# Patient Record
Sex: MALE | Race: WHITE | Employment: PART TIME
[De-identification: names, ages, dates, MRNs, and addresses within clinical notes are randomized per-mention and may not be internally consistent; named-entity substitution may affect disease eponyms.]

---

## 2017-02-11 ENCOUNTER — OFFICE VISIT (OUTPATIENT)
Dept: FAMILY MEDICINE CLINIC | Facility: CLINIC | Age: 52
End: 2017-02-11

## 2017-02-11 VITALS
OXYGEN SATURATION: 99 % | SYSTOLIC BLOOD PRESSURE: 166 MMHG | DIASTOLIC BLOOD PRESSURE: 94 MMHG | WEIGHT: 180 LBS | HEIGHT: 73 IN | TEMPERATURE: 97.4 F | BODY MASS INDEX: 23.86 KG/M2 | HEART RATE: 138 BPM

## 2017-02-11 DIAGNOSIS — B35.3 TINEA PEDIS OF BOTH FEET: ICD-10-CM

## 2017-02-11 DIAGNOSIS — B86 SCABIES: Primary | ICD-10-CM

## 2017-02-11 PROCEDURE — 99213 OFFICE O/P EST LOW 20 MIN: CPT | Performed by: INTERNAL MEDICINE

## 2017-02-11 RX ORDER — METOPROLOL SUCCINATE 100 MG/1
100 TABLET, EXTENDED RELEASE ORAL 2 TIMES DAILY
COMMUNITY
Start: 2017-01-01 | End: 2018-04-04 | Stop reason: SDUPTHER

## 2017-02-11 RX ORDER — PRENATAL VIT 91/IRON/FOLIC/DHA 28-975-200
COMBINATION PACKAGE (EA) ORAL
Qty: 42 G | Refills: 0 | Status: SHIPPED | OUTPATIENT
Start: 2017-02-11 | End: 2017-03-10

## 2017-02-11 RX ORDER — PERMETHRIN 50 MG/G
CREAM TOPICAL
Qty: 60 G | Refills: 0 | Status: SHIPPED | OUTPATIENT
Start: 2017-02-11 | End: 2017-03-10

## 2017-02-11 ASSESSMENT — ENCOUNTER SYMPTOMS
SHORTNESS OF BREATH: 0
COUGH: 0

## 2017-03-02 ENCOUNTER — HOSPITAL ENCOUNTER (INPATIENT)
Age: 52
LOS: 4 days | Discharge: HOME | DRG: 383 | End: 2017-03-06
Attending: EMERGENCY MEDICINE | Admitting: INTERNAL MEDICINE
Payer: MEDICAID

## 2017-03-02 ENCOUNTER — APPOINTMENT (OUTPATIENT)
Dept: GENERAL RADIOLOGY | Age: 52
DRG: 383 | End: 2017-03-02
Payer: MEDICAID

## 2017-03-02 DIAGNOSIS — L03.119 CELLULITIS OF LOWER EXTREMITY, UNSPECIFIED LATERALITY: Primary | ICD-10-CM

## 2017-03-02 PROBLEM — F10.20 ALCOHOLISM (HCC): Status: ACTIVE | Noted: 2017-03-02

## 2017-03-02 PROBLEM — B86 SCABIES: Status: ACTIVE | Noted: 2017-03-02

## 2017-03-02 PROBLEM — L03.116 CELLULITIS OF LEFT LOWER EXTREMITY: Status: ACTIVE | Noted: 2017-03-02

## 2017-03-02 PROBLEM — L03.115 CELLULITIS OF RIGHT LOWER EXTREMITY: Status: ACTIVE | Noted: 2017-03-02

## 2017-03-02 LAB
ABSOLUTE EOS #: 0.3 K/UL (ref 0–0.4)
ABSOLUTE LYMPH #: 1.6 K/UL (ref 1–4.8)
ABSOLUTE MONO #: 0.9 K/UL (ref 0.1–1.3)
ALBUMIN SERPL-MCNC: 3.9 G/DL (ref 3.5–5.2)
ALBUMIN/GLOBULIN RATIO: ABNORMAL (ref 1–2.5)
ALP BLD-CCNC: 89 U/L (ref 40–129)
ALT SERPL-CCNC: 28 U/L (ref 5–41)
ANION GAP SERPL CALCULATED.3IONS-SCNC: 17 MMOL/L (ref 9–17)
AST SERPL-CCNC: 54 U/L
BASOPHILS # BLD: 1 % (ref 0–2)
BASOPHILS ABSOLUTE: 0.1 K/UL (ref 0–0.2)
BILIRUB SERPL-MCNC: 0.47 MG/DL (ref 0.3–1.2)
BUN BLDV-MCNC: 6 MG/DL (ref 6–20)
BUN/CREAT BLD: ABNORMAL (ref 9–20)
CALCIUM SERPL-MCNC: 8.7 MG/DL (ref 8.6–10.4)
CHLORIDE BLD-SCNC: 100 MMOL/L (ref 98–107)
CO2: 21 MMOL/L (ref 20–31)
CREAT SERPL-MCNC: <0.4 MG/DL (ref 0.7–1.2)
DIFFERENTIAL TYPE: ABNORMAL
EOSINOPHILS RELATIVE PERCENT: 3 % (ref 0–4)
GFR AFRICAN AMERICAN: ABNORMAL ML/MIN
GFR NON-AFRICAN AMERICAN: ABNORMAL ML/MIN
GFR SERPL CREATININE-BSD FRML MDRD: ABNORMAL ML/MIN/{1.73_M2}
GFR SERPL CREATININE-BSD FRML MDRD: ABNORMAL ML/MIN/{1.73_M2}
GLUCOSE BLD-MCNC: 90 MG/DL (ref 70–99)
HCT VFR BLD CALC: 40.3 % (ref 41–53)
HEMOGLOBIN: 13.6 G/DL (ref 13.5–17.5)
HIV AG/AB: NONREACTIVE
INR BLD: 1
LACTIC ACID: 2.3 MMOL/L (ref 0.5–2.2)
LYMPHOCYTES # BLD: 18 % (ref 24–44)
MCH RBC QN AUTO: 36.8 PG (ref 26–34)
MCHC RBC AUTO-ENTMCNC: 33.8 G/DL (ref 31–37)
MCV RBC AUTO: 108.9 FL (ref 80–100)
MONOCYTES # BLD: 10 % (ref 1–7)
PARTIAL THROMBOPLASTIN TIME: 26.4 SEC (ref 23–31)
PDW BLD-RTO: 13.6 % (ref 11.5–14.9)
PLATELET # BLD: 173 K/UL (ref 150–450)
PLATELET ESTIMATE: ABNORMAL
PMV BLD AUTO: 8.7 FL (ref 6–12)
POTASSIUM SERPL-SCNC: 4.1 MMOL/L (ref 3.7–5.3)
PROTHROMBIN TIME: 10.4 SEC (ref 9.7–12)
RBC # BLD: 3.7 M/UL (ref 4.5–5.9)
RBC # BLD: ABNORMAL 10*6/UL
SEG NEUTROPHILS: 68 % (ref 36–66)
SEGMENTED NEUTROPHILS ABSOLUTE COUNT: 6 K/UL (ref 1.3–9.1)
SODIUM BLD-SCNC: 138 MMOL/L (ref 135–144)
TOTAL PROTEIN: 8 G/DL (ref 6.4–8.3)
WBC # BLD: 8.8 K/UL (ref 3.5–11)
WBC # BLD: ABNORMAL 10*3/UL

## 2017-03-02 PROCEDURE — 87389 HIV-1 AG W/HIV-1&-2 AB AG IA: CPT

## 2017-03-02 PROCEDURE — 99254 IP/OBS CNSLTJ NEW/EST MOD 60: CPT | Performed by: INTERNAL MEDICINE

## 2017-03-02 PROCEDURE — 6360000002 HC RX W HCPCS: Performed by: STUDENT IN AN ORGANIZED HEALTH CARE EDUCATION/TRAINING PROGRAM

## 2017-03-02 PROCEDURE — 96375 TX/PRO/DX INJ NEW DRUG ADDON: CPT

## 2017-03-02 PROCEDURE — S0028 INJECTION, FAMOTIDINE, 20 MG: HCPCS | Performed by: FAMILY MEDICINE

## 2017-03-02 PROCEDURE — 2580000003 HC RX 258: Performed by: EMERGENCY MEDICINE

## 2017-03-02 PROCEDURE — 6360000002 HC RX W HCPCS: Performed by: EMERGENCY MEDICINE

## 2017-03-02 PROCEDURE — 2500000003 HC RX 250 WO HCPCS: Performed by: FAMILY MEDICINE

## 2017-03-02 PROCEDURE — 99223 1ST HOSP IP/OBS HIGH 75: CPT | Performed by: INTERNAL MEDICINE

## 2017-03-02 PROCEDURE — 36415 COLL VENOUS BLD VENIPUNCTURE: CPT

## 2017-03-02 PROCEDURE — 99285 EMERGENCY DEPT VISIT HI MDM: CPT

## 2017-03-02 PROCEDURE — 73630 X-RAY EXAM OF FOOT: CPT

## 2017-03-02 PROCEDURE — 2500000003 HC RX 250 WO HCPCS: Performed by: STUDENT IN AN ORGANIZED HEALTH CARE EDUCATION/TRAINING PROGRAM

## 2017-03-02 PROCEDURE — 87040 BLOOD CULTURE FOR BACTERIA: CPT

## 2017-03-02 PROCEDURE — 85610 PROTHROMBIN TIME: CPT

## 2017-03-02 PROCEDURE — 85730 THROMBOPLASTIN TIME PARTIAL: CPT

## 2017-03-02 PROCEDURE — 83605 ASSAY OF LACTIC ACID: CPT

## 2017-03-02 PROCEDURE — 85025 COMPLETE CBC W/AUTO DIFF WBC: CPT

## 2017-03-02 PROCEDURE — 6360000002 HC RX W HCPCS: Performed by: FAMILY MEDICINE

## 2017-03-02 PROCEDURE — 2580000003 HC RX 258: Performed by: STUDENT IN AN ORGANIZED HEALTH CARE EDUCATION/TRAINING PROGRAM

## 2017-03-02 PROCEDURE — 1200000000 HC SEMI PRIVATE

## 2017-03-02 PROCEDURE — 6370000000 HC RX 637 (ALT 250 FOR IP): Performed by: STUDENT IN AN ORGANIZED HEALTH CARE EDUCATION/TRAINING PROGRAM

## 2017-03-02 PROCEDURE — 96374 THER/PROPH/DIAG INJ IV PUSH: CPT

## 2017-03-02 PROCEDURE — 80053 COMPREHEN METABOLIC PANEL: CPT

## 2017-03-02 RX ORDER — LORAZEPAM 1 MG/1
1 TABLET ORAL
Status: DISCONTINUED | OUTPATIENT
Start: 2017-03-02 | End: 2017-03-06 | Stop reason: HOSPADM

## 2017-03-02 RX ORDER — LORAZEPAM 1 MG/1
3 TABLET ORAL
Status: DISCONTINUED | OUTPATIENT
Start: 2017-03-02 | End: 2017-03-06 | Stop reason: HOSPADM

## 2017-03-02 RX ORDER — SODIUM CHLORIDE 0.9 % (FLUSH) 0.9 %
10 SYRINGE (ML) INJECTION PRN
Status: DISCONTINUED | OUTPATIENT
Start: 2017-03-02 | End: 2017-03-06 | Stop reason: HOSPADM

## 2017-03-02 RX ORDER — LORAZEPAM 1 MG/1
4 TABLET ORAL
Status: DISCONTINUED | OUTPATIENT
Start: 2017-03-02 | End: 2017-03-06 | Stop reason: HOSPADM

## 2017-03-02 RX ORDER — MORPHINE SULFATE 4 MG/ML
4 INJECTION, SOLUTION INTRAMUSCULAR; INTRAVENOUS ONCE
Status: COMPLETED | OUTPATIENT
Start: 2017-03-02 | End: 2017-03-02

## 2017-03-02 RX ORDER — ONDANSETRON 2 MG/ML
4 INJECTION INTRAMUSCULAR; INTRAVENOUS EVERY 6 HOURS PRN
Status: DISCONTINUED | OUTPATIENT
Start: 2017-03-02 | End: 2017-03-06 | Stop reason: HOSPADM

## 2017-03-02 RX ORDER — ONDANSETRON 2 MG/ML
4 INJECTION INTRAMUSCULAR; INTRAVENOUS ONCE
Status: COMPLETED | OUTPATIENT
Start: 2017-03-02 | End: 2017-03-02

## 2017-03-02 RX ORDER — 0.9 % SODIUM CHLORIDE 0.9 %
1000 INTRAVENOUS SOLUTION INTRAVENOUS ONCE
Status: COMPLETED | OUTPATIENT
Start: 2017-03-02 | End: 2017-03-02

## 2017-03-02 RX ORDER — LORAZEPAM 2 MG/ML
1 INJECTION INTRAMUSCULAR
Status: DISCONTINUED | OUTPATIENT
Start: 2017-03-02 | End: 2017-03-06 | Stop reason: HOSPADM

## 2017-03-02 RX ORDER — PERMETHRIN 50 MG/G
CREAM TOPICAL ONCE
Status: DISCONTINUED | OUTPATIENT
Start: 2017-03-02 | End: 2017-03-02

## 2017-03-02 RX ORDER — VIT B COMP NO.3/FOLIC/C/BIOTIN 1 MG-60 MG
1 TABLET ORAL DAILY
Status: DISCONTINUED | OUTPATIENT
Start: 2017-03-03 | End: 2017-03-06 | Stop reason: HOSPADM

## 2017-03-02 RX ORDER — ACETAMINOPHEN 325 MG/1
650 TABLET ORAL EVERY 4 HOURS PRN
Status: DISCONTINUED | OUTPATIENT
Start: 2017-03-02 | End: 2017-03-06 | Stop reason: HOSPADM

## 2017-03-02 RX ORDER — SODIUM CHLORIDE 0.9 % (FLUSH) 0.9 %
10 SYRINGE (ML) INJECTION EVERY 12 HOURS SCHEDULED
Status: DISCONTINUED | OUTPATIENT
Start: 2017-03-02 | End: 2017-03-06 | Stop reason: HOSPADM

## 2017-03-02 RX ORDER — NICOTINE 21 MG/24HR
1 PATCH, TRANSDERMAL 24 HOURS TRANSDERMAL DAILY
Status: DISCONTINUED | OUTPATIENT
Start: 2017-03-02 | End: 2017-03-06 | Stop reason: HOSPADM

## 2017-03-02 RX ORDER — LORAZEPAM 2 MG/ML
2 INJECTION INTRAMUSCULAR
Status: DISCONTINUED | OUTPATIENT
Start: 2017-03-02 | End: 2017-03-06 | Stop reason: HOSPADM

## 2017-03-02 RX ORDER — LORAZEPAM 2 MG/ML
4 INJECTION INTRAMUSCULAR
Status: DISCONTINUED | OUTPATIENT
Start: 2017-03-02 | End: 2017-03-06 | Stop reason: HOSPADM

## 2017-03-02 RX ORDER — LORAZEPAM 1 MG/1
2 TABLET ORAL
Status: DISCONTINUED | OUTPATIENT
Start: 2017-03-02 | End: 2017-03-06 | Stop reason: HOSPADM

## 2017-03-02 RX ORDER — LORAZEPAM 2 MG/ML
3 INJECTION INTRAMUSCULAR
Status: DISCONTINUED | OUTPATIENT
Start: 2017-03-02 | End: 2017-03-06 | Stop reason: HOSPADM

## 2017-03-02 RX ORDER — PERMETHRIN 50 MG/G
CREAM TOPICAL ONCE
Status: COMPLETED | OUTPATIENT
Start: 2017-03-02 | End: 2017-03-03

## 2017-03-02 RX ADMIN — LORAZEPAM 2 MG: 1 TABLET ORAL at 13:11

## 2017-03-02 RX ADMIN — PIPERACILLIN SODIUM,TAZOBACTAM SODIUM 3.38 G: 3; .375 INJECTION, POWDER, FOR SOLUTION INTRAVENOUS at 15:22

## 2017-03-02 RX ADMIN — MORPHINE SULFATE 4 MG: 4 INJECTION, SOLUTION INTRAMUSCULAR; INTRAVENOUS at 10:04

## 2017-03-02 RX ADMIN — SODIUM CHLORIDE 1000 ML: 9 INJECTION, SOLUTION INTRAVENOUS at 10:13

## 2017-03-02 RX ADMIN — DEXTROSE MONOHYDRATE 1000 MG: 50 INJECTION, SOLUTION INTRAVENOUS at 11:24

## 2017-03-02 RX ADMIN — ONDANSETRON 4 MG: 2 INJECTION INTRAMUSCULAR; INTRAVENOUS at 10:04

## 2017-03-02 RX ADMIN — FAMOTIDINE 20 MG: 10 INJECTION, SOLUTION INTRAVENOUS at 12:27

## 2017-03-02 RX ADMIN — LORAZEPAM 3 MG: 1 TABLET ORAL at 16:31

## 2017-03-02 RX ADMIN — ENOXAPARIN SODIUM 40 MG: 40 INJECTION SUBCUTANEOUS at 12:26

## 2017-03-02 RX ADMIN — FOLIC ACID: 5 INJECTION, SOLUTION INTRAMUSCULAR; INTRAVENOUS; SUBCUTANEOUS at 15:22

## 2017-03-02 ASSESSMENT — ENCOUNTER SYMPTOMS
ABDOMINAL PAIN: 0
COUGH: 0
DIARRHEA: 0
COLOR CHANGE: 1
GASTROINTESTINAL NEGATIVE: 1
NAUSEA: 0
SHORTNESS OF BREATH: 0
BACK PAIN: 0
RESPIRATORY NEGATIVE: 1
VOMITING: 0

## 2017-03-02 ASSESSMENT — PAIN DESCRIPTION - LOCATION: LOCATION: FOOT

## 2017-03-02 ASSESSMENT — PAIN SCALES - GENERAL
PAINLEVEL_OUTOF10: 7
PAINLEVEL_OUTOF10: 8
PAINLEVEL_OUTOF10: 1
PAINLEVEL_OUTOF10: 8

## 2017-03-02 ASSESSMENT — PAIN DESCRIPTION - DESCRIPTORS: DESCRIPTORS: ACHING

## 2017-03-02 ASSESSMENT — PAIN DESCRIPTION - ORIENTATION: ORIENTATION: RIGHT;LEFT

## 2017-03-03 PROBLEM — I10 ESSENTIAL HYPERTENSION: Status: ACTIVE | Noted: 2017-03-03

## 2017-03-03 LAB
ABSOLUTE EOS #: 0.3 K/UL (ref 0–0.4)
ABSOLUTE LYMPH #: 1.1 K/UL (ref 1–4.8)
ABSOLUTE MONO #: 0.7 K/UL (ref 0.1–1.3)
ANION GAP SERPL CALCULATED.3IONS-SCNC: 16 MMOL/L (ref 9–17)
BASOPHILS # BLD: 1 % (ref 0–2)
BASOPHILS ABSOLUTE: 0 K/UL (ref 0–0.2)
BUN BLDV-MCNC: 9 MG/DL (ref 6–20)
BUN/CREAT BLD: ABNORMAL (ref 9–20)
CALCIUM SERPL-MCNC: 8.8 MG/DL (ref 8.6–10.4)
CHLORIDE BLD-SCNC: 96 MMOL/L (ref 98–107)
CO2: 22 MMOL/L (ref 20–31)
CREAT SERPL-MCNC: 0.48 MG/DL (ref 0.7–1.2)
DIFFERENTIAL TYPE: ABNORMAL
EOSINOPHILS RELATIVE PERCENT: 4 % (ref 0–4)
GFR AFRICAN AMERICAN: >60 ML/MIN
GFR NON-AFRICAN AMERICAN: >60 ML/MIN
GFR SERPL CREATININE-BSD FRML MDRD: ABNORMAL ML/MIN/{1.73_M2}
GFR SERPL CREATININE-BSD FRML MDRD: ABNORMAL ML/MIN/{1.73_M2}
GLUCOSE BLD-MCNC: 105 MG/DL (ref 70–99)
HCT VFR BLD CALC: 40.2 % (ref 41–53)
HEMOGLOBIN: 13.4 G/DL (ref 13.5–17.5)
LYMPHOCYTES # BLD: 16 % (ref 24–44)
MCH RBC QN AUTO: 36.3 PG (ref 26–34)
MCHC RBC AUTO-ENTMCNC: 33.2 G/DL (ref 31–37)
MCV RBC AUTO: 109.3 FL (ref 80–100)
MONOCYTES # BLD: 11 % (ref 1–7)
PDW BLD-RTO: 13.5 % (ref 11.5–14.9)
PLATELET # BLD: 134 K/UL (ref 150–450)
PLATELET ESTIMATE: ABNORMAL
PMV BLD AUTO: 8.6 FL (ref 6–12)
POTASSIUM SERPL-SCNC: 3.9 MMOL/L (ref 3.7–5.3)
RBC # BLD: 3.68 M/UL (ref 4.5–5.9)
RBC # BLD: ABNORMAL 10*6/UL
SEG NEUTROPHILS: 68 % (ref 36–66)
SEGMENTED NEUTROPHILS ABSOLUTE COUNT: 4.7 K/UL (ref 1.3–9.1)
SODIUM BLD-SCNC: 134 MMOL/L (ref 135–144)
WBC # BLD: 6.8 K/UL (ref 3.5–11)
WBC # BLD: ABNORMAL 10*3/UL

## 2017-03-03 PROCEDURE — 97116 GAIT TRAINING THERAPY: CPT

## 2017-03-03 PROCEDURE — S0028 INJECTION, FAMOTIDINE, 20 MG: HCPCS | Performed by: FAMILY MEDICINE

## 2017-03-03 PROCEDURE — 97162 PT EVAL MOD COMPLEX 30 MIN: CPT

## 2017-03-03 PROCEDURE — 6370000000 HC RX 637 (ALT 250 FOR IP): Performed by: INTERNAL MEDICINE

## 2017-03-03 PROCEDURE — 6360000002 HC RX W HCPCS: Performed by: STUDENT IN AN ORGANIZED HEALTH CARE EDUCATION/TRAINING PROGRAM

## 2017-03-03 PROCEDURE — 1200000000 HC SEMI PRIVATE

## 2017-03-03 PROCEDURE — 85025 COMPLETE CBC W/AUTO DIFF WBC: CPT

## 2017-03-03 PROCEDURE — 80048 BASIC METABOLIC PNL TOTAL CA: CPT

## 2017-03-03 PROCEDURE — 6370000000 HC RX 637 (ALT 250 FOR IP): Performed by: STUDENT IN AN ORGANIZED HEALTH CARE EDUCATION/TRAINING PROGRAM

## 2017-03-03 PROCEDURE — 2580000003 HC RX 258: Performed by: STUDENT IN AN ORGANIZED HEALTH CARE EDUCATION/TRAINING PROGRAM

## 2017-03-03 PROCEDURE — G8987 SELF CARE CURRENT STATUS: HCPCS

## 2017-03-03 PROCEDURE — 6370000000 HC RX 637 (ALT 250 FOR IP): Performed by: NURSE PRACTITIONER

## 2017-03-03 PROCEDURE — 6360000002 HC RX W HCPCS: Performed by: FAMILY MEDICINE

## 2017-03-03 PROCEDURE — 99233 SBSQ HOSP IP/OBS HIGH 50: CPT | Performed by: INTERNAL MEDICINE

## 2017-03-03 PROCEDURE — 6360000002 HC RX W HCPCS: Performed by: INTERNAL MEDICINE

## 2017-03-03 PROCEDURE — 2500000003 HC RX 250 WO HCPCS: Performed by: NURSE PRACTITIONER

## 2017-03-03 PROCEDURE — 2580000003 HC RX 258: Performed by: FAMILY MEDICINE

## 2017-03-03 PROCEDURE — 2500000003 HC RX 250 WO HCPCS: Performed by: FAMILY MEDICINE

## 2017-03-03 PROCEDURE — 97166 OT EVAL MOD COMPLEX 45 MIN: CPT

## 2017-03-03 PROCEDURE — 2580000003 HC RX 258: Performed by: INTERNAL MEDICINE

## 2017-03-03 PROCEDURE — G8988 SELF CARE GOAL STATUS: HCPCS

## 2017-03-03 PROCEDURE — 97530 THERAPEUTIC ACTIVITIES: CPT

## 2017-03-03 PROCEDURE — 36415 COLL VENOUS BLD VENIPUNCTURE: CPT

## 2017-03-03 RX ORDER — PRENATAL VIT 91/IRON/FOLIC/DHA 28-975-200
COMBINATION PACKAGE (EA) ORAL 2 TIMES DAILY
Status: DISCONTINUED | OUTPATIENT
Start: 2017-03-03 | End: 2017-03-06 | Stop reason: HOSPADM

## 2017-03-03 RX ORDER — FLUCONAZOLE 100 MG/1
200 TABLET ORAL DAILY
Status: DISCONTINUED | OUTPATIENT
Start: 2017-03-03 | End: 2017-03-06 | Stop reason: HOSPADM

## 2017-03-03 RX ORDER — METOPROLOL SUCCINATE 100 MG/1
100 TABLET, EXTENDED RELEASE ORAL DAILY
Status: DISCONTINUED | OUTPATIENT
Start: 2017-03-03 | End: 2017-03-06 | Stop reason: HOSPADM

## 2017-03-03 RX ADMIN — LORAZEPAM 3 MG: 2 INJECTION INTRAMUSCULAR; INTRAVENOUS at 03:40

## 2017-03-03 RX ADMIN — METOPROLOL SUCCINATE 100 MG: 100 TABLET, EXTENDED RELEASE ORAL at 07:34

## 2017-03-03 RX ADMIN — PIPERACILLIN SODIUM,TAZOBACTAM SODIUM 3.38 G: 3; .375 INJECTION, POWDER, FOR SOLUTION INTRAVENOUS at 00:07

## 2017-03-03 RX ADMIN — LORAZEPAM 2 MG: 1 TABLET ORAL at 17:18

## 2017-03-03 RX ADMIN — FAMOTIDINE 20 MG: 10 INJECTION, SOLUTION INTRAVENOUS at 07:34

## 2017-03-03 RX ADMIN — METOPROLOL TARTRATE 5 MG: 5 INJECTION INTRAVENOUS at 07:34

## 2017-03-03 RX ADMIN — PIPERACILLIN SODIUM,TAZOBACTAM SODIUM 3.38 G: 3; .375 INJECTION, POWDER, FOR SOLUTION INTRAVENOUS at 15:19

## 2017-03-03 RX ADMIN — PIPERACILLIN SODIUM,TAZOBACTAM SODIUM 3.38 G: 3; .375 INJECTION, POWDER, FOR SOLUTION INTRAVENOUS at 05:59

## 2017-03-03 RX ADMIN — LORAZEPAM 4 MG: 2 INJECTION INTRAMUSCULAR; INTRAVENOUS at 07:33

## 2017-03-03 RX ADMIN — VANCOMYCIN HYDROCHLORIDE 1250 MG: 5 INJECTION, POWDER, LYOPHILIZED, FOR SOLUTION INTRAVENOUS at 13:03

## 2017-03-03 RX ADMIN — FAMOTIDINE 20 MG: 10 INJECTION, SOLUTION INTRAVENOUS at 00:04

## 2017-03-03 RX ADMIN — FAMOTIDINE 20 MG: 10 INJECTION, SOLUTION INTRAVENOUS at 20:09

## 2017-03-03 RX ADMIN — PERMETHRIN: 50 CREAM TOPICAL at 00:08

## 2017-03-03 RX ADMIN — LORAZEPAM 4 MG: 2 INJECTION INTRAMUSCULAR; INTRAVENOUS at 10:14

## 2017-03-03 RX ADMIN — PIPERACILLIN SODIUM,TAZOBACTAM SODIUM 3.38 G: 3; .375 INJECTION, POWDER, FOR SOLUTION INTRAVENOUS at 18:48

## 2017-03-03 RX ADMIN — Medication 10 ML: at 18:48

## 2017-03-03 RX ADMIN — Medication 1 TABLET: at 07:44

## 2017-03-03 RX ADMIN — LORAZEPAM 2 MG: 2 INJECTION INTRAMUSCULAR; INTRAVENOUS at 18:46

## 2017-03-03 RX ADMIN — Medication 10 ML: at 07:35

## 2017-03-03 RX ADMIN — LORAZEPAM 3 MG: 1 TABLET ORAL at 00:03

## 2017-03-03 RX ADMIN — LORAZEPAM 2 MG: 2 INJECTION INTRAMUSCULAR; INTRAVENOUS at 16:01

## 2017-03-03 RX ADMIN — LORAZEPAM 2 MG: 2 INJECTION INTRAMUSCULAR; INTRAVENOUS at 13:03

## 2017-03-03 RX ADMIN — TERBINAFINE HYDROCHLORIDE: 1 CREAM TOPICAL at 20:08

## 2017-03-03 RX ADMIN — FLUCONAZOLE 200 MG: 100 TABLET ORAL at 13:13

## 2017-03-03 RX ADMIN — Medication 10 ML: at 07:40

## 2017-03-03 RX ADMIN — ONDANSETRON 4 MG: 2 INJECTION INTRAMUSCULAR; INTRAVENOUS at 07:59

## 2017-03-03 RX ADMIN — METOPROLOL TARTRATE 5 MG: 5 INJECTION INTRAVENOUS at 03:41

## 2017-03-03 RX ADMIN — LORAZEPAM 2 MG: 1 TABLET ORAL at 05:59

## 2017-03-03 RX ADMIN — Medication 10 ML: at 13:04

## 2017-03-03 RX ADMIN — VANCOMYCIN HYDROCHLORIDE 1250 MG: 5 INJECTION, POWDER, LYOPHILIZED, FOR SOLUTION INTRAVENOUS at 01:17

## 2017-03-03 RX ADMIN — LORAZEPAM 3 MG: 2 INJECTION INTRAMUSCULAR; INTRAVENOUS at 20:09

## 2017-03-03 ASSESSMENT — PAIN DESCRIPTION - DESCRIPTORS
DESCRIPTORS_2: HEADACHE
DESCRIPTORS: ACHING
DESCRIPTORS: ACHING

## 2017-03-03 ASSESSMENT — PAIN DESCRIPTION - PAIN TYPE
TYPE: ACUTE PAIN
TYPE_2: CHRONIC PAIN
TYPE: ACUTE PAIN

## 2017-03-03 ASSESSMENT — PAIN DESCRIPTION - LOCATION
LOCATION_2: HEAD
LOCATION: FOOT
LOCATION: FOOT

## 2017-03-03 ASSESSMENT — PAIN DESCRIPTION - FREQUENCY: FREQUENCY: CONTINUOUS

## 2017-03-03 ASSESSMENT — PAIN DESCRIPTION - ORIENTATION
ORIENTATION_2: ANTERIOR
ORIENTATION: RIGHT;LEFT
ORIENTATION: RIGHT;LEFT

## 2017-03-03 ASSESSMENT — PAIN DESCRIPTION - PROGRESSION
CLINICAL_PROGRESSION_2: NOT CHANGED
CLINICAL_PROGRESSION: NOT CHANGED

## 2017-03-03 ASSESSMENT — PAIN DESCRIPTION - ONSET
ONSET: ON-GOING
ONSET_2: ON-GOING

## 2017-03-03 ASSESSMENT — PAIN SCALES - GENERAL
PAINLEVEL_OUTOF10: 6
PAINLEVEL_OUTOF10: 6

## 2017-03-03 ASSESSMENT — PAIN DESCRIPTION - DURATION: DURATION_2: CONTINUOUS

## 2017-03-03 ASSESSMENT — PAIN DESCRIPTION - INTENSITY: RATING_2: 10

## 2017-03-04 PROBLEM — Q82.8 HYPERKERATOSIS OF PALMS AND SOLES: Status: ACTIVE | Noted: 2017-03-04

## 2017-03-04 LAB
ABSOLUTE EOS #: 0.3 K/UL (ref 0–0.4)
ABSOLUTE LYMPH #: 0.7 K/UL (ref 1–4.8)
ABSOLUTE MONO #: 0.7 K/UL (ref 0.1–1.3)
ANION GAP SERPL CALCULATED.3IONS-SCNC: 14 MMOL/L (ref 9–17)
BASOPHILS # BLD: 1 % (ref 0–2)
BASOPHILS ABSOLUTE: 0.1 K/UL (ref 0–0.2)
BUN BLDV-MCNC: 10 MG/DL (ref 6–20)
BUN/CREAT BLD: ABNORMAL (ref 9–20)
CALCIUM SERPL-MCNC: 9 MG/DL (ref 8.6–10.4)
CHLORIDE BLD-SCNC: 98 MMOL/L (ref 98–107)
CO2: 23 MMOL/L (ref 20–31)
CREAT SERPL-MCNC: 0.43 MG/DL (ref 0.7–1.2)
DIFFERENTIAL TYPE: ABNORMAL
EOSINOPHILS RELATIVE PERCENT: 5 % (ref 0–4)
GFR AFRICAN AMERICAN: >60 ML/MIN
GFR NON-AFRICAN AMERICAN: >60 ML/MIN
GFR SERPL CREATININE-BSD FRML MDRD: ABNORMAL ML/MIN/{1.73_M2}
GFR SERPL CREATININE-BSD FRML MDRD: ABNORMAL ML/MIN/{1.73_M2}
GLUCOSE BLD-MCNC: 95 MG/DL (ref 70–99)
HCT VFR BLD CALC: 39.1 % (ref 41–53)
HEMOGLOBIN: 13.4 G/DL (ref 13.5–17.5)
LYMPHOCYTES # BLD: 11 % (ref 24–44)
MCH RBC QN AUTO: 37 PG (ref 26–34)
MCHC RBC AUTO-ENTMCNC: 34.3 G/DL (ref 31–37)
MCV RBC AUTO: 107.9 FL (ref 80–100)
MONOCYTES # BLD: 11 % (ref 1–7)
PDW BLD-RTO: 13.6 % (ref 11.5–14.9)
PLATELET # BLD: 141 K/UL (ref 150–450)
PLATELET ESTIMATE: ABNORMAL
PMV BLD AUTO: 9.2 FL (ref 6–12)
POTASSIUM SERPL-SCNC: 3.6 MMOL/L (ref 3.7–5.3)
RBC # BLD: 3.63 M/UL (ref 4.5–5.9)
RBC # BLD: ABNORMAL 10*6/UL
SEG NEUTROPHILS: 72 % (ref 36–66)
SEGMENTED NEUTROPHILS ABSOLUTE COUNT: 4.4 K/UL (ref 1.3–9.1)
SODIUM BLD-SCNC: 135 MMOL/L (ref 135–144)
WBC # BLD: 6.2 K/UL (ref 3.5–11)
WBC # BLD: ABNORMAL 10*3/UL

## 2017-03-04 PROCEDURE — 6370000000 HC RX 637 (ALT 250 FOR IP): Performed by: STUDENT IN AN ORGANIZED HEALTH CARE EDUCATION/TRAINING PROGRAM

## 2017-03-04 PROCEDURE — 85025 COMPLETE CBC W/AUTO DIFF WBC: CPT

## 2017-03-04 PROCEDURE — 6360000002 HC RX W HCPCS: Performed by: STUDENT IN AN ORGANIZED HEALTH CARE EDUCATION/TRAINING PROGRAM

## 2017-03-04 PROCEDURE — 99233 SBSQ HOSP IP/OBS HIGH 50: CPT | Performed by: INTERNAL MEDICINE

## 2017-03-04 PROCEDURE — 97116 GAIT TRAINING THERAPY: CPT

## 2017-03-04 PROCEDURE — 1200000000 HC SEMI PRIVATE

## 2017-03-04 PROCEDURE — 6370000000 HC RX 637 (ALT 250 FOR IP): Performed by: INTERNAL MEDICINE

## 2017-03-04 PROCEDURE — 2500000003 HC RX 250 WO HCPCS: Performed by: NURSE PRACTITIONER

## 2017-03-04 PROCEDURE — 36415 COLL VENOUS BLD VENIPUNCTURE: CPT

## 2017-03-04 PROCEDURE — 6370000000 HC RX 637 (ALT 250 FOR IP): Performed by: FAMILY MEDICINE

## 2017-03-04 PROCEDURE — 80048 BASIC METABOLIC PNL TOTAL CA: CPT

## 2017-03-04 PROCEDURE — 2580000003 HC RX 258: Performed by: STUDENT IN AN ORGANIZED HEALTH CARE EDUCATION/TRAINING PROGRAM

## 2017-03-04 PROCEDURE — 6370000000 HC RX 637 (ALT 250 FOR IP): Performed by: NURSE PRACTITIONER

## 2017-03-04 PROCEDURE — 97530 THERAPEUTIC ACTIVITIES: CPT

## 2017-03-04 RX ORDER — GINSENG 100 MG
CAPSULE ORAL 3 TIMES DAILY
Status: DISCONTINUED | OUTPATIENT
Start: 2017-03-04 | End: 2017-03-06 | Stop reason: HOSPADM

## 2017-03-04 RX ORDER — DIPHENHYDRAMINE HCL 25 MG
25 TABLET ORAL EVERY 6 HOURS PRN
Status: DISCONTINUED | OUTPATIENT
Start: 2017-03-04 | End: 2017-03-06 | Stop reason: HOSPADM

## 2017-03-04 RX ORDER — CEPHALEXIN 500 MG/1
500 CAPSULE ORAL EVERY 12 HOURS SCHEDULED
Status: DISCONTINUED | OUTPATIENT
Start: 2017-03-04 | End: 2017-03-06 | Stop reason: HOSPADM

## 2017-03-04 RX ORDER — ZIPRASIDONE MESYLATE 20 MG/ML
1 INJECTION, POWDER, LYOPHILIZED, FOR SOLUTION INTRAMUSCULAR ONCE
Status: DISCONTINUED | OUTPATIENT
Start: 2017-03-04 | End: 2017-03-04

## 2017-03-04 RX ORDER — CEPHALEXIN 250 MG/1
250 CAPSULE ORAL EVERY 6 HOURS SCHEDULED
Status: DISCONTINUED | OUTPATIENT
Start: 2017-03-04 | End: 2017-03-04

## 2017-03-04 RX ORDER — ZIPRASIDONE MESYLATE 20 MG/ML
20 INJECTION, POWDER, LYOPHILIZED, FOR SOLUTION INTRAMUSCULAR
Status: ACTIVE | OUTPATIENT
Start: 2017-03-04 | End: 2017-03-04

## 2017-03-04 RX ORDER — ZIPRASIDONE MESYLATE 20 MG/ML
1 INJECTION, POWDER, LYOPHILIZED, FOR SOLUTION INTRAMUSCULAR
Status: DISCONTINUED | OUTPATIENT
Start: 2017-03-04 | End: 2017-03-04

## 2017-03-04 RX ADMIN — TERBINAFINE HYDROCHLORIDE: 1 CREAM TOPICAL at 19:45

## 2017-03-04 RX ADMIN — CEPHALEXIN 500 MG: 500 CAPSULE ORAL at 19:44

## 2017-03-04 RX ADMIN — Medication 1 TABLET: at 09:22

## 2017-03-04 RX ADMIN — FLUCONAZOLE 200 MG: 100 TABLET ORAL at 09:19

## 2017-03-04 RX ADMIN — LORAZEPAM 4 MG: 1 TABLET ORAL at 13:13

## 2017-03-04 RX ADMIN — LORAZEPAM 4 MG: 1 TABLET ORAL at 04:09

## 2017-03-04 RX ADMIN — LORAZEPAM 4 MG: 1 TABLET ORAL at 16:30

## 2017-03-04 RX ADMIN — LORAZEPAM 1 MG: 1 TABLET ORAL at 09:20

## 2017-03-04 RX ADMIN — LORAZEPAM 4 MG: 1 TABLET ORAL at 19:45

## 2017-03-04 RX ADMIN — PIPERACILLIN SODIUM,TAZOBACTAM SODIUM 3.38 G: 3; .375 INJECTION, POWDER, FOR SOLUTION INTRAVENOUS at 01:04

## 2017-03-04 RX ADMIN — BACITRACIN: 500 OINTMENT TOPICAL at 13:13

## 2017-03-04 RX ADMIN — METOPROLOL SUCCINATE 100 MG: 100 TABLET, EXTENDED RELEASE ORAL at 09:19

## 2017-03-04 RX ADMIN — BACITRACIN: 500 OINTMENT TOPICAL at 22:25

## 2017-03-04 RX ADMIN — ACETAMINOPHEN 650 MG: 325 TABLET ORAL at 09:19

## 2017-03-04 RX ADMIN — TERBINAFINE HYDROCHLORIDE: 1 CREAM TOPICAL at 09:23

## 2017-03-04 RX ADMIN — METOPROLOL TARTRATE 5 MG: 5 INJECTION INTRAVENOUS at 01:04

## 2017-03-04 ASSESSMENT — PAIN SCALES - GENERAL
PAINLEVEL_OUTOF10: 0
PAINLEVEL_OUTOF10: 0

## 2017-03-05 LAB
ABSOLUTE EOS #: 0.3 K/UL (ref 0–0.4)
ABSOLUTE LYMPH #: 1 K/UL (ref 1–4.8)
ABSOLUTE MONO #: 0.9 K/UL (ref 0.1–1.3)
ANION GAP SERPL CALCULATED.3IONS-SCNC: 14 MMOL/L (ref 9–17)
BASOPHILS # BLD: 1 % (ref 0–2)
BASOPHILS ABSOLUTE: 0.1 K/UL (ref 0–0.2)
BUN BLDV-MCNC: 14 MG/DL (ref 6–20)
BUN/CREAT BLD: ABNORMAL (ref 9–20)
CALCIUM SERPL-MCNC: 8.9 MG/DL (ref 8.6–10.4)
CHLORIDE BLD-SCNC: 103 MMOL/L (ref 98–107)
CO2: 23 MMOL/L (ref 20–31)
CREAT SERPL-MCNC: 0.48 MG/DL (ref 0.7–1.2)
DIFFERENTIAL TYPE: ABNORMAL
EOSINOPHILS RELATIVE PERCENT: 4 % (ref 0–4)
GFR AFRICAN AMERICAN: >60 ML/MIN
GFR NON-AFRICAN AMERICAN: >60 ML/MIN
GFR SERPL CREATININE-BSD FRML MDRD: ABNORMAL ML/MIN/{1.73_M2}
GFR SERPL CREATININE-BSD FRML MDRD: ABNORMAL ML/MIN/{1.73_M2}
GLUCOSE BLD-MCNC: 101 MG/DL (ref 70–99)
HCT VFR BLD CALC: 38.3 % (ref 41–53)
HEMOGLOBIN: 13.6 G/DL (ref 13.5–17.5)
LYMPHOCYTES # BLD: 15 % (ref 24–44)
MCH RBC QN AUTO: 38.7 PG (ref 26–34)
MCHC RBC AUTO-ENTMCNC: 35.5 G/DL (ref 31–37)
MCV RBC AUTO: 109.1 FL (ref 80–100)
MONOCYTES # BLD: 13 % (ref 1–7)
PDW BLD-RTO: 13.6 % (ref 11.5–14.9)
PLATELET # BLD: 154 K/UL (ref 150–450)
PLATELET ESTIMATE: ABNORMAL
PMV BLD AUTO: 8.6 FL (ref 6–12)
POTASSIUM SERPL-SCNC: 3.8 MMOL/L (ref 3.7–5.3)
RBC # BLD: 3.51 M/UL (ref 4.5–5.9)
RBC # BLD: ABNORMAL 10*6/UL
SEG NEUTROPHILS: 67 % (ref 36–66)
SEGMENTED NEUTROPHILS ABSOLUTE COUNT: 4.5 K/UL (ref 1.3–9.1)
SODIUM BLD-SCNC: 140 MMOL/L (ref 135–144)
WBC # BLD: 6.8 K/UL (ref 3.5–11)
WBC # BLD: ABNORMAL 10*3/UL

## 2017-03-05 PROCEDURE — 6370000000 HC RX 637 (ALT 250 FOR IP): Performed by: NURSE PRACTITIONER

## 2017-03-05 PROCEDURE — 36415 COLL VENOUS BLD VENIPUNCTURE: CPT

## 2017-03-05 PROCEDURE — 6370000000 HC RX 637 (ALT 250 FOR IP): Performed by: STUDENT IN AN ORGANIZED HEALTH CARE EDUCATION/TRAINING PROGRAM

## 2017-03-05 PROCEDURE — 85025 COMPLETE CBC W/AUTO DIFF WBC: CPT

## 2017-03-05 PROCEDURE — 6370000000 HC RX 637 (ALT 250 FOR IP): Performed by: INTERNAL MEDICINE

## 2017-03-05 PROCEDURE — 1200000000 HC SEMI PRIVATE

## 2017-03-05 PROCEDURE — 97116 GAIT TRAINING THERAPY: CPT

## 2017-03-05 PROCEDURE — 99232 SBSQ HOSP IP/OBS MODERATE 35: CPT | Performed by: INTERNAL MEDICINE

## 2017-03-05 PROCEDURE — 80048 BASIC METABOLIC PNL TOTAL CA: CPT

## 2017-03-05 RX ADMIN — CEPHALEXIN 500 MG: 500 CAPSULE ORAL at 09:01

## 2017-03-05 RX ADMIN — FLUCONAZOLE 200 MG: 100 TABLET ORAL at 09:01

## 2017-03-05 RX ADMIN — LORAZEPAM 3 MG: 1 TABLET ORAL at 18:20

## 2017-03-05 RX ADMIN — CEPHALEXIN 500 MG: 500 CAPSULE ORAL at 20:41

## 2017-03-05 RX ADMIN — BACITRACIN: 500 OINTMENT TOPICAL at 20:42

## 2017-03-05 RX ADMIN — TERBINAFINE HYDROCHLORIDE: 1 CREAM TOPICAL at 20:43

## 2017-03-05 RX ADMIN — DIPHENHYDRAMINE HCL 25 MG: 25 TABLET ORAL at 20:41

## 2017-03-05 RX ADMIN — Medication 1 TABLET: at 09:00

## 2017-03-05 RX ADMIN — LORAZEPAM 3 MG: 1 TABLET ORAL at 14:17

## 2017-03-05 RX ADMIN — TERBINAFINE HYDROCHLORIDE: 1 CREAM TOPICAL at 09:03

## 2017-03-05 RX ADMIN — BACITRACIN: 500 OINTMENT TOPICAL at 09:02

## 2017-03-05 RX ADMIN — METOPROLOL SUCCINATE 100 MG: 100 TABLET, EXTENDED RELEASE ORAL at 09:01

## 2017-03-05 RX ADMIN — BACITRACIN: 500 OINTMENT TOPICAL at 14:17

## 2017-03-05 ASSESSMENT — ENCOUNTER SYMPTOMS
SPUTUM PRODUCTION: 0
VOMITING: 0
WHEEZING: 0
BLURRED VISION: 0
HEMOPTYSIS: 0
ABDOMINAL PAIN: 0
HEARTBURN: 0
COUGH: 0
CONSTIPATION: 0
SHORTNESS OF BREATH: 0
DIARRHEA: 0
NAUSEA: 0

## 2017-03-05 ASSESSMENT — PAIN SCALES - GENERAL
PAINLEVEL_OUTOF10: 0

## 2017-03-06 VITALS
OXYGEN SATURATION: 100 % | SYSTOLIC BLOOD PRESSURE: 131 MMHG | RESPIRATION RATE: 19 BRPM | HEART RATE: 98 BPM | DIASTOLIC BLOOD PRESSURE: 90 MMHG | HEIGHT: 73 IN | WEIGHT: 181.2 LBS | BODY MASS INDEX: 24.01 KG/M2 | TEMPERATURE: 98.4 F

## 2017-03-06 LAB
ABSOLUTE EOS #: 0.4 K/UL (ref 0–0.4)
ABSOLUTE LYMPH #: 1.5 K/UL (ref 1–4.8)
ABSOLUTE MONO #: 1.2 K/UL (ref 0.1–1.3)
ANION GAP SERPL CALCULATED.3IONS-SCNC: 17 MMOL/L (ref 9–17)
BASOPHILS # BLD: 1 % (ref 0–2)
BASOPHILS ABSOLUTE: 0.1 K/UL (ref 0–0.2)
BUN BLDV-MCNC: 12 MG/DL (ref 6–20)
BUN/CREAT BLD: ABNORMAL (ref 9–20)
CALCIUM SERPL-MCNC: 9.2 MG/DL (ref 8.6–10.4)
CHLORIDE BLD-SCNC: 102 MMOL/L (ref 98–107)
CO2: 21 MMOL/L (ref 20–31)
CREAT SERPL-MCNC: 0.52 MG/DL (ref 0.7–1.2)
DIFFERENTIAL TYPE: ABNORMAL
EOSINOPHILS RELATIVE PERCENT: 5 % (ref 0–4)
GFR AFRICAN AMERICAN: >60 ML/MIN
GFR NON-AFRICAN AMERICAN: >60 ML/MIN
GFR SERPL CREATININE-BSD FRML MDRD: ABNORMAL ML/MIN/{1.73_M2}
GFR SERPL CREATININE-BSD FRML MDRD: ABNORMAL ML/MIN/{1.73_M2}
GLUCOSE BLD-MCNC: 111 MG/DL (ref 70–99)
HCT VFR BLD CALC: 39 % (ref 41–53)
HEMOGLOBIN: 13.6 G/DL (ref 13.5–17.5)
LYMPHOCYTES # BLD: 18 % (ref 24–44)
MCH RBC QN AUTO: 38.1 PG (ref 26–34)
MCHC RBC AUTO-ENTMCNC: 34.8 G/DL (ref 31–37)
MCV RBC AUTO: 109.6 FL (ref 80–100)
MONOCYTES # BLD: 15 % (ref 1–7)
PDW BLD-RTO: 13.7 % (ref 11.5–14.9)
PLATELET # BLD: 185 K/UL (ref 150–450)
PLATELET ESTIMATE: ABNORMAL
PMV BLD AUTO: 8.8 FL (ref 6–12)
POTASSIUM SERPL-SCNC: 3.7 MMOL/L (ref 3.7–5.3)
RBC # BLD: 3.56 M/UL (ref 4.5–5.9)
RBC # BLD: ABNORMAL 10*6/UL
SEG NEUTROPHILS: 61 % (ref 36–66)
SEGMENTED NEUTROPHILS ABSOLUTE COUNT: 4.9 K/UL (ref 1.3–9.1)
SODIUM BLD-SCNC: 140 MMOL/L (ref 135–144)
WBC # BLD: 8 K/UL (ref 3.5–11)
WBC # BLD: ABNORMAL 10*3/UL

## 2017-03-06 PROCEDURE — 6370000000 HC RX 637 (ALT 250 FOR IP): Performed by: STUDENT IN AN ORGANIZED HEALTH CARE EDUCATION/TRAINING PROGRAM

## 2017-03-06 PROCEDURE — 80048 BASIC METABOLIC PNL TOTAL CA: CPT

## 2017-03-06 PROCEDURE — 99233 SBSQ HOSP IP/OBS HIGH 50: CPT | Performed by: INTERNAL MEDICINE

## 2017-03-06 PROCEDURE — 97110 THERAPEUTIC EXERCISES: CPT

## 2017-03-06 PROCEDURE — 6370000000 HC RX 637 (ALT 250 FOR IP): Performed by: INTERNAL MEDICINE

## 2017-03-06 PROCEDURE — 99232 SBSQ HOSP IP/OBS MODERATE 35: CPT | Performed by: INTERNAL MEDICINE

## 2017-03-06 PROCEDURE — 36415 COLL VENOUS BLD VENIPUNCTURE: CPT

## 2017-03-06 PROCEDURE — 85025 COMPLETE CBC W/AUTO DIFF WBC: CPT

## 2017-03-06 PROCEDURE — 97530 THERAPEUTIC ACTIVITIES: CPT

## 2017-03-06 PROCEDURE — 6370000000 HC RX 637 (ALT 250 FOR IP): Performed by: NURSE PRACTITIONER

## 2017-03-06 RX ORDER — CEPHALEXIN 500 MG/1
500 CAPSULE ORAL EVERY 12 HOURS SCHEDULED
Qty: 10 CAPSULE | Refills: 0 | Status: SHIPPED | OUTPATIENT
Start: 2017-03-06 | End: 2017-03-11

## 2017-03-06 RX ORDER — GINSENG 100 MG
CAPSULE ORAL
Qty: 1 TUBE | Refills: 0 | Status: SHIPPED | OUTPATIENT
Start: 2017-03-06 | End: 2017-03-16

## 2017-03-06 RX ORDER — CLOTRIMAZOLE 1 %
CREAM (GRAM) TOPICAL 2 TIMES DAILY
Status: DISCONTINUED | OUTPATIENT
Start: 2017-03-06 | End: 2017-03-06 | Stop reason: HOSPADM

## 2017-03-06 RX ORDER — CHLORDIAZEPOXIDE HYDROCHLORIDE 10 MG/1
10 CAPSULE, GELATIN COATED ORAL 2 TIMES DAILY PRN
Qty: 20 CAPSULE | Refills: 0 | Status: SHIPPED | OUTPATIENT
Start: 2017-03-06 | End: 2017-03-16

## 2017-03-06 RX ORDER — VIT B COMP NO.3/FOLIC/C/BIOTIN 1 MG-60 MG
1 TABLET ORAL DAILY
Qty: 30 TABLET | Refills: 0 | Status: SHIPPED | OUTPATIENT
Start: 2017-03-06 | End: 2017-05-22 | Stop reason: ALTCHOICE

## 2017-03-06 RX ORDER — NICOTINE 21 MG/24HR
1 PATCH, TRANSDERMAL 24 HOURS TRANSDERMAL DAILY
Qty: 14 PATCH | Refills: 0 | Status: SHIPPED | OUTPATIENT
Start: 2017-03-06 | End: 2017-03-10

## 2017-03-06 RX ORDER — CHLORDIAZEPOXIDE HYDROCHLORIDE 10 MG/1
10 CAPSULE, GELATIN COATED ORAL 3 TIMES DAILY PRN
Qty: 20 CAPSULE | Refills: 0 | Status: SHIPPED | OUTPATIENT
Start: 2017-03-06 | End: 2017-03-06

## 2017-03-06 RX ORDER — FLUCONAZOLE 200 MG/1
200 TABLET ORAL DAILY
Qty: 14 TABLET | Refills: 0 | Status: SHIPPED | OUTPATIENT
Start: 2017-03-06 | End: 2017-03-20

## 2017-03-06 RX ORDER — VIT B COMP NO.3/FOLIC/C/BIOTIN 1 MG-60 MG
1 TABLET ORAL DAILY
Qty: 30 TABLET | Refills: 3 | Status: SHIPPED | OUTPATIENT
Start: 2017-03-06 | End: 2017-03-06

## 2017-03-06 RX ORDER — CLOTRIMAZOLE 1 %
CREAM (GRAM) TOPICAL
Qty: 1 TUBE | Refills: 1 | Status: SHIPPED | OUTPATIENT
Start: 2017-03-06 | End: 2017-03-10 | Stop reason: SDUPTHER

## 2017-03-06 RX ADMIN — CEPHALEXIN 500 MG: 500 CAPSULE ORAL at 09:17

## 2017-03-06 RX ADMIN — METOPROLOL SUCCINATE 100 MG: 100 TABLET, EXTENDED RELEASE ORAL at 09:18

## 2017-03-06 RX ADMIN — LORAZEPAM 3 MG: 1 TABLET ORAL at 09:17

## 2017-03-06 RX ADMIN — Medication 1 TABLET: at 01:00

## 2017-03-06 RX ADMIN — LORAZEPAM 2 MG: 1 TABLET ORAL at 06:27

## 2017-03-06 RX ADMIN — BACITRACIN: 500 OINTMENT TOPICAL at 09:16

## 2017-03-06 RX ADMIN — DIPHENHYDRAMINE HCL 25 MG: 25 TABLET ORAL at 09:17

## 2017-03-06 RX ADMIN — FLUCONAZOLE 200 MG: 100 TABLET ORAL at 09:17

## 2017-03-06 RX ADMIN — TERBINAFINE HYDROCHLORIDE: 1 CREAM TOPICAL at 09:16

## 2017-03-06 ASSESSMENT — ENCOUNTER SYMPTOMS
COUGH: 0
DIARRHEA: 0
HEMOPTYSIS: 0
CONSTIPATION: 0
ABDOMINAL PAIN: 0
HEARTBURN: 0
WHEEZING: 0
NAUSEA: 0
VOMITING: 0
BLURRED VISION: 0
SHORTNESS OF BREATH: 0
SPUTUM PRODUCTION: 0

## 2017-03-06 ASSESSMENT — PAIN DESCRIPTION - PROGRESSION: CLINICAL_PROGRESSION: NOT CHANGED

## 2017-03-08 LAB
CULTURE: NORMAL
Lab: NORMAL
SPECIMEN DESCRIPTION: NORMAL
STATUS: NORMAL
STATUS: NORMAL

## 2017-03-10 ENCOUNTER — OFFICE VISIT (OUTPATIENT)
Dept: INTERNAL MEDICINE | Facility: CLINIC | Age: 52
End: 2017-03-10

## 2017-03-10 VITALS
HEIGHT: 73 IN | DIASTOLIC BLOOD PRESSURE: 74 MMHG | WEIGHT: 198 LBS | SYSTOLIC BLOOD PRESSURE: 122 MMHG | BODY MASS INDEX: 26.24 KG/M2

## 2017-03-10 DIAGNOSIS — L03.116 CELLULITIS OF LEFT LOWER EXTREMITY: Primary | ICD-10-CM

## 2017-03-10 DIAGNOSIS — F10.20 ALCOHOLISM (HCC): ICD-10-CM

## 2017-03-10 DIAGNOSIS — F17.200 SMOKER: ICD-10-CM

## 2017-03-10 DIAGNOSIS — Q82.8 HYPERKERATOSIS OF PALMS AND SOLES: ICD-10-CM

## 2017-03-10 DIAGNOSIS — F32.A DEPRESSION, UNSPECIFIED DEPRESSION TYPE: ICD-10-CM

## 2017-03-10 DIAGNOSIS — Z13.9 SCREENING: ICD-10-CM

## 2017-03-10 DIAGNOSIS — I10 ESSENTIAL HYPERTENSION: ICD-10-CM

## 2017-03-10 PROCEDURE — 99204 OFFICE O/P NEW MOD 45 MIN: CPT | Performed by: INTERNAL MEDICINE

## 2017-03-10 PROCEDURE — G0444 DEPRESSION SCREEN ANNUAL: HCPCS | Performed by: INTERNAL MEDICINE

## 2017-03-10 RX ORDER — ERGOCALCIFEROL 1.25 MG/1
CAPSULE ORAL
COMMUNITY
Start: 2016-03-02 | End: 2017-05-22 | Stop reason: ALTCHOICE

## 2017-03-10 RX ORDER — BUPROPION HYDROCHLORIDE 150 MG/1
150 TABLET ORAL EVERY MORNING
Qty: 30 TABLET | Refills: 3 | Status: SHIPPED | OUTPATIENT
Start: 2017-03-10 | End: 2020-03-12

## 2017-03-10 RX ORDER — CLOTRIMAZOLE 1 %
CREAM (GRAM) TOPICAL
Qty: 1 TUBE | Refills: 2 | Status: SHIPPED | OUTPATIENT
Start: 2017-03-10 | End: 2017-03-17

## 2017-03-10 ASSESSMENT — PATIENT HEALTH QUESTIONNAIRE - PHQ9
9. THOUGHTS THAT YOU WOULD BE BETTER OFF DEAD, OR OF HURTING YOURSELF: 0
3. TROUBLE FALLING OR STAYING ASLEEP: 3
1. LITTLE INTEREST OR PLEASURE IN DOING THINGS: 1
6. FEELING BAD ABOUT YOURSELF - OR THAT YOU ARE A FAILURE OR HAVE LET YOURSELF OR YOUR FAMILY DOWN: 3
SUM OF ALL RESPONSES TO PHQ QUESTIONS 1-9: 17
4. FEELING TIRED OR HAVING LITTLE ENERGY: 3
7. TROUBLE CONCENTRATING ON THINGS, SUCH AS READING THE NEWSPAPER OR WATCHING TELEVISION: 0
5. POOR APPETITE OR OVEREATING: 3
SUM OF ALL RESPONSES TO PHQ9 QUESTIONS 1 & 2: 4
2. FEELING DOWN, DEPRESSED OR HOPELESS: 3
10. IF YOU CHECKED OFF ANY PROBLEMS, HOW DIFFICULT HAVE THESE PROBLEMS MADE IT FOR YOU TO DO YOUR WORK, TAKE CARE OF THINGS AT HOME, OR GET ALONG WITH OTHER PEOPLE: 1
8. MOVING OR SPEAKING SO SLOWLY THAT OTHER PEOPLE COULD HAVE NOTICED. OR THE OPPOSITE, BEING SO FIGETY OR RESTLESS THAT YOU HAVE BEEN MOVING AROUND A LOT MORE THAN USUAL: 1

## 2017-03-10 ASSESSMENT — ENCOUNTER SYMPTOMS
ABDOMINAL PAIN: 0
EYE ITCHING: 0
CHOKING: 0
APNEA: 0
EYE DISCHARGE: 0
COUGH: 0
CHEST TIGHTNESS: 0
ABDOMINAL DISTENTION: 0
BACK PAIN: 0
EYE PAIN: 0

## 2017-03-31 ENCOUNTER — TELEPHONE (OUTPATIENT)
Dept: INTERNAL MEDICINE CLINIC | Age: 52
End: 2017-03-31

## 2017-04-07 ENCOUNTER — TELEPHONE (OUTPATIENT)
Dept: INTERNAL MEDICINE CLINIC | Age: 52
End: 2017-04-07

## 2017-05-22 ENCOUNTER — HOSPITAL ENCOUNTER (INPATIENT)
Age: 52
LOS: 8 days | Discharge: NURSING, REHAB OR LONG-TERM CARE | End: 2017-05-30
Attending: EMERGENCY MEDICINE | Admitting: INTERNAL MEDICINE
Payer: MEDICARE

## 2017-05-22 DIAGNOSIS — F32.A DEPRESSION WITH SUICIDAL IDEATION: ICD-10-CM

## 2017-05-22 DIAGNOSIS — R45.851 DEPRESSION WITH SUICIDAL IDEATION: ICD-10-CM

## 2017-05-22 DIAGNOSIS — F10.920 ACUTE ALCOHOLIC INTOXICATION, UNCOMPLICATED (HCC): Primary | ICD-10-CM

## 2017-05-22 PROBLEM — F10.939 ALCOHOL WITHDRAWAL (HCC): Status: ACTIVE | Noted: 2017-05-22

## 2017-05-22 PROBLEM — B35.3 TINEA PEDIS OF BOTH FEET: Status: ACTIVE | Noted: 2017-05-22

## 2017-05-22 LAB
ABSOLUTE EOS #: 0.2 K/UL (ref 0–0.4)
ABSOLUTE LYMPH #: 1.7 K/UL (ref 1–4.8)
ABSOLUTE MONO #: 0.6 K/UL (ref 0.1–1.3)
ALBUMIN SERPL-MCNC: 4.2 G/DL (ref 3.5–5.2)
ALBUMIN/GLOBULIN RATIO: ABNORMAL (ref 1–2.5)
ALP BLD-CCNC: 95 U/L (ref 40–129)
ALT SERPL-CCNC: 29 U/L (ref 5–41)
AMPHETAMINE SCREEN URINE: NEGATIVE
ANION GAP SERPL CALCULATED.3IONS-SCNC: 17 MMOL/L (ref 9–17)
AST SERPL-CCNC: 69 U/L
BARBITURATE SCREEN URINE: NEGATIVE
BASOPHILS # BLD: 1 %
BASOPHILS ABSOLUTE: 0.1 K/UL (ref 0–0.2)
BENZODIAZEPINE SCREEN, URINE: NEGATIVE
BILIRUB SERPL-MCNC: 0.91 MG/DL (ref 0.3–1.2)
BUN BLDV-MCNC: 7 MG/DL (ref 6–20)
BUN/CREAT BLD: ABNORMAL (ref 9–20)
BUPRENORPHINE URINE: NORMAL
CALCIUM SERPL-MCNC: 9.3 MG/DL (ref 8.6–10.4)
CANNABINOID SCREEN URINE: NEGATIVE
CHLORIDE BLD-SCNC: 99 MMOL/L (ref 98–107)
CO2: 22 MMOL/L (ref 20–31)
COCAINE METABOLITE, URINE: NEGATIVE
CREAT SERPL-MCNC: 0.45 MG/DL (ref 0.7–1.2)
DIFFERENTIAL TYPE: ABNORMAL
EOSINOPHILS RELATIVE PERCENT: 2 %
ETHANOL PERCENT: 0.36 %
ETHANOL: 359 MG/DL
GFR AFRICAN AMERICAN: >60 ML/MIN
GFR NON-AFRICAN AMERICAN: >60 ML/MIN
GFR SERPL CREATININE-BSD FRML MDRD: ABNORMAL ML/MIN/{1.73_M2}
GFR SERPL CREATININE-BSD FRML MDRD: ABNORMAL ML/MIN/{1.73_M2}
GLUCOSE BLD-MCNC: 94 MG/DL (ref 70–99)
HCT VFR BLD CALC: 47.1 % (ref 41–53)
HEMOGLOBIN: 16 G/DL (ref 13.5–17.5)
LACTIC ACID, WHOLE BLOOD: ABNORMAL MMOL/L (ref 0.7–2.1)
LACTIC ACID: 2.1 MMOL/L (ref 0.5–2.2)
LACTIC ACID: 2.7 MMOL/L (ref 0.5–2.2)
LYMPHOCYTES # BLD: 25 %
MAGNESIUM: 2.1 MG/DL (ref 1.6–2.6)
MCH RBC QN AUTO: 36.4 PG (ref 26–34)
MCHC RBC AUTO-ENTMCNC: 33.9 G/DL (ref 31–37)
MCV RBC AUTO: 107.3 FL (ref 80–100)
MDMA URINE: NORMAL
METHADONE SCREEN, URINE: NEGATIVE
METHAMPHETAMINE, URINE: NORMAL
MONOCYTES # BLD: 9 %
OPIATES, URINE: NEGATIVE
OXYCODONE SCREEN URINE: NEGATIVE
PDW BLD-RTO: 16.8 % (ref 11.5–14.9)
PHENCYCLIDINE, URINE: NEGATIVE
PHOSPHORUS: 3.6 MG/DL (ref 2.5–4.5)
PLATELET # BLD: 107 K/UL (ref 150–450)
PLATELET ESTIMATE: ABNORMAL
PMV BLD AUTO: 9.2 FL (ref 6–12)
POTASSIUM SERPL-SCNC: 4.4 MMOL/L (ref 3.7–5.3)
PROPOXYPHENE, URINE: NORMAL
RBC # BLD: 4.39 M/UL (ref 4.5–5.9)
RBC # BLD: ABNORMAL 10*6/UL
SEG NEUTROPHILS: 63 %
SEGMENTED NEUTROPHILS ABSOLUTE COUNT: 4.4 K/UL (ref 1.3–9.1)
SODIUM BLD-SCNC: 138 MMOL/L (ref 135–144)
TEST INFORMATION: NORMAL
TOTAL PROTEIN: 8.4 G/DL (ref 6.4–8.3)
TRICYCLIC ANTIDEPRESSANTS, UR: NORMAL
WBC # BLD: 7 K/UL (ref 3.5–11)
WBC # BLD: ABNORMAL 10*3/UL

## 2017-05-22 PROCEDURE — 83735 ASSAY OF MAGNESIUM: CPT

## 2017-05-22 PROCEDURE — 36415 COLL VENOUS BLD VENIPUNCTURE: CPT

## 2017-05-22 PROCEDURE — 94760 N-INVAS EAR/PLS OXIMETRY 1: CPT

## 2017-05-22 PROCEDURE — 6370000000 HC RX 637 (ALT 250 FOR IP): Performed by: FAMILY MEDICINE

## 2017-05-22 PROCEDURE — 99285 EMERGENCY DEPT VISIT HI MDM: CPT

## 2017-05-22 PROCEDURE — 2500000003 HC RX 250 WO HCPCS: Performed by: FAMILY MEDICINE

## 2017-05-22 PROCEDURE — 94664 DEMO&/EVAL PT USE INHALER: CPT

## 2017-05-22 PROCEDURE — 80307 DRUG TEST PRSMV CHEM ANLYZR: CPT

## 2017-05-22 PROCEDURE — 83605 ASSAY OF LACTIC ACID: CPT

## 2017-05-22 PROCEDURE — 2580000003 HC RX 258: Performed by: FAMILY MEDICINE

## 2017-05-22 PROCEDURE — 80053 COMPREHEN METABOLIC PANEL: CPT

## 2017-05-22 PROCEDURE — 2580000003 HC RX 258: Performed by: EMERGENCY MEDICINE

## 2017-05-22 PROCEDURE — 85025 COMPLETE CBC W/AUTO DIFF WBC: CPT

## 2017-05-22 PROCEDURE — 99223 1ST HOSP IP/OBS HIGH 75: CPT | Performed by: INTERNAL MEDICINE

## 2017-05-22 PROCEDURE — 1200000000 HC SEMI PRIVATE

## 2017-05-22 PROCEDURE — G0480 DRUG TEST DEF 1-7 CLASSES: HCPCS

## 2017-05-22 PROCEDURE — 6360000002 HC RX W HCPCS: Performed by: FAMILY MEDICINE

## 2017-05-22 PROCEDURE — 84100 ASSAY OF PHOSPHORUS: CPT

## 2017-05-22 RX ORDER — METOPROLOL SUCCINATE 100 MG/1
100 TABLET, EXTENDED RELEASE ORAL DAILY
Status: DISCONTINUED | OUTPATIENT
Start: 2017-05-22 | End: 2017-05-27

## 2017-05-22 RX ORDER — FAMOTIDINE 20 MG/1
20 TABLET, FILM COATED ORAL 2 TIMES DAILY
Status: DISCONTINUED | OUTPATIENT
Start: 2017-05-22 | End: 2017-05-30 | Stop reason: HOSPADM

## 2017-05-22 RX ORDER — ONDANSETRON 2 MG/ML
4 INJECTION INTRAMUSCULAR; INTRAVENOUS EVERY 6 HOURS PRN
Status: DISCONTINUED | OUTPATIENT
Start: 2017-05-22 | End: 2017-05-30 | Stop reason: HOSPADM

## 2017-05-22 RX ORDER — SODIUM CHLORIDE 0.9 % (FLUSH) 0.9 %
10 SYRINGE (ML) INJECTION PRN
Status: DISCONTINUED | OUTPATIENT
Start: 2017-05-22 | End: 2017-05-30 | Stop reason: HOSPADM

## 2017-05-22 RX ORDER — LORAZEPAM 2 MG/ML
2 INJECTION INTRAMUSCULAR
Status: DISCONTINUED | OUTPATIENT
Start: 2017-05-22 | End: 2017-05-23

## 2017-05-22 RX ORDER — LORAZEPAM 2 MG/ML
1 INJECTION INTRAMUSCULAR
Status: DISCONTINUED | OUTPATIENT
Start: 2017-05-22 | End: 2017-05-23

## 2017-05-22 RX ORDER — 0.9 % SODIUM CHLORIDE 0.9 %
1000 INTRAVENOUS SOLUTION INTRAVENOUS ONCE
Status: COMPLETED | OUTPATIENT
Start: 2017-05-22 | End: 2017-05-22

## 2017-05-22 RX ORDER — TERBINAFINE HYDROCHLORIDE 250 MG/1
250 TABLET ORAL DAILY
Status: DISCONTINUED | OUTPATIENT
Start: 2017-05-22 | End: 2017-05-26

## 2017-05-22 RX ORDER — LORAZEPAM 1 MG/1
2 TABLET ORAL
Status: DISCONTINUED | OUTPATIENT
Start: 2017-05-22 | End: 2017-05-23

## 2017-05-22 RX ORDER — BUPROPION HYDROCHLORIDE 150 MG/1
150 TABLET ORAL DAILY
Status: DISCONTINUED | OUTPATIENT
Start: 2017-05-22 | End: 2017-05-30 | Stop reason: HOSPADM

## 2017-05-22 RX ORDER — LORAZEPAM 2 MG/ML
3 INJECTION INTRAMUSCULAR
Status: DISCONTINUED | OUTPATIENT
Start: 2017-05-22 | End: 2017-05-23

## 2017-05-22 RX ORDER — LORAZEPAM 1 MG/1
3 TABLET ORAL
Status: DISCONTINUED | OUTPATIENT
Start: 2017-05-22 | End: 2017-05-23

## 2017-05-22 RX ORDER — SODIUM CHLORIDE 0.9 % (FLUSH) 0.9 %
10 SYRINGE (ML) INJECTION EVERY 12 HOURS SCHEDULED
Status: DISCONTINUED | OUTPATIENT
Start: 2017-05-22 | End: 2017-05-30 | Stop reason: HOSPADM

## 2017-05-22 RX ORDER — ALBUTEROL SULFATE 90 UG/1
2 AEROSOL, METERED RESPIRATORY (INHALATION) 3 TIMES DAILY
Status: DISCONTINUED | OUTPATIENT
Start: 2017-05-22 | End: 2017-05-27

## 2017-05-22 RX ORDER — LORAZEPAM 2 MG/ML
4 INJECTION INTRAMUSCULAR
Status: DISCONTINUED | OUTPATIENT
Start: 2017-05-22 | End: 2017-05-23

## 2017-05-22 RX ORDER — SODIUM CHLORIDE 9 MG/ML
INJECTION, SOLUTION INTRAVENOUS CONTINUOUS
Status: DISCONTINUED | OUTPATIENT
Start: 2017-05-22 | End: 2017-05-23

## 2017-05-22 RX ORDER — LORAZEPAM 1 MG/1
1 TABLET ORAL
Status: DISCONTINUED | OUTPATIENT
Start: 2017-05-22 | End: 2017-05-23

## 2017-05-22 RX ORDER — LORAZEPAM 1 MG/1
4 TABLET ORAL
Status: DISCONTINUED | OUTPATIENT
Start: 2017-05-22 | End: 2017-05-23

## 2017-05-22 RX ADMIN — FAMOTIDINE 20 MG: 20 TABLET ORAL at 15:38

## 2017-05-22 RX ADMIN — METOPROLOL SUCCINATE 100 MG: 100 TABLET, EXTENDED RELEASE ORAL at 15:38

## 2017-05-22 RX ADMIN — LORAZEPAM 1 MG: 2 INJECTION INTRAMUSCULAR; INTRAVENOUS at 15:57

## 2017-05-22 RX ADMIN — SODIUM CHLORIDE 1000 ML: 9 INJECTION, SOLUTION INTRAVENOUS at 12:06

## 2017-05-22 RX ADMIN — BUPROPION HYDROCHLORIDE 150 MG: 150 TABLET, FILM COATED, EXTENDED RELEASE ORAL at 16:30

## 2017-05-22 RX ADMIN — LORAZEPAM 2 MG: 1 TABLET ORAL at 20:25

## 2017-05-22 RX ADMIN — FOLIC ACID: 5 INJECTION, SOLUTION INTRAMUSCULAR; INTRAVENOUS; SUBCUTANEOUS at 16:30

## 2017-05-22 RX ADMIN — TERBINAFINE 250 MG: 250 TABLET ORAL at 16:30

## 2017-05-22 RX ADMIN — FAMOTIDINE 20 MG: 20 TABLET ORAL at 21:59

## 2017-05-22 ASSESSMENT — ENCOUNTER SYMPTOMS
CONSTIPATION: 0
EYE DISCHARGE: 0
COLOR CHANGE: 0
BLOOD IN STOOL: 0
SINUS PRESSURE: 0
NAUSEA: 0
EYE PAIN: 0
BACK PAIN: 0
WHEEZING: 0
FACIAL SWELLING: 0
CHEST TIGHTNESS: 0
VOMITING: 0
SORE THROAT: 0
TROUBLE SWALLOWING: 0
DIARRHEA: 0
EYE REDNESS: 0
ABDOMINAL DISTENTION: 0
ABDOMINAL PAIN: 0
COUGH: 0
SHORTNESS OF BREATH: 0
RHINORRHEA: 0

## 2017-05-22 ASSESSMENT — PAIN SCALES - GENERAL
PAINLEVEL_OUTOF10: 1
PAINLEVEL_OUTOF10: 1

## 2017-05-23 LAB
ABSOLUTE EOS #: 0.2 K/UL (ref 0–0.4)
ABSOLUTE LYMPH #: 1.1 K/UL (ref 1–4.8)
ABSOLUTE MONO #: 0.7 K/UL (ref 0.1–1.3)
ANION GAP SERPL CALCULATED.3IONS-SCNC: 14 MMOL/L (ref 9–17)
BASOPHILS # BLD: 1 %
BASOPHILS ABSOLUTE: 0 K/UL (ref 0–0.2)
BUN BLDV-MCNC: 9 MG/DL (ref 6–20)
BUN/CREAT BLD: ABNORMAL (ref 9–20)
CALCIUM SERPL-MCNC: 9.2 MG/DL (ref 8.6–10.4)
CHLORIDE BLD-SCNC: 101 MMOL/L (ref 98–107)
CO2: 23 MMOL/L (ref 20–31)
CREAT SERPL-MCNC: 0.46 MG/DL (ref 0.7–1.2)
DIFFERENTIAL TYPE: ABNORMAL
EOSINOPHILS RELATIVE PERCENT: 2 %
GFR AFRICAN AMERICAN: >60 ML/MIN
GFR NON-AFRICAN AMERICAN: >60 ML/MIN
GFR SERPL CREATININE-BSD FRML MDRD: ABNORMAL ML/MIN/{1.73_M2}
GFR SERPL CREATININE-BSD FRML MDRD: ABNORMAL ML/MIN/{1.73_M2}
GLUCOSE BLD-MCNC: 95 MG/DL (ref 70–99)
HCT VFR BLD CALC: 46.4 % (ref 41–53)
HEMOGLOBIN: 15.2 G/DL (ref 13.5–17.5)
LACTIC ACID: 1.8 MMOL/L (ref 0.5–2.2)
LYMPHOCYTES # BLD: 14 %
MCH RBC QN AUTO: 36.2 PG (ref 26–34)
MCHC RBC AUTO-ENTMCNC: 32.9 G/DL (ref 31–37)
MCV RBC AUTO: 110 FL (ref 80–100)
MONOCYTES # BLD: 9 %
PDW BLD-RTO: 16.9 % (ref 11.5–14.9)
PLATELET # BLD: 94 K/UL (ref 150–450)
PLATELET ESTIMATE: ABNORMAL
PMV BLD AUTO: 8.8 FL (ref 6–12)
POTASSIUM SERPL-SCNC: 4.2 MMOL/L (ref 3.7–5.3)
RBC # BLD: 4.21 M/UL (ref 4.5–5.9)
RBC # BLD: ABNORMAL 10*6/UL
SEG NEUTROPHILS: 74 %
SEGMENTED NEUTROPHILS ABSOLUTE COUNT: 5.7 K/UL (ref 1.3–9.1)
SODIUM BLD-SCNC: 138 MMOL/L (ref 135–144)
WBC # BLD: 7.7 K/UL (ref 3.5–11)
WBC # BLD: ABNORMAL 10*3/UL

## 2017-05-23 PROCEDURE — 6360000002 HC RX W HCPCS: Performed by: FAMILY MEDICINE

## 2017-05-23 PROCEDURE — 99233 SBSQ HOSP IP/OBS HIGH 50: CPT | Performed by: INTERNAL MEDICINE

## 2017-05-23 PROCEDURE — 2060000000 HC ICU INTERMEDIATE R&B

## 2017-05-23 PROCEDURE — 6360000002 HC RX W HCPCS: Performed by: STUDENT IN AN ORGANIZED HEALTH CARE EDUCATION/TRAINING PROGRAM

## 2017-05-23 PROCEDURE — 2500000003 HC RX 250 WO HCPCS: Performed by: FAMILY MEDICINE

## 2017-05-23 PROCEDURE — 2580000003 HC RX 258: Performed by: INTERNAL MEDICINE

## 2017-05-23 PROCEDURE — 94762 N-INVAS EAR/PLS OXIMTRY CONT: CPT

## 2017-05-23 PROCEDURE — 6370000000 HC RX 637 (ALT 250 FOR IP): Performed by: FAMILY MEDICINE

## 2017-05-23 PROCEDURE — 2580000003 HC RX 258: Performed by: FAMILY MEDICINE

## 2017-05-23 PROCEDURE — 85025 COMPLETE CBC W/AUTO DIFF WBC: CPT

## 2017-05-23 PROCEDURE — 6370000000 HC RX 637 (ALT 250 FOR IP): Performed by: HOSPITALIST

## 2017-05-23 PROCEDURE — 6370000000 HC RX 637 (ALT 250 FOR IP): Performed by: STUDENT IN AN ORGANIZED HEALTH CARE EDUCATION/TRAINING PROGRAM

## 2017-05-23 PROCEDURE — 80048 BASIC METABOLIC PNL TOTAL CA: CPT

## 2017-05-23 PROCEDURE — 36415 COLL VENOUS BLD VENIPUNCTURE: CPT

## 2017-05-23 PROCEDURE — 2500000003 HC RX 250 WO HCPCS: Performed by: INTERNAL MEDICINE

## 2017-05-23 RX ORDER — DIAZEPAM 5 MG/ML
5 INJECTION, SOLUTION INTRAMUSCULAR; INTRAVENOUS ONCE
Status: COMPLETED | OUTPATIENT
Start: 2017-05-23 | End: 2017-05-23

## 2017-05-23 RX ORDER — ALBUTEROL SULFATE 2.5 MG/3ML
2.5 SOLUTION RESPIRATORY (INHALATION)
Status: DISCONTINUED | OUTPATIENT
Start: 2017-05-23 | End: 2017-05-23

## 2017-05-23 RX ORDER — SODIUM CHLORIDE FOR INHALATION 0.9 %
3 VIAL, NEBULIZER (ML) INHALATION EVERY 6 HOURS PRN
Status: DISCONTINUED | OUTPATIENT
Start: 2017-05-23 | End: 2017-05-23

## 2017-05-23 RX ORDER — NICOTINE 21 MG/24HR
1 PATCH, TRANSDERMAL 24 HOURS TRANSDERMAL DAILY
Status: DISCONTINUED | OUTPATIENT
Start: 2017-05-23 | End: 2017-05-30 | Stop reason: HOSPADM

## 2017-05-23 RX ORDER — ZIPRASIDONE MESYLATE 20 MG/ML
20 INJECTION, POWDER, LYOPHILIZED, FOR SOLUTION INTRAMUSCULAR ONCE
Status: COMPLETED | OUTPATIENT
Start: 2017-05-23 | End: 2017-05-23

## 2017-05-23 RX ORDER — ALBUTEROL SULFATE 2.5 MG/3ML
2.5 SOLUTION RESPIRATORY (INHALATION) EVERY 6 HOURS PRN
Status: DISCONTINUED | OUTPATIENT
Start: 2017-05-23 | End: 2017-05-30 | Stop reason: HOSPADM

## 2017-05-23 RX ADMIN — FAMOTIDINE 20 MG: 20 TABLET ORAL at 07:45

## 2017-05-23 RX ADMIN — LORAZEPAM 3 MG: 2 INJECTION INTRAMUSCULAR; INTRAVENOUS at 07:59

## 2017-05-23 RX ADMIN — LORAZEPAM 1 MG: 1 TABLET ORAL at 00:11

## 2017-05-23 RX ADMIN — ENOXAPARIN SODIUM 40 MG: 40 INJECTION SUBCUTANEOUS at 07:59

## 2017-05-23 RX ADMIN — SODIUM CHLORIDE: 9 INJECTION, SOLUTION INTRAVENOUS at 02:42

## 2017-05-23 RX ADMIN — DEXMEDETOMIDINE HYDROCHLORIDE 0.2 MCG/KG/HR: 100 INJECTION, SOLUTION INTRAVENOUS at 16:36

## 2017-05-23 RX ADMIN — DIAZEPAM 5 MG: 5 INJECTION, SOLUTION INTRAMUSCULAR; INTRAVENOUS at 10:43

## 2017-05-23 RX ADMIN — BUPROPION HYDROCHLORIDE 150 MG: 150 TABLET, FILM COATED, EXTENDED RELEASE ORAL at 07:45

## 2017-05-23 RX ADMIN — TERBINAFINE 250 MG: 250 TABLET ORAL at 07:45

## 2017-05-23 RX ADMIN — LORAZEPAM 4 MG: 1 TABLET ORAL at 11:16

## 2017-05-23 RX ADMIN — LORAZEPAM 2 MG: 2 INJECTION INTRAMUSCULAR; INTRAVENOUS at 03:50

## 2017-05-23 RX ADMIN — FOLIC ACID: 5 INJECTION, SOLUTION INTRAMUSCULAR; INTRAVENOUS; SUBCUTANEOUS at 16:36

## 2017-05-23 RX ADMIN — ZIPRASIDONE MESYLATE 20 MG: 20 INJECTION, POWDER, LYOPHILIZED, FOR SOLUTION INTRAMUSCULAR at 14:53

## 2017-05-23 RX ADMIN — ALBUTEROL SULFATE 2 PUFF: 90 AEROSOL, METERED RESPIRATORY (INHALATION) at 07:57

## 2017-05-23 RX ADMIN — LORAZEPAM 2 MG/HR: 2 INJECTION INTRAMUSCULAR; INTRAVENOUS at 12:57

## 2017-05-23 RX ADMIN — LORAZEPAM 4 MG: 2 INJECTION INTRAMUSCULAR; INTRAVENOUS at 10:08

## 2017-05-23 RX ADMIN — METOPROLOL SUCCINATE 100 MG: 100 TABLET, EXTENDED RELEASE ORAL at 07:45

## 2017-05-23 RX ADMIN — DEXMEDETOMIDINE HYDROCHLORIDE 1.3 MCG/KG/HR: 100 INJECTION, SOLUTION INTRAVENOUS at 20:57

## 2017-05-23 ASSESSMENT — ENCOUNTER SYMPTOMS
COUGH: 0
SHORTNESS OF BREATH: 0
VOMITING: 0
ABDOMINAL PAIN: 0
NAUSEA: 0
BLURRED VISION: 0
WHEEZING: 0
DIARRHEA: 0
CONSTIPATION: 0

## 2017-05-23 ASSESSMENT — PAIN SCALES - GENERAL
PAINLEVEL_OUTOF10: 1
PAINLEVEL_OUTOF10: 0
PAINLEVEL_OUTOF10: 0

## 2017-05-24 ENCOUNTER — APPOINTMENT (OUTPATIENT)
Dept: GENERAL RADIOLOGY | Age: 52
End: 2017-05-24
Payer: MEDICARE

## 2017-05-24 LAB
-: ABNORMAL
ABSOLUTE EOS #: 0.11 K/UL (ref 0–0.4)
ABSOLUTE LYMPH #: 0.73 K/UL (ref 1–4.8)
ABSOLUTE MONO #: 0.62 K/UL (ref 0.1–1.3)
AMORPHOUS: ABNORMAL
ANION GAP SERPL CALCULATED.3IONS-SCNC: 14 MMOL/L (ref 9–17)
BACTERIA: ABNORMAL
BASOPHILS # BLD: 1 %
BASOPHILS ABSOLUTE: 0.06 K/UL (ref 0–0.2)
BILIRUBIN URINE: ABNORMAL
BUN BLDV-MCNC: 13 MG/DL (ref 6–20)
BUN/CREAT BLD: ABNORMAL (ref 9–20)
CALCIUM SERPL-MCNC: 9 MG/DL (ref 8.6–10.4)
CASTS UA: ABNORMAL /LPF
CHLORIDE BLD-SCNC: 102 MMOL/L (ref 98–107)
CO2: 23 MMOL/L (ref 20–31)
COLOR: ABNORMAL
COMMENT UA: ABNORMAL
CREAT SERPL-MCNC: 0.45 MG/DL (ref 0.7–1.2)
CRYSTALS, UA: ABNORMAL /HPF
DIFFERENTIAL TYPE: ABNORMAL
EOSINOPHILS RELATIVE PERCENT: 2 %
EPITHELIAL CELLS UA: ABNORMAL /HPF
ETHANOL PERCENT: <0.01 %
ETHANOL: <10 MG/DL
GFR AFRICAN AMERICAN: >60 ML/MIN
GFR NON-AFRICAN AMERICAN: >60 ML/MIN
GFR SERPL CREATININE-BSD FRML MDRD: ABNORMAL ML/MIN/{1.73_M2}
GFR SERPL CREATININE-BSD FRML MDRD: ABNORMAL ML/MIN/{1.73_M2}
GLUCOSE BLD-MCNC: 78 MG/DL (ref 70–99)
GLUCOSE URINE: NEGATIVE
HCT VFR BLD CALC: 43 % (ref 41–53)
HEMOGLOBIN: 14.4 G/DL (ref 13.5–17.5)
KETONES, URINE: ABNORMAL
LEUKOCYTE ESTERASE, URINE: ABNORMAL
LYMPHOCYTES # BLD: 13 %
MCH RBC QN AUTO: 36.3 PG (ref 26–34)
MCHC RBC AUTO-ENTMCNC: 33.5 G/DL (ref 31–37)
MCV RBC AUTO: 108.3 FL (ref 80–100)
MONOCYTES # BLD: 11 %
MORPHOLOGY: ABNORMAL
MUCUS: ABNORMAL
NITRITE, URINE: NEGATIVE
OTHER OBSERVATIONS UA: ABNORMAL
PDW BLD-RTO: 16.3 % (ref 11.5–14.9)
PH UA: 5.5 (ref 5–8)
PLATELET # BLD: 83 K/UL (ref 150–450)
PLATELET ESTIMATE: ABNORMAL
PMV BLD AUTO: 9.4 FL (ref 6–12)
POTASSIUM SERPL-SCNC: 3.9 MMOL/L (ref 3.7–5.3)
PROTEIN UA: ABNORMAL
RBC # BLD: 3.97 M/UL (ref 4.5–5.9)
RBC # BLD: ABNORMAL 10*6/UL
RBC UA: ABNORMAL /HPF
RENAL EPITHELIAL, UA: ABNORMAL /HPF
SEG NEUTROPHILS: 73 %
SEGMENTED NEUTROPHILS ABSOLUTE COUNT: 4.08 K/UL (ref 1.3–9.1)
SODIUM BLD-SCNC: 139 MMOL/L (ref 135–144)
SPECIFIC GRAVITY UA: 1.03 (ref 1–1.03)
TRICHOMONAS: ABNORMAL
TURBIDITY: CLEAR
URINE HGB: ABNORMAL
UROBILINOGEN, URINE: NORMAL
WBC # BLD: 5.6 K/UL (ref 3.5–11)
WBC # BLD: ABNORMAL 10*3/UL
WBC UA: ABNORMAL /HPF
YEAST: ABNORMAL

## 2017-05-24 PROCEDURE — 51798 US URINE CAPACITY MEASURE: CPT

## 2017-05-24 PROCEDURE — 6360000002 HC RX W HCPCS: Performed by: FAMILY MEDICINE

## 2017-05-24 PROCEDURE — 80048 BASIC METABOLIC PNL TOTAL CA: CPT

## 2017-05-24 PROCEDURE — 6360000002 HC RX W HCPCS: Performed by: NURSE PRACTITIONER

## 2017-05-24 PROCEDURE — 36415 COLL VENOUS BLD VENIPUNCTURE: CPT

## 2017-05-24 PROCEDURE — 6360000002 HC RX W HCPCS: Performed by: INTERNAL MEDICINE

## 2017-05-24 PROCEDURE — 99233 SBSQ HOSP IP/OBS HIGH 50: CPT | Performed by: INTERNAL MEDICINE

## 2017-05-24 PROCEDURE — 2500000003 HC RX 250 WO HCPCS: Performed by: FAMILY MEDICINE

## 2017-05-24 PROCEDURE — 81001 URINALYSIS AUTO W/SCOPE: CPT

## 2017-05-24 PROCEDURE — 2580000003 HC RX 258: Performed by: NURSE PRACTITIONER

## 2017-05-24 PROCEDURE — 87186 SC STD MICRODIL/AGAR DIL: CPT

## 2017-05-24 PROCEDURE — 87088 URINE BACTERIA CULTURE: CPT

## 2017-05-24 PROCEDURE — 6370000000 HC RX 637 (ALT 250 FOR IP): Performed by: FAMILY MEDICINE

## 2017-05-24 PROCEDURE — 87086 URINE CULTURE/COLONY COUNT: CPT

## 2017-05-24 PROCEDURE — 1200000000 HC SEMI PRIVATE

## 2017-05-24 PROCEDURE — G0480 DRUG TEST DEF 1-7 CLASSES: HCPCS

## 2017-05-24 PROCEDURE — 2580000003 HC RX 258: Performed by: FAMILY MEDICINE

## 2017-05-24 PROCEDURE — 71010 XR CHEST PORTABLE: CPT

## 2017-05-24 PROCEDURE — 6370000000 HC RX 637 (ALT 250 FOR IP): Performed by: INTERNAL MEDICINE

## 2017-05-24 PROCEDURE — 85025 COMPLETE CBC W/AUTO DIFF WBC: CPT

## 2017-05-24 PROCEDURE — 6370000000 HC RX 637 (ALT 250 FOR IP): Performed by: STUDENT IN AN ORGANIZED HEALTH CARE EDUCATION/TRAINING PROGRAM

## 2017-05-24 RX ORDER — LORAZEPAM 1 MG/1
3 TABLET ORAL
Status: DISCONTINUED | OUTPATIENT
Start: 2017-05-24 | End: 2017-05-24 | Stop reason: ALTCHOICE

## 2017-05-24 RX ORDER — LORAZEPAM 2 MG/ML
2 INJECTION INTRAMUSCULAR
Status: DISCONTINUED | OUTPATIENT
Start: 2017-05-24 | End: 2017-05-24 | Stop reason: ALTCHOICE

## 2017-05-24 RX ORDER — LORAZEPAM 1 MG/1
2 TABLET ORAL
Status: DISCONTINUED | OUTPATIENT
Start: 2017-05-24 | End: 2017-05-24 | Stop reason: ALTCHOICE

## 2017-05-24 RX ORDER — LORAZEPAM 2 MG/ML
3 INJECTION INTRAMUSCULAR
Status: DISCONTINUED | OUTPATIENT
Start: 2017-05-24 | End: 2017-05-24 | Stop reason: ALTCHOICE

## 2017-05-24 RX ORDER — LORAZEPAM 1 MG/1
1 TABLET ORAL EVERY 4 HOURS PRN
Status: DISCONTINUED | OUTPATIENT
Start: 2017-05-24 | End: 2017-05-25

## 2017-05-24 RX ORDER — SODIUM CHLORIDE 9 MG/ML
INJECTION, SOLUTION INTRAVENOUS CONTINUOUS
Status: DISCONTINUED | OUTPATIENT
Start: 2017-05-24 | End: 2017-05-28

## 2017-05-24 RX ORDER — LORAZEPAM 2 MG/ML
4 INJECTION INTRAMUSCULAR
Status: DISCONTINUED | OUTPATIENT
Start: 2017-05-24 | End: 2017-05-24 | Stop reason: ALTCHOICE

## 2017-05-24 RX ORDER — KETOROLAC TROMETHAMINE 30 MG/ML
30 INJECTION, SOLUTION INTRAMUSCULAR; INTRAVENOUS ONCE
Status: COMPLETED | OUTPATIENT
Start: 2017-05-24 | End: 2017-05-24

## 2017-05-24 RX ORDER — LORAZEPAM 2 MG/ML
1 INJECTION INTRAMUSCULAR
Status: DISCONTINUED | OUTPATIENT
Start: 2017-05-24 | End: 2017-05-24 | Stop reason: ALTCHOICE

## 2017-05-24 RX ORDER — LORAZEPAM 1 MG/1
1 TABLET ORAL
Status: DISCONTINUED | OUTPATIENT
Start: 2017-05-24 | End: 2017-05-24 | Stop reason: ALTCHOICE

## 2017-05-24 RX ORDER — LORAZEPAM 1 MG/1
4 TABLET ORAL
Status: DISCONTINUED | OUTPATIENT
Start: 2017-05-24 | End: 2017-05-24 | Stop reason: ALTCHOICE

## 2017-05-24 RX ORDER — METOPROLOL TARTRATE 5 MG/5ML
5 INJECTION INTRAVENOUS EVERY 6 HOURS PRN
Status: DISCONTINUED | OUTPATIENT
Start: 2017-05-24 | End: 2017-05-27

## 2017-05-24 RX ADMIN — LORAZEPAM 1 MG: 1 TABLET ORAL at 16:13

## 2017-05-24 RX ADMIN — METOPROLOL TARTRATE 5 MG: 5 INJECTION INTRAVENOUS at 16:42

## 2017-05-24 RX ADMIN — METOPROLOL SUCCINATE 100 MG: 100 TABLET, EXTENDED RELEASE ORAL at 10:07

## 2017-05-24 RX ADMIN — Medication 10 ML: at 10:06

## 2017-05-24 RX ADMIN — BUPROPION HYDROCHLORIDE 150 MG: 150 TABLET, FILM COATED, EXTENDED RELEASE ORAL at 16:03

## 2017-05-24 RX ADMIN — KETOROLAC TROMETHAMINE 30 MG: 30 INJECTION, SOLUTION INTRAMUSCULAR at 03:14

## 2017-05-24 RX ADMIN — FOLIC ACID: 5 INJECTION, SOLUTION INTRAMUSCULAR; INTRAVENOUS; SUBCUTANEOUS at 16:03

## 2017-05-24 RX ADMIN — FAMOTIDINE 20 MG: 20 TABLET ORAL at 10:06

## 2017-05-24 RX ADMIN — METOPROLOL TARTRATE 5 MG: 5 INJECTION INTRAVENOUS at 22:40

## 2017-05-24 RX ADMIN — LORAZEPAM 2 MG: 2 INJECTION INTRAMUSCULAR; INTRAVENOUS at 10:09

## 2017-05-24 RX ADMIN — ENOXAPARIN SODIUM 40 MG: 40 INJECTION SUBCUTANEOUS at 10:06

## 2017-05-24 RX ADMIN — Medication 10 ML: at 22:46

## 2017-05-24 RX ADMIN — SODIUM CHLORIDE: 9 INJECTION, SOLUTION INTRAVENOUS at 22:42

## 2017-05-24 RX ADMIN — ALBUTEROL SULFATE 2.5 MG: 2.5 SOLUTION RESPIRATORY (INHALATION) at 09:13

## 2017-05-24 RX ADMIN — SODIUM CHLORIDE: 9 INJECTION, SOLUTION INTRAVENOUS at 03:16

## 2017-05-24 RX ADMIN — LORAZEPAM 1 MG: 1 TABLET ORAL at 22:40

## 2017-05-24 RX ADMIN — TERBINAFINE 250 MG: 250 TABLET ORAL at 16:03

## 2017-05-24 RX ADMIN — FAMOTIDINE 20 MG: 20 TABLET ORAL at 22:45

## 2017-05-24 ASSESSMENT — PAIN SCALES - GENERAL
PAINLEVEL_OUTOF10: 0
PAINLEVEL_OUTOF10: 6

## 2017-05-24 ASSESSMENT — ENCOUNTER SYMPTOMS
VOMITING: 0
CONSTIPATION: 0
BLURRED VISION: 0
SHORTNESS OF BREATH: 0
DIARRHEA: 0
COUGH: 0
ABDOMINAL PAIN: 0
NAUSEA: 0
WHEEZING: 0

## 2017-05-25 LAB
CULTURE: ABNORMAL
CULTURE: ABNORMAL
Lab: ABNORMAL
ORGANISM: ABNORMAL
SPECIMEN DESCRIPTION: ABNORMAL
SPECIMEN DESCRIPTION: ABNORMAL
STATUS: ABNORMAL

## 2017-05-25 PROCEDURE — 6360000002 HC RX W HCPCS: Performed by: INTERNAL MEDICINE

## 2017-05-25 PROCEDURE — 2500000003 HC RX 250 WO HCPCS: Performed by: FAMILY MEDICINE

## 2017-05-25 PROCEDURE — 2580000003 HC RX 258: Performed by: FAMILY MEDICINE

## 2017-05-25 PROCEDURE — 6360000002 HC RX W HCPCS: Performed by: NURSE PRACTITIONER

## 2017-05-25 PROCEDURE — 6370000000 HC RX 637 (ALT 250 FOR IP): Performed by: STUDENT IN AN ORGANIZED HEALTH CARE EDUCATION/TRAINING PROGRAM

## 2017-05-25 PROCEDURE — 99232 SBSQ HOSP IP/OBS MODERATE 35: CPT | Performed by: INTERNAL MEDICINE

## 2017-05-25 PROCEDURE — 6370000000 HC RX 637 (ALT 250 FOR IP): Performed by: FAMILY MEDICINE

## 2017-05-25 PROCEDURE — 2580000003 HC RX 258: Performed by: NURSE PRACTITIONER

## 2017-05-25 PROCEDURE — 94762 N-INVAS EAR/PLS OXIMTRY CONT: CPT

## 2017-05-25 PROCEDURE — 2580000003 HC RX 258: Performed by: INTERNAL MEDICINE

## 2017-05-25 PROCEDURE — 2000000000 HC ICU R&B

## 2017-05-25 PROCEDURE — 6360000002 HC RX W HCPCS: Performed by: FAMILY MEDICINE

## 2017-05-25 PROCEDURE — A4216 STERILE WATER/SALINE, 10 ML: HCPCS | Performed by: FAMILY MEDICINE

## 2017-05-25 PROCEDURE — A4216 STERILE WATER/SALINE, 10 ML: HCPCS | Performed by: INTERNAL MEDICINE

## 2017-05-25 RX ORDER — LORAZEPAM 1 MG/1
3 TABLET ORAL
Status: DISCONTINUED | OUTPATIENT
Start: 2017-05-25 | End: 2017-05-25

## 2017-05-25 RX ORDER — HALOPERIDOL 5 MG/ML
1 INJECTION INTRAMUSCULAR EVERY 4 HOURS PRN
Status: DISCONTINUED | OUTPATIENT
Start: 2017-05-25 | End: 2017-05-25

## 2017-05-25 RX ORDER — LORAZEPAM 2 MG/ML
4 INJECTION INTRAMUSCULAR ONCE
Status: COMPLETED | OUTPATIENT
Start: 2017-05-25 | End: 2017-05-25

## 2017-05-25 RX ORDER — LORAZEPAM 2 MG/ML
1 INJECTION INTRAMUSCULAR EVERY 6 HOURS
Status: DISCONTINUED | OUTPATIENT
Start: 2017-05-25 | End: 2017-05-25

## 2017-05-25 RX ORDER — ZIPRASIDONE MESYLATE 20 MG/ML
20 INJECTION, POWDER, LYOPHILIZED, FOR SOLUTION INTRAMUSCULAR 2 TIMES DAILY
Status: DISCONTINUED | OUTPATIENT
Start: 2017-05-25 | End: 2017-05-26

## 2017-05-25 RX ORDER — LORAZEPAM 1 MG/1
4 TABLET ORAL
Status: DISCONTINUED | OUTPATIENT
Start: 2017-05-25 | End: 2017-05-25

## 2017-05-25 RX ORDER — LORAZEPAM 2 MG/ML
2 INJECTION INTRAMUSCULAR
Status: DISCONTINUED | OUTPATIENT
Start: 2017-05-25 | End: 2017-05-25

## 2017-05-25 RX ORDER — LORAZEPAM 2 MG/ML
4 INJECTION INTRAMUSCULAR
Status: DISCONTINUED | OUTPATIENT
Start: 2017-05-25 | End: 2017-05-25

## 2017-05-25 RX ORDER — HALOPERIDOL 5 MG/ML
1 INJECTION INTRAMUSCULAR EVERY 6 HOURS
Status: DISCONTINUED | OUTPATIENT
Start: 2017-05-25 | End: 2017-05-25

## 2017-05-25 RX ORDER — LORAZEPAM 2 MG/ML
1 INJECTION INTRAMUSCULAR EVERY 4 HOURS PRN
Status: DISCONTINUED | OUTPATIENT
Start: 2017-05-25 | End: 2017-05-27

## 2017-05-25 RX ORDER — LORAZEPAM 1 MG/1
2 TABLET ORAL
Status: DISCONTINUED | OUTPATIENT
Start: 2017-05-25 | End: 2017-05-25

## 2017-05-25 RX ORDER — HALOPERIDOL 5 MG/ML
1 INJECTION INTRAMUSCULAR
Status: DISCONTINUED | OUTPATIENT
Start: 2017-05-25 | End: 2017-05-25

## 2017-05-25 RX ORDER — LORAZEPAM 2 MG/ML
3 INJECTION INTRAMUSCULAR
Status: DISCONTINUED | OUTPATIENT
Start: 2017-05-25 | End: 2017-05-25

## 2017-05-25 RX ORDER — LORAZEPAM 2 MG/ML
1 INJECTION INTRAMUSCULAR
Status: DISCONTINUED | OUTPATIENT
Start: 2017-05-25 | End: 2017-05-25

## 2017-05-25 RX ORDER — ZIPRASIDONE MESYLATE 20 MG/ML
20 INJECTION, POWDER, LYOPHILIZED, FOR SOLUTION INTRAMUSCULAR ONCE
Status: COMPLETED | OUTPATIENT
Start: 2017-05-25 | End: 2017-05-25

## 2017-05-25 RX ORDER — LORAZEPAM 1 MG/1
1 TABLET ORAL
Status: DISCONTINUED | OUTPATIENT
Start: 2017-05-25 | End: 2017-05-25

## 2017-05-25 RX ADMIN — LORAZEPAM 1 MG: 2 INJECTION INTRAMUSCULAR; INTRAVENOUS at 15:54

## 2017-05-25 RX ADMIN — LORAZEPAM 4 MG: 2 INJECTION INTRAMUSCULAR; INTRAVENOUS at 07:05

## 2017-05-25 RX ADMIN — FAMOTIDINE 20 MG: 20 TABLET ORAL at 07:59

## 2017-05-25 RX ADMIN — ENOXAPARIN SODIUM 40 MG: 40 INJECTION SUBCUTANEOUS at 16:01

## 2017-05-25 RX ADMIN — AMPICILLIN SODIUM 500 MG: 500 INJECTION, POWDER, FOR SOLUTION INTRAMUSCULAR; INTRAVENOUS at 12:13

## 2017-05-25 RX ADMIN — METOPROLOL TARTRATE 5 MG: 5 INJECTION INTRAVENOUS at 06:48

## 2017-05-25 RX ADMIN — LORAZEPAM 1 MG: 2 INJECTION INTRAMUSCULAR; INTRAVENOUS at 21:27

## 2017-05-25 RX ADMIN — SODIUM CHLORIDE: 9 INJECTION, SOLUTION INTRAVENOUS at 20:16

## 2017-05-25 RX ADMIN — METOPROLOL SUCCINATE 100 MG: 100 TABLET, EXTENDED RELEASE ORAL at 07:59

## 2017-05-25 RX ADMIN — DEXMEDETOMIDINE HYDROCHLORIDE 0.2 MCG/KG/HR: 100 INJECTION, SOLUTION INTRAVENOUS at 10:13

## 2017-05-25 RX ADMIN — AMPICILLIN SODIUM 500 MG: 500 INJECTION, POWDER, FOR SOLUTION INTRAMUSCULAR; INTRAVENOUS at 18:16

## 2017-05-25 RX ADMIN — LORAZEPAM 2 MG: 2 INJECTION INTRAMUSCULAR; INTRAVENOUS at 03:19

## 2017-05-25 RX ADMIN — LORAZEPAM 4 MG: 2 INJECTION INTRAMUSCULAR; INTRAVENOUS at 06:02

## 2017-05-25 RX ADMIN — SODIUM CHLORIDE: 9 INJECTION, SOLUTION INTRAVENOUS at 11:32

## 2017-05-25 RX ADMIN — WATER 1.2 ML: 1 INJECTION INTRAMUSCULAR; INTRAVENOUS; SUBCUTANEOUS at 11:55

## 2017-05-25 RX ADMIN — HALOPERIDOL LACTATE 1 MG: 5 INJECTION, SOLUTION INTRAMUSCULAR at 18:51

## 2017-05-25 RX ADMIN — ZIPRASIDONE MESYLATE 20 MG: 20 INJECTION, POWDER, LYOPHILIZED, FOR SOLUTION INTRAMUSCULAR at 22:23

## 2017-05-25 RX ADMIN — LORAZEPAM 4 MG: 2 INJECTION INTRAMUSCULAR; INTRAVENOUS at 08:05

## 2017-05-25 RX ADMIN — WATER 1.2 ML: 1 INJECTION INTRAMUSCULAR; INTRAVENOUS; SUBCUTANEOUS at 22:24

## 2017-05-25 RX ADMIN — SODIUM CHLORIDE: 9 INJECTION, SOLUTION INTRAVENOUS at 03:19

## 2017-05-25 RX ADMIN — LORAZEPAM 4 MG: 2 INJECTION INTRAMUSCULAR; INTRAVENOUS at 09:12

## 2017-05-25 RX ADMIN — ZIPRASIDONE MESYLATE 20 MG: 20 INJECTION, POWDER, LYOPHILIZED, FOR SOLUTION INTRAMUSCULAR at 11:54

## 2017-05-25 RX ADMIN — LORAZEPAM 2 MG: 2 INJECTION INTRAMUSCULAR; INTRAVENOUS at 00:59

## 2017-05-25 RX ADMIN — LORAZEPAM 3 MG: 2 INJECTION INTRAMUSCULAR; INTRAVENOUS at 02:08

## 2017-05-25 RX ADMIN — LORAZEPAM 4 MG: 2 INJECTION INTRAMUSCULAR; INTRAVENOUS at 04:35

## 2017-05-25 ASSESSMENT — PAIN SCALES - GENERAL: PAINLEVEL_OUTOF10: 0

## 2017-05-25 ASSESSMENT — ENCOUNTER SYMPTOMS
NAUSEA: 0
BLURRED VISION: 0
DIARRHEA: 0
WHEEZING: 0
COUGH: 0
CONSTIPATION: 0
VOMITING: 0
ABDOMINAL PAIN: 0
SHORTNESS OF BREATH: 0

## 2017-05-26 LAB
ABSOLUTE EOS #: 0.2 K/UL (ref 0–0.4)
ABSOLUTE LYMPH #: 0.9 K/UL (ref 1–4.8)
ABSOLUTE MONO #: 0.9 K/UL (ref 0.1–1.3)
ALBUMIN SERPL-MCNC: 3.3 G/DL (ref 3.5–5.2)
ALBUMIN/GLOBULIN RATIO: ABNORMAL (ref 1–2.5)
ALP BLD-CCNC: 78 U/L (ref 40–129)
ALT SERPL-CCNC: 33 U/L (ref 5–41)
ANION GAP SERPL CALCULATED.3IONS-SCNC: 21 MMOL/L (ref 9–17)
AST SERPL-CCNC: 49 U/L
BASOPHILS # BLD: 1 %
BASOPHILS ABSOLUTE: 0 K/UL (ref 0–0.2)
BILIRUB SERPL-MCNC: 0.67 MG/DL (ref 0.3–1.2)
BILIRUBIN DIRECT: 0.2 MG/DL
BILIRUBIN, INDIRECT: 0.47 MG/DL (ref 0–1)
BUN BLDV-MCNC: 6 MG/DL (ref 6–20)
BUN/CREAT BLD: ABNORMAL (ref 9–20)
CALCIUM SERPL-MCNC: 8.9 MG/DL (ref 8.6–10.4)
CHLORIDE BLD-SCNC: 99 MMOL/L (ref 98–107)
CO2: 19 MMOL/L (ref 20–31)
CREAT SERPL-MCNC: 0.41 MG/DL (ref 0.7–1.2)
DIFFERENTIAL TYPE: ABNORMAL
EOSINOPHILS RELATIVE PERCENT: 3 %
ESTIMATED AVERAGE GLUCOSE: 88 MG/DL
GFR AFRICAN AMERICAN: >60 ML/MIN
GFR NON-AFRICAN AMERICAN: >60 ML/MIN
GFR SERPL CREATININE-BSD FRML MDRD: ABNORMAL ML/MIN/{1.73_M2}
GFR SERPL CREATININE-BSD FRML MDRD: ABNORMAL ML/MIN/{1.73_M2}
GLOBULIN: ABNORMAL G/DL (ref 1.5–3.8)
GLUCOSE BLD-MCNC: 68 MG/DL (ref 70–99)
HBA1C MFR BLD: 4.7 % (ref 4–6)
HCT VFR BLD CALC: 43.1 % (ref 41–53)
HEMOGLOBIN: 14.4 G/DL (ref 13.5–17.5)
LYMPHOCYTES # BLD: 15 %
MAGNESIUM: 1.5 MG/DL (ref 1.6–2.6)
MAGNESIUM: 1.7 MG/DL (ref 1.6–2.6)
MCH RBC QN AUTO: 36.2 PG (ref 26–34)
MCHC RBC AUTO-ENTMCNC: 33.5 G/DL (ref 31–37)
MCV RBC AUTO: 107.8 FL (ref 80–100)
MONOCYTES # BLD: 14 %
PDW BLD-RTO: 16 % (ref 11.5–14.9)
PHOSPHORUS: 4.1 MG/DL (ref 2.5–4.5)
PLATELET # BLD: 90 K/UL (ref 150–450)
PLATELET ESTIMATE: ABNORMAL
PMV BLD AUTO: 9.6 FL (ref 6–12)
POTASSIUM SERPL-SCNC: 3 MMOL/L (ref 3.7–5.3)
POTASSIUM SERPL-SCNC: 3.8 MMOL/L (ref 3.7–5.3)
RBC # BLD: 3.99 M/UL (ref 4.5–5.9)
RBC # BLD: ABNORMAL 10*6/UL
SEG NEUTROPHILS: 67 %
SEGMENTED NEUTROPHILS ABSOLUTE COUNT: 4.1 K/UL (ref 1.3–9.1)
SODIUM BLD-SCNC: 139 MMOL/L (ref 135–144)
TOTAL PROTEIN: 6.7 G/DL (ref 6.4–8.3)
WBC # BLD: 6.1 K/UL (ref 3.5–11)
WBC # BLD: ABNORMAL 10*3/UL

## 2017-05-26 PROCEDURE — 6360000002 HC RX W HCPCS: Performed by: STUDENT IN AN ORGANIZED HEALTH CARE EDUCATION/TRAINING PROGRAM

## 2017-05-26 PROCEDURE — 80048 BASIC METABOLIC PNL TOTAL CA: CPT

## 2017-05-26 PROCEDURE — 6360000002 HC RX W HCPCS: Performed by: FAMILY MEDICINE

## 2017-05-26 PROCEDURE — 2500000003 HC RX 250 WO HCPCS: Performed by: FAMILY MEDICINE

## 2017-05-26 PROCEDURE — 6360000002 HC RX W HCPCS: Performed by: NURSE PRACTITIONER

## 2017-05-26 PROCEDURE — 2000000000 HC ICU R&B

## 2017-05-26 PROCEDURE — 99233 SBSQ HOSP IP/OBS HIGH 50: CPT | Performed by: INTERNAL MEDICINE

## 2017-05-26 PROCEDURE — 94762 N-INVAS EAR/PLS OXIMTRY CONT: CPT

## 2017-05-26 PROCEDURE — 2580000003 HC RX 258: Performed by: NURSE PRACTITIONER

## 2017-05-26 PROCEDURE — 6370000000 HC RX 637 (ALT 250 FOR IP): Performed by: STUDENT IN AN ORGANIZED HEALTH CARE EDUCATION/TRAINING PROGRAM

## 2017-05-26 PROCEDURE — 6370000000 HC RX 637 (ALT 250 FOR IP): Performed by: FAMILY MEDICINE

## 2017-05-26 PROCEDURE — 6360000002 HC RX W HCPCS: Performed by: INTERNAL MEDICINE

## 2017-05-26 PROCEDURE — 84100 ASSAY OF PHOSPHORUS: CPT

## 2017-05-26 PROCEDURE — 6370000000 HC RX 637 (ALT 250 FOR IP): Performed by: HOSPITALIST

## 2017-05-26 PROCEDURE — 93005 ELECTROCARDIOGRAM TRACING: CPT

## 2017-05-26 PROCEDURE — 83735 ASSAY OF MAGNESIUM: CPT

## 2017-05-26 PROCEDURE — 80076 HEPATIC FUNCTION PANEL: CPT

## 2017-05-26 PROCEDURE — 36415 COLL VENOUS BLD VENIPUNCTURE: CPT

## 2017-05-26 PROCEDURE — 85025 COMPLETE CBC W/AUTO DIFF WBC: CPT

## 2017-05-26 PROCEDURE — 83036 HEMOGLOBIN GLYCOSYLATED A1C: CPT

## 2017-05-26 PROCEDURE — 84132 ASSAY OF SERUM POTASSIUM: CPT

## 2017-05-26 PROCEDURE — 2580000003 HC RX 258: Performed by: FAMILY MEDICINE

## 2017-05-26 RX ORDER — POTASSIUM CHLORIDE 20MEQ/15ML
40 LIQUID (ML) ORAL PRN
Status: DISCONTINUED | OUTPATIENT
Start: 2017-05-26 | End: 2017-05-30 | Stop reason: HOSPADM

## 2017-05-26 RX ORDER — IVERMECTIN 3 MG/1
1.6 TABLET ORAL ONCE
Status: DISCONTINUED | OUTPATIENT
Start: 2017-05-26 | End: 2017-05-26

## 2017-05-26 RX ORDER — ZIPRASIDONE HYDROCHLORIDE 20 MG/1
20 CAPSULE ORAL DAILY
Status: DISCONTINUED | OUTPATIENT
Start: 2017-05-27 | End: 2017-05-27

## 2017-05-26 RX ORDER — IVERMECTIN 3 MG/1
15 TABLET ORAL ONCE
Status: COMPLETED | OUTPATIENT
Start: 2017-05-26 | End: 2017-05-26

## 2017-05-26 RX ORDER — POTASSIUM CHLORIDE 7.45 MG/ML
10 INJECTION INTRAVENOUS PRN
Status: DISCONTINUED | OUTPATIENT
Start: 2017-05-26 | End: 2017-05-30 | Stop reason: HOSPADM

## 2017-05-26 RX ORDER — PRENATAL VIT 91/IRON/FOLIC/DHA 28-975-200
COMBINATION PACKAGE (EA) ORAL 2 TIMES DAILY
Status: DISCONTINUED | OUTPATIENT
Start: 2017-05-26 | End: 2017-05-30 | Stop reason: HOSPADM

## 2017-05-26 RX ORDER — ZIPRASIDONE HYDROCHLORIDE 20 MG/1
20 CAPSULE ORAL DAILY
Status: DISCONTINUED | OUTPATIENT
Start: 2017-05-26 | End: 2017-05-26

## 2017-05-26 RX ORDER — MAGNESIUM SULFATE 1 G/100ML
1 INJECTION INTRAVENOUS PRN
Status: DISCONTINUED | OUTPATIENT
Start: 2017-05-26 | End: 2017-05-30 | Stop reason: HOSPADM

## 2017-05-26 RX ORDER — DIPHENHYDRAMINE HYDROCHLORIDE 50 MG/ML
10 INJECTION INTRAMUSCULAR; INTRAVENOUS EVERY 6 HOURS PRN
Status: DISCONTINUED | OUTPATIENT
Start: 2017-05-26 | End: 2017-05-30 | Stop reason: HOSPADM

## 2017-05-26 RX ORDER — POTASSIUM CHLORIDE 20 MEQ/1
40 TABLET, EXTENDED RELEASE ORAL PRN
Status: DISCONTINUED | OUTPATIENT
Start: 2017-05-26 | End: 2017-05-30 | Stop reason: HOSPADM

## 2017-05-26 RX ORDER — MAGNESIUM SULFATE 1 G/100ML
1 INJECTION INTRAVENOUS ONCE
Status: COMPLETED | OUTPATIENT
Start: 2017-05-26 | End: 2017-05-26

## 2017-05-26 RX ORDER — PERMETHRIN 50 MG/G
CREAM TOPICAL ONCE
Status: COMPLETED | OUTPATIENT
Start: 2017-05-26 | End: 2017-05-26

## 2017-05-26 RX ADMIN — AMPICILLIN SODIUM 500 MG: 500 INJECTION, POWDER, FOR SOLUTION INTRAMUSCULAR; INTRAVENOUS at 17:10

## 2017-05-26 RX ADMIN — LORAZEPAM 1 MG: 2 INJECTION INTRAMUSCULAR; INTRAVENOUS at 22:24

## 2017-05-26 RX ADMIN — FAMOTIDINE 20 MG: 20 TABLET ORAL at 08:35

## 2017-05-26 RX ADMIN — LORAZEPAM 1 MG: 2 INJECTION INTRAMUSCULAR; INTRAVENOUS at 06:09

## 2017-05-26 RX ADMIN — TERBINAFINE HYDROCHLORIDE: 10 CREAM TOPICAL at 15:10

## 2017-05-26 RX ADMIN — POTASSIUM CHLORIDE 10 MEQ: 7.46 INJECTION, SOLUTION INTRAVENOUS at 07:05

## 2017-05-26 RX ADMIN — LORAZEPAM 1 MG: 2 INJECTION INTRAMUSCULAR; INTRAVENOUS at 01:51

## 2017-05-26 RX ADMIN — LORAZEPAM 1 MG: 2 INJECTION INTRAMUSCULAR; INTRAVENOUS at 17:30

## 2017-05-26 RX ADMIN — PERMETHRIN: 50 CREAM TOPICAL at 15:10

## 2017-05-26 RX ADMIN — ZIPRASIDONE MESYLATE 20 MG: 20 INJECTION, POWDER, LYOPHILIZED, FOR SOLUTION INTRAMUSCULAR at 08:33

## 2017-05-26 RX ADMIN — METOPROLOL TARTRATE 5 MG: 5 INJECTION INTRAVENOUS at 04:30

## 2017-05-26 RX ADMIN — Medication 10 ML: at 08:33

## 2017-05-26 RX ADMIN — AMPICILLIN SODIUM 500 MG: 500 INJECTION, POWDER, FOR SOLUTION INTRAMUSCULAR; INTRAVENOUS at 00:24

## 2017-05-26 RX ADMIN — AMPICILLIN SODIUM 500 MG: 500 INJECTION, POWDER, FOR SOLUTION INTRAMUSCULAR; INTRAVENOUS at 06:12

## 2017-05-26 RX ADMIN — POTASSIUM CHLORIDE 10 MEQ: 7.46 INJECTION, SOLUTION INTRAVENOUS at 08:36

## 2017-05-26 RX ADMIN — FAMOTIDINE 20 MG: 20 TABLET ORAL at 21:17

## 2017-05-26 RX ADMIN — TERBINAFINE 250 MG: 250 TABLET ORAL at 08:35

## 2017-05-26 RX ADMIN — POTASSIUM CHLORIDE 10 MEQ: 7.46 INJECTION, SOLUTION INTRAVENOUS at 09:33

## 2017-05-26 RX ADMIN — AMPICILLIN SODIUM 500 MG: 500 INJECTION, POWDER, FOR SOLUTION INTRAMUSCULAR; INTRAVENOUS at 11:44

## 2017-05-26 RX ADMIN — POTASSIUM CHLORIDE 10 MEQ: 7.46 INJECTION, SOLUTION INTRAVENOUS at 11:44

## 2017-05-26 RX ADMIN — SODIUM CHLORIDE: 9 INJECTION, SOLUTION INTRAVENOUS at 04:37

## 2017-05-26 RX ADMIN — IVERMECTIN 15 MG: 3 TABLET ORAL at 15:09

## 2017-05-26 RX ADMIN — METOPROLOL SUCCINATE 100 MG: 100 TABLET, EXTENDED RELEASE ORAL at 08:33

## 2017-05-26 RX ADMIN — TERBINAFINE HYDROCHLORIDE: 10 CREAM TOPICAL at 21:20

## 2017-05-26 RX ADMIN — AMPICILLIN SODIUM 500 MG: 500 INJECTION, POWDER, FOR SOLUTION INTRAMUSCULAR; INTRAVENOUS at 23:33

## 2017-05-26 RX ADMIN — POTASSIUM CHLORIDE 10 MEQ: 7.46 INJECTION, SOLUTION INTRAVENOUS at 10:37

## 2017-05-26 RX ADMIN — METOPROLOL TARTRATE 5 MG: 5 INJECTION INTRAVENOUS at 21:25

## 2017-05-26 RX ADMIN — BUPROPION HYDROCHLORIDE 150 MG: 150 TABLET, FILM COATED, EXTENDED RELEASE ORAL at 08:35

## 2017-05-26 RX ADMIN — MAGNESIUM SULFATE HEPTAHYDRATE 1 G: 1 INJECTION, SOLUTION INTRAVENOUS at 13:14

## 2017-05-26 RX ADMIN — DIPHENHYDRAMINE HYDROCHLORIDE 10 MG: 50 INJECTION, SOLUTION INTRAMUSCULAR; INTRAVENOUS at 16:09

## 2017-05-26 RX ADMIN — SODIUM CHLORIDE: 9 INJECTION, SOLUTION INTRAVENOUS at 22:24

## 2017-05-26 RX ADMIN — POTASSIUM CHLORIDE 10 MEQ: 7.46 INJECTION, SOLUTION INTRAVENOUS at 06:14

## 2017-05-26 RX ADMIN — ALBUTEROL SULFATE 2 PUFF: 90 AEROSOL, METERED RESPIRATORY (INHALATION) at 15:10

## 2017-05-26 RX ADMIN — ENOXAPARIN SODIUM 40 MG: 40 INJECTION SUBCUTANEOUS at 08:32

## 2017-05-26 ASSESSMENT — PAIN SCALES - GENERAL: PAINLEVEL_OUTOF10: 0

## 2017-05-26 ASSESSMENT — ENCOUNTER SYMPTOMS
VOMITING: 0
COUGH: 0
SHORTNESS OF BREATH: 0
NAUSEA: 0
WHEEZING: 0
BLURRED VISION: 0
CONSTIPATION: 0
ABDOMINAL PAIN: 0
DIARRHEA: 0

## 2017-05-27 ENCOUNTER — APPOINTMENT (OUTPATIENT)
Dept: ULTRASOUND IMAGING | Age: 52
End: 2017-05-27
Payer: MEDICARE

## 2017-05-27 LAB
ABSOLUTE EOS #: 0.3 K/UL (ref 0–0.4)
ABSOLUTE LYMPH #: 1 K/UL (ref 1–4.8)
ABSOLUTE MONO #: 1.1 K/UL (ref 0.1–1.3)
ANION GAP SERPL CALCULATED.3IONS-SCNC: 17 MMOL/L (ref 9–17)
BASOPHILS # BLD: 1 %
BASOPHILS ABSOLUTE: 0 K/UL (ref 0–0.2)
BUN BLDV-MCNC: 6 MG/DL (ref 6–20)
BUN/CREAT BLD: ABNORMAL (ref 9–20)
CALCIUM SERPL-MCNC: 9.1 MG/DL (ref 8.6–10.4)
CHLORIDE BLD-SCNC: 102 MMOL/L (ref 98–107)
CHOLESTEROL/HDL RATIO: 3
CHOLESTEROL: 176 MG/DL
CO2: 19 MMOL/L (ref 20–31)
CREAT SERPL-MCNC: 0.49 MG/DL (ref 0.7–1.2)
DIFFERENTIAL TYPE: ABNORMAL
EOSINOPHILS RELATIVE PERCENT: 4 %
GFR AFRICAN AMERICAN: >60 ML/MIN
GFR NON-AFRICAN AMERICAN: >60 ML/MIN
GFR SERPL CREATININE-BSD FRML MDRD: ABNORMAL ML/MIN/{1.73_M2}
GFR SERPL CREATININE-BSD FRML MDRD: ABNORMAL ML/MIN/{1.73_M2}
GLUCOSE BLD-MCNC: 97 MG/DL (ref 70–99)
HCT VFR BLD CALC: 42.8 % (ref 41–53)
HDLC SERPL-MCNC: 58 MG/DL
HEMOGLOBIN: 14.6 G/DL (ref 13.5–17.5)
LDL CHOLESTEROL: 101 MG/DL (ref 0–130)
LYMPHOCYTES # BLD: 13 %
MCH RBC QN AUTO: 36.7 PG (ref 26–34)
MCHC RBC AUTO-ENTMCNC: 34.1 G/DL (ref 31–37)
MCV RBC AUTO: 107.6 FL (ref 80–100)
MONOCYTES # BLD: 13 %
PDW BLD-RTO: 16.2 % (ref 11.5–14.9)
PLATELET # BLD: 119 K/UL (ref 150–450)
PLATELET ESTIMATE: ABNORMAL
PMV BLD AUTO: 8.6 FL (ref 6–12)
POTASSIUM SERPL-SCNC: 3.4 MMOL/L (ref 3.7–5.3)
RBC # BLD: 3.98 M/UL (ref 4.5–5.9)
RBC # BLD: ABNORMAL 10*6/UL
SEG NEUTROPHILS: 69 %
SEGMENTED NEUTROPHILS ABSOLUTE COUNT: 5.5 K/UL (ref 1.3–9.1)
SODIUM BLD-SCNC: 138 MMOL/L (ref 135–144)
TRIGL SERPL-MCNC: 85 MG/DL
VLDLC SERPL CALC-MCNC: NORMAL MG/DL (ref 1–30)
WBC # BLD: 7.9 K/UL (ref 3.5–11)
WBC # BLD: ABNORMAL 10*3/UL

## 2017-05-27 PROCEDURE — 97161 PT EVAL LOW COMPLEX 20 MIN: CPT

## 2017-05-27 PROCEDURE — 6370000000 HC RX 637 (ALT 250 FOR IP): Performed by: INTERNAL MEDICINE

## 2017-05-27 PROCEDURE — 85025 COMPLETE CBC W/AUTO DIFF WBC: CPT

## 2017-05-27 PROCEDURE — 2060000000 HC ICU INTERMEDIATE R&B

## 2017-05-27 PROCEDURE — 2500000003 HC RX 250 WO HCPCS: Performed by: INTERNAL MEDICINE

## 2017-05-27 PROCEDURE — 6370000000 HC RX 637 (ALT 250 FOR IP): Performed by: STUDENT IN AN ORGANIZED HEALTH CARE EDUCATION/TRAINING PROGRAM

## 2017-05-27 PROCEDURE — 6370000000 HC RX 637 (ALT 250 FOR IP): Performed by: NURSE PRACTITIONER

## 2017-05-27 PROCEDURE — 80061 LIPID PANEL: CPT

## 2017-05-27 PROCEDURE — 6360000002 HC RX W HCPCS: Performed by: INTERNAL MEDICINE

## 2017-05-27 PROCEDURE — 2580000003 HC RX 258: Performed by: INTERNAL MEDICINE

## 2017-05-27 PROCEDURE — 6360000002 HC RX W HCPCS: Performed by: FAMILY MEDICINE

## 2017-05-27 PROCEDURE — 6370000000 HC RX 637 (ALT 250 FOR IP): Performed by: FAMILY MEDICINE

## 2017-05-27 PROCEDURE — 6360000002 HC RX W HCPCS: Performed by: STUDENT IN AN ORGANIZED HEALTH CARE EDUCATION/TRAINING PROGRAM

## 2017-05-27 PROCEDURE — 2580000003 HC RX 258: Performed by: NURSE PRACTITIONER

## 2017-05-27 PROCEDURE — 36415 COLL VENOUS BLD VENIPUNCTURE: CPT

## 2017-05-27 PROCEDURE — 80048 BASIC METABOLIC PNL TOTAL CA: CPT

## 2017-05-27 PROCEDURE — 2500000003 HC RX 250 WO HCPCS: Performed by: FAMILY MEDICINE

## 2017-05-27 PROCEDURE — 76705 ECHO EXAM OF ABDOMEN: CPT

## 2017-05-27 PROCEDURE — 99233 SBSQ HOSP IP/OBS HIGH 50: CPT | Performed by: INTERNAL MEDICINE

## 2017-05-27 PROCEDURE — 2580000003 HC RX 258: Performed by: FAMILY MEDICINE

## 2017-05-27 PROCEDURE — 97110 THERAPEUTIC EXERCISES: CPT

## 2017-05-27 PROCEDURE — 93005 ELECTROCARDIOGRAM TRACING: CPT

## 2017-05-27 RX ORDER — HALOPERIDOL 5 MG/ML
1 INJECTION INTRAMUSCULAR EVERY 4 HOURS PRN
Status: DISCONTINUED | OUTPATIENT
Start: 2017-05-27 | End: 2017-05-30 | Stop reason: HOSPADM

## 2017-05-27 RX ORDER — AMOXICILLIN 500 MG/1
500 CAPSULE ORAL EVERY 8 HOURS SCHEDULED
Status: DISCONTINUED | OUTPATIENT
Start: 2017-05-27 | End: 2017-05-30

## 2017-05-27 RX ORDER — ZIPRASIDONE HYDROCHLORIDE 20 MG/1
20 CAPSULE ORAL 2 TIMES DAILY WITH MEALS
Status: DISCONTINUED | OUTPATIENT
Start: 2017-05-27 | End: 2017-05-30 | Stop reason: HOSPADM

## 2017-05-27 RX ORDER — CLONIDINE HYDROCHLORIDE 0.1 MG/1
0.2 TABLET ORAL 3 TIMES DAILY
Status: DISCONTINUED | OUTPATIENT
Start: 2017-05-27 | End: 2017-05-30 | Stop reason: HOSPADM

## 2017-05-27 RX ORDER — HYDRALAZINE HYDROCHLORIDE 20 MG/ML
20 INJECTION INTRAMUSCULAR; INTRAVENOUS EVERY 6 HOURS PRN
Status: DISCONTINUED | OUTPATIENT
Start: 2017-05-27 | End: 2017-05-30 | Stop reason: HOSPADM

## 2017-05-27 RX ORDER — METOPROLOL SUCCINATE 100 MG/1
200 TABLET, EXTENDED RELEASE ORAL DAILY
Status: DISCONTINUED | OUTPATIENT
Start: 2017-05-27 | End: 2017-05-27

## 2017-05-27 RX ORDER — IPRATROPIUM BROMIDE AND ALBUTEROL SULFATE 2.5; .5 MG/3ML; MG/3ML
1 SOLUTION RESPIRATORY (INHALATION) EVERY 4 HOURS PRN
Status: DISCONTINUED | OUTPATIENT
Start: 2017-05-27 | End: 2017-05-30 | Stop reason: HOSPADM

## 2017-05-27 RX ORDER — LORAZEPAM 2 MG/ML
2 INJECTION INTRAMUSCULAR
Status: DISCONTINUED | OUTPATIENT
Start: 2017-05-27 | End: 2017-05-30 | Stop reason: HOSPADM

## 2017-05-27 RX ORDER — METOPROLOL SUCCINATE 100 MG/1
100 TABLET, EXTENDED RELEASE ORAL DAILY
Status: DISCONTINUED | OUTPATIENT
Start: 2017-05-27 | End: 2017-05-30 | Stop reason: HOSPADM

## 2017-05-27 RX ORDER — METOPROLOL SUCCINATE 100 MG/1
100 TABLET, EXTENDED RELEASE ORAL DAILY
Status: DISCONTINUED | OUTPATIENT
Start: 2017-05-28 | End: 2017-05-27

## 2017-05-27 RX ADMIN — LORAZEPAM 2 MG: 2 INJECTION INTRAMUSCULAR; INTRAVENOUS at 18:18

## 2017-05-27 RX ADMIN — ENOXAPARIN SODIUM 40 MG: 40 INJECTION SUBCUTANEOUS at 09:04

## 2017-05-27 RX ADMIN — LORAZEPAM 1 MG: 2 INJECTION INTRAMUSCULAR; INTRAVENOUS at 06:47

## 2017-05-27 RX ADMIN — BUPROPION HYDROCHLORIDE 150 MG: 150 TABLET, FILM COATED, EXTENDED RELEASE ORAL at 09:30

## 2017-05-27 RX ADMIN — METOPROLOL TARTRATE 5 MG: 5 INJECTION INTRAVENOUS at 15:15

## 2017-05-27 RX ADMIN — LORAZEPAM 2 MG: 2 INJECTION INTRAMUSCULAR; INTRAVENOUS at 14:05

## 2017-05-27 RX ADMIN — METOPROLOL SUCCINATE 100 MG: 100 TABLET, EXTENDED RELEASE ORAL at 11:07

## 2017-05-27 RX ADMIN — TERBINAFINE HYDROCHLORIDE: 10 CREAM TOPICAL at 21:48

## 2017-05-27 RX ADMIN — ZIPRASIDONE HYDROCHLORIDE 20 MG: 20 CAPSULE ORAL at 17:07

## 2017-05-27 RX ADMIN — TERBINAFINE HYDROCHLORIDE: 10 CREAM TOPICAL at 09:05

## 2017-05-27 RX ADMIN — CLONIDINE HYDROCHLORIDE 0.2 MG: 0.1 TABLET ORAL at 21:47

## 2017-05-27 RX ADMIN — DIPHENHYDRAMINE HYDROCHLORIDE 10 MG: 50 INJECTION, SOLUTION INTRAMUSCULAR; INTRAVENOUS at 18:52

## 2017-05-27 RX ADMIN — CLONIDINE HYDROCHLORIDE 0.2 MG: 0.1 TABLET ORAL at 09:04

## 2017-05-27 RX ADMIN — SODIUM CHLORIDE: 9 INJECTION, SOLUTION INTRAVENOUS at 09:13

## 2017-05-27 RX ADMIN — FAMOTIDINE 20 MG: 20 TABLET ORAL at 09:05

## 2017-05-27 RX ADMIN — SODIUM CHLORIDE: 9 INJECTION, SOLUTION INTRAVENOUS at 21:46

## 2017-05-27 RX ADMIN — Medication 10 ML: at 21:49

## 2017-05-27 RX ADMIN — LORAZEPAM 1 MG: 2 INJECTION INTRAMUSCULAR; INTRAVENOUS at 02:51

## 2017-05-27 RX ADMIN — LORAZEPAM 1 MG: 2 INJECTION INTRAMUSCULAR; INTRAVENOUS at 11:07

## 2017-05-27 RX ADMIN — CLONIDINE HYDROCHLORIDE 0.2 MG: 0.1 TABLET ORAL at 14:04

## 2017-05-27 RX ADMIN — AMPICILLIN SODIUM 500 MG: 500 INJECTION, POWDER, FOR SOLUTION INTRAMUSCULAR; INTRAVENOUS at 06:29

## 2017-05-27 RX ADMIN — AMOXICILLIN 500 MG: 500 CAPSULE ORAL at 14:04

## 2017-05-27 RX ADMIN — HALOPERIDOL LACTATE 1 MG: 5 INJECTION, SOLUTION INTRAMUSCULAR at 15:15

## 2017-05-27 RX ADMIN — DEXMEDETOMIDINE HYDROCHLORIDE 0.2 MCG/KG/HR: 100 INJECTION, SOLUTION INTRAVENOUS at 17:31

## 2017-05-27 RX ADMIN — POTASSIUM CHLORIDE 40 MEQ: 20 TABLET, EXTENDED RELEASE ORAL at 06:27

## 2017-05-27 RX ADMIN — ZIPRASIDONE HYDROCHLORIDE 20 MG: 20 CAPSULE ORAL at 06:27

## 2017-05-27 RX ADMIN — AMOXICILLIN 500 MG: 500 CAPSULE ORAL at 21:47

## 2017-05-27 RX ADMIN — Medication 10 ML: at 09:04

## 2017-05-27 RX ADMIN — DIPHENHYDRAMINE HYDROCHLORIDE 10 MG: 50 INJECTION, SOLUTION INTRAMUSCULAR; INTRAVENOUS at 11:10

## 2017-05-27 ASSESSMENT — ENCOUNTER SYMPTOMS
COUGH: 0
ABDOMINAL PAIN: 0
DIARRHEA: 0
NAUSEA: 0
BLURRED VISION: 0
SHORTNESS OF BREATH: 0
WHEEZING: 0
CONSTIPATION: 0
VOMITING: 0

## 2017-05-27 ASSESSMENT — PAIN SCALES - GENERAL
PAINLEVEL_OUTOF10: 0

## 2017-05-28 LAB
ABSOLUTE EOS #: 0.14 K/UL (ref 0–0.4)
ABSOLUTE LYMPH #: 0.65 K/UL (ref 1–4.8)
ABSOLUTE MONO #: 0.58 K/UL (ref 0.1–1.3)
ANION GAP SERPL CALCULATED.3IONS-SCNC: 17 MMOL/L (ref 9–17)
BASOPHILS # BLD: 0 %
BASOPHILS ABSOLUTE: 0 K/UL (ref 0–0.2)
BUN BLDV-MCNC: 4 MG/DL (ref 6–20)
BUN/CREAT BLD: ABNORMAL (ref 9–20)
CALCIUM SERPL-MCNC: 8.9 MG/DL (ref 8.6–10.4)
CHLORIDE BLD-SCNC: 102 MMOL/L (ref 98–107)
CO2: 20 MMOL/L (ref 20–31)
CREAT SERPL-MCNC: 0.42 MG/DL (ref 0.7–1.2)
DIFFERENTIAL TYPE: ABNORMAL
EKG ATRIAL RATE: 92 BPM
EKG P AXIS: 55 DEGREES
EKG P-R INTERVAL: 132 MS
EKG Q-T INTERVAL: 376 MS
EKG QRS DURATION: 88 MS
EKG QTC CALCULATION (BAZETT): 464 MS
EKG R AXIS: 31 DEGREES
EKG T AXIS: 55 DEGREES
EKG VENTRICULAR RATE: 92 BPM
EOSINOPHILS RELATIVE PERCENT: 2 %
GFR AFRICAN AMERICAN: >60 ML/MIN
GFR NON-AFRICAN AMERICAN: >60 ML/MIN
GFR SERPL CREATININE-BSD FRML MDRD: ABNORMAL ML/MIN/{1.73_M2}
GFR SERPL CREATININE-BSD FRML MDRD: ABNORMAL ML/MIN/{1.73_M2}
GLUCOSE BLD-MCNC: 91 MG/DL (ref 70–99)
HCT VFR BLD CALC: 42 % (ref 41–53)
HEMOGLOBIN: 14.3 G/DL (ref 13.5–17.5)
LYMPHOCYTES # BLD: 9 %
MCH RBC QN AUTO: 37.4 PG (ref 26–34)
MCHC RBC AUTO-ENTMCNC: 34.1 G/DL (ref 31–37)
MCV RBC AUTO: 109.5 FL (ref 80–100)
MONOCYTES # BLD: 8 %
MORPHOLOGY: ABNORMAL
PDW BLD-RTO: 15.8 % (ref 11.5–14.9)
PLATELET # BLD: 117 K/UL (ref 150–450)
PLATELET ESTIMATE: ABNORMAL
PMV BLD AUTO: 9 FL (ref 6–12)
POTASSIUM SERPL-SCNC: 3.8 MMOL/L (ref 3.7–5.3)
RBC # BLD: 3.83 M/UL (ref 4.5–5.9)
RBC # BLD: ABNORMAL 10*6/UL
SEG NEUTROPHILS: 81 %
SEGMENTED NEUTROPHILS ABSOLUTE COUNT: 5.83 K/UL (ref 1.3–9.1)
SODIUM BLD-SCNC: 139 MMOL/L (ref 135–144)
WBC # BLD: 7.2 K/UL (ref 3.5–11)
WBC # BLD: ABNORMAL 10*3/UL

## 2017-05-28 PROCEDURE — 2580000003 HC RX 258: Performed by: NURSE PRACTITIONER

## 2017-05-28 PROCEDURE — 6370000000 HC RX 637 (ALT 250 FOR IP): Performed by: INTERNAL MEDICINE

## 2017-05-28 PROCEDURE — 80048 BASIC METABOLIC PNL TOTAL CA: CPT

## 2017-05-28 PROCEDURE — 6370000000 HC RX 637 (ALT 250 FOR IP): Performed by: FAMILY MEDICINE

## 2017-05-28 PROCEDURE — 2580000003 HC RX 258: Performed by: INTERNAL MEDICINE

## 2017-05-28 PROCEDURE — 85025 COMPLETE CBC W/AUTO DIFF WBC: CPT

## 2017-05-28 PROCEDURE — 36415 COLL VENOUS BLD VENIPUNCTURE: CPT

## 2017-05-28 PROCEDURE — 6370000000 HC RX 637 (ALT 250 FOR IP): Performed by: STUDENT IN AN ORGANIZED HEALTH CARE EDUCATION/TRAINING PROGRAM

## 2017-05-28 PROCEDURE — 2500000003 HC RX 250 WO HCPCS: Performed by: INTERNAL MEDICINE

## 2017-05-28 PROCEDURE — 2580000003 HC RX 258: Performed by: FAMILY MEDICINE

## 2017-05-28 PROCEDURE — 97535 SELF CARE MNGMENT TRAINING: CPT

## 2017-05-28 PROCEDURE — 6360000002 HC RX W HCPCS: Performed by: FAMILY MEDICINE

## 2017-05-28 PROCEDURE — 97165 OT EVAL LOW COMPLEX 30 MIN: CPT

## 2017-05-28 PROCEDURE — 2060000000 HC ICU INTERMEDIATE R&B

## 2017-05-28 PROCEDURE — 99233 SBSQ HOSP IP/OBS HIGH 50: CPT | Performed by: INTERNAL MEDICINE

## 2017-05-28 RX ORDER — LORAZEPAM 2 MG/ML
3 INJECTION INTRAMUSCULAR
Status: DISCONTINUED | OUTPATIENT
Start: 2017-05-28 | End: 2017-05-29

## 2017-05-28 RX ORDER — LORAZEPAM 1 MG/1
4 TABLET ORAL
Status: DISCONTINUED | OUTPATIENT
Start: 2017-05-28 | End: 2017-05-29

## 2017-05-28 RX ORDER — LORAZEPAM 1 MG/1
3 TABLET ORAL
Status: DISCONTINUED | OUTPATIENT
Start: 2017-05-28 | End: 2017-05-29

## 2017-05-28 RX ORDER — LORAZEPAM 2 MG/ML
1 INJECTION INTRAMUSCULAR
Status: DISCONTINUED | OUTPATIENT
Start: 2017-05-28 | End: 2017-05-29

## 2017-05-28 RX ORDER — LORAZEPAM 1 MG/1
2 TABLET ORAL
Status: DISCONTINUED | OUTPATIENT
Start: 2017-05-28 | End: 2017-05-29

## 2017-05-28 RX ORDER — LORAZEPAM 2 MG/ML
4 INJECTION INTRAMUSCULAR
Status: DISCONTINUED | OUTPATIENT
Start: 2017-05-28 | End: 2017-05-29

## 2017-05-28 RX ORDER — LORAZEPAM 2 MG/ML
2 INJECTION INTRAMUSCULAR
Status: DISCONTINUED | OUTPATIENT
Start: 2017-05-28 | End: 2017-05-29

## 2017-05-28 RX ORDER — LORAZEPAM 1 MG/1
1 TABLET ORAL
Status: DISCONTINUED | OUTPATIENT
Start: 2017-05-28 | End: 2017-05-29

## 2017-05-28 RX ADMIN — TERBINAFINE HYDROCHLORIDE: 10 CREAM TOPICAL at 20:38

## 2017-05-28 RX ADMIN — CLONIDINE HYDROCHLORIDE 0.2 MG: 0.1 TABLET ORAL at 20:39

## 2017-05-28 RX ADMIN — ENOXAPARIN SODIUM 40 MG: 40 INJECTION SUBCUTANEOUS at 08:51

## 2017-05-28 RX ADMIN — Medication 10 ML: at 20:38

## 2017-05-28 RX ADMIN — AMOXICILLIN 500 MG: 500 CAPSULE ORAL at 20:39

## 2017-05-28 RX ADMIN — FAMOTIDINE 20 MG: 20 TABLET ORAL at 20:39

## 2017-05-28 RX ADMIN — ZIPRASIDONE HYDROCHLORIDE 20 MG: 20 CAPSULE ORAL at 08:51

## 2017-05-28 RX ADMIN — IPRATROPIUM BROMIDE AND ALBUTEROL 1 PUFF: 20; 100 SPRAY, METERED RESPIRATORY (INHALATION) at 08:50

## 2017-05-28 RX ADMIN — AMOXICILLIN 500 MG: 500 CAPSULE ORAL at 13:46

## 2017-05-28 RX ADMIN — DEXMEDETOMIDINE HYDROCHLORIDE 7 MCG/KG/HR: 100 INJECTION, SOLUTION INTRAVENOUS at 01:45

## 2017-05-28 RX ADMIN — CLONIDINE HYDROCHLORIDE 0.2 MG: 0.1 TABLET ORAL at 13:46

## 2017-05-28 RX ADMIN — SODIUM CHLORIDE: 9 INJECTION, SOLUTION INTRAVENOUS at 05:23

## 2017-05-28 RX ADMIN — Medication 10 ML: at 08:53

## 2017-05-28 RX ADMIN — IPRATROPIUM BROMIDE AND ALBUTEROL 1 PUFF: 20; 100 SPRAY, METERED RESPIRATORY (INHALATION) at 20:39

## 2017-05-28 RX ADMIN — ZIPRASIDONE HYDROCHLORIDE 20 MG: 20 CAPSULE ORAL at 15:58

## 2017-05-28 RX ADMIN — METOPROLOL SUCCINATE 100 MG: 100 TABLET, EXTENDED RELEASE ORAL at 08:51

## 2017-05-28 RX ADMIN — IPRATROPIUM BROMIDE AND ALBUTEROL 1 PUFF: 20; 100 SPRAY, METERED RESPIRATORY (INHALATION) at 13:46

## 2017-05-28 RX ADMIN — AMOXICILLIN 500 MG: 500 CAPSULE ORAL at 05:23

## 2017-05-28 RX ADMIN — BUPROPION HYDROCHLORIDE 150 MG: 150 TABLET, FILM COATED, EXTENDED RELEASE ORAL at 09:54

## 2017-05-28 RX ADMIN — TERBINAFINE HYDROCHLORIDE: 10 CREAM TOPICAL at 08:49

## 2017-05-28 RX ADMIN — CLONIDINE HYDROCHLORIDE 0.2 MG: 0.1 TABLET ORAL at 08:51

## 2017-05-28 RX ADMIN — FAMOTIDINE 20 MG: 20 TABLET ORAL at 08:51

## 2017-05-28 RX ADMIN — LORAZEPAM 1 MG: 1 TABLET ORAL at 13:50

## 2017-05-28 ASSESSMENT — ENCOUNTER SYMPTOMS
NAUSEA: 0
CONSTIPATION: 0
DIARRHEA: 0
COUGH: 0
WHEEZING: 0
BLURRED VISION: 0
VOMITING: 0
SHORTNESS OF BREATH: 0
ABDOMINAL PAIN: 0

## 2017-05-28 ASSESSMENT — PAIN SCALES - GENERAL
PAINLEVEL_OUTOF10: 0
PAINLEVEL_OUTOF10: 0

## 2017-05-29 LAB
ABSOLUTE BANDS #: 0.08 K/UL (ref 0–1)
ABSOLUTE EOS #: 0.25 K/UL (ref 0–0.4)
ABSOLUTE LYMPH #: 1.23 K/UL (ref 1–4.8)
ABSOLUTE MONO #: 1.48 K/UL (ref 0.1–1.3)
ANION GAP SERPL CALCULATED.3IONS-SCNC: 16 MMOL/L (ref 9–17)
BANDS: 1 %
BASOPHILS # BLD: 0 %
BASOPHILS ABSOLUTE: 0 K/UL (ref 0–0.2)
BUN BLDV-MCNC: 11 MG/DL (ref 6–20)
BUN/CREAT BLD: ABNORMAL (ref 9–20)
CALCIUM SERPL-MCNC: 9.1 MG/DL (ref 8.6–10.4)
CHLORIDE BLD-SCNC: 99 MMOL/L (ref 98–107)
CO2: 21 MMOL/L (ref 20–31)
CREAT SERPL-MCNC: 0.55 MG/DL (ref 0.7–1.2)
DIFFERENTIAL TYPE: ABNORMAL
EOSINOPHILS RELATIVE PERCENT: 3 %
GFR AFRICAN AMERICAN: >60 ML/MIN
GFR NON-AFRICAN AMERICAN: >60 ML/MIN
GFR SERPL CREATININE-BSD FRML MDRD: ABNORMAL ML/MIN/{1.73_M2}
GFR SERPL CREATININE-BSD FRML MDRD: ABNORMAL ML/MIN/{1.73_M2}
GLUCOSE BLD-MCNC: 109 MG/DL (ref 70–99)
HCT VFR BLD CALC: 42.5 % (ref 41–53)
HEMOGLOBIN: 14.3 G/DL (ref 13.5–17.5)
LYMPHOCYTES # BLD: 15 %
MCH RBC QN AUTO: 36.8 PG (ref 26–34)
MCHC RBC AUTO-ENTMCNC: 33.7 G/DL (ref 31–37)
MCV RBC AUTO: 109.2 FL (ref 80–100)
MONOCYTES # BLD: 18 %
MORPHOLOGY: ABNORMAL
PDW BLD-RTO: 16.5 % (ref 11.5–14.9)
PLATELET # BLD: 157 K/UL (ref 150–450)
PLATELET ESTIMATE: ABNORMAL
PMV BLD AUTO: 9.2 FL (ref 6–12)
POTASSIUM SERPL-SCNC: 3.5 MMOL/L (ref 3.7–5.3)
RBC # BLD: 3.89 M/UL (ref 4.5–5.9)
RBC # BLD: ABNORMAL 10*6/UL
SEG NEUTROPHILS: 63 %
SEGMENTED NEUTROPHILS ABSOLUTE COUNT: 5.16 K/UL (ref 1.3–9.1)
SODIUM BLD-SCNC: 136 MMOL/L (ref 135–144)
WBC # BLD: 8.2 K/UL (ref 3.5–11)
WBC # BLD: ABNORMAL 10*3/UL

## 2017-05-29 PROCEDURE — 6360000002 HC RX W HCPCS: Performed by: FAMILY MEDICINE

## 2017-05-29 PROCEDURE — 6370000000 HC RX 637 (ALT 250 FOR IP): Performed by: STUDENT IN AN ORGANIZED HEALTH CARE EDUCATION/TRAINING PROGRAM

## 2017-05-29 PROCEDURE — 99233 SBSQ HOSP IP/OBS HIGH 50: CPT | Performed by: INTERNAL MEDICINE

## 2017-05-29 PROCEDURE — 2580000003 HC RX 258: Performed by: FAMILY MEDICINE

## 2017-05-29 PROCEDURE — 6370000000 HC RX 637 (ALT 250 FOR IP): Performed by: INTERNAL MEDICINE

## 2017-05-29 PROCEDURE — 80048 BASIC METABOLIC PNL TOTAL CA: CPT

## 2017-05-29 PROCEDURE — 85025 COMPLETE CBC W/AUTO DIFF WBC: CPT

## 2017-05-29 PROCEDURE — 97116 GAIT TRAINING THERAPY: CPT

## 2017-05-29 PROCEDURE — 6370000000 HC RX 637 (ALT 250 FOR IP): Performed by: FAMILY MEDICINE

## 2017-05-29 PROCEDURE — 6370000000 HC RX 637 (ALT 250 FOR IP): Performed by: NURSE PRACTITIONER

## 2017-05-29 PROCEDURE — 97110 THERAPEUTIC EXERCISES: CPT

## 2017-05-29 PROCEDURE — 36415 COLL VENOUS BLD VENIPUNCTURE: CPT

## 2017-05-29 PROCEDURE — 2060000000 HC ICU INTERMEDIATE R&B

## 2017-05-29 RX ORDER — PERMETHRIN 50 MG/G
CREAM TOPICAL ONCE
Status: COMPLETED | OUTPATIENT
Start: 2017-05-29 | End: 2017-05-29

## 2017-05-29 RX ORDER — CHLORDIAZEPOXIDE HYDROCHLORIDE 25 MG/1
25 CAPSULE, GELATIN COATED ORAL 3 TIMES DAILY
Status: DISCONTINUED | OUTPATIENT
Start: 2017-05-29 | End: 2017-05-30 | Stop reason: HOSPADM

## 2017-05-29 RX ADMIN — ENOXAPARIN SODIUM 40 MG: 40 INJECTION SUBCUTANEOUS at 08:36

## 2017-05-29 RX ADMIN — Medication 10 ML: at 08:37

## 2017-05-29 RX ADMIN — CLONIDINE HYDROCHLORIDE 0.2 MG: 0.1 TABLET ORAL at 20:51

## 2017-05-29 RX ADMIN — AMOXICILLIN 500 MG: 500 CAPSULE ORAL at 15:09

## 2017-05-29 RX ADMIN — AMOXICILLIN 500 MG: 500 CAPSULE ORAL at 06:45

## 2017-05-29 RX ADMIN — POTASSIUM CHLORIDE 40 MEQ: 20 TABLET, EXTENDED RELEASE ORAL at 08:36

## 2017-05-29 RX ADMIN — FAMOTIDINE 20 MG: 20 TABLET ORAL at 08:36

## 2017-05-29 RX ADMIN — TERBINAFINE HYDROCHLORIDE: 10 CREAM TOPICAL at 20:51

## 2017-05-29 RX ADMIN — TERBINAFINE HYDROCHLORIDE: 10 CREAM TOPICAL at 08:35

## 2017-05-29 RX ADMIN — CLONIDINE HYDROCHLORIDE 0.2 MG: 0.1 TABLET ORAL at 15:06

## 2017-05-29 RX ADMIN — IPRATROPIUM BROMIDE AND ALBUTEROL 1 PUFF: 20; 100 SPRAY, METERED RESPIRATORY (INHALATION) at 15:07

## 2017-05-29 RX ADMIN — ZIPRASIDONE HYDROCHLORIDE 20 MG: 20 CAPSULE ORAL at 16:49

## 2017-05-29 RX ADMIN — CHLORDIAZEPOXIDE HYDROCHLORIDE 25 MG: 25 CAPSULE ORAL at 20:51

## 2017-05-29 RX ADMIN — BUPROPION HYDROCHLORIDE 150 MG: 150 TABLET, FILM COATED, EXTENDED RELEASE ORAL at 08:35

## 2017-05-29 RX ADMIN — AMOXICILLIN 500 MG: 500 CAPSULE ORAL at 20:50

## 2017-05-29 RX ADMIN — FAMOTIDINE 20 MG: 20 TABLET ORAL at 20:51

## 2017-05-29 RX ADMIN — CHLORDIAZEPOXIDE HYDROCHLORIDE 25 MG: 25 CAPSULE ORAL at 09:42

## 2017-05-29 RX ADMIN — IPRATROPIUM BROMIDE AND ALBUTEROL 1 PUFF: 20; 100 SPRAY, METERED RESPIRATORY (INHALATION) at 20:51

## 2017-05-29 RX ADMIN — CHLORDIAZEPOXIDE HYDROCHLORIDE 25 MG: 25 CAPSULE ORAL at 15:06

## 2017-05-29 RX ADMIN — Medication 10 ML: at 20:50

## 2017-05-29 RX ADMIN — CLONIDINE HYDROCHLORIDE 0.2 MG: 0.1 TABLET ORAL at 08:36

## 2017-05-29 RX ADMIN — PERMETHRIN: 50 CREAM TOPICAL at 15:07

## 2017-05-29 RX ADMIN — METOPROLOL SUCCINATE 100 MG: 100 TABLET, EXTENDED RELEASE ORAL at 08:35

## 2017-05-29 RX ADMIN — ZIPRASIDONE HYDROCHLORIDE 20 MG: 20 CAPSULE ORAL at 08:35

## 2017-05-29 RX ADMIN — IPRATROPIUM BROMIDE AND ALBUTEROL 1 PUFF: 20; 100 SPRAY, METERED RESPIRATORY (INHALATION) at 08:35

## 2017-05-29 ASSESSMENT — PAIN SCALES - GENERAL
PAINLEVEL_OUTOF10: 0
PAINLEVEL_OUTOF10: 0

## 2017-05-29 ASSESSMENT — ENCOUNTER SYMPTOMS
ABDOMINAL PAIN: 0
SHORTNESS OF BREATH: 0
NAUSEA: 0
WHEEZING: 0
DIARRHEA: 0
BLURRED VISION: 0
CONSTIPATION: 0
VOMITING: 0
COUGH: 0

## 2017-05-30 VITALS
WEIGHT: 175 LBS | SYSTOLIC BLOOD PRESSURE: 109 MMHG | OXYGEN SATURATION: 99 % | TEMPERATURE: 97.9 F | HEIGHT: 74 IN | BODY MASS INDEX: 22.46 KG/M2 | RESPIRATION RATE: 18 BRPM | DIASTOLIC BLOOD PRESSURE: 76 MMHG | HEART RATE: 91 BPM

## 2017-05-30 PROCEDURE — 6360000002 HC RX W HCPCS: Performed by: FAMILY MEDICINE

## 2017-05-30 PROCEDURE — 97116 GAIT TRAINING THERAPY: CPT

## 2017-05-30 PROCEDURE — 6370000000 HC RX 637 (ALT 250 FOR IP): Performed by: FAMILY MEDICINE

## 2017-05-30 PROCEDURE — 6370000000 HC RX 637 (ALT 250 FOR IP): Performed by: INTERNAL MEDICINE

## 2017-05-30 PROCEDURE — 6370000000 HC RX 637 (ALT 250 FOR IP): Performed by: STUDENT IN AN ORGANIZED HEALTH CARE EDUCATION/TRAINING PROGRAM

## 2017-05-30 PROCEDURE — 97110 THERAPEUTIC EXERCISES: CPT

## 2017-05-30 PROCEDURE — 99239 HOSP IP/OBS DSCHRG MGMT >30: CPT | Performed by: INTERNAL MEDICINE

## 2017-05-30 PROCEDURE — 2580000003 HC RX 258: Performed by: FAMILY MEDICINE

## 2017-05-30 RX ORDER — ZIPRASIDONE HYDROCHLORIDE 20 MG/1
20 CAPSULE ORAL 2 TIMES DAILY WITH MEALS
Qty: 60 CAPSULE | Refills: 1 | Status: SHIPPED | OUTPATIENT
Start: 2017-05-30 | End: 2020-03-12

## 2017-05-30 RX ORDER — CHLORDIAZEPOXIDE HYDROCHLORIDE 25 MG/1
CAPSULE, GELATIN COATED ORAL
Qty: 9 CAPSULE | Refills: 0 | Status: SHIPPED | OUTPATIENT
Start: 2017-05-30 | End: 2018-01-29 | Stop reason: ALTCHOICE

## 2017-05-30 RX ORDER — PRENATAL VIT 91/IRON/FOLIC/DHA 28-975-200
COMBINATION PACKAGE (EA) ORAL
Qty: 15 G | Refills: 2 | Status: SHIPPED | OUTPATIENT
Start: 2017-05-30 | End: 2018-02-12 | Stop reason: ALTCHOICE

## 2017-05-30 RX ADMIN — IPRATROPIUM BROMIDE AND ALBUTEROL 1 PUFF: 20; 100 SPRAY, METERED RESPIRATORY (INHALATION) at 13:28

## 2017-05-30 RX ADMIN — ENOXAPARIN SODIUM 40 MG: 40 INJECTION SUBCUTANEOUS at 08:22

## 2017-05-30 RX ADMIN — CLONIDINE HYDROCHLORIDE 0.2 MG: 0.1 TABLET ORAL at 13:28

## 2017-05-30 RX ADMIN — ZIPRASIDONE HYDROCHLORIDE 20 MG: 20 CAPSULE ORAL at 08:21

## 2017-05-30 RX ADMIN — CLONIDINE HYDROCHLORIDE 0.2 MG: 0.1 TABLET ORAL at 08:22

## 2017-05-30 RX ADMIN — BUPROPION HYDROCHLORIDE 150 MG: 150 TABLET, FILM COATED, EXTENDED RELEASE ORAL at 08:22

## 2017-05-30 RX ADMIN — Medication 10 ML: at 08:23

## 2017-05-30 RX ADMIN — CHLORDIAZEPOXIDE HYDROCHLORIDE 25 MG: 25 CAPSULE ORAL at 13:28

## 2017-05-30 RX ADMIN — ZIPRASIDONE HYDROCHLORIDE 20 MG: 20 CAPSULE ORAL at 16:14

## 2017-05-30 RX ADMIN — AMOXICILLIN 500 MG: 500 CAPSULE ORAL at 05:30

## 2017-05-30 RX ADMIN — IPRATROPIUM BROMIDE AND ALBUTEROL 1 PUFF: 20; 100 SPRAY, METERED RESPIRATORY (INHALATION) at 08:24

## 2017-05-30 RX ADMIN — FAMOTIDINE 20 MG: 20 TABLET ORAL at 08:21

## 2017-05-30 RX ADMIN — TERBINAFINE HYDROCHLORIDE: 10 CREAM TOPICAL at 08:24

## 2017-05-30 RX ADMIN — CHLORDIAZEPOXIDE HYDROCHLORIDE 25 MG: 25 CAPSULE ORAL at 08:22

## 2017-05-30 RX ADMIN — METOPROLOL SUCCINATE 100 MG: 100 TABLET, EXTENDED RELEASE ORAL at 08:21

## 2017-05-30 ASSESSMENT — ENCOUNTER SYMPTOMS
VOMITING: 0
CONSTIPATION: 0
SHORTNESS OF BREATH: 0
NAUSEA: 0
ABDOMINAL PAIN: 0
WHEEZING: 0
DIARRHEA: 0
BLURRED VISION: 0
COUGH: 0

## 2017-06-12 ENCOUNTER — TELEPHONE (OUTPATIENT)
Dept: INTERNAL MEDICINE CLINIC | Age: 52
End: 2017-06-12

## 2018-01-29 ENCOUNTER — OFFICE VISIT (OUTPATIENT)
Dept: FAMILY MEDICINE CLINIC | Age: 53
End: 2018-01-29
Payer: COMMERCIAL

## 2018-01-29 VITALS
HEIGHT: 74 IN | HEART RATE: 64 BPM | DIASTOLIC BLOOD PRESSURE: 90 MMHG | SYSTOLIC BLOOD PRESSURE: 132 MMHG | WEIGHT: 203 LBS | RESPIRATION RATE: 14 BRPM | BODY MASS INDEX: 26.05 KG/M2

## 2018-01-29 DIAGNOSIS — Z12.5 SCREENING FOR PROSTATE CANCER: ICD-10-CM

## 2018-01-29 DIAGNOSIS — I10 ESSENTIAL HYPERTENSION: Primary | ICD-10-CM

## 2018-01-29 DIAGNOSIS — Z12.11 SCREEN FOR COLON CANCER: ICD-10-CM

## 2018-01-29 DIAGNOSIS — E66.3 OVERWEIGHT: ICD-10-CM

## 2018-01-29 PROCEDURE — G8419 CALC BMI OUT NRM PARAM NOF/U: HCPCS | Performed by: NURSE PRACTITIONER

## 2018-01-29 PROCEDURE — G8427 DOCREV CUR MEDS BY ELIG CLIN: HCPCS | Performed by: NURSE PRACTITIONER

## 2018-01-29 PROCEDURE — 99214 OFFICE O/P EST MOD 30 MIN: CPT | Performed by: NURSE PRACTITIONER

## 2018-01-29 PROCEDURE — G8484 FLU IMMUNIZE NO ADMIN: HCPCS | Performed by: NURSE PRACTITIONER

## 2018-01-29 PROCEDURE — 4004F PT TOBACCO SCREEN RCVD TLK: CPT | Performed by: NURSE PRACTITIONER

## 2018-01-29 PROCEDURE — 3017F COLORECTAL CA SCREEN DOC REV: CPT | Performed by: NURSE PRACTITIONER

## 2018-01-29 RX ORDER — LISINOPRIL 10 MG/1
10 TABLET ORAL DAILY
Qty: 30 TABLET | Refills: 1 | Status: SHIPPED | OUTPATIENT
Start: 2018-01-29 | End: 2018-03-05 | Stop reason: SDUPTHER

## 2018-01-29 ASSESSMENT — ENCOUNTER SYMPTOMS
SHORTNESS OF BREATH: 0
COUGH: 0
NAUSEA: 0
ABDOMINAL PAIN: 0

## 2018-01-29 NOTE — PROGRESS NOTES
BUN 11 05/29/2017    CO2 21 05/29/2017    INR 1.0 03/02/2017    LABA1C 4.7 05/26/2017     Lab Results   Component Value Date    CALCIUM 9.1 05/29/2017    PHOS 4.1 05/26/2017     Lab Results   Component Value Date    LDLCHOLESTEROL 101 05/27/2017         1. uncontrolled Essential hypertension  - CBC Auto Differential; Future  - Comprehensive Metabolic Panel; Future  - add  lisinopril (PRINIVIL;ZESTRIL) 10 MG tablet; Take 1 tablet by mouth daily  Dispense: 30 tablet; Refill: 1    2. Overweight  - Lipid Panel; Future  - TSH without Reflex; Future  - Diet, exercise and weight reduction discussed. 3. Screen for colon cancer  - POCT Fecal Immunochemical Test (FIT); Future    4. Screening for prostate cancer  - PSA Screening; Future        Requested Prescriptions     Signed Prescriptions Disp Refills    lisinopril (PRINIVIL;ZESTRIL) 10 MG tablet 30 tablet 1     Sig: Take 1 tablet by mouth daily       Medications Discontinued During This Encounter   Medication Reason    chlordiazePOXIDE (LIBRIUM) 25 MG capsule Therapy completed       Return in about 1 month (around 2/28/2018) for HTN, follow up. Viki Cherry received counseling on the following healthy behaviors: nutrition, exercise, tobacco cessation and weight reduction  Reviewed prior labs and health maintenance. Continue current medications, diet and exercise. Discussed use, benefit, and side effects of prescribed medications. Barriers to medication compliance addressed. Patient given educational materials - see patient instructions. All patient questions answered. Patient voiced understanding.

## 2018-01-29 NOTE — PATIENT INSTRUCTIONS
smoking, you improve the health of everyone who now breathes in your smoke. · Their heart, lung, and cancer risks drop, much like yours. · They are sick less. For babies and small children, living smoke-free means they're less likely to have ear infections, pneumonia, and bronchitis. · If you're a woman who is or will be pregnant someday, quitting smoking means a healthier . · Children who are close to you are less likely to become adult smokers. Where can you learn more? Go to https://GoLive! Mobileodilia.Bonuu! Loyalty. org and sign in to your [employer] account. Enter [phone] in the Tracsis box to learn more about \"Learning About Benefits From Quitting Smoking. \"     If you do not have an account, please click on the \"Sign Up Now\" link. Current as of: 2017  Content Version: 11.5  © 3777-3062 [employer]. Care instructions adapted under license by [facility] ([facility]). If you have questions about a medical condition or this instruction, always ask your healthcare professional. [place] [address] any warranty or liability for your use of this information. Patient Education        Stopping Smoking: Care Instructions  Your Care Instructions    Cigarette smokers crave the nicotine in cigarettes. Giving it up is much harder than simply changing a habit. Your body has to stop craving the nicotine. It is hard to quit, but you can do it. There are many tools that people use to quit smoking. You may find that combining tools works best for you. There are several steps to quitting. First you get ready to quit. Then you get support to help you. After that, you learn new skills and behaviors to become a nonsmoker. For many people, a necessary step is getting and using medicine. Your doctor will help you set up the plan that best meets your needs. You may want to attend a smoking cessation program to help you quit smoking.  When you choose a program, look for one that has

## 2018-02-12 ENCOUNTER — OFFICE VISIT (OUTPATIENT)
Dept: FAMILY MEDICINE CLINIC | Age: 53
End: 2018-02-12
Payer: COMMERCIAL

## 2018-02-12 VITALS
BODY MASS INDEX: 26.81 KG/M2 | SYSTOLIC BLOOD PRESSURE: 120 MMHG | TEMPERATURE: 97.5 F | DIASTOLIC BLOOD PRESSURE: 80 MMHG | WEIGHT: 206 LBS | HEART RATE: 80 BPM

## 2018-02-12 DIAGNOSIS — R68.89 FLU-LIKE SYMPTOMS: Primary | ICD-10-CM

## 2018-02-12 DIAGNOSIS — J20.9 ACUTE BRONCHITIS, UNSPECIFIED ORGANISM: ICD-10-CM

## 2018-02-12 LAB
INFLUENZA A ANTIBODY: NORMAL
INFLUENZA B ANTIBODY: NORMAL

## 2018-02-12 PROCEDURE — 99214 OFFICE O/P EST MOD 30 MIN: CPT | Performed by: NURSE PRACTITIONER

## 2018-02-12 PROCEDURE — 3017F COLORECTAL CA SCREEN DOC REV: CPT | Performed by: NURSE PRACTITIONER

## 2018-02-12 PROCEDURE — G8484 FLU IMMUNIZE NO ADMIN: HCPCS | Performed by: NURSE PRACTITIONER

## 2018-02-12 PROCEDURE — G8427 DOCREV CUR MEDS BY ELIG CLIN: HCPCS | Performed by: NURSE PRACTITIONER

## 2018-02-12 PROCEDURE — 4004F PT TOBACCO SCREEN RCVD TLK: CPT | Performed by: NURSE PRACTITIONER

## 2018-02-12 PROCEDURE — G8419 CALC BMI OUT NRM PARAM NOF/U: HCPCS | Performed by: NURSE PRACTITIONER

## 2018-02-12 PROCEDURE — 87804 INFLUENZA ASSAY W/OPTIC: CPT | Performed by: NURSE PRACTITIONER

## 2018-02-12 RX ORDER — BENZONATATE 100 MG/1
100 CAPSULE ORAL 3 TIMES DAILY PRN
Qty: 30 CAPSULE | Refills: 0 | Status: SHIPPED | OUTPATIENT
Start: 2018-02-12 | End: 2018-02-22

## 2018-02-12 RX ORDER — OSELTAMIVIR PHOSPHATE 75 MG/1
75 CAPSULE ORAL 2 TIMES DAILY
Qty: 10 CAPSULE | Refills: 0 | Status: SHIPPED | OUTPATIENT
Start: 2018-02-12 | End: 2018-02-17

## 2018-02-12 RX ORDER — PREDNISONE 10 MG/1
TABLET ORAL
Qty: 18 TABLET | Refills: 0 | Status: SHIPPED | OUTPATIENT
Start: 2018-02-12 | End: 2018-02-22

## 2018-02-12 RX ORDER — ALBUTEROL SULFATE 90 UG/1
2 AEROSOL, METERED RESPIRATORY (INHALATION) EVERY 6 HOURS PRN
Qty: 1 INHALER | Refills: 3 | Status: SHIPPED | OUTPATIENT
Start: 2018-02-12 | End: 2019-06-18 | Stop reason: SDUPTHER

## 2018-02-12 RX ORDER — AZITHROMYCIN 250 MG/1
TABLET, FILM COATED ORAL
Qty: 1 PACKET | Refills: 0 | Status: SHIPPED | OUTPATIENT
Start: 2018-02-12 | End: 2018-02-22

## 2018-02-12 ASSESSMENT — ENCOUNTER SYMPTOMS
ABDOMINAL PAIN: 0
SINUS PRESSURE: 1
VOMITING: 0
NAUSEA: 0
RHINORRHEA: 1
COUGH: 1
SHORTNESS OF BREATH: 1

## 2018-03-05 DIAGNOSIS — I10 ESSENTIAL HYPERTENSION: ICD-10-CM

## 2018-03-06 RX ORDER — LISINOPRIL 10 MG/1
10 TABLET ORAL DAILY
Qty: 30 TABLET | Refills: 2 | Status: SHIPPED | OUTPATIENT
Start: 2018-03-06 | End: 2018-07-10 | Stop reason: SDUPTHER

## 2018-04-04 RX ORDER — METOPROLOL SUCCINATE 100 MG/1
100 TABLET, EXTENDED RELEASE ORAL 2 TIMES DAILY
Qty: 60 TABLET | Refills: 2 | Status: SHIPPED | OUTPATIENT
Start: 2018-04-04 | End: 2018-07-10 | Stop reason: SDUPTHER

## 2018-07-10 DIAGNOSIS — I10 ESSENTIAL HYPERTENSION: ICD-10-CM

## 2018-07-11 RX ORDER — METOPROLOL SUCCINATE 100 MG/1
TABLET, EXTENDED RELEASE ORAL
Qty: 60 TABLET | Refills: 3 | Status: SHIPPED | OUTPATIENT
Start: 2018-07-11 | End: 2018-10-18 | Stop reason: SDUPTHER

## 2018-07-11 RX ORDER — LISINOPRIL 10 MG/1
10 TABLET ORAL DAILY
Qty: 30 TABLET | Refills: 3 | Status: SHIPPED | OUTPATIENT
Start: 2018-07-11 | End: 2018-10-18 | Stop reason: SDUPTHER

## 2018-10-18 DIAGNOSIS — I10 ESSENTIAL HYPERTENSION: ICD-10-CM

## 2018-10-18 RX ORDER — LISINOPRIL 10 MG/1
10 TABLET ORAL DAILY
Qty: 30 TABLET | Refills: 0 | Status: SHIPPED | OUTPATIENT
Start: 2018-10-18 | End: 2018-12-20 | Stop reason: SDUPTHER

## 2018-10-18 RX ORDER — METOPROLOL SUCCINATE 100 MG/1
TABLET, EXTENDED RELEASE ORAL
Qty: 60 TABLET | Refills: 0 | Status: SHIPPED | OUTPATIENT
Start: 2018-10-18 | End: 2018-12-20 | Stop reason: SDUPTHER

## 2018-12-20 DIAGNOSIS — I10 ESSENTIAL HYPERTENSION: ICD-10-CM

## 2018-12-20 RX ORDER — METOPROLOL SUCCINATE 100 MG/1
TABLET, EXTENDED RELEASE ORAL
Qty: 60 TABLET | Refills: 0 | Status: SHIPPED | OUTPATIENT
Start: 2018-12-20 | End: 2018-12-26 | Stop reason: SDUPTHER

## 2018-12-20 RX ORDER — LISINOPRIL 10 MG/1
10 TABLET ORAL DAILY
Qty: 30 TABLET | Refills: 0 | Status: SHIPPED | OUTPATIENT
Start: 2018-12-20 | End: 2018-12-26 | Stop reason: SDUPTHER

## 2018-12-26 DIAGNOSIS — I10 ESSENTIAL HYPERTENSION: ICD-10-CM

## 2018-12-26 RX ORDER — METOPROLOL SUCCINATE 100 MG/1
TABLET, EXTENDED RELEASE ORAL
Qty: 60 TABLET | Refills: 0 | Status: SHIPPED | OUTPATIENT
Start: 2018-12-26 | End: 2019-02-09 | Stop reason: SDUPTHER

## 2018-12-26 RX ORDER — LISINOPRIL 10 MG/1
10 TABLET ORAL DAILY
Qty: 30 TABLET | Refills: 0 | Status: SHIPPED | OUTPATIENT
Start: 2018-12-26 | End: 2019-02-09 | Stop reason: SDUPTHER

## 2019-02-09 DIAGNOSIS — I10 ESSENTIAL HYPERTENSION: ICD-10-CM

## 2019-02-11 RX ORDER — LISINOPRIL 10 MG/1
10 TABLET ORAL DAILY
Qty: 30 TABLET | Refills: 0 | Status: SHIPPED | OUTPATIENT
Start: 2019-02-11 | End: 2019-03-14 | Stop reason: SDUPTHER

## 2019-02-11 RX ORDER — METOPROLOL SUCCINATE 100 MG/1
TABLET, EXTENDED RELEASE ORAL
Qty: 60 TABLET | Refills: 0 | Status: SHIPPED | OUTPATIENT
Start: 2019-02-11 | End: 2019-03-14 | Stop reason: SDUPTHER

## 2019-03-14 DIAGNOSIS — I10 ESSENTIAL HYPERTENSION: ICD-10-CM

## 2019-03-14 RX ORDER — LISINOPRIL 10 MG/1
10 TABLET ORAL DAILY
Qty: 30 TABLET | Refills: 0 | Status: SHIPPED | OUTPATIENT
Start: 2019-03-14 | End: 2019-04-16 | Stop reason: SDUPTHER

## 2019-03-14 RX ORDER — METOPROLOL SUCCINATE 100 MG/1
TABLET, EXTENDED RELEASE ORAL
Qty: 60 TABLET | Refills: 0 | Status: SHIPPED | OUTPATIENT
Start: 2019-03-14 | End: 2019-04-16 | Stop reason: SDUPTHER

## 2019-04-16 DIAGNOSIS — I10 ESSENTIAL HYPERTENSION: ICD-10-CM

## 2019-04-16 RX ORDER — METOPROLOL SUCCINATE 100 MG/1
TABLET, EXTENDED RELEASE ORAL
Qty: 60 TABLET | Refills: 0 | Status: SHIPPED | OUTPATIENT
Start: 2019-04-16 | End: 2019-05-17 | Stop reason: SDUPTHER

## 2019-04-16 RX ORDER — LISINOPRIL 10 MG/1
10 TABLET ORAL DAILY
Qty: 30 TABLET | Refills: 0 | Status: SHIPPED | OUTPATIENT
Start: 2019-04-16 | End: 2019-05-17 | Stop reason: SDUPTHER

## 2019-05-02 ENCOUNTER — APPOINTMENT (OUTPATIENT)
Dept: CT IMAGING | Age: 54
End: 2019-05-02
Payer: COMMERCIAL

## 2019-05-02 ENCOUNTER — APPOINTMENT (OUTPATIENT)
Dept: GENERAL RADIOLOGY | Age: 54
End: 2019-05-02
Payer: COMMERCIAL

## 2019-05-02 ENCOUNTER — HOSPITAL ENCOUNTER (EMERGENCY)
Age: 54
Discharge: HOME | End: 2019-05-02
Attending: EMERGENCY MEDICINE
Payer: COMMERCIAL

## 2019-05-02 VITALS
HEART RATE: 116 BPM | SYSTOLIC BLOOD PRESSURE: 134 MMHG | WEIGHT: 200 LBS | OXYGEN SATURATION: 94 % | DIASTOLIC BLOOD PRESSURE: 89 MMHG | BODY MASS INDEX: 25.67 KG/M2 | HEIGHT: 74 IN | TEMPERATURE: 98.2 F | RESPIRATION RATE: 17 BRPM

## 2019-05-02 DIAGNOSIS — J40 BRONCHITIS: Primary | ICD-10-CM

## 2019-05-02 LAB
-: NORMAL
-: NORMAL
ABSOLUTE EOS #: 0.1 K/UL (ref 0–0.4)
ABSOLUTE IMMATURE GRANULOCYTE: ABNORMAL K/UL (ref 0–0.3)
ABSOLUTE LYMPH #: 1.2 K/UL (ref 1–4.8)
ABSOLUTE MONO #: 1.2 K/UL (ref 0.1–1.3)
ANION GAP SERPL CALCULATED.3IONS-SCNC: 15 MMOL/L (ref 9–17)
BASOPHILS # BLD: 1 % (ref 0–2)
BASOPHILS ABSOLUTE: 0.1 K/UL (ref 0–0.2)
BUN BLDV-MCNC: 5 MG/DL (ref 6–20)
BUN/CREAT BLD: ABNORMAL (ref 9–20)
CALCIUM SERPL-MCNC: 9 MG/DL (ref 8.6–10.4)
CHLORIDE BLD-SCNC: 94 MMOL/L (ref 98–107)
CO2: 20 MMOL/L (ref 20–31)
CREAT SERPL-MCNC: 0.49 MG/DL (ref 0.7–1.2)
D-DIMER QUANTITATIVE: 1.69 MG/L FEU (ref 0–0.59)
DIFFERENTIAL TYPE: ABNORMAL
EOSINOPHILS RELATIVE PERCENT: 1 % (ref 0–4)
GFR AFRICAN AMERICAN: >60 ML/MIN
GFR NON-AFRICAN AMERICAN: >60 ML/MIN
GFR SERPL CREATININE-BSD FRML MDRD: ABNORMAL ML/MIN/{1.73_M2}
GFR SERPL CREATININE-BSD FRML MDRD: ABNORMAL ML/MIN/{1.73_M2}
GLUCOSE BLD-MCNC: 113 MG/DL (ref 70–99)
HCT VFR BLD CALC: 42.8 % (ref 41–53)
HEMOGLOBIN: 14.7 G/DL (ref 13.5–17.5)
IMMATURE GRANULOCYTES: ABNORMAL %
LYMPHOCYTES # BLD: 13 % (ref 24–44)
MCH RBC QN AUTO: 36.4 PG (ref 26–34)
MCHC RBC AUTO-ENTMCNC: 34.4 G/DL (ref 31–37)
MCV RBC AUTO: 105.9 FL (ref 80–100)
MONOCYTES # BLD: 13 % (ref 1–7)
NRBC AUTOMATED: ABNORMAL PER 100 WBC
PDW BLD-RTO: 12.9 % (ref 11.5–14.9)
PLATELET # BLD: 202 K/UL (ref 150–450)
PLATELET ESTIMATE: ABNORMAL
PMV BLD AUTO: 8.9 FL (ref 6–12)
POTASSIUM SERPL-SCNC: 4.4 MMOL/L (ref 3.7–5.3)
RBC # BLD: 4.04 M/UL (ref 4.5–5.9)
RBC # BLD: ABNORMAL 10*6/UL
REASON FOR REJECTION: NORMAL
REASON FOR REJECTION: NORMAL
SEG NEUTROPHILS: 72 % (ref 36–66)
SEGMENTED NEUTROPHILS ABSOLUTE COUNT: 6.8 K/UL (ref 1.3–9.1)
SODIUM BLD-SCNC: 129 MMOL/L (ref 135–144)
TROPONIN INTERP: NORMAL
TROPONIN INTERP: NORMAL
TROPONIN T: NORMAL NG/ML
TROPONIN T: NORMAL NG/ML
TROPONIN, HIGH SENSITIVITY: <6 NG/L (ref 0–22)
TROPONIN, HIGH SENSITIVITY: <6 NG/L (ref 0–22)
WBC # BLD: 9.4 K/UL (ref 3.5–11)
WBC # BLD: ABNORMAL 10*3/UL
ZZ NTE CLEAN UP: ORDERED TEST: NORMAL
ZZ NTE CLEAN UP: ORDERED TEST: NORMAL
ZZ NTE WITH NAME CLEAN UP: SPECIMEN SOURCE: NORMAL
ZZ NTE WITH NAME CLEAN UP: SPECIMEN SOURCE: NORMAL

## 2019-05-02 PROCEDURE — 99284 EMERGENCY DEPT VISIT MOD MDM: CPT

## 2019-05-02 PROCEDURE — 71046 X-RAY EXAM CHEST 2 VIEWS: CPT

## 2019-05-02 PROCEDURE — 93005 ELECTROCARDIOGRAM TRACING: CPT

## 2019-05-02 PROCEDURE — 96374 THER/PROPH/DIAG INJ IV PUSH: CPT

## 2019-05-02 PROCEDURE — 71260 CT THORAX DX C+: CPT

## 2019-05-02 PROCEDURE — 6360000002 HC RX W HCPCS: Performed by: EMERGENCY MEDICINE

## 2019-05-02 PROCEDURE — 2580000003 HC RX 258: Performed by: EMERGENCY MEDICINE

## 2019-05-02 PROCEDURE — 84484 ASSAY OF TROPONIN QUANT: CPT

## 2019-05-02 PROCEDURE — 36415 COLL VENOUS BLD VENIPUNCTURE: CPT

## 2019-05-02 PROCEDURE — 85025 COMPLETE CBC W/AUTO DIFF WBC: CPT

## 2019-05-02 PROCEDURE — 6360000004 HC RX CONTRAST MEDICATION: Performed by: EMERGENCY MEDICINE

## 2019-05-02 PROCEDURE — 85379 FIBRIN DEGRADATION QUANT: CPT

## 2019-05-02 PROCEDURE — 6370000000 HC RX 637 (ALT 250 FOR IP): Performed by: EMERGENCY MEDICINE

## 2019-05-02 PROCEDURE — 94640 AIRWAY INHALATION TREATMENT: CPT

## 2019-05-02 PROCEDURE — 94762 N-INVAS EAR/PLS OXIMTRY CONT: CPT

## 2019-05-02 PROCEDURE — 80048 BASIC METABOLIC PNL TOTAL CA: CPT

## 2019-05-02 RX ORDER — SODIUM CHLORIDE 0.9 % (FLUSH) 0.9 %
10 SYRINGE (ML) INJECTION PRN
Status: DISCONTINUED | OUTPATIENT
Start: 2019-05-02 | End: 2019-05-02 | Stop reason: HOSPADM

## 2019-05-02 RX ORDER — IPRATROPIUM BROMIDE AND ALBUTEROL SULFATE 2.5; .5 MG/3ML; MG/3ML
SOLUTION RESPIRATORY (INHALATION)
Status: DISCONTINUED
Start: 2019-05-02 | End: 2019-05-02 | Stop reason: HOSPADM

## 2019-05-02 RX ORDER — DOXYCYCLINE 100 MG/1
100 TABLET ORAL 2 TIMES DAILY
Qty: 14 TABLET | Refills: 0 | Status: SHIPPED | OUTPATIENT
Start: 2019-05-02 | End: 2019-05-09

## 2019-05-02 RX ORDER — 0.9 % SODIUM CHLORIDE 0.9 %
80 INTRAVENOUS SOLUTION INTRAVENOUS ONCE
Status: COMPLETED | OUTPATIENT
Start: 2019-05-02 | End: 2019-05-02

## 2019-05-02 RX ORDER — ALBUTEROL SULFATE 90 UG/1
1-2 AEROSOL, METERED RESPIRATORY (INHALATION) EVERY 4 HOURS PRN
Qty: 1 INHALER | Refills: 0 | Status: SHIPPED | OUTPATIENT
Start: 2019-05-02 | End: 2020-10-28

## 2019-05-02 RX ORDER — METHYLPREDNISOLONE SODIUM SUCCINATE 125 MG/2ML
125 INJECTION, POWDER, LYOPHILIZED, FOR SOLUTION INTRAMUSCULAR; INTRAVENOUS ONCE
Status: COMPLETED | OUTPATIENT
Start: 2019-05-02 | End: 2019-05-02

## 2019-05-02 RX ORDER — IPRATROPIUM BROMIDE AND ALBUTEROL SULFATE 2.5; .5 MG/3ML; MG/3ML
1 SOLUTION RESPIRATORY (INHALATION) ONCE
Status: COMPLETED | OUTPATIENT
Start: 2019-05-02 | End: 2019-05-02

## 2019-05-02 RX ORDER — PREDNISONE 10 MG/1
TABLET ORAL
Qty: 20 TABLET | Refills: 0 | Status: SHIPPED | OUTPATIENT
Start: 2019-05-02 | End: 2019-05-12

## 2019-05-02 RX ADMIN — Medication 10 ML: at 03:49

## 2019-05-02 RX ADMIN — METHYLPREDNISOLONE SODIUM SUCCINATE 125 MG: 125 INJECTION, POWDER, FOR SOLUTION INTRAMUSCULAR; INTRAVENOUS at 01:40

## 2019-05-02 RX ADMIN — SODIUM CHLORIDE 80 ML: 9 INJECTION, SOLUTION INTRAVENOUS at 03:48

## 2019-05-02 RX ADMIN — IPRATROPIUM BROMIDE AND ALBUTEROL SULFATE 1 AMPULE: .5; 3 SOLUTION RESPIRATORY (INHALATION) at 01:22

## 2019-05-02 RX ADMIN — IOVERSOL 75 ML: 741 INJECTION INTRA-ARTERIAL; INTRAVENOUS at 03:48

## 2019-05-02 ASSESSMENT — PAIN DESCRIPTION - LOCATION: LOCATION: CHEST

## 2019-05-02 ASSESSMENT — PAIN DESCRIPTION - DESCRIPTORS: DESCRIPTORS: ACHING

## 2019-05-02 ASSESSMENT — ENCOUNTER SYMPTOMS
NAUSEA: 0
DIARRHEA: 0
BLOOD IN STOOL: 0
VOMITING: 0
SORE THROAT: 0
TROUBLE SWALLOWING: 0
ABDOMINAL PAIN: 0
BACK PAIN: 0
COUGH: 1
COLOR CHANGE: 0
SHORTNESS OF BREATH: 1
CONSTIPATION: 0

## 2019-05-02 ASSESSMENT — PAIN DESCRIPTION - FREQUENCY: FREQUENCY: CONTINUOUS

## 2019-05-02 ASSESSMENT — PAIN SCALES - GENERAL: PAINLEVEL_OUTOF10: 8

## 2019-05-02 NOTE — ED PROVIDER NOTES
[address] ED  eMERGENCY dEPARTMENT eNCOUnter    Pt Name: Hitesh Dickens  MRN: [id number]  YOB: 1965  Date of evaluation:5/2/19  PCP: Kaylie Day MD    [address] [place] [address]       Chief Complaint   Patient presents with    Cough       HISTORY OF PRESENT ILLNESS    Hitesh Dickens is a 53 y.o. male who presents with a chief complaint of a productive cough. Symptoms having gone on since Saturday. He has been coughing up thick phlegm. Denies any actual chest pain or shortness of breath above his baseline. He states he smokes cigarettes and he usually is short of breath. Also states he always has a high heart rate. Symptoms are acute. Symptoms are severe. Nothing makes his symptoms better or worse. Also complaining of a mild headache mostly when he coughs. No fevers at home that he knows about. He is unsure if he is ever diagnosed with COPD. No recent leg swelling. No history of DVT or PE that he knows of. Patient has no other complaints at this time. REVIEW OF SYSTEMS       Review of Systems   Constitutional: Negative for chills, fatigue and fever. HENT: Negative for congestion, ear pain, sore throat and trouble swallowing. Eyes: Negative for visual disturbance. Respiratory: Positive for cough and shortness of breath. Cardiovascular: Positive for chest pain. Negative for palpitations and leg swelling. Gastrointestinal: Negative for abdominal pain, blood in stool, constipation, diarrhea, nausea and vomiting. Genitourinary: Negative for dysuria and flank pain. Musculoskeletal: Negative for arthralgias, back pain, myalgias and neck pain. Skin: Negative for color change, rash and wound. Neurological: Negative for dizziness, weakness, light-headedness, numbness and headaches. Psychiatric/Behavioral: Negative for confusion. All other systems reviewed and are negative. Negativein 10 essential Systems except as mentioned above and in the HPI.         PAST MEDICAL sounds are normal. He exhibits no distension. There is no tenderness. Musculoskeletal: He exhibits no edema or tenderness. Lymphadenopathy:     He has no cervical adenopathy. Neurological: He is alert and oriented to person, place, and time. Skin: Skin is warm and dry. No rash noted. Psychiatric: Judgment normal.   Nursing note and vitals reviewed. DIFFERENTIAL DIAGNOSIS/MDM:   58-year-old male with questionable history of COPD presents with productive cough since Saturday. He is afebrile and nontoxic. He is mildly tachycardic and tachypneic. He is not in any respiratory distress. He has diffuse wheezing bilaterally. Also has a lot of thick mucus that he is physically coughing up in front of me. I suspect pneumonia versus COPD exacerbation. PE is lower on my differential however I am still considering it. He has a low Wells score. Will get d-dimer. We'll also get cardiac workup the EKG, troponins. Also give steroids, breathing treatment. Wells Criteria for PE Risk Stratification    1. Symptoms of DVT (3pts) No  2. No alternative diagnosis better explains illness (3pts) No  3. Tachycardia >100 (1.5pts) Yes  4. Immobilization >= 3 days or surgery in last 4 weeks (1.5pts) No  5. Hemoptysis (1.5pts) No  6. Malignancy (1.5pts) No    Total:  1.5    >6 pts High Probability  2 to <6 pts Moderate Probability  <2 pts Low Probability     Wells PS, et al. Excluding pulmonary embolism at the bedside without diagnostic imaging: management of patients with suspected pulmonary embolism presenting to the emergency department by using a simple clinical model and d-dimer. Yanelis Intern Med. 2001 Jul 17;135(2):. PubMed PMID: 83055225. Emil Olivares et al. Prospective validation of Wells Criteria in the evaluation of patients with suspected pulmonary embolism. [address] [place] [address] 2004 Nov;44(5):503-10. PubMed PMID: 71817219.       DIAGNOSTIC RESULTS     EKG: All EKG's are interpreted by the Emergency Department Physician who either signs or Co-signs this chart in the absence of a cardiologist.    EKG Interpretation    Interpreted by emergency department physician at 1:41 AM.    Rhythm: sinus tachycardia  Rate: tachycardia and 128  Axis: normal  Ectopy: none  Conduction: normal  ST Segments: no acute change  T Waves: no acute change  Q Waves: none    EKG  Impression: non-specific EKG and sinus tachycardia      RADIOLOGY:   I directly visualized the following  images and reviewed the radiologist interpretations:  CT Chest Pulmonary Embolism W Contrast   Final Result   No acute pulmonary thromboembolic disease. No pulmonary hypertension or   right ventricular strain. Scattered bilateral heterogeneous and small nodular opacities, left greater   than right. Findings may relate to an infectious/inflammatory process. Follow-up chest CT in 2-3 months may be helpful to document improvement and   evaluate for malignant potential.      No intrathoracic lymphadenopathy. XR CHEST STANDARD (2 VW)   Final Result   Hyperexpanded lung volume and diffusely prominent interstitium can be seen in   the setting of COPD. No pulmonary consolidation.                  ED BEDSIDE ULTRASOUND:      LABS:  Labs Reviewed   CBC WITH AUTO DIFFERENTIAL - Abnormal; Notable for the following components:       Result Value    RBC 4.04 (*)     .9 (*)     MCH 36.4 (*)     Seg Neutrophils 72 (*)     Lymphocytes 13 (*)     Monocytes 13 (*)     All other components within normal limits   BASIC METABOLIC PANEL - Abnormal; Notable for the following components:    Glucose 113 (*)     BUN 5 (*)     CREATININE 0.49 (*)     Sodium 129 (*)     Chloride 94 (*)     All other components within normal limits   D-DIMER, QUANTITATIVE - Abnormal; Notable for the following components:    D-Dimer, Quant 1.69 (*)     All other components within normal limits   TROPONIN   SPECIMEN REJECTION   SPECIMEN REJECTION   TROPONIN         EMERGENCY DEPARTMENT COURSE:   Vitals:    Vitals:    05/02/19 0415 05/02/19 0430 05/02/19 0445 05/02/19 0500   BP: 112/79 135/81 115/87 134/89   Pulse: 104 116  116   Resp: 17 26 19 17   Temp:       TempSrc:       SpO2: 93% 94% 96% 94%   Weight:       Height:         HEART Risk Score for Chest Pain Patients                       Patient Score  History   Highly suspicious2            Moderately suspicious. ..1     = 0    Slightly or non suspicious. 0      ECG   Significant STD. ...2        Nonspecific repolarization1      = 0    Normal (no change from previous). .0      Age   >642      > 45 - <65. 1     = 1   < 46. ..0      Risk Factors  >2 risk factors.2     I - 2 risk factors. .1     = 1  No risk factors. ...0     Troponin   >3times normal limit. ..2      >1 time - <3 times normal limit. 1   = 0    Normal trop. Olga Becerra 0     -----------------------------------------------------------------------------------------      TOTAL RISK SCORE =  2          RISK % =  2.5        Risk Factors: DM, smoker (current or recent < mo), HTN, HLP, FHx CAD, obesity,   dsv add-ons   SLE, CKDz, HIV, cocaine abuse    Score 0 - 3 =  2.5% MACE over next 6 wks = Discharge home  Score 4 - 6 =  20.3% MACE over next 6 wks = Obs admit  Score 7 - 10 = 72.7% MACE over next 6 wks = Early invasive Rx      5:19 AM  Patient's d-dimer was elevated so we did get a CT of his chest which showed no evidence of PE. He still mildly tachycardic however not in any respiratory distress and actually appears very comfortable. No obvious pneumonia on chest x-ray. Likely with a bronchitis with probable underlying COPD as well. The patient admits to smoking. 3 minutes of time was spent discussing how this can worsen underlying breathing problems, COPD hypertension and heart disease or cause them to develop.       Will discharge home with antibiotics, steroids, albuterol. Patient was advised to follow up with PCP and return if any symptoms worsen. He is agreeable to plan will be discharged home today. CRITICALCARE:      CONSULTS:  None      PROCEDURES:      FINAL IMPRESSION      1. Bronchitis            DISPOSITION/PLAN   DISPOSITION      Discharged home      PATIENT REFERRED TO:  Jewel Avila MD [facility]. [address]  [address] [facility] [phone]    Schedule an appointment as soon as possible for a visit       [facility] [address] [facility]  [facility] [phone]    As needed, If symptoms worsen      DISCHARGE MEDICATIONS:  New Prescriptions    ALBUTEROL SULFATE HFA (PROVENTIL HFA) 108 (90 BASE) MCG/ACT INHALER    Inhale 1-2 puffs into the lungs every 4 hours as needed for Wheezing or Shortness of Breath (Space out to every 6 hours as symptoms improve) Space out to every 6 hours as symptoms improve.     DOXYCYCLINE MONOHYDRATE (ADOXA) 100 MG TABLET    Take 1 tablet by mouth 2 times daily for 7 days    PREDNISONE (DELTASONE) 10 MG TABLET    Take 4 tablets by mouth once daily for 5 days       (Please note that portions ofthis note were completed with a voice recognition program.  Efforts were made to edit the dictations but occasionally words are mis-transcribed.)    Carlos Strong DO  Attending Emergency Physician          Carlos Strong DO  05/02/19 [id number]

## 2019-05-02 NOTE — ED NOTES

## 2019-05-02 NOTE — PROGRESS NOTES
· Bronchodilator assessment   [x]    Bronchodilator Assessment        Bronchodilator assessment at level  PRN  BRONCHODILATOR ASSESSMENT SCORE  Score 1 2 3 4   Breath Sounds   []  Clear [x]  Mild Wheezing with good aeration []  Moderate I/E wheezing with adequate aeration []  Poor Aeration or diffuse wheezing   Respiratory Rate [x]  Less than 20 []  20-25 []  Greater than 25  []  Greater than 35    Dyspnea []  No SOB  [x]  SOB with minimal activity []  Speaking in partial sentences []  Acute/ At rest   Peakflow (asthma) []  80 % or greater predicted/PB  [x]  Unable []  70% or greater predicted/PB  []  Unable []  51%-70% predicted/PB  []  Unable []  Less than 50% predicted/PB  []  Unable due to distress   FEV1 % Predicted []  Greater than 69%  [x]  Unable  []  Less than 50%-69%  []  Unable  []  Less than 35%-49%  []  Unable  []  Less than 35%  []  Unable due to distress

## 2019-05-17 DIAGNOSIS — I10 ESSENTIAL HYPERTENSION: ICD-10-CM

## 2019-05-17 RX ORDER — LISINOPRIL 10 MG/1
10 TABLET ORAL DAILY
Qty: 30 TABLET | Refills: 0 | Status: SHIPPED | OUTPATIENT
Start: 2019-05-17 | End: 2019-06-18 | Stop reason: SDUPTHER

## 2019-05-17 RX ORDER — METOPROLOL SUCCINATE 100 MG/1
TABLET, EXTENDED RELEASE ORAL
Qty: 60 TABLET | Refills: 0 | Status: SHIPPED | OUTPATIENT
Start: 2019-05-17 | End: 2019-06-18 | Stop reason: SDUPTHER

## 2019-05-17 NOTE — TELEPHONE ENCOUNTER
The patient was just seen at the ER on 05/02/19 and is unable to afford to make an appointment at this time but is requesting his refills.

## 2019-05-17 NOTE — TELEPHONE ENCOUNTER
Lisinopril, Metopolol -  Patient needs to make an appointment.   Will call to get him scheduled (he is out of these medciations right now)

## 2019-05-28 ENCOUNTER — HOSPITAL ENCOUNTER (INPATIENT)
Age: 54
LOS: 1 days | Discharge: LEFT BEFORE DISCHARGE | DRG: 191 | End: 2019-05-30
Attending: EMERGENCY MEDICINE | Admitting: FAMILY MEDICINE
Payer: COMMERCIAL

## 2019-05-28 ENCOUNTER — APPOINTMENT (OUTPATIENT)
Dept: GENERAL RADIOLOGY | Age: 54
DRG: 191 | End: 2019-05-28
Payer: COMMERCIAL

## 2019-05-28 DIAGNOSIS — R06.02 SHORTNESS OF BREATH: Primary | ICD-10-CM

## 2019-05-28 PROBLEM — R06.00 DYSPNEA: Status: ACTIVE | Noted: 2019-05-28

## 2019-05-28 PROBLEM — J44.1 ACUTE EXACERBATION OF CHRONIC OBSTRUCTIVE PULMONARY DISEASE (COPD) (HCC): Status: ACTIVE | Noted: 2019-05-28

## 2019-05-28 LAB
ABSOLUTE EOS #: 0.1 K/UL (ref 0–0.4)
ABSOLUTE IMMATURE GRANULOCYTE: ABNORMAL K/UL (ref 0–0.3)
ABSOLUTE LYMPH #: 1.5 K/UL (ref 1–4.8)
ABSOLUTE MONO #: 1.1 K/UL (ref 0.1–1.3)
ALBUMIN SERPL-MCNC: 4.1 G/DL (ref 3.5–5.2)
ALBUMIN/GLOBULIN RATIO: ABNORMAL (ref 1–2.5)
ALLEN TEST: ABNORMAL
ALP BLD-CCNC: 72 U/L (ref 40–129)
ALT SERPL-CCNC: 19 U/L (ref 5–41)
ANION GAP SERPL CALCULATED.3IONS-SCNC: 19 MMOL/L (ref 9–17)
AST SERPL-CCNC: 32 U/L
BASOPHILS # BLD: 1 % (ref 0–2)
BASOPHILS ABSOLUTE: 0.1 K/UL (ref 0–0.2)
BILIRUB SERPL-MCNC: 0.8 MG/DL (ref 0.3–1.2)
BNP INTERPRETATION: NORMAL
BUN BLDV-MCNC: 13 MG/DL (ref 6–20)
BUN/CREAT BLD: ABNORMAL (ref 9–20)
CALCIUM SERPL-MCNC: 9.7 MG/DL (ref 8.6–10.4)
CARBOXYHEMOGLOBIN: 1.9 % (ref 0–5)
CHLORIDE BLD-SCNC: 94 MMOL/L (ref 98–107)
CO2: 18 MMOL/L (ref 20–31)
CREAT SERPL-MCNC: 0.83 MG/DL (ref 0.7–1.2)
DIFFERENTIAL TYPE: ABNORMAL
EOSINOPHILS RELATIVE PERCENT: 1 % (ref 0–4)
FIO2: ABNORMAL
GFR AFRICAN AMERICAN: >60 ML/MIN
GFR NON-AFRICAN AMERICAN: >60 ML/MIN
GFR SERPL CREATININE-BSD FRML MDRD: ABNORMAL ML/MIN/{1.73_M2}
GFR SERPL CREATININE-BSD FRML MDRD: ABNORMAL ML/MIN/{1.73_M2}
GLUCOSE BLD-MCNC: 123 MG/DL (ref 70–99)
HCO3 VENOUS: 20.2 MMOL/L (ref 24–30)
HCT VFR BLD CALC: 46.5 % (ref 41–53)
HEMOGLOBIN: 16.1 G/DL (ref 13.5–17.5)
IMMATURE GRANULOCYTES: ABNORMAL %
INR BLD: 0.9
LYMPHOCYTES # BLD: 14 % (ref 24–44)
MCH RBC QN AUTO: 36.8 PG (ref 26–34)
MCHC RBC AUTO-ENTMCNC: 34.7 G/DL (ref 31–37)
MCV RBC AUTO: 106 FL (ref 80–100)
METHEMOGLOBIN: 0.4 % (ref 0–1.9)
MODE: ABNORMAL
MONOCYTES # BLD: 10 % (ref 1–7)
NEGATIVE BASE EXCESS, VEN: 3.5 MMOL/L (ref 0–2)
NOTIFICATION TIME: ABNORMAL
NOTIFICATION: ABNORMAL
NRBC AUTOMATED: ABNORMAL PER 100 WBC
O2 DEVICE/FLOW/%: ABNORMAL
O2 SAT, VEN: 94.2 % (ref 60–85)
OXYHEMOGLOBIN: ABNORMAL % (ref 95–98)
PARTIAL THROMBOPLASTIN TIME: 27.7 SEC (ref 24–36)
PATIENT TEMP: 37
PCO2, VEN, TEMP ADJ: ABNORMAL MMHG (ref 39–55)
PCO2, VEN: 27.8 (ref 39–55)
PDW BLD-RTO: 13.7 % (ref 11.5–14.9)
PEEP/CPAP: ABNORMAL
PH VENOUS: 7.47 (ref 7.32–7.42)
PH, VEN, TEMP ADJ: ABNORMAL (ref 7.32–7.42)
PLATELET # BLD: 260 K/UL (ref 150–450)
PLATELET ESTIMATE: ABNORMAL
PMV BLD AUTO: 9.7 FL (ref 6–12)
PO2, VEN, TEMP ADJ: ABNORMAL MMHG (ref 30–50)
PO2, VEN: 94.5 (ref 30–50)
POSITIVE BASE EXCESS, VEN: ABNORMAL MMOL/L (ref 0–2)
POTASSIUM SERPL-SCNC: 4 MMOL/L (ref 3.7–5.3)
PRO-BNP: 157 PG/ML
PROTHROMBIN TIME: 12.1 SEC (ref 11.8–14.6)
PSV: ABNORMAL
PT. POSITION: ABNORMAL
RBC # BLD: 4.39 M/UL (ref 4.5–5.9)
RBC # BLD: ABNORMAL 10*6/UL
RESPIRATORY RATE: ABNORMAL
SAMPLE SITE: ABNORMAL
SEG NEUTROPHILS: 74 % (ref 36–66)
SEGMENTED NEUTROPHILS ABSOLUTE COUNT: 8.1 K/UL (ref 1.3–9.1)
SET RATE: ABNORMAL
SODIUM BLD-SCNC: 131 MMOL/L (ref 135–144)
TEXT FOR RESPIRATORY: ABNORMAL
TOTAL HB: ABNORMAL G/DL (ref 12–16)
TOTAL PROTEIN: 8 G/DL (ref 6.4–8.3)
TOTAL RATE: ABNORMAL
TROPONIN INTERP: NORMAL
TROPONIN T: NORMAL NG/ML
TROPONIN, HIGH SENSITIVITY: 11 NG/L (ref 0–22)
VT: ABNORMAL
WBC # BLD: 10.9 K/UL (ref 3.5–11)
WBC # BLD: ABNORMAL 10*3/UL

## 2019-05-28 PROCEDURE — 96374 THER/PROPH/DIAG INJ IV PUSH: CPT

## 2019-05-28 PROCEDURE — 85610 PROTHROMBIN TIME: CPT

## 2019-05-28 PROCEDURE — 96376 TX/PRO/DX INJ SAME DRUG ADON: CPT

## 2019-05-28 PROCEDURE — 83880 ASSAY OF NATRIURETIC PEPTIDE: CPT

## 2019-05-28 PROCEDURE — 80053 COMPREHEN METABOLIC PANEL: CPT

## 2019-05-28 PROCEDURE — 82800 BLOOD PH: CPT

## 2019-05-28 PROCEDURE — 85025 COMPLETE CBC W/AUTO DIFF WBC: CPT

## 2019-05-28 PROCEDURE — G0378 HOSPITAL OBSERVATION PER HR: HCPCS

## 2019-05-28 PROCEDURE — 94640 AIRWAY INHALATION TREATMENT: CPT

## 2019-05-28 PROCEDURE — 2580000003 HC RX 258: Performed by: FAMILY MEDICINE

## 2019-05-28 PROCEDURE — 99285 EMERGENCY DEPT VISIT HI MDM: CPT

## 2019-05-28 PROCEDURE — 2580000003 HC RX 258: Performed by: EMERGENCY MEDICINE

## 2019-05-28 PROCEDURE — 93005 ELECTROCARDIOGRAM TRACING: CPT | Performed by: EMERGENCY MEDICINE

## 2019-05-28 PROCEDURE — 6370000000 HC RX 637 (ALT 250 FOR IP): Performed by: EMERGENCY MEDICINE

## 2019-05-28 PROCEDURE — 6370000000 HC RX 637 (ALT 250 FOR IP): Performed by: FAMILY MEDICINE

## 2019-05-28 PROCEDURE — 84484 ASSAY OF TROPONIN QUANT: CPT

## 2019-05-28 PROCEDURE — 6360000002 HC RX W HCPCS: Performed by: FAMILY MEDICINE

## 2019-05-28 PROCEDURE — 36415 COLL VENOUS BLD VENIPUNCTURE: CPT

## 2019-05-28 PROCEDURE — 96375 TX/PRO/DX INJ NEW DRUG ADDON: CPT

## 2019-05-28 PROCEDURE — 71045 X-RAY EXAM CHEST 1 VIEW: CPT

## 2019-05-28 PROCEDURE — 85730 THROMBOPLASTIN TIME PARTIAL: CPT

## 2019-05-28 PROCEDURE — 82805 BLOOD GASES W/O2 SATURATION: CPT

## 2019-05-28 PROCEDURE — 6360000002 HC RX W HCPCS: Performed by: EMERGENCY MEDICINE

## 2019-05-28 RX ORDER — BENZONATATE 100 MG/1
100 CAPSULE ORAL ONCE
Status: COMPLETED | OUTPATIENT
Start: 2019-05-28 | End: 2019-05-28

## 2019-05-28 RX ORDER — ALBUTEROL SULFATE 2.5 MG/3ML
5 SOLUTION RESPIRATORY (INHALATION)
Status: DISCONTINUED | OUTPATIENT
Start: 2019-05-28 | End: 2019-05-28

## 2019-05-28 RX ORDER — LORAZEPAM 2 MG/ML
1 INJECTION INTRAMUSCULAR ONCE
Status: COMPLETED | OUTPATIENT
Start: 2019-05-28 | End: 2019-05-28

## 2019-05-28 RX ORDER — ZIPRASIDONE HYDROCHLORIDE 20 MG/1
20 CAPSULE ORAL 2 TIMES DAILY WITH MEALS
Status: DISCONTINUED | OUTPATIENT
Start: 2019-05-29 | End: 2019-05-30 | Stop reason: HOSPADM

## 2019-05-28 RX ORDER — METHYLPREDNISOLONE SODIUM SUCCINATE 40 MG/ML
40 INJECTION, POWDER, LYOPHILIZED, FOR SOLUTION INTRAMUSCULAR; INTRAVENOUS EVERY 8 HOURS
Status: DISCONTINUED | OUTPATIENT
Start: 2019-05-28 | End: 2019-05-29

## 2019-05-28 RX ORDER — ACETAMINOPHEN 325 MG/1
650 TABLET ORAL EVERY 4 HOURS PRN
Status: DISCONTINUED | OUTPATIENT
Start: 2019-05-28 | End: 2019-05-28 | Stop reason: SDUPTHER

## 2019-05-28 RX ORDER — POTASSIUM CHLORIDE 7.45 MG/ML
10 INJECTION INTRAVENOUS PRN
Status: DISCONTINUED | OUTPATIENT
Start: 2019-05-28 | End: 2019-05-30 | Stop reason: HOSPADM

## 2019-05-28 RX ORDER — ALBUTEROL SULFATE 90 UG/1
2 AEROSOL, METERED RESPIRATORY (INHALATION)
Status: DISCONTINUED | OUTPATIENT
Start: 2019-05-28 | End: 2019-05-28

## 2019-05-28 RX ORDER — BENZONATATE 100 MG/1
100 CAPSULE ORAL 3 TIMES DAILY PRN
Status: DISCONTINUED | OUTPATIENT
Start: 2019-05-28 | End: 2019-05-29

## 2019-05-28 RX ORDER — ACETAMINOPHEN 325 MG/1
650 TABLET ORAL ONCE
Status: COMPLETED | OUTPATIENT
Start: 2019-05-28 | End: 2019-05-28

## 2019-05-28 RX ORDER — IPRATROPIUM BROMIDE AND ALBUTEROL SULFATE 2.5; .5 MG/3ML; MG/3ML
1 SOLUTION RESPIRATORY (INHALATION)
Status: DISCONTINUED | OUTPATIENT
Start: 2019-05-28 | End: 2019-05-28

## 2019-05-28 RX ORDER — ONDANSETRON 2 MG/ML
4 INJECTION INTRAMUSCULAR; INTRAVENOUS EVERY 6 HOURS PRN
Status: DISCONTINUED | OUTPATIENT
Start: 2019-05-28 | End: 2019-05-30 | Stop reason: HOSPADM

## 2019-05-28 RX ORDER — METHYLPREDNISOLONE SODIUM SUCCINATE 125 MG/2ML
125 INJECTION, POWDER, LYOPHILIZED, FOR SOLUTION INTRAMUSCULAR; INTRAVENOUS ONCE
Status: COMPLETED | OUTPATIENT
Start: 2019-05-28 | End: 2019-05-28

## 2019-05-28 RX ORDER — SODIUM CHLORIDE 0.9 % (FLUSH) 0.9 %
10 SYRINGE (ML) INJECTION PRN
Status: DISCONTINUED | OUTPATIENT
Start: 2019-05-28 | End: 2019-05-28 | Stop reason: SDUPTHER

## 2019-05-28 RX ORDER — LISINOPRIL 10 MG/1
10 TABLET ORAL DAILY
Status: DISCONTINUED | OUTPATIENT
Start: 2019-05-29 | End: 2019-05-30 | Stop reason: HOSPADM

## 2019-05-28 RX ORDER — METOPROLOL SUCCINATE 100 MG/1
100 TABLET, EXTENDED RELEASE ORAL 2 TIMES DAILY
Status: DISCONTINUED | OUTPATIENT
Start: 2019-05-28 | End: 2019-05-30 | Stop reason: HOSPADM

## 2019-05-28 RX ORDER — ALBUTEROL SULFATE 90 UG/1
2 AEROSOL, METERED RESPIRATORY (INHALATION) EVERY 6 HOURS PRN
Status: DISCONTINUED | OUTPATIENT
Start: 2019-05-28 | End: 2019-05-28

## 2019-05-28 RX ORDER — ACETAMINOPHEN 325 MG/1
650 TABLET ORAL EVERY 4 HOURS PRN
Status: DISCONTINUED | OUTPATIENT
Start: 2019-05-28 | End: 2019-05-30 | Stop reason: HOSPADM

## 2019-05-28 RX ORDER — IPRATROPIUM BROMIDE AND ALBUTEROL SULFATE 2.5; .5 MG/3ML; MG/3ML
1 SOLUTION RESPIRATORY (INHALATION)
Status: DISCONTINUED | OUTPATIENT
Start: 2019-05-29 | End: 2019-05-28

## 2019-05-28 RX ORDER — POTASSIUM CHLORIDE 20 MEQ/1
40 TABLET, EXTENDED RELEASE ORAL PRN
Status: DISCONTINUED | OUTPATIENT
Start: 2019-05-28 | End: 2019-05-30 | Stop reason: HOSPADM

## 2019-05-28 RX ORDER — BUPROPION HYDROCHLORIDE 150 MG/1
150 TABLET ORAL EVERY MORNING
Status: DISCONTINUED | OUTPATIENT
Start: 2019-05-29 | End: 2019-05-30 | Stop reason: HOSPADM

## 2019-05-28 RX ORDER — SODIUM CHLORIDE 0.9 % (FLUSH) 0.9 %
10 SYRINGE (ML) INJECTION PRN
Status: DISCONTINUED | OUTPATIENT
Start: 2019-05-28 | End: 2019-05-30 | Stop reason: HOSPADM

## 2019-05-28 RX ORDER — ALBUTEROL SULFATE 90 UG/1
2 AEROSOL, METERED RESPIRATORY (INHALATION) EVERY 4 HOURS PRN
Status: DISCONTINUED | OUTPATIENT
Start: 2019-05-28 | End: 2019-05-28

## 2019-05-28 RX ORDER — SODIUM CHLORIDE 0.9 % (FLUSH) 0.9 %
10 SYRINGE (ML) INJECTION EVERY 12 HOURS SCHEDULED
Status: DISCONTINUED | OUTPATIENT
Start: 2019-05-28 | End: 2019-05-30 | Stop reason: HOSPADM

## 2019-05-28 RX ORDER — 0.9 % SODIUM CHLORIDE 0.9 %
1000 INTRAVENOUS SOLUTION INTRAVENOUS ONCE
Status: COMPLETED | OUTPATIENT
Start: 2019-05-28 | End: 2019-05-28

## 2019-05-28 RX ORDER — SODIUM CHLORIDE 0.9 % (FLUSH) 0.9 %
10 SYRINGE (ML) INJECTION EVERY 12 HOURS SCHEDULED
Status: DISCONTINUED | OUTPATIENT
Start: 2019-05-28 | End: 2019-05-28 | Stop reason: SDUPTHER

## 2019-05-28 RX ADMIN — ACETAMINOPHEN 650 MG: 325 TABLET, FILM COATED ORAL at 22:13

## 2019-05-28 RX ADMIN — Medication 10 ML: at 23:44

## 2019-05-28 RX ADMIN — METHYLPREDNISOLONE SODIUM SUCCINATE 125 MG: 125 INJECTION, POWDER, FOR SOLUTION INTRAMUSCULAR; INTRAVENOUS at 21:48

## 2019-05-28 RX ADMIN — IPRATROPIUM BROMIDE AND ALBUTEROL SULFATE 1 AMPULE: .5; 3 SOLUTION RESPIRATORY (INHALATION) at 20:33

## 2019-05-28 RX ADMIN — SODIUM CHLORIDE 1000 ML: 9 INJECTION, SOLUTION INTRAVENOUS at 22:15

## 2019-05-28 RX ADMIN — LORAZEPAM 1 MG: 2 INJECTION INTRAMUSCULAR; INTRAVENOUS at 22:13

## 2019-05-28 RX ADMIN — ONDANSETRON HYDROCHLORIDE 4 MG: 2 INJECTION, SOLUTION INTRAMUSCULAR; INTRAVENOUS at 23:54

## 2019-05-28 RX ADMIN — BENZONATATE 100 MG: 100 CAPSULE ORAL at 21:55

## 2019-05-28 RX ADMIN — BENZONATATE 100 MG: 100 CAPSULE ORAL at 23:44

## 2019-05-28 RX ADMIN — METHYLPREDNISOLONE SODIUM SUCCINATE 40 MG: 40 INJECTION, POWDER, FOR SOLUTION INTRAMUSCULAR; INTRAVENOUS at 23:44

## 2019-05-28 RX ADMIN — METOPROLOL SUCCINATE 100 MG: 100 TABLET, EXTENDED RELEASE ORAL at 23:44

## 2019-05-28 ASSESSMENT — PAIN DESCRIPTION - LOCATION: LOCATION: HEAD

## 2019-05-28 ASSESSMENT — ENCOUNTER SYMPTOMS
ABDOMINAL PAIN: 0
COUGH: 1
BACK PAIN: 0
SHORTNESS OF BREATH: 1
GASTROINTESTINAL NEGATIVE: 1

## 2019-05-28 ASSESSMENT — PAIN DESCRIPTION - PAIN TYPE: TYPE: ACUTE PAIN

## 2019-05-28 ASSESSMENT — PAIN SCALES - GENERAL
PAINLEVEL_OUTOF10: 6
PAINLEVEL_OUTOF10: 9

## 2019-05-28 ASSESSMENT — PAIN DESCRIPTION - FREQUENCY: FREQUENCY: CONTINUOUS

## 2019-05-29 LAB
ABSOLUTE EOS #: 0 K/UL (ref 0–0.4)
ABSOLUTE IMMATURE GRANULOCYTE: ABNORMAL K/UL (ref 0–0.3)
ABSOLUTE LYMPH #: 0.37 K/UL (ref 1–4.8)
ABSOLUTE MONO #: 0.08 K/UL (ref 0.1–1.3)
ALBUMIN SERPL-MCNC: 3.8 G/DL (ref 3.5–5.2)
ALBUMIN/GLOBULIN RATIO: ABNORMAL (ref 1–2.5)
ALP BLD-CCNC: 63 U/L (ref 40–129)
ALT SERPL-CCNC: 17 U/L (ref 5–41)
ANION GAP SERPL CALCULATED.3IONS-SCNC: 18 MMOL/L (ref 9–17)
AST SERPL-CCNC: 32 U/L
BASOPHILS # BLD: 0 % (ref 0–2)
BASOPHILS ABSOLUTE: 0 K/UL (ref 0–0.2)
BILIRUB SERPL-MCNC: 0.82 MG/DL (ref 0.3–1.2)
BUN BLDV-MCNC: 12 MG/DL (ref 6–20)
BUN/CREAT BLD: ABNORMAL (ref 9–20)
CALCIUM SERPL-MCNC: 8.6 MG/DL (ref 8.6–10.4)
CHLORIDE BLD-SCNC: 97 MMOL/L (ref 98–107)
CO2: 16 MMOL/L (ref 20–31)
CREAT SERPL-MCNC: 0.56 MG/DL (ref 0.7–1.2)
DIFFERENTIAL TYPE: ABNORMAL
EOSINOPHILS RELATIVE PERCENT: 0 % (ref 0–4)
GFR AFRICAN AMERICAN: >60 ML/MIN
GFR NON-AFRICAN AMERICAN: >60 ML/MIN
GFR SERPL CREATININE-BSD FRML MDRD: ABNORMAL ML/MIN/{1.73_M2}
GFR SERPL CREATININE-BSD FRML MDRD: ABNORMAL ML/MIN/{1.73_M2}
GLUCOSE BLD-MCNC: 157 MG/DL (ref 70–99)
HCT VFR BLD CALC: 43.1 % (ref 41–53)
HEMOGLOBIN: 14.7 G/DL (ref 13.5–17.5)
IMMATURE GRANULOCYTES: ABNORMAL %
LYMPHOCYTES # BLD: 9 % (ref 24–44)
MCH RBC QN AUTO: 36.5 PG (ref 26–34)
MCHC RBC AUTO-ENTMCNC: 34.1 G/DL (ref 31–37)
MCV RBC AUTO: 106.9 FL (ref 80–100)
MONOCYTES # BLD: 2 % (ref 1–7)
MORPHOLOGY: ABNORMAL
MORPHOLOGY: ABNORMAL
NRBC AUTOMATED: ABNORMAL PER 100 WBC
PDW BLD-RTO: 14 % (ref 11.5–14.9)
PLATELET # BLD: 200 K/UL (ref 150–450)
PLATELET ESTIMATE: ABNORMAL
PMV BLD AUTO: 9.1 FL (ref 6–12)
POTASSIUM SERPL-SCNC: 4.5 MMOL/L (ref 3.7–5.3)
RBC # BLD: 4.03 M/UL (ref 4.5–5.9)
RBC # BLD: ABNORMAL 10*6/UL
SEG NEUTROPHILS: 89 % (ref 36–66)
SEGMENTED NEUTROPHILS ABSOLUTE COUNT: 3.65 K/UL (ref 1.3–9.1)
SODIUM BLD-SCNC: 131 MMOL/L (ref 135–144)
TOTAL PROTEIN: 7.5 G/DL (ref 6.4–8.3)
TROPONIN INTERP: NORMAL
TROPONIN T: NORMAL NG/ML
TROPONIN, HIGH SENSITIVITY: 8 NG/L (ref 0–22)
WBC # BLD: 4.1 K/UL (ref 3.5–11)
WBC # BLD: ABNORMAL 10*3/UL

## 2019-05-29 PROCEDURE — 80053 COMPREHEN METABOLIC PANEL: CPT

## 2019-05-29 PROCEDURE — 2580000003 HC RX 258: Performed by: FAMILY MEDICINE

## 2019-05-29 PROCEDURE — 2580000003 HC RX 258: Performed by: INTERNAL MEDICINE

## 2019-05-29 PROCEDURE — 6370000000 HC RX 637 (ALT 250 FOR IP): Performed by: INTERNAL MEDICINE

## 2019-05-29 PROCEDURE — 2060000000 HC ICU INTERMEDIATE R&B

## 2019-05-29 PROCEDURE — 94640 AIRWAY INHALATION TREATMENT: CPT

## 2019-05-29 PROCEDURE — 96376 TX/PRO/DX INJ SAME DRUG ADON: CPT

## 2019-05-29 PROCEDURE — 85025 COMPLETE CBC W/AUTO DIFF WBC: CPT

## 2019-05-29 PROCEDURE — 97161 PT EVAL LOW COMPLEX 20 MIN: CPT

## 2019-05-29 PROCEDURE — 2500000003 HC RX 250 WO HCPCS: Performed by: INTERNAL MEDICINE

## 2019-05-29 PROCEDURE — 97165 OT EVAL LOW COMPLEX 30 MIN: CPT

## 2019-05-29 PROCEDURE — 36415 COLL VENOUS BLD VENIPUNCTURE: CPT

## 2019-05-29 PROCEDURE — 6360000002 HC RX W HCPCS: Performed by: FAMILY MEDICINE

## 2019-05-29 PROCEDURE — 6360000002 HC RX W HCPCS: Performed by: INTERNAL MEDICINE

## 2019-05-29 PROCEDURE — 6370000000 HC RX 637 (ALT 250 FOR IP): Performed by: FAMILY MEDICINE

## 2019-05-29 RX ORDER — METHYLPREDNISOLONE SODIUM SUCCINATE 40 MG/ML
20 INJECTION, POWDER, LYOPHILIZED, FOR SOLUTION INTRAMUSCULAR; INTRAVENOUS EVERY 8 HOURS
Status: DISCONTINUED | OUTPATIENT
Start: 2019-05-29 | End: 2019-05-30 | Stop reason: HOSPADM

## 2019-05-29 RX ORDER — FLUTICASONE PROPIONATE 50 MCG
1 SPRAY, SUSPENSION (ML) NASAL 2 TIMES DAILY
Status: DISCONTINUED | OUTPATIENT
Start: 2019-05-29 | End: 2019-05-30 | Stop reason: HOSPADM

## 2019-05-29 RX ORDER — SODIUM CHLORIDE 9 MG/ML
INJECTION, SOLUTION INTRAVENOUS CONTINUOUS
Status: DISCONTINUED | OUTPATIENT
Start: 2019-05-29 | End: 2019-05-30 | Stop reason: HOSPADM

## 2019-05-29 RX ORDER — BENZONATATE 100 MG/1
100 CAPSULE ORAL 3 TIMES DAILY
Status: DISCONTINUED | OUTPATIENT
Start: 2019-05-29 | End: 2019-05-30 | Stop reason: HOSPADM

## 2019-05-29 RX ORDER — 0.9 % SODIUM CHLORIDE 0.9 %
10 VIAL (ML) INJECTION DAILY
Status: DISCONTINUED | OUTPATIENT
Start: 2019-05-29 | End: 2019-05-29

## 2019-05-29 RX ORDER — PANTOPRAZOLE SODIUM 40 MG/10ML
40 INJECTION, POWDER, LYOPHILIZED, FOR SOLUTION INTRAVENOUS DAILY
Status: DISCONTINUED | OUTPATIENT
Start: 2019-05-29 | End: 2019-05-29

## 2019-05-29 RX ORDER — IPRATROPIUM BROMIDE AND ALBUTEROL SULFATE 2.5; .5 MG/3ML; MG/3ML
1 SOLUTION RESPIRATORY (INHALATION)
Status: DISCONTINUED | OUTPATIENT
Start: 2019-05-29 | End: 2019-05-30 | Stop reason: HOSPADM

## 2019-05-29 RX ORDER — NICOTINE 21 MG/24HR
1 PATCH, TRANSDERMAL 24 HOURS TRANSDERMAL DAILY
Status: DISCONTINUED | OUTPATIENT
Start: 2019-05-29 | End: 2019-05-30 | Stop reason: HOSPADM

## 2019-05-29 RX ADMIN — FAMOTIDINE 20 MG: 10 INJECTION, SOLUTION INTRAVENOUS at 20:37

## 2019-05-29 RX ADMIN — SODIUM CHLORIDE: 9 INJECTION, SOLUTION INTRAVENOUS at 15:00

## 2019-05-29 RX ADMIN — IPRATROPIUM BROMIDE AND ALBUTEROL SULFATE 1 AMPULE: .5; 3 SOLUTION RESPIRATORY (INHALATION) at 18:49

## 2019-05-29 RX ADMIN — LISINOPRIL 10 MG: 10 TABLET ORAL at 09:04

## 2019-05-29 RX ADMIN — BENZONATATE 100 MG: 100 CAPSULE ORAL at 14:35

## 2019-05-29 RX ADMIN — METHYLPREDNISOLONE SODIUM SUCCINATE 40 MG: 40 INJECTION, POWDER, FOR SOLUTION INTRAMUSCULAR; INTRAVENOUS at 09:05

## 2019-05-29 RX ADMIN — ACETAMINOPHEN 650 MG: 325 TABLET, FILM COATED ORAL at 14:35

## 2019-05-29 RX ADMIN — FAMOTIDINE 20 MG: 10 INJECTION, SOLUTION INTRAVENOUS at 14:36

## 2019-05-29 RX ADMIN — ACETAMINOPHEN 650 MG: 325 TABLET, FILM COATED ORAL at 09:20

## 2019-05-29 RX ADMIN — BENZONATATE 100 MG: 100 CAPSULE ORAL at 20:37

## 2019-05-29 RX ADMIN — METOPROLOL SUCCINATE 100 MG: 100 TABLET, EXTENDED RELEASE ORAL at 20:37

## 2019-05-29 RX ADMIN — METOPROLOL SUCCINATE 100 MG: 100 TABLET, EXTENDED RELEASE ORAL at 09:04

## 2019-05-29 RX ADMIN — Medication 10 ML: at 09:15

## 2019-05-29 RX ADMIN — METHYLPREDNISOLONE SODIUM SUCCINATE 20 MG: 40 INJECTION, POWDER, FOR SOLUTION INTRAMUSCULAR; INTRAVENOUS at 14:37

## 2019-05-29 RX ADMIN — IPRATROPIUM BROMIDE AND ALBUTEROL SULFATE 1 AMPULE: .5; 3 SOLUTION RESPIRATORY (INHALATION) at 14:40

## 2019-05-29 RX ADMIN — FLUTICASONE PROPIONATE 1 SPRAY: 50 SPRAY, METERED NASAL at 20:37

## 2019-05-29 ASSESSMENT — PAIN DESCRIPTION - PROGRESSION
CLINICAL_PROGRESSION: GRADUALLY WORSENING
CLINICAL_PROGRESSION: GRADUALLY WORSENING

## 2019-05-29 ASSESSMENT — PAIN DESCRIPTION - PAIN TYPE
TYPE: ACUTE PAIN

## 2019-05-29 ASSESSMENT — PAIN DESCRIPTION - LOCATION
LOCATION: HEAD;THROAT
LOCATION: HEAD;THROAT
LOCATION: THROAT

## 2019-05-29 ASSESSMENT — PAIN DESCRIPTION - FREQUENCY
FREQUENCY: CONTINUOUS

## 2019-05-29 ASSESSMENT — PAIN DESCRIPTION - ONSET: ONSET: ON-GOING

## 2019-05-29 ASSESSMENT — PAIN SCALES - GENERAL
PAINLEVEL_OUTOF10: 8
PAINLEVEL_OUTOF10: 8
PAINLEVEL_OUTOF10: 6
PAINLEVEL_OUTOF10: 0

## 2019-05-29 NOTE — ED PROVIDER NOTES
morning    LISINOPRIL (PRINIVIL;ZESTRIL) 10 MG TABLET    Take 1 tablet by mouth daily Last refill, patient needs to make an appointment    METOPROLOL SUCCINATE (TOPROL XL) 100 MG EXTENDED RELEASE TABLET    TAKE 1 TABLET BY MOUTH TWICE DAILY    ZIPRASIDONE (GEODON) 20 MG CAPSULE    Take 1 capsule by mouth 2 times daily (with meals)     ALLERGIES     has No Known Allergies. FAMILY HISTORY     indicated that his mother is alive. He indicated that his father is . SOCIAL HISTORY       Social History     Tobacco Use    Smoking status: Current Every Day Smoker     Packs/day: 1.00     Types: Cigarettes    Smokeless tobacco: Never Used    Tobacco comment: pt educated on both alcohol and smoking cessation   Substance Use Topics    Alcohol use: Yes     Alcohol/week: 51.0 oz     Types: 84 Cans of beer, 1 Shots of liquor per week     Comment: states he drinks 4 beers per day    Drug use: No     Comment: denies drug use     PHYSICAL EXAM     INITIAL VITALS: /70   Pulse 133   Temp 98.1 °F (36.7 °C) (Oral)   Resp 19   Ht 6' 1\" (1.854 m)   Wt 200 lb (90.7 kg)   SpO2 99%   BMI 26.39 kg/m²    Physical Exam   Constitutional: He is oriented to person, place, and time. No distress. HENT:   Head: Normocephalic and atraumatic. Eyes: Conjunctivae are normal.   Neck: Normal range of motion. Neck supple. Cardiovascular: Regular rhythm and normal heart sounds. Tachycardia present. No murmur heard. Pulmonary/Chest: Tachypnea noted. No respiratory distress. He has wheezes. He has no rales. Abdominal: Soft. There is no tenderness. Musculoskeletal: He exhibits no edema or deformity. No calf tenderness. Neurological: He is alert and oriented to person, place, and time. Skin: Skin is warm and dry. Capillary refill takes less than 2 seconds. No rash noted. He is not diaphoretic. Nursing note and vitals reviewed. MEDICAL DECISION MAKING:     Reviewed results. CXR clear. Labs are nonacute.   On reeval, after treatments and steroids, resp status improving, decreased wheezing. Pt still tachycardic, however, per recent note, he was tachycardic on that visit as well. Low risk pe/dvt and recent negative cta. I do not suspect pe, dissection, pna, pneumo, or other emergent pathology. Overall, suspect copd exacerbation but pt will also need r/o from cardiac standpoint. Discussed with Dr. Lauren Lipscomb, oncall for pcp, will admit for further therapy and evaluation. CRITICAL CARE:       PROCEDURES:    Procedures    DIAGNOSTIC RESULTS   EKG:All EKG's are interpreted by the Emergency Department Physician who either signs or Co-signs this chart in the absence of a cardiologist.    Ekg, rate of 131, sinus tach, no st seg changes, normal pr and qrs, no acute ischemic changes. RADIOLOGY:All plain film, CT, MRI, and formal ultrasound images (except ED bedside ultrasound) are read by the radiologist, see reports below, unless otherwisenoted in MDM or here. XR CHEST PORTABLE   Final Result   No acute disease. LABS: All lab results were reviewed by myself, and all abnormals are listed below.   Labs Reviewed   COMPREHENSIVE METABOLIC PANEL - Abnormal; Notable for the following components:       Result Value    Glucose 123 (*)     Sodium 131 (*)     Chloride 94 (*)     CO2 18 (*)     Anion Gap 19 (*)     All other components within normal limits   CBC WITH AUTO DIFFERENTIAL - Abnormal; Notable for the following components:    RBC 4.39 (*)     .0 (*)     MCH 36.8 (*)     Seg Neutrophils 74 (*)     Lymphocytes 14 (*)     Monocytes 10 (*)     All other components within normal limits   BLOOD GAS, VENOUS - Abnormal; Notable for the following components:    pH, Tyler 7.470 (*)     pCO2, Tyler 27.8 (*)     pO2, Tyler 94.5 (*)     HCO3, Venous 20.2 (*)     Negative Base Excess, Tyler 3.5 (*)     O2 Sat, Tyler 94.2 (*)     All other components within normal limits   APTT   PROTIME-INR   TROPONIN   BRAIN NATRIURETIC PEPTIDE

## 2019-05-29 NOTE — PROGRESS NOTES
RN notified Dr. Yas Braxton of new consult, reason for consult, and updated him on patient's condition. No new orders at this time.

## 2019-05-29 NOTE — PROGRESS NOTES
Physical Therapy    Facility/Department: [facility] PROGRESSIVE CARE  Initial Assessment    NAME: Michael Rodriguez  : 1965  MRN: [id number]    Date of Service: 2019    Discharge Recommendations:  Home independently   PT Equipment Recommendations  Equipment Needed: No    Assessment   Assessment: pt demonstrates independence in bed mobility, transfers and gait on level surfaces- no need for skilled PT. Pt is safe to return home. Treatment Diagnosis: dyspnea  Specific instructions for Next Treatment: 19 HOME INDEPENDENTLY; D/C PT- pt is independent in bed mobility, transfers and gait on level surfaces   Prognosis: Excellent  Decision Making: Low Complexity  History: C/O difficulty breathing  Exam: ROM, MMT, balance and mobility assessments  Clinical Presentation: D/C PT- pt is independent in bed mobility, transfers and gait on level surfaces   Patient Education: POC  Barriers to Learning: none  No Skilled PT: Independent with functional mobility   REQUIRES PT FOLLOW UP: No  Activity Tolerance  Activity Tolerance: Patient Tolerated treatment well       Patient Diagnosis(es): The encounter diagnosis was Shortness of breath. has a past medical history of Alcohol abuse, History of hip replacement, Hypertension, and Scabies. has a past surgical history that includes Neck surgery and Total hip arthroplasty (Bilateral).     Restrictions  Restrictions/Precautions  Restrictions/Precautions: Fall Risk  Required Braces or Orthoses?: No  Implants present? : Metal implants(B ZACH; hardware in neck)  Vision/Hearing  Vision: Impaired  Vision Exceptions: Wears glasses at all times  Hearing: Within functional limits     Subjective  General  Patient assessed for rehabilitation services?: Yes  Response To Previous Treatment: Not applicable  Family / Caregiver Present: No  Referring Practitioner: Dr. Mike Ann  Referral Date : 19  Diagnosis: dyspnea  Follows Commands: Within Functional Limits  General Comment  Comments: OK per nurse Johanna Pradhan to proceed w/ PT evaluation. Subjective  Subjective: pt reports that he doesn't do any physical activity. Pt reports that he was admitted due to difficulty breathing. 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 Pain Screening  Patient Currently in Pain: Yes  Pain Assessment  Pain Assessment: 0-10  Pain Level: 8  Patient's Stated Pain Goal: No pain  Pain Type: Acute pain  Pain Location: Head;Throat  Pain Frequency: Continuous  Pain Onset: On-going  Clinical Progression: Gradually worsening  Non-Pharmaceutical Pain Intervention(s): Ambulation/Increased Activity;Repositioned  Response to Pain Intervention: Patient Satisfied  Multiple Pain Sites: No  Vital Signs  Patient Currently in Pain: Yes       Orientation  Orientation  Overall Orientation Status: Within Normal Limits  Social/Functional History  Social/Functional History  Lives With: Alone  Type of Home: Apartment  Home Layout: One level  Home Access: Level entry  Bathroom Shower/Tub: Tub/Shower unit  Bathroom Toilet: Standard  Bathroom Equipment: (NO DME)  Bathroom Accessibility: Accessible  Home Equipment: (NO DME - donated what he had )  ADL Assistance: Independent  Homemaking Assistance: Independent  Homemaking Responsibilities: Yes  Ambulation Assistance: Independent  Transfer Assistance: Independent  Active : Yes  Mode of Transportation: Truck  Occupation: Part time employment  Type of occupation:   Additional Comments: Pt reports that his sister lives close and can A PRN but she does work.   Cognition        Objective     Observation/Palpation  Observation: isolation for C-diff    AROM RLE (degrees)  RLE AROM: WFL  AROM LLE (degrees)  LLE AROM : WFL  AROM RUE (degrees)  RUE General AROM: see OT for UE  assessment  AROM LUE (degrees)  LUE General AROM: see OT for UE  assessment  Strength RLE  Strength RLE: WFL  Comment:

## 2019-05-29 NOTE — PROGRESS NOTES
Pt arrived to floor via stretcher from ED and transferred self to bed. Vitals taken. Admission and assessment complete. No distress noted. See doc flowsheet and admission navigator for details. POC and education initiated and reviewed with patient. Call light within reach, and pt educated on its use. Bed in lowest position, and locked. Side rails up x 2. Denied further questions or needs at this time. Will continue to monitor.

## 2019-05-29 NOTE — PLAN OF CARE
Problem: Falls - Risk of:  Goal: Will remain free from falls  Description  Will remain free from falls  Outcome: Ongoing  Note:   The patient remained free from falls this shift, call light within reach, bed in locked and lowest position. Side rails up x2. Continue to monitor closely. Problem: Breathing Pattern - Ineffective:  Goal: Ability to achieve and maintain a regular respiratory rate will improve  Description  Ability to achieve and maintain a regular respiratory rate will improve  Outcome: Ongoing  Note:   Patient's RR and SPO2 within normal limits. Patient dyspneic with exertion. Patient states that his difficulty breathing has improved. Will continue to monitor.

## 2019-05-29 NOTE — ED NOTES
Report given to JAYLEN Adorno from [facility]. Report method by phone   The following was reviewed with receiving RN:   Current vital signs:  /67   Pulse 127   Temp 98.1 °F (36.7 °C) (Oral)   Resp 19   Ht 6' 1\" (1.854 m)   Wt 200 lb (90.7 kg)   SpO2 96%   BMI 26.39 kg/m²                MEWS Score: 4     Any medication or safety alerts were reviewed. Any pending diagnostics and notifications were also reviewed, as well as any safety concerns or issues, abnormal labs, abnormal imaging, and abnormal assessment findings. Questions were answered.      ** relayed current v/s and that pt is tachycardic and tacyhpneic, relayed will medicate pt before transport           Robyn Reyna RN  05/28/19 [id number]

## 2019-05-29 NOTE — CARE COORDINATION
CASE MANAGEMENT NOTE:    Admission Date:  5/28/2019 Prema Ray is a 53 y.o.  male    Admitted for : Dyspnea [R06.00]    Met with:  Patient    PCP:  Monica durham                                Insurance:  MMO      Current Residence/ Living Arrangements:  independently at home             Current Services PTA:  No    Is patient agreeable to VNS: No    Freedom of choice provided: NA    List of [address] provided: NA    VNS chosen:  No    DME:  none    Home Oxygen: No    Nebulizer: yes    CPAP/BIPAP: No    Supplier: N/A    Potential Assistance Needed: No    SNF needed: No    Pharmacy:  [facility] [address]    Is the Patient an [employer] with Readmission Risk Score greater than 14%? No  If yes, pt needs a follow up appointment made within 7 days. Does Patient want to use MEDS to BEDS? No    Family Members/Caregivers that pt would like involved in their care:    Yes    If yes, list name here:  Sister Varsha    Transportation Provider:  Family             Is patient in Isolation/One on One/Altered Mental Status? Yes  If yes, skip next question. If no, would they like an I-Pad to  use? No  If yes, call [phone]. Discharge Plan:  5/29 - MMO - Patient is from home, has a nebulizer, Denies need for VNS, Works full time as a  for [employer]. Plan is to return home with no needs. On 40 mg Solu medrol Q8hrs.   Will continue to follow patient. //pf             Electronically signed by: Annette Mcdonald RN on 5/29/2019 at 10:27 AM

## 2019-05-29 NOTE — PROGRESS NOTES
Assessment  Pain Assessment: 0-10  Pain Level: 8  Patient's Stated Pain Goal: No pain  Pain Type: Acute pain  Pain Location: Head, Throat  Pain Frequency: Continuous  Clinical Progression: Gradually worsening  Response to Pain Intervention: Patient Satisfied  Multiple Pain Sites: No    Objective  Vision - Basic Assessment  Patient Visual Report: No visual complaint reported. Cognition  Overall Cognitive Status: WFL   Perception  Overall Perceptual Status: WFL  Sensation  Overall Sensation Status: WFL   ADL  Feeding: Independent(\"It all comes right back up\" - pt reports can't eat)  Grooming: Setup, Independent  UE Bathing: Setup, Independent  LE Bathing: Setup, Independent  UE Dressing: Setup, Independent  LE Dressing: Setup, Independent  Toileting: Independent  Additional Comments: Set-up A 2* hospital environment only. UE Function  LUE Strength  Gross LUE Strength: WFL  L Hand Grasp: 5/5     LUE Tone: Normotonic     LUE AROM (degrees)  LUE AROM : WFL     Left Hand AROM (degrees)  Left Hand AROM: WFL  RUE Strength  Gross RUE Strength: WFL  R Hand Grasp: 5/5      RUE Tone: Normotonic     RUE AROM (degrees)  RUE AROM : WFL     Right Hand AROM (degrees)  Right Hand AROM: WFL    Fine Motor Skills  Coordination  Movements Are Fluid And Coordinated: Yes     Mobility  Supine to Sit: Modified independent  Sit to Supine: Modified independent       Balance  Sitting Balance: Independent  Standing Balance: Independent     Functional Mobility  Functional - Mobility Device: No device  Activity: To/from bathroom  Assist Level: Independent  Bed mobility  Rolling to Right: Modified independent  Supine to Sit: Modified independent  Sit to Supine: Modified independent  Scooting: Independent     Transfers  Stand Step Transfers: Independent  Stand Pivot Transfers: Independent  Sit to stand: Independent  Stand to sit:  Independent  Transfer Comments: No device  Toilet Transfers  Toilet Transfers Comments: Pt up to bathroom per self with no LOB or safety concerns. Functional Activity Tolerance  Functional Activity Tolerance: Tolerates 10 - 20 min exercise with multiple rests   Assessment  Assessment: Pt IND with self-care and mobility, no need for skilled OT intervention at this time. Prognosis: Good  Decision Making: Low Complexity  REQUIRES OT FOLLOW UP: No  No Skilled OT: Independent with functional mobility, Independent with ADL's  Discharge Recommendations: Home with assist PRN  Activity Tolerance: Patient Tolerated treatment well    Goals  Patient Goals   Patient goals : Return home with A as needed.     Plan  Safety Devices  Safety Devices in place: Not Applicable    Equipment Recommendations  Equipment Needed: No  OT Individual Minutes  Time In: 8991  Time Out: [facility]  Minutes: 17    Electronically signed by Morteza Rosenberg on 5/29/19 at 11:57 AM

## 2019-05-29 NOTE — PLAN OF CARE
Problem: Falls - Risk of:  Goal: Will remain free from falls  Description  Will remain free from falls  5/29/2019 1718 by Ana Aquino RN  Outcome: Ongoing  Note:   The patient remained free from falls this shift, call light within reach, bed in locked and lowest position. Side rails up x2. Continue to monitor closely. Problem: Breathing Pattern - Ineffective:  Goal: Ability to achieve and maintain a regular respiratory rate will improve  Description  Ability to achieve and maintain a regular respiratory rate will improve  5/29/2019 1718 by Ana Aquino RN  Outcome: Ongoing  Note:   Patient's RR and SPO2 within normal limits. Patient dyspneic with exertion. Patient states that his difficulty breathing has improved. Will continue to monitor.         Problem: Pain:  Goal: Pain level will decrease  Description  Pain level will decrease  Outcome: Ongoing     Problem: Pain:  Goal: Control of acute pain  Description  Control of acute pain  Outcome: Ongoing

## 2019-05-29 NOTE — CONSULTS
Consult note dictated:    Mostly COPD with chronic bronchitis  Acute respiratory distress  Tobacco abuse 1 pack per day for 35 years  Wheezing/better, mostly with COPD  Recurrent nausea vomiting with recurrent cough  Hypertension  Hyponatremia

## 2019-05-30 VITALS
HEIGHT: 73 IN | TEMPERATURE: 98.2 F | OXYGEN SATURATION: 97 % | WEIGHT: 192.24 LBS | BODY MASS INDEX: 25.48 KG/M2 | RESPIRATION RATE: 16 BRPM | HEART RATE: 110 BPM | SYSTOLIC BLOOD PRESSURE: 146 MMHG | DIASTOLIC BLOOD PRESSURE: 90 MMHG

## 2019-05-30 LAB
ABSOLUTE EOS #: 0 K/UL (ref 0–0.4)
ABSOLUTE IMMATURE GRANULOCYTE: ABNORMAL K/UL (ref 0–0.3)
ABSOLUTE LYMPH #: 0.68 K/UL (ref 1–4.8)
ABSOLUTE MONO #: 0.49 K/UL (ref 0.1–1.3)
ALBUMIN SERPL-MCNC: 3.6 G/DL (ref 3.5–5.2)
ALBUMIN/GLOBULIN RATIO: ABNORMAL (ref 1–2.5)
ALP BLD-CCNC: 55 U/L (ref 40–129)
ALT SERPL-CCNC: 19 U/L (ref 5–41)
ANION GAP SERPL CALCULATED.3IONS-SCNC: 14 MMOL/L (ref 9–17)
AST SERPL-CCNC: 34 U/L
BASOPHILS # BLD: 0 % (ref 0–2)
BASOPHILS ABSOLUTE: 0 K/UL (ref 0–0.2)
BILIRUB SERPL-MCNC: 0.75 MG/DL (ref 0.3–1.2)
BUN BLDV-MCNC: 15 MG/DL (ref 6–20)
BUN/CREAT BLD: ABNORMAL (ref 9–20)
CALCIUM SERPL-MCNC: 8.8 MG/DL (ref 8.6–10.4)
CHLORIDE BLD-SCNC: 100 MMOL/L (ref 98–107)
CO2: 19 MMOL/L (ref 20–31)
CREAT SERPL-MCNC: 0.64 MG/DL (ref 0.7–1.2)
DIFFERENTIAL TYPE: ABNORMAL
EKG ATRIAL RATE: 128 BPM
EKG P AXIS: 53 DEGREES
EKG P-R INTERVAL: 114 MS
EKG Q-T INTERVAL: 302 MS
EKG QRS DURATION: 70 MS
EKG QTC CALCULATION (BAZETT): 440 MS
EKG R AXIS: 53 DEGREES
EKG T AXIS: 61 DEGREES
EKG VENTRICULAR RATE: 128 BPM
EOSINOPHILS RELATIVE PERCENT: 0 % (ref 0–4)
GFR AFRICAN AMERICAN: >60 ML/MIN
GFR NON-AFRICAN AMERICAN: >60 ML/MIN
GFR SERPL CREATININE-BSD FRML MDRD: ABNORMAL ML/MIN/{1.73_M2}
GFR SERPL CREATININE-BSD FRML MDRD: ABNORMAL ML/MIN/{1.73_M2}
GLUCOSE BLD-MCNC: 133 MG/DL (ref 70–99)
HCT VFR BLD CALC: 40.3 % (ref 41–53)
HEMOGLOBIN: 13.8 G/DL (ref 13.5–17.5)
IMMATURE GRANULOCYTES: ABNORMAL %
LYMPHOCYTES # BLD: 7 % (ref 24–44)
MCH RBC QN AUTO: 36.9 PG (ref 26–34)
MCHC RBC AUTO-ENTMCNC: 34.3 G/DL (ref 31–37)
MCV RBC AUTO: 107.5 FL (ref 80–100)
MONOCYTES # BLD: 5 % (ref 1–7)
MORPHOLOGY: NORMAL
NRBC AUTOMATED: ABNORMAL PER 100 WBC
PDW BLD-RTO: 13.9 % (ref 11.5–14.9)
PLATELET # BLD: 201 K/UL (ref 150–450)
PLATELET ESTIMATE: ABNORMAL
PMV BLD AUTO: 8.9 FL (ref 6–12)
POTASSIUM SERPL-SCNC: 4.2 MMOL/L (ref 3.7–5.3)
RBC # BLD: 3.75 M/UL (ref 4.5–5.9)
RBC # BLD: ABNORMAL 10*6/UL
SEG NEUTROPHILS: 88 % (ref 36–66)
SEGMENTED NEUTROPHILS ABSOLUTE COUNT: 8.53 K/UL (ref 1.3–9.1)
SODIUM BLD-SCNC: 133 MMOL/L (ref 135–144)
TOTAL PROTEIN: 6.9 G/DL (ref 6.4–8.3)
WBC # BLD: 9.7 K/UL (ref 3.5–11)
WBC # BLD: ABNORMAL 10*3/UL

## 2019-05-30 PROCEDURE — 36415 COLL VENOUS BLD VENIPUNCTURE: CPT

## 2019-05-30 PROCEDURE — 85025 COMPLETE CBC W/AUTO DIFF WBC: CPT

## 2019-05-30 PROCEDURE — 6370000000 HC RX 637 (ALT 250 FOR IP): Performed by: FAMILY MEDICINE

## 2019-05-30 PROCEDURE — 2500000003 HC RX 250 WO HCPCS: Performed by: INTERNAL MEDICINE

## 2019-05-30 PROCEDURE — 6360000002 HC RX W HCPCS: Performed by: INTERNAL MEDICINE

## 2019-05-30 PROCEDURE — 94640 AIRWAY INHALATION TREATMENT: CPT

## 2019-05-30 PROCEDURE — 6370000000 HC RX 637 (ALT 250 FOR IP): Performed by: INTERNAL MEDICINE

## 2019-05-30 PROCEDURE — 80053 COMPREHEN METABOLIC PANEL: CPT

## 2019-05-30 PROCEDURE — 94761 N-INVAS EAR/PLS OXIMETRY MLT: CPT

## 2019-05-30 RX ADMIN — BUPROPION HYDROCHLORIDE 150 MG: 150 TABLET, FILM COATED, EXTENDED RELEASE ORAL at 09:41

## 2019-05-30 RX ADMIN — BENZONATATE 100 MG: 100 CAPSULE ORAL at 09:41

## 2019-05-30 RX ADMIN — LISINOPRIL 10 MG: 10 TABLET ORAL at 09:42

## 2019-05-30 RX ADMIN — IPRATROPIUM BROMIDE AND ALBUTEROL SULFATE 1 AMPULE: .5; 3 SOLUTION RESPIRATORY (INHALATION) at 07:43

## 2019-05-30 RX ADMIN — METHYLPREDNISOLONE SODIUM SUCCINATE 20 MG: 40 INJECTION, POWDER, FOR SOLUTION INTRAMUSCULAR; INTRAVENOUS at 00:19

## 2019-05-30 RX ADMIN — METOPROLOL SUCCINATE 100 MG: 100 TABLET, EXTENDED RELEASE ORAL at 09:41

## 2019-05-30 RX ADMIN — FAMOTIDINE 20 MG: 10 INJECTION, SOLUTION INTRAVENOUS at 09:41

## 2019-05-30 RX ADMIN — METHYLPREDNISOLONE SODIUM SUCCINATE 20 MG: 40 INJECTION, POWDER, FOR SOLUTION INTRAMUSCULAR; INTRAVENOUS at 09:41

## 2019-05-30 NOTE — CONSULTS
[address]                 [address] [facility], [address]                                  CONSULTATION    PATIENT NAME: Tray Ellis                     :        1965  MED REC NO:   [id number]                              ROOM:       2088  ACCOUNT NO:   [de-identified]                           ADMIT DATE: 2019  PROVIDER:     Daniel Hummel    PULMONARY CRITICAL CARE CONSULTATION    CONSULT DATE:  2019    REFERRING PROVIDER:  _____    REASON FOR CONSULTATION:  Acute respiratory distress and wheezing. HISTORY OF PRESENT ILLNESS:  This is a 79-year-old gentleman with  history of hypertension, tobacco abuse around a pack a day for 35 years  and apparently was admitted recently earlier this month and been told to  have some mild COPD. The patient is coming back with chronic cough,  producing white to yellow sputum but it has been worse for the last few  days. He has recurrent cough, nonproductive that caused him to have  recurrent nausea, vomiting and dizziness. He almost fainted. REVIEW OF SYSTEMS:  GENERAL:  He denies any fever or chills. NEURO:  Noted some headaches and dizziness with recurrent cough, as such  he almost felt faint but no loss of consciousness. EYES:  No redness or watery eyes. ENT:  Chronic clear nasal drip. No sneezing or nasal itching. CARDIAC WISE:  No chest pain or palpitation. RESPIRATORY:  Noted some short of breath. Had some intermittent  wheezing. He was given IV steroid last night, feeling a little bit  better. He denies any short of breath or wheezing outside the cough,  but he does have recurrent cough, it has been chronic for at least 4-5  years. It is usually productive a little bit especially in the morning  with clear to yellow sputum, now he has recurrent cough for the last few  days, nonproductive. GI:  Noted some recurrent nausea, vomiting with coffee.   He does have  heartburn and he had no diarrhea, had GI bleed in the past.  GENITOURINARY:  No dysuria, hematuria. MUSCULOSKELETAL:  Had arthralgia. SKIN:  No new rash or ulceration noted. PSYCHIATRIC:  Denies any anxiety or depressed mood. PAST MEDICAL HISTORY:  Significant for hypertension and tobacco abuse. PAST SURGICAL HISTORY:  Bilateral total hip arthroplasty, had neck  surgery. FAMILY HISTORY:  None reported. SOCIAL HISTORY:  He is a smoker a pack a day for 35 years. He drinks  alcohol, he said around 4 beers daily for many years. ALLERGIES:  No known allergies. HOME MEDICATIONS:  He was recently prescribed albuterol using it as  needed and he is on metoprolol, lisinopril and Geodon and Wellbutrin. PHYSICAL EXAMINATION:  VITAL SIGNS:  Temperature is 98.2, pulse is 106, respiratory rate 18,  blood pressure of 108/59, saturation is 97% on room air. HEENT:  No icterus noted. No JVD or lymphadenopathy appreciated. HEART:  S1, S2 regular. No gallop. LUNGS:  Decreased sounds, a little bit coarse, slightly prolonged  expiration. No wheezing appreciated now. ABDOMEN:  Soft. No guarding. Bowel sounds present. EXTREMITIES:  No edema, calf tenderness or stiffness. SKIN:  No new rash or ulceration noted. NEURO:  He is awake, alert, appropriate. IMAGING DATA:  His chest x-ray was unremarkable except for  hyperinflation. LABORATORY DATA:  His blood work, his sodium was 131, BUN 12, creatinine  0.5. His blood sugar was 157. ProBNP _____. LFTs were unremarkable. His white count is 4.1, hemoglobin 14.7, platelets is 480. His ABGs, pH  7.47, pCO2 of 27 and pO2 94, bicarb 20. ASSESSMENT:  1.  Mostly COPD with chronic bronchitis. 2.  Acute respiratory distress most secondary to COPD. 3.  Tobacco abuse a pack a day for 35 years. 4.  Noted some wheezing, given IV steroids, improved now, mostly again  secondary to COPD. 5.  Recurrent nausea, vomiting with recurrent cough, rule out any  underlying GERD.   6. Hypertension. 7.  Hyponatremia, could be from recurrent nausea, vomiting. 8.  The patient denies a history of snoring but noted awakening twice  per night, unrefreshed sleep and napping during the day with possibility  of obstructive sleep apnea. PLAN OF TREATMENT:  We are going to add aerosol treatment and we will  give him Tessalon around the clock. Add nicotine patch and we will  decrease IV steroids. He will need PFTs later on as well as a sleep  study. We will give him Protonix IVP and gentle hydration with normal  saline and follow up his sodium level. As noted, he will need a sleep  evaluation and smoking cessation. The patient also does have alcohol  abuse. He drinks 4-5 beers on daily basis. He was advised to quit  drinking. He is already on Lovenox for DVT prophylaxis. We do  appreciate the consultation and we will follow.         Liseth Evans    D: 05/29/2019 13:00:38       T: 05/29/2019 13:08:01     [email or web address]  Job#: [id number]     Doc#: [id number]    CC:

## 2019-05-30 NOTE — PLAN OF CARE
Problem: Falls - Risk of:  Goal: Will remain free from falls  Description  Will remain free from falls  5/30/2019 0421 by Elvie Crabtree RN  Outcome: Met This Shift     Problem: Falls - Risk of:  Goal: Absence of physical injury  Description  Absence of physical injury  Outcome: Met This Shift     Problem: Musculor/Skeletal Functional Status  Goal: Highest potential functional level  Outcome: Met This Shift     Problem: Musculor/Skeletal Functional Status  Goal: Absence of falls  Outcome: Met This Shift     Problem: Pain:  Goal: Pain level will decrease  Description  Pain level will decrease  5/30/2019 0421 by Elvie Crabtree RN  Outcome: Met This Shift     Problem: Pain:  Goal: Control of acute pain  Description  Control of acute pain  5/30/2019 0421 by Elvie Crabtree RN  Outcome: Met This Shift     Problem: Pain:  Goal: Control of chronic pain  Description  Control of chronic pain  Outcome: Met This Shift     Problem: Breathing Pattern - Ineffective:  Goal: Ability to achieve and maintain a regular respiratory rate will improve  Description  Ability to achieve and maintain a regular respiratory rate will improve  5/30/2019 0421 by Elvie Crabtree RN  Outcome: Ongoing

## 2019-05-31 NOTE — PROGRESS NOTES
Patient very anxious, verbal, emotional. Complained of a Dr judging his past instead of focusing on on present problems per patient statement, that he's not being treated good. Patient named a Dr that did not match patient description of Dr named. Also patient worried about being billed, needing to go to work today because \"the kids need him\",he has to be there for the kids, he states he's the  for [employer]. Also patient stated he has not been able to eat d/t swallowing issues. Writer listened to the patient's concern's and worries, writer than apologized for his complains. Writer than told patient all his concerns will be communicated to Dr. Stephanie Mesa. Patient said \" don't bother, I'm leaving at 1000! Explained to patient that he needed to stay and finished treatments and that he needs a GI Dr. to assess him, told patient that Dr. Stephanie Mesa will get a GI consult. Writer also asked patient if he would like to talk to a case [place] discharge planner,  a supervisor, a , or talk to someone to fill out cosme for [employer], patient stated NO, NO. .. I'm leaving at 1000. Writer told patient that that was against medical advice. Patient stated he does not care he's leaving at 1000 via son picking him up. Writer told patient that a AMA form must be signed, form explained to patient. Patient signed form, IV was removed. Patient thanked writer for listening. Patient walked out of room per self per ambulatory with belongings.

## 2019-06-01 LAB
EKG ATRIAL RATE: 131 BPM
EKG P AXIS: 68 DEGREES
EKG P-R INTERVAL: 120 MS
EKG Q-T INTERVAL: 298 MS
EKG QRS DURATION: 66 MS
EKG QTC CALCULATION (BAZETT): 440 MS
EKG R AXIS: 59 DEGREES
EKG T AXIS: 73 DEGREES
EKG VENTRICULAR RATE: 131 BPM

## 2019-06-18 ENCOUNTER — OFFICE VISIT (OUTPATIENT)
Dept: FAMILY MEDICINE CLINIC | Age: 54
End: 2019-06-18
Payer: COMMERCIAL

## 2019-06-18 VITALS
BODY MASS INDEX: 25.2 KG/M2 | SYSTOLIC BLOOD PRESSURE: 130 MMHG | TEMPERATURE: 97.6 F | DIASTOLIC BLOOD PRESSURE: 88 MMHG | HEART RATE: 84 BPM | WEIGHT: 191 LBS | RESPIRATION RATE: 18 BRPM

## 2019-06-18 DIAGNOSIS — Z12.11 SCREEN FOR COLON CANCER: ICD-10-CM

## 2019-06-18 DIAGNOSIS — I10 ESSENTIAL HYPERTENSION: Primary | ICD-10-CM

## 2019-06-18 DIAGNOSIS — E87.1 HYPONATREMIA: ICD-10-CM

## 2019-06-18 DIAGNOSIS — Z12.5 SCREENING FOR PROSTATE CANCER: ICD-10-CM

## 2019-06-18 DIAGNOSIS — R73.09 ELEVATED RANDOM BLOOD GLUCOSE LEVEL: ICD-10-CM

## 2019-06-18 DIAGNOSIS — J44.9 CHRONIC OBSTRUCTIVE PULMONARY DISEASE, UNSPECIFIED COPD TYPE (HCC): ICD-10-CM

## 2019-06-18 PROBLEM — K21.9 GASTROESOPHAGEAL REFLUX DISEASE WITHOUT ESOPHAGITIS: Status: ACTIVE | Noted: 2019-06-18

## 2019-06-18 PROCEDURE — 4004F PT TOBACCO SCREEN RCVD TLK: CPT | Performed by: NURSE PRACTITIONER

## 2019-06-18 PROCEDURE — 1111F DSCHRG MED/CURRENT MED MERGE: CPT | Performed by: NURSE PRACTITIONER

## 2019-06-18 PROCEDURE — 3023F SPIROM DOC REV: CPT | Performed by: NURSE PRACTITIONER

## 2019-06-18 PROCEDURE — G8926 SPIRO NO PERF OR DOC: HCPCS | Performed by: NURSE PRACTITIONER

## 2019-06-18 PROCEDURE — G8419 CALC BMI OUT NRM PARAM NOF/U: HCPCS | Performed by: NURSE PRACTITIONER

## 2019-06-18 PROCEDURE — 3017F COLORECTAL CA SCREEN DOC REV: CPT | Performed by: NURSE PRACTITIONER

## 2019-06-18 PROCEDURE — 99214 OFFICE O/P EST MOD 30 MIN: CPT | Performed by: NURSE PRACTITIONER

## 2019-06-18 PROCEDURE — G8427 DOCREV CUR MEDS BY ELIG CLIN: HCPCS | Performed by: NURSE PRACTITIONER

## 2019-06-18 RX ORDER — METOPROLOL SUCCINATE 100 MG/1
TABLET, EXTENDED RELEASE ORAL
Qty: 180 TABLET | Refills: 0 | Status: SHIPPED | OUTPATIENT
Start: 2019-06-18 | End: 2019-09-25 | Stop reason: SDUPTHER

## 2019-06-18 RX ORDER — LISINOPRIL 10 MG/1
10 TABLET ORAL DAILY
Qty: 90 TABLET | Refills: 0 | Status: SHIPPED | OUTPATIENT
Start: 2019-06-18 | End: 2019-09-25 | Stop reason: SDUPTHER

## 2019-06-18 ASSESSMENT — ENCOUNTER SYMPTOMS
COUGH: 1
ABDOMINAL PAIN: 0
SHORTNESS OF BREATH: 1
NAUSEA: 0

## 2019-06-18 NOTE — PROGRESS NOTES
Subjective:      Patient ID: El José is a 53 y.o. male. Chronic Disease Visit Information    BP Readings from Last 3 Encounters:   06/18/19 130/88   05/30/19 (!) 146/90   05/02/19 134/89          Hemoglobin A1C (%)   Date Value   05/26/2017 4.7     LDL Cholesterol (mg/dL)   Date Value   05/27/2017 101     HDL (mg/dL)   Date Value   05/27/2017 58     BUN (mg/dL)   Date Value   05/30/2019 15     CREATININE (mg/dL)   Date Value   05/30/2019 0.64 (L)     Glucose (mg/dL)   Date Value   05/30/2019 133 (H)            Have you changed or started any medications since your last visit including any over-the-counter medicines, vitamins, or herbal medicines? no   Are you having any side effects from any of your medications? -  no  Have you stopped taking any of your medications? Is so, why? -  no    Have you seen any other physician or provider since your last visit? Yes - Records Obtained  Have you had any other diagnostic tests since your last visit? Yes - Records Obtained  Have you been seen in the emergency room and/or had an admission to a hospital since we last saw you? Yes - Records Obtained  Have you had your annual diabetic retinal (eye) exam? No  Have you had your routine dental cleaning in the past 6 months? no    Have you activated your [employer] account? If not, what are your barriers?  No:      Patient Care Team:  Kayleen Hampton MD as PCP - General (Family Medicine)         Medical History Review  Past Medical, Family, and Social History reviewed and does contribute to the patient presenting condition    Health Maintenance   Topic Date Due    Hepatitis C screen  1965    Pneumococcal 0-64 years Vaccine (1 of 1 - PPSV23) 01/06/1970    DTaP/Tdap/Td vaccine (1 - Tdap) 01/06/1983    Shingles Vaccine (1 of 2) 01/06/2014    Colon cancer screen colonoscopy  01/06/2014    Flu vaccine (Season Ended) 09/01/2019    Diabetes screen  05/26/2020    Potassium monitoring  05/30/2020    Creatinine level    5. Screening for prostate cancer    6. Screen for colon cancer            Plan:       BP Readings from Last 3 Encounters:   06/18/19 130/88   05/30/19 (!) 146/90   05/02/19 134/89     /88 (Site: Left Upper Arm, Position: Sitting, Cuff Size: Medium Adult)   Pulse 84   Temp 97.6 °F (36.4 °C) (Oral)   Resp 18   Wt 191 lb (86.6 kg)   BMI 25.20 kg/m²   Lab Results   Component Value Date    WBC 9.7 05/30/2019    HGB 13.8 05/30/2019    HCT 40.3 (L) 05/30/2019     05/30/2019    CHOL 176 05/27/2017    TRIG 85 05/27/2017    HDL 58 05/27/2017    ALT 19 05/30/2019    AST 34 05/30/2019     (L) 05/30/2019    K 4.2 05/30/2019     05/30/2019    CREATININE 0.64 (L) 05/30/2019    BUN 15 05/30/2019    CO2 19 (L) 05/30/2019    INR 0.9 05/28/2019    LABA1C 4.7 05/26/2017     Lab Results   Component Value Date    CALCIUM 8.8 05/30/2019    PHOS 4.1 05/26/2017     Lab Results   Component Value Date    LDLCHOLESTEROL 101 05/27/2017         1. Essential hypertension  - stable. Cont current bp therapy  - metoprolol succinate (TOPROL XL) 100 MG extended release tablet; TAKE 1 TABLET BY MOUTH TWICE DAILY  Dispense: 180 tablet; Refill: 0  - lisinopril (PRINIVIL;ZESTRIL) 10 MG tablet; Take 1 tablet by mouth daily Last refill, patient needs to make an appointment  Dispense: 90 tablet; Refill: 0  - Lipid Panel; Future    2. Chronic obstructive pulmonary disease, unspecified COPD type ([facility]  - uncontrolled. Cont albuterol inhaler and add dulera   - mometasone-formoterol (DULERA) 200-5 MCG/ACT inhaler; Inhale 2 puffs into the lungs every 12 hours  Dispense: 13 g; Refill: 2  - Full PFT Study With Bronchodilator; Future  - consider pulmonary referral if symptoms not improving     3. Hyponatremia  -  Repeat Sodium; Future    4. Elevated random blood glucose level  - Hemoglobin A1C; Future  -  Importance of tight blood pressure, glycemic and lipid control was outlined to patient .     5. Screening for prostate cancer  - PSA Screening; Future    6. Screen for colon cancer  - POCT Fecal Immunochemical Test (FIT); Future    Requested Prescriptions     Signed Prescriptions Disp Refills    metoprolol succinate (TOPROL XL) 100 MG extended release tablet 180 tablet 0     Sig: TAKE 1 TABLET BY MOUTH TWICE DAILY    lisinopril (PRINIVIL;ZESTRIL) 10 MG tablet 90 tablet 0     Sig: Take 1 tablet by mouth daily Last refill, patient needs to make an appointment    mometasone-formoterol (DULERA) 200-5 MCG/ACT inhaler 13 g 2     Sig: Inhale 2 puffs into the lungs every 12 hours       Medications Discontinued During This Encounter   Medication Reason    albuterol sulfate HFA (PROAIR HFA) 108 (90 Base) MCG/ACT inhaler DUPLICATE    metoprolol succinate (TOPROL XL) 100 MG extended release tablet REORDER    lisinopril (PRINIVIL;ZESTRIL) 10 MG tablet REORDER       Ryan received counseling on the following healthy behaviors: nutrition, exercise and tobacco cessation  Reviewed prior labs and health maintenance  Continue current medications, diet and exercise. Discussed use, benefit, and side effects of prescribed medications. Barriers to medication compliance addressed. Patient given educational materials - see patient instructions  Was a self-tracking handout given in paper form or via [employer]? Yes    Requested Prescriptions     Signed Prescriptions Disp Refills    metoprolol succinate (TOPROL XL) 100 MG extended release tablet 180 tablet 0     Sig: TAKE 1 TABLET BY MOUTH TWICE DAILY    lisinopril (PRINIVIL;ZESTRIL) 10 MG tablet 90 tablet 0     Sig: Take 1 tablet by mouth daily Last refill, patient needs to make an appointment    mometasone-formoterol (DULERA) 200-5 MCG/ACT inhaler 13 g 2     Sig: Inhale 2 puffs into the lungs every 12 hours       All patient questions answered. Patient voiced understanding. Quality Measures    Body mass index is 25.2 kg/m².  Normal. Weight control planned discussed medically supervised diet with primary care physician and Healthy diet and regular exercise. BP: 130/88 Blood pressure is normal. Treatment plan consists of Increased Physical Activity and No treatment change needed.     Lab Results   Component Value Date    LDLCHOLESTEROL 101 05/27/2017    (goal LDL reduction with dx if diabetes is 50% LDL reduction)      PHQ Scores 3/10/2017   PHQ2 Score 4   PHQ9 Score 17     Interpretation of Total Score Depression Severity: 1-4 = Minimal depression, 5-9 = Mild depression, 10-14 = Moderate depression, 15-19 = Moderately severe depression, 20-27 = Severe depression        Raul Krause, APRN - CNP

## 2019-06-24 NOTE — DISCHARGE SUMMARY
Hospitalist Discharge Summary    Hitesh Dickens  :  1965  MRN:  [id number]    Admit date:  2019  Discharge date:  2019    Admitting Physician:  Alberta Hickman MD    Discharge Diagnoses:   Patient Active Problem List   Diagnosis    Scabies    Cellulitis of b/l lower extremity    Cellulitis of lower extremity    [facility]ism ([facility]    Essential hypertension    Hyperkeratosis of b/l [place]    Alcohol withdrawal ([facility]    Tinea pedis of both feet    Suicidal ideation    Dyspnea    Acute exacerbation of chronic obstructive pulmonary disease (COPD) ([facility]    Gastroesophageal reflux disease without esophagitis        Admission Condition:  fair      Discharged Condition:  fair    Hospital Course/Treatments   Admitted with h/o of ac sob, pt is tobacco abuse pt and pt has h/o of copd, pt is under stress, pt was seen by pulmonologist,pt next day was upset about some thing which we can pin point, pt signed ama    Discharge Medications:       Kb Macias   Home Medication Instructions SRP:[id number]    Printed on:[id number]   Medication Information                      albuterol sulfate HFA (PROVENTIL HFA) 108 (90 Base) MCG/ACT inhaler  Inhale 1-2 puffs into the lungs every 4 hours as needed for Wheezing or Shortness of Breath (Space out to every 6 hours as symptoms improve) Space out to every 6 hours as symptoms improve.              buPROPion (WELLBUTRIN XL) 150 MG extended release tablet  Take 1 tablet by mouth every morning             ziprasidone (GEODON) 20 MG capsule  Take 1 capsule by mouth 2 times daily (with meals)                 Consults:  IP CONSULT TO INTERNAL MEDICINE  IP CONSULT TO PULMONOLOGY    Significant Diagnostic Studies:  Xr Chest Portable    Result Date: 2019  EXAMINATION: ONE XRAY VIEW OF THE CHEST 2019 8:52 pm COMPARISON: May 2, 2019 HISTORY: ORDERING SYSTEM PROVIDED HISTORY: Chest Pain TECHNOLOGIST PROVIDED HISTORY: Chest Pain Ordering Physician Provided Reason for Exam: chest pain Acuity: Acute Type of Exam: Initial Additional signs and symptoms: Cough, congestion, head pressure. Relevant Medical/Surgical History: Cough, congestion, head pressure. FINDINGS: Cardiac monitoring leads overlie the chest.  Heart is not enlarged. No pleural effusion, pneumothorax, or airspace consolidation. No acute disease. Disposition:   home    Discharge Instructions: Activity: activity as tolerated  Diet:  regular diet    Follow up with Charla Vogel MD in 1 weeks.     Signed:  Meryl Vogel  6/24/2019, 5:03 PM    Time spent in discharge of this pt is more than 30 minutes in examination,evaluvation,  counseling and review of medication and discharge plan

## 2019-09-25 DIAGNOSIS — J44.9 CHRONIC OBSTRUCTIVE PULMONARY DISEASE, UNSPECIFIED COPD TYPE (HCC): ICD-10-CM

## 2019-09-25 DIAGNOSIS — I10 ESSENTIAL HYPERTENSION: ICD-10-CM

## 2019-09-25 RX ORDER — LISINOPRIL 10 MG/1
10 TABLET ORAL DAILY
Qty: 90 TABLET | Refills: 0 | Status: SHIPPED | OUTPATIENT
Start: 2019-09-25 | End: 2020-03-12 | Stop reason: SDUPTHER

## 2019-09-25 RX ORDER — METOPROLOL SUCCINATE 100 MG/1
TABLET, EXTENDED RELEASE ORAL
Qty: 180 TABLET | Refills: 0 | Status: SHIPPED | OUTPATIENT
Start: 2019-09-25 | End: 2020-03-12 | Stop reason: SDUPTHER

## 2020-03-12 ENCOUNTER — OFFICE VISIT (OUTPATIENT)
Dept: FAMILY MEDICINE CLINIC | Age: 55
End: 2020-03-12
Payer: COMMERCIAL

## 2020-03-12 VITALS
WEIGHT: 228 LBS | BODY MASS INDEX: 30.08 KG/M2 | SYSTOLIC BLOOD PRESSURE: 110 MMHG | TEMPERATURE: 97.7 F | DIASTOLIC BLOOD PRESSURE: 78 MMHG | RESPIRATION RATE: 18 BRPM | HEART RATE: 112 BPM

## 2020-03-12 LAB
INFLUENZA A ANTIBODY: NEGATIVE
INFLUENZA B ANTIBODY: NEGATIVE

## 2020-03-12 PROCEDURE — 99214 OFFICE O/P EST MOD 30 MIN: CPT | Performed by: NURSE PRACTITIONER

## 2020-03-12 PROCEDURE — G8427 DOCREV CUR MEDS BY ELIG CLIN: HCPCS | Performed by: NURSE PRACTITIONER

## 2020-03-12 PROCEDURE — 3017F COLORECTAL CA SCREEN DOC REV: CPT | Performed by: NURSE PRACTITIONER

## 2020-03-12 PROCEDURE — 4004F PT TOBACCO SCREEN RCVD TLK: CPT | Performed by: NURSE PRACTITIONER

## 2020-03-12 PROCEDURE — G8484 FLU IMMUNIZE NO ADMIN: HCPCS | Performed by: NURSE PRACTITIONER

## 2020-03-12 PROCEDURE — 87804 INFLUENZA ASSAY W/OPTIC: CPT | Performed by: NURSE PRACTITIONER

## 2020-03-12 PROCEDURE — G8926 SPIRO NO PERF OR DOC: HCPCS | Performed by: NURSE PRACTITIONER

## 2020-03-12 PROCEDURE — 3023F SPIROM DOC REV: CPT | Performed by: NURSE PRACTITIONER

## 2020-03-12 PROCEDURE — G8417 CALC BMI ABV UP PARAM F/U: HCPCS | Performed by: NURSE PRACTITIONER

## 2020-03-12 RX ORDER — LISINOPRIL 10 MG/1
10 TABLET ORAL DAILY
Qty: 90 TABLET | Refills: 0 | Status: SHIPPED | OUTPATIENT
Start: 2020-03-12 | End: 2020-05-26 | Stop reason: SDUPTHER

## 2020-03-12 RX ORDER — ALBUTEROL SULFATE 90 UG/1
2 AEROSOL, METERED RESPIRATORY (INHALATION) EVERY 6 HOURS PRN
Qty: 1 INHALER | Refills: 2 | Status: SHIPPED | OUTPATIENT
Start: 2020-03-12 | End: 2020-08-07 | Stop reason: SDUPTHER

## 2020-03-12 RX ORDER — AZITHROMYCIN 250 MG/1
TABLET, FILM COATED ORAL
Qty: 1 PACKET | Refills: 0 | Status: SHIPPED | OUTPATIENT
Start: 2020-03-12 | End: 2020-03-22

## 2020-03-12 RX ORDER — METOPROLOL SUCCINATE 100 MG/1
TABLET, EXTENDED RELEASE ORAL
Qty: 180 TABLET | Refills: 0 | Status: SHIPPED | OUTPATIENT
Start: 2020-03-12 | End: 2020-05-26 | Stop reason: SDUPTHER

## 2020-03-12 NOTE — PROGRESS NOTES
days. Cough is productive with clear sputum. States he has had dyspnea on and off for a while and CT chest last May showed nodular opacity but PFT test ordered last visit was not complete. Pt is a current smoker but has no desire to quit. Denies recent out of state traveling . Review of Systems   Constitutional: Negative for chills and fever. HENT: Positive for congestion, postnasal drip, rhinorrhea and sinus pressure. Respiratory: Positive for cough and shortness of breath. Cardiovascular: Negative for chest pain and palpitations. Gastrointestinal: Negative for abdominal pain, nausea and vomiting. Musculoskeletal: Negative for arthralgias and myalgias. Neurological: Negative for dizziness and headaches. Objective:   Physical Exam  Vitals signs and nursing note reviewed. Constitutional:       General: He is not in acute distress. Appearance: Normal appearance. He is obese. HENT:      Nose: Nose normal.   Eyes:      Conjunctiva/sclera: Conjunctivae normal.   Neck:      Musculoskeletal: Neck supple. Cardiovascular:      Rate and Rhythm: Normal rate and regular rhythm. Pulses: Normal pulses. Heart sounds: Normal heart sounds. Pulmonary:      Effort: Pulmonary effort is normal. No respiratory distress. Comments: Mildly diminished lung sounds  Abdominal:      Palpations: Abdomen is soft. Tenderness: There is no abdominal tenderness. Musculoskeletal:         General: No tenderness. Lymphadenopathy:      Cervical: No cervical adenopathy. Skin:     General: Skin is warm and dry. Findings: No rash. Neurological:      Mental Status: He is alert. Cranial Nerves: No cranial nerve deficit. Psychiatric:         Behavior: Behavior normal.         Assessment:      1. IFG (impaired fasting glucose)    2. uncontrolled Essential hypertension    3. Essential hypertension    4. Flu-like symptoms    5. Dyspnea, unspecified type    6. Lung nodule    7.  Chronic obstructive pulmonary disease, unspecified COPD type ([facility]    8. Screening for prostate cancer    9. Screening cholesterol level            Plan:      BP Readings from Last 3 Encounters:   03/12/20 110/78   06/18/19 130/88   05/30/19 (!) 146/90     /78 (Site: Right Upper Arm, Position: Sitting, Cuff Size: Medium Adult)   Pulse 112   Temp 97.7 °F (36.5 °C) (Oral)   Resp 18   Wt 228 lb (103.4 kg)   BMI 30.08 kg/m²   Lab Results   Component Value Date    WBC 9.7 05/30/2019    HGB 13.8 05/30/2019    HCT 40.3 (L) 05/30/2019     05/30/2019    CHOL 176 05/27/2017    TRIG 85 05/27/2017    HDL 58 05/27/2017    ALT 19 05/30/2019    AST 34 05/30/2019     (L) 05/30/2019    K 4.2 05/30/2019     05/30/2019    CREATININE 0.64 (L) 05/30/2019    BUN 15 05/30/2019    CO2 19 (L) 05/30/2019    INR 0.9 05/28/2019    LABA1C 4.7 05/26/2017     Lab Results   Component Value Date    CALCIUM 8.8 05/30/2019    PHOS 4.1 05/26/2017     Lab Results   Component Value Date    LDLCHOLESTEROL 101 05/27/2017         1. IFG (impaired fasting glucose)  - cont diet measures. - Hemoglobin A1C; Future    2. uncontrolled Essential hypertension/ 3. Essential hypertension  - stable. No therapy change  - metoprolol succinate (TOPROL XL) 100 MG extended release tablet; TAKE 1 TABLET BY MOUTH TWICE DAILY  Dispense: 180 tablet; Refill: 0  - lisinopril (PRINIVIL;ZESTRIL) 10 MG tablet; Take 1 tablet by mouth daily  Dispense: 90 tablet; Refill: 0  - Basic Metabolic Panel; Future    4. Flu-like symptoms  - POCT Influenza A/B negative   - supportive therapy     5. Dyspnea, unspecified type/ 6. Lung nodule  - repeat CT Chest WO Contrast; Future  - CBC Auto Differential; Future    7. Chronic obstructive pulmonary disease, unspecified COPD type (HCC)  - azithromycin (ZITHROMAX) 250 MG tablet; Take 2 tabs on day 1, then 1 tab on days 2-5  Dispense: 1 packet; Refill: 0  - mometasone-formoterol (DULERA) 200-5 MCG/ACT inhaler;  Inhale 2 puffs into the lungs every 12 hours  Dispense: 13 g; Refill: 2  - albuterol sulfate HFA (PROAIR HFA) 108 (90 Base) MCG/ACT inhaler; Inhale 2 puffs into the lungs every 6 hours as needed for Wheezing  Dispense: 1 Inhaler; Refill: 2  - increase fluids and rest     8. Screening for prostate cancer  - PSA Screening; Future    9. Screening cholesterol level  - Lipid Panel; Future  - ALT; Future        Requested Prescriptions     Signed Prescriptions Disp Refills    metoprolol succinate (TOPROL XL) 100 MG extended release tablet 180 tablet 0     Sig: TAKE 1 TABLET BY MOUTH TWICE DAILY    lisinopril (PRINIVIL;ZESTRIL) 10 MG tablet 90 tablet 0     Sig: Take 1 tablet by mouth daily    azithromycin (ZITHROMAX) 250 MG tablet 1 packet 0     Sig: Take 2 tabs on day 1, then 1 tab on days 2-5    mometasone-formoterol (DULERA) 200-5 MCG/ACT inhaler 13 g 2     Sig: Inhale 2 puffs into the lungs every 12 hours    albuterol sulfate HFA (PROAIR HFA) 108 (90 Base) MCG/ACT inhaler 1 Inhaler 2     Sig: Inhale 2 puffs into the lungs every 6 hours as needed for Wheezing       Medications Discontinued During This Encounter   Medication Reason    buPROPion (WELLBUTRIN XL) 150 MG extended release tablet     ziprasidone (GEODON) 20 MG capsule     metoprolol succinate (TOPROL XL) 100 MG extended release tablet REORDER    lisinopril (PRINIVIL;ZESTRIL) 10 MG tablet REORDER    mometasone-formoterol (DULERA) 200-5 MCG/ACT inhaler REORDER       Discussed use, benefit, and side effects of prescribed medications. Barriers to medication compliance addressed. All patient questions answered. Pt voiced understanding. Return in about 1 month (around 4/12/2020) for sob, CT results .

## 2020-03-15 ASSESSMENT — ENCOUNTER SYMPTOMS
COUGH: 1
SHORTNESS OF BREATH: 1
ABDOMINAL PAIN: 0
VOMITING: 0
SINUS PRESSURE: 1
NAUSEA: 0
RHINORRHEA: 1

## 2020-04-17 ENCOUNTER — TELEPHONE (OUTPATIENT)
Dept: FAMILY MEDICINE CLINIC | Age: 55
End: 2020-04-17

## 2020-05-26 RX ORDER — LISINOPRIL 10 MG/1
10 TABLET ORAL DAILY
Qty: 90 TABLET | Refills: 0 | Status: SHIPPED | OUTPATIENT
Start: 2020-05-26 | End: 2020-09-01 | Stop reason: SDUPTHER

## 2020-05-26 RX ORDER — METOPROLOL SUCCINATE 100 MG/1
TABLET, EXTENDED RELEASE ORAL
Qty: 180 TABLET | Refills: 0 | Status: SHIPPED | OUTPATIENT
Start: 2020-05-26 | End: 2020-09-01 | Stop reason: SDUPTHER

## 2020-08-07 RX ORDER — ALBUTEROL SULFATE 90 UG/1
2 AEROSOL, METERED RESPIRATORY (INHALATION) EVERY 6 HOURS PRN
Qty: 1 INHALER | Refills: 2 | Status: SHIPPED | OUTPATIENT
Start: 2020-08-07 | End: 2021-01-04 | Stop reason: SDUPTHER

## 2020-09-01 RX ORDER — LISINOPRIL 10 MG/1
10 TABLET ORAL DAILY
Qty: 90 TABLET | Refills: 0 | Status: SHIPPED | OUTPATIENT
Start: 2020-09-01 | End: 2020-12-03 | Stop reason: SDUPTHER

## 2020-09-01 RX ORDER — METOPROLOL SUCCINATE 100 MG/1
TABLET, EXTENDED RELEASE ORAL
Qty: 180 TABLET | Refills: 0 | Status: SHIPPED | OUTPATIENT
Start: 2020-09-01 | End: 2020-12-03 | Stop reason: SDUPTHER

## 2020-10-28 ENCOUNTER — APPOINTMENT (OUTPATIENT)
Dept: CT IMAGING | Age: 55
End: 2020-10-28
Payer: COMMERCIAL

## 2020-10-28 ENCOUNTER — TELEPHONE (OUTPATIENT)
Dept: FAMILY MEDICINE CLINIC | Age: 55
End: 2020-10-28

## 2020-10-28 ENCOUNTER — APPOINTMENT (OUTPATIENT)
Dept: GENERAL RADIOLOGY | Age: 55
End: 2020-10-28
Payer: COMMERCIAL

## 2020-10-28 ENCOUNTER — HOSPITAL ENCOUNTER (OUTPATIENT)
Age: 55
Setting detail: OBSERVATION
Discharge: HOME | End: 2020-10-30
Attending: STUDENT IN AN ORGANIZED HEALTH CARE EDUCATION/TRAINING PROGRAM | Admitting: INTERNAL MEDICINE
Payer: COMMERCIAL

## 2020-10-28 PROBLEM — R00.0 TACHYCARDIA: Status: ACTIVE | Noted: 2020-10-28

## 2020-10-28 LAB
ABSOLUTE EOS #: 0 K/UL (ref 0–0.4)
ABSOLUTE IMMATURE GRANULOCYTE: ABNORMAL K/UL (ref 0–0.3)
ABSOLUTE LYMPH #: 0.9 K/UL (ref 1–4.8)
ABSOLUTE MONO #: 0.7 K/UL (ref 0.1–1.3)
ALBUMIN SERPL-MCNC: 3.9 G/DL (ref 3.5–5.2)
ALBUMIN/GLOBULIN RATIO: ABNORMAL (ref 1–2.5)
ALP BLD-CCNC: 90 U/L (ref 40–129)
ALT SERPL-CCNC: 18 U/L (ref 5–41)
ANION GAP SERPL CALCULATED.3IONS-SCNC: 16 MMOL/L (ref 9–17)
AST SERPL-CCNC: 32 U/L
BASOPHILS # BLD: 1 % (ref 0–2)
BASOPHILS ABSOLUTE: 0.1 K/UL (ref 0–0.2)
BILIRUB SERPL-MCNC: 1.41 MG/DL (ref 0.3–1.2)
BILIRUBIN URINE: NEGATIVE
BNP INTERPRETATION: NORMAL
BUN BLDV-MCNC: 7 MG/DL (ref 6–20)
BUN/CREAT BLD: ABNORMAL (ref 9–20)
C-REACTIVE PROTEIN: 68.3 MG/L (ref 0–5)
CALCIUM SERPL-MCNC: 9.3 MG/DL (ref 8.6–10.4)
CHLORIDE BLD-SCNC: 90 MMOL/L (ref 98–107)
CO2: 22 MMOL/L (ref 20–31)
COLOR: YELLOW
COMMENT UA: NORMAL
CREAT SERPL-MCNC: 0.46 MG/DL (ref 0.7–1.2)
D-DIMER QUANTITATIVE: 1.32 MG/L FEU (ref 0–0.59)
DIFFERENTIAL TYPE: ABNORMAL
EOSINOPHILS RELATIVE PERCENT: 0 % (ref 0–4)
GFR AFRICAN AMERICAN: >60 ML/MIN
GFR NON-AFRICAN AMERICAN: >60 ML/MIN
GFR SERPL CREATININE-BSD FRML MDRD: ABNORMAL ML/MIN/{1.73_M2}
GFR SERPL CREATININE-BSD FRML MDRD: ABNORMAL ML/MIN/{1.73_M2}
GLUCOSE BLD-MCNC: 100 MG/DL (ref 70–99)
GLUCOSE URINE: NEGATIVE
HCT VFR BLD CALC: 43.5 % (ref 41–53)
HEMOGLOBIN: 15.4 G/DL (ref 13.5–17.5)
IMMATURE GRANULOCYTES: ABNORMAL %
KETONES, URINE: NEGATIVE
LACTIC ACID, WHOLE BLOOD: NORMAL MMOL/L (ref 0.7–2.1)
LACTIC ACID: 1.5 MMOL/L (ref 0.5–2.2)
LEUKOCYTE ESTERASE, URINE: NEGATIVE
LYMPHOCYTES # BLD: 7 % (ref 24–44)
MCH RBC QN AUTO: 38.8 PG (ref 26–34)
MCHC RBC AUTO-ENTMCNC: 35.3 G/DL (ref 31–37)
MCV RBC AUTO: 109.9 FL (ref 80–100)
MONOCYTES # BLD: 6 % (ref 1–7)
NITRITE, URINE: NEGATIVE
NRBC AUTOMATED: ABNORMAL PER 100 WBC
PDW BLD-RTO: 13.1 % (ref 11.5–14.9)
PH UA: 6.5 (ref 5–8)
PLATELET # BLD: 198 K/UL (ref 150–450)
PLATELET ESTIMATE: ABNORMAL
PMV BLD AUTO: 8.6 FL (ref 6–12)
POTASSIUM SERPL-SCNC: 4.2 MMOL/L (ref 3.7–5.3)
PRO-BNP: 155 PG/ML
PROTEIN UA: NEGATIVE
RBC # BLD: 3.96 M/UL (ref 4.5–5.9)
RBC # BLD: ABNORMAL 10*6/UL
SARS-COV-2, RAPID: NOT DETECTED
SARS-COV-2: NORMAL
SARS-COV-2: NORMAL
SEDIMENTATION RATE, ERYTHROCYTE: 52 MM (ref 0–20)
SEG NEUTROPHILS: 86 % (ref 36–66)
SEGMENTED NEUTROPHILS ABSOLUTE COUNT: 11.1 K/UL (ref 1.3–9.1)
SODIUM BLD-SCNC: 128 MMOL/L (ref 135–144)
SOURCE: NORMAL
SPECIFIC GRAVITY UA: 1.02 (ref 1–1.03)
TOTAL PROTEIN: 7.9 G/DL (ref 6.4–8.3)
TROPONIN INTERP: NORMAL
TROPONIN T: NORMAL NG/ML
TROPONIN, HIGH SENSITIVITY: 8 NG/L (ref 0–22)
TURBIDITY: CLEAR
URINE HGB: NEGATIVE
UROBILINOGEN, URINE: NORMAL
WBC # BLD: 12.8 K/UL (ref 3.5–11)
WBC # BLD: ABNORMAL 10*3/UL

## 2020-10-28 PROCEDURE — 6360000004 HC RX CONTRAST MEDICATION: Performed by: STUDENT IN AN ORGANIZED HEALTH CARE EDUCATION/TRAINING PROGRAM

## 2020-10-28 PROCEDURE — 85025 COMPLETE CBC W/AUTO DIFF WBC: CPT

## 2020-10-28 PROCEDURE — 2580000003 HC RX 258: Performed by: STUDENT IN AN ORGANIZED HEALTH CARE EDUCATION/TRAINING PROGRAM

## 2020-10-28 PROCEDURE — G0378 HOSPITAL OBSERVATION PER HR: HCPCS

## 2020-10-28 PROCEDURE — 85379 FIBRIN DEGRADATION QUANT: CPT

## 2020-10-28 PROCEDURE — 96374 THER/PROPH/DIAG INJ IV PUSH: CPT

## 2020-10-28 PROCEDURE — 36415 COLL VENOUS BLD VENIPUNCTURE: CPT

## 2020-10-28 PROCEDURE — 99285 EMERGENCY DEPT VISIT HI MDM: CPT

## 2020-10-28 PROCEDURE — 86140 C-REACTIVE PROTEIN: CPT

## 2020-10-28 PROCEDURE — 81003 URINALYSIS AUTO W/O SCOPE: CPT

## 2020-10-28 PROCEDURE — 6370000000 HC RX 637 (ALT 250 FOR IP): Performed by: STUDENT IN AN ORGANIZED HEALTH CARE EDUCATION/TRAINING PROGRAM

## 2020-10-28 PROCEDURE — 80053 COMPREHEN METABOLIC PANEL: CPT

## 2020-10-28 PROCEDURE — 83880 ASSAY OF NATRIURETIC PEPTIDE: CPT

## 2020-10-28 PROCEDURE — 96360 HYDRATION IV INFUSION INIT: CPT

## 2020-10-28 PROCEDURE — U0002 COVID-19 LAB TEST NON-CDC: HCPCS

## 2020-10-28 PROCEDURE — 83605 ASSAY OF LACTIC ACID: CPT

## 2020-10-28 PROCEDURE — 96361 HYDRATE IV INFUSION ADD-ON: CPT

## 2020-10-28 PROCEDURE — 71260 CT THORAX DX C+: CPT

## 2020-10-28 PROCEDURE — 85652 RBC SED RATE AUTOMATED: CPT

## 2020-10-28 PROCEDURE — 6360000002 HC RX W HCPCS: Performed by: STUDENT IN AN ORGANIZED HEALTH CARE EDUCATION/TRAINING PROGRAM

## 2020-10-28 PROCEDURE — 84484 ASSAY OF TROPONIN QUANT: CPT

## 2020-10-28 PROCEDURE — 71045 X-RAY EXAM CHEST 1 VIEW: CPT

## 2020-10-28 RX ORDER — ACETAMINOPHEN 325 MG/1
650 TABLET ORAL EVERY 4 HOURS PRN
Status: DISCONTINUED | OUTPATIENT
Start: 2020-10-28 | End: 2020-10-30 | Stop reason: HOSPADM

## 2020-10-28 RX ORDER — SODIUM CHLORIDE 0.9 % (FLUSH) 0.9 %
10 SYRINGE (ML) INJECTION EVERY 12 HOURS SCHEDULED
Status: DISCONTINUED | OUTPATIENT
Start: 2020-10-28 | End: 2020-10-30 | Stop reason: HOSPADM

## 2020-10-28 RX ORDER — SODIUM CHLORIDE 0.9 % (FLUSH) 0.9 %
10 SYRINGE (ML) INJECTION PRN
Status: DISCONTINUED | OUTPATIENT
Start: 2020-10-28 | End: 2020-10-30 | Stop reason: HOSPADM

## 2020-10-28 RX ORDER — 0.9 % SODIUM CHLORIDE 0.9 %
1000 INTRAVENOUS SOLUTION INTRAVENOUS ONCE
Status: COMPLETED | OUTPATIENT
Start: 2020-10-28 | End: 2020-10-28

## 2020-10-28 RX ORDER — MORPHINE SULFATE 4 MG/ML
4 INJECTION, SOLUTION INTRAMUSCULAR; INTRAVENOUS ONCE
Status: COMPLETED | OUTPATIENT
Start: 2020-10-28 | End: 2020-10-28

## 2020-10-28 RX ORDER — METOPROLOL SUCCINATE 100 MG/1
100 TABLET, EXTENDED RELEASE ORAL DAILY
Status: DISCONTINUED | OUTPATIENT
Start: 2020-10-29 | End: 2020-10-29

## 2020-10-28 RX ORDER — ACETAMINOPHEN 325 MG/1
650 TABLET ORAL ONCE
Status: COMPLETED | OUTPATIENT
Start: 2020-10-28 | End: 2020-10-28

## 2020-10-28 RX ORDER — LISINOPRIL 10 MG/1
10 TABLET ORAL DAILY
Status: DISCONTINUED | OUTPATIENT
Start: 2020-10-29 | End: 2020-10-30 | Stop reason: HOSPADM

## 2020-10-28 RX ORDER — IBUPROFEN 200 MG
400 TABLET ORAL ONCE
Status: COMPLETED | OUTPATIENT
Start: 2020-10-28 | End: 2020-10-28

## 2020-10-28 RX ORDER — 0.9 % SODIUM CHLORIDE 0.9 %
80 INTRAVENOUS SOLUTION INTRAVENOUS ONCE
Status: COMPLETED | OUTPATIENT
Start: 2020-10-28 | End: 2020-10-28

## 2020-10-28 RX ORDER — DEXAMETHASONE SODIUM PHOSPHATE 10 MG/ML
6 INJECTION, SOLUTION INTRAMUSCULAR; INTRAVENOUS EVERY 24 HOURS
Status: DISCONTINUED | OUTPATIENT
Start: 2020-10-29 | End: 2020-10-30 | Stop reason: HOSPADM

## 2020-10-28 RX ORDER — SODIUM CHLORIDE 9 MG/ML
1000 INJECTION, SOLUTION INTRAVENOUS CONTINUOUS
Status: DISCONTINUED | OUTPATIENT
Start: 2020-10-28 | End: 2020-10-29

## 2020-10-28 RX ADMIN — IBUPROFEN 400 MG: 200 TABLET, FILM COATED ORAL at 17:54

## 2020-10-28 RX ADMIN — Medication 10 ML: at 15:29

## 2020-10-28 RX ADMIN — SODIUM CHLORIDE 1000 ML: 9 INJECTION, SOLUTION INTRAVENOUS at 14:04

## 2020-10-28 RX ADMIN — IOVERSOL 75 ML: 741 INJECTION INTRA-ARTERIAL; INTRAVENOUS at 15:29

## 2020-10-28 RX ADMIN — MORPHINE SULFATE 4 MG: 4 INJECTION, SOLUTION INTRAMUSCULAR; INTRAVENOUS at 19:56

## 2020-10-28 RX ADMIN — SODIUM CHLORIDE 1000 ML: 9 INJECTION, SOLUTION INTRAVENOUS at 17:55

## 2020-10-28 RX ADMIN — SODIUM CHLORIDE 80 ML: 9 INJECTION, SOLUTION INTRAVENOUS at 15:29

## 2020-10-28 RX ADMIN — ACETAMINOPHEN 650 MG: 325 TABLET, FILM COATED ORAL at 16:58

## 2020-10-28 RX ADMIN — SODIUM CHLORIDE 1000 ML: 9 INJECTION, SOLUTION INTRAVENOUS at 16:57

## 2020-10-28 ASSESSMENT — ENCOUNTER SYMPTOMS
EYE PAIN: 0
SORE THROAT: 0
COUGH: 1
DIARRHEA: 0
COLOR CHANGE: 0
SHORTNESS OF BREATH: 1
NAUSEA: 0
VOMITING: 0
EYE REDNESS: 0
FACIAL SWELLING: 0
RHINORRHEA: 1
BACK PAIN: 0
ABDOMINAL PAIN: 0

## 2020-10-28 ASSESSMENT — PAIN SCALES - GENERAL
PAINLEVEL_OUTOF10: 8
PAINLEVEL_OUTOF10: 8
PAINLEVEL_OUTOF10: 4
PAINLEVEL_OUTOF10: 6
PAINLEVEL_OUTOF10: 10

## 2020-10-28 ASSESSMENT — PAIN DESCRIPTION - LOCATION: LOCATION: THROAT

## 2020-10-28 ASSESSMENT — PAIN DESCRIPTION - PAIN TYPE: TYPE: ACUTE PAIN

## 2020-10-28 NOTE — ED NOTES
Report given to JAYLEN Taylor from ER. Report method in person   The following was reviewed with receiving RN:   Current vital signs:  /84   Pulse 119   Temp 99.7 °F (37.6 °C) (Oral)   Resp 23   Ht 6' 1\" (1.854 m)   Wt 210 lb (95.3 kg)   SpO2 98%   BMI 27.71 kg/m²                MEWS Score: 4     Any medication or safety alerts were reviewed. Any pending diagnostics and notifications were also reviewed, as well as any safety concerns or issues, abnormal labs, abnormal imaging, and abnormal assessment findings. Questions were answered.           Israel Bronson RN  10/28/20 5725

## 2020-10-28 NOTE — ED PROVIDER NOTES
hypertension I10    Hyperkeratosis of b/l Soles Q82.8    Alcohol withdrawal ([facility]) F10.239    Tinea pedis of both feet B35.3    Suicidal ideation R45.851    Dyspnea R06.00    Acute exacerbation of chronic obstructive pulmonary disease (COPD) ([facility]) J44.1    Gastroesophageal reflux disease without esophagitis K21.9    Cigarette smoker F17.210    Avascular necrosis of femoral head ([facility]) M87.059    Tachycardia R00.0     SURGICAL HISTORY       Past Surgical History:   Procedure Laterality Date    NECK SURGERY      TOTAL HIP ARTHROPLASTY Bilateral      CURRENT MEDICATIONS       Previous Medications    ALBUTEROL SULFATE HFA (PROAIR HFA) 108 (90 BASE) MCG/ACT INHALER    Inhale 2 puffs into the lungs every 6 hours as needed for Wheezing    LISINOPRIL (PRINIVIL;ZESTRIL) 10 MG TABLET    Take 1 tablet by mouth daily    METOPROLOL SUCCINATE (TOPROL XL) 100 MG EXTENDED RELEASE TABLET    TAKE 1 TABLET BY MOUTH TWICE DAILY    MOMETASONE-FORMOTEROL (DULERA) 200-5 MCG/ACT INHALER    Inhale 2 puffs into the lungs every 12 hours     ALLERGIES     has No Known Allergies. FAMILY HISTORY     He indicated that his mother is alive. He indicated that his father is . SOCIAL HISTORY       Social History     Tobacco Use    Smoking status: Current Every Day Smoker     Packs/day: 1.00     Types: Cigarettes    Smokeless tobacco: Never Used    Tobacco comment: pt educated on both alcohol and smoking cessation   Substance Use Topics    Alcohol use: Yes     Alcohol/week: 85.0 standard drinks     Types: 84 Cans of beer, 1 Shots of liquor per week     Comment: states he drinks 5 beers per day    Drug use: No     Comment: denies drug use     PHYSICAL EXAM     INITIAL VITALS: /74   Pulse 116   Temp 99.7 °F (37.6 °C) (Oral)   Resp 21   Ht 6' 1\" (1.854 m)   Wt 210 lb (95.3 kg)   SpO2 96%   BMI 27.71 kg/m²    Physical Exam  Constitutional:       Appearance: Normal appearance.    HENT:      Head: Normocephalic and atraumatic. Nose: Nose normal.   Eyes:      Extraocular Movements: Extraocular movements intact. Pupils: Pupils are equal, round, and reactive to light. Neck:      Musculoskeletal: Normal range of motion. No neck rigidity. Cardiovascular:      Rate and Rhythm: Regular rhythm. Tachycardia present. Pulmonary:      Effort: Pulmonary effort is normal. No respiratory distress. Breath sounds: Normal breath sounds. Abdominal:      General: Abdomen is flat. There is no distension. Palpations: Abdomen is soft. There is no mass. Musculoskeletal: Normal range of motion. General: No swelling. Skin:     General: Skin is warm and dry. Neurological:      General: No focal deficit present. Mental Status: He is alert. Mental status is at baseline. Cranial Nerves: No cranial nerve deficit. MEDICAL DECISION MAKIN-year-old male presents with cough shortness of breath chills and tachycardia. Will work-up for infectious etiologies including pneumonia. Will obtain basic blood counts and electrolytes to assess for renal dysfunction. Will get cardiac enzymes to evaluate for myocardial stress. Labs are unremarkable. Mild hyponatremia. Elevated inflammatory markers ESR and CRP are up as well as white count. Chest x-ray and urine were normal.  D-dimer was positive prompting CT PE this was negative. Patient was significantly tachycardic in the 140s on my initial evaluation. After several liters of IV fluid this had improved but patient was still having significant tachycardia with rates in the 120s. EKG showed sinus tachycardia. No concern for underlying atrial dysrhythmia. Suspect patient is just dehydrated in this phase of his acute viral illness. Coronavirus swab was sent off and was negative. Spoke with patient's primary care regarding admission will place in observation for continuous IV hydration overnight.        CRITICAL CARE: - Abnormal; Notable for the following components:    D-Dimer, Quant 1.32 (*)     All other components within normal limits   C-REACTIVE PROTEIN - Abnormal; Notable for the following components:    CRP 68.3 (*)     All other components within normal limits   SEDIMENTATION RATE - Abnormal; Notable for the following components:    Sed Rate 52 (*)     All other components within normal limits   TROPONIN   BRAIN NATRIURETIC PEPTIDE   LACTIC ACID, PLASMA   URINALYSIS   COVID-19       EMERGENCY DEPARTMENTCOURSE:         Vitals:    Vitals:    10/28/20 1912 10/28/20 1930 10/28/20 2015 10/28/20 2045   BP:  118/63 117/73 137/74   Pulse:    116   Resp:    21   Temp: 99.7 °F (37.6 °C)      TempSrc: Oral      SpO2:  96% 95% 96%   Weight:       Height:           The patient was given the following medications while in the emergency department:  Orders Placed This Encounter   Medications    0.9 % sodium chloride IV bolus 1,000 mL    0.9 % sodium chloride IV bolus 1,000 mL    acetaminophen (TYLENOL) tablet 650 mg    0.9 % sodium chloride bolus    sodium chloride flush 0.9 % injection 10 mL    ioversol (OPTIRAY) 74 % injection 75 mL    ibuprofen (ADVIL;MOTRIN) tablet 400 mg    0.9 % sodium chloride infusion    morphine sulfate (PF) injection 4 mg     CONSULTS:  IP CONSULT TO INTERNAL MEDICINE    FINAL IMPRESSION      1. Cough    2. Tachycardia    3. Dehydration          DISPOSITION/PLAN   DISPOSITION Admitted 10/28/2020 05:56:10 PM      PATIENT REFERRED TO:  No follow-up provider specified.   DISCHARGE MEDICATIONS:  New Prescriptions    No medications on file     Mindy Ventura MD  Attending Emergency Physician                    Mindy Ventura MD  10/28/20 3543

## 2020-10-29 LAB
-: NORMAL
ABSOLUTE EOS #: 0 K/UL (ref 0–0.4)
ABSOLUTE IMMATURE GRANULOCYTE: ABNORMAL K/UL (ref 0–0.3)
ABSOLUTE LYMPH #: 0.33 K/UL (ref 1–4.8)
ABSOLUTE MONO #: 0.16 K/UL (ref 0.1–1.3)
ANION GAP SERPL CALCULATED.3IONS-SCNC: 13 MMOL/L (ref 9–17)
BASOPHILS # BLD: 0 % (ref 0–2)
BASOPHILS ABSOLUTE: 0 K/UL (ref 0–0.2)
BUN BLDV-MCNC: 7 MG/DL (ref 6–20)
BUN/CREAT BLD: ABNORMAL (ref 9–20)
CALCIUM SERPL-MCNC: 8.8 MG/DL (ref 8.6–10.4)
CHLORIDE BLD-SCNC: 99 MMOL/L (ref 98–107)
CO2: 18 MMOL/L (ref 20–31)
CORTISOL COLLECTION INFO: NORMAL
CORTISOL: 3 UG/DL (ref 2.7–18.4)
CREAT SERPL-MCNC: 0.41 MG/DL (ref 0.7–1.2)
DIFFERENTIAL TYPE: ABNORMAL
EOSINOPHILS RELATIVE PERCENT: 0 % (ref 0–4)
GFR AFRICAN AMERICAN: >60 ML/MIN
GFR NON-AFRICAN AMERICAN: >60 ML/MIN
GFR SERPL CREATININE-BSD FRML MDRD: ABNORMAL ML/MIN/{1.73_M2}
GFR SERPL CREATININE-BSD FRML MDRD: ABNORMAL ML/MIN/{1.73_M2}
GLUCOSE BLD-MCNC: 131 MG/DL (ref 70–99)
HCT VFR BLD CALC: 39.6 % (ref 41–53)
HEMOGLOBIN: 13.9 G/DL (ref 13.5–17.5)
IMMATURE GRANULOCYTES: ABNORMAL %
LYMPHOCYTES # BLD: 4 % (ref 24–44)
MCH RBC QN AUTO: 39 PG (ref 26–34)
MCHC RBC AUTO-ENTMCNC: 35 G/DL (ref 31–37)
MCV RBC AUTO: 111.5 FL (ref 80–100)
MONOCYTES # BLD: 2 % (ref 1–7)
MORPHOLOGY: ABNORMAL
NRBC AUTOMATED: ABNORMAL PER 100 WBC
PATHOLOGIST REVIEW: NORMAL
PDW BLD-RTO: 13.1 % (ref 11.5–14.9)
PLATELET # BLD: 178 K/UL (ref 150–450)
PLATELET ESTIMATE: ABNORMAL
PMV BLD AUTO: 8.3 FL (ref 6–12)
POTASSIUM SERPL-SCNC: 4.4 MMOL/L (ref 3.7–5.3)
PROCALCITONIN: 1.58 NG/ML
RBC # BLD: 3.56 M/UL (ref 4.5–5.9)
RBC # BLD: ABNORMAL 10*6/UL
REASON FOR REJECTION: NORMAL
SEG NEUTROPHILS: 94 % (ref 36–66)
SEGMENTED NEUTROPHILS ABSOLUTE COUNT: 7.71 K/UL (ref 1.3–9.1)
SODIUM BLD-SCNC: 130 MMOL/L (ref 135–144)
SODIUM BLD-SCNC: 130 MMOL/L (ref 135–144)
TROPONIN INTERP: NORMAL
TROPONIN T: NORMAL NG/ML
TROPONIN, HIGH SENSITIVITY: 7 NG/L (ref 0–22)
TSH SERPL DL<=0.05 MIU/L-ACNC: 1.14 MIU/L (ref 0.3–5)
WBC # BLD: 8.2 K/UL (ref 3.5–11)
WBC # BLD: ABNORMAL 10*3/UL
ZZ NTE CLEAN UP: ORDERED TEST: NORMAL
ZZ NTE WITH NAME CLEAN UP: SPECIMEN SOURCE: NORMAL

## 2020-10-29 PROCEDURE — 85025 COMPLETE CBC W/AUTO DIFF WBC: CPT

## 2020-10-29 PROCEDURE — 84145 PROCALCITONIN (PCT): CPT

## 2020-10-29 PROCEDURE — 80048 BASIC METABOLIC PNL TOTAL CA: CPT

## 2020-10-29 PROCEDURE — 84300 ASSAY OF URINE SODIUM: CPT

## 2020-10-29 PROCEDURE — G0378 HOSPITAL OBSERVATION PER HR: HCPCS

## 2020-10-29 PROCEDURE — 2580000003 HC RX 258: Performed by: INTERNAL MEDICINE

## 2020-10-29 PROCEDURE — 84484 ASSAY OF TROPONIN QUANT: CPT

## 2020-10-29 PROCEDURE — 6370000000 HC RX 637 (ALT 250 FOR IP): Performed by: INTERNAL MEDICINE

## 2020-10-29 PROCEDURE — 2580000003 HC RX 258: Performed by: STUDENT IN AN ORGANIZED HEALTH CARE EDUCATION/TRAINING PROGRAM

## 2020-10-29 PROCEDURE — 82533 TOTAL CORTISOL: CPT

## 2020-10-29 PROCEDURE — 84443 ASSAY THYROID STIM HORMONE: CPT

## 2020-10-29 PROCEDURE — 93005 ELECTROCARDIOGRAM TRACING: CPT | Performed by: INTERNAL MEDICINE

## 2020-10-29 PROCEDURE — 84295 ASSAY OF SERUM SODIUM: CPT

## 2020-10-29 PROCEDURE — 83935 ASSAY OF URINE OSMOLALITY: CPT

## 2020-10-29 PROCEDURE — 96376 TX/PRO/DX INJ SAME DRUG ADON: CPT

## 2020-10-29 PROCEDURE — 6360000002 HC RX W HCPCS: Performed by: INTERNAL MEDICINE

## 2020-10-29 PROCEDURE — 36415 COLL VENOUS BLD VENIPUNCTURE: CPT

## 2020-10-29 PROCEDURE — 96375 TX/PRO/DX INJ NEW DRUG ADDON: CPT

## 2020-10-29 RX ORDER — ALBUTEROL SULFATE 90 UG/1
2 AEROSOL, METERED RESPIRATORY (INHALATION) EVERY 6 HOURS PRN
Status: DISCONTINUED | OUTPATIENT
Start: 2020-10-29 | End: 2020-10-30 | Stop reason: HOSPADM

## 2020-10-29 RX ORDER — METOPROLOL SUCCINATE 100 MG/1
100 TABLET, EXTENDED RELEASE ORAL 2 TIMES DAILY
Status: DISCONTINUED | OUTPATIENT
Start: 2020-10-29 | End: 2020-10-30 | Stop reason: HOSPADM

## 2020-10-29 RX ORDER — BUDESONIDE AND FORMOTEROL FUMARATE DIHYDRATE 160; 4.5 UG/1; UG/1
2 AEROSOL RESPIRATORY (INHALATION) 2 TIMES DAILY
Status: DISCONTINUED | OUTPATIENT
Start: 2020-10-29 | End: 2020-10-30 | Stop reason: HOSPADM

## 2020-10-29 RX ORDER — HYDROCODONE BITARTRATE AND ACETAMINOPHEN 5; 325 MG/1; MG/1
1 TABLET ORAL EVERY 6 HOURS PRN
Status: DISCONTINUED | OUTPATIENT
Start: 2020-10-29 | End: 2020-10-30 | Stop reason: HOSPADM

## 2020-10-29 RX ORDER — METOPROLOL TARTRATE 5 MG/5ML
2.5 INJECTION INTRAVENOUS EVERY 6 HOURS PRN
Status: DISCONTINUED | OUTPATIENT
Start: 2020-10-29 | End: 2020-10-30 | Stop reason: HOSPADM

## 2020-10-29 RX ORDER — NICOTINE 21 MG/24HR
1 PATCH, TRANSDERMAL 24 HOURS TRANSDERMAL DAILY
Status: DISCONTINUED | OUTPATIENT
Start: 2020-10-29 | End: 2020-10-30 | Stop reason: HOSPADM

## 2020-10-29 RX ORDER — SODIUM CHLORIDE 9 MG/ML
INJECTION, SOLUTION INTRAVENOUS CONTINUOUS
Status: DISCONTINUED | OUTPATIENT
Start: 2020-10-29 | End: 2020-10-30 | Stop reason: HOSPADM

## 2020-10-29 RX ORDER — ZOLPIDEM TARTRATE 10 MG/1
10 TABLET ORAL NIGHTLY PRN
Status: DISCONTINUED | OUTPATIENT
Start: 2020-10-29 | End: 2020-10-29

## 2020-10-29 RX ORDER — ALBUTEROL SULFATE 90 UG/1
2 AEROSOL, METERED RESPIRATORY (INHALATION) EVERY 6 HOURS PRN
Status: DISCONTINUED | OUTPATIENT
Start: 2020-10-29 | End: 2020-10-29 | Stop reason: CLARIF

## 2020-10-29 RX ORDER — ZOLPIDEM TARTRATE 10 MG/1
10 TABLET ORAL NIGHTLY PRN
Status: DISCONTINUED | OUTPATIENT
Start: 2020-10-29 | End: 2020-10-30 | Stop reason: HOSPADM

## 2020-10-29 RX ADMIN — DEXAMETHASONE SODIUM PHOSPHATE 6 MG: 10 INJECTION, SOLUTION INTRAMUSCULAR; INTRAVENOUS at 22:58

## 2020-10-29 RX ADMIN — Medication 10 ML: at 22:58

## 2020-10-29 RX ADMIN — SODIUM CHLORIDE 1000 ML: 9 INJECTION, SOLUTION INTRAVENOUS at 01:37

## 2020-10-29 RX ADMIN — SODIUM CHLORIDE: 9 INJECTION, SOLUTION INTRAVENOUS at 17:06

## 2020-10-29 RX ADMIN — APIXABAN 5 MG: 5 TABLET, FILM COATED ORAL at 01:30

## 2020-10-29 RX ADMIN — Medication 10 ML: at 01:29

## 2020-10-29 RX ADMIN — APIXABAN 5 MG: 5 TABLET, FILM COATED ORAL at 07:33

## 2020-10-29 RX ADMIN — METOPROLOL SUCCINATE 100 MG: 100 TABLET, EXTENDED RELEASE ORAL at 07:33

## 2020-10-29 RX ADMIN — APIXABAN 5 MG: 5 TABLET, FILM COATED ORAL at 22:48

## 2020-10-29 RX ADMIN — BUDESONIDE AND FORMOTEROL FUMARATE DIHYDRATE 2 PUFF: 160; 4.5 AEROSOL RESPIRATORY (INHALATION) at 22:58

## 2020-10-29 RX ADMIN — Medication 10 ML: at 07:37

## 2020-10-29 RX ADMIN — HYDROCODONE BITARTRATE AND ACETAMINOPHEN 1 TABLET: 5; 325 TABLET ORAL at 13:51

## 2020-10-29 RX ADMIN — SODIUM CHLORIDE: 9 INJECTION, SOLUTION INTRAVENOUS at 10:38

## 2020-10-29 RX ADMIN — ZOLPIDEM TARTRATE 10 MG: 10 TABLET ORAL at 22:55

## 2020-10-29 RX ADMIN — DEXAMETHASONE SODIUM PHOSPHATE 6 MG: 10 INJECTION, SOLUTION INTRAMUSCULAR; INTRAVENOUS at 01:30

## 2020-10-29 RX ADMIN — HYDROCODONE BITARTRATE AND ACETAMINOPHEN 1 TABLET: 5; 325 TABLET ORAL at 22:54

## 2020-10-29 RX ADMIN — METOPROLOL SUCCINATE 100 MG: 100 TABLET, EXTENDED RELEASE ORAL at 22:58

## 2020-10-29 RX ADMIN — ALBUTEROL SULFATE 2 PUFF: 90 AEROSOL, METERED RESPIRATORY (INHALATION) at 14:04

## 2020-10-29 RX ADMIN — ZOLPIDEM TARTRATE 10 MG: 10 TABLET ORAL at 01:30

## 2020-10-29 RX ADMIN — LISINOPRIL 10 MG: 10 TABLET ORAL at 07:33

## 2020-10-29 ASSESSMENT — PAIN DESCRIPTION - FREQUENCY
FREQUENCY: CONTINUOUS
FREQUENCY: INTERMITTENT
FREQUENCY: INTERMITTENT

## 2020-10-29 ASSESSMENT — PAIN SCALES - GENERAL
PAINLEVEL_OUTOF10: 0
PAINLEVEL_OUTOF10: 7
PAINLEVEL_OUTOF10: 5
PAINLEVEL_OUTOF10: 0
PAINLEVEL_OUTOF10: 3
PAINLEVEL_OUTOF10: 5
PAINLEVEL_OUTOF10: 0
PAINLEVEL_OUTOF10: 5

## 2020-10-29 ASSESSMENT — PAIN DESCRIPTION - PAIN TYPE
TYPE: ACUTE PAIN

## 2020-10-29 ASSESSMENT — PAIN DESCRIPTION - LOCATION
LOCATION: HEAD;THROAT

## 2020-10-29 ASSESSMENT — PAIN DESCRIPTION - DESCRIPTORS
DESCRIPTORS: ACHING

## 2020-10-29 NOTE — PROGRESS NOTES
Patient transferred to room 2026 via wheelchair. Call light in reach. Patient denies current needs. RN will continue to monitor.

## 2020-10-29 NOTE — CARE COORDINATION
CASE MANAGEMENT NOTE:    Admission Date:  10/28/2020 Tiarra Peña is a 63 y.o.  male    Admitted for : Tachycardia [R00.0] Covid rule out. On RA, Temp max 100.3 in the last 24 hours. Spoke with patient over the phone. PCP:  Dr. Monica Rahman:  [place]      Current Residence/ Living Arrangements:  independently at home with son and drives            Current Services PTA:  No    Is patient agreeable to VNS: No    Freedom of choice provided:  Yes    List of [address] provided: No    VNS chosen:  NA    DME:  none    Home Oxygen: No    Nebulizer: No    CPAP/BIPAP: No    Supplier: N/A    Potential Assistance Needed: No    SNF needed: No    Freedom of choice and list provided: NA    Pharmacy:  [address] [place] [address]       Does Patient want to use MEDS to BEDS? No    Is patient currently receiving oral anticoagulation therapy? Yes- pt started on Eliquis here (was NOT on at home). If discharges on Eliquis- please provide with Free 30 card and $10 co-pay card. Is the Patient an [facility] with Readmission Risk Score greater than 14%? No  If yes, pt needs a follow up appointment made within 7 days. Family Members/Caregivers that pt would like involved in their care:    Yes    If yes, list name here:  [facility]    Transportation Provider:  Patient and Family             Discharge Plan:  Home without needs.                  Electronically signed by: Nilam Coronado RN on 10/29/2020 at 3:54 PM

## 2020-10-29 NOTE — ACP (ADVANCE CARE PLANNING)
Advance Care Planning     Advance Care Planning Activator (Inpatient)  Conversation Note      Date of ACP Conversation: 10/29/2020    Conversation Conducted with: Patient with Decision Making Capacity    ACP Activator: Ella Wilks makes decisions on behalf of the incapacitated patient: Decision Maker is asked to consider and make decisions based on patient values, known preferences, or best interests. Health Care Decision Maker:     Current Designated Health Care Decision Maker:   (If there is a valid [address] named in the [address] [place] [address] Makers\" box in the ACP activity, but it is not visible above, be sure to open that field and then select the health care decision maker relationship (ie \"primary\") in the blank space to the right of the name.) Validate  this information as still accurate & up-to-date; edit [address] field as needed.)    Note: Assess and validate information in current ACP documents, as indicated. If no Decision Maker listed above or available through scanned documents, then:    If no Authorized Decision Maker has previously been identified, then patient chooses [address]:  \"Who would you like to name as your primary health care decision-maker? \"               Name: Mary Mckeon        Relationship: sister          Phone number: [phone]      Note: If the relationship of these Decision-Makers to the patient does NOT follow your state's Next of Kin hierarchy, recommend that patient complete ACP document that meets state-specific requirements to allow them to act on the patient's behalf when appropriate. Care Preferences    Ventilation: \"If you were in your present state of health and suddenly became very ill and were unable to breathe on your own, what would your preference be about the use of a ventilator (breathing machine) if it were available to you? \"      Would the patient desire the use of ventilator (breathing machine)?: yes    \"If your health worsens and it becomes clear that your chance of recovery is unlikely, what would your preference be about the use of a ventilator (breathing machine) if it were available to you? \"     Would the patient desire the use of ventilator (breathing machine)?: No      Resuscitation  \"CPR works best to restart the heart when there is a sudden event, like a heart attack, in someone who is otherwise healthy. Unfortunately, CPR does not typically restart the heart for people who have serious health conditions or who are very sick. \"    \"In the event your heart stopped as a result of an underlying serious health condition, would you want attempts to be made to restart your heart (answer \"yes\" for attempt to resuscitate) or would you prefer a natural death (answer \"no\" for do not attempt to resuscitate)? \" yes      NOTE: If the patient has a valid advance directive AND now provides care preference(s) that are inconsistent with that prior directive, advise the patient to consider either: creating a new advance directive that complies with state-specific requirements; or, if that is not possible, orally revoking that prior directive in accordance with state-specific requirements, which must be documented in the EHR. [x] Yes   [] No   Educated Patient / Jhony Villa regarding differences between Advance Directives and portable DNR orders.     Length of ACP Conversation in minutes:  10    Conversation Outcomes:  [x] ACP discussion completed  [] Existing advance directive reviewed with patient; no changes to patient's previously recorded wishes  [] New Advance Directive completed  [] Portable Do Not Rescitate prepared for Provider review and signature  [] POLST/POST/MOLST/MOST prepared for Provider review and signature      Follow-up plan:    [x] Schedule follow-up conversation to continue planning  [] Referred individual to Provider for additional questions/concerns   [] Advised patient/agent/surrogate to review completed ACP document and update if needed with changes in condition, patient preferences or care setting    [x] This note routed to one or more involved healthcare providers

## 2020-10-29 NOTE — PROGRESS NOTES
Dr. Merle Bowles notified of cardiology consult via telephone. Obtain a 12 lead EKG. Obtain a troponin x1. Ok for metoprolol 2.5 mg ivp q6h prn for heart rate greater than 120 and obtain a 2D echo.

## 2020-10-29 NOTE — PROGRESS NOTES
Writer transported pt to room 2026 with all his belongings, pt upset and states \"I should just go back home and get better\" Lois Molina RN was in room when writer took pt into room 2026. Bijal Rust denies any questions or concerns from writer.

## 2020-10-29 NOTE — PROGRESS NOTES
Writer called Dr. Abran Joseph regarding patient's condition and his admission. Dr. Abran Joseph gave orders for a PCR to be done and for the pt to be transferred to [facility] for COVID rule out. He gave orders via telephone with read back for the pt to remain prone as tolerated. Writer asked Dr. Abran Joseph if he would like a pulmonary consult ordered and a resp evaluation/treat order. Dr. Abran Joseph states to writer he does not want those ordered.  He stated he wants the PCR results first.

## 2020-10-29 NOTE — PROGRESS NOTES
Dr. Leyda Monroy notified of continued low sodium level as well as tachycardia even after administration of Toprol xl 100 mg po qd as well as his home dose of lisinopril. Per Dr. Leyda Monroy, consult Dr. Bárbara Mace for cardiology as well as Dr. Wing Ascencio for nephrology.

## 2020-10-29 NOTE — PROGRESS NOTES
Writer called over to med surg overflow to give report to yareli. Writer is transport pt per wheelchair, with all his belongings with him.

## 2020-10-29 NOTE — CONSULTS
Consult Note    Reason for Consult: Hyponatremia    Requesting Physician:  Lolly Hu MD    HISTORY OF PRESENT ILLNESS:    The patient is a 63 y.o. male presents with chief complaint of myalgias, chills, cough, shortness of breath, headaches over the past 3 days. Patient works as a . No nausea or vomiting or abdominal pain. C/o chills. He is COVID 19 PUI. Hx of chronic hyponatremia dating back to 2019.  yesterday and 130 today. Creatinine is trending 0.4, which is lower than previous 0.5-0.8 on previous labs. Bicarb is 18 today. Chest CT showed larger left thyroid nodule, No evidence of PE or pulmonary nodules. Probable hepatic steatosis. UA showed specific gravity 1.020. Urine studies pending. No diuretics on home med list. SBP trending 110-150s. He notes he drinks 15 beers per day on Friday, Saturdays, and Sundays. Review Of Systems:   Constitutional: +fever, chills, np lethargy, weakness or wt loss, +myalgias  HEENT:  No headache, nasal discharge or sore throat. +headaches  Cardiac:  No chest pain, +dyspnea, orthopnea or PND. Chest:  No cough, phlegm or wheezing. Abdomen:  No abdominal pain, nausea, vomiting or diarrhea. Neuro:  No gross focal weakness, numbness, abnormal movements or seizure like activity. Skin:   No rashes or itching. :   No hematuria, pyuria, dysuria or flank pain. Extremities:  No swelling or joint pains. Endocrine: No polyuria, polydypsia, or thyroid problems. Hematology:    No bleeding disorders, bruising or anemia. All other ROS is negative. Past Medical History:   Diagnosis Date    Alcohol abuse     History of hip replacement     left/right    Hypertension     Scabies        Past Surgical History:   Procedure Laterality Date    NECK SURGERY      TOTAL HIP ARTHROPLASTY Bilateral        Prior to Admission medications    Medication Sig Start Date End Date Taking?  Authorizing Provider   mometasone-formoterol [facility]) 200-5 MCG/ACT inhaler Inhale 2 puffs into the lungs every 12 hours 10/26/20  Yes JAEL Farr CNP   lisinopril (PRINIVIL;ZESTRIL) 10 MG tablet Take 1 tablet by mouth daily 9/1/20  Yes JAEL Farr CNP   metoprolol succinate (TOPROL XL) 100 MG extended release tablet TAKE 1 TABLET BY MOUTH TWICE DAILY 9/1/20  Yes JAEL Farr CNP   albuterol sulfate HFA (PROAIR HFA) 108 (90 Base) MCG/ACT inhaler Inhale 2 puffs into the lungs every 6 hours as needed for Wheezing 8/7/20  Yes JAEL Farr CNP       Scheduled Meds:   metoprolol succinate  100 mg Oral BID    sodium chloride flush  10 mL Intravenous 2 times per day    dexamethasone  6 mg Intravenous Q24H    apixaban  5 mg Oral BID    lisinopril  10 mg Oral Daily     Continuous Infusions:   sodium chloride 100 mL/hr at 10/29/20 1038     PRN Meds:zolpidem, metoprolol, HYDROcodone 5 mg - acetaminophen, albuterol sulfate HFA, sodium chloride flush, sodium chloride flush, acetaminophen    No Known Allergies    Social History     Socioeconomic History    Marital status: Single     Spouse name: Not on file    Number of children: Not on file    Years of education: Not on file    Highest education level: Not on file   Occupational History    Not on file   Social Needs    Financial resource strain: Not on file    Food insecurity     Worry: Not on file     Inability: Not on file    Transportation needs     Medical: Not on file     Non-medical: Not on file   Tobacco Use    Smoking status: Current Every Day Smoker     Packs/day: 1.00     Types: Cigarettes    Smokeless tobacco: Never Used    Tobacco comment: pt educated on both alcohol and smoking cessation   Substance and Sexual Activity    Alcohol use:  Yes     Alcohol/week: 85.0 standard drinks     Types: 84 Cans of beer, 1 Shots of liquor per week     Comment: states he drinks 5 beers per day    Drug use: No     Comment: denies drug use    Sexual activity: Not on file   Lifestyle    Physical activity Addendum  I have seen and examined pt at bed side.    I reviewed and agree with CNP's note. I performed all key and critical portions of this evaluation.  I agree with the plan of care as noted above.     Electronically signed by Sary Garces MD on 10/29/20 2:14 PM

## 2020-10-29 NOTE — PLAN OF CARE
Problem: Falls - Risk of:  Goal: Will remain free from falls  Description: Will remain free from falls  10/29/2020 1455 by Susi Simms RN  Outcome: Ongoing   No falls this shift. Problem: Falls - Risk of:  Goal: Absence of physical injury  Description: Absence of physical injury  10/29/2020 1455 by Susi Simms RN  Outcome: Ongoing   No injury this shift. Problem: Airway Clearance - Ineffective  Goal: Achieve or maintain patent airway  10/29/2020 1455 by Susi Simms RN  Outcome: Ongoing   Pt oxygen continues well- see flowsheet- on RA. Problem: Gas Exchange - Impaired  Goal: Absence of hypoxia  10/29/2020 1455 by Susi Simms RN  Outcome: Ongoing   Pt denies any SOB/CP. Problem: Body Temperature -  Risk of, Imbalanced  Goal: Ability to maintain a body temperature within defined limits  10/29/2020 1455 by Susi Simms RN  Outcome: Ongoing   NO fever noted this shift. Problem: Isolation Precautions - Risk of Spread of Infection  Goal: Prevent transmission of infection  10/29/2020 1455 by Susi Simms RN  Outcome: Ongoing   Continues   Problem: Nutrition Deficits  Goal: Optimize nutrtional status  10/29/2020 1455 by Susi Simms RN  Outcome: Ongoing   Pt tolerating po intake this shift. Problem: Risk for Fluid Volume Deficit  Goal: Maintain normal heart rhythm  10/29/2020 1455 by Susi Simms RN  Outcome: Ongoing   Pt continues to be tachycardic. Will continue to monitor. Problem: Risk for Fluid Volume Deficit  Goal: Maintain normal serum potassium, sodium, calcium, phosphorus, and pH  10/29/2020 1455 by Susi Simms RN  Outcome: Ongoing   Pt now started on fluid restriction due to low sodium. Pt being monitored. If above 134, to call Dr. Luciana Craig with result. Problem: Pain:  Goal: Pain level will decrease  Description: Pain level will decrease  Outcome: Ongoing   Pt rated pain as high as 7 this shift. Prn norco effective. Pt currently resting in bed.

## 2020-10-29 NOTE — PROGRESS NOTES
Notified Dr. Sincere Ford of consult via perfect serve/telephone. Per Dr. Sincere Ford, obtain a new sodium level, obtain urine for osmolality and sodium. Will be by to see patient this afternoon.

## 2020-10-29 NOTE — ED NOTES
Admission Dx: cough, dehydration, tachycardia    Pts Chief Complaints on Arrival: shortness of breath, cough, pharyngitis    ADL's - Self-care    Pending Diagnostics:  n/a    Residence PTA: single story home    Special Considerations/Circumstances:  n/a    Vitals: Current vital signs:  BP (!) 144/90   Pulse 100   Temp 98.4 °F (36.9 °C) (Oral)   Resp 16   Ht 6' 1\" (1.854 m)   Wt 210 lb (95.3 kg)   SpO2 98%   BMI 27.71 kg/m²                MEWS Score: Kobe 90, RN  10/28/20 2053

## 2020-10-30 VITALS
SYSTOLIC BLOOD PRESSURE: 170 MMHG | RESPIRATION RATE: 16 BRPM | BODY MASS INDEX: 27.35 KG/M2 | WEIGHT: 206.35 LBS | HEART RATE: 96 BPM | TEMPERATURE: 97.5 F | OXYGEN SATURATION: 100 % | HEIGHT: 73 IN | DIASTOLIC BLOOD PRESSURE: 95 MMHG

## 2020-10-30 LAB
ANION GAP SERPL CALCULATED.3IONS-SCNC: 9 MMOL/L (ref 9–17)
BUN BLDV-MCNC: 10 MG/DL (ref 6–20)
BUN/CREAT BLD: ABNORMAL (ref 9–20)
C-REACTIVE PROTEIN: 40.6 MG/L (ref 0–5)
CALCIUM SERPL-MCNC: 8.8 MG/DL (ref 8.6–10.4)
CHLORIDE BLD-SCNC: 102 MMOL/L (ref 98–107)
CO2: 20 MMOL/L (ref 20–31)
CREAT SERPL-MCNC: <0.4 MG/DL (ref 0.7–1.2)
FERRITIN: 853 UG/L (ref 30–400)
GFR AFRICAN AMERICAN: ABNORMAL ML/MIN
GFR NON-AFRICAN AMERICAN: ABNORMAL ML/MIN
GFR SERPL CREATININE-BSD FRML MDRD: ABNORMAL ML/MIN/{1.73_M2}
GFR SERPL CREATININE-BSD FRML MDRD: ABNORMAL ML/MIN/{1.73_M2}
GLUCOSE BLD-MCNC: 145 MG/DL (ref 70–99)
HCT VFR BLD CALC: 37.2 % (ref 41–53)
HEMOGLOBIN: 13.1 G/DL (ref 13.5–17.5)
LACTATE DEHYDROGENASE: 142 U/L (ref 135–225)
MCH RBC QN AUTO: 39.2 PG (ref 26–34)
MCHC RBC AUTO-ENTMCNC: 35.3 G/DL (ref 31–37)
MCV RBC AUTO: 111 FL (ref 80–100)
NRBC AUTOMATED: ABNORMAL PER 100 WBC
OSMOLALITY URINE: 623 MOSM/KG (ref 80–1300)
PATHOLOGIST REVIEW: NORMAL
PDW BLD-RTO: 13.1 % (ref 11.5–14.9)
PLATELET # BLD: 180 K/UL (ref 150–450)
PMV BLD AUTO: 8.4 FL (ref 6–12)
POTASSIUM SERPL-SCNC: 3.9 MMOL/L (ref 3.7–5.3)
RBC # BLD: 3.35 M/UL (ref 4.5–5.9)
SARS-COV-2, RAPID: NORMAL
SARS-COV-2: NORMAL
SARS-COV-2: NOT DETECTED
SODIUM BLD-SCNC: 131 MMOL/L (ref 135–144)
SODIUM,UR: 135 MMOL/L
SOURCE: NORMAL
WBC # BLD: 7 K/UL (ref 3.5–11)

## 2020-10-30 PROCEDURE — 85027 COMPLETE CBC AUTOMATED: CPT

## 2020-10-30 PROCEDURE — 6370000000 HC RX 637 (ALT 250 FOR IP): Performed by: INTERNAL MEDICINE

## 2020-10-30 PROCEDURE — 83615 LACTATE (LD) (LDH) ENZYME: CPT

## 2020-10-30 PROCEDURE — 80048 BASIC METABOLIC PNL TOTAL CA: CPT

## 2020-10-30 PROCEDURE — 86140 C-REACTIVE PROTEIN: CPT

## 2020-10-30 PROCEDURE — 36415 COLL VENOUS BLD VENIPUNCTURE: CPT

## 2020-10-30 PROCEDURE — G0378 HOSPITAL OBSERVATION PER HR: HCPCS

## 2020-10-30 PROCEDURE — 99253 IP/OBS CNSLTJ NEW/EST LOW 45: CPT | Performed by: INTERNAL MEDICINE

## 2020-10-30 PROCEDURE — U0003 INFECTIOUS AGENT DETECTION BY NUCLEIC ACID (DNA OR RNA); SEVERE ACUTE RESPIRATORY SYNDROME CORONAVIRUS 2 (SARS-COV-2) (CORONAVIRUS DISEASE [COVID-19]), AMPLIFIED PROBE TECHNIQUE, MAKING USE OF HIGH THROUGHPUT TECHNOLOGIES AS DESCRIBED BY CMS-2020-01-R: HCPCS

## 2020-10-30 PROCEDURE — 82728 ASSAY OF FERRITIN: CPT

## 2020-10-30 RX ORDER — AZITHROMYCIN 250 MG/1
250 TABLET, FILM COATED ORAL SEE ADMIN INSTRUCTIONS
Qty: 6 TABLET | Refills: 0 | Status: SHIPPED | OUTPATIENT
Start: 2020-10-30 | End: 2020-11-04

## 2020-10-30 RX ADMIN — LISINOPRIL 10 MG: 10 TABLET ORAL at 08:18

## 2020-10-30 RX ADMIN — BUDESONIDE AND FORMOTEROL FUMARATE DIHYDRATE 2 PUFF: 160; 4.5 AEROSOL RESPIRATORY (INHALATION) at 09:07

## 2020-10-30 RX ADMIN — METOPROLOL SUCCINATE 100 MG: 100 TABLET, EXTENDED RELEASE ORAL at 08:18

## 2020-10-30 RX ADMIN — HYDROCODONE BITARTRATE AND ACETAMINOPHEN 1 TABLET: 5; 325 TABLET ORAL at 09:07

## 2020-10-30 RX ADMIN — APIXABAN 5 MG: 5 TABLET, FILM COATED ORAL at 08:18

## 2020-10-30 ASSESSMENT — PAIN DESCRIPTION - PAIN TYPE: TYPE: ACUTE PAIN

## 2020-10-30 ASSESSMENT — PAIN DESCRIPTION - LOCATION: LOCATION: THROAT

## 2020-10-30 ASSESSMENT — PAIN SCALES - GENERAL
PAINLEVEL_OUTOF10: 4
PAINLEVEL_OUTOF10: 5

## 2020-10-30 ASSESSMENT — PAIN DESCRIPTION - FREQUENCY: FREQUENCY: CONTINUOUS

## 2020-10-30 ASSESSMENT — PAIN DESCRIPTION - DESCRIPTORS: DESCRIPTORS: SORE

## 2020-10-30 NOTE — PROGRESS NOTES
Progress Note    Reason for Consult: Hyponatremia    Requesting Physician:  Ej Johnston MD    INTERVAL HISTORY:   AM cortisol 3. SNA improving to 131. TSH 1.14. Ur osmolality 623, Ur Na 135    HISTORY OF PRESENT ILLNESS:    The patient is a 63 y.o. male presents with chief complaint of myalgias, chills, cough, shortness of breath, headaches over the past 3 days. Patient works as a . No nausea or vomiting or abdominal pain. C/o chills. He is COVID 19 PUI. Hx of chronic hyponatremia dating back to 2019.  yesterday and 130 today. Creatinine is trending 0.4, which is lower than previous 0.5-0.8 on previous labs. Bicarb is 18 today. Chest CT showed larger left thyroid nodule, No evidence of PE or pulmonary nodules. Probable hepatic steatosis. UA showed specific gravity 1.020. Urine studies pending. No diuretics on home med list. SBP trending 110-150s. He notes he drinks 15 beers per day on Friday, Saturdays, and Sundays. Review Of Systems:   Constitutional: +fever, chills,  lethargy, weakness or wt loss, +myalgias  HEENT:  No headache, nasal discharge or sore throat. +headaches  Cardiac:  No chest pain, +dyspnea, orthopnea or PND. Chest:  No cough, phlegm or wheezing. Abdomen:  No abdominal pain, nausea, vomiting or diarrhea. Neuro:  No gross focal weakness, numbness, abnormal movements or seizure like activity. Skin:   No rashes or itching. :   No hematuria, pyuria, dysuria or flank pain. Extremities:  No swelling or joint pains. Endocrine: No polyuria, polydypsia, or thyroid problems. Hematology:    No bleeding disorders, bruising or anemia. All other ROS is negative.     Past Medical History:   Diagnosis Date    Alcohol abuse     History of hip replacement     left/right    Hypertension     Scabies        Past Surgical History:   Procedure Laterality Date    NECK SURGERY      TOTAL HIP ARTHROPLASTY Bilateral        Prior to Admission medications Used    Tobacco comment: pt educated on both alcohol and smoking cessation   Substance and Sexual Activity    Alcohol use: Yes     Alcohol/week: 85.0 standard drinks     Types: 84 Cans of beer, 1 Shots of liquor per week     Comment: states he drinks 5 beers per day    Drug use: No     Comment: denies drug use    Sexual activity: Not on file   Lifestyle    Physical activity     Days per week: Not on file     Minutes per session: Not on file    Stress: Not on file   Relationships    Social connections     Talks on phone: Not on file     Gets together: Not on file     Attends Islam service: Not on file     Active member of club or organization: Not on file     Attends meetings of clubs or organizations: Not on file     Relationship status: Not on file    Intimate partner violence     Fear of current or ex partner: Not on file     Emotionally abused: Not on file     Physically abused: Not on file     Forced sexual activity: Not on file   Other Topics Concern    Not on file   Social History Narrative    Not on file       Family History   Problem Relation Age of Onset    Other Mother         mental health          Physical Exam:  Vitals:    10/29/20 1635 10/29/20 2245 10/30/20 0100 10/30/20 0812   BP: 135/80 (!) 174/95 (!) 155/89 (!) 170/95   Pulse: 107 117 103 96   Resp: 18 16 16 16   Temp: 97.9 °F (36.6 °C) 98 °F (36.7 °C) 97.7 °F (36.5 °C) 97.5 °F (36.4 °C)   TempSrc: Oral Oral Oral Oral   SpO2: 98% 98% 96% 100%   Weight:       Height:         I/O last 3 completed shifts: In: 2380.8 [P.O.:1000;  I.V.:1380.8]  Out: 300 [Urine:300]    Exam deferred due to COVID PUI    Data:    CBC:   Lab Results   Component Value Date    WBC 7.0 10/30/2020    HGB 13.1 (L) 10/30/2020    HCT 37.2 (L) 10/30/2020    .0 (H) 10/30/2020     10/30/2020     BMP:    Lab Results   Component Value Date     (L) 10/30/2020     (L) 10/29/2020     (L) 10/29/2020    K 3.9 10/30/2020    K 4.4 10/29/2020    K TSH WNL. AM Cortisol borderline low at 3. Ur osmolality 623, Ur Na 135  2. SBP trending 130-170s. Lisinopril and metoprolol resumed yesterday per home dosages. Pt declined echo per cardiology notes. 3. GFR at baseline. 4. 1000mL fluid restriction. Thank you for the consultation. Please do not hesitate to contact us for any further questions/concerns. We will continue to follow along with you. Pt seen in collaboration with Dr. Venus Hess. Electronically signed by JAEL De Leon CNP  on 10/30/2020 at 12:43 PM         Physician Addendum  I have seen and examined pt at bed side.    I reviewed and agree with CNP's note. I performed all key and critical portions of this evaluation. I agree with the plan of care as noted above.   Cortisol level was borderline low, most likely secondary to receiving Decadrone  Serum Na improved at a reasonable rate     Electronically signed by Ana M Isaacs MD on 10/30/20 12:56 PM

## 2020-10-30 NOTE — CONSULTS
Examination :     Patient Vitals for the past 8 hrs:   BP Temp Temp src Pulse Resp SpO2   10/30/20 0812 (!) 170/95 97.5 °F (36.4 °C) Oral 96 16 100 %       Physical Exam  Due to COVID-19 pandemic my exam was deferred    Medical Decision Making:   I have independently reviewed/ordered the following labs:    CBC with Differential:   Recent Labs     10/28/20  1400 10/29/20  0710 10/30/20  0455   WBC 12.8* 8.2 7.0   HGB 15.4 13.9 13.1*   HCT 43.5 39.6* 37.2*    178 180   LYMPHOPCT 7* 4*  --    MONOPCT 6 2  --      BMP:  Recent Labs     10/29/20  0710 10/29/20  1408 10/30/20  0455   * 130* 131*   K 4.4  --  3.9   CL 99  --  102   CO2 18*  --  20   BUN 7  --  10   CREATININE 0.41*  --  <0.40*     Hepatic Function Panel:   Recent Labs     10/28/20  1400   PROT 7.9   LABALBU 3.9   BILITOT 1.41*   ALKPHOS 90   ALT 18   AST 32     No results for input(s): RPR in the last 72 hours. No results for input(s): HIV in the last 72 hours. No results for input(s): BC in the last 72 hours. Lab Results   Component Value Date    CREATININE <0.40 10/30/2020    GLUCOSE 145 10/30/2020       Detailed results: Thank you for allowing us to participate in the care of this patient. Please call with questions. This note is created with the assistance of a speech recognition program.  While intending to generate adocument that actually reflects the content of the visit, the document can still have some errors including those of syntax and sound a like substitutions which may escape proof reading. It such instances, actual meaningcan be extrapolated by contextual diversion.     Tahira Laguerre MD  Office: [phone]  Perfect serve / office [phone]

## 2020-10-30 NOTE — PROGRESS NOTES
Writer called [facility] regarding when his COVID result would be available. Tech states it has not been run and will not be on the next run. Patient is D/C'd, will not wait for result. RN to instruct him to self isolate and call 's office for result.

## 2020-10-30 NOTE — PROGRESS NOTES
Received call from Dr. Soraya Dumont. Order received via telephone with readback for repeat covid swab.

## 2020-10-30 NOTE — PROGRESS NOTES
Received call from [address] to inform writer that patient's COVID-19 sample is missing. Will update physician and continue to monitor patient.

## 2020-10-30 NOTE — PLAN OF CARE
Problem: Falls - Risk of:  Goal: Will remain free from falls  Description: Will remain free from falls  Outcome: Ongoing  Note: Patient is alert and oriented x4 and remains free from falls this shift. Patient ambulates per self in room. No distress noted. Problem: Airway Clearance - Ineffective  Goal: Achieve or maintain patent airway  Outcome: Ongoing  Note: No distress noted this shift. Problem: Breathing Pattern - Ineffective  Goal: Ability to achieve and maintain a regular respiratory rate  Outcome: Ongoing     Problem: Body Temperature -  Risk of, Imbalanced  Goal: Ability to maintain a body temperature within defined limits  Outcome: Ongoing  Note: Patient remains afebrile this shift. RN will continue to monitor.       Problem: Risk for Fluid Volume Deficit  Goal: Maintain normal heart rhythm  Outcome: Ongoing

## 2020-10-30 NOTE — H&P
Hospital Medicine  History and Physical                                                                                                                                                 Full Code    Patient:  Debora Arreola  MRN: [id number]                                                                                                                                                                     CHIEF COMPLAINT:  sob    History Obtained From:  patient, electronic medical record  PCP: Aga Perdue MD    HISTORY OF PRESENT ILLNESS:   The patient is a 63 y.o. male who presents with h/o of myalgia and cough and chills,pt says he has been sick for few days,pt says he is ,not sure of contacts. Past Medical History:        Diagnosis Date    Alcohol abuse     History of hip replacement     left/right    Hypertension     Scabies        Past Surgical History:        Procedure Laterality Date    NECK SURGERY      TOTAL HIP ARTHROPLASTY Bilateral        Medications Prior to Admission:    Prior to Admission medications    Medication Sig Start Date End Date Taking?  Authorizing Provider   mometasone-formoterol [facility]) 200-5 MCG/ACT inhaler Inhale 2 puffs into the lungs every 12 hours 10/26/20  Yes JAEL Milton CNP   lisinopril (PRINIVIL;ZESTRIL) 10 MG tablet Take 1 tablet by mouth daily 9/1/20  Yes JAEL Milton CNP   metoprolol succinate (TOPROL XL) 100 MG extended release tablet TAKE 1 TABLET BY MOUTH TWICE DAILY 9/1/20  Yes JAEL Milton CNP   albuterol sulfate HFA (PROAIR HFA) 108 (90 Base) MCG/ACT inhaler Inhale 2 puffs into the lungs every 6 hours as needed for Wheezing 8/7/20  Yes JAEL Milton CNP       Current meds  Scheduled Meds:   metoprolol succinate  100 mg Oral BID    budesonide-formoterol  2 puff Inhalation BID    nicotine  1 patch Transdermal Daily    sodium chloride flush  10 mL Intravenous 2 times per day    dexamethasone  6 mg Intravenous Q24H    apixaban 5 mg Oral BID    lisinopril  10 mg Oral Daily     Continuous Infusions:   sodium chloride 100 mL/hr at 10/29/20 1706     PRN Meds:.zolpidem, metoprolol, HYDROcodone 5 mg - acetaminophen, albuterol sulfate HFA, sodium chloride flush, sodium chloride flush, acetaminophen    Allergies:  Patient has no known allergies. Social History:   TOBACCO:   reports that he has been smoking cigarettes. He has been smoking about 1.00 pack per day. He has never used smokeless tobacco.  ETOH:   reports current alcohol use of about 85.0 standard drinks of alcohol per week.   OCCUPATION:      Family History:       Problem Relation Age of Onset    Other Mother         mental health        REVIEW OF SYSTEMS:  Constitutional:  Positive for   fevers, chills, sweats and negative for weight loss  HEENT:  negative for  hearing loss, ear drainage, nasal congestion, snoring, hoarseness and voice change  Neck:  No swollen glands and No h/o goiter or thyroid disease  Cardiac:  Positive for   chest pain,negative for dyspnea, palpitations, orthopnea, PND, edema  Respiratory:  positive for  dry cough, cough with sputum,some dyspnea, negative forwheezing and hemoptysis  Gastrointestinal:  negative for nausea, vomiting, change in bowel habits, diarrhea, constipation, abdominal pain and hemtochezia  :  negative for frequency, dysuria, urinary incontinence, hesitancy, decreased stream and hematuria  Musculoskeletal:  Positive  for  myalgias, negative for arthralgias, joint swelling and stiff joints  Neuro:  negative for headaches, dizziness, seizures, memory problems, visual disturbance, weakness and syncope      Physical Exam:    Vitals: /80   Pulse 107   Temp 97.9 °F (36.6 °C) (Oral)   Resp 18   Ht 6' 1\" (1.854 m)   Wt 206 lb 5.6 oz (93.6 kg)   SpO2 98%   BMI 27.22 kg/m²     Physical not done   CBC:   Recent Labs     10/28/20  1400 10/29/20  0710   WBC 12.8* 8.2   HGB 15.4 13.9    178     BMP:    Recent Labs 10/28/20  1400 10/29/20  0710 10/29/20  1408   * 130* 130*   K 4.2 4.4  --    CL 90* 99  --    CO2 22 18*  --    BUN 7 7  --    CREATININE 0.46* 0.41*  --    GLUCOSE 100* 131*  --      Hepatic:   Recent Labs     10/28/20  1400   AST 32   ALT 18   BILITOT 1.41*   ALKPHOS 90     Troponin: No results for input(s): TROPONINI in the last 72 hours. BNP: No results for input(s): BNP in the last 72 hours. Lipids: No results for input(s): CHOL, HDL in the last 72 hours. Invalid input(s): LDLCALCU  ABGs: No results found for: PHART, PO2ART, [id number]  INR: No results for input(s): INR in the last 72 hours. -----------------------------------------------------------------  PA/lat CXR: Xr Chest Portable    Result Date: 10/28/2020  EXAMINATION: ONE XRAY VIEW OF THE CHEST 10/28/2020 3:01 pm COMPARISON: AP chest from 05/28/2019 HISTORY: ORDERING SYSTEM PROVIDED HISTORY: cough, shortness of breath TECHNOLOGIST PROVIDED HISTORY: cough, shortness of breath Reason for Exam: Cough and shortness of breath Acuity: Unknown Type of Exam: Unknown History of hypertension and alcohol abuse. FINDINGS: Again noted overlying ECG monitor leads and cervical spine fusion hardware. Cardiomediastinal shadow stable and WNL for AP technique. Possible airspace opacity lateral right mid lung, accentuated by overlying bony (healing fractures posterior right 4th, 5th, 6th and 7th ribs) and soft tissue structures. Remainder right lung, left lung and both costophrenic angles clear. No acute fracture. Somewhat questionable airspace opacity lateral right lung, versus superimposed structures. Remainder lung fields and costophrenic angles clear.  RECOMMENDATION: Consider PA and lateral follow-up chest x-ray     Ct Chest Pulmonary Embolism W Contrast    Result Date: 10/28/2020  EXAMINATION: CTA OF THE CHEST 10/28/2020 3:18 pm TECHNIQUE: CTA of the chest was performed after the administration of intravenous contrast.  Multiplanar reformatted images are provided for review. MIP images are provided for review. Dose modulation, iterative reconstruction, and/or weight based adjustment of the mA/kV was utilized to reduce the radiation dose to as low as reasonably achievable. COMPARISON: None. HISTORY: ORDERING SYSTEM PROVIDED HISTORY: tachycardia, shortness of breath, positive d-dimer TECHNOLOGIST PROVIDED HISTORY: tachycardia, shortness of breath, positive d-dimer Reason for Exam: tachycardia, shortness of breath since Monday, positive d-dimer Acuity: Unknown Relevant Medical/Surgical History: COPD, HTN FINDINGS: Pulmonary Arteries: Pulmonary arteries are adequately opacified for evaluation. Breath hold artifact somewhat limits assessment lower lobe PA branch vessels. No evidence of large or central intraluminal filling defects. Small more peripheral nonocclusive emboli could be missed on this study. Main pulmonary artery is normal in caliber. Mediastinum: Larger left thyroid nodule with central calcification, now measuring 3.7 x 3.5 cm versus 2.83.1 cm previously. No evidence of mediastinal lymphadenopathy. The heart and pericardium demonstrate no acute abnormality. There is no acute abnormality of the thoracic aorta. Lungs/pleura: The lungs are without acute process. Previous bilateral localized airspace opacities, best seen LLL and right lung apex previously, resolved. No new focal consolidation or pulmonary edema. No evidence of pleural effusion or pneumothorax. Minimal basilar atelectasis, more left base. And small bibasilar calcified granulomas. Upper Abdomen: Limited images of the upper abdomen are unchanged, again with probable diminished hepatic attenuation Soft Tissues/Bones: No acute bone or soft tissue abnormality. Old known right-sided rib fractures and mild DJD spine. No evidence of large or central pulmonary embolism; small nonocclusive peripheral emboli could be missed on this study.  No acute pulmonary abnormality; specifically, no right-sided infiltrate and interval resolution previously seen bilateral airspace opacities. Interval enlargement left thyroid nodule; if not already performed, nonemergent outpatient ultrasound correlation recommended. Probable hepatic steatosis. Prophylaxis:   DVT with  [] lovenox        [] heparin        [] Scd        [x] eliquis     Assessment and Plan   1. Covid/positive? Decadron/await results  2. Tachycardia  3. H/o of alcoholism    Patient Active Problem List   Diagnosis Code    Scabies B86    Cellulitis of b/l lower extremity L03. 116    Cellulitis of lower extremity L03.119    Alcoholism ([facility]) F10.20    Essential hypertension I10    Hyperkeratosis of b/l Soles Q82.8    Alcohol withdrawal ([facility]) F10.239    Tinea pedis of both feet B35.3    Suicidal ideation R45.851    Dyspnea R06.00    Acute exacerbation of chronic obstructive pulmonary disease (COPD) ([facility]) J44.1    Gastroesophageal reflux disease without esophagitis K21.9    Cigarette smoker F17.210    Avascular necrosis of femoral head ([facility]) M87.059    Tachycardia R00.0       Anticipated Disposition upon discharge: [] Home                                                                         [] Home with Home Health                                                                         [] [facility]                                                                         [] [address]          Patient is admitted as inpatient status because of co-morbidities listed above, severity of signs and symptoms as outlined, requirement for current medical therapies and most importantly because of direct risk to patient if care not provided in a hospital setting.     Jose Guadalupe Jaime MD  Admitting Hospitalist

## 2020-10-30 NOTE — CONSULTS
Consult        Date of Admission:  10/28/2020  Date of Consultation:  10/30/2020      PCP:  Evelin Moulton MD      Reason for the consult: Tachycardia    History of Present Illness:  Silevr Diallo is a 63 y.o. male was admitted with cough chills and myalgia he drives a schoolbus. Patient was admitted to rule out COVID-19. Because of the COVID-19 restriction I spoke to the patient on his room phone I did not enter his room  I was asked to see him for tachycardia when he came to the emergency room his heart rate was around 140 sinus tachycardia. His heart rate came down today is 106 he is on a high-dose metoprolol of 100 twice a day initially he did not receive the metoprolol. Patient have past medical history of hypertension, denies any cardiac issues,. He denies any chest pains      PMH:   has a past medical history of Alcohol abuse, History of hip replacement, Hypertension, and Scabies. PSH:   has a past surgical history that includes Neck surgery and Total hip arthroplasty (Bilateral). Allergies:  No Known Allergies     Home Meds:    Prior to Admission medications    Medication Sig Start Date End Date Taking?  Authorizing Provider   mometasone-formoterol [facility]) 200-5 MCG/ACT inhaler Inhale 2 puffs into the lungs every 12 hours 10/26/20  Yes JAEL Koroma CNP   lisinopril (PRINIVIL;ZESTRIL) 10 MG tablet Take 1 tablet by mouth daily 9/1/20  Yes JAEL Koroma CNP   metoprolol succinate (TOPROL XL) 100 MG extended release tablet TAKE 1 TABLET BY MOUTH TWICE DAILY 9/1/20  Yes JAEL Koroma CNP   albuterol sulfate HFA (PROAIR HFA) 108 (90 Base) MCG/ACT inhaler Inhale 2 puffs into the lungs every 6 hours as needed for Wheezing 8/7/20  Yes JAEL Koroma Se Pina Trwcarissa Meds:    Current Facility-Administered Medications   Medication Dose Route Frequency Provider Last Rate Last Dose    zolpidem (AMBIEN) tablet 10 mg  10 mg Oral Nightly PRN Franco Yu MD   10 mg at 10/29/20 2255    0.9 % sodium chloride infusion   Intravenous Continuous Venugoparaquel Chavarria  mL/hr at 10/29/20 1706      metoprolol (LOPRESSOR) injection 2.5 mg  2.5 mg Intravenous Q6H PRN Chilango Shetty MD        metoprolol succinate (TOPROL XL) extended release tablet 100 mg  100 mg Oral BID Chilango Shetty MD   100 mg at 10/30/20 0818    HYDROcodone-acetaminophen (NORCO) 5-325 MG per tablet 1 tablet  1 tablet Oral Q6H PRN Trung Worrell MD   1 tablet at 10/29/20 2254    albuterol sulfate  (90 Base) MCG/ACT inhaler 2 puff  2 puff Inhalation Q6H PRN Trung Worrell MD   2 puff at 10/29/20 1404    budesonide-formoterol (SYMBICORT) 160-4.5 MCG/ACT inhaler 2 puff  2 puff Inhalation BID Trung Worrell MD   2 puff at 10/29/20 2258    nicotine (NICODERM CQ) 21 MG/24HR 1 patch  1 patch Transdermal Daily Trung Worrell MD   1 patch at 10/30/20 7527    sodium chloride flush 0.9 % injection 10 mL  10 mL Intravenous PRN Cecelia Aponte MD   10 mL at 10/28/20 1529    sodium chloride flush 0.9 % injection 10 mL  10 mL Intravenous 2 times per day Franco Yu MD   10 mL at 10/29/20 2258    sodium chloride flush 0.9 % injection 10 mL  10 mL Intravenous PRN Franco Yu MD        acetaminophen (TYLENOL) tablet 650 mg  650 mg Oral Q4H PRN Franco Yu MD        dexamethasone (PF) (DECADRON) injection 6 mg  6 mg Intravenous Q24H Dawson Meigs Darr, MD   6 mg at 10/29/20 2258    apixaban (ELIQUIS) tablet 5 mg  5 mg Oral BID Franco Yu MD   5 mg at 10/30/20 0818    lisinopril (PRINIVIL;ZESTRIL) tablet 10 mg  10 mg Oral Daily Franco Yu MD   10 mg at 10/30/20 0818       Social History:    Social History     Socioeconomic History    Marital status: Single     Spouse name: Not on file    Number of children: Not on file    Years of education: Not on file    Highest education level: Not on file   Occupational History    Not on file   Social Needs    Financial resource strain: Not on file    Food insecurity     Worry: Not on file     Inability: Not on file    Transportation needs     Medical: Not on file     Non-medical: Not on file   Tobacco Use    Smoking status: Current Every Day Smoker     Packs/day: 1.00     Types: Cigarettes    Smokeless tobacco: Never Used    Tobacco comment: pt educated on both alcohol and smoking cessation   Substance and Sexual Activity    Alcohol use: Yes     Alcohol/week: 85.0 standard drinks     Types: 84 Cans of beer, 1 Shots of liquor per week     Comment: states he drinks 5 beers per day    Drug use: No     Comment: denies drug use    Sexual activity: Not on file   Lifestyle    Physical activity     Days per week: Not on file     Minutes per session: Not on file    Stress: Not on file   Relationships    Social connections     Talks on phone: Not on file     Gets together: Not on file     Attends Baptism service: Not on file     Active member of club or organization: Not on file     Attends meetings of clubs or organizations: Not on file     Relationship status: Not on file    Intimate partner violence     Fear of current or ex partner: Not on file     Emotionally abused: Not on file     Physically abused: Not on file     Forced sexual activity: Not on file   Other Topics Concern    Not on file   Social History Narrative    Not on file      Family History:   Family History   Problem Relation Age of Onset    Other Mother         mental health        Review of Systems:   · Constitutional: Fatigue chills muscle aches  · Eyes: No visual changes or diplopia. · ENT: No Headaches, hearing loss or vertigo. · Cardiovascular: Per HPI  · Respiratory: Coughing  · Gastrointestinal: No Nausea, Vomiting, Diarrhea, or Constipation  · Genitourinary: No dysuria,hematuria. · Musculoskeletal:  No gait disturbance, weakness or joint complaints. · Integumentary: No rash or pruritis.   · Neurological: No headache, No Hx CVA/TIA  · Psychiatric: No anxiety, or depression. · Endocrine: No temperature intolerance. · Hematologic/Lymphatic: No abnormal bruising or bleeding     Physical Exam   Vital Signs: BP (!) 170/95   Pulse 96   Temp 97.5 °F (36.4 °C) (Oral)   Resp 16   Ht 6' 1\" (1.854 m)   Wt 206 lb 5.6 oz (93.6 kg)   SpO2 100%   BMI 27.22 kg/m²        Admission Weight: 210 lb (95.3 kg)     Because of the COVID-19 restriction I spoke to the patient on his room phone I did not enter his room   and did not examine the patient    Pertinent Testing:       EKG:           Labs:      CBC:   Recent Labs     10/28/20  1400 10/29/20  0710 10/30/20  0455   WBC 12.8* 8.2 7.0   HGB 15.4 13.9 13.1*   HCT 43.5 39.6* 37.2*   .9* 111.5* 111.0*    178 180     BMP:   Recent Labs     10/28/20  1400 10/29/20  0710 10/29/20  1408   * 130* 130*   K 4.2 4.4  --    CL 90* 99  --    CO2 22 18*  --    BUN 7 7  --    CREATININE 0.46* 0.41*  --      PT/INR: No results for input(s): PROTIME, INR in the last 72 hours. APTT: No results for input(s): APTT in the last 72 hours. MAG: No results for input(s): MG in the last 72 hours. D Dimer:   Recent Labs     10/28/20  1400   DDIMER 1.32*     Troponin T   Recent Labs     10/28/20  1400 10/29/20  1130   TROPONINT NOT REPORTED NOT REPORTED      Pro-BNP   Recent Labs     10/28/20  1400   PROBNP 155       Impression  1. Sinus tachycardia, physiological probably related to underlying sepsis. 2.  Hypertension on high-dose metoprolol. 3.  Rule out COVID-19, /rule out sepsis, high sed rate and high C-reactive protein elevated white count    Recommendations  Heart rate came down since he has been back on metoprolol. Recommend echocardiogram, but patient declined. No further recommendation at this point.   Would like to see the patient next week in the office for follow-up and getting further cardiac status  I will sign off            Electronically signed by Olimpia Lo MD on 10/30/2020 at 8:30 AM       This note was created with the assistance of a speech-recognition program.  Although the intention is to generate a document that actually reflects the content of the visit, no guarantees can be provided that every mistake has been identified and corrected by editing.     Marley Baez MD [facility]

## 2020-10-30 NOTE — PROGRESS NOTES
Paged Dr. Alfa Cao to inform him of patient's sample missing, and to request order to retest patient for Covid. Awaiting response.

## 2020-10-31 ENCOUNTER — CARE COORDINATION (OUTPATIENT)
Dept: CASE MANAGEMENT | Age: 55
End: 2020-10-31

## 2020-10-31 NOTE — CARE COORDINATION
COVID-19 Monitoring Call: Attempted to reach patient via phone for initial post hospital transition call. VM left stating purpose of call along with my contact information requesting a return call.       Katie Carlson BSN, RN, [facility]  Care Transition Nurse   [phone]

## 2020-11-01 LAB
EKG ATRIAL RATE: 108 BPM
EKG ATRIAL RATE: 141 BPM
EKG P AXIS: 51 DEGREES
EKG P AXIS: 60 DEGREES
EKG P-R INTERVAL: 116 MS
EKG P-R INTERVAL: 132 MS
EKG Q-T INTERVAL: 274 MS
EKG Q-T INTERVAL: 336 MS
EKG QRS DURATION: 64 MS
EKG QRS DURATION: 70 MS
EKG QTC CALCULATION (BAZETT): 419 MS
EKG QTC CALCULATION (BAZETT): 450 MS
EKG R AXIS: 20 DEGREES
EKG R AXIS: 24 DEGREES
EKG T AXIS: 60 DEGREES
EKG T AXIS: 71 DEGREES
EKG VENTRICULAR RATE: 108 BPM
EKG VENTRICULAR RATE: 141 BPM

## 2020-11-01 PROCEDURE — 93010 ELECTROCARDIOGRAM REPORT: CPT | Performed by: INTERNAL MEDICINE

## 2020-11-02 ENCOUNTER — CARE COORDINATION (OUTPATIENT)
Dept: CASE MANAGEMENT | Age: 55
End: 2020-11-02

## 2020-11-02 ASSESSMENT — PATIENT HEALTH QUESTIONNAIRE - PHQ9
SUM OF ALL RESPONSES TO PHQ9 QUESTIONS 1 & 2: 0
2. FEELING DOWN, DEPRESSED OR HOPELESS: 0
SUM OF ALL RESPONSES TO PHQ QUESTIONS 1-9: 0
SUM OF ALL RESPONSES TO PHQ QUESTIONS 1-9: 0
1. LITTLE INTEREST OR PLEASURE IN DOING THINGS: 0
SUM OF ALL RESPONSES TO PHQ QUESTIONS 1-9: 0

## 2020-11-02 NOTE — CARE COORDINATION
Date/Time:  11/2/2020 3:09 PM  Attempted to reach patient by telephone. Call within 2 business days of discharge: Yes Left HIPPA compliant message requesting a return call.  # 2 attempt-episode resolved

## 2020-11-03 ENCOUNTER — TELEPHONE (OUTPATIENT)
Dept: FAMILY MEDICINE CLINIC | Age: 55
End: 2020-11-03

## 2020-11-03 ENCOUNTER — TELEMEDICINE (OUTPATIENT)
Dept: FAMILY MEDICINE CLINIC | Age: 55
End: 2020-11-03
Payer: COMMERCIAL

## 2020-11-03 PROCEDURE — 99443 PR PHYS/QHP TELEPHONE EVALUATION 21-30 MIN: CPT | Performed by: NURSE PRACTITIONER

## 2020-11-03 NOTE — TELEPHONE ENCOUNTER
----- Message from JAEL Lindsey CNP sent at 11/3/2020 12:22 PM EST -----  I also placed ultrasound thyroid that he can get it done after he is better.  Have pt fu in a month

## 2020-11-03 NOTE — PROGRESS NOTES
Arun Escalera is a 63 y.o. male evaluated via telephone on 11/3/2020. Consent:  He and/or health care decision maker is aware that that he may receive a bill for this telephone service, depending on his insurance coverage, and has provided verbal consent to proceed: Yes      Documentation:  I communicated with the patient and/or health care decision maker about suspected covid-19    Details of this discussion including any medical advice provided:   63year old male presents with follow up s/p hospital discharge for covid symptoms: Headache sore throat chills body ache fatigue some loss of taste and smell heart burn (pain in mid sternum) cough sob for a a week. Was admitted a week ago and covid testing x 2 was negative. blood tests showed mildly elevated wbc which was back to baseline. CXR and CT chest showed no evidence of PE other than enlarged left thyroid nodule. ekg showed sinus tachy. Pt was discharged home with zpak and states there is no improvement but pt declined to go to ER. Hx of COPD. Pt was advised to check bp, temp and pulsox twice daily and inform the office. Also advised self quarantine for 2 weeks and to take baby ASA, zinc and melatonin and increase fluids /rest.   Has been off work since 10/28 and will resume work on 11/16. Advised to go to ER if symptoms worsen or vital signs are unstable. I affirm this is a Patient Initiated Episode with a Patient who has not had a related appointment within my department in the past 7 days or scheduled within the next 24 hours.     Patient identification was verified at the start of the visit: Yes    Total Time: minutes: 21-30 minutes    Note: not billable if this call serves to triage the patient into an appointment for the relevant concern      Dash Aiken

## 2020-11-03 NOTE — TELEPHONE ENCOUNTER
----- Message from JAEL Seay CNP sent at 11/3/2020 12:17 PM EST -----  Pt is to call you with fax number for his employer for his work note 10/28- 11/16.    You can let him know to increase protein intake like eggs and milk to boost his immunity, and fluids as well

## 2020-11-03 NOTE — LETTER
[address]  Phone: [phone]    JAEL Melgar - KIMBERLY        November 3, 2020     Patient: Susana Diaz   YOB: 1965   Date of Visit: 11/3/2020       To Whom It May Concern: The patient has been under my care from 10/28/20 to 11/15/20 and may return to work on 11/16/20. If you have any questions or concerns, please don't hesitate to call.     Sincerely,        JAEL Melgar - CNP

## 2020-12-03 RX ORDER — METOPROLOL SUCCINATE 100 MG/1
TABLET, EXTENDED RELEASE ORAL
Qty: 180 TABLET | Refills: 0 | Status: SHIPPED | OUTPATIENT
Start: 2020-12-03 | End: 2021-04-05 | Stop reason: SDUPTHER

## 2020-12-03 RX ORDER — LISINOPRIL 10 MG/1
10 TABLET ORAL DAILY
Qty: 90 TABLET | Refills: 0 | Status: SHIPPED | OUTPATIENT
Start: 2020-12-03 | End: 2021-04-05 | Stop reason: SDUPTHER

## 2021-01-02 DIAGNOSIS — J44.9 CHRONIC OBSTRUCTIVE PULMONARY DISEASE, UNSPECIFIED COPD TYPE (HCC): ICD-10-CM

## 2021-01-04 RX ORDER — ALBUTEROL SULFATE 90 UG/1
2 AEROSOL, METERED RESPIRATORY (INHALATION) EVERY 6 HOURS PRN
Qty: 1 INHALER | Refills: 2 | Status: SHIPPED | OUTPATIENT
Start: 2021-01-04 | End: 2021-03-29 | Stop reason: SDUPTHER

## 2021-01-27 DIAGNOSIS — J44.9 CHRONIC OBSTRUCTIVE PULMONARY DISEASE, UNSPECIFIED COPD TYPE (HCC): ICD-10-CM

## 2021-03-01 SDOH — ECONOMIC STABILITY: FOOD INSECURITY: WITHIN THE PAST 12 MONTHS, THE FOOD YOU BOUGHT JUST DIDN'T LAST AND YOU DIDN'T HAVE MONEY TO GET MORE.: NEVER TRUE

## 2021-03-01 SDOH — ECONOMIC STABILITY: FOOD INSECURITY: WITHIN THE PAST 12 MONTHS, YOU WORRIED THAT YOUR FOOD WOULD RUN OUT BEFORE YOU GOT MONEY TO BUY MORE.: NEVER TRUE

## 2021-03-01 SDOH — ECONOMIC STABILITY: TRANSPORTATION INSECURITY
IN THE PAST 12 MONTHS, HAS LACK OF TRANSPORTATION KEPT YOU FROM MEETINGS, WORK, OR FROM GETTING THINGS NEEDED FOR DAILY LIVING?: NOT ASKED

## 2021-03-01 ASSESSMENT — PATIENT HEALTH QUESTIONNAIRE - PHQ9
SUM OF ALL RESPONSES TO PHQ QUESTIONS 1-9: 0
1. LITTLE INTEREST OR PLEASURE IN DOING THINGS: 0
SUM OF ALL RESPONSES TO PHQ9 QUESTIONS 1 & 2: 0
SUM OF ALL RESPONSES TO PHQ QUESTIONS 1-9: 0
SUM OF ALL RESPONSES TO PHQ QUESTIONS 1-9: 0

## 2021-03-01 NOTE — PROGRESS NOTES
Vijaya Villegas is a 61 y.o. male evaluated via telephone on 3/2/2021. Consent:  He and/or health care decision maker is aware that that he may receive a bill for this telephone service, depending on his insurance coverage, and has provided verbal consent to proceed: Yes      Documentation:  I communicated with the patient and/or health care decision maker about flu like symptoms . Details of this discussion including any medical advice provided:     61year old male presents with management of COPD exacerbation. Started fever, headache, sore throat body ache fatigue cough sob for 2 days. Cough is productive with brownish sputum. States his fever is improving today. States he had similar symptoms 3 times since last October. Was tested for covid x 2 last oct which came back negative but ferritin was noted to be elevated per chart review. States he had first dose of covid vaccine on 2/15. Pt is a current smoker and has not desire to quit. PFT ordered before was not complete. Currently is on dulera and rescue inhaler. 1. COPD with acute exacerbation (HCC)  - cont dulera and rescue inhaler. Start a short burst of prednisone and zpak  - predniSONE (DELTASONE) 20 MG tablet; Take 2 tabs po daily for 4 days  Dispense: 8 tablet; Refill: 0  - azithromycin (ZITHROMAX) 250 MG tablet; Take 2 tabs on day 1, then 1 tab on days 2-5  Dispense: 1 packet; Refill: 0  - Full PFT Study With Bronchodilator; Future  - refer pt to pulmonary for further evaluation     2. Screening for colon cancer  - Cologuard (For External Results Only); Future      Requested Prescriptions     Signed Prescriptions Disp Refills    predniSONE (DELTASONE) 20 MG tablet 8 tablet 0     Sig: Take 2 tabs po daily for 4 days    azithromycin (ZITHROMAX) 250 MG tablet 1 packet 0     Sig: Take 2 tabs on day 1, then 1 tab on days 2-5       There are no discontinued medications. Discussed use, benefit, and side effects of prescribed medications.   Barriers to medication compliance addressed. All patient questions answered. Pt voiced understanding. Return in about 4 months (around 7/2/2021) for HTN. COPD . I affirm this is a Patient Initiated Episode with a Patient who has not had a related appointment within my department in the past 7 days or scheduled within the next 24 hours. Patient identification was verified at the start of the visit: Yes    Total Time: minutes: 21-30 minutes    The visit was conducted pursuant to the emergency declaration under the [address] [place] [address], [address] [place] [address] authority and the [email or web address] General Act. Patient identification was verified, and a caregiver was present when appropriate. The patient was located in a state where the provider was credentialed to provide care.     Note: not billable if this call serves to triage the patient into an appointment for the relevant concern

## 2021-03-02 ENCOUNTER — TELEPHONE (OUTPATIENT)
Dept: FAMILY MEDICINE CLINIC | Age: 56
End: 2021-03-02

## 2021-03-02 ENCOUNTER — VIRTUAL VISIT (OUTPATIENT)
Dept: FAMILY MEDICINE CLINIC | Age: 56
End: 2021-03-02
Payer: COMMERCIAL

## 2021-03-02 DIAGNOSIS — J44.1 COPD WITH ACUTE EXACERBATION (HCC): Primary | ICD-10-CM

## 2021-03-02 DIAGNOSIS — Z12.11 SCREENING FOR COLON CANCER: ICD-10-CM

## 2021-03-02 PROCEDURE — 99443 PR PHYS/QHP TELEPHONE EVALUATION 21-30 MIN: CPT | Performed by: NURSE PRACTITIONER

## 2021-03-02 RX ORDER — AZITHROMYCIN 250 MG/1
TABLET, FILM COATED ORAL
Qty: 1 PACKET | Refills: 0 | Status: SHIPPED | OUTPATIENT
Start: 2021-03-02 | End: 2021-03-12

## 2021-03-02 RX ORDER — PREDNISONE 20 MG/1
TABLET ORAL
Qty: 8 TABLET | Refills: 0 | Status: SHIPPED | OUTPATIENT
Start: 2021-03-02 | End: 2021-10-18

## 2021-03-02 NOTE — TELEPHONE ENCOUNTER
Called patient to finish registration for phone visit. Had to leave message for him to call back. Refer to pulmonologist, meds sent to [facility] (Zpak, prednisone), PFT ordered and cologaurd. No mention of RTW note.       ----- Message from JAEL Anderson CNP sent at 3/2/2021 11:30 AM EST -----  Pls refer pt to pulmonary for copd . Thanks.

## 2021-03-03 DIAGNOSIS — J44.9 CHRONIC OBSTRUCTIVE PULMONARY DISEASE, UNSPECIFIED COPD TYPE (HCC): Primary | ICD-10-CM

## 2021-03-03 NOTE — TELEPHONE ENCOUNTER
Spoke to patient. Referring to Dr. Amy Morales, patient was given information. PFT ordered, faxed to [facility] Scheduling they will call the patient to schedule.   Cologuard info was faxed for the patient who agreed to do test.

## 2021-03-29 DIAGNOSIS — J44.9 CHRONIC OBSTRUCTIVE PULMONARY DISEASE, UNSPECIFIED COPD TYPE (HCC): ICD-10-CM

## 2021-03-29 RX ORDER — ALBUTEROL SULFATE 90 UG/1
2 AEROSOL, METERED RESPIRATORY (INHALATION) EVERY 6 HOURS PRN
Qty: 1 INHALER | Refills: 2 | Status: SHIPPED | OUTPATIENT
Start: 2021-03-29 | End: 2021-07-12 | Stop reason: SDUPTHER

## 2021-04-05 ENCOUNTER — TELEPHONE (OUTPATIENT)
Dept: FAMILY MEDICINE CLINIC | Age: 56
End: 2021-04-05

## 2021-04-05 DIAGNOSIS — R05.9 COUGH: Primary | ICD-10-CM

## 2021-04-05 DIAGNOSIS — I10 ESSENTIAL HYPERTENSION: ICD-10-CM

## 2021-04-05 RX ORDER — AZITHROMYCIN 250 MG/1
TABLET, FILM COATED ORAL
Qty: 1 PACKET | Refills: 0 | Status: SHIPPED | OUTPATIENT
Start: 2021-04-05 | End: 2021-04-15

## 2021-04-05 RX ORDER — METOPROLOL SUCCINATE 100 MG/1
TABLET, EXTENDED RELEASE ORAL
Qty: 180 TABLET | Refills: 0 | Status: SHIPPED | OUTPATIENT
Start: 2021-04-05 | End: 2021-07-01 | Stop reason: SDUPTHER

## 2021-04-05 RX ORDER — BENZONATATE 100 MG/1
100 CAPSULE ORAL 3 TIMES DAILY PRN
Qty: 30 CAPSULE | Refills: 0 | Status: SHIPPED | OUTPATIENT
Start: 2021-04-05 | End: 2021-04-12

## 2021-04-05 RX ORDER — LISINOPRIL 10 MG/1
10 TABLET ORAL DAILY
Qty: 90 TABLET | Refills: 0 | Status: SHIPPED | OUTPATIENT
Start: 2021-04-05 | End: 2021-07-01 | Stop reason: SDUPTHER

## 2021-04-19 DIAGNOSIS — J44.9 CHRONIC OBSTRUCTIVE PULMONARY DISEASE, UNSPECIFIED COPD TYPE (HCC): ICD-10-CM

## 2021-06-16 ENCOUNTER — TELEPHONE (OUTPATIENT)
Dept: FAMILY MEDICINE CLINIC | Age: 56
End: 2021-06-16

## 2021-06-16 NOTE — TELEPHONE ENCOUNTER
DR. Keller Areas OFFICE CALLED TO LET US KNOW PT DECLINED APPT AT [address] AND WILL CALL THEIR OFFICE BACK WHEN HE IS READY TO SCHEDULE AN APPT.

## 2021-06-30 DIAGNOSIS — I10 ESSENTIAL HYPERTENSION: ICD-10-CM

## 2021-06-30 NOTE — TELEPHONE ENCOUNTER
Next Visit Date:  No future appointments.     Health Maintenance   Topic Date Due    Hepatitis C screen  Never done    Pneumococcal 0-64 years Vaccine (1 of 2 - PPSV23) Never done    COVID-19 Vaccine (1) Never done    Shingles Vaccine (1 of 2) Never done    Colon cancer screen colonoscopy  Never done    Diabetes screen  05/26/2020    Flu vaccine (Season Ended) 09/01/2021    Potassium monitoring  10/30/2021    Creatinine monitoring  10/30/2021    Lipid screen  05/27/2022    DTaP/Tdap/Td vaccine (2 - Td or Tdap) 10/24/2024    HIV screen  Completed    Hepatitis A vaccine  Aged Out    Hepatitis B vaccine  Aged Out    Hib vaccine  Aged Out    Meningococcal (ACWY) vaccine  Aged Out       Hemoglobin A1C (%)   Date Value   05/26/2017 4.7             ( goal A1C is < 7)   No results found for: LABMICR  LDL Cholesterol (mg/dL)   Date Value   05/27/2017 101       (goal LDL is <100)   AST (U/L)   Date Value   10/28/2020 32     ALT (U/L)   Date Value   10/28/2020 18     BUN (mg/dL)   Date Value   10/30/2020 10     BP Readings from Last 3 Encounters:   10/30/20 (!) 170/95   03/12/20 110/78   06/18/19 130/88          (goal 120/80)    All Future Testing planned in [facility]  Lab Frequency Next Occurrence   US THYROID Once 11/03/2021   Full PFT Study With Bronchodilator Once 04/02/2021   Cologuard (For External Results Only) Once 03/02/2022               Patient Active Problem List:     Scabies     Cellulitis of b/l lower extremity     Cellulitis of lower extremity     Alcoholism ([address]     Essential hypertension     Hyperkeratosis of b/l Soles     Alcohol withdrawal (HCC)     Tinea pedis of both feet     Suicidal ideation     Dyspnea     Acute exacerbation of chronic obstructive pulmonary disease (COPD) ([address]     Gastroesophageal reflux disease without esophagitis     Cigarette smoker     Avascular necrosis of femoral head (HCC)     Tachycardia

## 2021-07-01 RX ORDER — METOPROLOL SUCCINATE 100 MG/1
TABLET, EXTENDED RELEASE ORAL
Qty: 180 TABLET | Refills: 0 | Status: SHIPPED | OUTPATIENT
Start: 2021-07-01 | End: 2021-09-22

## 2021-07-01 RX ORDER — LISINOPRIL 10 MG/1
10 TABLET ORAL DAILY
Qty: 90 TABLET | Refills: 0 | Status: SHIPPED | OUTPATIENT
Start: 2021-07-01 | End: 2021-09-22

## 2021-07-12 DIAGNOSIS — J44.9 CHRONIC OBSTRUCTIVE PULMONARY DISEASE, UNSPECIFIED COPD TYPE (HCC): ICD-10-CM

## 2021-07-12 RX ORDER — ALBUTEROL SULFATE 90 UG/1
2 AEROSOL, METERED RESPIRATORY (INHALATION) EVERY 6 HOURS PRN
Qty: 1 INHALER | Refills: 2 | Status: SHIPPED | OUTPATIENT
Start: 2021-07-12 | End: 2021-10-12 | Stop reason: SDUPTHER

## 2021-07-22 DIAGNOSIS — J44.9 CHRONIC OBSTRUCTIVE PULMONARY DISEASE, UNSPECIFIED COPD TYPE (HCC): ICD-10-CM

## 2021-07-31 NOTE — H&P
Hearing loss ziprasidone (GEODON) 20 MG capsule Take 1 capsule by mouth 2 times daily (with meals) 5/30/17   Ad Greene MD   buPROPion (WELLBUTRIN XL) 150 MG extended release tablet Take 1 tablet by mouth every morning 3/10/17   Nia Alberto MD       Current meds  Scheduled Meds:   methylPREDNISolone  20 mg Intravenous Q8H    benzonatate  100 mg Oral TID    ipratropium-albuterol  1 ampule Inhalation Q4H While awake    nicotine  1 patch Transdermal Daily    fluticasone  1 spray Each Nostril BID    famotidine (PEPCID) injection  20 mg Intravenous BID    buPROPion  150 mg Oral QAM    lisinopril  10 mg Oral Daily    metoprolol succinate  100 mg Oral BID    ziprasidone  20 mg Oral BID WC    sodium chloride flush  10 mL Intravenous 2 times per day    enoxaparin  40 mg Subcutaneous Daily     Continuous Infusions:   sodium chloride 60 mL/hr at 05/29/19 1500     PRN Meds:.sodium chloride flush, potassium chloride **OR** potassium alternative oral replacement **OR** potassium chloride, magnesium hydroxide, ondansetron, acetaminophen    Allergies:  Patient has no known allergies. Social History:   TOBACCO:   reports that he has been smoking cigarettes. He has been smoking about 1.00 pack per day. He has never used smokeless tobacco.  ETOH:   reports that he drinks about 51.0 oz of alcohol per week.   OCCUPATION:      Family History:       Problem Relation Age of Onset    Other Mother         mental health        REVIEW OF SYSTEMS:  Constitutional:  negative for  fevers, chills, sweats and weight loss  HEENT:  negative for  hearing loss, ear drainage, nasal congestion, snoring, hoarseness and voice change  Neck:  No swollen glands and No h/o goiter or thyroid disease  Cardiac:  negative for  chest pain, dyspnea, palpitations, orthopnea, PND, edema  Respiratory:  negative for  dry cough, cough with sputum, dyspnea, wheezing and hemoptysis  Gastrointestinal:  negative for nausea, vomiting, change in bowel habits, diarrhea, constipation, abdominal pain and hemtochezia  :  negative for frequency, dysuria, urinary incontinence, hesitancy, decreased stream and hematuria  Musculoskeletal:  negative for  myalgias, arthralgias, joint swelling and stiff joints  Neuro:  negative for headaches, dizziness, seizures, memory problems, visual disturbance, weakness and syncope      Physical Exam:    Vitals: /79   Pulse 112   Temp 97.7 °F (36.5 °C) (Oral)   Resp 17   Ht 6' 1\" (1.854 m)   Wt 192 lb 3.9 oz (87.2 kg)   SpO2 95%   BMI 25.36 kg/m²   General appearance: alert, appears stated age and cooperative  Skin: Skin color, texture, turgor normal. No rashes or lesions  HEENT: Head: Normocephalic, no lesions, without obvious abnormality. Eye: Normal external eye, conjunctiva, lids cornea, ALLIE  Neck: no adenopathy, no carotid bruit, no JVD, supple, symmetrical, trachea midline and thyroid not enlarged, symmetric, no tenderness/mass/nodules  Lungs: rhonchi in greg fields  Heart: regular rate and rhythm, S1, S2 normal, no murmur, click, rub or gallop  Abdomen: soft, non-tender; bowel sounds normal; no masses,  no organomegaly  Extremities: extremities normal, atraumatic, no cyanosis or edema  Neurologic: Mental status: Alert, oriented, thought content appropriate    CBC:   Recent Labs     05/28/19 2035 05/29/19  0612   WBC 10.9 4.1   HGB 16.1 14.7    200     BMP:    Recent Labs     05/28/19 2035 05/29/19  0612   * 131*   K 4.0 4.5   CL 94* 97*   CO2 18* 16*   BUN 13 12   CREATININE 0.83 0.56*   GLUCOSE 123* 157*     Hepatic:   Recent Labs     05/28/19 2035 05/29/19  0612   AST 32 32   ALT 19 17   BILITOT 0.80 0.82   ALKPHOS 72 63     Troponin: No results for input(s): TROPONINI in the last 72 hours. BNP: No results for input(s): BNP in the last 72 hours. Lipids: No results for input(s): CHOL, HDL in the last 72 hours.     Invalid input(s): LDLCALCU  ABGs: No results found for: PHART, PO2ART, UXK2CTW  INR: Recent Labs     05/28/19 2035   INR 0.9     -----------------------------------------------------------------  PA/lat CXR: Xr Chest Portable    Result Date: 5/28/2019  EXAMINATION: ONE XRAY VIEW OF THE CHEST 5/28/2019 8:52 pm COMPARISON: May 2, 2019 HISTORY: ORDERING SYSTEM PROVIDED HISTORY: Chest Pain TECHNOLOGIST PROVIDED HISTORY: Chest Pain Ordering Physician Provided Reason for Exam: chest pain Acuity: Acute Type of Exam: Initial Additional signs and symptoms: Cough, congestion, head pressure. Relevant Medical/Surgical History: Cough, congestion, head pressure. FINDINGS: Cardiac monitoring leads overlie the chest.  Heart is not enlarged. No pleural effusion, pneumothorax, or airspace consolidation. No acute disease. Prophylaxis:   DVT with  [x] lovenox        [] heparin        [] Scd        [] none:     Assessment and Plan   1. Ac ex copd/aerosol/antibiotics/pulmonary  2. Tobacco dep  3. alcoholism    Patient Active Problem List   Diagnosis Code    Scabies B86    Cellulitis of b/l lower extremity L03. 116    Cellulitis of lower extremity L03.119    Alcoholism ([facility]) F10.20    Essential hypertension I10    Hyperkeratosis of b/l Soles Q82.8    Alcohol withdrawal ([facility]) F10.239    Tinea pedis of both feet B35.3    Suicidal ideation R45.851    Dyspnea R06.00    Acute exacerbation of chronic obstructive pulmonary disease (COPD) ([facility] J44.1       Anticipated Disposition upon discharge: [] Home                                                                         [] Home with Home Health                                                                         [] [facility]ru[facility] Spaulding                                                                         [] [address]          Patient is admitted as inpatient status because of co-morbidities listed above, severity of signs and symptoms as outlined, requirement for current medical therapies and most importantly because of direct risk to patient if care not provided in a hospital setting.     Tari Rea MD  Admitting Hospitalist

## 2021-09-14 DIAGNOSIS — J44.9 CHRONIC OBSTRUCTIVE PULMONARY DISEASE, UNSPECIFIED COPD TYPE (HCC): Primary | ICD-10-CM

## 2021-09-22 DIAGNOSIS — I10 ESSENTIAL HYPERTENSION: ICD-10-CM

## 2021-09-22 RX ORDER — METOPROLOL SUCCINATE 100 MG/1
TABLET, EXTENDED RELEASE ORAL
Qty: 180 TABLET | Refills: 0 | Status: SHIPPED | OUTPATIENT
Start: 2021-09-22 | End: 2022-01-07 | Stop reason: SDUPTHER

## 2021-09-22 RX ORDER — LISINOPRIL 10 MG/1
10 TABLET ORAL DAILY
Qty: 90 TABLET | Refills: 0 | Status: SHIPPED | OUTPATIENT
Start: 2021-09-22 | End: 2022-01-07 | Stop reason: SDUPTHER

## 2021-10-08 DIAGNOSIS — J44.9 CHRONIC OBSTRUCTIVE PULMONARY DISEASE, UNSPECIFIED COPD TYPE (HCC): ICD-10-CM

## 2021-10-08 RX ORDER — ALBUTEROL SULFATE 90 UG/1
2 AEROSOL, METERED RESPIRATORY (INHALATION) EVERY 6 HOURS PRN
Qty: 1 EACH | Refills: 1 | Status: CANCELLED | OUTPATIENT
Start: 2021-10-08

## 2021-10-12 RX ORDER — ALBUTEROL SULFATE 90 UG/1
2 AEROSOL, METERED RESPIRATORY (INHALATION) EVERY 6 HOURS PRN
Qty: 1 EACH | Refills: 3 | Status: SHIPPED | OUTPATIENT
Start: 2021-10-12 | End: 2022-01-21 | Stop reason: SDUPTHER

## 2021-10-13 DIAGNOSIS — J44.9 CHRONIC OBSTRUCTIVE PULMONARY DISEASE, UNSPECIFIED COPD TYPE (HCC): ICD-10-CM

## 2021-10-18 ENCOUNTER — TELEPHONE (OUTPATIENT)
Dept: ONCOLOGY | Age: 56
End: 2021-10-18

## 2021-10-18 ENCOUNTER — OFFICE VISIT (OUTPATIENT)
Dept: PULMONOLOGY | Age: 56
End: 2021-10-18
Payer: COMMERCIAL

## 2021-10-18 VITALS
DIASTOLIC BLOOD PRESSURE: 88 MMHG | HEART RATE: 119 BPM | RESPIRATION RATE: 20 BRPM | BODY MASS INDEX: 25.98 KG/M2 | OXYGEN SATURATION: 99 % | SYSTOLIC BLOOD PRESSURE: 142 MMHG | HEIGHT: 73 IN | TEMPERATURE: 99.1 F | WEIGHT: 196 LBS

## 2021-10-18 DIAGNOSIS — Z87.891 PERSONAL HISTORY OF TOBACCO USE: ICD-10-CM

## 2021-10-18 DIAGNOSIS — J43.2 CENTRILOBULAR EMPHYSEMA (HCC): ICD-10-CM

## 2021-10-18 DIAGNOSIS — J44.9 STAGE 3 SEVERE COPD BY GOLD CLASSIFICATION (HCC): Primary | ICD-10-CM

## 2021-10-18 PROCEDURE — 4004F PT TOBACCO SCREEN RCVD TLK: CPT | Performed by: INTERNAL MEDICINE

## 2021-10-18 PROCEDURE — 3017F COLORECTAL CA SCREEN DOC REV: CPT | Performed by: INTERNAL MEDICINE

## 2021-10-18 PROCEDURE — G8926 SPIRO NO PERF OR DOC: HCPCS | Performed by: INTERNAL MEDICINE

## 2021-10-18 PROCEDURE — 94729 DIFFUSING CAPACITY: CPT | Performed by: INTERNAL MEDICINE

## 2021-10-18 PROCEDURE — G8419 CALC BMI OUT NRM PARAM NOF/U: HCPCS | Performed by: INTERNAL MEDICINE

## 2021-10-18 PROCEDURE — 3023F SPIROM DOC REV: CPT | Performed by: INTERNAL MEDICINE

## 2021-10-18 PROCEDURE — G8484 FLU IMMUNIZE NO ADMIN: HCPCS | Performed by: INTERNAL MEDICINE

## 2021-10-18 PROCEDURE — 94375 RESPIRATORY FLOW VOLUME LOOP: CPT | Performed by: INTERNAL MEDICINE

## 2021-10-18 PROCEDURE — G8427 DOCREV CUR MEDS BY ELIG CLIN: HCPCS | Performed by: INTERNAL MEDICINE

## 2021-10-18 PROCEDURE — 99204 OFFICE O/P NEW MOD 45 MIN: CPT | Performed by: INTERNAL MEDICINE

## 2021-10-18 RX ORDER — FLUTICASONE FUROATE, UMECLIDINIUM BROMIDE AND VILANTEROL TRIFENATATE 200; 62.5; 25 UG/1; UG/1; UG/1
2 POWDER RESPIRATORY (INHALATION) DAILY
Qty: 2 EACH | Refills: 0 | COMMUNITY
Start: 2021-10-18 | End: 2022-01-26 | Stop reason: SDUPTHER

## 2021-10-18 RX ORDER — ALBUTEROL SULFATE 2.5 MG/3ML
2.5 SOLUTION RESPIRATORY (INHALATION) EVERY 6 HOURS PRN
Qty: 120 EACH | Refills: 3 | Status: SHIPPED | OUTPATIENT
Start: 2021-10-18 | End: 2022-01-26

## 2021-10-18 NOTE — LETTER
10/18/2021        Ivette Theodore  [address] [place] [address] [address]  [place] [id number]    Dear Ivette Theodore: Your healthcare provider has ordered a low dose CT scan of the chest for lung cancer screening. You will find enclosed, information about CT lung screening. Please review the statement of understanding, you will be asked to sign a copy of this at the time of your CT scan    If you have not already been contacted to make the appointment for your scan, please call our scheduling department at [phone]    Keep in mind that CT lung screening does not take the place of smoking cessation. If you are a current smoker, you will find enclosed smoking cessation resources. Please do not hesitate to contact me if you have any questions or concerns.     [facility],      [facility] Lung Screening Program  [phone]

## 2021-10-18 NOTE — PATIENT INSTRUCTIONS
SENDING YOUR NEBULIZER ORDER TO [facility] -PH. [phone] SLEEP  FAXED OFFICE NOTE AND ORDER 10/19/21    Patient Education        Chronic Obstructive Pulmonary Disease (COPD): Care Instructions  Overview     Chronic obstructive pulmonary disease (COPD) is a lung disease that makes it hard to breathe. With COPD, the airways that lead to the lungs are narrowed, and the tiny air sacs in the lungs are damaged and lose their stretch. People with COPD have decreased airflow in and out of the lungs, which makes it hard to breathe. The airways also can get clogged with thick mucus. Cigarette smoking is a major cause of COPD. Although there is no cure for COPD, you can slow its progress. Following your treatment plan and taking care of yourself can help you feel better and live longer. Follow-up care is a key part of your treatment and safety. Be sure to make and go to all appointments, and call your doctor if you are having problems. It's also a good idea to know your test results and keep a list of the medicines you take. How can you care for yourself at home? Staying healthy    · Do not smoke. This is the most important step you can take to prevent more damage to your lungs. If you need help quitting, talk to your doctor about stop-smoking programs and medicines. These can increase your chances of quitting for good.     · Avoid colds and other infections. Get the pneumococcal and whooping cough (pertussis) vaccines. If you have had these vaccines before, ask your doctor if you need another dose. Get the flu vaccine every fall. If you must be around people with colds or the flu, wash your hands often.     · Avoid secondhand smoke and air pollution. Try to stay inside with your windows closed when air pollution is bad. Medicines and oxygen therapy    · Take your medicines exactly as prescribed. Call your doctor if you think you are having a problem with your medicine. You may be taking medicines such as:  ? Bronchodilators. These help open your airways and make breathing easier. They are either short-acting (work for 4 to 9 hours) or long-acting (work for 12 to 24 hours). You inhale most bronchodilators, so they start to act quickly. Always carry your quick-relief inhaler with you in case you need it. ? Corticosteroids (prednisone, budesonide). These reduce airway inflammation. They come in inhaled or pill form.     · Ask your doctor or pharmacist if you are using each type of inhaler correctly. With correct use, the medicine is more likely to get to your lungs.     · See if a spacer is right for you. A spacer may also help you get more inhaled medicine to your lungs. If you use one, ask how to use it properly.     · Do not take any vitamins, over-the-counter medicine, or herbal products without talking to your doctor first.     · If your doctor prescribed antibiotics, take them as directed. Do not stop taking them just because you feel better. You need to take the full course of antibiotics.     · If you use oxygen therapy, use the flow rate your doctor has recommended. Don't change it without talking to your doctor first. Oxygen therapy boosts the amount of oxygen in your blood and helps you breathe easier. Activity    · Get regular exercise. Walking is an easy way to get exercise. Start out slowly, and walk a little more each day.     · Pay attention to your breathing. You are exercising too hard if you can't talk while you exercise.     · Take short rest breaks when doing household chores and other activities.     · Learn breathing methods--such as breathing through pursed lips--to help you become less short of breath.     · If your doctor has not set you up with a pulmonary rehabilitation program, ask if rehab is right for you. Rehab includes exercise programs, education about your disease and how to manage it, help with diet and other changes, and emotional support.    Diet    · Eat regular, healthy meals.     · Use bronchodilators about 1 hour before you eat to make it easier to eat.     · Eat several smaller, frequent meals to prevent getting too full. A full stomach can push on the muscle that helps you breathe (your diaphragm) and make it harder to breathe.     · Drink beverages at the end of the meal.     · Avoid foods that are hard to chew.     · Eat foods that contain protein so you don't lose muscle mass.     · Talk with your doctor if you gain too much weight or if you lose weight without trying. Mental health    · Talk to your family, friends, or a therapist about your feelings. Some people feel frightened, angry, hopeless, helpless, and even guilty. Talking openly about feelings may help you cope. If these feelings last, talk to your doctor. When should you call for help? Call 911 anytime you think you may need emergency care. For example, call if:    · You have severe trouble breathing.     · You are having chest pain that is different or worse than usual.   Call your doctor now or seek immediate medical care if:    · You have new or worse trouble breathing.     · You cough up blood.     · You have a fever.     · You have feelings of anxiety or depression. Watch closely for changes in your health, and be sure to contact your doctor if:    · You cough more deeply or more often, especially if you notice more mucus or a change in the color of your mucus.     · You have new or worse swelling in your legs or belly.     · You are not getting better as expected. Where can you learn more? Go to https://[employer] Pharm[employer]odilia.[employer]. org and sign in to your [employer] account. Enter R812 in the [employer] box to learn more about \"Chronic Obstructive Pulmonary Disease (COPD): Care Instructions. \"     If you do not have an account, please click on the \"Sign Up Now\" link. Current as of: July 6, 2021               Content Version: 13.0  © 9893-9255 Healthwise, Incorporated.    Care instructions adapted under license by [facility] ([facility]). If you have questions about a medical condition or this instruction, always ask your healthcare professional. [place] [address] any warranty or liability for your use of this information. Patient Education        Chronic Obstructive Pulmonary Disease (COPD): Care Instructions  Overview     Chronic obstructive pulmonary disease (COPD) is a lung disease that makes it hard to breathe. With COPD, the airways that lead to the lungs are narrowed, and the tiny air sacs in the lungs are damaged and lose their stretch. People with COPD have decreased airflow in and out of the lungs, which makes it hard to breathe. The airways also can get clogged with thick mucus. Cigarette smoking is a major cause of COPD. Although there is no cure for COPD, you can slow its progress. Following your treatment plan and taking care of yourself can help you feel better and live longer. Follow-up care is a key part of your treatment and safety. Be sure to make and go to all appointments, and call your doctor if you are having problems. It's also a good idea to know your test results and keep a list of the medicines you take. How can you care for yourself at home? Staying healthy    · Do not smoke. This is the most important step you can take to prevent more damage to your lungs. If you need help quitting, talk to your doctor about stop-smoking programs and medicines. These can increase your chances of quitting for good.     · Avoid colds and other infections. Get the pneumococcal and whooping cough (pertussis) vaccines. If you have had these vaccines before, ask your doctor if you need another dose. Get the flu vaccine every fall. If you must be around people with colds or the flu, wash your hands often.     · Avoid secondhand smoke and air pollution. Try to stay inside with your windows closed when air pollution is bad.    Medicines and oxygen therapy    · Take your medicines exactly as prescribed. Call your doctor if you think you are having a problem with your medicine. You may be taking medicines such as:  ? Bronchodilators. These help open your airways and make breathing easier. They are either short-acting (work for 4 to 9 hours) or long-acting (work for 12 to 24 hours). You inhale most bronchodilators, so they start to act quickly. Always carry your quick-relief inhaler with you in case you need it. ? Corticosteroids (prednisone, budesonide). These reduce airway inflammation. They come in inhaled or pill form.     · Ask your doctor or pharmacist if you are using each type of inhaler correctly. With correct use, the medicine is more likely to get to your lungs.     · See if a spacer is right for you. A spacer may also help you get more inhaled medicine to your lungs. If you use one, ask how to use it properly.     · Do not take any vitamins, over-the-counter medicine, or herbal products without talking to your doctor first.     · If your doctor prescribed antibiotics, take them as directed. Do not stop taking them just because you feel better. You need to take the full course of antibiotics.     · If you use oxygen therapy, use the flow rate your doctor has recommended. Don't change it without talking to your doctor first. Oxygen therapy boosts the amount of oxygen in your blood and helps you breathe easier. Activity    · Get regular exercise. Walking is an easy way to get exercise. Start out slowly, and walk a little more each day.     · Pay attention to your breathing. You are exercising too hard if you can't talk while you exercise.     · Take short rest breaks when doing household chores and other activities.     · Learn breathing methods--such as breathing through pursed lips--to help you become less short of breath.     · If your doctor has not set you up with a pulmonary rehabilitation program, ask if rehab is right for you.  Rehab includes exercise programs, education about your disease and how to manage it, help with diet and other changes, and emotional support. Diet    · Eat regular, healthy meals.     · Use bronchodilators about 1 hour before you eat to make it easier to eat.     · Eat several smaller, frequent meals to prevent getting too full. A full stomach can push on the muscle that helps you breathe (your diaphragm) and make it harder to breathe.     · Drink beverages at the end of the meal.     · Avoid foods that are hard to chew.     · Eat foods that contain protein so you don't lose muscle mass.     · Talk with your doctor if you gain too much weight or if you lose weight without trying. Mental health    · Talk to your family, friends, or a therapist about your feelings. Some people feel frightened, angry, hopeless, helpless, and even guilty. Talking openly about feelings may help you cope. If these feelings last, talk to your doctor. When should you call for help? Call 911 anytime you think you may need emergency care. For example, call if:    · You have severe trouble breathing.     · You are having chest pain that is different or worse than usual.   Call your doctor now or seek immediate medical care if:    · You have new or worse trouble breathing.     · You cough up blood.     · You have a fever.     · You have feelings of anxiety or depression. Watch closely for changes in your health, and be sure to contact your doctor if:    · You cough more deeply or more often, especially if you notice more mucus or a change in the color of your mucus.     · You have new or worse swelling in your legs or belly.     · You are not getting better as expected. Where can you learn more? Go to https://ivan.[employer]. org and sign in to your [employer] account. Enter S401 in the [employer] box to learn more about \"Chronic Obstructive Pulmonary Disease (COPD): Care Instructions. \"     If you do not have an account, please click on the \"Sign Up Now\" link.  Current as of: July 6, 2021               Content Version: 13.0  © 0523-1978 [employer], Incorporated. Care instructions adapted under license by [facility] ([facility]). If you have questions about a medical condition or this instruction, always ask your healthcare professional. [address] any warranty or liability for your use of this information.

## 2021-10-18 NOTE — PROGRESS NOTES
REASON FOR THE CONSULTATION:  Copd   HISTORY OF PRESENT ILLNESS:    Ivette Theodore is a 61y.o. year old male   Patient has chronic grade 3 dyspnea , which has been progressively getting worse with  Exertion OVER LAST 4 years to point that he can walk 10 feet and is sob with taking care of ADL . Complains of Yes Cough , Yessputum production large amount every morning   , No  hemoptysis , Yes Associated with wheezing . No home oxygen  . Smokes 1 ppd . He has smoked 40 years and average 1 pack per day . He is on Dulera and albuterol but donot help much . LUNG CANCER SCREENING     1. CRITERIA MET    [x]     CT ORDERED  [x]      2. CRITERIA NOT MET   []      3. REFUSED                    []        REASON CRITERIA NOT MET     1. SMOKING LESS THAN 30 PY  []      2. AGE LESS THAN 55 or GREATER 77 YEARS  []      3. QUIT SMOKING 15 YEARS OR GREATER   []      4. RECENT CT WITH IN 11 MONTHS    []      5. LIFE EXPECTANCY < 5 YEARS   []      6.  SIGNS  AND SYMPTOMS OF LUNG CANCER   []         Immunization   Immunization History   Administered Date(s) Administered    COVID-19, Pfizer, PF, 30mcg/0.3mL 02/19/2021, 03/12/2021    Td (Adult), 5 Lf Tetanus Toxoid, Pf (Tenivac, Decavac) 12/01/1998    Tdap (Boostrix, Adacel) 10/24/2014        Pneumococcal Vaccine     [] Up to date    [] Indicated   [] Refused  [] Contraindicated       Influenza Vaccine   [] Up to date    [] Indicated   [] Refused  [] Contraindicated        Pulmonary Rehab   [] Completed   [] Indicated   [] Refused  [] Contraindicated   PAST MEDICAL HISTORY:       Diagnosis Date    Alcohol abuse     History of hip replacement     left/right    Hypertension     Scabies          Family History:       Problem Relation Age of Onset    Other Mother         mental health        SURGICAL HISTORY:   Past Surgical History:   Procedure Laterality Date    NECK SURGERY      TOTAL HIP ARTHROPLASTY Bilateral            SOCIAL AND OCCUPATIONAL HEALTH: There  No history of TB or TB exposure. There  No asbestos ,[place] dust exposure. The patient reports  No coal mine, [place], [place], [facility] exposure. History of travel outside the country No  There  is use of   marijuana No   VapingNo  IV heroinNo  Crack cocaineNo  There  Nohot tub exposure. Pets -cats Yes, dogsNo  Birds No    Occupational history - 27 years  and now  , minimal work of brake or clutch     TOBACCO:   reports that he has been smoking cigarettes. He has been smoking about 1.00 pack per day. He has never used smokeless tobacco.  ETOH:   reports current alcohol use of about 85.0 standard drinks of alcohol per week. ALLERGIES:      No Known Allergies      Home Meds:   Prior to Admission medications    Medication Sig Start Date End Date Taking?  Authorizing Provider   albuterol (PROVENTIL) (2.5 MG/3ML) 0.083% nebulizer solution Take 3 mLs by nebulization every 6 hours as needed for Wheezing 10/18/21  Yes Ewelina Felipe MD   mometasone-formoterol (DULERA) 200-5 MCG/ACT inhaler Inhale 2 puffs into the lungs every 12 hours 10/13/21  Yes JAEL Chance CNP   albuterol sulfate HFA (PROAIR HFA) 108 (90 Base) MCG/ACT inhaler Inhale 2 puffs into the lungs every 6 hours as needed for Wheezing 10/12/21  Yes JAEL Chance CNP   metoprolol succinate (TOPROL XL) 100 MG extended release tablet TAKE 1 TABLET BY MOUTH TWICE DAILY 9/22/21  Yes JAEL Chance CNP   lisinopril (PRINIVIL;ZESTRIL) 10 MG tablet TAKE 1 TABLET BY MOUTH DAILY 9/22/21  Yes JAEL Chance CNP              REVIEW OF SYSTEMS:    CONSTITUTIONAL:  negative for  fevers, chills, sweats, fatigue, malaise, anorexia and weight loss  EYES:  negative for  double vision, blurred vision, dry eyes, eye discharge and redness  HEENT:  negative for  hearing loss, tinnitus, ear drainage, earaches and nasal congestion  RESPIRATORY:  See hpi  CARDIOVASCULAR:  negative for  chest pain,, palpitations, orthopnea, PND  GASTROINTESTINAL:  negative for nausea, vomiting, change in bowel habits, diarrhea, constipation, abdominal pain, pruritus, abdominal mass and abdominal distention  GENITOURINARY:  negative for frequency, dysuria, nocturia, urinary incontinence and hesitancy  INTEGUMENT  negative for rash, skin lesion(s), dryness, skin color change, changes in lesion, pruritus and changes in hair  HEMATOLOGIC/LYMPHATIC:  negative for easy bruising, bleeding, lymphadenopathy, petechiae and swelling/edema  ALLERGIC/IMMUNOLOGIC:  negative for recurrent infections, urticaria and drug reactions  ENDOCRINE:  negative for heat intolerance, cold intolerance, tremor, weight changes and change in bowel habits  MUSCULOSKELETAL:  negative for  myalgias, arthralgias, pain, joint swelling, stiff joints and decreased range of motion  NEUROLOGICAL:  negative for headaches, dizziness, seizures, memory problems, speech problems, visual disturbance and coordination problems  Skin no rash no dermatitis  Vitals:  BP (!) 142/88 (Site: Right Upper Arm, Position: Sitting, Cuff Size: Medium Adult)   Pulse 119   Temp 99.1 °F (37.3 °C) (Temporal)   Resp 20   Ht 6' 1\" (1.854 m)   Wt 196 lb (88.9 kg)   SpO2 99% Comment: ra  BMI 25.86 kg/m²     PHYSICAL EXAM:  General Appearance:    Alert, cooperative, no distress, appears stated age   Head:    Normocephalic, without obvious abnormality, atraumatic      Eyes:    PERRL, conjunctiva/corneas clear, EOM's intact   Ears:    Normal  external ear canals, both ears   Nose:   Nares normal, septum midline, mucosa normal, no drainage        or sinus tenderness   Throat:   Lips, mucosa, and tongue normal; teeth and gums normal   Neck:   Supple, symmetrical, trachea midline, no adenopathy;     thyroid:  no enlargement/tenderness/nodules; no carotid    bruit , no JVD   Back:     Symmetric, no curvature, ROM normal, no CVA tenderness   Lungs:     AP diameter of chest increased.  Thoracic expansion and diaphragmatic excursion diminished. BS diminished and expiratory phase prolonged. No dullness to percussion or tenderness to palpation. No bronchial breath sounds . Chest Wall:    No tenderness or deformity      Heart:    Regular rate and rhythm, S1 and S2 normal, no murmur, rub        or gallop no rvh                           Abdomen:                                                 Pulses:                              Skin:                  Lymph nodes:                    Neurologic:                  Soft, non-tender, bowel sounds active all four quadrants,     no masses, no organomegaly         2+ and symmetric all extremities     Skin color, texture, turgor normal, no rashes or lesions       Cervical, supraclavicular not enlarged or matted or tender      CNII-XII intact, normal strength 5/5 . Sensation grossly normal  and reflexes normal 2+  throughout     Clubbing No  Lower ext edema No  Upper ext edema No                     IMPRESSION:     Diagnosis Orders   1. Stage 3 severe COPD by GOLD classification ([address]  OR DIFFUSING CAPACITY    OR RESPIRATORY FLOW VOLUME LOOP    Alpha-1-Antitrypsin W/ Phenotype    DME Order for Nebulizer as OP    albuterol (PROVENTIL) (2.5 MG/3ML) 0.083% nebulizer solution   2. Personal history of tobacco use  CT LUNG SCREENING   3. Centrilobular emphysema ([address]  Alpha-1-Antitrypsin W/ Phenotype        :                PLAN:       extensive discussion with patient - advised smoking cessation but enjoys smoking and does not want to quit . Previously has tried Virtual 3-D Display for Smartphones and caused night sanchez . Per patient this is only vice and he enjoys it . Will give trial of Trelegy and based on response will provide px   He will stop Dulera while using Trelegy .   Will need flue shot which wants to get on a week end   Pneumonia vaccine next visit   Nebulizer with albuterol as needed   Follow up 1 month      Requested Prescriptions     Signed Prescriptions Disp Refills    albuterol (PROVENTIL) (2.5 MG/3ML) 0.083% nebulizer solution 120 each 3     Sig: Take 3 mLs by nebulization every 6 hours as needed for Wheezing       Medications Discontinued During This Encounter   Medication Reason    predniSONE (DELTASONE) 20 MG tablet LIST CLEANUP       Ryan received counseling on the following healthy behaviors: nutrition, exercise and medication adherence    Patient given educational materials : see patient instruction       Discussed use, benefit, and side effects of prescribed medications. Barriers to medication compliance addressed. All patient questions answered. Pt voiced understanding. I hope this updates you on my evaluation and clinical thinking. Thank you for allowing me to participate in his care. Sincerely,      Electronically signed by Kristi Monroe MD on   10/18/21 at 11:35 AM EDT       Please note that this chart was generated using voice recognition Dragon dictation software. Although every effort was made to ensure the accuracy of this automated transcription, some errors in transcription may have occurred.

## 2021-10-19 NOTE — PROGRESS NOTES
Pulmonary Function Test     10/18/ 21     Spirometry - sub optimal effort . Flow volume loop shows obstructive disease pattern . FEV1- 39 % predicted . FVC- 54 % predicted   FEV1/FVC- 58     No bronchodilator challenge performed     Lung Volumes By Body Box   Unable to perform     Diffusion capacity . Uncorrected -58 % predicted   Corrected for alveolar volume 98 % predicted     Impression     Stage 3 copd   Moderately decreased Dl co ? Emphysema .     Clinical correlation advised     Electronically signed by Luis Clifton MD on 10/19/2021 at 11:14 AM

## 2021-10-26 ENCOUNTER — TELEPHONE (OUTPATIENT)
Dept: PULMONOLOGY | Age: 56
End: 2021-10-26

## 2021-10-26 NOTE — TELEPHONE ENCOUNTER
----- Message from Nicola Lemos sent at 10/26/2021  8:09 AM EDT -----  Demetrius Rodriguez, labs ordered by Dr Lopez Durham on 10/18/21 fell into the overdue results folder. Please contact patient to see if this test was completed outside of a [facility] facility. If so please obtain results and if testing has not been completed please advise patient to complete.  Thank you.    ----- Message -----  From: SYSTEM  Sent: 10/26/2021   5:37 AM EDT  To: p Respiratory Spec Clinical Staff

## 2021-11-02 ENCOUNTER — TELEPHONE (OUTPATIENT)
Dept: FAMILY MEDICINE CLINIC | Age: 56
End: 2021-11-02

## 2021-11-02 ENCOUNTER — TELEPHONE (OUTPATIENT)
Dept: PULMONOLOGY | Age: 56
End: 2021-11-02

## 2021-11-02 NOTE — TELEPHONE ENCOUNTER
MARYSE:  Called patient back about AMANDA for COPD. He saw Dr. Berkley Moore on 10-18-21 and he ordrered CT lung to be done on 10-25-21 (patient no showed) he states he thought it was for the following day. I told him he needs to get that done to Dr. Berkley Moore. He also needs appointment here for HTN. He can discuss AMANDA with Cara Jo upon appointment on 11-8-21.     ----- Message from Tiffanie Seo sent at 11/2/2021  3:23 PM EDT -----  Subject: Message to Provider    QUESTIONS  Information for Provider? Pt calling in regards to leave of absence   request, Dr. Berkley Moore will not write off on it. Please contact pt to   discuss further. ---------------------------------------------------------------------------  --------------  Zeynep Fearing INFO  What is the best way for the office to contact you? OK to leave message on   voicemail  Preferred Call Back Phone Number? [phone]  ---------------------------------------------------------------------------  --------------  SCRIPT ANSWERS  Relationship to Patient?  Self

## 2021-11-02 NOTE — TELEPHONE ENCOUNTER
Yolanda Edmondson,   Patient called requesting leave of absence (to use his sick time) for Pulmonary issues, Stage 3 severe COPD. I told him he would need to speak with his pulmonary for that. Dr. Cherelle Breen.

## 2021-11-02 NOTE — TELEPHONE ENCOUNTER
Patient called, states you diagnosed him with COPD. He is having a hard time with his \"daily projects\" and would like to take time off of work by using up his paid time off and is eventually going to consider applying for disability. He wants to know if you can write him off of work. You ordered a cat scan that patient didn't show up for. You ordered labs, patient has not got that done yet. He has an appt with you to follow up on 12/13/21. He already asked PCP who told him to call us.

## 2021-11-08 ENCOUNTER — OFFICE VISIT (OUTPATIENT)
Dept: FAMILY MEDICINE CLINIC | Age: 56
End: 2021-11-08
Payer: COMMERCIAL

## 2021-11-08 VITALS
HEIGHT: 73 IN | HEART RATE: 92 BPM | BODY MASS INDEX: 26.61 KG/M2 | TEMPERATURE: 97.2 F | WEIGHT: 200.8 LBS | DIASTOLIC BLOOD PRESSURE: 68 MMHG | SYSTOLIC BLOOD PRESSURE: 108 MMHG

## 2021-11-08 DIAGNOSIS — F17.200 TOBACCO DEPENDENCE: ICD-10-CM

## 2021-11-08 DIAGNOSIS — Z13.220 SCREENING CHOLESTEROL LEVEL: ICD-10-CM

## 2021-11-08 DIAGNOSIS — J44.9 STAGE 3 SEVERE COPD BY GOLD CLASSIFICATION (HCC): ICD-10-CM

## 2021-11-08 DIAGNOSIS — Z12.5 SCREENING FOR PROSTATE CANCER: ICD-10-CM

## 2021-11-08 DIAGNOSIS — Z23 NEED FOR INFLUENZA VACCINATION: ICD-10-CM

## 2021-11-08 DIAGNOSIS — I10 ESSENTIAL HYPERTENSION: Primary | ICD-10-CM

## 2021-11-08 PROBLEM — B86 SCABIES: Status: RESOLVED | Noted: 2017-03-02 | Resolved: 2021-11-08

## 2021-11-08 PROCEDURE — 90674 CCIIV4 VAC NO PRSV 0.5 ML IM: CPT | Performed by: NURSE PRACTITIONER

## 2021-11-08 PROCEDURE — 3017F COLORECTAL CA SCREEN DOC REV: CPT | Performed by: NURSE PRACTITIONER

## 2021-11-08 PROCEDURE — G8427 DOCREV CUR MEDS BY ELIG CLIN: HCPCS | Performed by: NURSE PRACTITIONER

## 2021-11-08 PROCEDURE — G8926 SPIRO NO PERF OR DOC: HCPCS | Performed by: NURSE PRACTITIONER

## 2021-11-08 PROCEDURE — 90471 IMMUNIZATION ADMIN: CPT | Performed by: NURSE PRACTITIONER

## 2021-11-08 PROCEDURE — G8419 CALC BMI OUT NRM PARAM NOF/U: HCPCS | Performed by: NURSE PRACTITIONER

## 2021-11-08 PROCEDURE — 3023F SPIROM DOC REV: CPT | Performed by: NURSE PRACTITIONER

## 2021-11-08 PROCEDURE — 4004F PT TOBACCO SCREEN RCVD TLK: CPT | Performed by: NURSE PRACTITIONER

## 2021-11-08 PROCEDURE — 99214 OFFICE O/P EST MOD 30 MIN: CPT | Performed by: NURSE PRACTITIONER

## 2021-11-08 PROCEDURE — G8482 FLU IMMUNIZE ORDER/ADMIN: HCPCS | Performed by: NURSE PRACTITIONER

## 2021-11-08 ASSESSMENT — ENCOUNTER SYMPTOMS
SHORTNESS OF BREATH: 1
ABDOMINAL PAIN: 0
NAUSEA: 0
COUGH: 0

## 2021-11-08 NOTE — PROGRESS NOTES
Subjective:      Patient ID: Lukas Flores is a 61 y.o. male. Visit Information    Have you changed or started any medications since your last visit including any over-the-counter medicines, vitamins, or herbal medicines? no   Are you having any side effects from any of your medications? -  no  Have you stopped taking any of your medications? Is so, why? -  no    Have you seen any other physician or provider since your last visit? No  Have you had any other diagnostic tests since your last visit? No  Have you been seen in the emergency room and/or had an admission to a hospital since we last saw you? No  Have you had your routine dental cleaning in the past 6 months? no    Have you activated your [employer] account? If not, what are your barriers? Yes     Patient Care Team:  Shirlene Dorantes MD as PCP - General (Family Medicine)  Tia Pennington MD as Consulting Physician (Pulmonary Disease)    Medical History Review  Past Medical, Family, and Social History reviewed and does not contribute to the patient presenting condition    Health Maintenance   Topic Date Due    Hepatitis C screen  Never done    Pneumococcal 0-64 years Vaccine (1 of 2 - PPSV23) Never done    Colon cancer screen colonoscopy  Never done    Shingles Vaccine (1 of 2) Never done    Diabetes screen  05/26/2020    COVID-19 Vaccine (3 - Pfizer booster) 09/12/2021    Potassium monitoring  10/30/2021    Creatinine monitoring  10/30/2021    Lipid screen  05/27/2022    DTaP/Tdap/Td vaccine (2 - Td or Tdap) 10/24/2024    Flu vaccine  Completed    HIV screen  Completed    Hepatitis A vaccine  Aged Out    Hepatitis B vaccine  Aged Out    Hib vaccine  Aged Out    Meningococcal (ACWY) vaccine  Aged Out     HPI  61year old male presents with management of HTN severe COPD and tobacco dependence with medication refills. Currently is on dual therapy for bp and it is stable today.  Pt  recently followed up with Dr. Brandi Abraham for worsening dyspnea and states he was diagnosed with stage 3 COPD which affects his daily function. States it takes him 10 minutes to get up due to sob. Pt would like to use his sick time for short term disability and needs a letter. Pt is a current smoker and has no desire to quit    Review of Systems   Constitutional: Negative for chills and fever. Respiratory: Positive for shortness of breath (exertional ). Negative for cough. Cardiovascular: Negative for chest pain and palpitations. Gastrointestinal: Negative for abdominal pain and nausea. Neurological: Negative for dizziness and weakness. Psychiatric/Behavioral: Negative for agitation and behavioral problems. Objective:   Physical Exam  Vitals and nursing note reviewed. Constitutional:       General: He is not in acute distress. Appearance: Normal appearance. HENT:      Nose: Nose normal.   Eyes:      Conjunctiva/sclera: Conjunctivae normal.   Cardiovascular:      Rate and Rhythm: Regular rhythm. Heart sounds: Normal heart sounds. Pulmonary:      Effort: Pulmonary effort is normal. No respiratory distress. Comments: Diminished lung sounds throughout   Abdominal:      Palpations: Abdomen is soft. Tenderness: There is no abdominal tenderness. Musculoskeletal:         General: No tenderness or deformity. Cervical back: Neck supple. Lymphadenopathy:      Cervical: No cervical adenopathy. Skin:     General: Skin is warm and dry. Neurological:      Mental Status: He is alert and oriented to person, place, and time. Cranial Nerves: No cranial nerve deficit. Psychiatric:         Mood and Affect: Mood normal.         Behavior: Behavior normal.         Assessment:      1. Essential hypertension    2. Stage 3 severe COPD by GOLD classification ([facility]    3. Tobacco dependence    4. Screening cholesterol level    5. Screening for prostate cancer    6.  Need for influenza vaccination            Plan:        BP Readings from Last 3 voiced understanding. Return in about 5 weeks (around 12/16/2021) for HTN, severe COPD.

## 2021-12-16 ENCOUNTER — TELEPHONE (OUTPATIENT)
Dept: FAMILY MEDICINE CLINIC | Age: 56
End: 2021-12-16

## 2021-12-16 NOTE — TELEPHONE ENCOUNTER
I spoke with the patient. I will mail his letter for work. I told him he needs to follow with his pulmonologist Dr Josseline Kennedy, very important due to COPD. He understood.

## 2021-12-16 NOTE — TELEPHONE ENCOUNTER
Garett Campbell,  Patient called requesting an extension on his return to work date to 12-22-21. (Stage 3 COPD). He has appointment tomorrow, but cannot afford office visit ([facility] insurance did not cover visit in [facility]). Can I extend note to 12-22-21 return to work? Thank you.         Last visit: 11/8/21  Last Med refill:   Does patient have enough medication for 72 hours:     Next Visit Date:  Future Appointments   Date Time Provider Lona Camarena   12/17/2021 11:15 AM JAEL Fontana - CNP [facility] Maintenance   Topic Date Due    Hepatitis C screen  Never done    Pneumococcal 0-64 years Vaccine (1 of 2 - PPSV23) Never done    Colon cancer screen colonoscopy  Never done    Shingles Vaccine (1 of 2) Never done    Diabetes screen  05/26/2020    COVID-19 Vaccine (3 - Booster for Pfizer series) 09/12/2021    Potassium monitoring  10/30/2021    Creatinine monitoring  10/30/2021    Lipid screen  05/27/2022    DTaP/Tdap/Td vaccine (2 - Td or Tdap) 10/24/2024    Flu vaccine  Completed    HIV screen  Completed    Hepatitis A vaccine  Aged Out    Hepatitis B vaccine  Aged Out    Hib vaccine  Aged Out    Meningococcal (ACWY) vaccine  Aged Out       Hemoglobin A1C (%)   Date Value   05/26/2017 4.7             ( goal A1C is < 7)   No results found for: LABMICR  LDL Cholesterol (mg/dL)   Date Value   05/27/2017 101       (goal LDL is <100)   AST (U/L)   Date Value   10/28/2020 32     ALT (U/L)   Date Value   10/28/2020 18     BUN (mg/dL)   Date Value   10/30/2020 10     BP Readings from Last 3 Encounters:   11/08/21 108/68   10/18/21 (!) 142/88   10/30/20 (!) 170/95          (goal 120/80)    All Future Testing planned in [facility]  Lab Frequency Next Occurrence   Full PFT Study With Bronchodilator Once 04/02/2021   Cologuard (For External Results Only) Once 03/02/2022   CT LUNG SCREENING Once 10/18/2022   Alpha-1-Antitrypsin W/ Phenotype Once 12/24/2021   Comprehensive Metabolic Panel Once 11/08/2022   CBC Once 11/08/2022   Lipid Panel Once 11/08/2022   PSA Screening Once 11/08/2022               Patient Active Problem List:     Cellulitis of b/l lower extremity     Cellulitis of lower extremity     Al[facility]ism ([facility]     Essential hypertension     Hyperkeratosis of b/l Soles     Alcohol withdrawal (HCC)     Tinea pedis of both feet     Suicidal ideation     Dyspnea     Acute exacerbation of chronic obstructive pulmonary disease (COPD) ([facility] [address]     Gastroesophageal reflux disease without esophagitis     Cigarette smoker     Avascular necrosis of femoral head (HCC)     Tachycardia     Stage 3 severe COPD by GOLD classification ([facility]     Tobacco dependence

## 2022-01-07 DIAGNOSIS — I10 ESSENTIAL HYPERTENSION: ICD-10-CM

## 2022-01-07 RX ORDER — LISINOPRIL 10 MG/1
10 TABLET ORAL DAILY
Qty: 90 TABLET | Refills: 0 | Status: SHIPPED | OUTPATIENT
Start: 2022-01-07 | End: 2022-04-14 | Stop reason: SDUPTHER

## 2022-01-07 RX ORDER — METOPROLOL SUCCINATE 100 MG/1
TABLET, EXTENDED RELEASE ORAL
Qty: 180 TABLET | Refills: 0 | Status: SHIPPED | OUTPATIENT
Start: 2022-01-07 | End: 2022-04-14 | Stop reason: SDUPTHER

## 2022-01-21 DIAGNOSIS — J44.9 CHRONIC OBSTRUCTIVE PULMONARY DISEASE, UNSPECIFIED COPD TYPE (HCC): ICD-10-CM

## 2022-01-21 RX ORDER — ALBUTEROL SULFATE 90 UG/1
2 AEROSOL, METERED RESPIRATORY (INHALATION) EVERY 6 HOURS PRN
Qty: 1 EACH | Refills: 3 | Status: SHIPPED | OUTPATIENT
Start: 2022-01-21 | End: 2022-06-13 | Stop reason: SDUPTHER

## 2022-01-26 DIAGNOSIS — J44.9 STAGE 3 SEVERE COPD BY GOLD CLASSIFICATION (HCC): ICD-10-CM

## 2022-01-26 RX ORDER — FLUTICASONE FUROATE, UMECLIDINIUM BROMIDE AND VILANTEROL TRIFENATATE 200; 62.5; 25 UG/1; UG/1; UG/1
POWDER RESPIRATORY (INHALATION)
Qty: 28 EACH | Refills: 2 | Status: SHIPPED | OUTPATIENT
Start: 2022-01-26 | End: 2022-04-07 | Stop reason: SDUPTHER

## 2022-02-23 ENCOUNTER — TELEPHONE (OUTPATIENT)
Dept: FAMILY MEDICINE CLINIC | Age: 57
End: 2022-02-23

## 2022-02-23 NOTE — TELEPHONE ENCOUNTER
Form was faxing from [employer] for medications that are not on patient's formulary. Trelegy Ellipta 200-62.5-25 Inhaler, Albuterol HFA 90 MCG Inhaler. I spoke with the patient. He states he received these inhalers through mail already. I told him he needs to make an apponitment with Dr. Matt Dwyer (Pulmonary) [phone] for further refills (due to his dx of severe COPD). He states that Dr. Matt Dwyer is not in his office and is still at the hospital.  He states he would be back in the office in March. I told him to call them for an appointment. He was also told to make an appointment with Leslie Carroll.

## 2022-04-07 DIAGNOSIS — J44.9 STAGE 3 SEVERE COPD BY GOLD CLASSIFICATION (HCC): ICD-10-CM

## 2022-04-07 RX ORDER — FLUTICASONE FUROATE, UMECLIDINIUM BROMIDE AND VILANTEROL TRIFENATATE 200; 62.5; 25 UG/1; UG/1; UG/1
POWDER RESPIRATORY (INHALATION)
Qty: 28 EACH | Refills: 2 | Status: SHIPPED | OUTPATIENT
Start: 2022-04-07 | End: 2022-07-19

## 2022-04-14 DIAGNOSIS — J44.9 STAGE 3 SEVERE COPD BY GOLD CLASSIFICATION (HCC): ICD-10-CM

## 2022-04-14 DIAGNOSIS — I10 ESSENTIAL HYPERTENSION: ICD-10-CM

## 2022-04-14 RX ORDER — ALBUTEROL SULFATE 2.5 MG/3ML
SOLUTION RESPIRATORY (INHALATION)
Qty: 360 ML | Refills: 3 | OUTPATIENT
Start: 2022-04-14

## 2022-04-14 RX ORDER — METOPROLOL SUCCINATE 100 MG/1
TABLET, EXTENDED RELEASE ORAL
Qty: 180 TABLET | Refills: 0 | Status: SHIPPED | OUTPATIENT
Start: 2022-04-14 | End: 2022-08-18 | Stop reason: SDUPTHER

## 2022-04-14 RX ORDER — LISINOPRIL 10 MG/1
10 TABLET ORAL DAILY
Qty: 90 TABLET | Refills: 0 | Status: SHIPPED | OUTPATIENT
Start: 2022-04-14 | End: 2022-08-18 | Stop reason: SDUPTHER

## 2022-04-14 NOTE — TELEPHONE ENCOUNTER
[facility],   Dejan last seen 10/18/21 with recommendation to complete CT and f/u in one month. Pt has canceled/No Showed 3 appts and he no showed for his scheduled CT   No F/U appt at this time. Sign if you approve.

## 2022-04-14 NOTE — TELEPHONE ENCOUNTER
RECEIVED 2 FAXES FROM [facility] TO REFILL:    1)  DULERA INHALER AND    2)  TOPROL  MG    3)  LISINOPRIL

## 2022-04-25 ENCOUNTER — TELEPHONE (OUTPATIENT)
Dept: FAMILY MEDICINE CLINIC | Age: 57
End: 2022-04-25

## 2022-04-25 NOTE — TELEPHONE ENCOUNTER
Called [facility] [phone] for PA - not open, will try tomorrow morning. Jameson Ellipta 200-62-25 mcg Inhaler Aerosol Powder. Needs PA.    1 inhalation into lungs once daily  Dispense qty 28, refills 2 ?     Dx stage 3 severe COPD J44.9    [facility] ID  [id number]  Wing Guardian, CNP) NPI [id number]    [address] phone [phone]

## 2022-04-28 NOTE — TELEPHONE ENCOUNTER
Alis Hasmukh was DENIED. I spoke with the patient. I told him to contact pulmonary physician. He agreed. He has an appointment on 7-29-22 with Dr. Chele Lala. In the meantime, is there [facility] Patient needs to fail a one month trial of two of the following:    (can you send one of them if applicable):  Still may need Prior Auth. Severent Diskus  Dulera  Salmeterol/Fluticasone (generic Adviar Diskus)  Symbicort Brand  Bivespi Aerosphere  Atrovent HFA,Combivent Respimat  Spiriva Handihaler or Respimat  Asmanex TwisthalerFlovent Diskus  HFA inhalers  Pulmicort Flexhaler.

## 2022-04-29 ENCOUNTER — TELEPHONE (OUTPATIENT)
Dept: PULMONOLOGY | Age: 57
End: 2022-04-29

## 2022-04-29 NOTE — TELEPHONE ENCOUNTER
Patient is requesting Trelegy. He has already tried and failed [facility]. He has albuterol solution for the nebulizer, not Ipratropium/Albuterol though. According to JOE dooley, he must do a 30 day- try and fail of another formulary inhaler. Patient is going to call PCP and request one. Once patient does that and finds that the 2nd formulary inhaler doesn't work for him, his PCP can then write for Trelegy and hope the insurance company can cover it. Patient was transferred to scheduling to schedule the CT. Pt was informed to get labs done also, prior to next appt.

## 2022-06-13 DIAGNOSIS — J44.9 CHRONIC OBSTRUCTIVE PULMONARY DISEASE, UNSPECIFIED COPD TYPE (HCC): ICD-10-CM

## 2022-06-13 RX ORDER — ALBUTEROL SULFATE 90 UG/1
2 AEROSOL, METERED RESPIRATORY (INHALATION) EVERY 6 HOURS PRN
Qty: 1 EACH | Refills: 1 | Status: SHIPPED | OUTPATIENT
Start: 2022-06-13

## 2022-06-27 NOTE — PROGRESS NOTES
Alem Manzo (:  1975) is a 49 y.o. female,Established patient, here for evaluation of the following chief complaint(s): Other (Possible MS)         ASSESSMENT/PLAN:    Ledy Arvizu was seen today for other. Diagnoses and all orders for this visit:    Gait disturbance  -     Xavier Harris MD, Neurology, [facility]    Vasomotor flushing  -     PARoxetine Mesylate 7.5 MG CAPS; Take 1 capsule by mouth daily               Subjective   SUBJECTIVE/OBJECTIVE:  HPI Presnets today to discuss inability to walk long distance, standing, leans afer short period of time due to leg weakness. Has gained 19 pounds in the past 3 months. Due to her inactivity because of her unsteady gait. Review of Systems   HENT: Negative. Respiratory: Negative. Cardiovascular: Negative. Gastrointestinal: Negative. Endocrine: Negative. Genitourinary: Negative. Musculoskeletal: Positive for gait problem. Allergic/Immunologic: Negative. Hematological: Negative. Psychiatric/Behavioral: The patient is nervous/anxious. Vitals:    22 1104   BP: 120/82   Pulse: 81   SpO2: 93%     BP Readings from Last 3 Encounters:   22 120/82   22 118/76   22 124/80     Wt Readings from Last 3 Encounters:   22 298 lb (135.2 kg)   22 296 lb (134.3 kg)   22 279 lb (126.6 kg)       Objective   Physical Exam  Constitutional:       Appearance: She is obese. Cardiovascular:      Rate and Rhythm: Normal rate and regular rhythm. Heart sounds: Normal heart sounds. Pulmonary:      Effort: Pulmonary effort is normal.      Breath sounds: Normal breath sounds and air entry. Genitourinary:     Comments: Reports loss of sexual libido in the past 2 months, finds this very irritating. States she has not had relations with her  for several years but does use a vibrator and that does not stimulate her either.   Believe this is part related to her depressed mood but will prescribe Paxil to see if that is beneficial  Musculoskeletal:      Comments: Gait is shuffling, does not lift her for her knees when she ambulates discussed the use of her knees for safety when walking. States when she goes into a store she uses the shopping scooter and at the cart due to her unsteady gait   Skin:     General: Skin is warm and dry. Neurological:      Mental Status: She is alert. Psychiatric:         Attention and Perception: Attention normal.         Mood and Affect: Mood is depressed. Speech: Speech normal.                  An electronic signature was used to authenticate this note.     --Cari Kaye, APRN - CNP negative  - oseltamivir (TAMIFLU) 75 MG capsule; Take 1 capsule by mouth 2 times daily for 5 days  Dispense: 10 capsule; Refill: 0  - supportive therapy    2. Acute bronchitis, unspecified organism  - azithromycin (ZITHROMAX) 250 MG tablet; Take 2 tabs on day 1, then 1 tab on days 2-5  Dispense: 1 packet; Refill: 0  - albuterol sulfate HFA (PROAIR HFA) 108 (90 Base) MCG/ACT inhaler; Inhale 2 puffs into the lungs every 6 hours as needed for Wheezing  Dispense: 1 Inhaler; Refill: 3  - benzonatate (TESSALON PERLES) 100 MG capsule; Take 1 capsule by mouth 3 times daily as needed for Cough  Dispense: 30 capsule; Refill: 0  - predniSONE (DELTASONE) 10 MG tablet; Take 3 tablets together daily for 3 days, then 2 tablets daily for 3 days, then 1 tablet daily for 3 days. Dispense: 18 tablet; Refill:  - enc to call if symptoms are not improving    3. HTN, uncontrolled   - stable. Cont current bp therapy      Requested Prescriptions     Signed Prescriptions Disp Refills    azithromycin (ZITHROMAX) 250 MG tablet 1 packet 0     Sig: Take 2 tabs on day 1, then 1 tab on days 2-5    albuterol sulfate HFA (PROAIR HFA) 108 (90 Base) MCG/ACT inhaler 1 Inhaler 3     Sig: Inhale 2 puffs into the lungs every 6 hours as needed for Wheezing    benzonatate (TESSALON PERLES) 100 MG capsule 30 capsule 0     Sig: Take 1 capsule by mouth 3 times daily as needed for Cough    predniSONE (DELTASONE) 10 MG tablet 18 tablet 0     Sig: Take 3 tablets together daily for 3 days, then 2 tablets daily for 3 days, then 1 tablet daily for 3 days.  oseltamivir (TAMIFLU) 75 MG capsule 10 capsule 0     Sig: Take 1 capsule by mouth 2 times daily for 5 days       Medications Discontinued During This Encounter   Medication Reason    terbinafine (LAMISIL) 1 % cream Therapy completed       Ziyad Roque received counseling on the following healthy behaviors: nutrition  Reviewed prior labs and health maintenance.   Continue current medications, diet and

## 2022-06-28 ENCOUNTER — TELEPHONE (OUTPATIENT)
Dept: ONCOLOGY | Age: 57
End: 2022-06-28

## 2022-07-11 ENCOUNTER — HOSPITAL ENCOUNTER (OUTPATIENT)
Dept: CT IMAGING | Age: 57
Discharge: HOME | End: 2022-07-13
Payer: COMMERCIAL

## 2022-07-11 VITALS — BODY MASS INDEX: 26.51 KG/M2 | HEIGHT: 73 IN | WEIGHT: 200 LBS

## 2022-07-11 DIAGNOSIS — Z87.891 PERSONAL HISTORY OF TOBACCO USE: ICD-10-CM

## 2022-07-11 PROCEDURE — 71271 CT THORAX LUNG CANCER SCR C-: CPT

## 2022-07-15 ENCOUNTER — TELEPHONE (OUTPATIENT)
Dept: CASE MANAGEMENT | Age: 57
End: 2022-07-15

## 2022-07-15 DIAGNOSIS — R91.8 MASS OF LEFT LUNG: Primary | ICD-10-CM

## 2022-07-15 NOTE — TELEPHONE ENCOUNTER
Lung Navigator reviewing chart and recent Lung Screening, await review per provider.  Pt. Scheduled for f/U 7/29

## 2022-07-18 DIAGNOSIS — J44.9 STAGE 3 SEVERE COPD BY GOLD CLASSIFICATION (HCC): ICD-10-CM

## 2022-07-18 NOTE — TELEPHONE ENCOUNTER
Patient stated that he would like a refill on Trelegy. Per patient he had a script for Trelegy and it required a prior auth and the prior auth was denied stating he had to try the formulary medications. Per patient he has been on Advair and it is not helping at all. He would like to try the Trelegy again. Of note, medications have been prescribed by PCP and patient said the PCP will not fill without seeing him. Please send in Trelegy RX to [facility] should you agree. I wasn't sure on the dose so no script was pended. Thank you.

## 2022-07-19 ENCOUNTER — TELEPHONE (OUTPATIENT)
Dept: INTERVENTIONAL RADIOLOGY/VASCULAR | Age: 57
End: 2022-07-19

## 2022-07-19 RX ORDER — FLUTICASONE FUROATE, UMECLIDINIUM BROMIDE AND VILANTEROL TRIFENATATE 200; 62.5; 25 UG/1; UG/1; UG/1
1 POWDER RESPIRATORY (INHALATION) DAILY
Qty: 1 EACH | Refills: 2 | Status: SHIPPED | OUTPATIENT
Start: 2022-07-19 | End: 2022-10-06

## 2022-07-19 NOTE — TELEPHONE ENCOUNTER
I spoke with patient and informed him that script for Trelegy was sent into his pharmacy. Patient voiced understanding.

## 2022-07-22 ENCOUNTER — TELEPHONE (OUTPATIENT)
Dept: PULMONOLOGY | Age: 57
End: 2022-07-22

## 2022-07-22 NOTE — TELEPHONE ENCOUNTER
BIJAN STONE (Key: [id number])  Rx #: [id number]  Trelegy Ellipta 200-62.5-25MCG/INH aerosol powder     Form  [employer] Non-Medicare Electronic PA Form (2017 NCPDP)  Created  4 hours ago  Sent to Plan  3 hours ago  Plan Response  3 hours ago  Submit Clinical Questions  3 hours ago  Determination  Favorable  24 minutes ago  Message from Plan  Approved.

## 2022-07-25 ENCOUNTER — TELEPHONE (OUTPATIENT)
Dept: PULMONOLOGY | Age: 57
End: 2022-07-25

## 2022-07-25 ENCOUNTER — HOSPITAL ENCOUNTER (OUTPATIENT)
Dept: NUCLEAR MEDICINE | Age: 57
Discharge: HOME | End: 2022-07-25

## 2022-07-25 DIAGNOSIS — R91.8 MASS OF LEFT LUNG: ICD-10-CM

## 2022-07-25 NOTE — TELEPHONE ENCOUNTER
Martha Ralph took a call from patient this morning stating he arrived for his PET scan today and was told it was not able to be done because it will be denied by insurance. I spoke with Fadia Stewart in the pre-cert department for [facility] and she said that she checked the status this morning and the PET scan has been denied by insurance. She said the reason for the denial has not been loaded into the system yet but once she receives the denial she will fax it to me. Our fax number was provided. I called patient and left a message on machine asking for a call back. Our phone number was provided.

## 2022-07-27 NOTE — TELEPHONE ENCOUNTER
I spoke with patient and informed him of my conversation with Dr Brandt Cabrera yesterday and his orders. I advised patient we will be in touch after the biopsy results have been reviewed. Patient voiced understanding.

## 2022-08-02 ENCOUNTER — HOSPITAL ENCOUNTER (OUTPATIENT)
Dept: CT IMAGING | Age: 57
Discharge: HOME | End: 2022-08-04
Payer: COMMERCIAL

## 2022-08-02 VITALS
WEIGHT: 220 LBS | HEIGHT: 73 IN | BODY MASS INDEX: 29.16 KG/M2 | OXYGEN SATURATION: 98 % | SYSTOLIC BLOOD PRESSURE: 132 MMHG | RESPIRATION RATE: 18 BRPM | TEMPERATURE: 96.9 F | DIASTOLIC BLOOD PRESSURE: 77 MMHG | HEART RATE: 99 BPM

## 2022-08-02 DIAGNOSIS — R91.8 MASS OF LEFT LUNG: ICD-10-CM

## 2022-08-02 LAB
INR BLD: 0.9
PARTIAL THROMBOPLASTIN TIME: 30.3 SEC (ref 24–36)
PLATELET # BLD: 195 K/UL (ref 150–450)
PROTHROMBIN TIME: 12.5 SEC (ref 11.8–14.6)

## 2022-08-02 PROCEDURE — 85049 AUTOMATED PLATELET COUNT: CPT

## 2022-08-02 PROCEDURE — 85610 PROTHROMBIN TIME: CPT

## 2022-08-02 PROCEDURE — 36415 COLL VENOUS BLD VENIPUNCTURE: CPT

## 2022-08-02 PROCEDURE — 2580000003 HC RX 258: Performed by: RADIOLOGY

## 2022-08-02 PROCEDURE — 85730 THROMBOPLASTIN TIME PARTIAL: CPT

## 2022-08-02 RX ORDER — SODIUM CHLORIDE 9 MG/ML
INJECTION, SOLUTION INTRAVENOUS CONTINUOUS
Status: DISCONTINUED | OUTPATIENT
Start: 2022-08-02 | End: 2022-08-05 | Stop reason: HOSPADM

## 2022-08-02 RX ORDER — SODIUM CHLORIDE 0.9 % (FLUSH) 0.9 %
5-40 SYRINGE (ML) INJECTION EVERY 12 HOURS SCHEDULED
Status: DISCONTINUED | OUTPATIENT
Start: 2022-08-02 | End: 2022-08-05 | Stop reason: HOSPADM

## 2022-08-02 RX ORDER — SODIUM CHLORIDE 0.9 % (FLUSH) 0.9 %
5-40 SYRINGE (ML) INJECTION PRN
Status: DISCONTINUED | OUTPATIENT
Start: 2022-08-02 | End: 2022-08-05 | Stop reason: HOSPADM

## 2022-08-02 RX ORDER — SODIUM CHLORIDE 9 MG/ML
INJECTION, SOLUTION INTRAVENOUS PRN
Status: DISCONTINUED | OUTPATIENT
Start: 2022-08-02 | End: 2022-08-05 | Stop reason: HOSPADM

## 2022-08-02 RX ADMIN — SODIUM CHLORIDE: 9 INJECTION, SOLUTION INTRAVENOUS at 10:39

## 2022-08-02 ASSESSMENT — ENCOUNTER SYMPTOMS
ANAL BLEEDING: 0
ABDOMINAL PAIN: 0
VOICE CHANGE: 1
DIARRHEA: 0
BLOOD IN STOOL: 0
SORE THROAT: 1
NAUSEA: 0
SHORTNESS OF BREATH: 1
RHINORRHEA: 0
WHEEZING: 1
TROUBLE SWALLOWING: 1
VOMITING: 0
COUGH: 1
CONSTIPATION: 0

## 2022-08-02 ASSESSMENT — PAIN - FUNCTIONAL ASSESSMENT: PAIN_FUNCTIONAL_ASSESSMENT: 0-10

## 2022-08-02 ASSESSMENT — PAIN DESCRIPTION - DESCRIPTORS: DESCRIPTORS: SORE

## 2022-08-02 NOTE — OP NOTE
After discussing the planned lung biopsy including risks, indications, benefits, and alternatives, the patient declined the procedure at this time.

## 2022-08-02 NOTE — H&P
HISTORY and [facility] [address]       NAME:  Kizzy Callejas  MRN: [id number]   YOB: 1965   Date: 8/2/2022   Age: 61 y.o. Gender: male     COMPLAINT AND PRESENT HISTORY:   Kizzy Callejas is 61 y.o.,  male, undergoing IR LUNG BIOPSY W/CT for MASS OF LEFT LUNG per Dr. Jose Amaral. HPI:  SEE PORTION OF NOTE BELOW PER DR. Jose Amaral, 10-18-21 (REVIEWED):  REASON FOR THE CONSULTATION:  Copd   HISTORY OF PRESENT ILLNESS:    Neville Becker is a 61y.o. year old male   Patient has chronic grade 3 dyspnea , which has been progressively getting worse with  Exertion OVER LAST 4 years to point that he can walk 10 feet and is sob with taking care of ADL . Complains of Yes Cough , Yessputum production large amount every morning   , No  hemoptysis , Yes Associated with wheezing . No home oxygen  . Smokes 1 ppd . He has smoked 40 years and average 1 pack per day . He is on Dulera and albuterol but donot help much . PLAN:       extensive discussion with patient - advised smoking cessation but enjoys smoking and does not want to quit . Previously has tried [employer] and caused night sanchez . Per patient this is only vice and he enjoys it . Will give trial of Trelegy and based on response will provide px  He will stop Dulera while using Trelegy . Will need flue shot which wants to get on a week end  Pneumonia vaccine next visit  Nebulizer with albuterol as needed  Follow up 1 month    UPDATE: Patient completed CT of the chest (as noted below)- on 7-11-22. He does feel SOB all of the time, states he does not do any type of exertion. + wheezing, + cough (productive- white sputum). Denies current fever/chills. He is maintained on Trelegy Ellipta, albuterol inhaler/nebulizer. He does continue to smoke 1 PPD, long time smoker.     Narrative   EXAMINATION:   LOW DOSE SCREENING CT OF THE CHEST WITHOUT CONTRAST       7/11/2022 8:10 am       TECHNIQUE:   Low dose lung cancer screening CT of the chest was performed without the   administration of intravenous contrast.  Multiplanar reformatted images are   provided for review. Automated exposure control, iterative reconstruction,   and/or weight based adjustment of the mA/kV was utilized to reduce the   radiation dose to as low as reasonably achievable. Field-of-view: 32 cm       Dose Length Product: 96.80 mGy       CTDlvol: 2.71 mGy       COMPARISON:   10/28/2020       HISTORY:   Screening. Patient Age: 62 y/o       Number of pack years of smokin pack years       If no longer smoking, number of years since cessation:  Current, everyday   smoker       Other symptoms:  None. Additional history: ORDERING SYSTEM PROVIDED HISTORY: Personal history of   tobacco use   TECHNOLOGIST PROVIDED HISTORY:   Is there documentation of shared decision making? ->Yes   Does the patient show any signs or symptoms of lung cancer? ->No   Is this the first (baseline) CT or an annual exam?->Baseline   Is this a low dose CT or a routine CT?->Low Dose CT   Smoking Status?->Current Every Day Smoker   Smoking packs per day?->1   Years smoking?->40   Reason for Exam: Personal history of tobacco use   Additional signs and symptoms: Present smoker x 30 years x 1 pack a day   Relevant Medical/Surgical History: Hx of COPD       FINDINGS:   Mediastinum:  Heterogeneous enlargement of the left thyroid lobe. Left   thyroid lobe nodule with associated calcifications expanding the left thyroid   lobe measuring up to 3 cm on image 15, series 2, similar to the prior study. Atherosclerotic calcification of the thoracic aorta and branch vasculature. No periaortic or mediastinal hemorrhage. No axillary, mediastinal, or hilar   lymphadenopathy. No pericardial or pleural effusions. Coronary artery   disease. Lungs/Pleura:  Trachea and proximal central airways appear patent.        Somewhat spiculated lingular soft tissue mass lesion measuring 4.1 x 3.0 cm   in greatest AP and transverse dimensions on image 71, series 4. This measures   up to 3.1 cm in greatest craniocaudal extent on image 107, series 601. There   is adjacent parenchymal scarring. This lesion is also well seen on image 51,   series 602 adjacent to the left cardiac border. Mild to moderate underlying emphysema. No pneumothorax. Mild respiratory motion. Upper Abdomen:  Atherosclerotic calcification of the upper abdominal aorta   and branch vasculature. Fatty liver. Soft Tissues/Bones: Mild diffuse degenerative changes in the spine. Multilevel Schmorl's node deformities. Impression   1. Spiculated lingular soft tissue mass lesion measuring 4.1 x 3.0 x 3.1 cm   abutting the left cardiac border concerning for malignancy until proven   otherwise. Mild to moderate underlying emphysema. 2. Atherosclerotic calcification of the aorta and branch vasculature. Coronary artery disease. 3. Heterogeneous enlargement of the left thyroid lobe with left thyroid lobe   nodule with calcification measuring up to 3 cm. Further evaluation with   thyroid ultrasound recommended if not recently performed. 4. Fatty liver. The findings were sent to the Radiology Results [id number] at 11:15   am on 7/11/2022 to be communicated to a licensed caregiver. LUNG RADS:   Per ACR Lung-RADS Version 1.1       Findings:  Category 3 or 4 nodules with additional features or imaging   findings that increases the suspicion of malignancy. Management: Chest CT with or without contrast, PET/CT and/or tissue sampling   depending on the probability of malignancy and comorbidities. PET/CT may be   used when there is a > 8 mm  solid component. For new large nodules that   develop on an annual repeat screening CT, a 1 month LDCT may be recommended   to address potentially infectious or inflammatory conditions.        RECOMMENDATIONS:   If you would like to register your patient with the [facility] Nodule/Lung   Cancer Screening Program, please contact the Nurse Navigator at   [phone]). Review of additional SIGNIFICANT medical hx:  ALCOHOL ABUSE: States he drinks 5 beers/daily, last alcoholic drink was last night. HTN: Denies current chest pain, palpitations, dizziness, leg swelling, headache. Current medications r/t condition: TOPROL XL, LISINOPRIL (states he is out of lisinopril, PCP's office states he needs appointment for refill). Narrative & Impression    Sinus tachycardia  Otherwise normal ECG  When compared with ECG of 28-OCT-2020 13:50, (unconfirmed)  No significant change was found      Specimen Collected: 10/29/20 11:45 EDT        BP Readings from Last 3 Encounters:   08/02/22 132/77   11/08/21 108/68   10/18/21 (!) 142/88       COPD: Last smoked cigarette a half hour ago. Current medications r/t condition: TRELEGY ELLIPTA, ALBUTEROL INHALER/NEB    NPO status: Patient states they have been NPO since before midnight. Medications taken TODAY (with sip of water): Toprol XL, Trelegy Ellipta  Anticoagulation status: Patient denies taking any anti-coagulants, including aspirin currently. Denies personal hx of blood clots. Denies personal hx of MRSA infection. Denies any personal or family hx of previous complications w/anesthesia.   PAST MEDICAL HISTORY     Past Medical History:   Diagnosis Date    Alcohol abuse     Avascular necrosis of femoral head ([address] 11/25/2014    Overview:  S/p bilateral hip replacements    History of hip replacement     left/right    Hypertension     Mass of left lung     Rib fracture     Scabies     Stage 3 severe COPD by GOLD classification ([address] 11/08/2021    Tachycardia 10/28/2020       SURGICAL HISTORY       Past Surgical History:   Procedure Laterality Date    NECK SURGERY      TOTAL HIP ARTHROPLASTY Bilateral     VASECTOMY         SOCIAL HISTORY       Social History     Socioeconomic History    Marital status: Single     Spouse Negative for congestion and rhinorrhea. Respiratory:  Positive for cough, shortness of breath and wheezing. See HPI. Cardiovascular:  Negative for chest pain, palpitations and leg swelling. Gastrointestinal:  Negative for abdominal pain, anal bleeding, blood in stool, constipation, diarrhea, nausea and vomiting. Genitourinary:  Positive for dysuria (Chronic- over 3 months) and frequency (Chronic- over 3 months). Neurological:  Negative for dizziness and headaches. Hematological:  Bruises/bleeds easily. GENERAL PHYSICAL EXAM     Vitals: /77   Pulse 99   Temp 96.9 °F (36.1 °C) (Temporal)   Resp 18   Ht 6' 1\" (1.854 m)   Wt 220 lb (99.8 kg)   SpO2 98%   BMI 29.03 kg/m²      GENERAL APPEARANCE:   Tom Rank is 61 y.o.,  male, not obese, nourished, conscious, alert. Does not appear to be in any distress or pain at this time. Physical Exam  Vitals reviewed. Constitutional:       General: He is not in acute distress. Appearance: He is well-developed. He is not ill-appearing, toxic-appearing or diaphoretic. HENT:      Head: Normocephalic. Right Ear: External ear normal.      Left Ear: External ear normal.      Nose: Nose normal.      Mouth/Throat:      Dentition: Abnormal dentition (Missing teeth). Dental caries present. Palate: No mass (Appears somewhat swollen). Pharynx: Oropharyngeal exudate present. No posterior oropharyngeal erythema or uvula swelling (Slight uvula deviation to the left). Tonsils: No tonsillar abscesses. Comments: Voice sounds strained, weak. Eyes:      General:         Right eye: No discharge. Left eye: No discharge. Conjunctiva/sclera:      Right eye: Right conjunctiva is injected. Left eye: Left conjunctiva is injected. Pupils: Pupils are equal, round, and reactive to light. Comments: Aditya cortes/l irises- patient states sees eye specialist yearly.    Cardiovascular: Rate and Rhythm: Normal rate and regular rhythm. Pulses: Intact distal pulses. Heart sounds: Normal heart sounds. Pulmonary:      Effort: Pulmonary effort is normal. No accessory muscle usage or respiratory distress. Breath sounds: No stridor. Examination of the left-lower field reveals rales. Decreased breath sounds and rales (Expiratory crackles to left posteior/anterior lobes, as well as right anterior upper lobe) present. No wheezing or rhonchi. Comments: Harsh, occasional cough noted. Appears slightly SOB, more w/walking back to exam room. Abdominal:      General: Bowel sounds are normal. There is no distension. Palpations: Abdomen is soft. There is no mass. Tenderness: There is no abdominal tenderness. There is no guarding or rebound. Musculoskeletal:      Right lower leg: No swelling or tenderness. No edema. Left lower leg: No swelling or tenderness. No edema. Comments: Negative Cass's sign b/l. Lymphadenopathy:      Cervical: No cervical adenopathy. Skin:     General: Skin is warm and dry. Findings: Abscess (Right neck- patient states has been present for a week and he has squeezed discharge out of it- possible fluctuance noted, no erythema, no warmth, no active drainage- approximately half dollar sized) and bruising (B/l arms, right worse than left, also noted well healed scaring to b/l arms) present. Neurological:      Mental Status: He is alert and oriented to person, place, and time.    Psychiatric:         Behavior: Behavior normal.       RECENT LAB WORK     Lab Results   Component Value Date     (L) 10/30/2020    K 3.9 10/30/2020     10/30/2020    CO2 20 10/30/2020    BUN 10 10/30/2020    CREATININE <0.40 (L) 10/30/2020    GLUCOSE 145 (H) 10/30/2020    CALCIUM 8.8 10/30/2020    PROT 7.9 10/28/2020    LABALBU 3.9 10/28/2020    BILITOT 1.41 (H) 10/28/2020    ALKPHOS 90 10/28/2020    AST 32 10/28/2020    ALT 18 10/28/2020    LABGLOM CANNOT BE CALCULATED 10/30/2020    GFRAA CANNOT BE CALCULATED 10/30/2020    GLOB NOT REPORTED 05/26/2017     Lab Results   Component Value Date    WBC 7.0 10/30/2020    HGB 13.1 (L) 10/30/2020    HCT 37.2 (L) 10/30/2020    .0 (H) 10/30/2020     10/30/2020     APTT, PT/INR, PLATELET COUNT PENDING AT THE SIGNING OF THIS NOTE- SEE CHART FOR RESULTS. PROVISIONAL DIAGNOSES / PROCEDURE:      MASS OF LEFT LUNG    IR LUNG BIOPSY W/CT     Patient Active Problem List    Diagnosis Date Noted    Stage 3 severe COPD by GOLD classification ([facility] 11/08/2021    Tobacco dependence 11/08/2021    Tachycardia 10/28/2020    Gastroesophageal reflux disease without esophagitis 06/18/2019    Dyspnea 05/28/2019    Acute exacerbation of chronic obstructive pulmonary disease (COPD) ([facility] 05/28/2019    Alcohol withdrawal ([facility] 05/22/2017    Tinea pedis of both feet 05/22/2017    Suicidal ideation 05/22/2017    Hyperkeratosis of b/l [place] 03/04/2017    Essential hypertension 03/03/2017    Cellulitis of b/l lower extremity 03/02/2017    Cellulitis of lower extremity 03/02/2017    Alcoholism ([address] 03/02/2017    Avascular necrosis of femoral head ([facility] 11/25/2014    Cigarette smoker 01/22/2014         Patient with multiple health concerns, abnormal findings on physical exam today- I highly encouraged patient to f/u with his PCP. Concern for right neck abscess- advised patient if he will no f/u with PCP, evaluation in urgent care or ER would be appropriate. I did speak to Jodi Vega in IR to discuss this finding, who stated she will notify Dr. Clyde Saba.     Angel Vázquez, APRN - CNP on 8/2/2022 at 10:25 AM

## 2022-08-03 ENCOUNTER — TELEPHONE (OUTPATIENT)
Dept: CASE MANAGEMENT | Age: 57
End: 2022-08-03

## 2022-08-03 ENCOUNTER — TELEPHONE (OUTPATIENT)
Dept: PULMONOLOGY | Age: 57
End: 2022-08-03

## 2022-08-18 DIAGNOSIS — I10 ESSENTIAL HYPERTENSION: ICD-10-CM

## 2022-08-18 RX ORDER — LISINOPRIL 10 MG/1
10 TABLET ORAL DAILY
Qty: 90 TABLET | Refills: 0 | Status: SHIPPED | OUTPATIENT
Start: 2022-08-18

## 2022-08-18 RX ORDER — METOPROLOL SUCCINATE 100 MG/1
TABLET, EXTENDED RELEASE ORAL
Qty: 180 TABLET | Refills: 0 | Status: ON HOLD | OUTPATIENT
Start: 2022-08-18 | End: 2022-08-31 | Stop reason: HOSPADM

## 2022-08-19 ENCOUNTER — OFFICE VISIT (OUTPATIENT)
Dept: PULMONOLOGY | Age: 57
End: 2022-08-19
Payer: COMMERCIAL

## 2022-08-19 VITALS
BODY MASS INDEX: 26.51 KG/M2 | DIASTOLIC BLOOD PRESSURE: 83 MMHG | HEART RATE: 115 BPM | SYSTOLIC BLOOD PRESSURE: 126 MMHG | WEIGHT: 200 LBS | RESPIRATION RATE: 18 BRPM | OXYGEN SATURATION: 95 % | HEIGHT: 73 IN

## 2022-08-19 DIAGNOSIS — J43.2 CENTRILOBULAR EMPHYSEMA (HCC): ICD-10-CM

## 2022-08-19 DIAGNOSIS — Z87.891 PERSONAL HISTORY OF TOBACCO USE: ICD-10-CM

## 2022-08-19 DIAGNOSIS — R91.8 MASS OF LEFT LUNG: Primary | ICD-10-CM

## 2022-08-19 DIAGNOSIS — J44.9 STAGE 3 SEVERE COPD BY GOLD CLASSIFICATION (HCC): ICD-10-CM

## 2022-08-19 PROCEDURE — 4004F PT TOBACCO SCREEN RCVD TLK: CPT | Performed by: INTERNAL MEDICINE

## 2022-08-19 PROCEDURE — 3017F COLORECTAL CA SCREEN DOC REV: CPT | Performed by: INTERNAL MEDICINE

## 2022-08-19 PROCEDURE — G8427 DOCREV CUR MEDS BY ELIG CLIN: HCPCS | Performed by: INTERNAL MEDICINE

## 2022-08-19 PROCEDURE — 99214 OFFICE O/P EST MOD 30 MIN: CPT | Performed by: INTERNAL MEDICINE

## 2022-08-19 PROCEDURE — G8419 CALC BMI OUT NRM PARAM NOF/U: HCPCS | Performed by: INTERNAL MEDICINE

## 2022-08-19 PROCEDURE — 3023F SPIROM DOC REV: CPT | Performed by: INTERNAL MEDICINE

## 2022-08-22 ENCOUNTER — TELEPHONE (OUTPATIENT)
Dept: CASE MANAGEMENT | Age: 57
End: 2022-08-22

## 2022-08-22 ENCOUNTER — TELEPHONE (OUTPATIENT)
Dept: ONCOLOGY | Age: 57
End: 2022-08-22

## 2022-08-22 NOTE — PROGRESS NOTES
REASON FOR THE CONSULTATION:  Left lung mass  HISTORY OF PRESENT ILLNESS:    Robert Evans is a 61y.o. year old male   Patient PET scan was declined by insurance. I referred him for CT-guided biopsy of left lung mass. He was seen by interventional radiologist and after discussing with interventional radiologist the risks and complications. Patient was scared and he declined biopsy. He is tearful and anxious and does not know what to do. I reviewed the option of bronchoscopy with endobronchial biopsy  He would like to obtain further information about his diagnosis and treatment options. Last visit    Patient has chronic grade 3 dyspnea , which has been progressively getting worse with  Exertion OVER LAST 4 years to point that he can walk 10 feet and is sob with taking care of ADL . Complains of Yes Cough , Yessputum production large amount every morning   , No  hemoptysis , Yes Associated with wheezing . No home oxygen  . Smokes 1 ppd . He has smoked 40 years and average 1 pack per day . He is on Dulera and albuterol but donot help much .                   LUNG CANCER SCREENING     CRITERIA MET    [x]     CT ORDERED  [x]      CRITERIA NOT MET   []      REFUSED                    []        REASON CRITERIA NOT MET     SMOKING LESS THAN 30 PY  []      AGE LESS THAN 55 or GREATER 77 YEARS  []      QUIT SMOKING 15 YEARS OR GREATER   []      RECENT CT WITH IN 11 MONTHS    []      LIFE EXPECTANCY < 5 YEARS   []      SIGNS  AND SYMPTOMS OF LUNG CANCER   []         Immunization   Immunization History   Administered Date(s) Administered    COVID-19, PFIZER PURPLE top, DILUTE for use, (age 15 y+), 30mcg/0.3mL 02/19/2021, 03/12/2021    Influenza, FLUCELVAX, (age 10 mo+), MDCK, PF 11/08/2021    Td (Adult), 5 Lf Tetanus Toxoid, Pf (Tenivac, Decavac) 12/01/1998    Tdap (Boostrix, Adacel) 10/24/2014        Pneumococcal Vaccine     [] Up to date    [] Indicated   [] Refused  [] Contraindicated       Influenza Vaccine [] Up to date    [] Indicated   [] Refused  [] Contraindicated        Pulmonary Rehab   [] Completed   [] Indicated   [] Refused  [] Contraindicated   PAST MEDICAL HISTORY:       Diagnosis Date    Alcohol abuse     Av[facility] necrosis [facility] head ([facility] 11/25/2014    Overview:  S/p bilateral hip replacements    History of hip replacement     left/right    Hypertension     Mass of left lung     Rib fracture     Scabies     Stage 3 severe COPD by GOLD classification ([facility] 11/08/2021    Tachycardia 10/28/2020         Family History:       Problem Relation Age of Onset    Other Mother         mental health        SURGICAL HISTORY:   Past Surgical History:   Procedure Laterality Date    NECK SURGERY      TOTAL HIP ARTHROPLASTY Bilateral     VASECTOMY             SOCIAL AND OCCUPATIONAL HEALTH:      There  No history of TB or TB exposure. There  No asbestos ,[place] dust exposure. The patient reports  No coal mine, [place], [place], [facility] exposure. History of travel outside the country No  There  is use of   marijuana No   VapingNo  IV heroinNo  Crack cocaineNo  There  Nohot tub exposure. Pets -cats Yes, dogsNo  Birds No    Occupational history - 27 years  and now  , minimal work of brake or clutch     TOBACCO:   reports that he has been smoking cigarettes. He started smoking about 36 years ago. He has a 44.00 pack-year smoking history. He has never used smokeless tobacco.  ETOH:   reports current alcohol use of about 35.0 standard drinks per week. ALLERGIES:      Allergies   Allergen Reactions    Dulera [Mometasone Furo-Formoterol Fum]      Doesn't work for patient. Home Meds:   Prior to Admission medications    Medication Sig Start Date End Date Taking?  Authorizing Provider   lisinopril (PRINIVIL;ZESTRIL) 10 MG tablet Take 1 tablet by mouth daily 8/18/22  Yes JAEL Howard - CNP   metoprolol succinate (TOPROL XL) 100 MG extended release tablet TAKE 1 TABLET BY MOUTH TWICE DAILY 8/18/22  Yes Venus LeoJAEL - KIMBERLY   Fluticasone-Umeclidin-Vilant (TRELEGY ELLIPTA) 200-62.5-25 MCG/INH AEPB Inhale 1 puff into the lungs Daily 7/19/22  Yes Ewelina Felipe MD   albuterol sulfate HFA (PROAIR HFA) 108 (90 Base) MCG/ACT inhaler Inhale 2 puffs into the lungs every 6 hours as needed for Wheezing 6/13/22  Yes Venus Ahmet APROMAR - CNP   albuterol (PROVENTIL) (2.5 MG/3ML) 0.083% nebulizer solution INHALE 3 MLS (ONE VIAL) INTO THE LUNGS EVERY 6 HOURS VIA NEBULIZER AS NEEDED FOR WHEEZING 1/26/22  Yes Ewelina Felipe MD            Review of Systems -  General ROS: negative for - chills, fatigue, fever or weight loss  ENT ROS: negative for - headaches, oral lesions or sore throat  Cardiovascular ROS: no chest pain , orthopnea or pnd   Gastrointestinal ROS: no abdominal pain, change in bowel habits, or black or bloody stools  Skin - no rash   Neuro - no blurry vision , no loc . No focal weakness   msk - no jt tenderness or swelling    Vascular - no claudication , rest completed and negative   Lymphatic - complete and negative   Hematology - oncology - complete and negative   Allergy immunology - complete and negative    no burning or hematuria    Vitals:  /83 (Site: Right Upper Arm, Position: Sitting, Cuff Size: Medium Adult)   Pulse (!) 115   Resp 18   Ht 6' 1\" (1.854 m)   Wt 200 lb (90.7 kg)   SpO2 95% Comment: room air at rest  BMI 26.39 kg/m²     PHYSICAL EXAM:  Head and neck atraumatic, normocephalic    Lymph nodes-no cervical, supraclavicular lymphadenopathy    Neck-no JVP elevation    Lungs - AP diameter of chest increased. Thoracic expansion and diaphragmatic excursion diminished. BS diminished and expiratory phase prolonged. No dullness to percussion or tenderness to palpation. No bronchial breath sounds . CVS- S1, S2 regular. No S3 no S4, no murmurs    Abdomen-nontender, nondistended. Bowel sounds are present.   No organomegaly    Lower extremity-no edema    Upper extremity-no edema    Neurological-grossly normal cranial nerves. No overt motor deficit                  IMPRESSION:     Diagnosis Orders   1. Mass of left lung  Vero Bhatti MD, Hematology/Oncology, [place]      2. Stage 3 severe COPD by GOLD classification ([address]             :                PLAN:      After reviewing complications of IR guided biopsy and discussing with IR physician patient was scared and he declined CT-guided biopsy of left lung mass. He is tearful and anxious and does not know how to proceed. I had extensive discussion with patient and explained to him that I can attempt to obtain biopsy specimen with bronchoscopy. He would like to know his prognosis treatment options prior to making that decision. I have referred him to hematology oncology . Patient will call my office once he has made his decision whether he wants to proceed with biopsy. Advised him to stop smoking   Continue Trelegy and albuterol  Follow-up in 1 week . Requested Prescriptions      No prescriptions requested or ordered in this encounter       There are no discontinued medications. Shaggy Boyd received counseling on the following healthy behaviors: nutrition, exercise and medication adherence    Patient given educational materials : see patient instruction       Discussed use, benefit, and side effects of prescribed medications. Barriers to medication compliance addressed. All patient questions answered. Pt voiced understanding. I hope this updates you on my evaluation and clinical thinking. Thank you for allowing me to participate in his care. Sincerely,    Electronically signed by Jose Ramon Osborne MD on 8/22/2022 at 7:26 PM       Please note that this chart was generated using voice recognition Dragon dictation software.   Although every effort was made to ensure the accuracy of this automated transcription, some errors in transcription may have occurred.

## 2022-08-22 NOTE — TELEPHONE ENCOUNTER
Tried to lvm for pt to call and schedule a consult appt with Dr. Nicanor Gutierrez. LV stated the message was for Rachel Cotter.     Electronically signed by Gagan Canales on 8/22/2022 at 12:17 PM

## 2022-08-22 NOTE — TELEPHONE ENCOUNTER
Lung Navigator [id number] Lung CLinic pt. ? Writer responding no, noting new referral placed for Dr. Rivas(Lung Nodule). Pt. Declining Bx in the past and PET was denied last time ordered.

## 2022-08-24 NOTE — TELEPHONE ENCOUNTER
Patient was referrred from my office. I have been out of the office until today. I am tracking his appt with you (Dr Stanley Roach wanted him seen this week) so that I can schedule a follow up with Dr Stanley Roach. Dr Stanley Roach said he was going to talk to Dr Rosa Kunz regarding him, last Friday. If a biopsy is agreed upon, Dr Stanley Roach will do the procedure himself, instead of interventional radiology.

## 2022-08-26 ENCOUNTER — TELEPHONE (OUTPATIENT)
Dept: CASE MANAGEMENT | Age: 57
End: 2022-08-26

## 2022-08-26 ENCOUNTER — TELEPHONE (OUTPATIENT)
Dept: ONCOLOGY | Age: 57
End: 2022-08-26

## 2022-08-26 NOTE — TELEPHONE ENCOUNTER
LVM for pt to call the office. LVM states it is for a [employer]. I left my name, number, and stated I was from [facility] and to call back.     Electronically signed by Freedom Fields on 8/26/2022 at 11:11 AM

## 2022-08-26 NOTE — TELEPHONE ENCOUNTER
Lung Navigator reviewing chart and pt. Referred to [place] -Dr. Fatuma Wagoner. Dr. Fatuma Wagoner back in office Monday 8/29. Writer spoke with [facility] in scheduling and will work on referral today.

## 2022-08-26 NOTE — TELEPHONE ENCOUNTER
Called patient, he states he didn't receive any messages or missed calls. He said he spoke with Dr Adame's office who said that the doctor will be calling him.  He also said he would call me back to schedule a follow up with Dr Addis Ghotra

## 2022-08-28 ENCOUNTER — HOSPITAL ENCOUNTER (INPATIENT)
Age: 57
LOS: 3 days | Discharge: HOME | DRG: 720 | End: 2022-08-31
Attending: EMERGENCY MEDICINE | Admitting: INTERNAL MEDICINE
Payer: COMMERCIAL

## 2022-08-28 ENCOUNTER — APPOINTMENT (OUTPATIENT)
Dept: GENERAL RADIOLOGY | Age: 57
DRG: 720 | End: 2022-08-28
Payer: COMMERCIAL

## 2022-08-28 DIAGNOSIS — E87.1 HYPONATREMIA: ICD-10-CM

## 2022-08-28 DIAGNOSIS — R06.02 SHORTNESS OF BREATH: ICD-10-CM

## 2022-08-28 DIAGNOSIS — E87.8 HYPOCHLOREMIA: ICD-10-CM

## 2022-08-28 DIAGNOSIS — R91.8 LUNG MASS: ICD-10-CM

## 2022-08-28 DIAGNOSIS — R04.2 COUGH WITH HEMOPTYSIS: Primary | ICD-10-CM

## 2022-08-28 DIAGNOSIS — J42 CHRONIC BRONCHITIS, UNSPECIFIED CHRONIC BRONCHITIS TYPE (HCC): ICD-10-CM

## 2022-08-28 PROBLEM — J44.9 COPD (CHRONIC OBSTRUCTIVE PULMONARY DISEASE) (HCC): Status: ACTIVE | Noted: 2022-08-28

## 2022-08-28 LAB
ABSOLUTE EOS #: 0 K/UL (ref 0–0.4)
ABSOLUTE LYMPH #: 1 K/UL (ref 1–4.8)
ABSOLUTE MONO #: 1 K/UL (ref 0.1–1.3)
ALBUMIN SERPL-MCNC: 3.6 G/DL (ref 3.5–5.2)
ALLEN TEST: ABNORMAL
ALP BLD-CCNC: 130 U/L (ref 40–129)
ALT SERPL-CCNC: 16 U/L (ref 5–41)
ANION GAP SERPL CALCULATED.3IONS-SCNC: 14 MMOL/L (ref 9–17)
AST SERPL-CCNC: 27 U/L
BASOPHILS # BLD: 0 % (ref 0–2)
BASOPHILS ABSOLUTE: 0 K/UL (ref 0–0.2)
BILIRUB SERPL-MCNC: 0.85 MG/DL (ref 0.3–1.2)
BUN BLDV-MCNC: 6 MG/DL (ref 6–20)
CALCIUM SERPL-MCNC: 9.5 MG/DL (ref 8.6–10.4)
CARBOXYHEMOGLOBIN: 2.3 % (ref 0–5)
CHLORIDE BLD-SCNC: 91 MMOL/L (ref 98–107)
CO2: 20 MMOL/L (ref 20–31)
CREAT SERPL-MCNC: 0.46 MG/DL (ref 0.7–1.2)
EOSINOPHILS RELATIVE PERCENT: 0 % (ref 0–4)
GFR AFRICAN AMERICAN: >60 ML/MIN
GFR NON-AFRICAN AMERICAN: >60 ML/MIN
GFR SERPL CREATININE-BSD FRML MDRD: ABNORMAL ML/MIN/{1.73_M2}
GLUCOSE BLD-MCNC: 99 MG/DL (ref 70–99)
HCO3 ARTERIAL: 21.9 MMOL/L (ref 22–26)
HCT VFR BLD CALC: 39.7 % (ref 41–53)
HEMOGLOBIN: 13.7 G/DL (ref 13.5–17.5)
LYMPHOCYTES # BLD: 8 % (ref 24–44)
MAGNESIUM: 1.7 MG/DL (ref 1.6–2.6)
MCH RBC QN AUTO: 37.8 PG (ref 26–34)
MCHC RBC AUTO-ENTMCNC: 34.6 G/DL (ref 31–37)
MCV RBC AUTO: 109.2 FL (ref 80–100)
METHEMOGLOBIN: 0 % (ref 0–1.9)
MONOCYTES # BLD: 8 % (ref 1–7)
NEGATIVE BASE EXCESS, ART: 3 MMOL/L (ref 0–2)
O2 DEVICE/FLOW/%: ABNORMAL
O2 SAT, ARTERIAL: 96.7 % (ref 95–98)
PATIENT TEMP: 37
PCO2 ARTERIAL: 36.3 MMHG (ref 35–45)
PDW BLD-RTO: 12.7 % (ref 11.5–14.9)
PH ARTERIAL: 7.39 (ref 7.35–7.45)
PLATELET # BLD: 261 K/UL (ref 150–450)
PMV BLD AUTO: 7.8 FL (ref 6–12)
PO2 ARTERIAL: 163 MMHG (ref 80–100)
POTASSIUM SERPL-SCNC: 3.8 MMOL/L (ref 3.7–5.3)
PRO-BNP: 169 PG/ML
PT. POSITION: ABNORMAL
RBC # BLD: 3.63 M/UL (ref 4.5–5.9)
RESPIRATORY RATE: 30
SAMPLE SITE: ABNORMAL
SEG NEUTROPHILS: 84 % (ref 36–66)
SEGMENTED NEUTROPHILS ABSOLUTE COUNT: 10.4 K/UL (ref 1.3–9.1)
SODIUM BLD-SCNC: 125 MMOL/L (ref 135–144)
TEXT FOR RESPIRATORY: ABNORMAL
TOTAL PROTEIN: 8.1 G/DL (ref 6.4–8.3)
TROPONIN, HIGH SENSITIVITY: 14 NG/L (ref 0–22)
WBC # BLD: 12.5 K/UL (ref 3.5–11)

## 2022-08-28 PROCEDURE — 2500000003 HC RX 250 WO HCPCS: Performed by: NURSE PRACTITIONER

## 2022-08-28 PROCEDURE — 85025 COMPLETE CBC W/AUTO DIFF WBC: CPT

## 2022-08-28 PROCEDURE — 6370000000 HC RX 637 (ALT 250 FOR IP): Performed by: NURSE PRACTITIONER

## 2022-08-28 PROCEDURE — 36600 WITHDRAWAL OF ARTERIAL BLOOD: CPT

## 2022-08-28 PROCEDURE — 6360000002 HC RX W HCPCS: Performed by: EMERGENCY MEDICINE

## 2022-08-28 PROCEDURE — 93005 ELECTROCARDIOGRAM TRACING: CPT | Performed by: EMERGENCY MEDICINE

## 2022-08-28 PROCEDURE — 94640 AIRWAY INHALATION TREATMENT: CPT

## 2022-08-28 PROCEDURE — 2000000000 HC ICU R&B

## 2022-08-28 PROCEDURE — 2580000003 HC RX 258: Performed by: NURSE PRACTITIONER

## 2022-08-28 PROCEDURE — 6370000000 HC RX 637 (ALT 250 FOR IP): Performed by: EMERGENCY MEDICINE

## 2022-08-28 PROCEDURE — 83880 ASSAY OF NATRIURETIC PEPTIDE: CPT

## 2022-08-28 PROCEDURE — 2580000003 HC RX 258: Performed by: EMERGENCY MEDICINE

## 2022-08-28 PROCEDURE — 36415 COLL VENOUS BLD VENIPUNCTURE: CPT

## 2022-08-28 PROCEDURE — 84484 ASSAY OF TROPONIN QUANT: CPT

## 2022-08-28 PROCEDURE — 82805 BLOOD GASES W/O2 SATURATION: CPT

## 2022-08-28 PROCEDURE — 71045 X-RAY EXAM CHEST 1 VIEW: CPT

## 2022-08-28 PROCEDURE — 96375 TX/PRO/DX INJ NEW DRUG ADDON: CPT

## 2022-08-28 PROCEDURE — 94761 N-INVAS EAR/PLS OXIMETRY MLT: CPT

## 2022-08-28 PROCEDURE — 99285 EMERGENCY DEPT VISIT HI MDM: CPT

## 2022-08-28 PROCEDURE — 6360000002 HC RX W HCPCS: Performed by: NURSE PRACTITIONER

## 2022-08-28 PROCEDURE — 80053 COMPREHEN METABOLIC PANEL: CPT

## 2022-08-28 PROCEDURE — 83735 ASSAY OF MAGNESIUM: CPT

## 2022-08-28 PROCEDURE — 96374 THER/PROPH/DIAG INJ IV PUSH: CPT

## 2022-08-28 RX ORDER — ONDANSETRON 4 MG/1
4 TABLET, ORALLY DISINTEGRATING ORAL EVERY 8 HOURS PRN
Status: DISCONTINUED | OUTPATIENT
Start: 2022-08-28 | End: 2022-08-31 | Stop reason: HOSPADM

## 2022-08-28 RX ORDER — ONDANSETRON 2 MG/ML
4 INJECTION INTRAMUSCULAR; INTRAVENOUS EVERY 6 HOURS PRN
Status: DISCONTINUED | OUTPATIENT
Start: 2022-08-28 | End: 2022-08-31 | Stop reason: HOSPADM

## 2022-08-28 RX ORDER — SODIUM CHLORIDE 0.9 % (FLUSH) 0.9 %
5-40 SYRINGE (ML) INJECTION PRN
Status: DISCONTINUED | OUTPATIENT
Start: 2022-08-28 | End: 2022-08-31 | Stop reason: HOSPADM

## 2022-08-28 RX ORDER — LORAZEPAM 2 MG/ML
1 INJECTION INTRAMUSCULAR
Status: DISCONTINUED | OUTPATIENT
Start: 2022-08-28 | End: 2022-08-29

## 2022-08-28 RX ORDER — LORAZEPAM 1 MG/1
1 TABLET ORAL
Status: DISCONTINUED | OUTPATIENT
Start: 2022-08-28 | End: 2022-08-29

## 2022-08-28 RX ORDER — LORAZEPAM 2 MG/ML
4 INJECTION INTRAMUSCULAR
Status: DISCONTINUED | OUTPATIENT
Start: 2022-08-28 | End: 2022-08-29

## 2022-08-28 RX ORDER — ACETAMINOPHEN 325 MG/1
650 TABLET ORAL EVERY 4 HOURS PRN
Status: DISCONTINUED | OUTPATIENT
Start: 2022-08-28 | End: 2022-08-31 | Stop reason: HOSPADM

## 2022-08-28 RX ORDER — LORAZEPAM 1 MG/1
4 TABLET ORAL
Status: DISCONTINUED | OUTPATIENT
Start: 2022-08-28 | End: 2022-08-29

## 2022-08-28 RX ORDER — LORAZEPAM 2 MG/ML
2 INJECTION INTRAMUSCULAR
Status: DISCONTINUED | OUTPATIENT
Start: 2022-08-28 | End: 2022-08-29

## 2022-08-28 RX ORDER — SODIUM CHLORIDE 0.9 % (FLUSH) 0.9 %
5-40 SYRINGE (ML) INJECTION EVERY 12 HOURS SCHEDULED
Status: DISCONTINUED | OUTPATIENT
Start: 2022-08-28 | End: 2022-08-31 | Stop reason: HOSPADM

## 2022-08-28 RX ORDER — MORPHINE SULFATE 4 MG/ML
4 INJECTION, SOLUTION INTRAMUSCULAR; INTRAVENOUS
Status: DISCONTINUED | OUTPATIENT
Start: 2022-08-28 | End: 2022-08-31 | Stop reason: HOSPADM

## 2022-08-28 RX ORDER — IPRATROPIUM BROMIDE AND ALBUTEROL SULFATE 2.5; .5 MG/3ML; MG/3ML
1 SOLUTION RESPIRATORY (INHALATION) EVERY 4 HOURS PRN
Status: DISCONTINUED | OUTPATIENT
Start: 2022-08-28 | End: 2022-08-29

## 2022-08-28 RX ORDER — LORAZEPAM 2 MG/ML
2 INJECTION INTRAMUSCULAR ONCE
Status: CANCELLED | OUTPATIENT
Start: 2022-08-28

## 2022-08-28 RX ORDER — 0.9 % SODIUM CHLORIDE 0.9 %
1000 INTRAVENOUS SOLUTION INTRAVENOUS ONCE
Status: COMPLETED | OUTPATIENT
Start: 2022-08-28 | End: 2022-08-28

## 2022-08-28 RX ORDER — METOPROLOL SUCCINATE 100 MG/1
100 TABLET, EXTENDED RELEASE ORAL 2 TIMES DAILY
Status: DISCONTINUED | OUTPATIENT
Start: 2022-08-29 | End: 2022-08-31 | Stop reason: HOSPADM

## 2022-08-28 RX ORDER — LORAZEPAM 2 MG/ML
3 INJECTION INTRAMUSCULAR
Status: DISCONTINUED | OUTPATIENT
Start: 2022-08-28 | End: 2022-08-29

## 2022-08-28 RX ORDER — LORAZEPAM 2 MG/ML
1 INJECTION INTRAMUSCULAR
Status: ACTIVE | OUTPATIENT
Start: 2022-08-28 | End: 2022-08-28

## 2022-08-28 RX ORDER — LISINOPRIL 10 MG/1
10 TABLET ORAL DAILY
Status: DISCONTINUED | OUTPATIENT
Start: 2022-08-29 | End: 2022-08-31 | Stop reason: HOSPADM

## 2022-08-28 RX ORDER — LORAZEPAM 1 MG/1
1 TABLET ORAL ONCE
Status: COMPLETED | OUTPATIENT
Start: 2022-08-28 | End: 2022-08-28

## 2022-08-28 RX ORDER — MORPHINE SULFATE 4 MG/ML
4 INJECTION, SOLUTION INTRAMUSCULAR; INTRAVENOUS ONCE
Status: COMPLETED | OUTPATIENT
Start: 2022-08-28 | End: 2022-08-28

## 2022-08-28 RX ORDER — SODIUM CHLORIDE 9 MG/ML
INJECTION, SOLUTION INTRAVENOUS PRN
Status: DISCONTINUED | OUTPATIENT
Start: 2022-08-28 | End: 2022-08-31 | Stop reason: HOSPADM

## 2022-08-28 RX ORDER — LORAZEPAM 1 MG/1
2 TABLET ORAL
Status: DISCONTINUED | OUTPATIENT
Start: 2022-08-28 | End: 2022-08-29

## 2022-08-28 RX ORDER — IPRATROPIUM BROMIDE AND ALBUTEROL SULFATE 2.5; .5 MG/3ML; MG/3ML
SOLUTION RESPIRATORY (INHALATION)
Status: DISCONTINUED
Start: 2022-08-28 | End: 2022-08-29

## 2022-08-28 RX ORDER — LORAZEPAM 1 MG/1
3 TABLET ORAL
Status: DISCONTINUED | OUTPATIENT
Start: 2022-08-28 | End: 2022-08-29

## 2022-08-28 RX ORDER — NICOTINE 21 MG/24HR
1 PATCH, TRANSDERMAL 24 HOURS TRANSDERMAL DAILY
Status: DISCONTINUED | OUTPATIENT
Start: 2022-08-28 | End: 2022-08-31 | Stop reason: HOSPADM

## 2022-08-28 RX ORDER — ENOXAPARIN SODIUM 100 MG/ML
40 INJECTION SUBCUTANEOUS DAILY
Status: DISCONTINUED | OUTPATIENT
Start: 2022-08-29 | End: 2022-08-29

## 2022-08-28 RX ORDER — LORAZEPAM 2 MG/ML
2 INJECTION INTRAMUSCULAR ONCE
Status: COMPLETED | OUTPATIENT
Start: 2022-08-28 | End: 2022-08-28

## 2022-08-28 RX ORDER — LORAZEPAM 2 MG/ML
1 INJECTION INTRAMUSCULAR ONCE
Status: COMPLETED | OUTPATIENT
Start: 2022-08-28 | End: 2022-08-28

## 2022-08-28 RX ORDER — SODIUM CHLORIDE 0.9 % (FLUSH) 0.9 %
5-40 SYRINGE (ML) INJECTION EVERY 12 HOURS SCHEDULED
Status: DISCONTINUED | OUTPATIENT
Start: 2022-08-29 | End: 2022-08-31 | Stop reason: HOSPADM

## 2022-08-28 RX ORDER — MORPHINE SULFATE 2 MG/ML
2 INJECTION, SOLUTION INTRAMUSCULAR; INTRAVENOUS
Status: DISCONTINUED | OUTPATIENT
Start: 2022-08-28 | End: 2022-08-31 | Stop reason: HOSPADM

## 2022-08-28 RX ADMIN — THIAMINE HYDROCHLORIDE: 100 INJECTION, SOLUTION INTRAMUSCULAR; INTRAVENOUS at 22:44

## 2022-08-28 RX ADMIN — LORAZEPAM 1 MG: 1 TABLET ORAL at 15:48

## 2022-08-28 RX ADMIN — LORAZEPAM 1 MG: 2 INJECTION, SOLUTION INTRAMUSCULAR; INTRAVENOUS at 19:47

## 2022-08-28 RX ADMIN — IPRATROPIUM BROMIDE AND ALBUTEROL SULFATE 1 AMPULE: .5; 2.5 SOLUTION RESPIRATORY (INHALATION) at 20:13

## 2022-08-28 RX ADMIN — LORAZEPAM 2 MG: 2 INJECTION, SOLUTION INTRAMUSCULAR; INTRAVENOUS at 22:39

## 2022-08-28 RX ADMIN — SODIUM CHLORIDE, PRESERVATIVE FREE 10 ML: 5 INJECTION INTRAVENOUS at 23:14

## 2022-08-28 RX ADMIN — LORAZEPAM 1 MG: 1 TABLET ORAL at 18:05

## 2022-08-28 RX ADMIN — SODIUM CHLORIDE 1000 ML: 9 INJECTION, SOLUTION INTRAVENOUS at 15:45

## 2022-08-28 RX ADMIN — SODIUM CHLORIDE 1000 ML: 9 INJECTION, SOLUTION INTRAVENOUS at 19:15

## 2022-08-28 RX ADMIN — MORPHINE SULFATE 4 MG: 4 INJECTION, SOLUTION INTRAMUSCULAR; INTRAVENOUS at 18:05

## 2022-08-28 RX ADMIN — IPRATROPIUM BROMIDE AND ALBUTEROL SULFATE 1 AMPULE: .5; 2.5 SOLUTION RESPIRATORY (INHALATION) at 20:11

## 2022-08-28 ASSESSMENT — ENCOUNTER SYMPTOMS
EYE REDNESS: 0
CONSTIPATION: 0
DIARRHEA: 0
NAUSEA: 0
EYE PAIN: 0
EYE DISCHARGE: 0
SORE THROAT: 0
VOMITING: 0
WHEEZING: 0
SHORTNESS OF BREATH: 1
STRIDOR: 0
COLOR CHANGE: 0
COUGH: 1
ABDOMINAL PAIN: 0

## 2022-08-28 ASSESSMENT — PAIN SCALES - GENERAL
PAINLEVEL_OUTOF10: 9
PAINLEVEL_OUTOF10: 8
PAINLEVEL_OUTOF10: 9

## 2022-08-28 ASSESSMENT — PAIN DESCRIPTION - ONSET: ONSET: ON-GOING

## 2022-08-28 ASSESSMENT — PAIN DESCRIPTION - DESCRIPTORS
DESCRIPTORS: ACHING
DESCRIPTORS: BURNING;ACHING

## 2022-08-28 ASSESSMENT — PAIN DESCRIPTION - ORIENTATION
ORIENTATION: RIGHT;LEFT
ORIENTATION: MID

## 2022-08-28 ASSESSMENT — PAIN DESCRIPTION - FREQUENCY: FREQUENCY: CONTINUOUS

## 2022-08-28 ASSESSMENT — PAIN - FUNCTIONAL ASSESSMENT
PAIN_FUNCTIONAL_ASSESSMENT: PREVENTS OR INTERFERES SOME ACTIVE ACTIVITIES AND ADLS
PAIN_FUNCTIONAL_ASSESSMENT: 0-10

## 2022-08-28 ASSESSMENT — PAIN DESCRIPTION - LOCATION: LOCATION: HEAD

## 2022-08-28 NOTE — ED TRIAGE NOTES
C= \"Have you ever felt that you should Cut down on your drinking? \"  Yes  A= \"Have people Annoyed you by criticizing your drinking? \"  No  G= \"Have you ever felt bad or Guilty about your drinking? \"  No  E= \"Have you ever had a drink as an Eye-opener first thing in the morning to steady your nerves or to help a hangover? \"  No      Deferred []      Reason for deferring:     *If yes to two or more: probable alcohol abuse. *

## 2022-08-28 NOTE — ED PROVIDER NOTES
[address]  EMERGENCY DEPARTMENT ENCOUNTER      Pt Name: Elizabeth Brown  MRN: [id number]  [place] 1965  Date of evaluation: 8/28/2022  Provider: Rafael Bee MD    CHIEF COMPLAINT       Chief Complaint   Patient presents with    Hemoptysis    Headache    Chest Pain    Pharyngitis         CRITICAL CARE TIME   Total Critical Care time was 10 minutes, excluding separately reportable procedures. There was a high probability of clinically significant/life threatening deterioration in the patient's condition which required my urgent intervention. HISTORY OF PRESENT ILLNESS  (Location/Symptom, Timing/Onset, Context/Setting, Quality, Duration, Modifying Factors, Severity.)   Elizabeth Brown is a 61 y.o. male who presents to the emergency department complaining of cough with blood. He relates the coughing up blood started Friday. He has had a cough and has been told he has a nodule that is likely cancer in the lung. He is following with Dr. Dina Dobbs but has had no biopsy yet. Dr. Fatou Estrada is his heme oncology referral but they have not yet met. He relates he does not feel any more short of breath than typical.  But this is a what a typical flareup feels like except for the blood. He relates this is an abnormal aspect and it is completely different than anything else he is ever experienced. Nursing Notes were reviewed. REVIEW OF SYSTEMS    (2-9 systems for level 4, 10 or more for level 5)     Review of Systems   Constitutional:  Negative for activity change, appetite change, chills, fatigue and fever. HENT:  Negative for congestion, ear pain and sore throat. Eyes:  Negative for pain, discharge and redness. Respiratory:  Positive for cough and shortness of breath. Negative for wheezing and stridor. Cardiovascular:  Negative for chest pain. Gastrointestinal:  Negative for abdominal pain, constipation, diarrhea, nausea and vomiting.    Genitourinary:  Negative for decreased MS RN NOTES  ACCU-CHECK

BLOOD SUGAR CHECK 131,DUE LEVEMIR 20 UNITS GIVEN SQ VIA RIGHT DELTOID. urine volume and difficulty urinating. Musculoskeletal:  Negative for arthralgias and myalgias. Skin:  Negative for color change and rash. Neurological:  Negative for dizziness, weakness and headaches. Psychiatric/Behavioral:  Negative for behavioral problems and confusion. Except as noted above the remainder of the review of systems was reviewed and negative. PAST MEDICAL HISTORY     Past Medical History:   Diagnosis Date    Alcohol abuse     hx of alcoholism; States he drinks 5 beers per day; pt has D/T's    Avascular necrosis of femoral head ([facility] [address] 2014    Overview:  S/p bilateral hip replacements    History of hip replacement     left/right    Hypertension     Mass of left lung     Rib fracture     Scabies     Stage 3 severe COPD by GOLD classification ([facility] [address] 2021    Tachycardia 10/28/2020       SURGICAL HISTORY       Past Surgical History:   Procedure Laterality Date    NECK SURGERY      TOTAL HIP ARTHROPLASTY Bilateral     VASECTOMY         CURRENT MEDICATIONS       Previous Medications    ALBUTEROL (PROVENTIL) (2.5 MG/3ML) 0.083% NEBULIZER SOLUTION    INHALE 3 MLS (ONE VIAL) INTO THE LUNGS EVERY 6 HOURS VIA NEBULIZER AS NEEDED FOR WHEEZING    ALBUTEROL SULFATE HFA (PROAIR HFA) 108 (90 BASE) MCG/ACT INHALER    Inhale 2 puffs into the lungs every 6 hours as needed for Wheezing    FLUTICASONE-UMECLIDIN-VILANT (TRELEGY ELLIPTA) 200-62.5-25 MCG/INH AEPB    Inhale 1 puff into the lungs Daily    LISINOPRIL (PRINIVIL;ZESTRIL) 10 MG TABLET    Take 1 tablet by mouth daily    METOPROLOL SUCCINATE (TOPROL XL) 100 MG EXTENDED RELEASE TABLET    TAKE 1 TABLET BY MOUTH TWICE DAILY       ALLERGIES     Dulera [mometasone furo-formoterol fum]    FAMILY HISTORY           Problem Relation Age of Onset    Other Mother         mental health      Family Status   Relation Name Status    Mother  Alive    Father          SOCIAL HISTORY      reports that he has been smoking cigarettes.  He started smoking about 36 years ago. He has a 44.00 pack-year smoking history. He has never used smokeless tobacco. He reports current alcohol use of about 35.0 standard drinks per week. He reports that he does not currently use drugs after having used the following drugs: Cocaine and Marijuana Shayla Bautista). PHYSICAL EXAM    (up to 7 for level 4, 8 or more for level 5)     Physical Exam  Vitals and nursing note reviewed. Constitutional:       General: He is not in acute distress. Appearance: He is well-developed. He is not ill-appearing, toxic-appearing or diaphoretic. HENT:      Head: Normocephalic and atraumatic. Right Ear: External ear normal.      Left Ear: External ear normal.      Nose: Nose normal.      Mouth/Throat:      Mouth: Mucous membranes are moist.   Eyes:      General:         Right eye: No discharge. Left eye: No discharge. Conjunctiva/sclera: Conjunctivae normal.      Pupils: Pupils are equal, round, and reactive to light. Cardiovascular:      Rate and Rhythm: Regular rhythm. Tachycardia present. Heart sounds: Normal heart sounds. No murmur heard. Pulmonary:      Effort: Pulmonary effort is normal. Tachypnea present. No respiratory distress. Breath sounds: Decreased breath sounds present. No wheezing, rhonchi or rales. Chest:      Chest wall: No tenderness. Abdominal:      General: Bowel sounds are normal. There is no distension. Palpations: Abdomen is soft. There is no mass. Tenderness: There is no abdominal tenderness. There is no guarding or rebound. Musculoskeletal:         General: Normal range of motion. Cervical back: Normal range of motion and neck supple. Right lower leg: No edema. Left lower leg: No edema. Lymphadenopathy:      Cervical: No cervical adenopathy. Skin:     General: Skin is warm. Findings: No rash. Comments: Ashen skin color   Neurological:      General: No focal deficit present.       Mental Status: He is alert and oriented to person, place, and time. Motor: No abnormal muscle tone. Psychiatric:         Mood and Affect: Mood normal.         Behavior: Behavior normal.        DIAGNOSTIC RESULTS     EKG: All EKG's are interpreted by the Emergency Department Physician who either signs or Co-signs this chart in the absence of a cardiologist.    EKG Interpretation    Interpreted by me    Rhythm: Sinus tachycardia  Rate: Tachycardic  Axis: Left   Ectopy: none  Conduction: normal  ST Segments: no acute change  T Waves: no acute change  Q Waves: none    Clinical Impression: Nonspecific changes and abnormal EKG    RADIOLOGY:   Non-plain film images such as CT, Ultrasound and MRI are read by the radiologist. Plain radiographic images are visualized and preliminarily interpreted by the emergency physician with the below findings:    Interpretation per the Radiologist below, if available at the time of this note:    XR CHEST PORTABLE   Final Result   No acute findings. Redemonstrated lingular mass better evaluated on recent CT study highly   suspicious for malignancy. Recommend CT PET follow-up and/or tissue sampling   if not recently performed.              ED BEDSIDE ULTRASOUND:   Performed by ED Physician - none    LABS:  Labs Reviewed   CBC WITH AUTO DIFFERENTIAL - Abnormal; Notable for the following components:       Result Value    WBC 12.5 (*)     RBC 3.63 (*)     Hematocrit 39.7 (*)     .2 (*)     MCH 37.8 (*)     Seg Neutrophils 84 (*)     Lymphocytes 8 (*)     Monocytes 8 (*)     Segs Absolute 10.40 (*)     All other components within normal limits   COMPREHENSIVE METABOLIC PANEL - Abnormal; Notable for the following components:    Creatinine 0.46 (*)     Sodium 125 (*)     Chloride 91 (*)     Alkaline Phosphatase 130 (*)     All other components within normal limits   MAGNESIUM   TROPONIN   BRAIN NATRIURETIC PEPTIDE   BLOOD GAS, ARTERIAL       All other labs were within normal range or not returned as of this dictation. EMERGENCY DEPARTMENT COURSE and DIFFERENTIAL DIAGNOSIS/MDM:   Vitals:    Vitals:    08/28/22 2011 08/28/22 2013 08/28/22 2031 08/28/22 2040   BP:       Pulse: (!) 135 (!) 136 (!) 155    Resp: 30 21 21 (!) 34   Temp:       TempSrc:       SpO2: 98% 98% 99%    Weight:       Height:         I did discuss with him that I suspicious of lung malignancy as well and that he will likely need admitted. We will go ahead and start work-up here in the emergency setting and then discuss with him plan once we get everything back. He is agreeable at this time. I did discuss admission with him. I have spoken with Dr. Karma Stone who relates he will admit the patient if he is knowing that he will not be able to see his own pulmonologist.  Patient is comfortable with this plan. I did speak with Dr. Jazmine Wright for the patient. No further orders for us in the emergency setting. Patient continues to be persistently tachycardic. I have given him a second liter of fluid and checking ABG. I think that this may be related to alcohol withdrawal.  Patient has been put on BiPAP. We will consider Librium or Versed if needed. CONSULTS:  IP CONSULT TO INTERNAL MEDICINE  IP CONSULT TO INTERNAL MEDICINE  IP CONSULT TO PULMONOLOGY  IP CONSULT TO PULMONOLOGY    PROCEDURES:  None    FINAL IMPRESSION      1. Cough with hemoptysis    2. Lung mass    3. Shortness of breath    4. Hyponatremia    5. Hypochloremia          DISPOSITION/PLAN   DISPOSITION Admitted 08/28/2022 08:24:01 PM      PATIENT REFERRED TO:  No follow-up provider specified.     DISCHARGE MEDICATIONS:  New Prescriptions    No medications on file       (Please note that portions of this note were completed with a voice recognition program.  Efforts were made to edit the dictations but occasionally words are mis-transcribed.)    Garth Soriano MD  Attending Emergency Physician        Garth Soriano MD  08/28/22 2049

## 2022-08-29 PROBLEM — R91.8 LUNG MASS: Status: ACTIVE | Noted: 2022-08-29

## 2022-08-29 PROBLEM — U07.1 COVID-19: Status: ACTIVE | Noted: 2022-08-29

## 2022-08-29 PROBLEM — A41.9 SEPSIS (HCC): Status: ACTIVE | Noted: 2022-08-29

## 2022-08-29 PROBLEM — E87.1 HYPONATREMIA: Status: ACTIVE | Noted: 2022-08-29

## 2022-08-29 PROBLEM — R04.2 HEMOPTYSIS: Status: ACTIVE | Noted: 2022-08-29

## 2022-08-29 PROBLEM — C34.90 LUNG MALIGNANCY (HCC): Status: ACTIVE | Noted: 2022-08-29

## 2022-08-29 LAB
ADENOVIRUS PCR: NOT DETECTED
ANION GAP SERPL CALCULATED.3IONS-SCNC: 11 MMOL/L (ref 9–17)
BACTERIA: NORMAL
BILIRUBIN URINE: NEGATIVE
BORDETELLA PARAPERTUSSIS: NOT DETECTED
BORDETELLA PERTUSSIS PCR: NOT DETECTED
BUN BLDV-MCNC: 6 MG/DL (ref 6–20)
CALCIUM SERPL-MCNC: 8.9 MG/DL (ref 8.6–10.4)
CASTS UA: NORMAL /LPF
CHLAMYDIA PNEUMONIAE BY PCR: NOT DETECTED
CHLORIDE BLD-SCNC: 94 MMOL/L (ref 98–107)
CO2: 21 MMOL/L (ref 20–31)
COLOR: ABNORMAL
CORONAVIRUS 229E PCR: NOT DETECTED
CORONAVIRUS HKU1 PCR: NOT DETECTED
CORONAVIRUS NL63 PCR: NOT DETECTED
CORONAVIRUS OC43 PCR: NOT DETECTED
CREAT SERPL-MCNC: <0.4 MG/DL (ref 0.7–1.2)
D-DIMER QUANTITATIVE: 4.89 MG/L FEU (ref 0–0.59)
EKG ATRIAL RATE: 120 BPM
EKG P AXIS: 50 DEGREES
EKG P-R INTERVAL: 134 MS
EKG Q-T INTERVAL: 322 MS
EKG QRS DURATION: 76 MS
EKG QTC CALCULATION (BAZETT): 455 MS
EKG R AXIS: -10 DEGREES
EKG T AXIS: 48 DEGREES
EKG VENTRICULAR RATE: 120 BPM
EPITHELIAL CELLS UA: NORMAL /HPF
GFR AFRICAN AMERICAN: ABNORMAL ML/MIN
GFR NON-AFRICAN AMERICAN: ABNORMAL ML/MIN
GFR SERPL CREATININE-BSD FRML MDRD: ABNORMAL ML/MIN/{1.73_M2}
GLUCOSE BLD-MCNC: 90 MG/DL (ref 70–99)
GLUCOSE URINE: NEGATIVE
HCT VFR BLD CALC: 38.8 % (ref 41–53)
HEMOGLOBIN: 13.2 G/DL (ref 13.5–17.5)
HUMAN METAPNEUMOVIRUS PCR: NOT DETECTED
INFLUENZA A BY PCR: NOT DETECTED
INFLUENZA B BY PCR: NOT DETECTED
INR BLD: 1
KETONES, URINE: ABNORMAL
LACTIC ACID: 1.2 MMOL/L (ref 0.5–2.2)
LEUKOCYTE ESTERASE, URINE: ABNORMAL
MCH RBC QN AUTO: 38.5 PG (ref 26–34)
MCHC RBC AUTO-ENTMCNC: 34 G/DL (ref 31–37)
MCV RBC AUTO: 113.1 FL (ref 80–100)
MYCOPLASMA PNEUMONIAE PCR: NOT DETECTED
NITRITE, URINE: POSITIVE
PARAINFLUENZA 1 PCR: NOT DETECTED
PARAINFLUENZA 2 PCR: NOT DETECTED
PARAINFLUENZA 3 PCR: NOT DETECTED
PARAINFLUENZA 4 PCR: NOT DETECTED
PDW BLD-RTO: 13.1 % (ref 11.5–14.9)
PH UA: 6 (ref 5–8)
PLATELET # BLD: 200 K/UL (ref 150–450)
PMV BLD AUTO: 7.6 FL (ref 6–12)
POTASSIUM SERPL-SCNC: 4 MMOL/L (ref 3.7–5.3)
PROCALCITONIN: 0.2 NG/ML
PROTEIN UA: NEGATIVE
PROTHROMBIN TIME: 12.8 SEC (ref 11.8–14.6)
RBC # BLD: 3.43 M/UL (ref 4.5–5.9)
RBC UA: NORMAL /HPF
RESP SYNCYTIAL VIRUS PCR: NOT DETECTED
RHINO/ENTEROVIRUS PCR: NOT DETECTED
SARS-COV-2, PCR: DETECTED
SERUM OSMOLALITY: 258 MOSM/KG (ref 275–295)
SODIUM BLD-SCNC: 124 MMOL/L (ref 135–144)
SODIUM BLD-SCNC: 126 MMOL/L (ref 135–144)
SODIUM,UR: 118 MMOL/L
SPECIFIC GRAVITY UA: 1.01 (ref 1–1.03)
SPECIMEN DESCRIPTION: ABNORMAL
TURBIDITY: CLEAR
URINE HGB: ABNORMAL
UROBILINOGEN, URINE: ABNORMAL
WBC # BLD: 8.3 K/UL (ref 3.5–11)
WBC UA: NORMAL /HPF

## 2022-08-29 PROCEDURE — 83935 ASSAY OF URINE OSMOLALITY: CPT

## 2022-08-29 PROCEDURE — 85027 COMPLETE CBC AUTOMATED: CPT

## 2022-08-29 PROCEDURE — 6360000002 HC RX W HCPCS: Performed by: NURSE PRACTITIONER

## 2022-08-29 PROCEDURE — 97166 OT EVAL MOD COMPLEX 45 MIN: CPT

## 2022-08-29 PROCEDURE — 97162 PT EVAL MOD COMPLEX 30 MIN: CPT

## 2022-08-29 PROCEDURE — 6370000000 HC RX 637 (ALT 250 FOR IP): Performed by: EMERGENCY MEDICINE

## 2022-08-29 PROCEDURE — 83605 ASSAY OF LACTIC ACID: CPT

## 2022-08-29 PROCEDURE — 93010 ELECTROCARDIOGRAM REPORT: CPT | Performed by: INTERNAL MEDICINE

## 2022-08-29 PROCEDURE — 6370000000 HC RX 637 (ALT 250 FOR IP): Performed by: NURSE PRACTITIONER

## 2022-08-29 PROCEDURE — 97530 THERAPEUTIC ACTIVITIES: CPT

## 2022-08-29 PROCEDURE — 87086 URINE CULTURE/COLONY COUNT: CPT

## 2022-08-29 PROCEDURE — 6360000002 HC RX W HCPCS: Performed by: EMERGENCY MEDICINE

## 2022-08-29 PROCEDURE — 85379 FIBRIN DEGRADATION QUANT: CPT

## 2022-08-29 PROCEDURE — 97116 GAIT TRAINING THERAPY: CPT

## 2022-08-29 PROCEDURE — 83930 ASSAY OF BLOOD OSMOLALITY: CPT

## 2022-08-29 PROCEDURE — 2500000003 HC RX 250 WO HCPCS: Performed by: NURSE PRACTITIONER

## 2022-08-29 PROCEDURE — 2000000000 HC ICU R&B

## 2022-08-29 PROCEDURE — 6370000000 HC RX 637 (ALT 250 FOR IP): Performed by: STUDENT IN AN ORGANIZED HEALTH CARE EDUCATION/TRAINING PROGRAM

## 2022-08-29 PROCEDURE — 80048 BASIC METABOLIC PNL TOTAL CA: CPT

## 2022-08-29 PROCEDURE — 2580000003 HC RX 258: Performed by: STUDENT IN AN ORGANIZED HEALTH CARE EDUCATION/TRAINING PROGRAM

## 2022-08-29 PROCEDURE — 2580000003 HC RX 258: Performed by: EMERGENCY MEDICINE

## 2022-08-29 PROCEDURE — 2700000000 HC OXYGEN THERAPY PER DAY

## 2022-08-29 PROCEDURE — 94761 N-INVAS EAR/PLS OXIMETRY MLT: CPT

## 2022-08-29 PROCEDURE — 6360000002 HC RX W HCPCS: Performed by: STUDENT IN AN ORGANIZED HEALTH CARE EDUCATION/TRAINING PROGRAM

## 2022-08-29 PROCEDURE — 81001 URINALYSIS AUTO W/SCOPE: CPT

## 2022-08-29 PROCEDURE — 85610 PROTHROMBIN TIME: CPT

## 2022-08-29 PROCEDURE — 0202U NFCT DS 22 TRGT SARS-COV-2: CPT

## 2022-08-29 PROCEDURE — 84300 ASSAY OF URINE SODIUM: CPT

## 2022-08-29 PROCEDURE — 84145 PROCALCITONIN (PCT): CPT

## 2022-08-29 PROCEDURE — 36415 COLL VENOUS BLD VENIPUNCTURE: CPT

## 2022-08-29 PROCEDURE — 84295 ASSAY OF SERUM SODIUM: CPT

## 2022-08-29 PROCEDURE — 94660 CPAP INITIATION&MGMT: CPT

## 2022-08-29 PROCEDURE — 99223 1ST HOSP IP/OBS HIGH 75: CPT | Performed by: INTERNAL MEDICINE

## 2022-08-29 PROCEDURE — 94640 AIRWAY INHALATION TREATMENT: CPT

## 2022-08-29 PROCEDURE — 99255 IP/OBS CONSLTJ NEW/EST HI 80: CPT | Performed by: INTERNAL MEDICINE

## 2022-08-29 PROCEDURE — 2580000003 HC RX 258: Performed by: NURSE PRACTITIONER

## 2022-08-29 RX ORDER — ALPRAZOLAM 0.5 MG/1
0.5 TABLET ORAL 2 TIMES DAILY PRN
Status: DISCONTINUED | OUTPATIENT
Start: 2022-08-29 | End: 2022-08-31 | Stop reason: HOSPADM

## 2022-08-29 RX ORDER — ENOXAPARIN SODIUM 100 MG/ML
1 INJECTION SUBCUTANEOUS 2 TIMES DAILY
Status: DISCONTINUED | OUTPATIENT
Start: 2022-08-29 | End: 2022-08-31 | Stop reason: HOSPADM

## 2022-08-29 RX ORDER — CHLORDIAZEPOXIDE HYDROCHLORIDE 10 MG/1
10 CAPSULE, GELATIN COATED ORAL 3 TIMES DAILY
Status: DISCONTINUED | OUTPATIENT
Start: 2022-08-29 | End: 2022-08-31 | Stop reason: HOSPADM

## 2022-08-29 RX ORDER — DEXAMETHASONE SODIUM PHOSPHATE 10 MG/ML
6 INJECTION, SOLUTION INTRAMUSCULAR; INTRAVENOUS DAILY
Status: DISCONTINUED | OUTPATIENT
Start: 2022-08-29 | End: 2022-08-30

## 2022-08-29 RX ORDER — SODIUM CHLORIDE 9 MG/ML
INJECTION, SOLUTION INTRAVENOUS CONTINUOUS
Status: DISCONTINUED | OUTPATIENT
Start: 2022-08-29 | End: 2022-08-29

## 2022-08-29 RX ORDER — BUDESONIDE AND FORMOTEROL FUMARATE DIHYDRATE 160; 4.5 UG/1; UG/1
2 AEROSOL RESPIRATORY (INHALATION) 2 TIMES DAILY
Status: DISCONTINUED | OUTPATIENT
Start: 2022-08-29 | End: 2022-08-31 | Stop reason: HOSPADM

## 2022-08-29 RX ORDER — LEVALBUTEROL TARTRATE 45 UG/1
1 AEROSOL, METERED ORAL
Status: DISCONTINUED | OUTPATIENT
Start: 2022-08-29 | End: 2022-08-31 | Stop reason: HOSPADM

## 2022-08-29 RX ADMIN — IPRATROPIUM BROMIDE 1 PUFF: 17 AEROSOL, METERED RESPIRATORY (INHALATION) at 15:22

## 2022-08-29 RX ADMIN — IPRATROPIUM BROMIDE 1 PUFF: 17 AEROSOL, METERED RESPIRATORY (INHALATION) at 20:01

## 2022-08-29 RX ADMIN — SODIUM CHLORIDE, PRESERVATIVE FREE 10 ML: 5 INJECTION INTRAVENOUS at 21:10

## 2022-08-29 RX ADMIN — MORPHINE SULFATE 4 MG: 4 INJECTION, SOLUTION INTRAMUSCULAR; INTRAVENOUS at 16:55

## 2022-08-29 RX ADMIN — SODIUM CHLORIDE, PRESERVATIVE FREE 10 ML: 5 INJECTION INTRAVENOUS at 21:11

## 2022-08-29 RX ADMIN — BUDESONIDE AND FORMOTEROL FUMARATE DIHYDRATE 2 PUFF: 160; 4.5 AEROSOL RESPIRATORY (INHALATION) at 11:39

## 2022-08-29 RX ADMIN — LORAZEPAM 2 MG: 2 INJECTION, SOLUTION INTRAMUSCULAR; INTRAVENOUS at 04:21

## 2022-08-29 RX ADMIN — LORAZEPAM 2 MG: 2 INJECTION, SOLUTION INTRAMUSCULAR; INTRAVENOUS at 00:57

## 2022-08-29 RX ADMIN — DEXAMETHASONE SODIUM PHOSPHATE 6 MG: 10 INJECTION, SOLUTION INTRAMUSCULAR; INTRAVENOUS at 11:55

## 2022-08-29 RX ADMIN — ENOXAPARIN SODIUM 40 MG: 100 INJECTION SUBCUTANEOUS at 08:50

## 2022-08-29 RX ADMIN — SODIUM CHLORIDE: 9 INJECTION, SOLUTION INTRAVENOUS at 05:38

## 2022-08-29 RX ADMIN — METOPROLOL SUCCINATE 100 MG: 100 TABLET, EXTENDED RELEASE ORAL at 21:09

## 2022-08-29 RX ADMIN — BUDESONIDE AND FORMOTEROL FUMARATE DIHYDRATE 2 PUFF: 160; 4.5 AEROSOL RESPIRATORY (INHALATION) at 20:01

## 2022-08-29 RX ADMIN — MORPHINE SULFATE 2 MG: 2 INJECTION, SOLUTION INTRAMUSCULAR; INTRAVENOUS at 21:08

## 2022-08-29 RX ADMIN — SODIUM CHLORIDE, PRESERVATIVE FREE 10 ML: 5 INJECTION INTRAVENOUS at 07:52

## 2022-08-29 RX ADMIN — LEVALBUTEROL TARTRATE 1 PUFF: 45 AEROSOL, METERED ORAL at 09:54

## 2022-08-29 RX ADMIN — THIAMINE HYDROCHLORIDE: 100 INJECTION, SOLUTION INTRAMUSCULAR; INTRAVENOUS at 10:34

## 2022-08-29 RX ADMIN — ACETAMINOPHEN 650 MG: 325 TABLET, FILM COATED ORAL at 00:56

## 2022-08-29 RX ADMIN — LEVALBUTEROL TARTRATE 1 PUFF: 45 AEROSOL, METERED ORAL at 20:01

## 2022-08-29 RX ADMIN — IPRATROPIUM BROMIDE 1 PUFF: 17 AEROSOL, METERED RESPIRATORY (INHALATION) at 11:38

## 2022-08-29 RX ADMIN — ENOXAPARIN SODIUM 90 MG: 100 INJECTION SUBCUTANEOUS at 21:11

## 2022-08-29 RX ADMIN — CHLORDIAZEPOXIDE HYDROCHLORIDE 10 MG: 10 CAPSULE ORAL at 16:29

## 2022-08-29 RX ADMIN — CHLORDIAZEPOXIDE HYDROCHLORIDE 10 MG: 10 CAPSULE ORAL at 10:33

## 2022-08-29 RX ADMIN — CEFTRIAXONE SODIUM 1000 MG: 1 INJECTION, POWDER, FOR SOLUTION INTRAMUSCULAR; INTRAVENOUS at 08:00

## 2022-08-29 RX ADMIN — AZITHROMYCIN DIHYDRATE 500 MG: 500 INJECTION, POWDER, LYOPHILIZED, FOR SOLUTION INTRAVENOUS at 08:57

## 2022-08-29 RX ADMIN — MORPHINE SULFATE 4 MG: 4 INJECTION, SOLUTION INTRAMUSCULAR; INTRAVENOUS at 04:28

## 2022-08-29 RX ADMIN — LEVALBUTEROL TARTRATE 1 PUFF: 45 AEROSOL, METERED ORAL at 11:34

## 2022-08-29 RX ADMIN — METOPROLOL SUCCINATE 100 MG: 100 TABLET, EXTENDED RELEASE ORAL at 00:00

## 2022-08-29 RX ADMIN — MORPHINE SULFATE 4 MG: 4 INJECTION, SOLUTION INTRAMUSCULAR; INTRAVENOUS at 08:49

## 2022-08-29 RX ADMIN — METOPROLOL SUCCINATE 100 MG: 100 TABLET, EXTENDED RELEASE ORAL at 07:50

## 2022-08-29 RX ADMIN — LISINOPRIL 10 MG: 10 TABLET ORAL at 07:50

## 2022-08-29 RX ADMIN — MORPHINE SULFATE 4 MG: 4 INJECTION, SOLUTION INTRAMUSCULAR; INTRAVENOUS at 06:38

## 2022-08-29 RX ADMIN — ALPRAZOLAM 0.5 MG: 0.5 TABLET ORAL at 12:21

## 2022-08-29 RX ADMIN — LEVALBUTEROL TARTRATE 1 PUFF: 45 AEROSOL, METERED ORAL at 15:21

## 2022-08-29 RX ADMIN — CHLORDIAZEPOXIDE HYDROCHLORIDE 10 MG: 10 CAPSULE ORAL at 21:08

## 2022-08-29 ASSESSMENT — PAIN DESCRIPTION - ORIENTATION
ORIENTATION: ANTERIOR

## 2022-08-29 ASSESSMENT — PAIN DESCRIPTION - ONSET
ONSET: ON-GOING

## 2022-08-29 ASSESSMENT — PAIN DESCRIPTION - LOCATION
LOCATION: HEAD

## 2022-08-29 ASSESSMENT — PAIN SCALES - GENERAL
PAINLEVEL_OUTOF10: 6
PAINLEVEL_OUTOF10: 2
PAINLEVEL_OUTOF10: 2
PAINLEVEL_OUTOF10: 9
PAINLEVEL_OUTOF10: 6
PAINLEVEL_OUTOF10: 3
PAINLEVEL_OUTOF10: 7
PAINLEVEL_OUTOF10: 8
PAINLEVEL_OUTOF10: 3
PAINLEVEL_OUTOF10: 9
PAINLEVEL_OUTOF10: 2
PAINLEVEL_OUTOF10: 10
PAINLEVEL_OUTOF10: 8

## 2022-08-29 ASSESSMENT — PAIN - FUNCTIONAL ASSESSMENT
PAIN_FUNCTIONAL_ASSESSMENT: PREVENTS OR INTERFERES SOME ACTIVE ACTIVITIES AND ADLS
PAIN_FUNCTIONAL_ASSESSMENT: ACTIVITIES ARE NOT PREVENTED
PAIN_FUNCTIONAL_ASSESSMENT: PREVENTS OR INTERFERES SOME ACTIVE ACTIVITIES AND ADLS

## 2022-08-29 ASSESSMENT — PAIN DESCRIPTION - PAIN TYPE
TYPE: CHRONIC PAIN

## 2022-08-29 ASSESSMENT — PAIN DESCRIPTION - DIRECTION
RADIATING_TOWARDS: FOREHEAD

## 2022-08-29 ASSESSMENT — PAIN DESCRIPTION - DESCRIPTORS
DESCRIPTORS: POUNDING
DESCRIPTORS: ACHING
DESCRIPTORS: ACHING;NAGGING
DESCRIPTORS: ACHING
DESCRIPTORS: ACHING
DESCRIPTORS: ACHING;POUNDING

## 2022-08-29 ASSESSMENT — PAIN DESCRIPTION - FREQUENCY
FREQUENCY: CONTINUOUS
FREQUENCY: CONTINUOUS
FREQUENCY: INTERMITTENT
FREQUENCY: CONTINUOUS

## 2022-08-29 ASSESSMENT — ENCOUNTER SYMPTOMS
SHORTNESS OF BREATH: 1
ABDOMINAL PAIN: 0
WHEEZING: 1
COLOR CHANGE: 0
COUGH: 1

## 2022-08-29 NOTE — ED NOTES
Pt becoming more tachypneic and tachycardic. Dr Shelia Guzmán at bedside for eval.  Pt has no visible JVD, but he has some bilat expanding area, with the rt side greater than the lt, at the clavicular border that seems to be inflating with his expirations. Pt has no palpable subcutaneous emphysema.      Roselyn Mar RN  08/28/22 2035

## 2022-08-29 NOTE — ED NOTES
Pt is brought to this ER by [facility] with c/o hemoptysis, frontal headache, sorethroat, and lower-midsternal chest discomfort. Pt states onset of the hemoptysis was Friday, but the cough has been going on for \"months and months\". Pt arrives A+O x 4, GCS = 15, PMS x 4 intact, and PWD. Pt's pulse is tachycardic and regular. Pt is tachypneic and has frequent bright red hemoptysis. Lung sounds clear t/o bilat but slightly diminished. Pt's abdomen is slightly distended and slightly firm. Pt is having appropriate conversation with this nurse, but he is anxious on the cart.        Michell Leonard RN  08/28/22 2019

## 2022-08-29 NOTE — ED NOTES
Pt BIPAP removed. Churchill Hesham at bedside. Pt resting at this time. Will continue to monitor.       Darryl Bowden RN  08/28/22 2147       Darryl Bowden RN  08/28/22 2158

## 2022-08-29 NOTE — CONSULTS
_                         Today's Date: 8/29/2022  Patient Name: Emre Gates  Date of admission: 8/28/2022  2:07 PM  Patient's age: 61 y. o., 1965  Admission Dx: Shortness of breath [R06.02]  COPD (chronic obstructive pulmonary disease) ([facility] [address] [J44.9]  Hypochloremia [E87.8]  Hyponatremia [E87.1]  Lung mass [R91.8]  Cough with hemoptysis [R04.2]      Requesting Physician: Bhavna Kerr MD    CHIEF COMPLAINT: Shortness of breath. Cough and sputum. COPD exacerbation. COVID-positive. Consult for lung mass. History Obtained From:  patient, electronic medical record    HISTORY OF PRESENT ILLNESS:      The patient is a 61 y.o.  male who is admitted to the hospital for further management of hypoxia with increasing shortness of breath and cough and hemoptysis. Patient had increasing symptoms over the last several weeks but recently was found to have positive COVID infection and he has COPD exacerbation. He was admitted to the [facility] unit for further management. Consult is for lung mass. Obviously patient is a chronic smoker. He had screening lung CT scan July 11, 2022. He was noted to have a left upper lobe lung mass measuring 4.1 x 3 x 3.1 cm. Masses suspicious for malignancy. Subsequently he had an order for PET/CT scan and CT-guided needle biopsy. These were not done yet as patient was hospitalized. Past Medical History:   has a past medical history of Alcohol abuse, Avascular necrosis of femoral head ([facility] [address] History of hip replacement, Hypertension, Mass of left lung, Rib fracture, Scabies, Stage 3 severe COPD by GOLD classification ([facility] [address] and Tachycardia. Past Surgical History:   has a past surgical history that includes Neck surgery; Total hip arthroplasty (Bilateral); and Vasectomy. Family History: family history includes Other in his mother. Social History:   reports that he has been smoking cigarettes.  He started smoking about 36 years ago. He has a 44.00 pack-year smoking history. He has never used smokeless tobacco. He reports current alcohol use of about 35.0 standard drinks per week. He reports that he does not currently use drugs after having used the following drugs: Cocaine and Marijuana Maharshad Soheilajai). Medications:    Prior to Admission medications    Medication Sig Start Date End Date Taking?  Authorizing Provider   lisinopril (PRINIVIL;ZESTRIL) 10 MG tablet Take 1 tablet by mouth daily 8/18/22  Yes JAEL Abarca CNP   metoprolol succinate (TOPROL XL) 100 MG extended release tablet TAKE 1 TABLET BY MOUTH TWICE DAILY 8/18/22  Yes JAEL Abarca CNP   Fluticasone-Umeclidin-Vilant (Yudith [place] Cruz) 200-62.5-25 MCG/INH AEPB Inhale 1 puff into the lungs Daily 7/19/22  Yes Cem Hernandez MD   albuterol sulfate HFA (PROAIR HFA) 108 (90 Base) MCG/ACT inhaler Inhale 2 puffs into the lungs every 6 hours as needed for Wheezing 6/13/22  Yes JAEL Abarca CNP   albuterol (PROVENTIL) (2.5 MG/3ML) 0.083% nebulizer solution INHALE 3 MLS (ONE VIAL) INTO THE LUNGS EVERY 6 HOURS VIA NEBULIZER AS NEEDED FOR WHEEZING 1/26/22  Yes Cem Hernandez MD     Current Facility-Administered Medications   Medication Dose Route Frequency Provider Last Rate Last Admin    budesonide-formoterol (SYMBICORT) 160-4.5 MCG/ACT inhaler 2 puff  2 puff Inhalation BID JAEL Flaherty Che, CNP   2 puff at 08/29/22 1139    magnesium hydroxide (MILK OF MAGNESIA) 400 MG/5ML suspension 30 mL  30 mL Oral Daily PRN JAEL Flaherty Che, CNP        cefTRIAXone (ROCEPHIN) 1,000 mg in sodium chloride 0.9 % 50 mL IVPB mini-bag  1,000 mg IntraVENous Q24H Italo Galo MD   Stopped at 08/29/22 0830    azithromycin (ZITHROMAX) 500 mg in D5W 250ml addavial  500 mg IntraVENous Q24H Italo Galo MD   Stopped at 08/29/22 0957    levalbuterol (XOPENEX HFA) inhaler 1 puff  1 puff Inhalation Q4H While awake Piter Sharpe, APRN - CNP   1 puff at 08/29/22 1521    ipratropium (ATROVENT HFA) 17 MCG/ACT inhaler 1 puff  1 puff Inhalation Q4H WA JAEL Gómez CNP   1 puff at 08/29/22 1522    chlordiazePOXIDE (LIBRIUM) capsule 10 mg  10 mg Oral TID Nette Reddy MD   10 mg at 08/29/22 1629    enoxaparin (LOVENOX) injection 90 mg  1 mg/kg SubCUTAneous BID Nette Reddy MD        dexamethasone (PF) (DECADRON) injection 6 mg  6 mg IntraVENous Daily Nette Reddy MD   6 mg at 08/29/22 1155    ALPRAZolam Eyal Roller) tablet 0.5 mg  0.5 mg Oral BID PRN Nette Reddy MD   0.5 mg at 08/29/22 1221    sodium chloride flush 0.9 % injection 5-40 mL  5-40 mL IntraVENous 2 times per day Renetta Espinosa MD        sodium chloride flush 0.9 % injection 5-40 mL  5-40 mL IntraVENous PRN Kallie Ham MD        0.9 % sodium chloride infusion   IntraVENous PRN Renetta Espinosa MD        acetaminophen (TYLENOL) tablet 650 mg  650 mg Oral Q4H PRN Renetta Espinosa MD   650 mg at 08/29/22 0056    ondansetron (ZOFRAN-ODT) disintegrating tablet 4 mg  4 mg Oral Q8H PRN Kallie Ham MD        Or    ondansetron (ZOFRAN) injection 4 mg  4 mg IntraVENous Q6H PRN Kallie Ham MD        morphine (PF) injection 2 mg  2 mg IntraVENous Q2H PRN Kallie Ham MD        Or    morphine sulfate (PF) injection 4 mg  4 mg IntraVENous Q2H PRN Renetta Espinosa MD   4 mg at 08/29/22 1655    lisinopril (PRINIVIL;ZESTRIL) tablet 10 mg  10 mg Oral Daily JAEL Rivera - CNP   10 mg at 08/29/22 0750    metoprolol succinate (TOPROL XL) extended release tablet 100 mg  100 mg Oral BID JAEL Rivera - CNP   100 mg at 08/29/22 0750    sodium chloride flush 0.9 % injection 5-40 mL  5-40 mL IntraVENous 2 times per day JAEL Rivera CNP   10 mL at 08/29/22 0752    sodium chloride flush 0.9 % injection 5-40 mL  5-40 mL IntraVENous PRN Darrel Corcoran, APRN - CNP        nicotine (NICODERM CQ) 21 MG/24HR 1 patch  1 patch TransDERmal Daily Darrel Corcoran, APRN - CNP   1 patch at 51 1247    folic acid 1 mg, thiamine (B-1) 100 mg in sodium chloride 0.9 % 50 mL IVPB   IntraVENous Daily Rhonda Coyle JAEL - CNP   Stopped at 22 1119       Allergies:  [facility] [mometasone furo-formoterol fum]    REVIEW OF SYSTEMS:      General: Positive for weakness and fatigue. No unanticipated weight loss or decreased appetite. No fever or chills. Eyes: No blurred vision, eye pain or double vision. Ears: No hearing problems or drainage. No tinnitus. Throat: No sore throat, problems with swallowing or dysphagia. Respiratory: As above. Cardiovascular: No chest pain, orthopnea or PND. No lower extremity edema. No palpitation. Gastrointestinal: No problems with swallowing. No abdominal pain or bloating. No nausea or vomiting. No diarrhea or constipation. No GI bleeding. Genitourinary: No dysuria, hematuria, frequency or urgency. Musculoskeletal: No muscle aches or pains. No limitation of movement. No back pain. No gait disturbance, No joint complaints. Dermatologic: No skin rashes or pruritus. No skin lesions or discolorations. Psychiatric: No depression, anxiety, or stress or signs of schizophrenia. No change in mood or affect. Hematologic: No history of bleeding tendency. No bruises or ecchymosis. No history of clotting problems. Infectious disease: No fever, chills or frequent infections. Endocrine: No polydipsia or polyuria. No temperature intolerance. Neurologic: No headaches or dizziness. No weakness or numbness of the extremities. No changes in balance, coordination,  memory, mentation, behavior. Allergic/Immunologic: No nasal congestion or hives. No repeated infections.        PHYSICAL EXAM:      BP (!) 167/92   Pulse (!) 117   Temp 97.9 °F (36.6 °C) (Oral)   Resp 30   Ht 6' 1\" (1.854 m)   Wt 201 lb 4.5 oz (91.3 kg)   SpO2 93%   BMI 26.56 kg/m²    Temp (24hrs), Av.5 °F (36.9 °C), Min:97.6 °F (36.4 °C), Max:99.8 °F (37.7 °C)      General appearance - not in pain or distress  Mental status - alert and oriented  Eyes - pupils equal and reactive, extraocular eye movements intact  Ears - bilateral TM's and external ear canals normal  Nose - normal and patent, no erythema, discharge or polyps  Mouth - mucous membranes moist, pharynx normal without lesions  Neck - supple, no significant adenopathy  Lymphatics - no palpable lymphadenopathy, no hepatosplenomegaly  Chest - clear to auscultation, no wheezes, rales or rhonchi, symmetric air entry  Heart - normal rate, regular rhythm, normal S1, S2, no murmurs, rubs, clicks or gallops  Abdomen - soft, nontender, nondistended, no masses or organomegaly  Neurological - alert, oriented, normal speech, no focal findings or movement disorder noted  Musculoskeletal - no joint tenderness, deformity or swelling  Extremities - peripheral pulses normal, no pedal edema, no clubbing or cyanosis  Skin - normal coloration and turgor, no rashes, no suspicious skin lesions noted           DATA:      Labs:       CBC:   Recent Labs     08/28/22  1436 08/29/22  0426   WBC 12.5* 8.3   HGB 13.7 13.2*   HCT 39.7* 38.8*    200     BMP:   Recent Labs     08/28/22  1436 08/29/22  0426 08/29/22  0807 08/29/22  1513   * 126* 126* 124*   K 3.8 4.0  --   --    CO2 20 21  --   --    BUN 6 6  --   --    CREATININE 0.46* <0.40*  --   --    LABGLOM >60 Can not be calculated  --   --    GLUCOSE 99 90  --   --      PT/INR:   Recent Labs     08/29/22  1055   PROTIME 12.8   INR 1.0     APTT:No results for input(s): APTT in the last 72 hours.   LIVER PROFILE:  Recent Labs     08/28/22  1436   AST 27   ALT 16   LABALBU 3.6     XR CHEST PORTABLE  Narrative: EXAMINATION:  ONE XRAY VIEW OF THE CHEST    8/28/2022 2:12 pm    COMPARISON:  CT lung screen July 11, 2022    HISTORY:  ORDERING SYSTEM PROVIDED HISTORY: Shortness of Breath  TECHNOLOGIST PROVIDED HISTORY:  Shortness of Breath  Reason for Exam: Shortness of breath and coughing up participate in the care of this pleasant patient. Rae Gonzalez MD, MD                            [address] [facility]                            This note is created with the assistance of a speech recognition program.  While intending to generate a document that actually reflects the content of the visit, the document can still have some errors including those of syntax and sound a like substitutions which may escape proof reading. It such instances, actual meaning can be extrapolated by contextual diversion.

## 2022-08-29 NOTE — CONSULTS
PULMONARY  CONSULT NOTE      Date of Admission: 8/28/2022  2:07 PM    Reason for Consult: lung mass, hemoptysis    Referring Physician: Dr Anibal Breen  PCP: JAEL Newton CNP     History of Present Illness: Uzair Power is a 61 y.o. White (non-)   male with PMH COPD, ETOH, HTN, known lung mass, who presents with frontal HA hat comes and goes ,sore throat, chest discomfort, chronic cough, and hemoptysis that started 4 days ago. He has had no hemoptysis since evening of 8-27-22. His pulmonologist is Dr Cruz De. Low dose CT dickens from 7-11-22 shows 4.1 x 3.0 x 3.1 cm left lingular lung mass  PET scan was ordered for 7/25/22 but was not done. Could not find any completed PFTs on file. Problem:  Principal Problem: COPD, gloria mass    PMH:   Past Medical History:   Diagnosis Date    Alcohol abuse     hx of alcoholism; States he drinks 5 beers per day; pt has D/T's    Avascular necrosis of femoral head ([address] 11/25/2014    Overview:  S/p bilateral hip replacements    History of hip replacement     left/right    Hypertension     Mass of left lung     Rib fracture     Scabies     Stage 3 severe COPD by GOLD classification ([address] 11/08/2021    Tachycardia 10/28/2020       PSH:   Past Surgical History:   Procedure Laterality Date    NECK SURGERY      TOTAL HIP ARTHROPLASTY Bilateral     VASECTOMY         Allergies: Allergies   Allergen Reactions    Dulera [Mometasone Furo-Formoterol Fum]      Doesn't work for patient.         Home Meds:  Medications Prior to Admission: lisinopril (PRINIVIL;ZESTRIL) 10 MG tablet, Take 1 tablet by mouth daily  metoprolol succinate (TOPROL XL) 100 MG extended release tablet, TAKE 1 TABLET BY MOUTH TWICE DAILY  Fluticasone-Umeclidin-Vilant (TRELEGY ELLIPTA) 200-62.5-25 MCG/INH AEPB, Inhale 1 puff into the lungs Daily  albuterol sulfate HFA (PROAIR HFA) 108 (90 Base) MCG/ACT inhaler, Inhale 2 puffs into the lungs every 6 hours as needed for Wheezing  albuterol (PROVENTIL) (2.5 MG/3ML) 0.083% nebulizer solution, INHALE 3 MLS (ONE VIAL) INTO THE LUNGS EVERY 6 HOURS VIA NEBULIZER AS NEEDED FOR WHEEZING    Social History:   Social History     Socioeconomic History    Marital status: Single     Spouse name: Not on file    Number of children: Not on file    Years of education: Not on file    Highest education level: Not on file   Occupational History    Not on file   Tobacco Use    Smoking status: Every Day     Packs/day: 1.00     Years: 44.00     Pack years: 44.00     Types: Cigarettes     Start date: 11    Smokeless tobacco: Never    Tobacco comments:     pt educated on both alcohol and smoking cessation   Substance and Sexual Activity    Alcohol use:  Yes     Alcohol/week: 35.0 standard drinks     Types: 35 Cans of beer per week     Comment: hx of alcoholism; States he drinks 5 beers per day; pt has D/T's    Drug use: Not Currently     Types: Cocaine, Marijuana (Weed)     Comment: Quit cocaine at age 24, quit marijuana at age 24    Sexual activity: Not on file   Other Topics Concern    Not on file   Social History Narrative    Not on file     Social Determinants of Health     Financial Resource Strain: Not on file   Food Insecurity: Not on file   Transportation Needs: Not on file   Physical Activity: Not on file   Stress: Not on file   Social Connections: Not on file   Intimate Partner Violence: Not on file   Housing Stability: Not on file       Family History:   Family History   Problem Relation Age of Onset    Other Mother         mental health        Review of Systems  Fever/ chills - no  Chest pain - yes  Cough - yes  Expectoration / hemoptysis - yes, last episode 8-27-22  shortness of breath - yes  Headache - no  Sinus drainage/ sore throat - yes  abdominal pain - no  Swelling feet - no  Nausea/ vomiting/ diarrhea/ constipation - no    Physical Exam  Vital Signs: BP (!) 143/75   Pulse (!) 112   Temp 98 °F (36.7 °C) (Oral)   Resp (!) 31   Ht 6' 1\" (1.854 m)   Wt 201 lb 4.5 oz (91.3 kg) SpO2 (!) 89%   BMI 26.56 kg/m²       Admission Weight: Weight: 200 lb (90.7 kg)    General Appearance:  alert, cooperative, and with labored respirations  Head: Normocephalic, without obvious abnormality, atraumatic  Neck: no adenopathy, no JVD, supple, symmetrical, trachea midline  Lungs: fair air entry bilaterally; breath sounds- vesicular  Heart: : ST 120s  Abdomen: flat, firm, + BS  Extremities: extremities normal, atraumatic, no cyanosis or edema  Skin: skin color, texture, turgor normal. No rashes or lesions  Neurologic: tremors    Recent labs, Imaging studies reviewed    Data ReviewCBC:   Recent Labs     08/28/22  1436 08/29/22  0426   WBC 12.5* 8.3   RBC 3.63* 3.43*   HGB 13.7 13.2*   HCT 39.7* 38.8*    200     BMP:   Recent Labs     08/28/22  1436 08/29/22  0426   GLUCOSE 99 90   * 126*   K 3.8 4.0   BUN 6 6   CREATININE 0.46* <0.40*   CALCIUM 9.5 8.9     ABGs:   Recent Labs     08/28/22  2020   PHART 7.390   PO2ART 163.0*   BNA6ZYK 36.3   CUP7XJV 21.9*   I5EUKDDO 96.7      PT/INR:  No results found for: PTINR    ASSESSMENT / PLAN:    COPD - BD to xopenex  Lung mass      Consult to IR for biopsy  Acute hypoxic respiratory failure     02 to keep sats 92%  Hyponatremia, IVF  Rule out COVID  Hemoptysis - continue to monitor  PFTs as OP    Plan of care discussed with Dr Jazmine Wright  Electronically signed by JAEL Herbert - CNP on 08/29/22 at 8:35 AM.

## 2022-08-29 NOTE — PROGRESS NOTES
Patient is continuing to refuse aerosol treatments despite education on need for breathing treatments and encouragement from writer to take respiratory medications. Noted diminished breath sounds, work of breathing, tachypnea. Pt informed to call out if he changes his mind.

## 2022-08-29 NOTE — ED NOTES
Patient on BIPAP at this time. Sitting on side of bed. Will continue to monitor.       Rui Ireland RN  08/28/22 2121

## 2022-08-29 NOTE — PLAN OF CARE
Problem: Discharge Planning  Goal: Discharge to home or other facility with appropriate resources  8/29/2022 1104 by Evonne Crenshaw RN  Outcome: Progressing  Flowsheets (Taken 8/29/2022 0800)  Discharge to home or other facility with appropriate resources: Identify barriers to discharge with patient and caregiver  8/29/2022 0442 by Claudia Deng RN  Outcome: Progressing     Problem: Pain  Goal: Verbalizes/displays adequate comfort level or baseline comfort level  8/29/2022 1104 by Evonne Crenshaw RN  Outcome: Progressing  8/29/2022 0442 by Claudia Deng RN  Outcome: Progressing  Flowsheets (Taken 8/28/2022 2335 by Mya Hernandez RN)  Verbalizes/displays adequate comfort level or baseline comfort level: Assess pain using appropriate pain scale     Problem: Safety - Adult  Goal: Free from fall injury  8/29/2022 1104 by Evonne Crenshaw RN  Outcome: Progressing  8/29/2022 0442 by Claudia Deng RN  Outcome: Progressing     Problem: Skin/Tissue Integrity  Goal: Absence of new skin breakdown  Description: 1. Monitor for areas of redness and/or skin breakdown  2. Assess vascular access sites hourly  3. Every 4-6 hours minimum:  Change oxygen saturation probe site  4. Every 4-6 hours:  If on nasal continuous positive airway pressure, respiratory therapy assess nares and determine need for appliance change or resting period.   8/29/2022 1104 by Evonne Crenshaw RN  Outcome: Progressing  8/29/2022 0442 by Claudia Deng RN  Outcome: Progressing

## 2022-08-29 NOTE — PROGRESS NOTES
08/29/22 1811   Encounter Summary   Encounter Overview/Reason  Initial Encounter   Service Provided For: Patient   Referral/Consult From: Rounding  (outside room)   Complexity of Encounter Low   Spiritual/Emotional needs   Type Spiritual Support   Assessment/Intervention/Outcome   Assessment Unable to assess  (covid isolation)   Intervention Prayer (assurance of)/[place]

## 2022-08-29 NOTE — ACP (ADVANCE CARE PLANNING)
Advance Care Planning     Advance Care Planning Activator (Inpatient)  Conversation Note      Date of ACP Conversation: 8/29/2022     Conversation Conducted with: Patient with Decision Making Capacity    ACP Activator: Robbie Bañuelos RN        Health Care Decision Maker:     Current Designated Health Care Decision Maker:     Click here to complete Healthcare Decision Makers including section of the Healthcare Decision Maker Relationship (ie \"Primary\")  Today we documented Decision Maker(s) consistent with Legal Next of Kin hierarchy. Care Preferences    Ventilation: \"If you were in your present state of health and suddenly became very ill and were unable to breathe on your own, what would your preference be about the use of a ventilator (breathing machine) if it were available to you? \"      Would the patient desire the use of ventilator (breathing machine)?: yes    \"If your health worsens and it becomes clear that your chance of recovery is unlikely, what would your preference be about the use of a ventilator (breathing machine) if it were available to you? \"     Would the patient desire the use of ventilator (breathing machine)?: No      Resuscitation  \"CPR works best to restart the heart when there is a sudden event, like a heart attack, in someone who is otherwise healthy. Unfortunately, CPR does not typically restart the heart for people who have serious health conditions or who are very sick. \"    \"In the event your heart stopped as a result of an underlying serious health condition, would you want attempts to be made to restart your heart (answer \"yes\" for attempt to resuscitate) or would you prefer a natural death (answer \"no\" for do not attempt to resuscitate)? \" yes       [] Yes   [] No   Educated Patient / Donya Neighbors regarding differences between Advance Directives and portable DNR orders.     Length of ACP Conversation in minutes:      Conversation Outcomes:  [x] ACP discussion completed  [] Existing advance directive reviewed with patient; no changes to patient's previously recorded wishes  [] New Advance Directive completed  [] Portable Do Not Rescitate prepared for Provider review and signature  [] POLST/POST/MOLST/MOST prepared for Provider review and signature      Follow-up plan:    [] Schedule follow-up conversation to continue planning  [] Referred individual to Provider for additional questions/concerns   [] Advised patient/agent/surrogate to review completed ACP document and update if needed with changes in condition, patient preferences or care setting    [] This note routed to one or more involved healthcare providers

## 2022-08-29 NOTE — PROGRESS NOTES
The patient is a 65-year-old  male with a past medical history of alcoholism, COPD, HTN, tachycardia, and tobacco dependence, who presents to the ED with hemoptysis, headache, chest pain, and pharyngitis. Patient found to have spiculated lingular soft tissue mass concerning for malignancy in July for which he is following with Dr. Lopez Durham, pulmonology and Dr. Niño Lighter oncology; however, biopsy has not yet been completed. Patient reports that he began feeling poorly 3 days ago, when he expectorated some bright red blood. Reports that he had a second episode of hemoptysis earlier today which prompted him to come to the ED for evaluation. Symptoms are associated with productive cough, shortness of breath, and wheezing. Additionally, patient reports history of consuming 3 high alcohol content beer beverages every day, with the last alcohol being consumed approximately 24 hours ago. Patient reports history of alcohol withdrawal seizures and DTs. We will initiate CIWA scale and alcohol withdrawal protocol. Patient tachycardic with heart rate in the 120s-130s. Patient was on BiPAP for a brief period due to brief drop in SPO2. At time of exam, patient is off BiPAP with SPO2 noted to be 93% on room air. Patient also reports a history of tobacco use and request nicotine patch. Patient informed that his pulmonologist does not come to this facility; however, Dr. Roger Vick, has been consulted and will see him in the a.m. to plan for biopsy. We will keep patient n.p.o. until seen in a.m. Home medications reconciled and additional admission orders entered.

## 2022-08-29 NOTE — PLAN OF CARE
Problem: Discharge Planning  Goal: Discharge to home or other facility with appropriate resources  Outcome: Progressing     Problem: Pain  Goal: Verbalizes/displays adequate comfort level or baseline comfort level  Outcome: Progressing  Flowsheets (Taken 8/28/2022 2335 by Gagan Espinoza, RN)  Verbalizes/displays adequate comfort level or baseline comfort level: Assess pain using appropriate pain scale     Problem: Safety - Adult  Goal: Free from fall injury  Outcome: Progressing     Problem: Skin/Tissue Integrity  Goal: Absence of new skin breakdown  Description: 1. Monitor for areas of redness and/or skin breakdown  2. Assess vascular access sites hourly  3. Every 4-6 hours minimum:  Change oxygen saturation probe site  4. Every 4-6 hours:  If on nasal continuous positive airway pressure, respiratory therapy assess nares and determine need for appliance change or resting period.   Outcome: Progressing

## 2022-08-29 NOTE — ED NOTES
Bedside report given to [facility]. Pt ready for transport upstairs.       Alexandria Dumont RN  08/28/22 3408

## 2022-08-29 NOTE — PROGRESS NOTES
Physical Therapy  Facility/Department: [address] [place] [address]  Physical Therapy Initial Assessment    Name: Nette Valdez  : 1965  MRN: [id number]  Date of Service: 2022    Discharge Recommendations:  Continue to assess pending progress, Patient would benefit from continued therapy after discharge   PT Equipment Recommendations  Equipment Needed:  (TBD, ? wheeled walker)      Patient Diagnosis(es): The primary encounter diagnosis was Cough with hemoptysis. Diagnoses of Lung mass, Shortness of breath, Hyponatremia, and Hypochloremia were also pertinent to this visit. Past Medical History:  has a past medical history of Alcohol abuse, Avascular necrosis of femoral head ([address] History of hip replacement, Hypertension, Mass of left lung, Rib fracture, Scabies, Stage 3 severe COPD by GOLD classification ([address] and Tachycardia. Past Surgical History:  has a past surgical history that includes Neck surgery; Total hip arthroplasty (Bilateral); and Vasectomy. Assessment   Body Structures, Functions, Activity Limitations Requiring Skilled Therapeutic Intervention: Decreased functional mobility ; Decreased balance;Decreased strength;Decreased endurance  Assessment: Continue per POC to maxmize potential for safe D/C.   Treatment Diagnosis: impaired mobility due to weakness and low endurance  Specific Instructions for Next Treatment: advance gait distance using wheeled walker and O2 as directed, instruct in energy conservation, balance and general strengthening exercises  Therapy Prognosis: Good  Decision Making: Medium Complexity  History: pt admitted due to SOB, pt found to be (+) COVID 19  Exam: ROM, MMT, balance and mobility assessments  Clinical Presentation: MOD I for bed mobility, CGA x 1 sit <> stand and 3 side steps towards the [facility] w/ O2 at 1 L, DROPLET PLUS (+) COVID 19, fall risk  Barriers to Learning: none  Requires PT Follow-Up: Yes  Activity Tolerance  Activity Tolerance: Patient limited by fatigue;Patient limited by endurance     Plan   Plan  Plan:  (3 treatments/ week)  Specific Instructions for Next Treatment: advance gait distance using wheeled walker and O2 as directed, instruct in energy conservation, balance and general strengthening exercises  Current Treatment Recommendations: Strengthening, Patient/Caregiver education & training, Endurance training, Balance training, Functional mobility training, Transfer training, Gait training, Safety education & training, Stair training, Equipment evaluation, education, & procurement  Safety Devices  Type of Devices: All fall risk precautions in place, Call light within reach, Patient at risk for falls, Left in bed, Bed alarm in place, Nurse notified     Restrictions  Restrictions/Precautions  Restrictions/Precautions: Isolation, Fall Risk, Up as Tolerated  Required Braces or Orthoses?: No  Implants present? : Metal implants (B ZACH; plates in neck)     Subjective   Pain: 8/10 low back  General  Patient assessed for rehabilitation services?: Yes  Response To Previous Treatment: Not applicable  Family / Caregiver Present: No  Referring Practitioner: Dr. Modesto Saavedra. [facility]  Referral Date : 08/29/22  Follows Commands: Within Functional Limits  Other (Comment): OK per nurse Tanya Holt to proceed w/ PT evaluation  General Comment  Comments: X-Ray of the chest shows lingular mass in the lung. Pt is (+) for COVID 19. Subjective  Subjective: pt was admitted w/ C/O sore throat, cough w/ hemoptysis and SOB.          Social/Functional History  Social/Functional History  Lives With: Alone  Type of Home: Apartment (first floor apartment)  Home Layout: One level  Home Access: Stairs to enter with rails  Entrance Stairs - Number of Steps: 1  Entrance Stairs - Rails: None  Bathroom Shower/Tub: Tub/Shower unit, Curtain  Bathroom Toilet: Standard (has sink and coutertop near it)  [employer]:  (no bathroom DME)  Bathroom Accessibility: Accessible  Home Equipment: [facility]  Has the patient had two or more falls in the past year or any fall with injury in the past year?: Yes (1 fall after legs gave out)  ADL Assistance: Independent (extra time needed to complete independently)  Homemaking Assistance: Independent (xtra time needed to complete independently, gets delivered groceries)  Homemaking Responsibilities: Yes  Ambulation Assistance: Independent  Transfer Assistance: Independent  Active : Yes  Mode of Transportation: Bus, Car  Occupation: Part time employment  Type of Occupation:   IADL Comments: sleeps in a flat bed  Additional Comments: Pt reports that his sister lives close and can A PRN but she does work.   Vision/Hearing  Vision  Vision: Impaired  Vision Exceptions: Wears glasses at all times  Hearing  Hearing: Within functional limits    Cognition   Orientation  Overall Orientation Status: Within Functional Limits  Orientation Level: Oriented X4     Objective   Resp: 28  SpO2: 94 %  O2 Device: Nasal cannula  Comment: 1 L     Observation/Palpation  Observation: O2 at 1  L, peripheral IVs  left wrist and left forearm, (+) COVID 19- droplet plus isolation, CIWA scale  Gross Assessment  Sensation: Impaired (C/O numbness bilateral hands)     AROM RLE (degrees)  RLE AROM: WFL  AROM LLE (degrees)  LLE AROM : WFL  AROM RUE (degrees)  RUE General AROM: see OT for UE assessment  AROM LUE (degrees)  LUE General AROM: see OT for UE assessment  Strength RLE  Comment: grossly 4/5  Strength LLE  Comment: grossly 4/5  Strength RUE  Comment: see OT for UE assessment  Strength LUE  Comment: see OT for UE assessment           Bed mobility  Rolling to Right: Modified independent  Supine to Sit: Modified independent  Sit to Supine: Modified independent  Scooting: Modified independent  Bed Mobility Comments: HOB elevated for above activities, dangles at the EOB w/ supervision  Transfers  Sit to Stand: Contact guard assistance  Stand to sit: Contact guard assistance  Comment: deferred up in chair due to breathing pattern  Ambulation  Surface: level tile  Device: No Device  Other Apparatus: O2 (1 L)  Assistance: Contact guard assistance  Distance: 3 side steps towards the [facility]  More Ambulation?: No  Stairs/Curb  Stairs?: No (has 1 step w/o rail at home)     Balance  Sitting - Static: Good  Sitting - Dynamic: Good  Standing - Static: Fair;+ (w/o AD)  Standing - Dynamic: Fair (w/o AD)           OutComes Score                                                  AM-PAC Score  AM-PAC Inpatient Mobility Raw Score : 16 (08/29/22 1431)  AM-PAC Inpatient T-Scale Score : 40.78 (08/29/22 1431)  Mobility Inpatient CMS 0-100% Score: 54.16 (08/29/22 1431)  Mobility Inpatient CMS G-Code Modifier : CK (08/29/22 1431)          Tinneti Score       Goals  Short Term Goals  Time Frame for Short term goals: 3 treatments/ week  Short term goal 1: pt to tolerate 1/2 hour per therapy session keeping O2 sats above 90%  Short term goal 2: pt to demonstrate good technique for general strengthening exercises, balance activities and energy conservation.   Short term goal 3: pt to demonstrate transfers w/ supervision using least restrictive device as needed and O2 as directed  Short term goal 4: pt to demonstrate gait 50-80' w/ supervision using least restrictive device as needed and O2 as directed  Short term goal 5: pt to demonstrate good dynamic balance using least restrictive device as needed  Short Term Goal 6: pt to demonstrate ability to ascend/ descend 6\" platform step w/ CGA x 1 using least restrictive device as needed and O2 as directed  Patient Goals   Patient goals : did not state a goal       Education  Patient Education  Education Given To: Patient  Education Provided: Role of Therapy;Plan of Care  Education Method: Demonstration;Verbal  Barriers to Learning: None  Education Outcome: Verbalized understanding;Continued education needed      Therapy Time   Individual Concurrent Group Co-treatment   Time In 1431         Time Out 1509 Minutes 33         Timed Code Treatment Minutes: [address], PT

## 2022-08-29 NOTE — PROGRESS NOTES
[address]    Occupational Therapy Evaluation  Date: 22  Patient Name: Yaya Nj       Room:   MRN: [id number]  Account: [de-identified]   : 1965  (61 y.o.) Gender: male     Discharge Recommendations:  Further Occupational Therapy is recommended upon facility discharge. OT Equipment Recommendations  Other: TBD    Referring Practitioner: Donavan Valdovinos MD  Diagnosis: Sepsis; COVID-19 Additional Pertinent Hx: 58-year-old  male with past medical history of COPD, hypertension , alcohol abuse presented with hemoptysis, shortness of breath at [facility], headaches , alcohol abuse. Patient has had 2 episodes of hemoptysis since Friday where he started coughing up blood. Patient follows with Dr. Abdiel Young and on the  of this month and CT scan showed nodule left lung patient. He refused the subsequent biopsy due to anxiety. Treatment Diagnosis: Impaired self care status    Past Medical History:  has a past medical history of Alcohol abuse, Avascular necrosis of femoral head ([address] History of hip replacement, Hypertension, Mass of left lung, Rib fracture, Scabies, Stage 3 severe COPD by GOLD classification ([address] and Tachycardia. Past Surgical History:   has a past surgical history that includes Neck surgery; Total hip arthroplasty (Bilateral); and Vasectomy. Restrictions  Restrictions/Precautions  Restrictions/Precautions: Isolation; Fall Risk;Up as Tolerated  Required Braces or Orthoses?: No  Implants present? : Metal implants (B ZACH; plates in neck)      Vitals  Vitals  Heart Rate: (!) 126  Heart Rate Source: Monitor  BP: 106/80  BP Location: Right upper arm  BP Method: Automatic  Patient Position: Up in chair  MAP (Calculated): 88.67  Resp: 28  SpO2: 94 %  O2 Device: Nasal cannula  Comment: 1 L     Subjective  Subjective: \"I've had COPD since 2018. I have COVID beat\" Pt has a raspy, soft tone.   Comments: Okay for OT PT ayadal and treat per RN Joyce Mosley  Subjective  Pain: 8/10 low back      Social/Functional History  Social/Functional History  Lives With: Alone  Type of Home: Apartment (first floor apartment)  Home Layout: One level  Home Access: Stairs to enter with rails  Entrance Stairs - Number of Steps: 1  Entrance Stairs - Rails: None  Bathroom Shower/Tub: Tub/Shower unit, Curtain  Bathroom Toilet: Standard (has sink and coutertop near it)  Bathroom Equipment:  (no bathroom DME)  Bathroom Accessibility: Accessible  Home Equipment: Cane  Has the patient had two or more falls in the past year or any fall with injury in the past year?: Yes (1 fall after legs gave out)  ADL Assistance: Independent (extra time needed to complete independently)  Homemaking Assistance: Independent (xtra time needed to complete independently, gets delivered groceries)  Homemaking Responsibilities: Yes  Ambulation Assistance: Independent  Transfer Assistance: Independent  Active : Yes  Mode of Transportation: Bus, Car  Occupation: Part time employment  Type of Occupation:   IADL Comments: sleeps in a flat bed  Additional Comments: Pt reports that his sister lives close and can A PRN but she does work. Objective  Cognition  Orientation  Overall Orientation Status: Within Functional Limits  Orientation Level: Oriented X4  Cognition  Overall Cognitive Status: WFL   Sensation  Overall Sensation Status: Impaired (Tingling in B hands)    Activities of Daily Living  ADL  Feeding: Setup  Grooming: Setup  UE Bathing: Stand by assistance  LE Bathing: Contact guard assistance  UE Dressing: Stand by assistance  LE Dressing: Contact guard assistance  Toileting: Contact guard assistance  Additional Comments: ADL scores based on skilled observation and clinical reasoning, unless otherwise noted. Pt donned B socks, using figure four technique.  Pt is currently limited due to significant SOB with activity and poor endurance, impacting independence with Patient Tolerated treatment well    Safety Devices  Type of Devices:  All fall risk precautions in place, Call light within reach, Patient at risk for falls, Left in bed, Bed alarm in place, Nurse notified    Patient Education  Patient Education  Education Given To: Patient  Education Provided: Role of Therapy, Plan of Care  Education Method: Verbal  Barriers to Learning: None  Education Outcome: Continued education needed      Functional Outcome Measures  AM-PAC Daily Activity Inpatient   How much help for putting on and taking off regular lower body clothing?: A Little  How much help for Bathing?: A Little  How much help for Toileting?: A Little  How much help for putting on and taking off regular upper body clothing?: A Little  How much help for taking care of personal grooming?: A Little  How much help for eating meals?: A Little  AM-[facility] Inpatient Daily Activity Raw Score: 18  AM-PAC Inpatient ADL T-Scale Score : 38.66  ADL Inpatient CMS 0-100% Score: 46.65  ADL Inpatient CMS G-Code Modifier : CK       Goals     Short Term Goals  Time Frame for Short term goals: By discharge  Short Term Goal 1: Pt will perform BADLs mod I and Good safety  Short Term Goal 2: Pt will perform transfers/functional mobility mod I, using least restrictive device, during self care tasks  Short Term Goal 3: Pt will tolerate standing 5+ minutes, mod I, during self care/functional activities  Short Term Goal 4: Pt will actively participate in 15+ minutes of therapeutic exercise/functional activity to promote safety and independence with self care and mobility  Short Term Goal 5: Pt will V/D use of energy conservation/work simplifcation techniques that are applicable to his daily routine    Plan  Plan  Times per Week: 3-4  Current Treatment Recommendations: Strengthening, Balance training, Functional mobility training, Endurance training, Safety education & training, Patient/Caregiver education & training, Equipment evaluation, education, & procurement, Positioning, Self-Care / ADL      OT Individual Minutes  OT Individual Minutes  Time In: 7977  Time Out: 8047  Minutes: 34  Time Code Minutes   Timed Code Treatment Minutes: 15 Minutes        Electronically signed by Mitzi Meneses OT on 8/29/22 at 3:28 PM EDT

## 2022-08-29 NOTE — PROGRESS NOTES
Patient arrives to 2013. Patient hooked up to cardiac monitor and vital signs taken. Assessment by primary RN to follow.

## 2022-08-29 NOTE — CARE COORDINATION
CASE MANAGEMENT NOTE:    Admission Date:  8/28/2022 Uzair Power is a 61 y.o.  male    Admitted for : Shortness of breath [R06.02]  COPD (chronic obstructive pulmonary disease) [facility] [J44.9]  Hypochloremia [E87.8]  Hyponatremia [E87.1]  Lung mass [R91.8]  Cough with hemoptysis [R04.2]    Met with:  Patient    PCP:  Pratik Velasco NP                                 Insurance:  Rocio Aviles       Is patient alert and oriented at time of discussion:  Yes    Current Residence/ Living Arrangements:  independently at home             Current Services PTA:  No    Does patient go to outpatient dialysis: No  If yes, location and chair time: Na  Who is their nephrologist? NA    Is patient agreeable to VNS: No    Freedom of choice provided:  No    List of [address] provided: No    VNS chosen:  No    DME:  straight cane    Home Oxygen: No    Nebulizer: Yes    CPAP/BIPAP: No    Supplier: N/A    Potential Assistance Needed: No    SNF needed: No    Freedom of choice and list provided: No    Pharmacy:  [facility]       Is patient currently receiving oral anticoagulation therapy? No    Is the [facility] with Read[facility] Risk Score greater than 14%? No  If yes, pt needs a follow up appointment made within 7 days. Family Members/Caregivers that pt would like involved in their care:    Yes    If yes, list name here:  Jorge Alberto Talavera    Transportation Provider:  Family             Discharge Plan:  8/29/22 [facility] COVID + ( 1 lpm NC 94%,afebrile)  From home DME:cane, walker VNS: none. Pt denies need for VNS. Going for IR guided biopsy of lung mass. Following with Dr Cruz De (pulm) and Dr Arely Briscoe (onc) as outpt for mass. IV zithromax, rocephin, aerosols, CIWA SCALE, Librium TID for ETOH withdrawal. ID, Hem/onc and Pulm consult. Following for home O2.   Following for needs//JF                           Electronically signed by: Theresa Longoria RN on 8/29/2022 at 9:52 AM

## 2022-08-29 NOTE — H&P
[address]     HISTORY AND PHYSICAL EXAMINATION            Date:   8/29/2022  Patient name:  Ana Moeller  Date of admission:  8/28/2022  2:07 PM  MRN:   [id number]  Account:  [de-identified]  YOB: 1965  PCP:    JAEL Ji CNP  Room:   2013/2013-01  Code Status:    Full Code    Chief Complaint:     Chief Complaint   Patient presents with    Hemoptysis    Headache    Chest Pain    Pharyngitis       History Obtained From:     patient    History of Present Illness: The patient is a 71-year-old  male with past medical history of COPD, hypertension , alcohol abuse presented with hemoptysis, shortness of breath at [facility], headaches , alcohol abuse. Patient has had 2 episodes of hemoptysis since Friday where he started coughing up blood. Patient follows with Dr. Rj Dsouza and on the 11th of this month and CT scan showed nodule left lung patient. He refused the subsequent biopsy due to anxiety. He has also been referred to Dr. Federica Lorenzo for heme/onc however has not seen them yet. In the ED patient was tachycardic and was on room air satting at 96%. Patient is tachypneic and tachycardic with periods of elevated blood pressure. He is currently on 2 L of nasal cannula saturating at in the low the low 90s. On physical exam patient looks like he is in severe respiratory distress, using accessory muscles is breathing through mouth, however states that this is his baseline and has been his baseline for the last 10 years.   He is not on any oxygen at home    He is being admitted for the management of sepsis - rule out pneumonia in the setting of lung malignancy         Past Medical History:     Past Medical History:   Diagnosis Date    Alcohol abuse     hx of alcoholism; States he drinks 5 beers per day; pt has D/T's   [facility] Avascular necrosis of femoral head ([facility] 11/25/2014    Overview:  S/p bilateral hip replacements    History of hip replacement     left/right    Hypertension     Mass of left lung     Rib fracture     Scabies     Stage 3 [facility] by GOLD [facility] 11/08/2021    Tachycardia 10/28/2020        Past SurgicalHistory:     Past Surgical History:   Procedure Laterality Date    NECK SURGERY      TOTAL HIP ARTHROPLASTY Bilateral     VASECTOMY          Medications Prior to Admission:        Prior to Admission medications    Medication Sig Start Date End Date Taking? Authorizing Provider   lisinopril (PRINIVIL;ZESTRIL) 10 MG tablet Take 1 tablet by mouth daily 8/18/22  Yes JAEL Maurice CNP   metoprolol succinate (TOPROL XL) 100 MG extended release tablet TAKE 1 TABLET BY MOUTH TWICE DAILY 8/18/22  Yes JAEL Maurice CNP   Fluticasone-Umeclidin-Vilant (TRELEGY ELLIPTA) 200-62.5-25 MCG/INH AEPB Inhale 1 puff into the lungs Daily 7/19/22  Yes Francisca House MD   albuterol sulfate HFA (PROAIR HFA) 108 (90 Base) MCG/ACT inhaler Inhale 2 puffs into the lungs every 6 hours as needed for Wheezing 6/13/22  Yes JAEL Maurice CNP   albuterol (PROVENTIL) (2.5 MG/3ML) 0.083% nebulizer solution INHALE 3 MLS (ONE VIAL) INTO THE LUNGS EVERY 6 HOURS VIA NEBULIZER AS NEEDED FOR WHEEZING 1/26/22  Yes Francisca House MD        Allergies:     Pinkie Milo [mometasone furo-formoterol fum]    Social History:     Tobacco:    reports that he has been smoking cigarettes. He started smoking about 36 years ago. He has a 44.00 pack-year smoking history. He has never used smokeless tobacco.  Alcohol:      reports current alcohol use of about 35.0 standard drinks per week. Drug Use:  reports that he does not currently use drugs after having used the following drugs: Cocaine and Marijuana [place]     Family History:     Family History   Problem Relation Age of Onset    Other Mother         mental health        Review of Systems: Positive and Negative as described in HPI. Review of Systems   Constitutional:  Negative for fever. HENT:  Negative for congestion. Respiratory:  Positive for cough, shortness of breath and wheezing. Cardiovascular:  Negative for leg swelling. Gastrointestinal:  Negative for abdominal pain. Genitourinary:  Negative for difficulty urinating. Musculoskeletal:  Negative for arthralgias. Skin:  Negative for color change. Neurological:  Positive for tremors. Hematological:  Negative for adenopathy. Psychiatric/Behavioral:  Negative for agitation. Physical Exam:   BP (!) 143/75   Pulse (!) 112   Temp 98 °F (36.7 °C) (Oral)   Resp (!) 31   Ht 6' 1\" (1.854 m)   Wt 201 lb 4.5 oz (91.3 kg)   SpO2 (!) 89%   BMI 26.56 kg/m²   Temp (24hrs), Av.9 °F (37.2 °C), Min:98 °F (36.7 °C), Max:99.8 °F (37.7 °C)    No results for input(s): POCGLU in the last 72 hours. Intake/Output Summary (Last 24 hours) at 2022 0747  Last data filed at 2022 0600  Gross per 24 hour   Intake 1050 ml   Output 550 ml   Net 500 ml       Physical Exam  Constitutional:       General: He is awake. Interventions: Nasal cannula in place. Comments: On 2 L    Pulmonary:      Effort: Tachypnea, accessory muscle usage and respiratory distress present. Breath sounds: Wheezing present. Abdominal:      Tenderness: There is no abdominal tenderness. Skin:     General: Skin is warm. Capillary Refill: Capillary refill takes less than 2 seconds. Neurological:      Mental Status: He is alert.    Psychiatric:         Attention and Perception: Attention normal.       Investigations:     Laboratory Testing:  Recent Results (from the past 24 hour(s))   EKG 12 Lead    Collection Time: 22  2:15 PM   Result Value Ref Range    Ventricular Rate 120 BPM    Atrial Rate 120 BPM    P-R Interval 134 ms    QRS Duration 76 ms    Q-T Interval 322 ms    QTc Calculation (Bazett) 455 ms    P Axis 50 degrees R Axis -10 degrees    T Axis 48 degrees   CBC with Auto Differential    Collection Time: 08/28/22  2:36 PM   Result Value Ref Range    WBC 12.5 (H) 3.5 - 11.0 k/uL    RBC 3.63 (L) 4.5 - 5.9 m/uL    Hemoglobin 13.7 13.5 - 17.5 g/dL    Hematocrit 39.7 (L) 41 - 53 %    .2 (H) 80 - 100 fL    MCH 37.8 (H) 26 - 34 pg    MCHC 34.6 31 - 37 g/dL    RDW 12.7 11.5 - 14.9 %    Platelets 284 199 - 282 k/uL    MPV 7.8 6.0 - 12.0 fL    Seg Neutrophils 84 (H) 36 - 66 %    Lymphocytes 8 (L) 24 - 44 %    Monocytes 8 (H) 1 - 7 %    Eosinophils % 0 0 - 4 %    Basophils 0 0 - 2 %    Segs Absolute 10.40 (H) 1.3 - 9.1 k/uL    Absolute Lymph # 1.00 1.0 - 4.8 k/uL    Absolute Mono # 1.00 0.1 - 1.3 k/uL    Absolute Eos # 0.00 0.0 - 0.4 k/uL    Basophils Absolute 0.00 0.0 - 0.2 k/uL   Comprehensive Metabolic Panel    Collection Time: 08/28/22  2:36 PM   Result Value Ref Range    Glucose 99 70 - 99 mg/dL    BUN 6 6 - 20 mg/dL    Creatinine 0.46 (L) 0.70 - 1.20 mg/dL    Calcium 9.5 8.6 - 10.4 mg/dL    Sodium 125 (L) 135 - 144 mmol/L    Potassium 3.8 3.7 - 5.3 mmol/L    Chloride 91 (L) 98 - 107 mmol/L    CO2 20 20 - 31 mmol/L    Anion Gap 14 9 - 17 mmol/L    Alkaline Phosphatase 130 (H) 40 - 129 U/L    ALT 16 5 - 41 U/L    AST 27 <40 U/L    Total Bilirubin 0.85 0.3 - 1.2 mg/dL    Total Protein 8.1 6.4 - 8.3 g/dL    Albumin 3.6 3.5 - 5.2 g/dL    GFR Non-African American >60 >60 mL/min    GFR African American >60 >60 mL/min    GFR Comment         Magnesium    Collection Time: 08/28/22  2:36 PM   Result Value Ref Range    Magnesium 1.7 1.6 - 2.6 mg/dL   Troponin    Collection Time: 08/28/22  2:36 PM   Result Value Ref Range    Troponin, High Sensitivity 14 0 - 22 ng/L   Brain Natriuretic Peptide    Collection Time: 08/28/22  2:36 PM   Result Value Ref Range    Pro- <300 pg/mL   Arterial Blood Gases    Collection Time: 08/28/22  8:20 PM   Result Value Ref Range    pH, Arterial 7.390 7.350 - 7.450    pCO2, Arterial 36.3 35.0 - 45.0 mmHg    pO2, Arterial 163.0 (H) 80.0 - 100.0 mmHg    HCO3, Arterial 21.9 (L) 22.0 - 26.0 mmol/L    Negative Base Excess, Art 3.0 (H) 0.0 - 2.0 mmol/L    O2 Sat, Arterial 96.7 95 - 98 %    Carboxyhemoglobin 2.3 0 - 5 %    Methemoglobin 0.0 0.0 - 1.9 %    Pt Temp 37     O2 Device/Flow/% ROOM AIR     Respiratory Rate 30     Jose Test PASS     Sample Site Right Radial Artery     Pt. Position SEMI-FOWLERS     Text for Respiratory RESULTS GIVEN TO DR. Anthony Taveras    Basic Metabolic Panel w/ Reflex to MG    Collection Time: 08/29/22  4:26 AM   Result Value Ref Range    Glucose 90 70 - 99 mg/dL    BUN 6 6 - 20 mg/dL    Creatinine <0.40 (L) 0.70 - 1.20 mg/dL    Calcium 8.9 8.6 - 10.4 mg/dL    Sodium 126 (L) 135 - 144 mmol/L    Potassium 4.0 3.7 - 5.3 mmol/L    Chloride 94 (L) 98 - 107 mmol/L    CO2 21 20 - 31 mmol/L    Anion Gap 11 9 - 17 mmol/L    GFR Non-African American Can not be calculated >60 mL/min    GFR  Can not be calculated >60 mL/min    GFR Comment         CBC    Collection Time: 08/29/22  4:26 AM   Result Value Ref Range    WBC 8.3 3.5 - 11.0 k/uL    RBC 3.43 (L) 4.5 - 5.9 m/uL    Hemoglobin 13.2 (L) 13.5 - 17.5 g/dL    Hematocrit 38.8 (L) 41 - 53 %    .1 (H) 80 - 100 fL    MCH 38.5 (H) 26 - 34 pg    MCHC 34.0 31 - 37 g/dL    RDW 13.1 11.5 - 14.9 %    Platelets 629 686 - 239 k/uL    MPV 7.6 6.0 - 12.0 fL   Procalcitonin    Collection Time: 08/29/22  4:26 AM   Result Value Ref Range    Procalcitonin 0.20 (H) <0.09 ng/mL       Imaging/Diagnostics:  XR CHEST PORTABLE    Result Date: 8/28/2022  EXAMINATION: ONE XRAY VIEW OF THE CHEST 8/28/2022 2:12 pm COMPARISON: CT lung screen July 11, 2022 HISTORY: ORDERING SYSTEM PROVIDED HISTORY: Shortness of Breath TECHNOLOGIST PROVIDED HISTORY: Shortness of Breath Reason for Exam: Shortness of breath and coughing up blood FINDINGS: Lungs are clear focal airspace process.   Allowing for difference in modality, lingular spiculated mass reported on previous CT imaging appears to be stable in size concerning for malignancy. No consolidation, pneumothorax or pleural effusion. Cardiac and mediastinal silhouettes unremarkable. No acute osseous abnormality. Telemetry leads overlie the chest.     No acute findings. Redemonstrated lingular mass better evaluated on recent CT study highly suspicious for malignancy. Recommend CT PET follow-up and/or tissue sampling if not recently performed. Assessment :      Primary Problem  Acute exacerbation of chronic obstructive pulmonary disease (COPD) ([facility]    Active Hospital Problems    Diagnosis Date Noted    COPD (chronic obstructive pulmonary disease) ([facility] [J44.9] 08/28/2022     Priority: Medium    Tobacco dependence [F17.200] 11/08/2021    Tachycardia [R00.0] 10/28/2020    Acute exacerbation of chronic obstructive pulmonary disease (COPD) ([facility] [J44.1] 05/28/2019    Alcoholism ([facility] [F10.20] 03/02/2017       Plan:     Patient status Admit as inpatient in the  Medical ICU      Sepsis rule out pneumonia in the setting of suspected lung malignancy   -imaging CT from 8/11 : Spiculated lingular soft tissue mass lesion measuring 4.1 x 3.0 x 3.1 cm  -Follow with Dr. Yaidel Coffey, appreciate recs , most likely will get lung biopsy   -Blood cultures pending  -Procalcitonin : 0.2  -Lactic acid pending  -Respiratory cultures pending  -Given tachycardia, hypoxia and lung malignancy will get D-dimer   -On azithromycin and Rocephin  Pulmonology consulted appreciate recs  IR consulted appreciate recs      2.    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 Long Stewart DO  8/29/2022  7:47 AM    Copy sent to JAEL Bravo - CNP    Attending Physician Statement  I have discussed the care of Radha Arreola with the resident team. I have examined the patient myself and taken ros and hpi , including pertinent history and exam findings,  with the resident. I have reviewed the key elements of all parts of the encounter with the resident. I agree with the assessment, plan and orders as documented by the resident. Principal Problem:    Lung malignancy ([facility]  Active Problems:    COPD (chronic obstructive pulmonary disease) (HCC)    Hemoptysis    Alcoholism ([address]    Acute exacerbation of chronic obstructive pulmonary disease (COPD) (HCC)    Tachycardia    Tobacco dependence  Resolved Problems:    * No resolved hospital problems.  *    61 yr old h/o COPD, recent diagnosis of lung cancer, not had biopsy yet, not started on tmt lidya  Presented with hemoptysis- blood tinged sputum    Sepsis sec to covid 19, no urine cx so far- ordered, lactate pending,  will consult ID, follow inflammatory markers,   Needing supplemenntal O2- will start decadron  Hyponatremia- urine studies ordered will follow for results  Elevated D-dimer - CTPE awaited, start on tmt dose lovenox, hemotpysis very mild and hb stable  D/c IVF  Will consult oncology      Electronically signed by Michelle Tripp MD

## 2022-08-29 NOTE — PROGRESS NOTES
Received call from ICU nurse, patient is positive for covid and the lung biopsy is on hold at this time. Please call IR when patient cleared for lung biopsy.

## 2022-08-29 NOTE — CONSULTS
[facility] -   Infectious diseases evaluation  admission date 8/28/2022    reason for consultation:   COVID-19 infection    Impression :   Current:  COVID-19 infection  Lung mass  Hemoptysis likely secondary to lung mass  Hyponatremia  Asymptomatic pyuria      Recommendations   Decadron  IV ceftriaxone and Zithromax  C-reactive protein in a.m. Lung mass biopsy planned  Follow urine culture  Supportive care        History of Present Illness:   Initial history:  Jacinto Sanchez is a 61y.o.-year-old male presented to the hospital with a worsening shortness of breath associated with cough and hemoptysis for several weeks. Symptoms moderate to severe, worse with exertion, no alleviating factors. CT chest showed left upper lobe lung mass with no consolidation. The patient was hypoxic initially was placed on oxygen by nasal cannula, currently comfortable off oxygen. He denied fever or chills, denied nausea or vomiting, no diarrhea, no other complaints.   He denied urinary symptoms, urinalysis yesterday showed large leukocyte esterase, positive nitrate,  WBC  Procalcitonin level 0.2  Interval changes  8/29/2022   He is tachycardic and tachypneic  Patient Vitals for the past 8 hrs:   BP Temp Temp src Pulse Resp SpO2   08/29/22 1200 106/80 98 °F (36.7 °C) Oral (!) 126 (!) 35 96 %   08/29/22 1139 -- -- -- (!) 127 25 92 %   08/29/22 1138 -- -- -- (!) 124 30 --   08/29/22 1134 -- -- -- (!) 114 28 93 %   08/29/22 1100 -- -- -- (!) 125 (!) 31 95 %   08/29/22 1000 (!) 154/94 -- -- (!) 127 (!) 35 (!) 88 %   08/29/22 0954 -- -- -- -- (!) 34 --   08/29/22 0900 (!) 178/83 -- -- (!) 126 29 95 %   08/29/22 0800 -- 97.6 °F (36.4 °C) Oral (!) 119 (!) 34 94 %   08/29/22 0708 -- -- -- (!) 112 -- (!) 89 %   08/29/22 0700 (!) 143/75 -- -- (!) 110 (!) 37 90 %   08/29/22 0638 -- -- -- -- (!) 32 --   08/29/22 0600 119/62 -- -- (!) 109 (!) 32 90 %   08/29/22 0500 (!) 126/49 -- -- (!) 111 (!) 32 91 %           I have personally reviewed the past medical history, past surgical history, medications, social history, and family history, and I haveupdated the database accordingly. Allergies:   Dulera [mometasone furo-formoterol fum]     Review of Systems:     Review of Systems  History of present illness, other than above 12 system review was negative  Physical Examination :       Physical Exam  Constitutional:       General: He is not in acute distress. HENT:      Head: Normocephalic and atraumatic. Right Ear: External ear normal.      Left Ear: External ear normal.   Eyes:      General: No scleral icterus. Conjunctiva/sclera: Conjunctivae normal.   Cardiovascular:      Rate and Rhythm: Regular rhythm. Tachycardia present. Pulmonary:      Breath sounds: Wheezing present. Comments: Decreased breath sounds bilaterally  Abdominal:      General: There is no distension. Palpations: Abdomen is soft. Tenderness: There is no abdominal tenderness. Musculoskeletal:      Right lower leg: No edema. Left lower leg: No edema. Skin:     General: Skin is dry. Coloration: Skin is not jaundiced. Neurological:      General: No focal deficit present. Mental Status: He is oriented to person, place, and time.        Past Medical History:     Past Medical History:   Diagnosis Date    Alcohol abuse     hx of alcoholism; States he drinks 5 beers per day; pt has D/T's    Avascular necrosis of femoral head ([address] 11/25/2014    Overview:  S/p bilateral hip replacements    History of hip replacement     left/right    Hypertension     Mass of left lung     Rib fracture     Scabies     Stage 3 severe COPD by GOLD classification ([address] 11/08/2021    Tachycardia 10/28/2020       Past Surgical  History:     Past Surgical History:   Procedure Laterality Date    NECK SURGERY      TOTAL HIP ARTHROPLASTY Bilateral     VASECTOMY         Medications:      budesonide-formoterol  2 puff Inhalation BID    cefTRIAXone (ROCEPHIN) IV  1,000 mg IntraVENous Q24H    azithromycin  500 mg IntraVENous Q24H    levalbuterol  1 puff Inhalation Q4H While awake    ipratropium  1 puff Inhalation Q4H WA    chlordiazePOXIDE  10 mg Oral TID    enoxaparin  1 mg/kg SubCUTAneous BID    dexamethasone  6 mg IntraVENous Daily    sodium chloride flush  5-40 mL IntraVENous 2 times per day    lisinopril  10 mg Oral Daily    metoprolol succinate  100 mg Oral BID    sodium chloride flush  5-40 mL IntraVENous 2 times per day    nicotine  1 patch TransDERmal Daily    thiamine and folic acid IVPB   IntraVENous Daily       Social History:     Social History     Socioeconomic History    Marital status: Single     Spouse name: Not on file    Number of children: Not on file    Years of education: Not on file    Highest education level: Not on file   Occupational History    Not on file   Tobacco Use    Smoking status: Every Day     Packs/day: 1.00     Years: 44.00     Pack years: 44.00     Types: Cigarettes     Start date: 11    Smokeless tobacco: Never    Tobacco comments:     pt educated on both alcohol and smoking cessation   Substance and Sexual Activity    Alcohol use:  Yes     Alcohol/week: 35.0 standard drinks     Types: 35 Cans of beer per week     Comment: hx of alcoholism; States he drinks 5 beers per day; pt has D/T's    Drug use: Not Currently     Types: Cocaine, Marijuana (Weed)     Comment: Quit cocaine at age 24, quit marijuana at age 24    Sexual activity: Not on file   Other Topics Concern    Not on file   Social History Narrative    Not on file     Social Determinants of Health     Financial Resource Strain: Not on file   Food Insecurity: Not on file   Transportation Needs: Not on file   Physical Activity: Not on file   Stress: Not on file   Social Connections: Not on file   Intimate Partner Violence: Not on file   Housing Stability: Not on file       Family History:     Family History   Problem Relation Age of Onset Other Mother         mental health       Medical Decision Making:   I have independently reviewed/ordered the following labs:    CBC with Differential:   Recent Labs     08/28/22  1436 08/29/22  0426   WBC 12.5* 8.3   HGB 13.7 13.2*   HCT 39.7* 38.8*    200   LYMPHOPCT 8*  --    MONOPCT 8*  --      BMP:  Recent Labs     08/28/22  1436 08/29/22  0426 08/29/22  0807   * 126* 126*   K 3.8 4.0  --    CL 91* 94*  --    CO2 20 21  --    BUN 6 6  --    CREATININE 0.46* <0.40*  --    MG 1.7  --   --      Hepatic Function Panel:   Recent Labs     08/28/22  1436   PROT 8.1   LABALBU 3.6   BILITOT 0.85   ALKPHOS 130*   ALT 16   AST 27     No results for input(s): RPR in the last 72 hours. No results for input(s): HIV in the last 72 hours. No results for input(s): BC in the last 72 hours. Lab Results   Component Value Date/Time    CREATININE <0.40 08/29/2022 04:26 AM    GLUCOSE 90 08/29/2022 04:26 AM       Detailed results: Thank you for allowing us to participate in the care of this patient. Please call with questions. This note is created with the assistance of a speech recognition program.  While intending to generate adocument that actually reflects the content of the visit, the document can still have some errors including those of syntax and sound a like substitutions which may escape proof reading. It such instances, actual meaningcan be extrapolated by contextual diversion.     Tania Juárez MD  Office: [phone]  Perfect serve / office [phone]

## 2022-08-30 ENCOUNTER — TELEPHONE (OUTPATIENT)
Dept: ONCOLOGY | Age: 57
End: 2022-08-30

## 2022-08-30 ENCOUNTER — APPOINTMENT (OUTPATIENT)
Dept: CT IMAGING | Age: 57
DRG: 720 | End: 2022-08-30
Payer: COMMERCIAL

## 2022-08-30 ENCOUNTER — TELEPHONE (OUTPATIENT)
Dept: PULMONOLOGY | Age: 57
End: 2022-08-30

## 2022-08-30 ENCOUNTER — TELEPHONE (OUTPATIENT)
Dept: CASE MANAGEMENT | Age: 57
End: 2022-08-30

## 2022-08-30 LAB
ANION GAP SERPL CALCULATED.3IONS-SCNC: 13 MMOL/L (ref 9–17)
BUN BLDV-MCNC: 11 MG/DL (ref 6–20)
CALCIUM SERPL-MCNC: 9.1 MG/DL (ref 8.6–10.4)
CHLORIDE BLD-SCNC: 91 MMOL/L (ref 98–107)
CO2: 23 MMOL/L (ref 20–31)
CREAT SERPL-MCNC: <0.4 MG/DL (ref 0.7–1.2)
CULTURE: NORMAL
GFR AFRICAN AMERICAN: ABNORMAL ML/MIN
GFR NON-AFRICAN AMERICAN: ABNORMAL ML/MIN
GFR SERPL CREATININE-BSD FRML MDRD: ABNORMAL ML/MIN/{1.73_M2}
GLUCOSE BLD-MCNC: 86 MG/DL (ref 70–99)
HCT VFR BLD CALC: 36.5 % (ref 41–53)
HEMOGLOBIN: 12.4 G/DL (ref 13.5–17.5)
MCH RBC QN AUTO: 37.7 PG (ref 26–34)
MCHC RBC AUTO-ENTMCNC: 34.1 G/DL (ref 31–37)
MCV RBC AUTO: 110.6 FL (ref 80–100)
OSMOLALITY URINE: 456 MOSM/KG (ref 80–1300)
PDW BLD-RTO: 12.7 % (ref 11.5–14.9)
PLATELET # BLD: 219 K/UL (ref 150–450)
PMV BLD AUTO: 8.1 FL (ref 6–12)
POTASSIUM SERPL-SCNC: 4.2 MMOL/L (ref 3.7–5.3)
RBC # BLD: 3.3 M/UL (ref 4.5–5.9)
SODIUM BLD-SCNC: 126 MMOL/L (ref 135–144)
SODIUM BLD-SCNC: 127 MMOL/L (ref 135–144)
SODIUM BLD-SCNC: 127 MMOL/L (ref 135–144)
SPECIMEN DESCRIPTION: NORMAL
WBC # BLD: 6 K/UL (ref 3.5–11)

## 2022-08-30 PROCEDURE — 6360000002 HC RX W HCPCS: Performed by: EMERGENCY MEDICINE

## 2022-08-30 PROCEDURE — 84295 ASSAY OF SERUM SODIUM: CPT

## 2022-08-30 PROCEDURE — 6370000000 HC RX 637 (ALT 250 FOR IP): Performed by: NURSE PRACTITIONER

## 2022-08-30 PROCEDURE — 2580000003 HC RX 258: Performed by: STUDENT IN AN ORGANIZED HEALTH CARE EDUCATION/TRAINING PROGRAM

## 2022-08-30 PROCEDURE — 99233 SBSQ HOSP IP/OBS HIGH 50: CPT | Performed by: INTERNAL MEDICINE

## 2022-08-30 PROCEDURE — 6360000002 HC RX W HCPCS: Performed by: NURSE PRACTITIONER

## 2022-08-30 PROCEDURE — 6370000000 HC RX 637 (ALT 250 FOR IP): Performed by: STUDENT IN AN ORGANIZED HEALTH CARE EDUCATION/TRAINING PROGRAM

## 2022-08-30 PROCEDURE — 2500000003 HC RX 250 WO HCPCS: Performed by: NURSE PRACTITIONER

## 2022-08-30 PROCEDURE — 6360000002 HC RX W HCPCS: Performed by: STUDENT IN AN ORGANIZED HEALTH CARE EDUCATION/TRAINING PROGRAM

## 2022-08-30 PROCEDURE — 94640 AIRWAY INHALATION TREATMENT: CPT

## 2022-08-30 PROCEDURE — 99254 IP/OBS CNSLTJ NEW/EST MOD 60: CPT | Performed by: INTERNAL MEDICINE

## 2022-08-30 PROCEDURE — 6370000000 HC RX 637 (ALT 250 FOR IP): Performed by: INTERNAL MEDICINE

## 2022-08-30 PROCEDURE — 94660 CPAP INITIATION&MGMT: CPT

## 2022-08-30 PROCEDURE — 99232 SBSQ HOSP IP/OBS MODERATE 35: CPT | Performed by: INTERNAL MEDICINE

## 2022-08-30 PROCEDURE — 2580000003 HC RX 258: Performed by: EMERGENCY MEDICINE

## 2022-08-30 PROCEDURE — 36415 COLL VENOUS BLD VENIPUNCTURE: CPT

## 2022-08-30 PROCEDURE — 2580000003 HC RX 258: Performed by: NURSE PRACTITIONER

## 2022-08-30 PROCEDURE — 85027 COMPLETE CBC AUTOMATED: CPT

## 2022-08-30 PROCEDURE — 94761 N-INVAS EAR/PLS OXIMETRY MLT: CPT

## 2022-08-30 PROCEDURE — 2060000000 HC ICU INTERMEDIATE R&B

## 2022-08-30 PROCEDURE — 80048 BASIC METABOLIC PNL TOTAL CA: CPT

## 2022-08-30 RX ORDER — SODIUM CHLORIDE 0.9 % (FLUSH) 0.9 %
10 SYRINGE (ML) INJECTION 2 TIMES DAILY
Status: DISCONTINUED | OUTPATIENT
Start: 2022-08-30 | End: 2022-08-31 | Stop reason: HOSPADM

## 2022-08-30 RX ORDER — DEXAMETHASONE 6 MG/1
6 TABLET ORAL DAILY
Status: DISCONTINUED | OUTPATIENT
Start: 2022-08-31 | End: 2022-08-31 | Stop reason: HOSPADM

## 2022-08-30 RX ORDER — AMOXICILLIN AND CLAVULANATE POTASSIUM 875; 125 MG/1; MG/1
1 TABLET, FILM COATED ORAL EVERY 12 HOURS SCHEDULED
Status: DISCONTINUED | OUTPATIENT
Start: 2022-08-30 | End: 2022-08-31

## 2022-08-30 RX ORDER — AZITHROMYCIN 500 MG/1
500 TABLET, FILM COATED ORAL DAILY
Status: DISCONTINUED | OUTPATIENT
Start: 2022-08-31 | End: 2022-08-31

## 2022-08-30 RX ORDER — SODIUM CHLORIDE 1000 MG
1 TABLET, SOLUBLE MISCELLANEOUS 2 TIMES DAILY WITH MEALS
Status: DISCONTINUED | OUTPATIENT
Start: 2022-08-30 | End: 2022-08-31 | Stop reason: HOSPADM

## 2022-08-30 RX ORDER — 0.9 % SODIUM CHLORIDE 0.9 %
80 INTRAVENOUS SOLUTION INTRAVENOUS ONCE
Status: DISCONTINUED | OUTPATIENT
Start: 2022-08-30 | End: 2022-08-31 | Stop reason: HOSPADM

## 2022-08-30 RX ADMIN — SODIUM CHLORIDE, PRESERVATIVE FREE 10 ML: 5 INJECTION INTRAVENOUS at 08:34

## 2022-08-30 RX ADMIN — IPRATROPIUM BROMIDE 1 PUFF: 17 AEROSOL, METERED RESPIRATORY (INHALATION) at 11:40

## 2022-08-30 RX ADMIN — BUDESONIDE AND FORMOTEROL FUMARATE DIHYDRATE 2 PUFF: 160; 4.5 AEROSOL RESPIRATORY (INHALATION) at 07:39

## 2022-08-30 RX ADMIN — METOPROLOL SUCCINATE 100 MG: 100 TABLET, EXTENDED RELEASE ORAL at 21:27

## 2022-08-30 RX ADMIN — SODIUM CHLORIDE, PRESERVATIVE FREE 10 ML: 5 INJECTION INTRAVENOUS at 11:32

## 2022-08-30 RX ADMIN — LEVALBUTEROL TARTRATE 1 PUFF: 45 AEROSOL, METERED ORAL at 07:39

## 2022-08-30 RX ADMIN — IPRATROPIUM BROMIDE 1 PUFF: 17 AEROSOL, METERED RESPIRATORY (INHALATION) at 20:28

## 2022-08-30 RX ADMIN — ALPRAZOLAM 0.5 MG: 0.5 TABLET ORAL at 17:33

## 2022-08-30 RX ADMIN — LEVALBUTEROL TARTRATE 1 PUFF: 45 AEROSOL, METERED ORAL at 11:40

## 2022-08-30 RX ADMIN — CEFTRIAXONE SODIUM 1000 MG: 1 INJECTION, POWDER, FOR SOLUTION INTRAMUSCULAR; INTRAVENOUS at 08:45

## 2022-08-30 RX ADMIN — BUDESONIDE AND FORMOTEROL FUMARATE DIHYDRATE 2 PUFF: 160; 4.5 AEROSOL RESPIRATORY (INHALATION) at 20:28

## 2022-08-30 RX ADMIN — AZITHROMYCIN DIHYDRATE 500 MG: 500 INJECTION, POWDER, LYOPHILIZED, FOR SOLUTION INTRAVENOUS at 09:02

## 2022-08-30 RX ADMIN — LEVALBUTEROL TARTRATE 1 PUFF: 45 AEROSOL, METERED ORAL at 15:18

## 2022-08-30 RX ADMIN — ENOXAPARIN SODIUM 90 MG: 100 INJECTION SUBCUTANEOUS at 21:26

## 2022-08-30 RX ADMIN — LISINOPRIL 10 MG: 10 TABLET ORAL at 08:25

## 2022-08-30 RX ADMIN — SODIUM CHLORIDE, PRESERVATIVE FREE 10 ML: 5 INJECTION INTRAVENOUS at 21:29

## 2022-08-30 RX ADMIN — LEVALBUTEROL TARTRATE 1 PUFF: 45 AEROSOL, METERED ORAL at 20:27

## 2022-08-30 RX ADMIN — CHLORDIAZEPOXIDE HYDROCHLORIDE 10 MG: 10 CAPSULE ORAL at 21:30

## 2022-08-30 RX ADMIN — CHLORDIAZEPOXIDE HYDROCHLORIDE 10 MG: 10 CAPSULE ORAL at 08:25

## 2022-08-30 RX ADMIN — CHLORDIAZEPOXIDE HYDROCHLORIDE 10 MG: 10 CAPSULE ORAL at 14:11

## 2022-08-30 RX ADMIN — AMOXICILLIN AND CLAVULANATE POTASSIUM 1 TABLET: 875; 125 TABLET, FILM COATED ORAL at 22:22

## 2022-08-30 RX ADMIN — SODIUM CHLORIDE: 9 INJECTION, SOLUTION INTRAVENOUS at 08:42

## 2022-08-30 RX ADMIN — MORPHINE SULFATE 2 MG: 2 INJECTION, SOLUTION INTRAMUSCULAR; INTRAVENOUS at 14:11

## 2022-08-30 RX ADMIN — METOPROLOL SUCCINATE 100 MG: 100 TABLET, EXTENDED RELEASE ORAL at 08:28

## 2022-08-30 RX ADMIN — IPRATROPIUM BROMIDE 1 PUFF: 17 AEROSOL, METERED RESPIRATORY (INHALATION) at 07:39

## 2022-08-30 RX ADMIN — MORPHINE SULFATE 2 MG: 2 INJECTION, SOLUTION INTRAMUSCULAR; INTRAVENOUS at 00:39

## 2022-08-30 RX ADMIN — MORPHINE SULFATE 2 MG: 2 INJECTION, SOLUTION INTRAMUSCULAR; INTRAVENOUS at 21:26

## 2022-08-30 RX ADMIN — DEXAMETHASONE SODIUM PHOSPHATE 6 MG: 10 INJECTION, SOLUTION INTRAMUSCULAR; INTRAVENOUS at 08:25

## 2022-08-30 RX ADMIN — ENOXAPARIN SODIUM 90 MG: 100 INJECTION SUBCUTANEOUS at 08:28

## 2022-08-30 RX ADMIN — THIAMINE HYDROCHLORIDE: 100 INJECTION, SOLUTION INTRAMUSCULAR; INTRAVENOUS at 11:30

## 2022-08-30 RX ADMIN — IPRATROPIUM BROMIDE 1 PUFF: 17 AEROSOL, METERED RESPIRATORY (INHALATION) at 15:18

## 2022-08-30 RX ADMIN — SODIUM CHLORIDE 1 G: 1 TABLET ORAL at 22:22

## 2022-08-30 ASSESSMENT — PAIN DESCRIPTION - DESCRIPTORS
DESCRIPTORS: SHARP
DESCRIPTORS: ACHING
DESCRIPTORS: SHARP

## 2022-08-30 ASSESSMENT — PAIN - FUNCTIONAL ASSESSMENT
PAIN_FUNCTIONAL_ASSESSMENT: ACTIVITIES ARE NOT PREVENTED
PAIN_FUNCTIONAL_ASSESSMENT: ACTIVITIES ARE NOT PREVENTED
PAIN_FUNCTIONAL_ASSESSMENT: PREVENTS OR INTERFERES SOME ACTIVE ACTIVITIES AND ADLS

## 2022-08-30 ASSESSMENT — PAIN DESCRIPTION - LOCATION
LOCATION: EYE;CHEST
LOCATION: HEAD
LOCATION: CHEST;EYE
LOCATION: EYE;CHEST

## 2022-08-30 ASSESSMENT — PAIN SCALES - GENERAL
PAINLEVEL_OUTOF10: 9
PAINLEVEL_OUTOF10: 9
PAINLEVEL_OUTOF10: 6
PAINLEVEL_OUTOF10: 9
PAINLEVEL_OUTOF10: 0
PAINLEVEL_OUTOF10: 6
PAINLEVEL_OUTOF10: 9

## 2022-08-30 ASSESSMENT — PAIN DESCRIPTION - ONSET: ONSET: ON-GOING

## 2022-08-30 ASSESSMENT — PAIN DESCRIPTION - PAIN TYPE
TYPE: ACUTE PAIN
TYPE: CHRONIC PAIN
TYPE: ACUTE PAIN

## 2022-08-30 ASSESSMENT — PAIN DESCRIPTION - FREQUENCY: FREQUENCY: INTERMITTENT

## 2022-08-30 ASSESSMENT — PAIN DESCRIPTION - DIRECTION: RADIATING_TOWARDS: FOREHEAD

## 2022-08-30 ASSESSMENT — PAIN DESCRIPTION - ORIENTATION: ORIENTATION: ANTERIOR

## 2022-08-30 NOTE — PROGRESS NOTES
Physical Therapy  DATE: 2022    NAME: Georgia Driver  MRN: [id number]   : 1965    Patient not seen this date for Physical Therapy due to:      [] Cancel by RN or physician due to:    [] Hemodialysis    [] Critical Lab Value Level     [] Blood transfusion in progress    [] Acute or unstable cardiovascular status   _MAP < 55 or more than >115  _HR < 40 or > 130    [] Acute or unstable pulmonary status   -FiO2 > 60%   _RR < 5 or >40    _O2 sats < 85%    [] Strict Bedrest    [] Off Unit for surgery or procedure    [] Off Unit for testing       [] Pending imaging to R/O fracture    [x] Refusal by Patient; checked on pt from [phone]. Pt side lying in bed stating he was just up and does not wish to participate in therapy at this time. Will continue to follow. [] Other      [] PT being discontinued at this time. Patient independent. No further needs. [] PT being discontinued at this time as the patient has been transferred to hospice care. No further needs.       Electronically signed by Diane Vanegas PTA on 22 at 3:12 PM EDT

## 2022-08-30 NOTE — PROGRESS NOTES
PULMONARY PROGRESS NOTE:    REASON FOR VISIT:lung mass, hemoptysis  Interval History:    Shortness of Breath: improved   Cough: no  Sputum: no          Hemoptysis: no  Chest Pain: no  Fever: no                   Swelling Feet: no  Headache: no                                           Nausea, Emesis, Abdominal Pain: no  Diarrhea: no         Constipation: no    Events since last visit: Covid test was positive. Denies any further hemoptysis. Wants to go home.      PAST MEDICAL HISTORY:      Scheduled Meds:   budesonide-formoterol  2 puff Inhalation BID    cefTRIAXone (ROCEPHIN) IV  1,000 mg IntraVENous Q24H    azithromycin  500 mg IntraVENous Q24H    levalbuterol  1 puff Inhalation Q4H While awake    ipratropium  1 puff Inhalation Q4H WA    chlordiazePOXIDE  10 mg Oral TID    enoxaparin  1 mg/kg SubCUTAneous BID    dexamethasone  6 mg IntraVENous Daily    sodium chloride flush  5-40 mL IntraVENous 2 times per day    lisinopril  10 mg Oral Daily    metoprolol succinate  100 mg Oral BID    sodium chloride flush  5-40 mL IntraVENous 2 times per day    nicotine  1 patch TransDERmal Daily    thiamine and folic acid IVPB   IntraVENous Daily     Continuous Infusions:   sodium chloride       PRN Meds:magnesium hydroxide, ALPRAZolam, sodium chloride flush, sodium chloride, acetaminophen, ondansetron **OR** ondansetron, morphine **OR** morphine, sodium chloride flush        PHYSICAL EXAMINATION:  /75   Pulse 97   Temp 98 °F (36.7 °C) (Oral)   Resp 23   Ht 6' 1\" (1.854 m)   Wt 201 lb 4.5 oz (91.3 kg)   SpO2 95%   BMI 26.56 kg/m²     General : Awake, alert,   Neck - supple, no lymphadenopathy, JVD not raised  Heart -   Lungs - Air Entry- fair bilaterally; breath sounds : vesicular;   rales/crackles - absent, 95% on RA   Abdomen - soft, no tenderness  Upper Extremities  - no cyanosis, mottling; edema : absent  Lower Extremities: no cyanosis, mottling; edema : absent    Current Laboratory, Radiologic, Microbiologic, and Diagnostic studies reviewed  Data ReviewCBC:   Recent Labs     08/28/22  1436 08/29/22  0426 08/30/22  0434   WBC 12.5* 8.3 6.0   RBC 3.63* 3.43* 3.30*   HGB 13.7 13.2* 12.4*   HCT 39.7* 38.8* 36.5*    200 219     BMP:   Recent Labs     08/28/22  1436 08/29/22  0426 08/29/22  0807 08/29/22  1513 08/29/22  2049 08/30/22  0434   GLUCOSE 99 90  --   --   --  86   * 126*   < > 124* 126* 127*   K 3.8 4.0  --   --   --  4.2   BUN 6 6  --   --   --  11   CREATININE 0.46* <0.40*  --   --   --  <0.40*   CALCIUM 9.5 8.9  --   --   --  9.1    < > = values in this interval not displayed. ABGs:   Recent Labs     08/28/22  2020   PHART 7.390   PO2ART 163.0*   SUH4ZAB 36.3   YSG0XWQ 21.9*   [id number] 96.7      PT/INR:  No results found for: PTINR    ASSESSMENT / PLAN:  COPD - BD to xopenex  Lung mass   Consult to IR for biopsy- will not be done due to covid positive status   Patient  wants to FU with DR Gorman  Acute hypoxic respiratory failure  02 to keep sats 92%  Home O2 eval prior to discharge   Hyponatremia,-improving  Related to lung mass? Also daily drinker  Covid+- decadron   Hemoptysis - improving, continue to monitor  PFTs as OP  Tachycardic- on metoprolol BID   Possible discharge tomorrow if HR improves    This is a late note on patient seen by me earlier today.   Electronically signed by Carlos Lyn MD on 08/30/22 at 7:43 PM

## 2022-08-30 NOTE — PLAN OF CARE
Problem: Discharge Planning  Goal: Discharge to home or other facility with appropriate resources  8/30/2022 1446 by Carol Harp RN  Outcome: Progressing  8/30/2022 0536 by Nicky Dailey RN  Outcome: Progressing     Problem: Pain  Goal: Verbalizes/displays adequate comfort level or baseline comfort level  8/30/2022 1446 by Carol Harp RN  Outcome: Progressing  8/30/2022 0536 by Nicky Dailey RN  Outcome: Progressing     Problem: Safety - Adult  Goal: Free from fall injury  8/30/2022 1446 by Carol Harp RN  Outcome: Progressing  8/30/2022 0536 by Nicky Dailey RN  Outcome: Progressing     Problem: Skin/Tissue Integrity  Goal: Absence of new skin breakdown  Description: 1. Monitor for areas of redness and/or skin breakdown  2. Assess vascular access sites hourly  3. Every 4-6 hours minimum:  Change oxygen saturation probe site  4. Every 4-6 hours:  If on nasal continuous positive airway pressure, respiratory therapy assess nares and determine need for appliance change or resting period.   8/30/2022 1446 by Carol Harp RN  Outcome: Progressing  8/30/2022 0536 by Nicky Dailey RN  Outcome: Progressing     Problem: Neurosensory - Adult  Goal: Achieves maximal functionality and self care  8/30/2022 1446 by Carol Harp RN  Outcome: Progressing  8/30/2022 0536 by Nicky Dailey RN  Outcome: Progressing     Problem: Cardiovascular - Adult  Goal: Maintains optimal cardiac output and hemodynamic stability  8/30/2022 1446 by Carol Harp RN  Outcome: Progressing  8/30/2022 0536 by Nicky Dailey RN  Outcome: Progressing  Goal: Absence of cardiac dysrhythmias or at baseline  8/30/2022 1446 by Carol Harp RN  Outcome: Progressing  8/30/2022 0536 by Nicky Dailey RN  Outcome: Progressing     Problem: Infection - Adult  Goal: Absence of infection at discharge  8/30/2022 1446 by Carol Harp RN  Outcome: Progressing  8/30/2022 0536 by Nicky Dailey RN  Outcome: Progressing Problem: Respiratory - Adult  Goal: Achieves optimal ventilation and oxygenation  8/30/2022 1446 by Albert Reza RN  Outcome: Progressing  8/30/2022 0536 by Breana Mcwilliams RN  Outcome: Progressing

## 2022-08-30 NOTE — PROGRESS NOTES
[facility] Internal Medicine    Progress Note    8/30/2022    4:23 PM    Name:   Nicola Theodore  MRN:     [id number]     Acct:      [de-identified]   Room:   2013/2013-01  IP Day:  2  Admit Date:  8/28/2022  2:07 PM    PCP:   JAEL Munoz CNP  Code Status:  Full Code    Subjective:     C/C:   Chief Complaint   Patient presents with    Hemoptysis    Headache    Chest Pain    Pharyngitis         Interval History Status: Improving    HPI:     71-year-old male history of COPD hypertension alcohol abuse recent diagnosis of lung mass 2 weeks ago presenting with shortness of breath and hemoptysis  Noted to be COVID-19 positive  Somewhat hypoxic requiring 2 L nasal cannula  Admitted for management of sepsis concern for pneumonia in the setting of lung malignancy    Review of Systems:     Positive for cough and shortness of breath  Denies chest pain or palpitations  Denies abdominal pain, diarrhea vomiting  Denies any new numbness tremors or weakness. Medications: Allergies: Allergies   Allergen Reactions    Dulera [Mometasone Furo-Formoterol Fum]      Doesn't work for patient.         Current Meds:   Scheduled Meds:    [START ON 8/31/2022] dexamethasone  6 mg Oral Daily    [START ON 8/31/2022] azithromycin  500 mg Oral Daily    amoxicillin-clavulanate  1 tablet Oral 2 times per day    budesonide-formoterol  2 puff Inhalation BID    levalbuterol  1 puff Inhalation Q4H While awake    ipratropium  1 puff Inhalation Q4H WA    chlordiazePOXIDE  10 mg Oral TID    enoxaparin  1 mg/kg SubCUTAneous BID    sodium chloride flush  5-40 mL IntraVENous 2 times per day    lisinopril  10 mg Oral Daily    metoprolol succinate  100 mg Oral BID    sodium chloride flush  5-40 mL IntraVENous 2 times per day    nicotine  1 patch TransDERmal Daily    thiamine and folic acid IVPB   IntraVENous Daily     Continuous Infusions:    sodium chloride 5 mL/hr at 08/30/22 0842     PRN Meds: magnesium hydroxide, ALPRAZolam, sodium chloride flush, sodium chloride, acetaminophen, ondansetron **OR** ondansetron, morphine **OR** morphine, sodium chloride flush    Data:     Past Medical History:   has a past medical history of Alcohol abuse, Avascular necrosis of femoral head ([facility] History of hip replacement, Hypertension, Mass of left lung, Rib fracture, Scabies, Stage 3 severe COPD by GOLD classification ([facility] and Tachycardia. Social History:   reports that he has been smoking cigarettes. He started smoking about 36 years ago. He has a 44.00 pack-year smoking history. He has never used smokeless tobacco. He reports current alcohol use of about 35.0 standard drinks per week. He reports that he does not currently use drugs after having used the following drugs: Cocaine and Marijuana Kevyn Koroma). Family History:   Family History   Problem Relation Age of Onset    Other Mother         mental health        Vitals:  /77   Pulse (!) 111   Temp 97.8 °F (36.6 °C)   Resp 21   Ht 6' 1\" (1.854 m)   Wt 201 lb 4.5 oz (91.3 kg)   SpO2 96%   BMI 26.56 kg/m²   Temp (24hrs), Av.2 °F (36.8 °C), Min:97.8 °F (36.6 °C), Max:98.6 °F (37 °C)    No results for input(s): POCGLU in the last 72 hours. I/O (24Hr):     Intake/Output Summary (Last 24 hours) at 2022 1623  Last data filed at 2022 1030  Gross per 24 hour   Intake --   Output 1000 ml   Net -1000 ml       Labs:    Lab Results   Component Value Date    WBC 6.0 2022    HGB 12.4 (L) 2022    HCT 36.5 (L) 2022    .6 (H) 2022     2022     Lab Results   Component Value Date/Time     2022 01:37 PM    K 4.2 2022 04:34 AM    CL 91 2022 04:34 AM    CO2 23 2022 04:34 AM    BUN 11 2022 04:34 AM    CREATININE <0.40 2022 04:34 AM    GLUCOSE 86 2022 04:34 AM    CALCIUM 9.1 2022 04:34 AM          Lab Results   Component Value Date/Time    SPECIAL NOT REPORTED 05/24/2017 04:22 AM     Lab Results   Component Value Date/Time    CULTURE GROUP D ENTEROCOCCUS 10 to 50,000 CFU/ML (A) 05/24/2017 04:22 AM    CULTURE  05/24/2017 04:22 AM     Performed at [facility], [address] [place] [address] [phone]         Radiology:    Recent data reviewed    Physical Examination:        General appearance:  alert, cooperative and no distress  Eyes: Anicteric sclera. Pupils are equally round and reactive to light. Extraocular movements are intact.   Lungs: Increased effort of breathing, bilaterally clear  Heart:  regular rate and rhythm, no murmur  Abdomen:  soft, nontender, nondistended, normal bowel sounds, no masses, hepatomegaly, splenomegaly  Extremities:  no edema, redness, tenderness in the calves  Skin:  no gross lesions, rashes, induration  Neuro:  Alert, oriented X 3, no new focal weakness  Assessment:        Primary Problem  Sepsis [facility])    Active Hospital Problems    Diagnosis Date Noted    Lung malignancy ([facility] [C34.90] 08/29/2022     Priority: Medium    Cough with hemoptysis [R04.2] 08/29/2022     Priority: Medium    Sepsis ([facility] [A41.9] 08/29/2022     Priority: Medium    COVID-19 [U07.1] 08/29/2022     Priority: Medium    Lung mass [R91.8] 08/29/2022     Priority: Medium    Hyponatremia [E87.1] 08/29/2022     Priority: Medium    COPD (chronic obstructive pulmonary disease) ([facility] [J44.9] 08/28/2022     Priority: Medium    Tobacco dependence [F17.200] 11/08/2021    Tachycardia [R00.0] 10/28/2020    Acute exacerbation of chronic obstructive pulmonary disease (COPD) ([facility] [J44.1] 05/28/2019    Alcoholism ([facility] [F10.20] 03/02/2017           DVT prophylaxis: Treatment dose Lovenox  Antibiotics: Augmentin, Rocephin    Plan:        79-year-old male history of COPD hypertension alcohol abuse recent diagnosis of lung mass 2 weeks ago presenting with shortness of breath and hemoptysis  Noted to be COVID-19 positive  Somewhat hypoxic requiring 2 L nasal cannula  Admitted for management of sepsis concern for pneumonia in the setting of lung malignancy    Sepsis suspected superadded bacterial pneumonia chest x-ray not suggestive of COVID-19 pneumonia-started on azithromycin Rocephin, pulmonology consult switched to p.o. antibiotics  COVID-19 infection with hypoxia-started on Decadron  Hyponatremia sodium 122  Elevated D-dimer-check CT PE, patient on treatment dose Lovenox meanwhile  Lung mass-oncology consulted    8/30  Remains tachycardic satisfactory saturations on room air  Sodium improving to 126-high urine sodium and urine osmole likely SIADH possible paraneoplastic-placed on fluid restriction, salt tablets    Patient refusing CT PE discussed in detail-continues to refuse. Discussed with him the reason for concern-elevated D-dimer presence of malignancy persistent tachycardia . I have explained to him the risks of missing a blood clot, including serious illness and even death patient understands and does not want to get the CT of the lungs done. He says he will follow-up with Dr. Erica Anguiano who is the only doctor he will let take care of his lung issues.            Gabrielle Zeng MD  8/30/2022  4:23 PM

## 2022-08-30 NOTE — TELEPHONE ENCOUNTER
Pt is currently admitted at [facility]. He went in for cough and was coughing up blood starting the night of 8/27. They are wanting to do a needle biopsy, pt declined. He said he would like the bronchoscopy to be done but will only have it at [facility] and only trust you to do it. Per your last office note:   I reviewed the option of bronchoscopy with endobronchial biopsy  He would like to obtain further information about his diagnosis and treatment options. Pt has tested positive for Covid as of 8/28. He is asking if he can have bronch done after he is free of Covid. Please advise of bronch and if you are in agreement to do that, thank you.

## 2022-08-30 NOTE — PROGRESS NOTES
[facility]   OCCUPATIONAL THERAPY MISSED TREATMENT NOTE   INPATIENT   Date: 22  Patient Name: Nicola Theodore       Room:   MRN: [id number]   Account #: [de-identified]    : 1965  (61 y.o.)  Gender: male   Referring Practitioner: Danie Damon MD  Diagnosis: Sepsis; COVID-19             REASON FOR MISSED TREATMENT:  Patient refusal   -   Pt reports he has been managing for 12 years with stage 4 COPD, he states he does not understand benefits at this time. Increased encouragement provided however pt continues to decline. Attempt made at 1500. RN notified.          [address], [place]

## 2022-08-30 NOTE — PROGRESS NOTES
_                         Today's Date: 8/30/2022  Patient Name: Nilda Patterson  Date of admission: 8/28/2022  2:07 PM  Patient's age: 61 y. o., 1965  Admission Dx: Shortness of breath [R06.02]  COPD (chronic obstructive pulmonary disease) ([facility] [J44.9]  Hypochloremia [E87.8]  Hyponatremia [E87.1]  Lung mass [R91.8]  Cough with hemoptysis [R04.2]      Requesting Physician: Leobardo Velasco MD    CHIEF COMPLAINT: Shortness of breath. Cough and sputum. COPD exacerbation. COVID-positive. Consult for lung mass. SUBJECTIVE:  . Seen and examined. Continues to have cough and sputum. Continues have shortness of breath. Patient refused to have CTA. No fever. No active bleeding. BRIEF CASE HISTORY:    The patient is a 61 y.o.  male who is admitted to the hospital for further management of hypoxia with increasing shortness of breath and cough and hemoptysis. Patient had increasing symptoms over the last several weeks but recently was found to have positive COVID infection and he has COPD exacerbation. He was admitted to the [facility] unit for further management. Consult is for lung mass. Obviously patient is a chronic smoker. He had screening lung CT scan July 11, 2022. He was noted to have a left upper lobe lung mass measuring 4.1 x 3 x 3.1 cm. Masses suspicious for malignancy. Subsequently he had an order for PET/CT scan and CT-guided needle biopsy. These were not done yet as patient was hospitalized. Past Medical History:   has a past medical history of Alcohol abuse, Avascular necrosis of femoral head ([address] History of hip replacement, Hypertension, Mass of left lung, Rib fracture, Scabies, Stage 3 severe COPD by GOLD classification ([facility] and Tachycardia. Past Surgical History:   has a past surgical history that includes Neck surgery; Total hip arthroplasty (Bilateral); and Vasectomy.    Family History: family history includes Other in his mother. Social History:   reports that he has been smoking cigarettes. He started smoking about 36 years ago. He has a 44.00 pack-year smoking history. He has never used smokeless tobacco. He reports current alcohol use of about 35.0 standard drinks per week. He reports that he does not currently use drugs after having used the following drugs: Cocaine and Marijuana Valdavin Cruz). Medications:    Prior to Admission medications    Medication Sig Start Date End Date Taking?  Authorizing Provider   lisinopril (PRINIVIL;ZESTRIL) 10 MG tablet Take 1 tablet by mouth daily 8/18/22  Yes [place], JAEL - CNP   metoprolol succinate (TOPROL XL) 100 MG extended release tablet TAKE 1 TABLET BY MOUTH TWICE DAILY 8/18/22  Yes [place], JAEL - KIMBERLY   Fluticasone-Umeclidin-Vilant (TRELEGY ELLIPTA) 200-62.5-25 MCG/INH AEPB Inhale 1 puff into the lungs Daily 7/19/22  Yes Juanis Hammond MD   albuterol sulfate HFA (PROAIR HFA) 108 (90 Base) MCG/ACT inhaler Inhale 2 puffs into the lungs every 6 hours as needed for Wheezing 6/13/22  Yes [place], JAEL Schilling CNP   albuterol (PROVENTIL) (2.5 MG/3ML) 0.083% nebulizer solution INHALE 3 MLS (ONE VIAL) INTO THE LUNGS EVERY 6 HOURS VIA NEBULIZER AS NEEDED FOR WHEEZING 1/26/22  Yes Juanis Hammond MD     Current Facility-Administered Medications   Medication Dose Route Frequency Provider Last Rate Last Admin    [START ON 8/31/2022] dexamethasone (DECADRON) tablet 6 mg  6 mg Oral Daily JAEL Friedman CNP        [START ON 8/31/2022] azithromycin (ZITHROMAX) tablet 500 mg  500 mg Oral Daily JAEL Chatman CNP        amoxicillin-clavulanate (AUGMENTIN) 875-125 MG per tablet 1 tablet  1 tablet Oral 2 times per day JAEL Canales CNP        sodium chloride tablet 1 g  1 g Oral BID  Yogi Wallace MD        iopamidol (ISOVUE-370) 76 % injection 75 mL  75 mL IntraVENous ONCE PRN Yogi Wallace MD        0.9 % sodium chloride bolus  80 mL IntraVENous Once Yogi Wallace MD sodium chloride flush 0.9 % injection 10 mL  10 mL IntraVENous BID Sammi Belcher MD        budesonide-formoterol [facility]) 160-4.5 MCG/ACT inhaler 2 puff  2 puff Inhalation BID JAEL Hubbard CNP   2 puff at 08/30/22 4861    magnesium hydroxide (MILK OF MAGNESIA) 400 MG/5ML suspension 30 mL  30 mL Oral Daily PRN JAEL Hubbard CNP        levalbuterol [facility]) inhaler 1 puff  1 puff Inhalation Q4H While awake JAEL Guillen CNP   1 puff at 08/30/22 1518    ipratropium (ATROVENT HFA) 17 MCG/ACT inhaler 1 puff  1 puff Inhalation Q4H WA JAEL Guillen CNP   1 puff at 08/30/22 1518    chlordiazePOXIDE (LIBRIUM) capsule 10 mg  10 mg Oral TID Mandy Edouard MD   10 mg at 08/30/22 1411    enoxaparin (LOVENOX) injection 90 mg  1 mg/kg SubCUTAneous BID Mandy Edouard MD   90 mg at 08/30/22 8377    ALPRAZolam Fercho Taylor) tablet 0.5 mg  0.5 mg Oral BID PRN Mandy Edouard MD   0.5 mg at 08/30/22 1733    sodium chloride flush 0.9 % injection 5-40 mL  5-40 mL IntraVENous 2 times per day Hima Uribe MD   10 mL at 08/30/22 1132    sodium chloride flush 0.9 % injection 5-40 mL  5-40 mL IntraVENous PRN Kallie Ham MD        0.9 % sodium chloride infusion   IntraVENous PRN Hima Uribe MD 5 mL/hr at 08/30/22 0842 New Bag at 08/30/22 9656    acetaminophen (TYLENOL) tablet 650 mg  650 mg Oral Q4H PRN Hima Uribe MD   650 mg at 08/29/22 0056    ondansetron (ZOFRAN-ODT) disintegrating tablet 4 mg  4 mg Oral Q8H PRN Kallie Ham MD        Or    ondansetron (ZOFRAN) injection 4 mg  4 mg IntraVENous Q6H PRN Kallie Ham MD        morphine (PF) injection 2 mg  2 mg IntraVENous Q2H PRN Hima Uribe MD   2 mg at 08/30/22 1411    Or    morphine sulfate (PF) injection 4 mg  4 mg IntraVENous Q2H PRN Hima Uribe MD   4 mg at 08/29/22 1655    lisinopril (PRINIVIL;ZESTRIL) tablet 10 mg  10 mg Oral Daily Irl JAEL Patel - KIMBERLY   10 mg at 08/30/22 0825    metoprolol succinate (TOPROL XL) extended release tablet 100 mg  100 mg Oral BID Martha Fillers, APRN - CNP   100 mg at 08/30/22 1561    sodium chloride flush 0.9 % injection 5-40 mL  5-40 mL IntraVENous 2 times per day Martha Fillers, APRN - CNP   10 mL at 08/30/22 0379    sodium chloride flush 0.9 % injection 5-40 mL  5-40 mL IntraVENous PRN Martha Fillers, APRN - CNP        nicotine (NICODERM CQ) 21 MG/24HR 1 patch  1 patch TransDERmal Daily Martha Fillers, APRN - CNP   1 patch at 99/19/10 9797    folic acid 1 mg, thiamine (B-1) 100 mg in sodium chloride 0.9 % 50 mL IVPB   IntraVENous Daily Martha Fillers, APRN - CNP   Stopped at 08/30/22 1200       Allergies:  Gabriella Lowe [mometasone furo-formoterol fum]    REVIEW OF SYSTEMS:      General: Positive for weakness and fatigue. No unanticipated weight loss or decreased appetite. No fever or chills. Eyes: No blurred vision, eye pain or double vision. Ears: No hearing problems or drainage. No tinnitus. Throat: No sore throat, problems with swallowing or dysphagia. Respiratory: As above. Cardiovascular: No chest pain, orthopnea or PND. No lower extremity edema. No palpitation. Gastrointestinal: No problems with swallowing. No abdominal pain or bloating. No nausea or vomiting. No diarrhea or constipation. No GI bleeding. Genitourinary: No dysuria, hematuria, frequency or urgency. Musculoskeletal: No muscle aches or pains. No limitation of movement. No back pain. No gait disturbance, No joint complaints. Dermatologic: No skin rashes or pruritus. No skin lesions or discolorations. Psychiatric: No depression, anxiety, or stress or signs of schizophrenia. No change in mood or affect. Hematologic: No history of bleeding tendency. No bruises or ecchymosis. No history of clotting problems. Infectious disease: No fever, chills or frequent infections. Endocrine: No polydipsia or polyuria. No temperature intolerance.   Neurologic: No headaches or dizziness. No weakness or numbness of the extremities. No changes in balance, coordination,  memory, mentation, behavior. Allergic/Immunologic: No nasal congestion or hives. No repeated infections.        PHYSICAL EXAM:      /84   Pulse 98   Temp 97.8 °F (36.6 °C) (Oral)   Resp 27   Ht 6' 1\" (1.854 m)   Wt 201 lb 4.5 oz (91.3 kg)   SpO2 94%   BMI 26.56 kg/m²    Temp (24hrs), Av.2 °F (36.8 °C), Min:97.8 °F (36.6 °C), Max:98.6 °F (37 °C)      General appearance - not in pain or distress  Mental status - alert and oriented  Eyes - pupils equal and reactive, extraocular eye movements intact  Ears - bilateral TM's and external ear canals normal  Nose - normal and patent, no erythema, discharge or polyps  Mouth - mucous membranes moist, pharynx normal without lesions  Neck - supple, no significant adenopathy  Lymphatics - no palpable lymphadenopathy, no hepatosplenomegaly  Chest - clear to auscultation, no wheezes, rales or rhonchi, symmetric air entry  Heart - normal rate, regular rhythm, normal S1, S2, no murmurs, rubs, clicks or gallops  Abdomen - soft, nontender, nondistended, no masses or organomegaly  Neurological - alert, oriented, normal speech, no focal findings or movement disorder noted  Musculoskeletal - no joint tenderness, deformity or swelling  Extremities - peripheral pulses normal, no pedal edema, no clubbing or cyanosis  Skin - normal coloration and turgor, no rashes, no suspicious skin lesions noted           DATA:      Labs:       CBC:   Recent Labs     22  04222  0434   WBC 8.3 6.0   HGB 13.2* 12.4*   HCT 38.8* 36.5*    219     BMP:   Recent Labs     22  04222  0807 22  0434 22  1337   *   < > 127* 126*   K 4.0  --  4.2  --    CO2 21  --  23  --    BUN 6  --  11  --    CREATININE <0.40*  --  <0.40*  --    LABGLOM Can not be calculated  --  Can not be calculated  --    GLUCOSE 90  --  86  --     < > = values in this interval not displayed. PT/INR:   Recent Labs     08/29/22  1055   PROTIME 12.8   INR 1.0     APTT:No results for input(s): APTT in the last 72 hours. LIVER PROFILE:  Recent Labs     08/28/22  1436   AST 27   ALT 16   LABALBU 3.6     XR CHEST PORTABLE  Narrative: EXAMINATION:  ONE XRAY VIEW OF THE CHEST    8/28/2022 2:12 pm    COMPARISON:  CT lung screen July 11, 2022    HISTORY:  ORDERING SYSTEM PROVIDED HISTORY: Shortness of Breath  TECHNOLOGIST PROVIDED HISTORY:  Shortness of Breath  Reason for Exam: Shortness of breath and coughing up blood    FINDINGS:  Lungs are clear focal airspace process. Allowing for difference in modality,  lingular spiculated mass reported on previous CT imaging appears to be stable  in size concerning for malignancy. No consolidation, pneumothorax or pleural  effusion. Cardiac and mediastinal silhouettes unremarkable. No acute  osseous abnormality. Telemetry leads overlie the chest.  Impression: No acute findings. Redemonstrated lingular mass better evaluated on recent CT study highly  suspicious for malignancy. Recommend CT PET follow-up and/or tissue sampling  if not recently performed.             IMPRESSION:    Primary Problem  Sepsis [facility])    Active Hospital Problems    Diagnosis Date Noted    Lung malignancy ([facility] [C34.90] 08/29/2022     Priority: Medium    Cough with hemoptysis [R04.2] 08/29/2022     Priority: Medium    Sepsis ([facility] [A41.9] 08/29/2022     Priority: Medium    COVID-19 [U07.1] 08/29/2022     Priority: Medium    Lung mass [R91.8] 08/29/2022     Priority: Medium    Hyponatremia [E87.1] 08/29/2022     Priority: Medium    COPD (chronic obstructive pulmonary disease) ([facility] [J44.9] 08/28/2022     Priority: Medium    Tobacco dependence [F17.200] 11/08/2021    Tachycardia [R00.0] 10/28/2020    Acute exacerbation of chronic obstructive pulmonary disease (COPD) ([facility] [J44.1] 05/28/2019    Alcoholism ([facility] [F10.20] 03/02/2017       RECOMMENDATIONS:  Records and labs and images were reviewed and discussed with the patient. Continue current care for his COVID infection/COVID-pneumonia along with hypoxia and COPD exacerbation. Pulmonary following. Patient is refusing CTA. Discussed further care for the lung mass. Obviously he was evaluated by pulmonary as outpatient with Dr. Jakob Reyes. He had an order for CT-guided needle biopsy and PET CT scan. This will be rescheduled following discharge. Further recommendations and treatment will be based on the results of the PET CT scan and the pathology. Ramana Schuster MD, MD                            [address] [place] [address] [facility]                            This note is created with the assistance of a speech recognition program.  While intending to generate a document that actually reflects the content of the visit, the document can still have some errors including those of syntax and sound a like substitutions which may escape proof reading. It such instances, actual meaning can be extrapolated by contextual diversion.

## 2022-08-30 NOTE — PLAN OF CARE
BRONCHOSPASM/BRONCHOCONSTRICTION   [x]  IMPROVE AERATION/BREATH SOUNDS  [x]   ADMINISTER BRONCHODILATOR THERAPY AS APPROPRIATE  [x]   ASSESS BREATH SOUNDS  []   IMPLEMENT AEROSOL/MDI PROTOCOL  [x]   PATIENT EDUCATION AS NEEDED    PROVIDE ADEQUATE OXYGENATION WITH ACCEPTABLE SP02/ABG'S  [x]  IDENTIFY APPROPRIATE OXYGEN THERAPY  [x]   MONITOR SP02/ABG'S AS NEEDED   [x]   PATIENT EDUCATION AS NEEDED    NONINVASIVE VENTILATION    PROVIDE OPTIMAL VENTILATION/ACCEPTABLE SPO2   IMPLEMENT NONINVASIVE VENTILATION PROTOCOL   MAINTAIN ACCEPTABLE SPO2   ASSESS SKIN INTEGRITY/BREAKDOWN SCORE   PATIENT EDUCATION AS NEEDED   BIPAP AS NEEDED

## 2022-08-30 NOTE — CARE COORDINATION
ONGOING DISCHARGE PLAN:    COVID + afebrile and O2 per NC at 1lpm     Patient is alert and oriented x4. Spoke with patient regarding discharge plan and patient confirms that plan is still to go home,pt declined VNS. Following for home O2 needs. Currently on 1 lpm NC.  home O2 eval ordered. Lung biopsy will not be done due to COVID status. PO zithromax and augmentin, PO decadron. PO librium TID. Will continue to follow for additional discharge needs.     Electronically signed by Shauna Sparks RN on 8/30/2022 at 11:54 AM

## 2022-08-30 NOTE — PLAN OF CARE
Problem: Discharge Planning  Goal: Discharge to home or other facility with appropriate resources  Outcome: Progressing     Problem: Pain  Goal: Verbalizes/displays adequate comfort level or baseline comfort level  Outcome: Progressing     Problem: Safety - Adult  Goal: Free from fall injury  Outcome: Progressing     Problem: Skin/Tissue Integrity  Goal: Absence of new skin breakdown  Outcome: Progressing     Problem: Neurosensory - Adult  Goal: Achieves maximal functionality and self care  Outcome: Progressing     Problem: Cardiovascular - Adult  Goal: Maintains optimal cardiac output and hemodynamic stability  Outcome: Progressing  Goal: Absence of cardiac dysrhythmias or at baseline  Outcome: Progressing     Problem: Infection - Adult  Goal: Absence of infection at discharge  Outcome: Progressing     Problem: Respiratory - Adult  Goal: Achieves optimal ventilation and oxygenation  Outcome: Progressing

## 2022-08-30 NOTE — TELEPHONE ENCOUNTER
Lung Navigator reviewing chart and pt. Willing to proceed with Bronch, message sent to Dr. Aldair Braun. Pt. Was consulted while inpt. By Dr. Jean Interiano. Plan to follow.

## 2022-08-31 VITALS
DIASTOLIC BLOOD PRESSURE: 63 MMHG | SYSTOLIC BLOOD PRESSURE: 101 MMHG | BODY MASS INDEX: 27.17 KG/M2 | HEART RATE: 83 BPM | TEMPERATURE: 98.5 F | HEIGHT: 73 IN | OXYGEN SATURATION: 90 % | WEIGHT: 205.03 LBS | RESPIRATION RATE: 24 BRPM

## 2022-08-31 LAB
ANION GAP SERPL CALCULATED.3IONS-SCNC: 10 MMOL/L (ref 9–17)
BUN BLDV-MCNC: 17 MG/DL (ref 6–20)
C-REACTIVE PROTEIN: 35.5 MG/L (ref 0–5)
CALCIUM SERPL-MCNC: 9 MG/DL (ref 8.6–10.4)
CHLORIDE BLD-SCNC: 93 MMOL/L (ref 98–107)
CO2: 24 MMOL/L (ref 20–31)
CREAT SERPL-MCNC: 0.46 MG/DL (ref 0.7–1.2)
GFR AFRICAN AMERICAN: >60 ML/MIN
GFR NON-AFRICAN AMERICAN: >60 ML/MIN
GFR SERPL CREATININE-BSD FRML MDRD: ABNORMAL ML/MIN/{1.73_M2}
GLUCOSE BLD-MCNC: 91 MG/DL (ref 70–99)
HCT VFR BLD CALC: 36.9 % (ref 41–53)
HEMOGLOBIN: 12.6 G/DL (ref 13.5–17.5)
MCH RBC QN AUTO: 37.9 PG (ref 26–34)
MCHC RBC AUTO-ENTMCNC: 34.2 G/DL (ref 31–37)
MCV RBC AUTO: 110.7 FL (ref 80–100)
PDW BLD-RTO: 12.9 % (ref 11.5–14.9)
PLATELET # BLD: 193 K/UL (ref 150–450)
PMV BLD AUTO: 7.4 FL (ref 6–12)
POTASSIUM SERPL-SCNC: 3.8 MMOL/L (ref 3.7–5.3)
RBC # BLD: 3.34 M/UL (ref 4.5–5.9)
SODIUM BLD-SCNC: 127 MMOL/L (ref 135–144)
SODIUM BLD-SCNC: 128 MMOL/L (ref 135–144)
WBC # BLD: 5.6 K/UL (ref 3.5–11)

## 2022-08-31 PROCEDURE — 6370000000 HC RX 637 (ALT 250 FOR IP): Performed by: NURSE PRACTITIONER

## 2022-08-31 PROCEDURE — 86140 C-REACTIVE PROTEIN: CPT

## 2022-08-31 PROCEDURE — 84295 ASSAY OF SERUM SODIUM: CPT

## 2022-08-31 PROCEDURE — 80048 BASIC METABOLIC PNL TOTAL CA: CPT

## 2022-08-31 PROCEDURE — 6370000000 HC RX 637 (ALT 250 FOR IP): Performed by: INTERNAL MEDICINE

## 2022-08-31 PROCEDURE — 36415 COLL VENOUS BLD VENIPUNCTURE: CPT

## 2022-08-31 PROCEDURE — 94761 N-INVAS EAR/PLS OXIMETRY MLT: CPT

## 2022-08-31 PROCEDURE — 85027 COMPLETE CBC AUTOMATED: CPT

## 2022-08-31 PROCEDURE — 99239 HOSP IP/OBS DSCHRG MGMT >30: CPT | Performed by: INTERNAL MEDICINE

## 2022-08-31 PROCEDURE — 94640 AIRWAY INHALATION TREATMENT: CPT

## 2022-08-31 PROCEDURE — 6370000000 HC RX 637 (ALT 250 FOR IP): Performed by: STUDENT IN AN ORGANIZED HEALTH CARE EDUCATION/TRAINING PROGRAM

## 2022-08-31 PROCEDURE — 99232 SBSQ HOSP IP/OBS MODERATE 35: CPT | Performed by: INTERNAL MEDICINE

## 2022-08-31 PROCEDURE — 6360000002 HC RX W HCPCS: Performed by: STUDENT IN AN ORGANIZED HEALTH CARE EDUCATION/TRAINING PROGRAM

## 2022-08-31 RX ORDER — DEXAMETHASONE 6 MG/1
6 TABLET ORAL DAILY
Qty: 8 TABLET | Refills: 0 | Status: SHIPPED | OUTPATIENT
Start: 2022-09-01 | End: 2022-09-09 | Stop reason: ALTCHOICE

## 2022-08-31 RX ORDER — METOPROLOL SUCCINATE 100 MG/1
100 TABLET, EXTENDED RELEASE ORAL 2 TIMES DAILY
Qty: 30 TABLET | Refills: 3 | Status: SHIPPED | OUTPATIENT
Start: 2022-08-31

## 2022-08-31 RX ORDER — AMOXICILLIN AND CLAVULANATE POTASSIUM 875; 125 MG/1; MG/1
1 TABLET, FILM COATED ORAL EVERY 12 HOURS SCHEDULED
Qty: 2 TABLET | Refills: 0 | Status: SHIPPED | OUTPATIENT
Start: 2022-08-31 | End: 2022-09-01

## 2022-08-31 RX ORDER — AZITHROMYCIN 500 MG/1
500 TABLET, FILM COATED ORAL DAILY
Qty: 3 TABLET | Refills: 0 | Status: SHIPPED | OUTPATIENT
Start: 2022-09-01 | End: 2022-09-04

## 2022-08-31 RX ORDER — BUDESONIDE AND FORMOTEROL FUMARATE DIHYDRATE 160; 4.5 UG/1; UG/1
2 AEROSOL RESPIRATORY (INHALATION) 2 TIMES DAILY
Qty: 10.2 G | Refills: 3 | Status: SHIPPED | OUTPATIENT
Start: 2022-08-31 | End: 2022-09-09

## 2022-08-31 RX ORDER — SODIUM CHLORIDE 1000 MG
1 TABLET, SOLUBLE MISCELLANEOUS 2 TIMES DAILY WITH MEALS
Qty: 90 TABLET | Refills: 3 | Status: SHIPPED | OUTPATIENT
Start: 2022-08-31 | End: 2022-09-09

## 2022-08-31 RX ADMIN — CHLORDIAZEPOXIDE HYDROCHLORIDE 10 MG: 10 CAPSULE ORAL at 08:14

## 2022-08-31 RX ADMIN — LISINOPRIL 10 MG: 10 TABLET ORAL at 10:59

## 2022-08-31 RX ADMIN — SODIUM CHLORIDE 1 G: 1 TABLET ORAL at 08:14

## 2022-08-31 RX ADMIN — METOPROLOL SUCCINATE 100 MG: 100 TABLET, EXTENDED RELEASE ORAL at 08:19

## 2022-08-31 RX ADMIN — IPRATROPIUM BROMIDE 1 PUFF: 17 AEROSOL, METERED RESPIRATORY (INHALATION) at 14:46

## 2022-08-31 RX ADMIN — ENOXAPARIN SODIUM 90 MG: 100 INJECTION SUBCUTANEOUS at 08:16

## 2022-08-31 RX ADMIN — CHLORDIAZEPOXIDE HYDROCHLORIDE 10 MG: 10 CAPSULE ORAL at 14:33

## 2022-08-31 RX ADMIN — IPRATROPIUM BROMIDE 1 PUFF: 17 AEROSOL, METERED RESPIRATORY (INHALATION) at 08:05

## 2022-08-31 RX ADMIN — LEVALBUTEROL TARTRATE 1 PUFF: 45 AEROSOL, METERED ORAL at 14:46

## 2022-08-31 RX ADMIN — ALPRAZOLAM 0.5 MG: 0.5 TABLET ORAL at 10:01

## 2022-08-31 RX ADMIN — AMOXICILLIN AND CLAVULANATE POTASSIUM 1 TABLET: 875; 125 TABLET, FILM COATED ORAL at 08:15

## 2022-08-31 RX ADMIN — LEVALBUTEROL TARTRATE 1 PUFF: 45 AEROSOL, METERED ORAL at 08:05

## 2022-08-31 RX ADMIN — DEXAMETHASONE 6 MG: 6 TABLET ORAL at 08:13

## 2022-08-31 RX ADMIN — AZITHROMYCIN MONOHYDRATE 500 MG: 500 TABLET ORAL at 08:15

## 2022-08-31 RX ADMIN — BUDESONIDE AND FORMOTEROL FUMARATE DIHYDRATE 2 PUFF: 160; 4.5 AEROSOL RESPIRATORY (INHALATION) at 08:05

## 2022-08-31 ASSESSMENT — PAIN DESCRIPTION - PAIN TYPE
TYPE: ACUTE PAIN
TYPE: ACUTE PAIN

## 2022-08-31 ASSESSMENT — PAIN DESCRIPTION - DESCRIPTORS
DESCRIPTORS: SHARP
DESCRIPTORS: SHARP

## 2022-08-31 ASSESSMENT — PAIN SCALES - GENERAL
PAINLEVEL_OUTOF10: 8
PAINLEVEL_OUTOF10: 7

## 2022-08-31 ASSESSMENT — PAIN DESCRIPTION - LOCATION
LOCATION: EYE;CHEST
LOCATION: EYE;CHEST

## 2022-08-31 ASSESSMENT — PAIN - FUNCTIONAL ASSESSMENT
PAIN_FUNCTIONAL_ASSESSMENT: ACTIVITIES ARE NOT PREVENTED
PAIN_FUNCTIONAL_ASSESSMENT: ACTIVITIES ARE NOT PREVENTED

## 2022-08-31 NOTE — DISCHARGE INSTRUCTIONS
General Discharge: Care Instructions  Your Care Instructions     One or more doctors have given you a physical exam, reviewed your symptoms, andasked about your past medical problems. You have had tests as needed. At this time, the doctors feel it is safe for you to go home. It is very important that you follow up with your doctor as directed. If youcontinue to have symptoms, you may need more tests or treatment. The doctor has checked you carefully, but problems can develop later. If you notice any problems or new symptoms,  get medical treatment right away. Follow-up care is a key part of your treatment and safety. Be sure to make and go to all appointments, and call your doctor if you are having problems. It's also a good idea to know your test results and keep alist of the medicines you take. How can you care for yourself at home? Keep track of any new symptoms or changes in your symptoms. Rest until you feel better. Be safe with medicines. Take your medicines exactly as prescribed. Call your doctor if you think you are having a problem with your medicine. Do not drive after taking a prescription pain medicine. When should you call for help? Call 911 anytime you think you may need emergency care. For example, call if:    You passed out (lost consciousness). Call your doctor now or seek immediate medical care if:    You have new symptoms like fever, difficulty breathing, vomiting, or rash. You have new or different pain. You are confused and are having trouble thinking clearly. Your symptoms are getting worse. Watch closely for changes in your health, and be sure to contact your doctor if:    You do not get better as expected. Where can you learn more? Go to https://ivan.[employer]. org and sign in to your [employer] account. Enter G150 in the [employer] box to learn more about \"General Discharge: Care Instructions. \"     If you do not have an account, please click on the \"Sign Up Now\" link. Current as of: March 9, 2022               Content Version: 13.3  © 1027-0502 [employer]. Care instructions adapted under license by [facility] [place] ([facility]). If you have questions about a medical condition or this instruction, always ask your healthcare professional. [address] any warranty or liability for your use of this information.

## 2022-08-31 NOTE — PROGRESS NOTES
PULMONARY PROGRESS NOTE:    REASON FOR VISIT:lung mass, hemoptysis  Interval History:    Shortness of Breath: improved   Cough: no  Sputum: no          Hemoptysis: no  Chest Pain: no  Fever: no                   Swelling Feet: no  Headache: no                                           Nausea, Emesis, Abdominal Pain: no  Diarrhea: no         Constipation: no    Events since last visit: Covid positive. Denies any further hemoptysis.  Wants to go home and follow up with Dr Josi Rondon:      Scheduled Meds:   dexamethasone  6 mg Oral Daily    azithromycin  500 mg Oral Daily    amoxicillin-clavulanate  1 tablet Oral 2 times per day    sodium chloride  1 g Oral BID WC    sodium chloride  80 mL IntraVENous Once    sodium chloride flush  10 mL IntraVENous BID    budesonide-formoterol  2 puff Inhalation BID    levalbuterol  1 puff Inhalation Q4H While awake    ipratropium  1 puff Inhalation Q4H WA    chlordiazePOXIDE  10 mg Oral TID    enoxaparin  1 mg/kg SubCUTAneous BID    sodium chloride flush  5-40 mL IntraVENous 2 times per day    lisinopril  10 mg Oral Daily    metoprolol succinate  100 mg Oral BID    sodium chloride flush  5-40 mL IntraVENous 2 times per day    nicotine  1 patch TransDERmal Daily    thiamine and folic acid IVPB   IntraVENous Daily     Continuous Infusions:   sodium chloride Stopped (08/30/22 1216)     PRN Meds:iopamidol, magnesium hydroxide, ALPRAZolam, sodium chloride flush, sodium chloride, acetaminophen, ondansetron **OR** ondansetron, morphine **OR** morphine, sodium chloride flush        PHYSICAL EXAMINATION:  /73   Pulse 93   Temp 98 °F (36.7 °C) (Oral)   Resp 22   Ht 6' 1\" (1.854 m)   Wt 205 lb 0.4 oz (93 kg)   SpO2 92%   BMI 27.05 kg/m²     General : Awake, alert,   Neck - supple, no lymphadenopathy, JVD not raised  Heart - SR 81  Lungs - Air Entry- fair bilaterally; breath sounds : vesicular;   rales/crackles - absent, 95% on RA   Abdomen - soft, no WDL

## 2022-08-31 NOTE — PROGRESS NOTES
[facility] -   Infectious diseases evaluation  admission date 8/28/2022    reason for consultation:   COVID-19 infection    Impression :   Current:  COVID-19 infection  Lung mass  Hemoptysis likely secondary to lung mass  Hyponatremia  Asymptomatic pyuria      Recommendations   Decadron po to finish 10 days course   Augmentin and Zithromax per primary  C-reactive protein 35.5  Lung mass biopsy as outpatient  No growth urine culture          History of Present Illness:   Initial history:  Yaya Nj is a 61y.o.-year-old male presented to the hospital with a worsening shortness of breath associated with cough and hemoptysis for several weeks. Symptoms moderate to severe, worse with exertion, no alleviating factors. CT chest showed left upper lobe lung mass with no consolidation. The patient was hypoxic initially was placed on oxygen by nasal cannula. He denied urinary symptoms, urinalysis showed large leukocyte esterase, positive nitrate,  WBC  Procalcitonin level 0.2  Interval changes  8/31/2022   He is comfortable on room air, asking if he can go home, denied fever or chills, denied increased cough or shortness of breath. Patient Vitals for the past 8 hrs:   BP Temp Temp src Pulse Resp SpO2   08/31/22 1500 101/63 -- -- 83 24 90 %   08/31/22 1400 (!) 85/57 98.5 °F (36.9 °C) Oral 83 23 90 %   08/31/22 1300 -- -- -- 77 16 94 %   08/31/22 1200 -- -- -- 95 26 94 %   08/31/22 1100 118/73 -- -- 87 24 98 %   08/31/22 1059 110/62 -- -- -- -- --   08/31/22 1000 99/63 -- -- 83 27 97 %             I have personally reviewed the past medical history, past surgical history, medications, social history, and family history, and I haveupdated the database accordingly.       Allergies:   Dulera [mometasone furo-formoterol fum]     Review of Systems:     Review of Systems  History of present illness, other than above 12 system review was negative  Physical Examination : Physical Exam  Constitutional:       General: He is not in acute distress. HENT:      Head: Normocephalic and atraumatic. Right Ear: External ear normal.      Left Ear: External ear normal.   Eyes:      General: No scleral icterus. Conjunctiva/sclera: Conjunctivae normal.   Cardiovascular:      Rate and Rhythm: Regular rhythm. Pulmonary:      Comments: Decreased breath sounds bilaterally  Abdominal:      General: There is no distension. Palpations: Abdomen is soft. Tenderness: There is no abdominal tenderness. Musculoskeletal:      Right lower leg: No edema. Left lower leg: No edema. Skin:     General: Skin is dry. Coloration: Skin is not jaundiced. Neurological:      General: No focal deficit present. Mental Status: He is oriented to person, place, and time.        Past Medical History:     Past Medical History:   Diagnosis Date    Alcohol abuse     hx of alcoholism; States he drinks 5 beers per day; pt has D/T's    Avascular necrosis of femoral head ([address] 11/25/2014    Overview:  S/p bilateral hip replacements    History of hip replacement     left/right    Hypertension     Mass of left lung     Rib fracture     Scabies     Stage 3 severe COPD by GOLD classification ([address] 11/08/2021    Tachycardia 10/28/2020       Past Surgical  History:     Past Surgical History:   Procedure Laterality Date    NECK SURGERY      TOTAL HIP ARTHROPLASTY Bilateral     VASECTOMY         Medications:      dexamethasone  6 mg Oral Daily    azithromycin  500 mg Oral Daily    amoxicillin-clavulanate  1 tablet Oral 2 times per day    sodium chloride  1 g Oral BID WC    sodium chloride  80 mL IntraVENous Once    sodium chloride flush  10 mL IntraVENous BID    budesonide-formoterol  2 puff Inhalation BID    levalbuterol  1 puff Inhalation Q4H While awake    ipratropium  1 puff Inhalation Q4H WA    chlordiazePOXIDE  10 mg Oral TID    enoxaparin  1 mg/kg SubCUTAneous BID    sodium chloride flush 5-40 mL IntraVENous 2 times per day    lisinopril  10 mg Oral Daily    metoprolol succinate  100 mg Oral BID    sodium chloride flush  5-40 mL IntraVENous 2 times per day    nicotine  1 patch TransDERmal Daily    thiamine and folic acid IVPB   IntraVENous Daily       Social History:     Social History     Socioeconomic History    Marital status: Single     Spouse name: Not on file    Number of children: Not on file    Years of education: Not on file    Highest education level: Not on file   Occupational History    Not on file   Tobacco Use    Smoking status: Every Day     Packs/day: 1.00     Years: 44.00     Pack years: 44.00     Types: Cigarettes     Start date: 11    Smokeless tobacco: Never    Tobacco comments:     pt educated on both alcohol and smoking cessation   Substance and Sexual Activity    Alcohol use:  Yes     Alcohol/week: 35.0 standard drinks     Types: 35 Cans of beer per week     Comment: hx of alcoholism; States he drinks 5 beers per day; pt has D/T's    Drug use: Not Currently     Types: Cocaine, Marijuana (Weed)     Comment: Quit cocaine at age 24, quit marijuana at age 24    Sexual activity: Not on file   Other Topics Concern    Not on file   Social History Narrative    Not on file     Social Determinants of Health     Financial Resource Strain: Not on file   Food Insecurity: Not on file   Transportation Needs: Not on file   Physical Activity: Not on file   Stress: Not on file   Social Connections: Not on file   Intimate Partner Violence: Not on file   Housing Stability: Not on file       Family History:     Family History   Problem Relation Age of Onset    Other Mother         mental health       Medical Decision Making:   I have independently reviewed/ordered the following labs:    CBC with Differential:   Recent Labs     08/30/22  0434 08/31/22  0442   WBC 6.0 5.6   HGB 12.4* 12.6*   HCT 36.5* 36.9*    193       BMP:  Recent Labs     08/30/22  0434 08/30/22  1337 08/31/22  0442 08/31/22  1453   *   < > 127* 128*   K 4.2  --  3.8  --    CL 91*  --  93*  --    CO2 23  --  24  --    BUN 11  --  17  --    CREATININE <0.40*  --  0.46*  --     < > = values in this interval not displayed. Hepatic Function Panel:   No results for input(s): PROT, LABALBU, BILIDIR, IBILI, BILITOT, ALKPHOS, ALT, AST in the last 72 hours. No results for input(s): RPR in the last 72 hours. No results for input(s): HIV in the last 72 hours. No results for input(s): BC in the last 72 hours. Lab Results   Component Value Date/Time    CREATININE 0.46 08/31/2022 04:42 AM    GLUCOSE 91 08/31/2022 04:42 AM       Detailed results: Thank you for allowing us to participate in the care of this patient. Please call with questions. This note is created with the assistance of a speech recognition program.  While intending to generate adocument that actually reflects the content of the visit, the document can still have some errors including those of syntax and sound a like substitutions which may escape proof reading. It such instances, actual meaningcan be extrapolated by contextual diversion.     Sandra Weller MD  Office: [phone]  Perfect serve / office [phone]

## 2022-08-31 NOTE — PROGRESS NOTES
Home Oxygen Evaluation    Room air SpO2 at Rest = 92%    Room air with exercise/exertion = 93%    SpO2 on prescribed O2 level at  0  LPM  at rest = N/A     with exercise/exertion = N/A    Nocturnal Oximetry with patient on room air recommended if the resting SpO2 is 89% to 95%.  (Requires additional order)

## 2022-08-31 NOTE — PROGRESS NOTES
RN went over discharge instructions with patient at bedside. All questions answered. Patient was picked up at [phone].

## 2022-08-31 NOTE — CARE COORDINATION
ONGOING DISCHARGE PLAN:    COVID +, Remains Afebrile, RA. Pt. Did NOT Qualify for Home Oxygen. Patient is alert and oriented x4. Spoke with patient regarding discharge plan and patient confirms that plan is still to DC to home w/ no needs. Denies VNS. Remains on PO Zithromax/Decadron & PO Librium. ID/Pulm continue to follow. Anticipate DC today. Will continue to follow for additional discharge needs.     Electronically signed by My Theodore RN on 8/31/2022 at 3:44 PM

## 2022-08-31 NOTE — PROGRESS NOTES
Physical Therapy        Physical Therapy Cancel Note      DATE: 2022    NAME: Jeanne Fields  MRN: [id number]   : 1965      Patient not seen this date for Physical Therapy due to:    Cx per RN JAYLEN Mathis reports pt will tell you no. RN also reports pt has been independent in room and will be going home later today.       Electronically signed by Geoff Love PTA on 2022 at 4:12 PM

## 2022-08-31 NOTE — DISCHARGE SUMMARY
[place] [address] Internal Medicine    Discharge Summary     Patient ID: Chase Rosas  :  1965   MRN: [id number]     ACCOUNT:  [de-identified]   Patient's PCP: JAEL Blanco CNP  Admit Date: 2022   Discharge Date: 2022  Length of Stay: 3  Code Status:  Full Code  Admitting Physician: Jason Calderon MD  Discharge Physician: Oscar Loera MD     Active Discharge Diagnoses:     Primary Problem  Sepsis [facility])      [place] Problems    Diagnosis Date Noted    Lung malignancy ([facility] [C34.90] 2022     Priority: Medium    Cough with hemoptysis [R04.2] 2022     Priority: Medium    Sepsis ([facility] [A41.9] 2022     Priority: Medium    COVID-19 [U07.1] 2022     Priority: Medium    Lung mass [R91.8] 2022     Priority: Medium    Hyponatremia [E87.1] 2022     Priority: Medium    COPD (chronic obstructive pulmonary disease) ([facility] [J44.9] 2022     Priority: Medium    Tobacco dependence [F17.200] 2021    Tachycardia [R00.0] 10/28/2020    Acute exacerbation of chronic obstructive pulmonary disease (COPD) ([facility] [J44.1] 2019    Alcoholism ([facility] [F10.20] 2017       Admission Condition:  fair     Discharged Condition: fair    Hospital Stay:     Hospital Course:  Chase Rosas is a 61 y.o. male who was admitted for the management of Sepsis ([facility] , presented to ER with Hemoptysis, Headache, Chest Pain, and Pharyngitis      53-year-old male history of COPD hypertension alcohol abuse recent diagnosis of lung mass 2 weeks ago presenting with shortness of breath and hemoptysis  Noted to be COVID-19 positive  Somewhat hypoxic requiring 2 L nasal cannula  Admitted for management of sepsis concern for pneumonia in the setting of lung malignancy    Sepsis suspected superadded bacterial pneumonia chest x-ray not suggestive of COVID-19 pneumonia-started on azithromycin Rocephin, pulmonology consult switched to p.o. antibiotics  COVID-19 infection with hypoxia-started on Decadron, ID consulted  Hyponatremia sodium 122  Elevated D-dimer-check CT PE, patient on treatment dose Lovenox meanwhile  Lung mass-oncology consulted       8/31  Hypoxia resolved  Tachycardia resolved  Na improved to 128  Patient refusing CT PE despite detailed discussion-wants to follow-up only with Dr. Addis Ghotra      Significant therapeutic interventions: As above    Significant Diagnostic Studies:   Labs / Micro:    Lab Results   Component Value Date    WBC 5.6 08/31/2022    HGB 12.6 (L) 08/31/2022    HCT 36.9 (L) 08/31/2022    .7 (H) 08/31/2022     08/31/2022          Lab Results   Component Value Date/Time     08/31/2022 04:42 AM    K 3.8 08/31/2022 04:42 AM    CL 93 08/31/2022 04:42 AM    CO2 24 08/31/2022 04:42 AM    BUN 17 08/31/2022 04:42 AM    CREATININE 0.46 08/31/2022 04:42 AM    GLUCOSE 91 08/31/2022 04:42 AM    CALCIUM 9.0 08/31/2022 04:42 AM          Radiology:    XR CHEST PORTABLE    Result Date: 8/28/2022  EXAMINATION: ONE XRAY VIEW OF THE CHEST 8/28/2022 2:12 pm COMPARISON: CT lung screen July 11, 2022 HISTORY: ORDERING SYSTEM PROVIDED HISTORY: Shortness of Breath TECHNOLOGIST PROVIDED HISTORY: Shortness of Breath Reason for Exam: Shortness of breath and coughing up blood FINDINGS: Lungs are clear focal airspace process. Allowing for difference in modality, lingular spiculated mass reported on previous CT imaging appears to be stable in size concerning for malignancy. No consolidation, pneumothorax or pleural effusion. Cardiac and mediastinal silhouettes unremarkable. No acute osseous abnormality. Telemetry leads overlie the chest.     No acute findings. Redemonstrated lingular mass better evaluated on recent CT study highly suspicious for malignancy. Recommend CT PET follow-up and/or tissue sampling if not recently performed.          Consultations:    Consults:     Final Specialist Recommendations/Findings: IP CONSULT TO INTERNAL MEDICINE  IP CONSULT TO INTERNAL MEDICINE  IP CONSULT TO PULMONOLOGY  IP CONSULT TO SOCIAL WORK  IP CONSULT TO PULMONOLOGY  IP CONSULT TO SOCIAL WORK  IP CONSULT TO INTERVENTIONAL RADIOLOGY  IP CONSULT TO INFECTIOUS DISEASES  IP CONSULT TO HEM/ONC      The patient was seen and examined on day of discharge and this discharge summary is in conjunction with any daily progress note from day of discharge. Discharge plan:     Disposition: Home      Instructions to Patient: Keep follow-up appointments      Requiring Further Evaluation/Follow Up POST HOSPITALIZATION/Incidental Findings:     Physician Follow Up:     JAEL Mina - CNP  [address]  [facility] AT THE [place] [address]  [phone]    Follow up      Shaw Rivera, [address] [facility] [address] [facility] [id number] [phone]    Follow up      Valerie Benito, [address] [facility] [address] [facility]  [phone]    Follow up         Activity: Resume as directed    Discharge Medications:      Medication List        START taking these medications      budesonide-formoterol 160-4.5 MCG/ACT Aero  Commonly known as: SYMBICORT  Inhale 2 puffs into the lungs in the morning and 2 puffs in the evening.      dexamethasone 6 MG tablet  Commonly known as: DECADRON  Take 1 tablet by mouth daily for 8 doses     sodium chloride 1 g tablet  Take 1 tablet by mouth 2 times daily (with meals)            CHANGE how you take these medications      metoprolol succinate 100 MG extended release tablet  Commonly known as: TOPROL XL  Take 1 tablet by mouth 2 times daily  What changed:   how much to take  how to take this  when to take this  additional instructions            CONTINUE taking these medications      * albuterol (2.5 MG/3ML) 0.083% nebulizer solution  Commonly known as: PROVENTIL  INHALE 3 MLS (ONE VIAL) INTO THE LUNGS EVERY 6 HOURS VIA NEBULIZER AS NEEDED FOR WHEEZING     * albuterol sulfate  (90 Base) MCG/ACT inhaler  Commonly known as: ProAir HFA  Inhale 2 puffs into the lungs every 6 hours as needed for Wheezing     lisinopril 10 MG tablet  Commonly known as: PRINIVIL;ZESTRIL  Take 1 tablet by mouth daily     Trelegy Ellipta 200-62.5-25 MCG/INH Aepb  Generic drug: Fluticasone-Umeclidin-Vilant  Inhale 1 puff into the lungs Daily           * This list has 2 medication(s) that are the same as other medications prescribed for you. Read the directions carefully, and ask your doctor or other care provider to review them with you. ASK your doctor about these medications      amoxicillin-clavulanate 875-125 MG per tablet  Commonly known as: AUGMENTIN  Take 1 tablet by mouth every 12 hours for 2 doses  Ask about: Should I take this medication?     azithromycin 500 MG tablet  Commonly known as: ZITHROMAX  Take 1 tablet by mouth daily for 3 doses  Ask about: Should I take this medication? Where to Get Your Medications        These medications were sent to [facility], [address]  [address] [facility], [place] [address]      Phone: [phone]   amoxicillin-clavulanate 875-125 MG per tablet  azithromycin 500 MG tablet  budesonide-formoterol 160-4.5 MCG/ACT Aero  dexamethasone 6 MG tablet  metoprolol succinate 100 MG extended release tablet  sodium chloride 1 g tablet         Time Spent on discharge is  35 mins in patient examination, evaluation, counseling as well as medication reconciliation, prescriptions for required medications, discharge plan and follow up. Electronically signed by   Lei Carmen MD  8/31/2022  3:38 PM      Thank you Dr. Margarita Slaughter, APRN - CNP for the opportunity to be involved in this patient's care.

## 2022-08-31 NOTE — PLAN OF CARE
Problem: Discharge Planning  Goal: Discharge to home or other facility with appropriate resources  Outcome: Progressing  Flowsheets (Taken 8/31/2022 0451)  Discharge to home or other facility with appropriate resources:   Identify barriers to discharge with patient and caregiver   Arrange for needed discharge resources and transportation as appropriate   Refer to discharge planning if patient needs post-hospital services based on physician order or complex needs related to functional status, cognitive ability or social support system   Identify discharge learning needs (meds, wound care, etc)  Note: Patient eager to discharge home with no needs; Possible discharge 8/31/22. HR and O2 saturation have been controlled throughout this shift. Problem: Pain  Goal: Verbalizes/displays adequate comfort level or baseline comfort level  Outcome: Progressing  Flowsheets (Taken 8/31/2022 0451)  Verbalizes/displays adequate comfort level or baseline comfort level:   Encourage patient to monitor pain and request assistance   Assess pain using appropriate pain scale   Administer analgesics based on type and severity of pain and evaluate response   Implement non-pharmacological measures as appropriate and evaluate response  Note: Patient verbalizes adequate pain control during this shift. PRN medication available and given appropriately; once this shift. Patient resting comfortably without guarding or grimacing. Problem: Safety - Adult  Goal: Free from fall injury  Outcome: Progressing  Flowsheets (Taken 8/31/2022 0451)  Free From Fall Injury:   Instruct family/caregiver on patient safety   Based on caregiver fall risk screen, instruct family/caregiver to ask for assistance with transferring infant if caregiver noted to have fall risk factors  Note: Patient remains free from falls during this shift. Bed in lowest position, side rails up x2, call light/personal belongings/side table within reach.  Patient calls out appropriately with call light for assistance with ambulation.        Problem: Cardiovascular - Adult  Goal: Maintains optimal cardiac output and hemodynamic stability  Outcome: Progressing  Flowsheets (Taken 8/31/2022 0451)  Maintains optimal cardiac output and hemodynamic stability:   Monitor blood pressure and heart rate   Administer vasoactive medications as ordered     Problem: Respiratory - Adult  Goal: Achieves optimal ventilation and oxygenation  Outcome: Progressing  Flowsheets (Taken 8/31/2022 0451)  Achieves optimal ventilation and oxygenation:   Assess for changes in respiratory status   Assess and instruct to report shortness of breath or any respiratory difficulty   Encourage broncho-pulmonary hygiene including cough, deep breathe, incentive spirometry

## 2022-09-01 ENCOUNTER — CARE COORDINATION (OUTPATIENT)
Dept: CASE MANAGEMENT | Age: 57
End: 2022-09-01

## 2022-09-01 DIAGNOSIS — R04.2 COUGH WITH HEMOPTYSIS: Primary | ICD-10-CM

## 2022-09-01 NOTE — CARE COORDINATION
[facility] Transitions Initial Follow Up Call    Call within 2 business days of discharge: Yes    Patient: Nette Valdez   Patient : 1965   MRN: [id number]    Reason for Admission: hemoptysis, left lung mass, hx COPD, incidental covid positive finding  Discharge Date: 22   RARS: Readmission Risk Score: 11.1      Last Discharge [facility]       Date Complaint Diagnosis Description Type Department Provider    22 Hemoptysis; Headache; Chest Pain; Pharyngitis Cough with hemoptysis . .. ED to Hosp-Admission (Discharged) (ADMITTED) ST ICU Julieth Hernandez MD; Kiley Rodríguez. .. Facility:[facility]    Non-face-to-face services provided:  Obtained and reviewed discharge summary and/or continuity of care documents    Spoke with: patient, ONC/lung navigator, pharmacy    Patient reports no further hemoptysis. Has his usual SOB as related to his COPD. Denies any other covid related symptoms - incidental finding apparently. Patient says is back at his baseline. Does not have home oxygen. Continues to cough & expectorate whitish mucus which he says is typical for him. Covid positive - 5 day quarantine. He says he will need a return to work note when able. Patient is a . He plans to see his PCP to obtain clearance to return to work. Patient has agreed to have bronch to further evaluate etiology of hemoptysis - lung mass. He is being followed by oncology navigator, Cheri Waterman. Communicated with Emerald Carey - she is continuing to follow - Care Transitions will sign off - episode resolved.     Plan for next call: f/u per [address] [facility] Transitions 24 Hour Call    Do you have a copy of your discharge instructions?: Yes  Do you have all of your prescriptions and are they filled?: Yes (Comment: all medications being delivered to patient today - confirmed by pharmacy)  Have you been contacted by a [facility] Pharmacist?: No  Have you scheduled your follow up appointment?:  (Comment: plans to schedule with PCP)  Care Transitions Interventions         Was this an external facility discharge? No   Challenges to be reviewed by the provider   Additional needs identified to be addressed with provider: No  none             Method of communication with provider : none    Advance Care Planning:   Does patient have an Advance Directive: not on file. Care Transition Nurse contacted the patient by telephone to perform post hospital discharge assessment. Verified name and  with patient as identifiers. Provided introduction to self, and explanation of the CTN role. CTN reviewed discharge instructions, medical action plan and red flags with patient who verbalized understanding. Patient given an opportunity to ask questions and does not have any further questions or concerns at this time. Were discharge instructions available to patient? Yes. Reviewed appropriate site of care based on symptoms and resources available to patient including: PCP  Specialist  CTN + ONC Navigator . The patient agrees to contact the PCP office for questions related to their healthcare. Medication reconciliation was performed with patient, who verbalizes understanding of administration of home medications. Was patient discharged with a pulse oximeter? no    CTN provided contact information. No further follow-up call indicated since patient is being followed by Mikala Jimenez.   Plan for next call:  f/u per ONC navigator       Follow Up  Future Appointments   Date Time Provider Lona Camarena   10/10/2022 11:30 AM Anna Escalante MD Resp Spec Emily Small RN

## 2022-09-02 ENCOUNTER — TELEPHONE (OUTPATIENT)
Dept: CASE MANAGEMENT | Age: 57
End: 2022-09-02

## 2022-09-02 ENCOUNTER — TELEPHONE (OUTPATIENT)
Dept: PULMONOLOGY | Age: 57
End: 2022-09-02

## 2022-09-02 DIAGNOSIS — R91.8 MASS OF LEFT LUNG: Primary | ICD-10-CM

## 2022-09-07 RX ORDER — SODIUM CHLORIDE, SODIUM LACTATE, POTASSIUM CHLORIDE, CALCIUM CHLORIDE 600; 310; 30; 20 MG/100ML; MG/100ML; MG/100ML; MG/100ML
1000 INJECTION, SOLUTION INTRAVENOUS CONTINUOUS
Status: CANCELLED | OUTPATIENT
Start: 2022-09-07

## 2022-09-07 NOTE — TELEPHONE ENCOUNTER
Called and got pt scheduled for bronch for the 15th pt was advised and everything was advised to patient also the preparation insturctions

## 2022-09-07 NOTE — DISCHARGE INSTRUCTIONS
SURGERY:     Before surgery, you can play an important role in your own health. Because skin is not sterile, we need to be sure that your skin is as free of germs as possible before surgery by carefully washing before surgery. Preparing or prepping skin before surgery can reduce the risk of a surgical site infection.   Do not shave the area of your body where your surgery will be performed unless you received specific permission from your physician. You will need to shower at home the night before surgery and the morning of surgery with a special soap called chlorhexidine gluconate (CHG*). *Not to be used by people allergic to Chlorhexidine Gluconate (CHG). Following these instructions will help you be sure that your skin is clean before surgery. Instructions on cleaning your skin before surgery: The night before your surgery: You will need to shower with warm water (not hot) and the CHG soap. Use a clean wash cloth and a clean towel. Have clean clothes available to put on after the shower. First wash your hair with regular shampoo. Rinse your hair and body thoroughly to remove the shampoo. Wash your face with your regular soap or water only. Thoroughly rinse your body with warm water from the neck down. Turn water off to prevent rinsing the soap off too soon. With a clean wet washcloth and half of the CHG soap in the bottle, lather your entire body from the neck down. Do not use CHG soap near your eyes or ears to avoid injury to those areas. Wash thoroughly, paying special attention to the area where your surgery will be performed. Wash your body gently for five (5) minutes. Avoid scrubbing your skin too hard. Turn the water back on and rinse your body thoroughly. Pat yourself dry with a clean, soft towel. Do not apply lotion, cream or powder. Dress with clean freshly washed clothes.     The morning of surgery:    Repeat shower following steps above  - using remaining half of CHG soap in bottle. If you have any questions, call the Pre-Admission Testing Unit at [phone]. Day of Surgery/Procedure    As a patient at [facility] you can expect quality medical and nursing care that is centered on your individual needs. Our goal is to make your surgical experience as comfortable as possible  . Directions to the [address] [place] [address] is located at [address] [place], [address] Please pull into the Emergency [address] parking lot (Entrance B) and park in that lot. We also have additional parking across the street. You will enter the facility following the [address] sign. Please stop at the reception desk where you will be checked in by the staff. If you have any questions please call [phone]. Transportation after your procedure. You will need a friend or family member to drive you home after your procedure. Your  must be 25years of age or older and able to sign off on your discharge instructions. Taxi cabs or any form of public transportation is not acceptable. It is preferable that the friend or family member stay at the hospital throughout your procedure. Someone must remain at home with you for the first 24 hours after your surgery if you receive anesthesia or sedation. If you do not have someone to stay with you, your procedure may be cancelled. Patient Instructions    If you are having any type of anesthesia you are to have nothing to eat or drink after midnight the night before your surgery. This includes gum, mints, water or smoking or chewing tobacco.  The only exception to this is a small sip of water to take with any morning dose of heart, blood pressure, or seizure medications. Bring a list of all medications you take, along with the dose of the medications and how often you take it.   If more convenient bring the pharmacy bottles in a zip lock bag. Please shower the night before and the morning of surgery with an antibacterial soap. Please use the wipes given to you the night before your surgery after your shower. Unless otherwise told by your physician, please do not shave legs or any part of your body below your neck the night before or day of your surgery. You may shave your face or neck. Brush your teeth but do not swallow water. Bring your inhaler if you are currently using one. Bring your eyeglasses and case with you. No contacts are to be worn the day of surgery. You also may bring your hearing aids. Bring your blood band if one has been given to you. Please do not close the clasp. If you are on C-PAP or Bi-PAP at home and plan on staying in the hospital overnight for your surgery please bring the machine with you. Do not wear any jewelry or body piercings day of surgery. Also, NO lotion, perfume or deodorant to be used the day of surgery. Do not bring any valuables, such as jewelry, cash or credit cards. If you are staying overnight with us, please bring a SMALL bag of personal items. We cannot accommodate large items, like suitcases. Please wear loose, comfortable clothing. If you are potentially going to have a cast or brace bring clothing that will fit over them. In case of illness - If you have cold or flu like symptoms (high fever, runny nose, sore throat, cough, etc.) rash, nausea, vomiting, loose stools, and/or recent contact with someone who has a contagious disease (chicken pox, measles, etc.) Please call your doctor before coming to the hospital.      If your child is having surgery please make arrangements for any other children to be cared for at home on the day of surgery.   Other children are not permitted in recovery room and we want you to be able to spend time with the patient. If other arrangements are not available then we suggest that you have a second adult to stay in the waiting room.       If you have any other questions regarding your procedure or the day of surgery, please call [phone], or [phone]

## 2022-09-09 ENCOUNTER — HOSPITAL ENCOUNTER (OUTPATIENT)
Dept: PREADMISSION TESTING | Age: 57
Discharge: HOME | End: 2022-09-13

## 2022-09-09 ENCOUNTER — OFFICE VISIT (OUTPATIENT)
Dept: FAMILY MEDICINE CLINIC | Age: 57
End: 2022-09-09
Payer: COMMERCIAL

## 2022-09-09 VITALS
SYSTOLIC BLOOD PRESSURE: 112 MMHG | RESPIRATION RATE: 24 BRPM | TEMPERATURE: 97.8 F | WEIGHT: 204.4 LBS | DIASTOLIC BLOOD PRESSURE: 80 MMHG | OXYGEN SATURATION: 98 % | HEART RATE: 96 BPM | BODY MASS INDEX: 26.97 KG/M2

## 2022-09-09 DIAGNOSIS — U07.1 COVID-19 VIRUS INFECTION: ICD-10-CM

## 2022-09-09 DIAGNOSIS — E87.1 HYPONATREMIA: ICD-10-CM

## 2022-09-09 DIAGNOSIS — Z09 HOSPITAL DISCHARGE FOLLOW-UP: ICD-10-CM

## 2022-09-09 DIAGNOSIS — I10 ESSENTIAL HYPERTENSION: ICD-10-CM

## 2022-09-09 DIAGNOSIS — R04.2 HEMOPTYSIS: Primary | ICD-10-CM

## 2022-09-09 DIAGNOSIS — R91.8 LUNG MASS: ICD-10-CM

## 2022-09-09 DIAGNOSIS — J44.9 CHRONIC OBSTRUCTIVE PULMONARY DISEASE, UNSPECIFIED COPD TYPE (HCC): ICD-10-CM

## 2022-09-09 DIAGNOSIS — G47.00 INSOMNIA, UNSPECIFIED TYPE: ICD-10-CM

## 2022-09-09 PROCEDURE — 99214 OFFICE O/P EST MOD 30 MIN: CPT | Performed by: NURSE PRACTITIONER

## 2022-09-09 PROCEDURE — 1111F DSCHRG MED/CURRENT MED MERGE: CPT | Performed by: NURSE PRACTITIONER

## 2022-09-09 RX ORDER — TRAZODONE HYDROCHLORIDE 50 MG/1
50 TABLET ORAL NIGHTLY
Qty: 90 TABLET | Refills: 0 | Status: ON HOLD | OUTPATIENT
Start: 2022-09-09 | End: 2022-09-14

## 2022-09-09 SDOH — ECONOMIC STABILITY: FOOD INSECURITY: WITHIN THE PAST 12 MONTHS, YOU WORRIED THAT YOUR FOOD WOULD RUN OUT BEFORE YOU GOT MONEY TO BUY MORE.: NEVER TRUE

## 2022-09-09 SDOH — ECONOMIC STABILITY: FOOD INSECURITY: WITHIN THE PAST 12 MONTHS, THE FOOD YOU BOUGHT JUST DIDN'T LAST AND YOU DIDN'T HAVE MONEY TO GET MORE.: NEVER TRUE

## 2022-09-09 ASSESSMENT — PATIENT HEALTH QUESTIONNAIRE - PHQ9
8. MOVING OR SPEAKING SO SLOWLY THAT OTHER PEOPLE COULD HAVE NOTICED. OR THE OPPOSITE, BEING SO FIGETY OR RESTLESS THAT YOU HAVE BEEN MOVING AROUND A LOT MORE THAN USUAL: 0
SUM OF ALL RESPONSES TO PHQ9 QUESTIONS 1 & 2: 3
1. LITTLE INTEREST OR PLEASURE IN DOING THINGS: 0
10. IF YOU CHECKED OFF ANY PROBLEMS, HOW DIFFICULT HAVE THESE PROBLEMS MADE IT FOR YOU TO DO YOUR WORK, TAKE CARE OF THINGS AT HOME, OR GET ALONG WITH OTHER PEOPLE: 0
4. FEELING TIRED OR HAVING LITTLE ENERGY: 3
3. TROUBLE FALLING OR STAYING ASLEEP: 3
2. FEELING DOWN, DEPRESSED OR HOPELESS: 3
7. TROUBLE CONCENTRATING ON THINGS, SUCH AS READING THE NEWSPAPER OR WATCHING TELEVISION: 3
5. POOR APPETITE OR OVEREATING: 0
SUM OF ALL RESPONSES TO PHQ QUESTIONS 1-9: 12
6. FEELING BAD ABOUT YOURSELF - OR THAT YOU ARE A FAILURE OR HAVE LET YOURSELF OR YOUR FAMILY DOWN: 0
9. THOUGHTS THAT YOU WOULD BE BETTER OFF DEAD, OR OF HURTING YOURSELF: 0
SUM OF ALL RESPONSES TO PHQ QUESTIONS 1-9: 12

## 2022-09-09 ASSESSMENT — SOCIAL DETERMINANTS OF HEALTH (SDOH): HOW HARD IS IT FOR YOU TO PAY FOR THE VERY BASICS LIKE FOOD, HOUSING, MEDICAL CARE, AND HEATING?: HARD

## 2022-09-09 NOTE — PROGRESS NOTES
Post-Discharge Transitional Care  Follow Up      Aishwarya Vicente   YOB: 1965    Date of Office Visit:  9/9/2022  Date of Hospital Admission: 8/28/22  Date of Hospital Discharge: 8/31/22  Risk of hospital readmission (high >=14%. Medium >=10%) :Readmission Risk Score: 11.1      Care management risk score Rising risk (score 2-5) and Complex Care (Scores >=6): No Risk Score On File     Non face to face  following discharge, date last encounter closed (first attempt may have been earlier): 09/01/2022    Call initiated 2 business days of discharge: Yes    ASSESSMENT/PLAN:   Hemoptysis / Lung mass  - follow up with Dr. Yamel Glass as scheduled for Endoscopy    COVID-19 virus infection  - cont supportive therapy     Hyponatremia  -     Basic Metabolic Panel; Future  - cont sodium chloride supplement as prescribed. Chronic obstructive pulmonary disease, unspecified COPD type ([facility] [address]  - cont current therapy and follow up with pulmonary as scheduled     Essential hypertension  - cont current bp therapy     Insomnia, unspecified type  -     start traZODone (DESYREL) 50 MG tablet; Take 1 tablet by mouth nightly, Disp-90 tablet, R-0Normal    Medical Decision Making: high complexity  Return in about 4 months (around 1/9/2023) for HTN hyponatremia, COPD. lung mass . Subjective:   HPI:  Follow up of Hospital problems/diagnosis(es):     61year old male presents with follow s/p hospital discharge for hemoptysis, lung mass Covid infection, hyponatremia, COPD exacerbation HTN and insomnia with medication refills. Was admitted a week ago for coughing up blood and blood tests showed low sodium level and positive covid. CXR showed lung mass. States he was scheduled for lung biopsy due to lung mass but declined. He is scheduled for Endoscopy by Dr. Yamel Glass. Currently he is on dual therapy for COPD and states he is doing well after finishing zpak. Bp is stable with dual therapy.  States he has hard time falling asleep and would   Like something for sleep. Denies fever chills chest pain or nv abd pain. Pt is a current smoer and has no desire to quit      Interval history/Current status: stable     Patient Active Problem List   Diagnosis    Cellulitis of b/l lower extremity    Cellulitis of lower extremity    Alcoholism ([facility] [address]    Essential hypertension    Hyperkeratosis of b/l Soles    Alcohol withdrawal (HCC)    Tinea pedis of both feet    Suicidal ideation    Dyspnea    Acute exacerbation of chronic obstructive pulmonary disease (COPD) ([facility] [address]    Gastroesophageal reflux disease without esophagitis    Cigarette smoker    Avascular necrosis of femoral head (HCC)    Tachycardia    Stage 3 severe COPD by GOLD classification ([facility] [address]    Tobacco dependence    Chronic obstructive pulmonary disease (HCC)    Lung malignancy ([facility] [address]    Cough with hemoptysis    Sepsis ([facility] [address]    COVID-19    Lung mass    Hyponatremia       Medications listed as ordered at the time of discharge from hospital     Medication List            Accurate as of September 9, 2022 11:59 PM. If you have any questions, ask your nurse or doctor.                 START taking these medications      traZODone 50 MG tablet  Commonly known as: DESYREL  Take 1 tablet by mouth nightly  Started by: JAEL Hayes - KIMBERLY            CONTINUE taking these medications      * albuterol (2.5 MG/3ML) 0.083% nebulizer solution  Commonly known as: PROVENTIL  INHALE 3 MLS (ONE VIAL) INTO THE LUNGS EVERY 6 HOURS VIA NEBULIZER AS NEEDED FOR WHEEZING     * albuterol sulfate  (90 Base) MCG/ACT inhaler  Commonly known as: ProAir HFA  Inhale 2 puffs into the lungs every 6 hours as needed for Wheezing     lisinopril 10 MG tablet  Commonly known as: PRINIVIL;ZESTRIL  Take 1 tablet by mouth daily     metoprolol succinate 100 MG extended release tablet  Commonly known as: TOPROL XL  Take 1 tablet by mouth 2 times daily     Trelegy Ellipta 200-62.5-25 MCG/INH Aepb  Generic drug: Fluticasone-Umeclidin-Vilant  Inhale 1 puff into the lungs Daily           * This list has 2 medication(s) that are the same as other medications prescribed for you. Read the directions carefully, and ask your doctor or other care provider to review them with you. STOP taking these medications      dexamethasone 6 MG tablet  Commonly known as: DECADRON  Stopped by: JAEL Kapoor CNP     sodium chloride 1 g tablet  Stopped by: JAEL Kapoor CNP               Where to Get Your Medications        These medications were sent to [address] [place] [address] [address] [place] [address]  [address] [place] [address] [facility] [address] [address]      Phone: [phone]   traZODone 50 MG tablet           Medications marked \"taking\" at this time  Outpatient Medications Marked as Taking for the 9/9/22 encounter (Office Visit) with JAEL Kapoor CNP   Medication Sig Dispense Refill    traZODone (DESYREL) 50 MG tablet Take 1 tablet by mouth nightly 90 tablet 0    metoprolol succinate (TOPROL XL) 100 MG extended release tablet Take 1 tablet by mouth 2 times daily 30 tablet 3    lisinopril (PRINIVIL;ZESTRIL) 10 MG tablet Take 1 tablet by mouth daily 90 tablet 0    Fluticasone-Umeclidin-Vilant (TRELEGY ELLIPTA) 200-62.5-25 MCG/INH AEPB Inhale 1 puff into the lungs Daily 1 each 2    albuterol sulfate HFA (PROAIR HFA) 108 (90 Base) MCG/ACT inhaler Inhale 2 puffs into the lungs every 6 hours as needed for Wheezing 1 each 1    albuterol (PROVENTIL) (2.5 MG/3ML) 0.083% nebulizer solution INHALE 3 MLS (ONE VIAL) INTO THE LUNGS EVERY 6 HOURS VIA NEBULIZER AS NEEDED FOR WHEEZING 360 mL 0        Medications patient taking as of now reconciled against medications ordered at time of hospital discharge: Yes    A comprehensive review of systems was negative except for what was noted in the HPI.     Objective:    /80 (Site: Right Upper Arm, Position: Sitting, Cuff Size: Medium Adult)   Pulse 96   Temp 97.8 °F (36.6 °C) (Oral)   Resp 24   Wt 204 lb 6.4 oz (92.7 kg)   SpO2 98%   BMI 26.97 kg/m²   Constitutional: obesity. he is oriented to person, place, and time. he appears well-developed and well-nourished. No distress. HENT:   Mouth/Throat: No oropharyngeal exudate. Eyes: Conjunctivae are normal. Pupils are equal, round, and reactive to light. No scleral icterus. Neck: No JVD present. No thyromegaly present. Cardiovascular: Normal rate, regular rhythm and normal heart sounds. Exam reveals no gallop and no friction rub. No murmur heard. Pulmonary/Chest: diminished lung sounds throughout. No respiratory distress. he has no wheezes. Abdominal: Soft. Bowel sounds are normal. he exhibits no distension and no mass. There is no tenderness. There is no rebound and no guarding. Lymphadenopathy:   he has no cervical adenopathy. Neurological: he is alert and oriented to person, place, and time. No cranial nerve deficit. he exhibits normal muscle tone. Skin: he is not diaphoretic. An electronic signature was used to authenticate this note.   --Ric Hoyt, JAEL - CNP

## 2022-09-09 NOTE — TELEPHONE ENCOUNTER
Nurse melanie called and patient tested positive for covid. They cancelled the Bronch. It needs to be rescheduled for after September 29th. ALSO . Vesna Martinez he states he has NO ONE to stay with him for 24 to 48 hours after the procedure so he would need a direct admit. Please discuss with Dr. Nkechi Rogers and arrange. Thanks!

## 2022-09-13 ENCOUNTER — HOSPITAL ENCOUNTER (INPATIENT)
Age: 57
LOS: 6 days | Discharge: HOME | DRG: 426 | End: 2022-09-19
Attending: EMERGENCY MEDICINE | Admitting: FAMILY MEDICINE
Payer: COMMERCIAL

## 2022-09-13 ENCOUNTER — APPOINTMENT (OUTPATIENT)
Dept: CT IMAGING | Age: 57
DRG: 426 | End: 2022-09-13
Payer: COMMERCIAL

## 2022-09-13 ENCOUNTER — APPOINTMENT (OUTPATIENT)
Dept: GENERAL RADIOLOGY | Age: 57
DRG: 426 | End: 2022-09-13
Payer: COMMERCIAL

## 2022-09-13 ENCOUNTER — TELEPHONE (OUTPATIENT)
Dept: PULMONOLOGY | Age: 57
End: 2022-09-13

## 2022-09-13 ENCOUNTER — TELEPHONE (OUTPATIENT)
Dept: OTHER | Facility: CLINIC | Age: 57
End: 2022-09-13

## 2022-09-13 DIAGNOSIS — L03.119 CELLULITIS OF LOWER EXTREMITY, UNSPECIFIED LATERALITY: ICD-10-CM

## 2022-09-13 DIAGNOSIS — J44.1 COPD EXACERBATION (HCC): Primary | ICD-10-CM

## 2022-09-13 DIAGNOSIS — E87.1 HYPONATREMIA: ICD-10-CM

## 2022-09-13 DIAGNOSIS — U07.1 COVID-19: ICD-10-CM

## 2022-09-13 LAB
ABSOLUTE EOS #: 0.1 K/UL (ref 0–0.4)
ABSOLUTE LYMPH #: 1.1 K/UL (ref 1–4.8)
ABSOLUTE MONO #: 0.7 K/UL (ref 0.1–1.3)
ALBUMIN SERPL-MCNC: 3.4 G/DL (ref 3.5–5.2)
ALP BLD-CCNC: 127 U/L (ref 40–129)
ALT SERPL-CCNC: 32 U/L (ref 5–41)
ANION GAP SERPL CALCULATED.3IONS-SCNC: 13 MMOL/L (ref 9–17)
AST SERPL-CCNC: 25 U/L
BASOPHILS # BLD: 0 % (ref 0–2)
BASOPHILS ABSOLUTE: 0 K/UL (ref 0–0.2)
BILIRUB SERPL-MCNC: 1.2 MG/DL (ref 0.3–1.2)
BILIRUBIN DIRECT: 0.5 MG/DL
BILIRUBIN, INDIRECT: 0.7 MG/DL (ref 0–1)
BUN BLDV-MCNC: 6 MG/DL (ref 6–20)
CALCIUM SERPL-MCNC: 9.1 MG/DL (ref 8.6–10.4)
CHLORIDE BLD-SCNC: 86 MMOL/L (ref 98–107)
CO2: 21 MMOL/L (ref 20–31)
CREAT SERPL-MCNC: 0.42 MG/DL (ref 0.7–1.2)
EOSINOPHILS RELATIVE PERCENT: 1 % (ref 0–4)
GFR AFRICAN AMERICAN: >60 ML/MIN
GFR NON-AFRICAN AMERICAN: >60 ML/MIN
GFR SERPL CREATININE-BSD FRML MDRD: ABNORMAL ML/MIN/{1.73_M2}
GLUCOSE BLD-MCNC: 76 MG/DL (ref 70–99)
HCT VFR BLD CALC: 40.2 % (ref 41–53)
HEMOGLOBIN: 14 G/DL (ref 13.5–17.5)
INR BLD: 0.9
LYMPHOCYTES # BLD: 9 % (ref 24–44)
MAGNESIUM: 1.9 MG/DL (ref 1.6–2.6)
MCH RBC QN AUTO: 37.8 PG (ref 26–34)
MCHC RBC AUTO-ENTMCNC: 34.8 G/DL (ref 31–37)
MCV RBC AUTO: 108.6 FL (ref 80–100)
MONOCYTES # BLD: 6 % (ref 1–7)
PDW BLD-RTO: 14 % (ref 11.5–14.9)
PLATELET # BLD: 197 K/UL (ref 150–450)
PMV BLD AUTO: 8.5 FL (ref 6–12)
POTASSIUM SERPL-SCNC: 4.2 MMOL/L (ref 3.7–5.3)
PRO-BNP: 167 PG/ML
PROTHROMBIN TIME: 12.1 SEC (ref 11.8–14.6)
RBC # BLD: 3.7 M/UL (ref 4.5–5.9)
REASON FOR REJECTION: NORMAL
SARS-COV-2, RAPID: DETECTED
SEG NEUTROPHILS: 84 % (ref 36–66)
SEGMENTED NEUTROPHILS ABSOLUTE COUNT: 10.6 K/UL (ref 1.3–9.1)
SODIUM BLD-SCNC: 120 MMOL/L (ref 135–144)
SPECIMEN DESCRIPTION: ABNORMAL
TOTAL PROTEIN: 7 G/DL (ref 6.4–8.3)
TROPONIN, HIGH SENSITIVITY: 12 NG/L (ref 0–22)
TROPONIN, HIGH SENSITIVITY: 15 NG/L (ref 0–22)
WBC # BLD: 12.6 K/UL (ref 3.5–11)
ZZ NTE CLEAN UP: ORDERED TEST: NORMAL
ZZ NTE WITH NAME CLEAN UP: SPECIMEN SOURCE: NORMAL

## 2022-09-13 PROCEDURE — 94640 AIRWAY INHALATION TREATMENT: CPT

## 2022-09-13 PROCEDURE — 6370000000 HC RX 637 (ALT 250 FOR IP): Performed by: INTERNAL MEDICINE

## 2022-09-13 PROCEDURE — 6370000000 HC RX 637 (ALT 250 FOR IP): Performed by: EMERGENCY MEDICINE

## 2022-09-13 PROCEDURE — 6360000002 HC RX W HCPCS: Performed by: EMERGENCY MEDICINE

## 2022-09-13 PROCEDURE — 94664 DEMO&/EVAL PT USE INHALER: CPT

## 2022-09-13 PROCEDURE — 80048 BASIC METABOLIC PNL TOTAL CA: CPT

## 2022-09-13 PROCEDURE — 6370000000 HC RX 637 (ALT 250 FOR IP): Performed by: FAMILY MEDICINE

## 2022-09-13 PROCEDURE — 36415 COLL VENOUS BLD VENIPUNCTURE: CPT

## 2022-09-13 PROCEDURE — 83880 ASSAY OF NATRIURETIC PEPTIDE: CPT

## 2022-09-13 PROCEDURE — 2000000000 HC ICU R&B

## 2022-09-13 PROCEDURE — 71045 X-RAY EXAM CHEST 1 VIEW: CPT

## 2022-09-13 PROCEDURE — 99285 EMERGENCY DEPT VISIT HI MDM: CPT

## 2022-09-13 PROCEDURE — 2580000003 HC RX 258: Performed by: EMERGENCY MEDICINE

## 2022-09-13 PROCEDURE — 83735 ASSAY OF MAGNESIUM: CPT

## 2022-09-13 PROCEDURE — 6360000002 HC RX W HCPCS: Performed by: FAMILY MEDICINE

## 2022-09-13 PROCEDURE — 85025 COMPLETE CBC W/AUTO DIFF WBC: CPT

## 2022-09-13 PROCEDURE — 80076 HEPATIC FUNCTION PANEL: CPT

## 2022-09-13 PROCEDURE — 6360000004 HC RX CONTRAST MEDICATION: Performed by: EMERGENCY MEDICINE

## 2022-09-13 PROCEDURE — 85610 PROTHROMBIN TIME: CPT

## 2022-09-13 PROCEDURE — 71260 CT THORAX DX C+: CPT | Performed by: EMERGENCY MEDICINE

## 2022-09-13 PROCEDURE — 84484 ASSAY OF TROPONIN QUANT: CPT

## 2022-09-13 PROCEDURE — 93005 ELECTROCARDIOGRAM TRACING: CPT | Performed by: FAMILY MEDICINE

## 2022-09-13 PROCEDURE — 87635 SARS-COV-2 COVID-19 AMP PRB: CPT

## 2022-09-13 PROCEDURE — 96374 THER/PROPH/DIAG INJ IV PUSH: CPT

## 2022-09-13 PROCEDURE — 94761 N-INVAS EAR/PLS OXIMETRY MLT: CPT

## 2022-09-13 RX ORDER — POTASSIUM CHLORIDE 7.45 MG/ML
10 INJECTION INTRAVENOUS PRN
Status: DISCONTINUED | OUTPATIENT
Start: 2022-09-13 | End: 2022-09-19 | Stop reason: HOSPADM

## 2022-09-13 RX ORDER — SODIUM CHLORIDE 9 MG/ML
INJECTION, SOLUTION INTRAVENOUS CONTINUOUS
Status: DISCONTINUED | OUTPATIENT
Start: 2022-09-13 | End: 2022-09-18

## 2022-09-13 RX ORDER — LORAZEPAM 2 MG/ML
2 INJECTION INTRAMUSCULAR
Status: DISCONTINUED | OUTPATIENT
Start: 2022-09-13 | End: 2022-09-19 | Stop reason: HOSPADM

## 2022-09-13 RX ORDER — LORAZEPAM 1 MG/1
2 TABLET ORAL
Status: DISCONTINUED | OUTPATIENT
Start: 2022-09-13 | End: 2022-09-19 | Stop reason: HOSPADM

## 2022-09-13 RX ORDER — DOXYCYCLINE 100 MG/1
100 CAPSULE ORAL EVERY 12 HOURS SCHEDULED
Status: DISPENSED | OUTPATIENT
Start: 2022-09-13 | End: 2022-09-18

## 2022-09-13 RX ORDER — METHYLPREDNISOLONE SODIUM SUCCINATE 125 MG/2ML
125 INJECTION, POWDER, LYOPHILIZED, FOR SOLUTION INTRAMUSCULAR; INTRAVENOUS ONCE
Status: COMPLETED | OUTPATIENT
Start: 2022-09-13 | End: 2022-09-13

## 2022-09-13 RX ORDER — LISINOPRIL 10 MG/1
10 TABLET ORAL DAILY
Status: DISCONTINUED | OUTPATIENT
Start: 2022-09-14 | End: 2022-09-19 | Stop reason: HOSPADM

## 2022-09-13 RX ORDER — SODIUM CHLORIDE 0.9 % (FLUSH) 0.9 %
5-40 SYRINGE (ML) INJECTION PRN
Status: DISCONTINUED | OUTPATIENT
Start: 2022-09-13 | End: 2022-09-19 | Stop reason: HOSPADM

## 2022-09-13 RX ORDER — TRAZODONE HYDROCHLORIDE 50 MG/1
50 TABLET ORAL NIGHTLY
Status: DISCONTINUED | OUTPATIENT
Start: 2022-09-13 | End: 2022-09-19 | Stop reason: HOSPADM

## 2022-09-13 RX ORDER — POTASSIUM CHLORIDE 20 MEQ/1
40 TABLET, EXTENDED RELEASE ORAL PRN
Status: DISCONTINUED | OUTPATIENT
Start: 2022-09-13 | End: 2022-09-19 | Stop reason: HOSPADM

## 2022-09-13 RX ORDER — ACETAMINOPHEN 325 MG/1
650 TABLET ORAL EVERY 6 HOURS PRN
Status: DISCONTINUED | OUTPATIENT
Start: 2022-09-13 | End: 2022-09-19 | Stop reason: HOSPADM

## 2022-09-13 RX ORDER — LORAZEPAM 2 MG/ML
3 INJECTION INTRAMUSCULAR
Status: DISCONTINUED | OUTPATIENT
Start: 2022-09-13 | End: 2022-09-19 | Stop reason: HOSPADM

## 2022-09-13 RX ORDER — IPRATROPIUM BROMIDE AND ALBUTEROL SULFATE 2.5; .5 MG/3ML; MG/3ML
1 SOLUTION RESPIRATORY (INHALATION) ONCE
Status: COMPLETED | OUTPATIENT
Start: 2022-09-13 | End: 2022-09-13

## 2022-09-13 RX ORDER — METOPROLOL TARTRATE 5 MG/5ML
5 INJECTION INTRAVENOUS EVERY 6 HOURS PRN
Status: DISCONTINUED | OUTPATIENT
Start: 2022-09-13 | End: 2022-09-19 | Stop reason: HOSPADM

## 2022-09-13 RX ORDER — ONDANSETRON 4 MG/1
4 TABLET, ORALLY DISINTEGRATING ORAL EVERY 8 HOURS PRN
Status: DISCONTINUED | OUTPATIENT
Start: 2022-09-13 | End: 2022-09-19 | Stop reason: HOSPADM

## 2022-09-13 RX ORDER — 0.9 % SODIUM CHLORIDE 0.9 %
80 INTRAVENOUS SOLUTION INTRAVENOUS ONCE
Status: COMPLETED | OUTPATIENT
Start: 2022-09-13 | End: 2022-09-13

## 2022-09-13 RX ORDER — LORAZEPAM 1 MG/1
1 TABLET ORAL
Status: DISCONTINUED | OUTPATIENT
Start: 2022-09-13 | End: 2022-09-19 | Stop reason: HOSPADM

## 2022-09-13 RX ORDER — MAGNESIUM SULFATE 1 G/100ML
1000 INJECTION INTRAVENOUS PRN
Status: DISCONTINUED | OUTPATIENT
Start: 2022-09-13 | End: 2022-09-19 | Stop reason: HOSPADM

## 2022-09-13 RX ORDER — LORAZEPAM 1 MG/1
3 TABLET ORAL
Status: DISCONTINUED | OUTPATIENT
Start: 2022-09-13 | End: 2022-09-19 | Stop reason: HOSPADM

## 2022-09-13 RX ORDER — SODIUM CHLORIDE 0.9 % (FLUSH) 0.9 %
10 SYRINGE (ML) INJECTION EVERY 12 HOURS SCHEDULED
Status: DISCONTINUED | OUTPATIENT
Start: 2022-09-13 | End: 2022-09-19 | Stop reason: HOSPADM

## 2022-09-13 RX ORDER — LORAZEPAM 1 MG/1
4 TABLET ORAL
Status: DISCONTINUED | OUTPATIENT
Start: 2022-09-13 | End: 2022-09-19 | Stop reason: HOSPADM

## 2022-09-13 RX ORDER — ONDANSETRON 2 MG/ML
4 INJECTION INTRAMUSCULAR; INTRAVENOUS EVERY 6 HOURS PRN
Status: DISCONTINUED | OUTPATIENT
Start: 2022-09-13 | End: 2022-09-19 | Stop reason: HOSPADM

## 2022-09-13 RX ORDER — SODIUM CHLORIDE 9 MG/ML
INJECTION, SOLUTION INTRAVENOUS CONTINUOUS
Status: DISCONTINUED | OUTPATIENT
Start: 2022-09-13 | End: 2022-09-14

## 2022-09-13 RX ORDER — IPRATROPIUM BROMIDE AND ALBUTEROL SULFATE 2.5; .5 MG/3ML; MG/3ML
1 SOLUTION RESPIRATORY (INHALATION)
Status: DISCONTINUED | OUTPATIENT
Start: 2022-09-14 | End: 2022-09-14

## 2022-09-13 RX ORDER — SODIUM CHLORIDE 0.9 % (FLUSH) 0.9 %
10 SYRINGE (ML) INJECTION PRN
Status: DISCONTINUED | OUTPATIENT
Start: 2022-09-13 | End: 2022-09-19 | Stop reason: HOSPADM

## 2022-09-13 RX ORDER — METHYLPREDNISOLONE SODIUM SUCCINATE 40 MG/ML
40 INJECTION, POWDER, LYOPHILIZED, FOR SOLUTION INTRAMUSCULAR; INTRAVENOUS EVERY 8 HOURS
Status: DISCONTINUED | OUTPATIENT
Start: 2022-09-13 | End: 2022-09-18

## 2022-09-13 RX ORDER — LORAZEPAM 2 MG/ML
4 INJECTION INTRAMUSCULAR
Status: DISCONTINUED | OUTPATIENT
Start: 2022-09-13 | End: 2022-09-19 | Stop reason: HOSPADM

## 2022-09-13 RX ORDER — BUDESONIDE AND FORMOTEROL FUMARATE DIHYDRATE 160; 4.5 UG/1; UG/1
2 AEROSOL RESPIRATORY (INHALATION) 2 TIMES DAILY
Status: DISCONTINUED | OUTPATIENT
Start: 2022-09-13 | End: 2022-09-13

## 2022-09-13 RX ORDER — BUDESONIDE AND FORMOTEROL FUMARATE DIHYDRATE 160; 4.5 UG/1; UG/1
2 AEROSOL RESPIRATORY (INHALATION) 2 TIMES DAILY
Status: DISCONTINUED | OUTPATIENT
Start: 2022-09-13 | End: 2022-09-14 | Stop reason: SDUPTHER

## 2022-09-13 RX ORDER — METOPROLOL SUCCINATE 50 MG/1
100 TABLET, EXTENDED RELEASE ORAL 2 TIMES DAILY
Status: DISCONTINUED | OUTPATIENT
Start: 2022-09-13 | End: 2022-09-19 | Stop reason: HOSPADM

## 2022-09-13 RX ORDER — ACETAMINOPHEN 650 MG/1
650 SUPPOSITORY RECTAL EVERY 6 HOURS PRN
Status: DISCONTINUED | OUTPATIENT
Start: 2022-09-13 | End: 2022-09-19 | Stop reason: HOSPADM

## 2022-09-13 RX ORDER — ENOXAPARIN SODIUM 100 MG/ML
40 INJECTION SUBCUTANEOUS DAILY
Status: DISCONTINUED | OUTPATIENT
Start: 2022-09-14 | End: 2022-09-19 | Stop reason: HOSPADM

## 2022-09-13 RX ORDER — LORAZEPAM 2 MG/ML
1 INJECTION INTRAMUSCULAR
Status: DISCONTINUED | OUTPATIENT
Start: 2022-09-13 | End: 2022-09-19 | Stop reason: HOSPADM

## 2022-09-13 RX ORDER — SODIUM CHLORIDE 9 MG/ML
INJECTION, SOLUTION INTRAVENOUS PRN
Status: DISCONTINUED | OUTPATIENT
Start: 2022-09-13 | End: 2022-09-19 | Stop reason: HOSPADM

## 2022-09-13 RX ORDER — SODIUM CHLORIDE 0.9 % (FLUSH) 0.9 %
5-40 SYRINGE (ML) INJECTION EVERY 12 HOURS SCHEDULED
Status: DISCONTINUED | OUTPATIENT
Start: 2022-09-13 | End: 2022-09-19 | Stop reason: HOSPADM

## 2022-09-13 RX ADMIN — IPRATROPIUM BROMIDE AND ALBUTEROL SULFATE 1 AMPULE: .5; 2.5 SOLUTION RESPIRATORY (INHALATION) at 17:28

## 2022-09-13 RX ADMIN — LORAZEPAM 1 MG: 1 TABLET ORAL at 23:58

## 2022-09-13 RX ADMIN — ACETAMINOPHEN 650 MG: 325 TABLET, FILM COATED ORAL at 23:58

## 2022-09-13 RX ADMIN — VANCOMYCIN HYDROCHLORIDE 2500 MG: 1.5 INJECTION, POWDER, LYOPHILIZED, FOR SOLUTION INTRAVENOUS at 20:17

## 2022-09-13 RX ADMIN — DOXYCYCLINE 100 MG: 100 CAPSULE ORAL at 23:57

## 2022-09-13 RX ADMIN — TRAZODONE HYDROCHLORIDE 50 MG: 50 TABLET ORAL at 23:58

## 2022-09-13 RX ADMIN — SODIUM CHLORIDE: 9 INJECTION, SOLUTION INTRAVENOUS at 18:59

## 2022-09-13 RX ADMIN — METOPROLOL SUCCINATE 100 MG: 50 TABLET, EXTENDED RELEASE ORAL at 23:57

## 2022-09-13 RX ADMIN — METHYLPREDNISOLONE SODIUM SUCCINATE 125 MG: 125 INJECTION, POWDER, FOR SOLUTION INTRAMUSCULAR; INTRAVENOUS at 18:00

## 2022-09-13 RX ADMIN — SODIUM CHLORIDE 80 ML: 9 INJECTION, SOLUTION INTRAVENOUS at 18:51

## 2022-09-13 RX ADMIN — METHYLPREDNISOLONE SODIUM SUCCINATE 40 MG: 40 INJECTION, POWDER, FOR SOLUTION INTRAMUSCULAR; INTRAVENOUS at 23:58

## 2022-09-13 RX ADMIN — IOPAMIDOL 75 ML: 755 INJECTION, SOLUTION INTRAVENOUS at 18:51

## 2022-09-13 RX ADMIN — SODIUM CHLORIDE, PRESERVATIVE FREE 10 ML: 5 INJECTION INTRAVENOUS at 18:51

## 2022-09-13 ASSESSMENT — PAIN DESCRIPTION - LOCATION
LOCATION: HEAD
LOCATION: HEAD

## 2022-09-13 ASSESSMENT — PAIN DESCRIPTION - DESCRIPTORS
DESCRIPTORS: THROBBING
DESCRIPTORS: THROBBING

## 2022-09-13 ASSESSMENT — PAIN DESCRIPTION - ORIENTATION
ORIENTATION: ANTERIOR
ORIENTATION: ANTERIOR

## 2022-09-13 ASSESSMENT — PAIN - FUNCTIONAL ASSESSMENT: PAIN_FUNCTIONAL_ASSESSMENT: NONE - DENIES PAIN

## 2022-09-13 ASSESSMENT — PAIN SCALES - GENERAL
PAINLEVEL_OUTOF10: 9
PAINLEVEL_OUTOF10: 9

## 2022-09-13 NOTE — TELEPHONE ENCOUNTER
Writer contacted Dr. Michele Barahona to inform of 30 day readmission risk. Dr. Michele Barahona informed writer there is no decision on disposition at this time.       Call Back: If you need to call back to inform of disposition you can contact me at [phone]

## 2022-09-13 NOTE — PROGRESS NOTES
Bronchodilator assessment   [x]    Bronchodilator Assessment        Bronchodilator assessment at level  2  BRONCHODILATOR ASSESSMENT SCORE  Score 1 2 3 4   Breath Sounds   []  Clear [x]  Mild Wheezing with good aeration []  Moderate I/E wheezing with adequate aeration []  Poor Aeration or diffuse wheezing   Respiratory Rate [x]  Less than 20 []  20-25 []  Greater than 25  []  Greater than 35    Dyspnea 2  No SOB  [x]  SOB with minimal activity []  Speaking in partial sentences []  Acute/ At rest   Peakflow (asthma) []  80 % or greater predicted/PB  []  Unable []  70% or greater predicted/PB  []  Unable []  51%-70% predicted/PB  []  Unable []  Less than 50% predicted/PB  []  Unable due to distress   FEV1 % Predicted []  Greater than 69%  []  Unable  []  Less than 50%-69%  []  Unable  []  Less than 35%-49%  []  Unable  []  Less than 35%  []  Unable due to distress

## 2022-09-13 NOTE — ED NOTES
Pt is brought to this ER by a friend with c/o SOB, frontal headache, sorethroat, bodyaches, white productive cough, intermittent rt ear pain, and bilat feet swelling et redness. Pt states onset of s/s's was [place] couple months ago, 2 or 3. \"  Pt was dx with a lung mass on 07/11/2022, and he was admitted here on 08/28/2022 for dyspnea et hemoptysis. Pt was also dx with Covid at this time. Pt arrives A+O x 4, GCS = 15, PMS x 4 intact, and PWD. Pt's pulse is tachycardic and regular. Pt has diminished lung sounds and diffuse rhonchi t/o bilat. Pt does speak in complete sentences, and he is having appropriate conversation with this nurse. Pt's bilat ankles and feet are swollen, red, warm, and tender to touch which the pt states he first noticed 4 days ago. Pt also states he does drink at least 4 beers daily, and his last drink was around noon today. Pt reports previous episodes of DT's.      Mark Noland RN  09/13/22 1940

## 2022-09-13 NOTE — ED PROVIDER NOTES
[address]  eMERGENCY dEPARTMENT eNCOUnter    Pt Name: Vj iNx  MRN: [id number]  [place] 1965  Date of evaluation: 9/13/22  CHIEF COMPLAINT       Chief Complaint   Patient presents with    Shortness of Breath     Hx of COPD, SOB worse over the last 2 days  [id number] in triage  RA sating 100%, resps labored around [address]   HPI  Patient is a 18-year-old male with a history of COPD presents emergency room with complaints of shortness of breath that he has had over the past 5 days. Patient admits to cough. Patient admits to wheezing. Patient states cough is productive of clear sputum. Patient complains of fevers chills chest pain sore throat and headache as well as generalized body aches. Patient has had decreased p.o. intake because of a sore throat. Patient denies abdominal pain nausea vomiting or diarrhea. Patient states that shortness of breath is worse with exertion. Patient also admits to bilateral lower extremity swelling since Friday. REVIEW OF SYSTEMS     Constitutional: Fevers  Eye: No visual changes  Ear/Nose/Mouth/Throat: sore throat  Respiratory: Cough wheezing and shortness of breath  Cardiovascular: chest pain  Gastrointestinal: No nausea, no vomiting, no diarrhea  Genitourinary: No dysuria  Musculoskeletal: Generalized body   Integumentary: No rash  Neurologic: No dizziness  Psychiatric: No anxiety, no depression  All systems otherwise negative.         PAST MEDICAL HISTORY     Past Medical History:   Diagnosis Date    Alcohol abuse     hx of alcoholism; States he drinks 5 beers per day; pt has D/T's    Avascular necrosis of femoral head ([facility] [address] 11/25/2014    Overview:  S/p bilateral hip replacements    History of hip replacement     left/right    Hypertension     Mass of left lung     Rib fracture     Scabies     Stage 3 severe COPD by GOLD classification ([facility] [address] 11/08/2021    Tachycardia 10/28/2020    does not follow with Cardiology     SURGICAL HISTORY Past Surgical History:   Procedure Laterality Date    NECK SURGERY      TOTAL HIP ARTHROPLASTY Bilateral     VASECTOMY       CURRENT MEDICATIONS       Previous Medications    ALBUTEROL (PROVENTIL) (2.5 MG/3ML) 0.083% NEBULIZER SOLUTION    INHALE 3 MLS (ONE VIAL) INTO THE LUNGS EVERY 6 HOURS VIA NEBULIZER AS NEEDED FOR WHEEZING    ALBUTEROL SULFATE HFA (PROAIR HFA) 108 (90 BASE) MCG/ACT INHALER    Inhale 2 puffs into the lungs every 6 hours as needed for Wheezing    FLUTICASONE-UMECLIDIN-VILANT (TRELEGY ELLIPTA) 200-62.5-25 MCG/INH AEPB    Inhale 1 puff into the lungs Daily    LISINOPRIL (PRINIVIL;ZESTRIL) 10 MG TABLET    Take 1 tablet by mouth daily    METOPROLOL SUCCINATE (TOPROL XL) 100 MG EXTENDED RELEASE TABLET    Take 1 tablet by mouth 2 times daily    TRAZODONE (DESYREL) 50 MG TABLET    Take 1 tablet by mouth nightly     ALLERGIES     is allergic to dulera [mometasone furo-formoterol fum]. FAMILY HISTORY     He indicated that his mother is alive. He indicated that his father is . SOCIAL HISTORY      reports that he has been smoking cigarettes. He started smoking about 36 years ago. He has a 44.00 pack-year smoking history. He has never used smokeless tobacco. He reports current alcohol use of about 35.0 standard drinks per week. He reports that he does not currently use drugs after having used the following drugs: Cocaine and Marijuana Leticia Portillo). PHYSICAL EXAM     INITIAL VITALS: /75   Pulse (!) 127   Temp 97.6 °F (36.4 °C) (Oral)   Resp 21   SpO2 99%      General: NAD  Head: Normocephalic  Eyes: No scleral icterus  ENT: No dry mucus membranes  Neck: Supple  Lungs: Clear to ascultation bilaterally, expiratory wheeze, no rales, no rhonchi  Cardiac: Regular rate and rhythm, S1/S2, no murmurs  Abdominal: Soft, nondistended, nontender, no rebound, no guarding.   Extremities: Bilateral lower extremity edema  Rectal: Deferred  Genitourinary: Deferred  Skin: Multiple bruises  Neuro: AOx4, no decreased LOC  Psych: No anxiety  Perfusion: Warm x 4      MEDICAL DECISION MAKING:   [facility] [place]    Patient presents with complaints of cough wheezing and shortness of breath x5 days. History of COPD. Patient also complains of generalized body aches chest pain sore throat headache fevers chills. Concern for viral syndrome. Patient has multiple bruises on his body and when questioned regarding this states he had a trip and fall on Thursday. Denies head trauma. Patient is tachycardic. EKG shows sinus tachycardia 131 bpm.  Of note patient is a drinker has a history of tachyarrhythmias. Patient was given a breathing treatment. Breathing has improved. Labs obtained. I reviewed labs and they are all within normal limits except for COVID-positive and sodium of 120. Chest x-ray shows lingular mass that is known to the patient. As patient has tachycardia complains of shortness of breath and a known neoplasm concern for PE. A CTA was ordered. Patient has elevated white blood cell count. He does have bilateral lower extremity edema with some erythema. Patient has been started on antibiotics. Case discussed with Dr. Raman Diaz of pulmonology who agrees to see the patient. He approves intensive care bed. Case discussed with Luan Martínez NP covering for [facility] at [phone]. She agrees to admit the patient. Called by Dr. Martha Cazares at 2020 who identifies Brianda Rubio as a NP for Dr. Navid Billings who she covers. She states that she will admit the patient. CRITICAL CARE:     PROCEDURES:    Procedures    DIAGNOSTIC RESULTS   EKG: All EKG's are interpreted by the Emergency Department Physician who either signs or Co-signs this chart in the absence of a cardiologist.      RADIOLOGY:All plain film, CT, MRI, and formal ultrasound images (except ED bedside ultrasound) are read by the radiologist, see reports below, unless otherwise noted in [facility] [place] or here. CT CHEST PULMONARY EMBOLISM W CONTRAST   Final Result   1.   Minimal peripheral ground-glass opacities in the right upper lobe are   noted, while nonspecific, this may correspond to the provided history of   COVID infection. 2.  Suboptimal contrast timing for evaluation of the peripheral pulmonary   arteries. No evidence for central pulmonary embolism. 3.  Redemonstration of lingular mass, now measuring 4.7 cm versus 4.1 cm on   recent CT. This remains concerning for primary neoplasm. XR CHEST PORTABLE   Final Result   1. Redemonstration of lingular mass concerning for primary neoplasm. 2.  No new airspace disease identified in the interval.           LABS: All lab results were reviewed by myself, and all abnormals are listed below.   Labs Reviewed   COVID-19, RAPID - Abnormal; Notable for the following components:       Result Value    SARS-CoV-2, Rapid DETECTED (*)     All other components within normal limits   CBC WITH AUTO DIFFERENTIAL - Abnormal; Notable for the following components:    WBC 12.6 (*)     RBC 3.70 (*)     Hematocrit 40.2 (*)     .6 (*)     MCH 37.8 (*)     Seg Neutrophils 84 (*)     Lymphocytes 9 (*)     Segs Absolute 10.60 (*)     All other components within normal limits   BASIC METABOLIC PANEL - Abnormal; Notable for the following components:    Creatinine 0.42 (*)     Sodium 120 (*)     Chloride 86 (*)     All other components within normal limits   HEPATIC FUNCTION PANEL - Abnormal; Notable for the following components:    Albumin 3.4 (*)     Bilirubin, Direct 0.5 (*)     All other components within normal limits   PROTIME-INR   TROPONIN   SPECIMEN REJECTION   BRAIN NATRIURETIC PEPTIDE   MAGNESIUM   TROPONIN   TROPONIN   TROPONIN   TROPONIN   TROPONIN   TROPONIN   TROPONIN     EMERGENCY DEPARTMENT COURSE:   Vitals:    Vitals:    09/13/22 1701 09/13/22 1728 09/13/22 1755 09/13/22 1902   BP: 126/88  106/72 120/75   Pulse: (!) 152  (!) 141 (!) 127   Resp: 30 18 24 21   Temp: 97.6 °F (36.4 °C)      TempSrc: Oral      SpO2: 100% 97% 99%       The patient was given the following medications while in the emergency department:  Orders Placed This Encounter   Medications    methylPREDNISolone sodium (SOLU-MEDROL) injection 125 mg    ipratropium-albuterol (DUONEB) nebulizer solution 1 ampule     Order Specific Question:   Initiate RT Bronchodilator Protocol     Answer:   Yes - Inpatient Protocol    0.9 % sodium chloride infusion    0.9 % sodium chloride bolus    sodium chloride flush 0.9 % injection 10 mL    iopamidol (ISOVUE-370) 76 % injection 75 mL    vancomycin (VANCOCIN) 2,500 mg in dextrose 5 % 500 mL IVPB     Order Specific Question:   Antimicrobial Indications     Answer:   Skin and Soft Tissue Infection    vancomycin (VANCOCIN) intermittent dosing (placeholder)     Order Specific Question:   Antimicrobial Indications     Answer:   Skin and Soft Tissue Infection     Order Specific Question:   Skin duration of therapy     Answer:   7 days     CONSULTS:  PHARMACY TO DOSE VANCOMYCIN  IP CONSULT TO INTERNAL MEDICINE  IP CONSULT TO CRITICAL CARE  IP CONSULT TO CRITICAL CARE    FINAL IMPRESSION      1. COPD exacerbation ([address]    2. Hyponatremia    3. COVID-19    4. Cellulitis of lower extremity, unspecified laterality          DISPOSITION/PLAN   DISPOSITION Decision To Admit 09/13/2022 06:34:18 PM      PATIENT REFERRED TO:  No follow-up provider specified. DISCHARGE MEDICATIONS:  New Prescriptions    No medications on file     Branden Rose MD  Attending Emergency Physician  [employer] voice recognition software used in portions of this document.                     Branden Rose MD  09/13/22 [id number]       Branden Rose MD  09/13/22 2021

## 2022-09-13 NOTE — TELEPHONE ENCOUNTER
Pt calling office stating he has \"gone down hill\" pt was in [facility], after D/C on 9/6/22 he stated he has become physically unable to do certain things. Walking to the bathroom is difficult. He tested positive for Covid - Bronch was canceled. He continues to cough, has body aches, no fever. He has a light appetite and is able to drink fluids. 02  yesterday was @ 96%  Please advise.

## 2022-09-14 ENCOUNTER — APPOINTMENT (OUTPATIENT)
Dept: NON INVASIVE DIAGNOSTICS | Age: 57
DRG: 426 | End: 2022-09-14
Payer: COMMERCIAL

## 2022-09-14 ENCOUNTER — APPOINTMENT (OUTPATIENT)
Dept: VASCULAR LAB | Age: 57
DRG: 426 | End: 2022-09-14
Payer: COMMERCIAL

## 2022-09-14 LAB
ABSOLUTE EOS #: 0 K/UL (ref 0–0.4)
ABSOLUTE LYMPH #: 0.13 K/UL (ref 1–4.8)
ABSOLUTE MONO #: 0.2 K/UL (ref 0.1–1.3)
ALBUMIN SERPL-MCNC: 3.2 G/DL (ref 3.5–5.2)
ALP BLD-CCNC: 126 U/L (ref 40–129)
ALT SERPL-CCNC: 31 U/L (ref 5–41)
ANION GAP SERPL CALCULATED.3IONS-SCNC: 10 MMOL/L (ref 9–17)
AST SERPL-CCNC: 30 U/L
BASOPHILS # BLD: 0 % (ref 0–2)
BASOPHILS ABSOLUTE: 0 K/UL (ref 0–0.2)
BILIRUB SERPL-MCNC: 1.2 MG/DL (ref 0.3–1.2)
BUN BLDV-MCNC: 8 MG/DL (ref 6–20)
CALCIUM SERPL-MCNC: 8.9 MG/DL (ref 8.6–10.4)
CHLORIDE BLD-SCNC: 93 MMOL/L (ref 98–107)
CO2: 21 MMOL/L (ref 20–31)
CREAT SERPL-MCNC: <0.4 MG/DL (ref 0.7–1.2)
EKG ATRIAL RATE: 131 BPM
EKG P AXIS: 60 DEGREES
EKG P-R INTERVAL: 118 MS
EKG Q-T INTERVAL: 300 MS
EKG QRS DURATION: 76 MS
EKG QTC CALCULATION (BAZETT): 443 MS
EKG R AXIS: -21 DEGREES
EKG T AXIS: 65 DEGREES
EKG VENTRICULAR RATE: 131 BPM
EOSINOPHILS RELATIVE PERCENT: 0 % (ref 0–4)
GFR AFRICAN AMERICAN: ABNORMAL ML/MIN
GFR NON-AFRICAN AMERICAN: ABNORMAL ML/MIN
GFR SERPL CREATININE-BSD FRML MDRD: ABNORMAL ML/MIN/{1.73_M2}
GLUCOSE BLD-MCNC: 125 MG/DL (ref 70–99)
HCT VFR BLD CALC: 36.9 % (ref 41–53)
HEMOGLOBIN: 13 G/DL (ref 13.5–17.5)
LV EF: 55 %
LVEF MODALITY: NORMAL
LYMPHOCYTES # BLD: 2 % (ref 24–44)
MCH RBC QN AUTO: 38.6 PG (ref 26–34)
MCHC RBC AUTO-ENTMCNC: 35.3 G/DL (ref 31–37)
MCV RBC AUTO: 109.3 FL (ref 80–100)
MONOCYTES # BLD: 3 % (ref 1–7)
MORPHOLOGY: ABNORMAL
MORPHOLOGY: ABNORMAL
OSMOLALITY URINE: 273 MOSM/KG (ref 80–1300)
PDW BLD-RTO: 13.8 % (ref 11.5–14.9)
PLATELET # BLD: 153 K/UL (ref 150–450)
PMV BLD AUTO: 8 FL (ref 6–12)
POTASSIUM SERPL-SCNC: 4.2 MMOL/L (ref 3.7–5.3)
RBC # BLD: 3.37 M/UL (ref 4.5–5.9)
REASON FOR REJECTION: NORMAL
SEG NEUTROPHILS: 95 % (ref 36–66)
SEGMENTED NEUTROPHILS ABSOLUTE COUNT: 6.17 K/UL (ref 1.3–9.1)
SERUM OSMOLALITY: 271 MOSM/KG (ref 275–295)
SODIUM BLD-SCNC: 124 MMOL/L (ref 135–144)
SODIUM BLD-SCNC: 126 MMOL/L (ref 135–144)
SODIUM BLD-SCNC: 126 MMOL/L (ref 135–144)
SODIUM BLD-SCNC: 128 MMOL/L (ref 135–144)
SODIUM,UR: 20 MMOL/L
TOTAL PROTEIN: 6.9 G/DL (ref 6.4–8.3)
TROPONIN, HIGH SENSITIVITY: 10 NG/L (ref 0–22)
WBC # BLD: 6.5 K/UL (ref 3.5–11)
ZZ NTE CLEAN UP: ORDERED TEST: NORMAL
ZZ NTE WITH NAME CLEAN UP: SPECIMEN SOURCE: NORMAL

## 2022-09-14 PROCEDURE — 6360000002 HC RX W HCPCS: Performed by: FAMILY MEDICINE

## 2022-09-14 PROCEDURE — 94640 AIRWAY INHALATION TREATMENT: CPT

## 2022-09-14 PROCEDURE — 93970 EXTREMITY STUDY: CPT

## 2022-09-14 PROCEDURE — 84300 ASSAY OF URINE SODIUM: CPT

## 2022-09-14 PROCEDURE — 84484 ASSAY OF TROPONIN QUANT: CPT

## 2022-09-14 PROCEDURE — 97530 THERAPEUTIC ACTIVITIES: CPT

## 2022-09-14 PROCEDURE — 97162 PT EVAL MOD COMPLEX 30 MIN: CPT

## 2022-09-14 PROCEDURE — 80053 COMPREHEN METABOLIC PANEL: CPT

## 2022-09-14 PROCEDURE — 2500000003 HC RX 250 WO HCPCS: Performed by: FAMILY MEDICINE

## 2022-09-14 PROCEDURE — 2580000003 HC RX 258: Performed by: FAMILY MEDICINE

## 2022-09-14 PROCEDURE — 6370000000 HC RX 637 (ALT 250 FOR IP): Performed by: FAMILY MEDICINE

## 2022-09-14 PROCEDURE — 84295 ASSAY OF SERUM SODIUM: CPT

## 2022-09-14 PROCEDURE — 83935 ASSAY OF URINE OSMOLALITY: CPT

## 2022-09-14 PROCEDURE — 83930 ASSAY OF BLOOD OSMOLALITY: CPT

## 2022-09-14 PROCEDURE — 6370000000 HC RX 637 (ALT 250 FOR IP): Performed by: INTERNAL MEDICINE

## 2022-09-14 PROCEDURE — 97166 OT EVAL MOD COMPLEX 45 MIN: CPT

## 2022-09-14 PROCEDURE — 2000000000 HC ICU R&B

## 2022-09-14 PROCEDURE — 2580000003 HC RX 258: Performed by: EMERGENCY MEDICINE

## 2022-09-14 PROCEDURE — 85025 COMPLETE CBC W/AUTO DIFF WBC: CPT

## 2022-09-14 PROCEDURE — 93010 ELECTROCARDIOGRAM REPORT: CPT | Performed by: INTERNAL MEDICINE

## 2022-09-14 PROCEDURE — 6360000002 HC RX W HCPCS: Performed by: INTERNAL MEDICINE

## 2022-09-14 PROCEDURE — 6360000002 HC RX W HCPCS: Performed by: EMERGENCY MEDICINE

## 2022-09-14 PROCEDURE — 36415 COLL VENOUS BLD VENIPUNCTURE: CPT

## 2022-09-14 PROCEDURE — 2580000003 HC RX 258: Performed by: INTERNAL MEDICINE

## 2022-09-14 RX ORDER — LEVALBUTEROL TARTRATE 45 UG/1
1 AEROSOL, METERED ORAL EVERY 6 HOURS
Status: DISCONTINUED | OUTPATIENT
Start: 2022-09-14 | End: 2022-09-19 | Stop reason: HOSPADM

## 2022-09-14 RX ORDER — LEVALBUTEROL TARTRATE 45 UG/1
1 AEROSOL, METERED ORAL EVERY 6 HOURS PRN
Status: DISCONTINUED | OUTPATIENT
Start: 2022-09-14 | End: 2022-09-14

## 2022-09-14 RX ORDER — ALBUTEROL SULFATE 90 UG/1
2 AEROSOL, METERED RESPIRATORY (INHALATION) 4 TIMES DAILY
Status: DISCONTINUED | OUTPATIENT
Start: 2022-09-14 | End: 2022-09-14

## 2022-09-14 RX ORDER — BUDESONIDE AND FORMOTEROL FUMARATE DIHYDRATE 160; 4.5 UG/1; UG/1
2 AEROSOL RESPIRATORY (INHALATION) 2 TIMES DAILY
Status: DISCONTINUED | OUTPATIENT
Start: 2022-09-14 | End: 2022-09-19 | Stop reason: HOSPADM

## 2022-09-14 RX ADMIN — BUDESONIDE AND FORMOTEROL FUMARATE DIHYDRATE 2 PUFF: 160; 4.5 AEROSOL RESPIRATORY (INHALATION) at 20:30

## 2022-09-14 RX ADMIN — METHYLPREDNISOLONE SODIUM SUCCINATE 40 MG: 40 INJECTION, POWDER, FOR SOLUTION INTRAMUSCULAR; INTRAVENOUS at 13:08

## 2022-09-14 RX ADMIN — METHYLPREDNISOLONE SODIUM SUCCINATE 40 MG: 40 INJECTION, POWDER, FOR SOLUTION INTRAMUSCULAR; INTRAVENOUS at 20:52

## 2022-09-14 RX ADMIN — SODIUM CHLORIDE: 9 INJECTION, SOLUTION INTRAVENOUS at 15:18

## 2022-09-14 RX ADMIN — VANCOMYCIN HYDROCHLORIDE 1250 MG: 1.25 INJECTION, POWDER, LYOPHILIZED, FOR SOLUTION INTRAVENOUS at 11:30

## 2022-09-14 RX ADMIN — BUDESONIDE AND FORMOTEROL FUMARATE DIHYDRATE 2 PUFF: 160; 4.5 AEROSOL RESPIRATORY (INHALATION) at 08:50

## 2022-09-14 RX ADMIN — SODIUM CHLORIDE, PRESERVATIVE FREE 10 ML: 5 INJECTION INTRAVENOUS at 20:52

## 2022-09-14 RX ADMIN — LORAZEPAM 1 MG: 1 TABLET ORAL at 09:28

## 2022-09-14 RX ADMIN — SODIUM CHLORIDE: 9 INJECTION, SOLUTION INTRAVENOUS at 05:16

## 2022-09-14 RX ADMIN — TRAZODONE HYDROCHLORIDE 50 MG: 50 TABLET ORAL at 20:52

## 2022-09-14 RX ADMIN — LORAZEPAM 1 MG: 1 TABLET ORAL at 15:20

## 2022-09-14 RX ADMIN — VANCOMYCIN HYDROCHLORIDE 1250 MG: 1.25 INJECTION, POWDER, LYOPHILIZED, FOR SOLUTION INTRAVENOUS at 23:24

## 2022-09-14 RX ADMIN — ENOXAPARIN SODIUM 40 MG: 100 INJECTION SUBCUTANEOUS at 07:53

## 2022-09-14 RX ADMIN — SODIUM CHLORIDE: 9 INJECTION, SOLUTION INTRAVENOUS at 00:02

## 2022-09-14 RX ADMIN — METHYLPREDNISOLONE SODIUM SUCCINATE 40 MG: 40 INJECTION, POWDER, FOR SOLUTION INTRAMUSCULAR; INTRAVENOUS at 06:20

## 2022-09-14 RX ADMIN — DOXYCYCLINE 100 MG: 100 CAPSULE ORAL at 07:52

## 2022-09-14 RX ADMIN — METOPROLOL SUCCINATE 100 MG: 50 TABLET, EXTENDED RELEASE ORAL at 07:52

## 2022-09-14 RX ADMIN — DILTIAZEM HYDROCHLORIDE 30 MG: 30 TABLET, FILM COATED ORAL at 20:51

## 2022-09-14 RX ADMIN — LEVALBUTEROL TARTRATE 1 PUFF: 45 AEROSOL, METERED ORAL at 20:29

## 2022-09-14 RX ADMIN — DILTIAZEM HYDROCHLORIDE 30 MG: 30 TABLET, FILM COATED ORAL at 13:08

## 2022-09-14 RX ADMIN — METOPROLOL SUCCINATE 100 MG: 50 TABLET, EXTENDED RELEASE ORAL at 20:52

## 2022-09-14 RX ADMIN — LORAZEPAM 3 MG: 2 INJECTION INTRAMUSCULAR; INTRAVENOUS at 23:43

## 2022-09-14 RX ADMIN — Medication 2 PUFF: at 20:30

## 2022-09-14 RX ADMIN — DOXYCYCLINE 100 MG: 100 CAPSULE ORAL at 20:52

## 2022-09-14 RX ADMIN — LISINOPRIL 10 MG: 10 TABLET ORAL at 08:43

## 2022-09-14 ASSESSMENT — PAIN SCALES - GENERAL: PAINLEVEL_OUTOF10: 0

## 2022-09-14 ASSESSMENT — ENCOUNTER SYMPTOMS
ANAL BLEEDING: 0
RECTAL PAIN: 0
COLOR CHANGE: 0
STRIDOR: 0
VOICE CHANGE: 0
TROUBLE SWALLOWING: 0
BLOOD IN STOOL: 0
PHOTOPHOBIA: 0
BACK PAIN: 0
SHORTNESS OF BREATH: 1
ABDOMINAL DISTENTION: 0
CHOKING: 0
COUGH: 1

## 2022-09-14 NOTE — ED NOTES
Report given to Marycarmen Garcia RN from ICU. Report method in person   The following was reviewed with receiving RN:   Current vital signs:  BP (!) 148/78   Pulse (!) 117   Temp 97.6 °F (36.4 °C) (Oral)   Resp 21   SpO2 95%                MEWS Score: 6     Any medication or safety alerts were reviewed. Any pending diagnostics and notifications were also reviewed, as well as any safety concerns or issues, abnormal labs, abnormal imaging, and abnormal assessment findings. Questions were answered.             Mitchell Harrison RN  09/13/22 [id number]

## 2022-09-14 NOTE — PLAN OF CARE
Problem: ABCDS Injury Assessment  Goal: Absence of physical injury  Outcome: Progressing  Flowsheets (Taken 9/13/2022 2215)  Absence of Physical Injury: Implement safety measures based on patient assessment     Problem: Safety - Adult  Goal: Free from fall injury  Outcome: Progressing  Flowsheets (Taken 9/13/2022 2219)  Free From Fall Injury: Instruct family/caregiver on patient safety     Problem: Pain  Goal: Verbalizes/displays adequate comfort level or baseline comfort level  Outcome: Progressing  Flowsheets (Taken 9/13/2022 2215)  Verbalizes/displays adequate comfort level or baseline comfort level:   Encourage patient to monitor pain and request assistance   Assess pain using appropriate pain scale   Administer analgesics based on type and severity of pain and evaluate response   Implement non-pharmacological measures as appropriate and evaluate response

## 2022-09-14 NOTE — H&P
History and Physical      Name: Uzair Power  MRN: [id number]     Acct: [de-identified]  Room: 2014/2014-01    Admit Date: 9/13/2022  PCP: JAEL Newton CNP      Chief Complaint:     Chief Complaint   Patient presents with    Shortness of Breath     Hx of COPD, SOB worse over the last 2 days  [id number] in triage  RA sating 100%, resps labored around 30       History Obtained From:     patient, electronic medical record    History of Present Illness:      Uzair Power is a  61 y.o.  male who presents with Shortness of Breath (Hx of COPD, SOB worse over the last 2 days/[id number] in triage/RA sating 100%, resps labored around 27)   Patient states that has been experiencing increased shortness of breath for the past 5 days patient has cough with clear sputum production has intermittent wheezing. Patient complains of exertional dyspnea. Patient admits to decreased p.o. intake due to sore throat. Patient has a reproducible chest wall pain with cough. Chest wall pain is moderate dull intermittent nonradiating. Patient denies abdominal pain diaphoresis nausea vomiting dysuria flank pain diarrhea numbness tingling in extremities fever or chills    Past Medical History:     Past Medical History:   Diagnosis Date    Alcohol abuse     hx of alcoholism; States he drinks 5 beers per day; pt has D/T's    Avascular necrosis of femoral head ([address] 11/25/2014    Overview:  S/p bilateral hip replacements    History of hip replacement     left/right    Hypertension     Mass of left lung     Rib fracture     Scabies     Stage 3 severe COPD by GOLD classification ([address] 11/08/2021    Tachycardia 10/28/2020    does not follow with Cardiology        Past Surgical History:     Past Surgical History:   Procedure Laterality Date    NECK SURGERY      TOTAL HIP ARTHROPLASTY Bilateral     VASECTOMY          Medications Prior to Admission:       Prior to Admission medications    Medication Sig Start Date End Date Taking?  Authorizing Provider metoprolol succinate (TOPROL XL) 100 MG extended release tablet Take 1 tablet by mouth 2 times daily 8/31/22   Michelle Tripp MD   lisinopril (PRINIVIL;ZESTRIL) 10 MG tablet Take 1 tablet by mouth daily 8/18/22   JAEL Callejas CNP   Fluticasone-Umeclidin-Vilant (TRELEGY ELLIPTA) 200-62.5-25 MCG/INH AEPB Inhale 1 puff into the lungs Daily 7/19/22   Aldean Jeans, MD   albuterol sulfate HFA (PROAIR HFA) 108 (90 Base) MCG/ACT inhaler Inhale 2 puffs into the lungs every 6 hours as needed for Wheezing 6/13/22   JAEL Callejas CNP   albuterol (PROVENTIL) (2.5 MG/3ML) 0.083% nebulizer solution INHALE 3 MLS (ONE VIAL) INTO THE LUNGS EVERY 6 HOURS VIA NEBULIZER AS NEEDED FOR WHEEZING 1/26/22   Aldean Jeans, MD        Allergies:       Patient has no known allergies. Social History:     Tobacco:    reports that he has been smoking cigarettes. He started smoking about 36 years ago. He has a 44.00 pack-year smoking history. He has never used smokeless tobacco.  Alcohol:      reports current alcohol use of about 35.0 standard drinks per week. Drug Use:  reports that he does not currently use drugs after having used the following drugs: Cocaine and Marijuana Wolfgang Clarke). Family History:     Family History   Problem Relation Age of Onset    Other Mother         mental health        Review of Systems:     Positive and Negative as described in HPI   all 10 systems are reviewed and negative except as Noted      Review of Systems   Constitutional:  Positive for fatigue and fever. Negative for appetite change, chills and diaphoresis. HENT:  Negative for drooling, ear pain, trouble swallowing and voice change. Eyes:  Negative for photophobia and visual disturbance. Respiratory:  Positive for cough and shortness of breath. Negative for choking and stridor. Cardiovascular:  Positive for leg swelling. Negative for chest pain and palpitations.    Gastrointestinal:  Negative for abdominal distention, anal bleeding, blood in stool and rectal pain. Endocrine: Negative for polyphagia and polyuria. Genitourinary:  Negative for dysuria, flank pain, hematuria and urgency. Musculoskeletal:  Negative for back pain, myalgias and neck stiffness. Skin:  Negative for color change, pallor and rash. Allergic/Immunologic: Negative for environmental allergies and food allergies. Neurological:  Negative for tremors, seizures, facial asymmetry and numbness. Hematological:  Negative for adenopathy. Does not bruise/bleed easily. Psychiatric/Behavioral:  Negative for agitation, behavioral problems, hallucinations, self-injury and suicidal ideas. Code Status:  Full Code    Physical Exam:     Vitals:  /74   Pulse (!) 116   Temp 98.6 °F (37 °C) (Oral)   Resp 28   Ht 6' 1\" (1.854 m)   Wt 198 lb 3.1 oz (89.9 kg)   SpO2 90%   BMI 26.15 kg/m²   Temp (24hrs), Av.1 °F (36.7 °C), Min:97.6 °F (36.4 °C), Max:98.6 °F (37 °C)        Physical Exam  Vitals reviewed. Constitutional:       Appearance: Normal appearance. He is not diaphoretic. HENT:      Head: Normocephalic and atraumatic. Right Ear: External ear normal.      Left Ear: External ear normal.      Nose: Nose normal.      Mouth/Throat:      Lips: No lesions. Mouth: Mucous membranes are moist. No oral lesions. Pharynx: Oropharynx is clear. No pharyngeal swelling or oropharyngeal exudate. Eyes:      Conjunctiva/sclera: Conjunctivae normal.   Cardiovascular:      Rate and Rhythm: Regular rhythm. Tachycardia present. Pulses: Normal pulses. Heart sounds: Normal heart sounds. Pulmonary:      Effort: Pulmonary effort is normal.      Breath sounds: Examination of the right-lower field reveals decreased breath sounds and wheezing. Examination of the left-lower field reveals decreased breath sounds and wheezing. Decreased breath sounds and wheezing present. Abdominal:      General: Bowel sounds are normal. There is no distension.       Palpations: Abdomen is soft. Tenderness: There is no abdominal tenderness. There is no right CVA tenderness or left CVA tenderness. Musculoskeletal:         General: No deformity. Normal range of motion. Cervical back: Normal range of motion and neck supple. No rigidity. Right lower leg: Edema (1+) present. Left lower leg: Edema (1+) present. Skin:     General: Skin is warm and dry. Capillary Refill: Capillary refill takes less than 2 seconds. Coloration: Skin is not jaundiced. Comments: Erythema bilateral lower extremity   Neurological:      General: No focal deficit present. Mental Status: Mental status is at baseline. Psychiatric:         Mood and Affect: Mood normal.         Behavior: Behavior normal.             Data:     Recent Results (from the past 24 hour(s))   EKG 12 Lead    Collection Time: 09/13/22  5:12 PM   Result Value Ref Range    Ventricular Rate 131 BPM    Atrial Rate 131 BPM    P-R Interval 118 ms    QRS Duration 76 ms    Q-T Interval 300 ms    QTc Calculation (Bazett) 443 ms    P Axis 60 degrees    R Axis -21 degrees    T Axis 65 degrees   CBC with Auto Differential    Collection Time: 09/13/22  5:15 PM   Result Value Ref Range    WBC 12.6 (H) 3.5 - 11.0 k/uL    RBC 3.70 (L) 4.5 - 5.9 m/uL    Hemoglobin 14.0 13.5 - 17.5 g/dL    Hematocrit 40.2 (L) 41 - 53 %    .6 (H) 80 - 100 fL    MCH 37.8 (H) 26 - 34 pg    MCHC 34.8 31 - 37 g/dL    RDW 14.0 11.5 - 14.9 %    Platelets 398 533 - 342 k/uL    MPV 8.5 6.0 - 12.0 fL    Seg Neutrophils 84 (H) 36 - 66 %    Lymphocytes 9 (L) 24 - 44 %    Monocytes 6 1 - 7 %    Eosinophils % 1 0 - 4 %    Basophils 0 0 - 2 %    Segs Absolute 10.60 (H) 1.3 - 9.1 k/uL    Absolute Lymph # 1.10 1.0 - 4.8 k/uL    Absolute Mono # 0.70 0.1 - 1.3 k/uL    Absolute Eos # 0.10 0.0 - 0.4 k/uL    Basophils Absolute 0.00 0.0 - 0.2 k/uL   SPECIMEN REJECTION    Collection Time: 09/13/22  5:15 PM   Result Value Ref Range    Specimen Source . BLOOD Ordered Test BMP, LIVP, TROPI     Reason for Rejection Unable to perform testing: Specimen hemolyzed. Basic Metabolic Panel    Collection Time: 09/13/22  5:45 PM   Result Value Ref Range    Glucose 76 70 - 99 mg/dL    BUN 6 6 - 20 mg/dL    Creatinine 0.42 (L) 0.70 - 1.20 mg/dL    Calcium 9.1 8.6 - 10.4 mg/dL    Sodium 120 (L) 135 - 144 mmol/L    Potassium 4.2 3.7 - 5.3 mmol/L    Chloride 86 (L) 98 - 107 mmol/L    CO2 21 20 - 31 mmol/L    Anion Gap 13 9 - 17 mmol/L    GFR Non-African American >60 >60 mL/min    GFR African American >60 >60 mL/min    GFR Comment         Hepatic Function Panel    Collection Time: 09/13/22  5:45 PM   Result Value Ref Range    Albumin 3.4 (L) 3.5 - 5.2 g/dL    Alkaline Phosphatase 127 40 - 129 U/L    ALT 32 5 - 41 U/L    AST 25 <40 U/L    Total Bilirubin 1.2 0.3 - 1.2 mg/dL    Bilirubin, Direct 0.5 (H) <0.31 mg/dL    Bilirubin, Indirect 0.7 0.00 - 1.00 mg/dL    Total Protein 7.0 6.4 - 8.3 g/dL   Protime-INR    Collection Time: 09/13/22  5:45 PM   Result Value Ref Range    Protime 12.1 11.8 - 14.6 sec    INR 0.9    Troponin    Collection Time: 09/13/22  5:45 PM   Result Value Ref Range    Troponin, High Sensitivity 12 0 - 22 ng/L   Brain Natriuretic Peptide    Collection Time: 09/13/22  5:45 PM   Result Value Ref Range    Pro- <300 pg/mL   Magnesium    Collection Time: 09/13/22  5:45 PM   Result Value Ref Range    Magnesium 1.9 1.6 - 2.6 mg/dL   COVID-19, Rapid    Collection Time: 09/13/22  5:54 PM    Specimen: Nasopharyngeal Swab   Result Value Ref Range    Specimen Description . NASOPHARYNGEAL SWAB     SARS-CoV-2, Rapid DETECTED (A) Not Detected   Troponin    Collection Time: 09/13/22  8:53 PM   Result Value Ref Range    Troponin, High Sensitivity 15 0 - 22 ng/L   SPECIMEN REJECTION    Collection Time: 09/14/22  3:38 AM   Result Value Ref Range    Specimen Source . BLOOD     Ordered Test CMPX,CDP     Reason for Rejection Unable to perform testing: Specimen hemolyzed.     CBC with Auto Differential    Collection Time: 09/14/22  7:28 AM   Result Value Ref Range    WBC 6.5 3.5 - 11.0 k/uL    RBC 3.37 (L) 4.5 - 5.9 m/uL    Hemoglobin 13.0 (L) 13.5 - 17.5 g/dL    Hematocrit 36.9 (L) 41 - 53 %    .3 (H) 80 - 100 fL    MCH 38.6 (H) 26 - 34 pg    MCHC 35.3 31 - 37 g/dL    RDW 13.8 11.5 - 14.9 %    Platelets 013 627 - 226 k/uL    MPV 8.0 6.0 - 12.0 fL    Seg Neutrophils 95 (H) 36 - 66 %    Lymphocytes 2 (L) 24 - 44 %    Monocytes 3 1 - 7 %    Eosinophils % 0 0 - 4 %    Basophils 0 0 - 2 %    Segs Absolute 6.17 1.3 - 9.1 k/uL    Absolute Lymph # 0.13 (L) 1.0 - 4.8 k/uL    Absolute Mono # 0.20 0.1 - 1.3 k/uL    Absolute Eos # 0.00 0.0 - 0.4 k/uL    Basophils Absolute 0.00 0.0 - 0.2 k/uL    Morphology ANISOCYTOSIS PRESENT     Morphology 1+ TEARDROPS    Comprehensive Metabolic Panel w/ Reflex to MG    Collection Time: 09/14/22  7:28 AM   Result Value Ref Range    Glucose 125 (H) 70 - 99 mg/dL    BUN 8 6 - 20 mg/dL    Creatinine <0.40 (L) 0.70 - 1.20 mg/dL    Calcium 8.9 8.6 - 10.4 mg/dL    Sodium 124 (L) 135 - 144 mmol/L    Potassium 4.2 3.7 - 5.3 mmol/L    Chloride 93 (L) 98 - 107 mmol/L    CO2 21 20 - 31 mmol/L    Anion Gap 10 9 - 17 mmol/L    Alkaline Phosphatase 126 40 - 129 U/L    ALT 31 5 - 41 U/L    AST 30 <40 U/L    Total Bilirubin 1.2 0.3 - 1.2 mg/dL    Total Protein 6.9 6.4 - 8.3 g/dL    Albumin 3.2 (L) 3.5 - 5.2 g/dL    GFR Non- Can not be calculated >60 mL/min    GFR  Can not be calculated >60 mL/min    GFR Comment         Troponin    Collection Time: 09/14/22 10:44 AM   Result Value Ref Range    Troponin, High Sensitivity 10 0 - 22 ng/L   Sodium    Collection Time: 09/14/22 10:44 AM   Result Value Ref Range    Sodium 126 (L) 135 - 144 mmol/L   Sodium, Random Ur    Collection Time: 09/14/22 11:38 AM   Result Value Ref Range    Sodium,Ur 20 mmol/L       Assesment:     Primary Problem  Hyponatremia    Principal Problem:    Hyponatremia  Active Problems:    Mass of lingula of lung    Essential hypertension    COPD exacerbation (HCC)  Resolved Problems:    * No resolved hospital problems.  *      Plan:      IV normal saline at 100 mL/hour   Solu-Medrol 40 mg every 8 hours   Doxycycline 100 mg p.o. twice daily   IV vancomycin for cellulitis bilateral lower extremity   Xopenex 1 puff inhalation every 4 hours   Continue metoprolol 100 mg p.o. XL twice daily   Start Cardizem 30 mg p.o. every 8 hours  to optimize elevated heart rate control    Venous Doppler bilateral lower extremity   Atrovent HFA every 6 hours   Check sodium level every 6 hours   Consult nephrology   Check 2D echo   12 lead EKG shows sinus tachycardia   CT lung report shows lingular mass   Consult pulmonology   DVT prophylaxis Lovenox subQ daily   EPCs   check and replace electrolytes per sliding scale   restart home medications        Electronically signed by Jerrell Tracey MD     Copy sent to Dr. Eliz Monroy, APRN - CNP

## 2022-09-14 NOTE — CARE COORDINATION
CASE MANAGEMENT NOTE:    Admission Date:  9/13/2022 Iliana Shaikh is a 61 y.o.  male    Admitted for : Hyponatremia [E87.1]  COPD exacerbation ([facility] [J44.1]  Cellulitis of lower extremity, unspecified laterality [L03.119]  COVID-19 [U07.1]    Met with:  Patient    PCP:  JAEL Spencer CNP                                Insurance:  [address] [place] [address]      Is patient alert and oriented at time of discussion:  Yes    Current Residence/ Living Arrangements:  independently at home, 1 story apt            Current Services PTA:  No    Does patient go to outpatient dialysis: No  If yes, location and chair time:   Who is their nephrologist?     Is patient agreeable to VNS: No    Freedom of choice provided:  NA    List of [address] provided: DRAKE    VNS chosen:  DRAKE    DME:  straight cane    Home Oxygen: No    Nebulizer: Yes    CPAP/BIPAP: No    Supplier: unsure    Potential Assistance Needed: No    SNF needed: No    Freedom of choice and list provided: NA    Pharmacy:  [address] [place] [address]       Is patient currently receiving oral anticoagulation therapy? No    Is the Patient an [facility] with Readmission Risk Score greater than 14%? No  If yes, pt needs a follow up appointment made within 7 days.     Family Members/Caregivers that pt would like involved in their care:    Yes    If yes, list name here:  Sadaf-daughter     Transportation Provider:  [facility]             Discharge Plan:  9/14/2022 [employer]; patient independent in 1 story apt; DME cane, nebulizer; VNS denies needs; will continue to follow//KR                 Electronically signed by: Jonny Diego RN on 9/14/2022 at 4:06 PM

## 2022-09-14 NOTE — PROGRESS NOTES
[address]   Occupational Therapy Evaluation  Date: 22  Patient Name: Alexandria Maxwell       Room:   MRN: [id number]  Account: [de-identified]   : 1965  (61 y.o.) Gender: male     Discharge Recommendations:  Further Occupational Therapy is recommended upon facility discharge. OT Equipment Recommendations  Other: TBD    Referring Practitioner: Chapis Marmolejo MD  Diagnosis: Hyponatremia      Treatment Diagnosis: impaired self care status    Past Medical History:  has a past medical history of Alcohol abuse, Avascular necrosis of femoral head ([address] History of hip replacement, Hypertension, Mass of left lung, Rib fracture, Scabies, Stage 3 severe COPD by GOLD classification ([address] and Tachycardia. Past Surgical History:   has a past surgical history that includes Neck surgery; Total hip arthroplasty (Bilateral); and Vasectomy. Restrictions  Restrictions/Precautions  Restrictions/Precautions: Isolation (COVID+)  Required Braces or Orthoses?: No  Implants present? : Metal implants (BTHA)      Vitals  Vitals  Heart Rate: (!) 116  Heart Rate Source: Monitor  BP: 106/74  BP Location: Right upper arm  BP Method: Automatic  Patient Position: Turns self;Semi fowlers  MAP (Calculated): 84.67  Resp: 28  SpO2: 90 %  O2 Device: Nasal cannula     Subjective  Subjective: \"Everyone come in and look at the man in his underwear. \" Pt was agreeable to OT/PT eval  Comments: Ok per Fabrice  Subjective  Pain: Pt reports chonic COPD pain in chest, forehead and throat 8/10 pain      Social/Functional History  Social/Functional History  Lives With: Alone  Type of Home: Apartment  Home Layout: One level  Home Access: Stairs to enter with rails  Entrance Stairs - Number of Steps: 1 VERO  Entrance Stairs - Rails: Right  Bathroom Shower/Tub: Tub/Shower unit, Curtain  Bathroom Toilet: Standard  Home Equipment: [employer]  Has the patient had two or more falls in the past year or any fall with injury in the past year?: Yes  ADL Assistance: Independent  Homemaking Assistance: Independent  Homemaking Responsibilities: Yes  Ambulation Assistance: Independent ([employer] as needed)  Transfer Assistance: Independent  Active : Yes  Mode of Transportation: Car, Bus  Occupation: Part time employment  Type of Occupation:   IADL Comments: Pt reports sleeping in foam bed  Additional Comments: Pt reports limited suppport at home      Objective  Cognition  Orientation  Overall Orientation Status: Within Functional Limits  Cognition  Overall Cognitive Status: Exceptions  Safety Judgement: Decreased awareness of need for assistance, Decreased awareness of need for safety  Insights: Decreased awareness of deficits   Sensation  Overall Sensation Status: Impaired (L D4-D5)    Activities of Daily Living  ADL  Feeding: Setup  Grooming: Setup  UE Bathing: Setup  LE Bathing: Stand by assistance  UE Dressing: Setup  LE Dressing: Stand by assistance  Toileting: Stand by assistance  Additional Comments: ADL scores based on clinical reasoning and skilled observation unless otherwise noted. Pt currently limited due to decreased activity tolerance and balance impacting safety and independence with self care and mobility         UE Function  LUE AROM (degrees)  LUE AROM : WFL  Left Hand AROM (degrees)  Left Hand AROM: WFL  Tone LUE  LUE Tone: Normotonic  LUE Strength  Gross LUE Strength: WFL  L Hand General: 4/5    RUE AROM (degrees)  RUE AROM : WFL  Right Hand AROM (degrees)  Right Hand AROM: WFL  Tone RUE  RUE Tone: Normotonic  RUE Strength  Gross RUE Strength: WFL  R Hand General: 4/5         Fine Motor Skills/Coordination  Coordination  Movements Are Fluid And Coordinated: Yes                Mobility  Bed Mobility  Bed mobility  Supine to Sit: Modified independent  Scooting: Modified independent  Bed Mobility Comments: Bed mobility completed with HOB elevated.  Pt sitting EOB at end of goals: By discharge  Short Term Goal 1: Pt will complete BADLs with modified independence and Good safety while maintaining SpO2 above 90%  Short Term Goal 2: Pt will complete functional transfers/mobility with modified independence with Good safety while maintaining Spo2 above 90%  Short Term Goal 3: Pt will tolerate standing 5+ minutes during functional activity of choice with Good safety while maintaining SpO2 above 90%  Short Term Goal 4: Pt will verbalize/demonstrate Good understanding of home safety/fall prevention/ energy conservation strategies to increase safety and independence with self care and mobility  Short Term Goal 5: Pt will participate in 15+ minutes during functional activities to increase safety and independence with self care and mobility    Plan  Plan  Times per Week: 2-3  Current Treatment Recommendations: Self-Care / ADL, Strengthening, Balance training, Functional mobility training, Endurance training, Safety education & training, Patient/Caregiver education & training, Equipment evaluation, education, & procurement, Home management training      OT Individual Minutes  OT Individual Minutes  Time In: 4779  Time Out: 6027  Minutes: 23  Time Code Minutes   Timed Code Treatment Minutes: 8 Minutes        Electronically signed by Brianna Stroud OT on 9/14/22 at 3:04 PM EDT

## 2022-09-14 NOTE — PROGRESS NOTES
Vancomycin Dosing by Pharmacy - Initial Note   TODAY'S DATE:  9/13/2022  Patient name, age:  Elizabeth Brown, 61 y.o. Indication: SSTI, MRSA suspected. Additional antimicrobials:  none    Allergies:  Dulera [mometasone furo-formoterol fum]   Actual Weight:    Wt Readings from Last 1 Encounters:   09/09/22 204 lb 6.4 oz (92.7 kg)     Labs/Ancillary Data  Estimated Creatinine Clearance: 217 mL/min (A) (based on SCr of 0.42 mg/dL (L)). Recent Labs     09/13/22  1715 09/13/22  1745   CREATININE  --  0.42*   BUN  --  6   WBC 12.6*  --      Procalcitonin   Date Value Ref Range Status   08/29/2022 0.20 (H) <0.09 ng/mL Final     Comment:           Suspected Sepsis:  <0.50 ng/mL     Low likelihood of sepsis. 0.50-2.00 ng/mL     Increased likelihood of sepsis. Antibiotics encouraged. >2.00 ng/mL     High risk of sepsis/shock. Antibiotics strongly encouraged. Suspected Lower Resp Tract Infections:  <0.24 ng/mL     Low likelihood of bacterial infection. >0.24 ng/mL     Increased likelihood of bacterial infection. Antibiotics encouraged. With successful antibiotic therapy, PCT levels should decrease rapidly. (Half-life of 24 to   36 hours.)        Procalcitonin values from samples collected within the first 6 hours of systemic infection   may still be low. Retesting may be indicated. Values from day 1 and day 4 can be entered into the Change in Procalcitonin Calculator   (www.eBrevias-pct-calculator. com) to determine the patient's Mortality Risk Prognosis        In healthy neonates, plasma Procalcitonin (PCT) concentrations increase gradually after   birth, reaching peak values at about 24 hours of age then decrease to normal values below   0.5 ng/mL by 48-72 hours of age. No intake or output data in the 24 hours ending 09/13/22 2012  Temp: 97.6    Recent vancomycin administrations        No vancomycin IV orders with administrations found.             Orders not given:            vancomycin (VANCOCIN) 2,500 mg in dextrose 5 % 500 mL IVPB    vancomycin (VANCOCIN) intermittent dosing (placeholder)    vancomycin (VANCOCIN) 1,250 mg in dextrose 5 % 250 mL IVPB (ADDAVIAL)                  Culture Date / Source  /  Results  See micro    MRSA Nasal Swab: N/A. Non-respiratory infection. Apple Rehman PLAN   Initial loading dose of 25mg/kg (max of 2,500 mg) = 2500  mg  x 1, then  vancomycin 1250 mg IV every 12 hours. Ensured BUN/sCr ordered at baseline and every 48 hours x at least 3 levels, then at least weekly. Vancomycin level ordered for 09/15 @ 600. Will use bayesian method for dosing. This level will not be a trough. Target AUC/NIK: 400-600. Vancomycin Target Concentration Parameters  Treatment  Population Target AUC/NIK Target Trough   Invasive MRSA Infection (bacteremia, pneumonia, meningitis, endocarditis, osteomyelitis)  Sepsis (undifferentiated) 400-600 N/A   Infection due to non-MRSA pathogen  Empiric treatment of non-invasive MRSA infection  (SSTI, UTI) <500 10-15 mg/L   CrCl < 29 mL/min  Rapidly fluctuating serum creatinine   KRYSTA N/A < 15 mg/L     Renal replacement therapy is dosed by levels, per hospital protocol. Abbreviations  * Pauc: probability that AUC is >400 (efficacy); Pconc: probability that Ctrough is above 20 ?g/mL (toxicity); Tox: Probability of nephrotoxicity, based on Kade et al. Clin Infect Dis 2009. Loading dose: 2500 mg at 21:00 09/13/2022. Regimen: 1250 mg IV every 12 hours. Start time: 20:13 on 09/13/2022  Exposure target: AUC24 (range)400-600 mg/L.hr   AUC24,ss: 463 mg/L.hr  Probability of AUC24 > 400: 65 %  Ctrough,ss: 13.9 mg/L  Probability of Ctrough,ss > 20: 24 %  Probability of nephrotoxicity (Kade IVANNA 2009): 9 %      Thank you for the consult. Pharmacy will continue to follow.    Marlon Randall, [address]  9/13/2022  8:13 PM

## 2022-09-14 NOTE — CONSULTS
NEPHROLOGY CONSULT     Patient :  Elizabeth Brown; 61 y.o. MRN# [id number]  Location:  2014/2014-01  Attending:  Tirso Damian MD  Admit Date:  9/13/2022   Hospital Day: 1      Reason for Consult:  Hyponatremia      Chief Complaint:  cough headache sob  History Obtained From:  patient    History of Present Illness: This is a 61 y.o. male with past medical history of COPD, patient presented to the hospital with complaints of shortness of breath cough and headache. Patient was recently admitted to the hospital from 8/28/2022 to 9/1/2022 patient was admitted for COVID-19 pneumonia, hemoptysis. After discharge patient continued to have cough shortness of breath but no hemoptysis, patient started to have headache, sore throat body aches. On initial investigation CT chest with IV contrast showed minimal peripheral groundglass opacities in the right upper lobe nonspecific but may be consistent with COVID infection, no PE.   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    Labs showed serum sodium of 120 on admission, patient admits to alcohol drinking and not compliant with fluid restriction        Past Medical History:        Diagnosis Date    Alcohol abuse     hx of alcoholism; States he drinks 5 beers per day; pt has D/T's    Avascular necrosis of femoral head ([address] 11/25/2014    Overview:  S/p bilateral hip replacements    History of hip replacement     left/right    Hypertension     Mass of left lung     Rib fracture     Scabies     Stage 3 severe COPD by GOLD classification ([address] 11/08/2021    Tachycardia 10/28/2020    does not follow with Cardiology       Past Surgical History:        Procedure Laterality Date    NECK SURGERY      TOTAL HIP ARTHROPLASTY Bilateral     VASECTOMY         Current Medications:    budesonide-formoterol (SYMBICORT) 160-4.5 MCG/ACT inhaler 2 puff, BID  dilTIAZem (CARDIZEM) tablet 30 mg, 3 times per day  ipratropium (ATROVENT HFA) 17 MCG/ACT inhaler 2 puff, Q6H WA  levalbuterol (XOPENEX HFA) inhaler 1 puff, Q6H  0.9 % sodium chloride infusion, Continuous  sodium chloride flush 0.9 % injection 10 mL, PRN  vancomycin (VANCOCIN) intermittent dosing (placeholder), RX Placeholder  vancomycin (VANCOCIN) 1,250 mg in dextrose 5 % 250 mL IVPB (ADDAVIAL), Q12H  lisinopril (PRINIVIL;ZESTRIL) tablet 10 mg, Daily  metoprolol succinate (TOPROL XL) extended release tablet 100 mg, BID  traZODone (DESYREL) tablet 50 mg, Nightly  sodium chloride flush 0.9 % injection 10 mL, 2 times per day  sodium chloride flush 0.9 % injection 10 mL, PRN  0.9 % sodium chloride infusion, PRN  potassium chloride (KLOR-CON M) extended release tablet 40 mEq, PRN   Or  potassium bicarb-citric acid (EFFER-K) effervescent tablet 40 mEq, PRN   Or  potassium chloride 10 mEq/100 mL IVPB (Peripheral Line), PRN  magnesium sulfate 1000 mg in dextrose 5% 100 mL IVPB, PRN  enoxaparin (LOVENOX) injection 40 mg, Daily  ondansetron (ZOFRAN-ODT) disintegrating tablet 4 mg, Q8H PRN   Or  ondansetron (ZOFRAN) injection 4 mg, Q6H PRN  magnesium hydroxide (MILK OF MAGNESIA) 400 MG/5ML suspension 30 mL, Daily PRN  acetaminophen (TYLENOL) tablet 650 mg, Q6H PRN   Or  acetaminophen (TYLENOL) suppository 650 mg, Q6H PRN  0.9 % sodium chloride infusion, Continuous  methylPREDNISolone sodium (SOLU-MEDROL) injection 40 mg, Q8H  doxycycline monohydrate (MONODOX) capsule 100 mg, 2 times per day  metoprolol (LOPRESSOR) injection 5 mg, Q6H PRN  sodium chloride flush 0.9 % injection 5-40 mL, 2 times per day  sodium chloride flush 0.9 % injection 5-40 mL, PRN  0.9 % sodium chloride infusion, PRN  LORazepam (ATIVAN) tablet 1 mg, Q1H PRN   Or  LORazepam (ATIVAN) injection 1 mg, Q1H PRN   Or  LORazepam (ATIVAN) tablet 2 mg, Q1H PRN   Or  LORazepam (ATIVAN) injection 2 mg, Q1H PRN   Or  LORazepam (ATIVAN) tablet 3 mg, Q1H PRN   Or  LORazepam (ATIVAN) injection 3 mg, Q1H PRN Or  LORazepam (ATIVAN) tablet 4 mg, Q1H PRN   Or  LORazepam (ATIVAN) injection 4 mg, Q1H PRN        Allergies:  Patient has no known allergies. Social History:   Social History     Socioeconomic History    Marital status: Single     Spouse name: Not on file    Number of children: Not on file    Years of education: Not on file    Highest education level: Not on file   Occupational History    Not on file   Tobacco Use    Smoking status: Every Day     Packs/day: 1.00     Years: 44.00     Pack years: 44.00     Types: Cigarettes     Start date: 11    Smokeless tobacco: Never    Tobacco comments:     pt educated on both alcohol and smoking cessation   Substance and Sexual Activity    Alcohol use: Yes     Alcohol/week: 35.0 standard drinks     Types: 35 Cans of beer per week     Comment: hx of alcoholism; States he drinks 5 beers per day; pt has D/T's    Drug use: Not Currently     Types: Cocaine, Marijuana (Weed)     Comment: Quit cocaine at age 24, quit marijuana at age 24    Sexual activity: Not on file   Other Topics Concern    Not on file   Social History Narrative    Not on file     Social Determinants of Health     Financial Resource Strain: High Risk    Difficulty of Paying Living Expenses: Hard   Food Insecurity: No Food Insecurity    Worried About Running Out of Food in the Last Year: Never true    Ran Out of Food in the Last Year: Never true   Transportation Needs: Not on file   Physical Activity: Not on file   Stress: Not on file   Social Connections: Not on file   Intimate Partner Violence: Not on file   Housing Stability: Not on file       Family History:   Family History   Problem Relation Age of Onset    Other Mother         mental health        Review of Systems:    Constitutional: No fever, no chills, no night sweats, +fatigue, generalized weakness, loss of appetite  HEENT:  +headache, otalgia, no itchy eyes, epistaxis, nasal discharge or sore throat.   Cardiac:  No chest pain, +dyspnea, orthopnea or PND, palpitations  Chest: + cough, no hemoptysis, pleuritic chest pain, wheezing,+SOB  Abdomen:  No abdominal pain, nausea, vomiting, diarrhea, melena, dysphagia hematemesis,constipation, abdominal bloating, flank pain  Neuro:  No CVA, TIA or seizure like activity. Skin:   No rashes, no itching. :   No hematuria, no pyuria, no dysuria, no flank pain. Extremities:  No swelling or joint pains. Objective:  CURRENT TEMPERATURE:  Temp: 98.6 °F (37 °C)  MAXIMUM TEMPERATURE OVER 24HRS:  Temp (24hrs), Av.1 °F (36.7 °C), Min:97.6 °F (36.4 °C), Max:98.6 °F (37 °C)    CURRENT RESPIRATORY RATE:  Resp: 28  CURRENT PULSE:  Heart Rate: (!) 116  CURRENT BLOOD PRESSURE:  BP: 106/74  24HR BLOOD PRESSURE RANGE:  Systolic (17WDI), RMT:885 , Min:106 , RER:310   ; Diastolic (33LPI), BGH:81, Min:69, Max:106    24HR INTAKE/OUTPUT:    Intake/Output Summary (Last 24 hours) at 2022 1450  Last data filed at 2022 0530  Gross per 24 hour   Intake 2609.59 ml   Output 1300 ml   Net 1309.59 ml     Patient Vitals for the past 96 hrs (Last 3 readings):   Weight   22 2237 198 lb 3.1 oz (89.9 kg)       Physical Exam:  GENERAL APPEARANCE: Alert and cooperative, and appears to be in no acute distress. HEAD: normocephalic  EYES:  EOMI. NOSE:  No nasal discharge. CARDIAC: Rhythm is regular. LUNGS: Normal respiratory effort no accessory muscle use  NECK: Trachea midline    MUSKULOSKELETAL:  No joint erythema or tenderness. EXTREMITIES: No edema. Peripheral pulses intact.    NEURO: Nonfocal      Labs:   CBC:  Recent Labs     22  1715 22  0728   WBC 12.6* 6.5   RBC 3.70* 3.37*   HGB 14.0 13.0*   HCT 40.2* 36.9*   .6* 109.3*   MCH 37.8* 38.6*   MCHC 34.8 35.3   RDW 14.0 13.8    153   MPV 8.5 8.0      BMP:   Recent Labs     22  1745 22  0728 22  1044   * 124* 126*   K 4.2 4.2  --    CL 86* 93*  --    CO2 21 21  --    BUN 6 8  --    CREATININE 0.42* <0.40*  --    GLUCOSE 76 125*  --    CALCIUM 9.1 8.9  --       Phosphorus:  No results for input(s): PHOS in the last 72 hours. Magnesium:   Recent Labs     09/13/22  1745   MG 1.9     Albumin:   Recent Labs     09/13/22  1745 09/14/22  0728   LABALBU 3.4* 3.2*       IRON:  No results for input(s): IRON in the last 72 hours. Iron Saturation:  Invalid input(s): PERCENTFE  TIBC:  No results for input(s): TIBC in the last 72 hours. FERRITIN:  No results for input(s): FERRITIN in the last 72 hours. SPEP: No results for input(s): SPEP in the last 72 hours. Recent Labs     09/14/22  0728   PROT 6.9     UPEP: No results for input(s): TPU in the last 72 hours. Urine Sodium:    Recent Labs     09/14/22  1138   SHEELA 20      Urine Potassium: No results for input(s): KUR in the last 72 hours. Urine Chloride:  No results for input(s): CLU in the last 72 hours. Urine Ph:  Invalid input(s): PO4U  Urine Osmolarity: No results for input(s): OSMOU in the last 72 hours. Urine Creatinine:  No results for input(s): LABCREA in the last 72 hours. Urine Eosinophils: Invalid input(s): EOSU  Urine Protein:  No results for input(s): TPU in the last 72 hours. Urinalysis:  No results for input(s): Yasir Magic, PHUR, LABCAST, WBCUA, RBCUA, MUCUS, TRICHOMONAS, YEAST, BACTERIA, CLARITYU, SPECGRAV, LEUKOCYTESUR, UROBILINOGEN, BILIRUBINUR, BLOODU, GLUCOSEU, KETUA, AMORPHOUS in the last 72 hours. Radiology:  Reviewed as available. Assessment:  Acute on chronic hyponatremia, hypoosmolar hyponatremia urine sodium is 20 which is consistent with hypovolemic hyponatremia, patient is responsive to saline relying SIADH cannot be ruled out given history of lung mass  Essential hypertension-blood pressure well controlled       Plan:  Continue IV fluids normal saline  Serum sodium checks every 6 hours  Serum sodium target not to increase more than 9 mmol in 24 hours.   So serum sodium target for next 24 hours to keep between 1   Continue fluid restriction

## 2022-09-14 NOTE — PROGRESS NOTES
Patient had seen Dr. Katarina Lamar during the most recent admission in late August.  We will defer consult service to him. Notified bedside nurse and charge nurse.     Amanda Baldwin MD

## 2022-09-14 NOTE — PROGRESS NOTES
Claudine Bound Dr Kortney Dickey regarding pt having no AM labs ordered except troponins despite pt coming in with hyponatremia. Awaiting response.

## 2022-09-14 NOTE — PLAN OF CARE
Problem: Discharge Planning  Goal: Discharge to home or other facility with appropriate resources  Outcome: Progressing  Flowsheets (Taken 9/14/2022 0800)  Discharge to home or other facility with appropriate resources: Identify barriers to discharge with patient and caregiver     Problem: ABCDS Injury Assessment  Goal: Absence of physical injury  9/14/2022 1606 by Kevyn Barclay RN  Outcome: Progressing  Flowsheets (Taken 9/14/2022 0913)  Absence of Physical Injury: Implement safety measures based on patient assessment  9/14/2022 0530 by Lindsey Butler RN  Outcome: Progressing  Flowsheets (Taken 9/13/2022 2215)  Absence of Physical Injury: Implement safety measures based on patient assessment     Problem: Safety - Adult  Goal: Free from fall injury  9/14/2022 1606 by Kevyn Barclay RN  Outcome: Progressing  Flowsheets (Taken 9/14/2022 0913)  Free From Fall Injury: Instruct family/caregiver on patient safety  9/14/2022 0530 by Lindsey Butler RN  Outcome: Progressing  [address] (Taken 9/13/2022 2219)  Free From Fall Injury: Instruct family/caregiver on patient safety     Problem: Pain  Goal: Verbalizes/displays adequate comfort level or baseline comfort level  9/14/2022 1606 by Kevyn Barclay RN  Outcome: Progressing  9/14/2022 0530 by Lindsey Butler RN  Outcome: Progressing  Flowsheets (Taken 9/13/2022 2215)  Verbalizes/displays adequate comfort level or baseline comfort level:   Encourage patient to monitor pain and request assistance   Assess pain using appropriate pain scale   Administer analgesics based on type and severity of pain and evaluate response   Implement non-pharmacological measures as appropriate and evaluate response

## 2022-09-14 NOTE — PROGRESS NOTES
Patient keeps asking RN to close the window that is already closed, hallucinating about a \"man in the black coat\" standing on the roof looking in his room. See CIWA scale charted.

## 2022-09-14 NOTE — TELEPHONE ENCOUNTER
Just looked in pt chart and he was admitted yesterday into [address] [facility] and he is still admitted into there

## 2022-09-14 NOTE — PROGRESS NOTES
Physical Therapy  Facility/Department: [facility] ICU  Physical Therapy Initial Assessment    Name: Robert Evans  : 1965  MRN: [id number]  Date of Service: 2022    Discharge Recommendations:  Patient would benefit from continued therapy after discharge          Patient Diagnosis(es): The primary encounter diagnosis was COPD exacerbation ([facility] [address] Diagnoses of Hyponatremia, COVID-19, and Cellulitis of lower extremity, unspecified laterality were also pertinent to this visit. Past Medical History:  has a past medical history of Alcohol abuse, Avascular necrosis of femoral head ([address] History of hip replacement, Hypertension, Mass of left lung, Rib fracture, Scabies, Stage 3 severe COPD by GOLD classification ([facility] [address] and Tachycardia. Past Surgical History:  has a past surgical history that includes Neck surgery; Total hip arthroplasty (Bilateral); and Vasectomy. Assessment   Assessment: Pt needs assistance for transfers and ambulation at this time, is unsteady during ambulation and will benefit from PT intervention  Treatment Diagnosis: impaired strength and mobility  Specific Instructions for Next Treatment: ther exs and ther activities as tolerated  Therapy Prognosis: Fair  Decision Making: Medium Complexity  History: admitted to hospital 22 with exacerbation of COPD, hyponatremia,COVID +ve and cellulitis LE  Exam: ROM,MMT,sensations, functional mobility testing  Clinical Presentation: Pt was alert, cooperative ,but a little impulsive. Requires PT Follow-Up: Yes  Activity Tolerance  Activity Tolerance: Patient tolerated evaluation without incident     Plan   Plan  Plan: 2-3 times per week  Plan weeks: 2  Specific Instructions for Next Treatment: ther exs and ther activities as tolerated  Current Treatment Recommendations: Strengthening, Balance training, Functional mobility training, Transfer training, Gait training  Safety Devices  Type of Devices:  All fall risk precautions in place, Call light within eval for details)  AROM LUE (degrees)  LUE AROM :  (see OT eval for details)  Strength RLE  Strength RLE: WFL (grossly 4/5)  Strength LLE  Strength LLE: WFL (grossly 4/5)  Strength RUE  Strength RUE:  (see OT eval for details)  Strength LUE  Strength LUE:  (see OT eval for details)  Tone RLE  RLE Tone: Normotonic  Tone LLE  LLE Tone: Normotonic  Sensation  Overall Sensation Status: Impaired     Bed mobility  Supine to Sit: Modified independent  Scooting: Modified independent  Bed Mobility Comments: Bed mobility completed with HOB elevated. Pt sitting EOB at end of session  Transfers  Sit to Stand: Stand by assistance  Stand to sit: Stand by assistance  Ambulation  Surface: level tile  Device: No Device;Hand-Held Assist  Assistance: Stand by assistance  Quality of Gait: slightly unsteady, impulsive  Distance: 15 ft     Balance  Posture: Fair  Sitting - Static: Fair  Sitting - Dynamic: Fair;-  Standing - Static: Fair  Standing - Dynamic: Fair;-                                                           AM-PAC Score     AM-PAC Inpatient Mobility without Stair Climbing Raw Score : 16 (09/14/22 1609)  AM-PAC Inpatient without Stair Climbing T-Scale Score : 45.54 (09/14/22 1609)  Mobility Inpatient CMS 0-100% Score: 40.64 (09/14/22 1609)  Mobility Inpatient without Stair CMS G-Code Modifier : CK (09/14/22 1609)              Goals  Short Term Goals  Time Frame for Short term goals: 4 sessions  Short term goal 1: Improve strength in both LE to 5/5  Short term goal 2: Improve sitting and standing balance to fair+  Short term goal 3:  Independent sit to stand transfers  Long Term Goals  Time Frame for Long term goals : 8 sessions  Long term goal 1: Independent ambulation 120 ft  with standard cane  Patient Goals   Patient goals : return home       Education  Patient Education  Education Given To: Patient  Education Provided: Role of Therapy;Plan of Care  Education Method: Verbal  Education Outcome: Verbalized understanding      Therapy Time   Individual Concurrent Group Co-treatment   Time In 9650         Time Out 1419         Minutes [address], PT

## 2022-09-14 NOTE — PROGRESS NOTES
[address] [facility]    OCCUPATIONAL THERAPY MISSED TREATMENT NOTE   INPATIENT   Date: 22  Patient Name: Jeanne Fields       Room:   MRN: [id number]   Account #: [de-identified]    : 1965  (61 y.o.)  Gender: male   Referring Practitioner: Ivonne Mclaughlin MD             REASON FOR MISSED TREATMENT:  Pt currently at baseline level of independence. Pt denies need for Occupational therapy at this time. No further OT needed    -   OT being discontinued at this time.  Patient functioning at Premorbid Level  No further needs  [id number]        [place] [employer] [facility], OT

## 2022-09-14 NOTE — PROGRESS NOTES
Patient is increasingly agitated, states he is seeing people dancing outside of his room. Continues to pull EKG leads off and tracing his IV lines. Patient keeps standing up to look out the window waving his cane around. Telesitter requested.

## 2022-09-14 NOTE — PROGRESS NOTES
09/14/22 1244   Encounter Summary   Encounter Overview/Reason  Initial Encounter   Service Provided For: Patient   Referral/Consult From: Rounding   Complexity of Encounter Low   Spiritual/Emotional needs   Type Spiritual Support   Assessment/Intervention/Outcome   Assessment Unable to assess; Other (Comment)  (covid isolation)   Intervention Prayer (assurance of)/[place]

## 2022-09-15 LAB
BUN BLDV-MCNC: 11 MG/DL (ref 6–20)
CREAT SERPL-MCNC: <0.4 MG/DL (ref 0.7–1.2)
GFR AFRICAN AMERICAN: ABNORMAL ML/MIN
GFR NON-AFRICAN AMERICAN: ABNORMAL ML/MIN
GFR SERPL CREATININE-BSD FRML MDRD: ABNORMAL ML/MIN/{1.73_M2}
REASON FOR REJECTION: NORMAL
SODIUM BLD-SCNC: 124 MMOL/L (ref 135–144)
SODIUM BLD-SCNC: 129 MMOL/L (ref 135–144)
SODIUM BLD-SCNC: 131 MMOL/L (ref 135–144)
SODIUM BLD-SCNC: 132 MMOL/L (ref 135–144)
VANCOMYCIN RANDOM DATE LAST DOSE: NORMAL
VANCOMYCIN RANDOM DOSE AMOUNT: 1250
VANCOMYCIN RANDOM TIME LAST DOSE: 2224
VANCOMYCIN RANDOM: 13.2 UG/ML
ZZ NTE CLEAN UP: ORDERED TEST: NORMAL
ZZ NTE WITH NAME CLEAN UP: SPECIMEN SOURCE: NORMAL

## 2022-09-15 PROCEDURE — 6360000002 HC RX W HCPCS: Performed by: EMERGENCY MEDICINE

## 2022-09-15 PROCEDURE — 2580000003 HC RX 258: Performed by: EMERGENCY MEDICINE

## 2022-09-15 PROCEDURE — 6360000002 HC RX W HCPCS: Performed by: FAMILY MEDICINE

## 2022-09-15 PROCEDURE — 2580000003 HC RX 258: Performed by: INTERNAL MEDICINE

## 2022-09-15 PROCEDURE — 2580000003 HC RX 258: Performed by: FAMILY MEDICINE

## 2022-09-15 PROCEDURE — 6370000000 HC RX 637 (ALT 250 FOR IP): Performed by: FAMILY MEDICINE

## 2022-09-15 PROCEDURE — 6360000002 HC RX W HCPCS: Performed by: INTERNAL MEDICINE

## 2022-09-15 PROCEDURE — 94640 AIRWAY INHALATION TREATMENT: CPT

## 2022-09-15 PROCEDURE — 2500000003 HC RX 250 WO HCPCS: Performed by: INTERNAL MEDICINE

## 2022-09-15 PROCEDURE — 2500000003 HC RX 250 WO HCPCS: Performed by: FAMILY MEDICINE

## 2022-09-15 PROCEDURE — 80202 ASSAY OF VANCOMYCIN: CPT

## 2022-09-15 PROCEDURE — 2700000000 HC OXYGEN THERAPY PER DAY

## 2022-09-15 PROCEDURE — 36415 COLL VENOUS BLD VENIPUNCTURE: CPT

## 2022-09-15 PROCEDURE — 84295 ASSAY OF SERUM SODIUM: CPT

## 2022-09-15 PROCEDURE — 2000000000 HC ICU R&B

## 2022-09-15 PROCEDURE — 84520 ASSAY OF UREA NITROGEN: CPT

## 2022-09-15 PROCEDURE — 82565 ASSAY OF CREATININE: CPT

## 2022-09-15 RX ORDER — DEXMEDETOMIDINE HYDROCHLORIDE 4 UG/ML
.1-1.5 INJECTION, SOLUTION INTRAVENOUS CONTINUOUS
Status: DISCONTINUED | OUTPATIENT
Start: 2022-09-15 | End: 2022-09-17

## 2022-09-15 RX ORDER — GAUZE BANDAGE 2" X 2"
100 BANDAGE TOPICAL DAILY
Status: DISCONTINUED | OUTPATIENT
Start: 2022-09-15 | End: 2022-09-19 | Stop reason: HOSPADM

## 2022-09-15 RX ORDER — FOLIC ACID 1 MG/1
1 TABLET ORAL DAILY
Status: DISCONTINUED | OUTPATIENT
Start: 2022-09-15 | End: 2022-09-19 | Stop reason: HOSPADM

## 2022-09-15 RX ADMIN — BUDESONIDE AND FORMOTEROL FUMARATE DIHYDRATE 2 PUFF: 160; 4.5 AEROSOL RESPIRATORY (INHALATION) at 08:02

## 2022-09-15 RX ADMIN — SODIUM CHLORIDE, PRESERVATIVE FREE 10 ML: 5 INJECTION INTRAVENOUS at 20:37

## 2022-09-15 RX ADMIN — DOXYCYCLINE 100 MG: 100 CAPSULE ORAL at 20:36

## 2022-09-15 RX ADMIN — LEVALBUTEROL TARTRATE 1 PUFF: 45 AEROSOL, METERED ORAL at 08:01

## 2022-09-15 RX ADMIN — LEVALBUTEROL TARTRATE 1 PUFF: 45 AEROSOL, METERED ORAL at 20:44

## 2022-09-15 RX ADMIN — METHYLPREDNISOLONE SODIUM SUCCINATE 40 MG: 40 INJECTION, POWDER, FOR SOLUTION INTRAMUSCULAR; INTRAVENOUS at 07:15

## 2022-09-15 RX ADMIN — LORAZEPAM 4 MG: 2 INJECTION INTRAMUSCULAR; INTRAVENOUS at 02:02

## 2022-09-15 RX ADMIN — DEXMEDETOMIDINE HYDROCHLORIDE 0.2 MCG/KG/HR: 400 INJECTION INTRAVENOUS at 01:58

## 2022-09-15 RX ADMIN — Medication 2 PUFF: at 08:01

## 2022-09-15 RX ADMIN — TRAZODONE HYDROCHLORIDE 50 MG: 50 TABLET ORAL at 20:36

## 2022-09-15 RX ADMIN — METHYLPREDNISOLONE SODIUM SUCCINATE 40 MG: 40 INJECTION, POWDER, FOR SOLUTION INTRAMUSCULAR; INTRAVENOUS at 13:55

## 2022-09-15 RX ADMIN — METOPROLOL SUCCINATE 100 MG: 50 TABLET, EXTENDED RELEASE ORAL at 20:36

## 2022-09-15 RX ADMIN — DEXMEDETOMIDINE HYDROCHLORIDE 0.2 MCG/KG/HR: 400 INJECTION INTRAVENOUS at 20:47

## 2022-09-15 RX ADMIN — VANCOMYCIN HYDROCHLORIDE 1500 MG: 1.5 INJECTION, POWDER, LYOPHILIZED, FOR SOLUTION INTRAVENOUS at 09:39

## 2022-09-15 RX ADMIN — METHYLPREDNISOLONE SODIUM SUCCINATE 40 MG: 40 INJECTION, POWDER, FOR SOLUTION INTRAMUSCULAR; INTRAVENOUS at 22:42

## 2022-09-15 RX ADMIN — ENOXAPARIN SODIUM 40 MG: 100 INJECTION SUBCUTANEOUS at 09:33

## 2022-09-15 RX ADMIN — METOPROLOL TARTRATE 5 MG: 5 INJECTION, SOLUTION INTRAVENOUS at 14:45

## 2022-09-15 RX ADMIN — SODIUM CHLORIDE: 9 INJECTION, SOLUTION INTRAVENOUS at 09:36

## 2022-09-15 RX ADMIN — Medication 2 PUFF: at 20:44

## 2022-09-15 RX ADMIN — VANCOMYCIN HYDROCHLORIDE 1500 MG: 1.5 INJECTION, POWDER, LYOPHILIZED, FOR SOLUTION INTRAVENOUS at 21:07

## 2022-09-15 RX ADMIN — BUDESONIDE AND FORMOTEROL FUMARATE DIHYDRATE 2 PUFF: 160; 4.5 AEROSOL RESPIRATORY (INHALATION) at 20:44

## 2022-09-15 ASSESSMENT — ENCOUNTER SYMPTOMS
BACK PAIN: 0
ANAL BLEEDING: 0
COLOR CHANGE: 0
STRIDOR: 0
BLOOD IN STOOL: 0
RECTAL PAIN: 0
VOICE CHANGE: 0
TROUBLE SWALLOWING: 0
CHOKING: 0
PHOTOPHOBIA: 0
SHORTNESS OF BREATH: 1
ABDOMINAL DISTENTION: 0
COUGH: 1

## 2022-09-15 ASSESSMENT — PAIN SCALES - GENERAL
PAINLEVEL_OUTOF10: 0
PAINLEVEL_OUTOF10: 0

## 2022-09-15 NOTE — PROGRESS NOTES
Spoke with Dr. ZAYAS [facility] regarding oral medications. Patient started on sedation overnight d/t behaviors. Unable to tolerate oral meds at this time. Per Dr. ZAYAS [facility], BP management per IV Metoprolol as needed and continue IV solumedrol. Will restart orals when patient is more alert.

## 2022-09-15 NOTE — PROGRESS NOTES
Nephrology Progress Note    Subjective/   61y.o. year old male who we are seeing in consultation for hyponatremia    Interval history:  Patient is sedated on 4 L nasal cannula oxygen. Has good urine output 1.8 L yesterday. No significant events overnight serum sodium 129 mmol stable. History of Present Illness: This is a 61 y.o. male with past medical history of COPD, patient presented to the hospital with complaints of shortness of breath cough and headache. Patient was recently admitted to the hospital from 8/28/2022 to 9/1/2022 patient was admitted for COVID-19 pneumonia, hemoptysis. After discharge patient continued to have cough shortness of breath but no hemoptysis, patient started to have headache, sore throat body aches. On initial investigation CT chest with IV contrast showed minimal peripheral groundglass opacities in the right upper lobe nonspecific but may be consistent with COVID infection, no PE.   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   Labs showed serum sodium of 120 on admission, patient admits to alcohol drinking and not compliant with fluid restriction     Objective/     Vitals:    09/15/22 0600 09/15/22 0715 09/15/22 0802 09/15/22 0845   BP:  (!) 139/97  (!) 169/89   Pulse:  88 98 (!) 108   Resp:  24 24 23   Temp:  97.2 °F (36.2 °C)     TempSrc:       SpO2:  98% 98% 96%   Weight: 201 lb 11.5 oz (91.5 kg)      Height:         24HR INTAKE/OUTPUT:    Intake/Output Summary (Last 24 hours) at 9/15/2022 1056  Last data filed at 9/15/2022 8538  Gross per 24 hour   Intake 2756.79 ml   Output 1650 ml   Net 1106.79 ml     Patient Vitals for the past 96 hrs (Last 3 readings):   Weight   09/15/22 0600 201 lb 11.5 oz (91.5 kg)   09/15/22 0156 201 lb 11.5 oz (91.5 kg)   09/13/22 2237 198 lb 3.1 oz (89.9 kg)       Constitutional:  Alert, awake, no apparent distress  Cardiovascular:  S1, S2 without m/r/g  Respiratory:  CTA B without w/r/r  Abdomen: +bs, soft, nt  Ext:  LE edema    Data/  Recent Labs     09/13/22  1715 09/14/22  0728   WBC 12.6* 6.5   HGB 14.0 13.0*   HCT 40.2* 36.9*   .6* 109.3*    153     Recent Labs     09/13/22  1745 09/14/22  0728 09/14/22  1044 09/14/22  1902 09/15/22  0157 09/15/22  0715   * 124*   < > 126* 124* 129*   K 4.2 4.2  --   --   --   --    CL 86* 93*  --   --   --   --    CO2 21 21  --   --   --   --    GLUCOSE 76 125*  --   --   --   --    MG 1.9  --   --   --   --   --    BUN 6 8  --   --   --  11   CREATININE 0.42* <0.40*  --   --   --  <0.40*   LABGLOM >60 Can not be calculated  --   --   --  Can not be calculated   GFRAA >60 Can not be calculated  --   --   --  Can not be calculated    < > = values in this interval not displayed. Assessment/   Acute on chronic hyponatremia, hypoosmolar hyponatremia urine sodium is 20 which is consistent with hypovolemic hyponatremia, patient is responsive to saline - underlying SIADH cannot be ruled out given history of lung mass-  Serum sodium improving to 129 mmol/l with isotonic saline  Essential hypertension-blood pressure well controlled  3.   4.7 cm right lung mass    Plan/   Continue IV fluids normal saline at 75 cc/h  Serum sodium checks every 6 hours  Serum sodium target not to increase more than 9 mmol in 24 hours.   serum sodium target for next 24 hours should not exceed 136 to 137 mmol  Continue fluid restriction less than 1500 mils per day      Enedina Carpenter MD

## 2022-09-15 NOTE — CONSULTS
PULMONARY  CONSULT NOTE      Date of Admission: 9/13/2022  5:00 PM    Reason for Consult: [unfilled]    Referring Physician: * No referring provider recorded for this case *  PCP: JAEL Perez CNP     History of Present Illness: Fartun Flanagan is a 61 y.o. White (non-)   male, past medical history newly diagnosed Lung mass, COPD, recent Covid, who presents with worsening SOB that started about 5 days prior to arrival. He follows with Dr. Josh Bello and had Low dose CT scan from 7-11-22 shows 4.1 x 3.0 x 3.1 cm left lingular lung mass. He was recently admitted from 8/28-8/31 for hemoptysis and was found to be covid positive. Associated symptoms with worsening shortness of breath include sore throat, chest pain. He denies abdominal pain, nausea, vomiting, diarrhea, chills or fevers. He denies any hemoptysis. He is a current smoker. Initial sodium 120. WBC 12.6. He is still testing positive for covid. CTA chest shows RUL peripheral ground glass opacities, lingular mass now increased in size. He was previously scheduled for outpatient bronchoscopy with Dr. Josh Bello for 9/15 which was subsequently cancelled de to being covid positive. He is also a daily beer drinker with several beers per day. Patient is confused so it is difficult to obtain accurate information at this time.      Problem:  Principal Problem: COPD exac, lung mass     PMH:   Past Medical History:   Diagnosis Date    Alcohol abuse     hx of alcoholism; States he drinks 5 beers per day; pt has D/T's    Avascular necrosis of femoral head ([address] 11/25/2014    Overview:  S/p bilateral hip replacements    History of hip replacement     left/right    Hypertension     Mass of left lung     Rib fracture     Scabies     Stage 3 severe COPD by GOLD classification ([facility] [address] 11/08/2021    Tachycardia 10/28/2020    does not follow with Cardiology       PSH:   Past Surgical History:   Procedure Laterality Date    NECK SURGERY      TOTAL HIP ARTHROPLASTY Bilateral     VASECTOMY         Allergies: No Known Allergies    Home Meds:  Medications Prior to Admission: [DISCONTINUED] traZODone (DESYREL) 50 MG tablet, Take 1 tablet by mouth nightly (Patient not taking: Reported on 9/13/2022)  metoprolol succinate (TOPROL XL) 100 MG extended release tablet, Take 1 tablet by mouth 2 times daily  lisinopril (PRINIVIL;ZESTRIL) 10 MG tablet, Take 1 tablet by mouth daily  Fluticasone-Umeclidin-Vilant (TRELEGY ELLIPTA) 200-62.5-25 MCG/INH AEPB, Inhale 1 puff into the lungs Daily  albuterol sulfate HFA (PROAIR HFA) 108 (90 Base) MCG/ACT inhaler, Inhale 2 puffs into the lungs every 6 hours as needed for Wheezing  albuterol (PROVENTIL) (2.5 MG/3ML) 0.083% nebulizer solution, INHALE 3 MLS (ONE VIAL) INTO THE LUNGS EVERY 6 HOURS VIA NEBULIZER AS NEEDED FOR WHEEZING    Social History:   Social History     Socioeconomic History    Marital status: Single     Spouse name: Not on file    Number of children: Not on file    Years of education: Not on file    Highest education level: Not on file   Occupational History    Not on file   Tobacco Use    Smoking status: Every Day     Packs/day: 1.00     Years: 44.00     Pack years: 44.00     Types: Cigarettes     Start date: 11    Smokeless tobacco: Never    Tobacco comments:     pt educated on both alcohol and smoking cessation   Substance and Sexual Activity    Alcohol use:  Yes     Alcohol/week: 35.0 standard drinks     Types: 35 Cans of beer per week     Comment: hx of alcoholism; States he drinks 5 beers per day; pt has D/T's    Drug use: Not Currently     Types: Cocaine, Marijuana (Weed)     Comment: Quit cocaine at age 24, quit marijuana at age 24    Sexual activity: Not on file   Other Topics Concern    Not on file   Social History Narrative    Not on file     Social Determinants of Health     Financial Resource Strain: High Risk    Difficulty of Paying Living Expenses: Hard   Food Insecurity: No Food Insecurity    Worried About Running Out of Food in the Last Year: Never true    Ran Out of Food in the Last Year: Never true   Transportation Needs: Not on file   Physical Activity: Not on file   Stress: Not on file   Social Connections: Not on file   Intimate Partner Violence: Not on file   Housing Stability: Not on file       Family History:   Family History   Problem Relation Age of Onset    Other Mother         mental health        Review of Systems  Poor historian  Fever/ chills - no  Chest pain - no  Cough - yes   Expectoration / hemoptysis - no  shortness of breath - yes   Headache - no  Sinus drainage/ sore throat - no  abdominal pain - no  Swelling feet - no  Nausea/ vomiting/ diarrhea/ constipation - no    Physical Exam  Vital Signs: BP (!) 163/83 Comment: RN notified  Pulse 69   Temp 97.4 °F (36.3 °C) (Axillary)   Resp 20   Ht 6' 1\" (1.854 m)   Wt 201 lb 11.5 oz (91.5 kg)   SpO2 93%   BMI 26.61 kg/m²       Admission Weight: Weight: 198 lb 3.1 oz (89.9 kg)  Precedex 0.2  General Appearance: Confused at times, restrained, in no distress  Head: Normocephalic, without obvious abnormality, atraumatic  Neck: no adenopathy, no JVD, supple, symmetrical, trachea midline\"thyroid not enlarged, symmetric, no tenderness/mass/nodule  Lungs: poor air entry bilaterally; breath sounds+rhonchi- ; rales/ crackles- absent, on 4L  Heart: : regular rate and rhythm, S1, S2 normal, no murmur, click, rub or gallop  Abdomen: soft, non-tender; bowel sounds normal; no masses,  no organomegaly  Extremities: extremities normal, atraumatic, no cyanosis or edema  Skin: skin color, texture, turgor normal. No rashes or lesions  Neurologic: Grossly normal    [unfilled]    Recent labs, Imaging studies reviewed      Data ReviewCBC:   Recent Labs     09/13/22  1715 09/14/22  0728   WBC 12.6* 6.5   RBC 3.70* 3.37*   HGB 14.0 13.0*   HCT 40.2* 36.9*    153     BMP:   Recent Labs     09/13/22  1745 09/14/22  0728 09/14/22  1044 09/15/22  0157 09/15/22  0715 09/15/22  1252   GLUCOSE 76 125*  --   --   --   --    * 124*   < > 124* 129* 131*   K 4.2 4.2  --   --   --   --    BUN 6 8  --   --  11  --    CREATININE 0.42* <0.40*  --   --  <0.40*  --    CALCIUM 9.1 8.9  --   --   --   --     < > = values in this interval not displayed. ABGs: No results for input(s): PHART, PO2ART, YEY1IZX, OXD9LQM, BEART, S3CEUBCF, LYZ5VUP in the last 72 hours. PT/INR:  No results found for: PTINR      ASSESSMENT / PLAN:  Lingular lung mass   Slight increase in size since LDCT from 7/11/22  Was previously scheduled for outpatient bronchoscopy and biopsy at [facility]. V's with Dr. Berkley Moore on 9/15 which was cancelled due to positive covid test. Per notes would not be done until after 9/29. COPD exac- ABX, steroids   Recent covid- initially tested positive on 8/29  AMS- related to alcohol withdraw? Hyponatremia- improved   Nephrology following.  FR of 1500 ml ordered  Alcohol dependence  CIWA  Nicotine dependence- nicotine patch   Patient seems agreeable to needle biopsy given increase in size on lingular mass and delay with bronch & biopsy due to covid status   Discussed with Dr. Odette Serrato     Electronically signed by JAEL oJrdan CNP on 09/15/22 at 2:01 PM.

## 2022-09-15 NOTE — PROGRESS NOTES
Pt repeatedly pulling at telemetry wires, touching and pushing buttons on IV pump, and getting out of bed unassisted. 3mg Ativan given previously, did not help, will give full dose based on ciwa scale. Pt refusing telemetry at this time. Page sent to Dr. Abhishek Grady.

## 2022-09-15 NOTE — PROGRESS NOTES
Pt ripped out IV. Security called to place pt in wrist restraints. Pt placed in restraints without incident. Spoke with NP Calvin Abbasi and received order for wrist restraints. Recommended calling critical care to consider precedex. Will continue to monitor pt safety.

## 2022-09-15 NOTE — PLAN OF CARE
Problem: Discharge Planning  Goal: Discharge to home or other facility with appropriate resources  Outcome: Progressing  Flowsheets (Taken 9/15/2022 0900)  Discharge to home or other facility with appropriate resources:   Identify barriers to discharge with patient and caregiver   Arrange for needed discharge resources and transportation as appropriate   Identify discharge learning needs (meds, wound care, etc)   Arrange for interpreters to assist at discharge as needed   Refer to discharge planning if patient needs post-hospital services based on physician order or complex needs related to functional status, cognitive ability or social support system     Problem: ABCDS Injury Assessment  Goal: Absence of physical injury  Outcome: Progressing  Flowsheets (Taken 9/15/2022 0900)  Absence of Physical Injury: Implement safety measures based on patient assessment     Problem: Safety - Adult  Goal: Free from fall injury  Outcome: Progressing  Flowsheets (Taken 9/15/2022 0900)  Free From Fall Injury:   Instruct family/caregiver on patient safety   Based on caregiver fall risk screen, instruct family/caregiver to ask for assistance with transferring infant if caregiver noted to have fall risk factors     Problem: Pain  Goal: Verbalizes/displays adequate comfort level or baseline comfort level  Outcome: Progressing  Flowsheets (Taken 9/15/2022 0900)  Verbalizes/displays adequate comfort level or baseline comfort level:   Encourage patient to monitor pain and request assistance   Assess pain using appropriate pain scale   Administer analgesics based on type and severity of pain and evaluate response   Implement non-pharmacological measures as appropriate and evaluate response   Consider cultural and social influences on pain and pain management   Notify Licensed Independent Practitioner if interventions unsuccessful or patient reports new pain     Problem: Safety - Medical Restraint  Goal: Remains free of injury from restraints (Restraint for Interference with Medical Device)  Description: INTERVENTIONS:  1. Determine that other, less restrictive measures have been tried or would not be effective before applying the restraint  2. Evaluate the patient's condition at the time of restraint application  3. Inform patient/family regarding the reason for restraint  4.  Q2H: Monitor safety, psychosocial status, comfort, nutrition and hydration  Outcome: Progressing  Flowsheets  Taken 9/15/2022 1400  Remains free of injury from restraints (restraint for interference with medical device):   Determine that other, less restrictive measures have been tried or would not be effective before applying the restraint   Evaluate the patient's condition at the time of restraint application   Inform patient/family regarding the reason for restraint   Every 2 hours: Monitor safety, psychosocial status, comfort, nutrition and hydration  Taken 9/15/2022 1300  Remains free of injury from restraints (restraint for interference with medical device):   Determine that other, less restrictive measures have been tried or would not be effective before applying the restraint   Evaluate the patient's condition at the time of restraint application   Inform patient/family regarding the reason for restraint   Every 2 hours: Monitor safety, psychosocial status, comfort, nutrition and hydration  Taken 9/15/2022 1203  Remains free of injury from restraints (restraint for interference with medical device):   Determine that other, less restrictive measures have been tried or would not be effective before applying the restraint   Evaluate the patient's condition at the time of restraint application   Inform patient/family regarding the reason for restraint   Every 2 hours: Monitor safety, psychosocial status, comfort, nutrition and hydration  Taken 9/15/2022 1100  Remains free of injury from restraints (restraint for interference with medical device):   Determine that other, less restrictive measures have been tried or would not be effective before applying the restraint   Evaluate the patient's condition at the time of restraint application   Inform patient/family regarding the reason for restraint   Every 2 hours: Monitor safety, psychosocial status, comfort, nutrition and hydration  Taken 9/15/2022 1000  Remains free of injury from restraints (restraint for interference with medical device):   Determine that other, less restrictive measures have been tried or would not be effective before applying the restraint   Evaluate the patient's condition at the time of restraint application   Inform patient/family regarding the reason for restraint   Every 2 hours: Monitor safety, psychosocial status, comfort, nutrition and hydration  Taken 9/15/2022 0800  Remains free of injury from restraints (restraint for interference with medical device):   Determine that other, less restrictive measures have been tried or would not be effective before applying the restraint   Evaluate the patient's condition at the time of restraint application   Inform patient/family regarding the reason for restraint   Every 2 hours: Monitor safety, psychosocial status, comfort, nutrition and hydration     Problem: Skin/Tissue Integrity  Goal: Absence of new skin breakdown  Description: 1. Monitor for areas of redness and/or skin breakdown  2. Assess vascular access sites hourly  3. Every 4-6 hours minimum:  Change oxygen saturation probe site  4. Every 4-6 hours:  If on nasal continuous positive airway pressure, respiratory therapy assess nares and determine need for appliance change or resting period.   Outcome: Progressing

## 2022-09-15 NOTE — PROGRESS NOTES
Windy Mary  is refusing medications saying he will take them when he leaves he is going home. He stated it is his game now not mine. He is stating he wants his girlfriend and wallet. Also stating put the inhalers in the damn box give them to him so he can get the hell out of here where is his damn girlfriend and wallet.

## 2022-09-15 NOTE — PROGRESS NOTES
Progress Note    9/15/2022   3:10 PM    Name:  Shira Williamson  MRN:    [id number]     Acct:     [de-identified]   Room:  2014/2014-01  IP Day: 2     Admit Date: 9/13/2022  5:00 PM  PCP: JAEL Lambert CNP    Subjective:     C/C:   Chief Complaint   Patient presents with    Shortness of Breath     Hx of COPD, SOB worse over the last 2 days  Mh190g in triage  RA sating 100%, resps labored around 30       Interval History: Status: not changed. Patient has agitation and confusion quiring soft wrist restraints. Patient is on IV Precedex. Patient has shortness of breath. Patient oxygen saturation is 93% on 4 L of oxygen via nasal cannula. Elevated blood pressure readings noted heart rate in 100s. Afebrile    ROS:   all 10 systems reviewed and are negative except as noted    Review of Systems   Constitutional:  Negative for appetite change, chills and diaphoresis. HENT:  Negative for drooling, ear pain, trouble swallowing and voice change. Eyes:  Negative for photophobia and visual disturbance. Respiratory:  Positive for cough and shortness of breath. Negative for choking and stridor. Cardiovascular:  Negative for chest pain and palpitations. Gastrointestinal:  Negative for abdominal distention, anal bleeding, blood in stool and rectal pain. Endocrine: Negative for polyphagia and polyuria. Genitourinary:  Negative for dysuria, flank pain, hematuria and urgency. Musculoskeletal:  Negative for back pain, myalgias and neck stiffness. Skin:  Negative for color change, pallor and rash. Allergic/Immunologic: Negative for environmental allergies and food allergies. Neurological:  Negative for tremors, seizures, facial asymmetry and numbness. Hematological:  Negative for adenopathy. Does not bruise/bleed easily. Psychiatric/Behavioral:  Positive for agitation, behavioral problems and confusion. Negative for hallucinations, self-injury and suicidal ideas. Medications:      Allergies: No Known Allergies    Current Meds: dexmedetomidine (PRECEDEX) 400 mcg in sodium chloride 0.9 % 100 mL infusion, Continuous  vancomycin (VANCOCIN) 1,500 mg in dextrose 5 % 250 mL IVPB (ADDAVIAL), Q12H  thiamine mononitrate tablet 816 mg, Daily  folic acid (FOLVITE) tablet 1 mg, Daily  dilTIAZem (CARDIZEM) tablet 30 mg, 4 times per day  budesonide-formoterol (SYMBICORT) 160-4.5 MCG/ACT inhaler 2 puff, BID  ipratropium (ATROVENT HFA) 17 MCG/ACT inhaler 2 puff, Q6H WA  levalbuterol (XOPENEX HFA) inhaler 1 puff, Q6H  nicotine (NICODERM CQ) 7 MG/24HR 1 patch, Daily  sodium chloride flush 0.9 % injection 10 mL, PRN  vancomycin (VANCOCIN) intermittent dosing (placeholder), RX Placeholder  lisinopril (PRINIVIL;ZESTRIL) tablet 10 mg, Daily  metoprolol succinate (TOPROL XL) extended release tablet 100 mg, BID  traZODone (DESYREL) tablet 50 mg, Nightly  sodium chloride flush 0.9 % injection 10 mL, 2 times per day  sodium chloride flush 0.9 % injection 10 mL, PRN  0.9 % sodium chloride infusion, PRN  potassium chloride (KLOR-CON M) extended release tablet 40 mEq, PRN   Or  potassium bicarb-citric acid (EFFER-K) effervescent tablet 40 mEq, PRN   Or  potassium chloride 10 mEq/100 mL IVPB (Peripheral Line), PRN  magnesium sulfate 1000 mg in dextrose 5% 100 mL IVPB, PRN  enoxaparin (LOVENOX) injection 40 mg, Daily  ondansetron (ZOFRAN-ODT) disintegrating tablet 4 mg, Q8H PRN   Or  ondansetron (ZOFRAN) injection 4 mg, Q6H PRN  magnesium hydroxide (MILK OF MAGNESIA) 400 MG/5ML suspension 30 mL, Daily PRN  acetaminophen (TYLENOL) tablet 650 mg, Q6H PRN   Or  acetaminophen (TYLENOL) suppository 650 mg, Q6H PRN  0.9 % sodium chloride infusion, Continuous  methylPREDNISolone sodium (SOLU-MEDROL) injection 40 mg, Q8H  doxycycline monohydrate (MONODOX) capsule 100 mg, 2 times per day  metoprolol (LOPRESSOR) injection 5 mg, Q6H PRN  sodium chloride flush 0.9 % injection 5-40 mL, 2 times per day  sodium chloride flush 0.9 % injection 5-40 mL, PRN  0.9 % sodium chloride infusion, PRN  LORazepam (ATIVAN) tablet 1 mg, Q1H PRN   Or  LORazepam (ATIVAN) injection 1 mg, Q1H PRN   Or  LORazepam (ATIVAN) tablet 2 mg, Q1H PRN   Or  LORazepam (ATIVAN) injection 2 mg, Q1H PRN   Or  LORazepam (ATIVAN) tablet 3 mg, Q1H PRN   Or  LORazepam (ATIVAN) injection 3 mg, Q1H PRN   Or  LORazepam (ATIVAN) tablet 4 mg, Q1H PRN   Or  LORazepam (ATIVAN) injection 4 mg, Q1H PRN      Data:     Code Status:  Full Code    Family History   Problem Relation Age of Onset    Other Mother         mental health        Social History     Socioeconomic History    Marital status: Single     Spouse name: Not on file    Number of children: Not on file    Years of education: Not on file    Highest education level: Not on file   Occupational History    Not on file   Tobacco Use    Smoking status: Every Day     Packs/day: 1.00     Years: 44.00     Pack years: 44.00     Types: Cigarettes     Start date: 11    Smokeless tobacco: Never    Tobacco comments:     pt educated on both alcohol and smoking cessation   Substance and Sexual Activity    Alcohol use:  Yes     Alcohol/week: 35.0 standard drinks     Types: 35 Cans of beer per week     Comment: hx of alcoholism; States he drinks 5 beers per day; pt has D/T's    Drug use: Not Currently     Types: Cocaine, Marijuana (Weed)     Comment: Quit cocaine at age 24, quit marijuana at age 24    Sexual activity: Not on file   Other Topics Concern    Not on file   Social History Narrative    Not on file     Social Determinants of Health     Financial Resource Strain: High Risk    Difficulty of Paying Living Expenses: Hard   Food Insecurity: No Food Insecurity    Worried About Running Out of Food in the Last Year: Never true    Ran Out of Food in the Last Year: Never true   Transportation Needs: Not on file   Physical Activity: Not on file   Stress: Not on file   Social Connections: Not on file   Intimate Partner Violence: Not on file   Housing Stability: Not on file       I/O (24Hr): Intake/Output Summary (Last 24 hours) at 9/15/2022 1510  Last data filed at 9/15/2022 1139  Gross per 24 hour   Intake 2756.79 ml   Output 2550 ml   Net 206.79 ml     Radiology:  XR CHEST PORTABLE    Result Date: 9/13/2022  1. Redemonstration of lingular mass concerning for primary neoplasm. 2.  No new airspace disease identified in the interval.     CT CHEST PULMONARY EMBOLISM W CONTRAST    Result Date: 9/13/2022  1. Minimal peripheral ground-glass opacities in the right upper lobe are noted, while nonspecific, this may correspond to the provided history of COVID infection. 2.  Suboptimal contrast timing for evaluation of the peripheral pulmonary arteries. No evidence for central pulmonary embolism. 3.  Redemonstration of lingular mass, now measuring 4.7 cm versus 4.1 cm on recent CT. This remains concerning for primary neoplasm. Labs:  Recent Results (from the past 24 hour(s))   Sodium    Collection Time: 09/14/22  3:56 PM   Result Value Ref Range    Sodium 128 (L) 135 - 144 mmol/L   Troponin    Collection Time: 09/14/22  5:16 PM   Result Value Ref Range    Troponin, High Sensitivity 10 0 - 22 ng/L   Troponin    Collection Time: 09/14/22  7:02 PM   Result Value Ref Range    Troponin, High Sensitivity 10 0 - 22 ng/L   Sodium    Collection Time: 09/14/22  7:02 PM   Result Value Ref Range    Sodium 126 (L) 135 - 144 mmol/L   Sodium    Collection Time: 09/15/22  1:57 AM   Result Value Ref Range    Sodium 124 (L) 135 - 144 mmol/L   SPECIMEN REJECTION    Collection Time: 09/15/22  4:45 AM   Result Value Ref Range    Specimen Source . BLOOD     Ordered Test BUNCRT,NA,VNCR     Reason for Rejection Unable to perform testing: Specimen hemolyzed.     Vancomycin Level, Random    Collection Time: 09/15/22  5:39 AM   Result Value Ref Range    Vancomycin Rm 13.2 ug/mL    Vancomycin Random Dose amount 1250     Vancomycin Random Date last dose [id number]     Vancomycin Random Time last dose 5265 BUN & Creatinine    Collection Time: 09/15/22  7:15 AM   Result Value Ref Range    BUN 11 6 - 20 mg/dL    Creatinine <0.40 (L) 0.70 - 1.20 mg/dL    GFR Non- Can not be calculated >60 mL/min    GFR  Can not be calculated >60 mL/min    GFR Comment         Sodium    Collection Time: 09/15/22  7:15 AM   Result Value Ref Range    Sodium 129 (L) 135 - 144 mmol/L   Sodium    Collection Time: 09/15/22 12:52 PM   Result Value Ref Range    Sodium 131 (L) 135 - 144 mmol/L       Physical Examination:        Vitals:  BP (!) 163/83 Comment: RN notified  Pulse (!) 116   Temp 97.4 °F (36.3 °C) (Axillary)   Resp 20   Ht 6' 1\" (1.854 m)   Wt 201 lb 11.5 oz (91.5 kg)   SpO2 93%   BMI 26.61 kg/m²   Temp (24hrs), Av.7 °F (36.5 °C), Min:97.2 °F (36.2 °C), Max:98.2 °F (36.8 °C)    No results for input(s): POCGLU in the last 72 hours. Physical Exam  Vitals reviewed. Constitutional:       Appearance: Normal appearance. He is not diaphoretic. HENT:      Head: Normocephalic and atraumatic. Right Ear: External ear normal.      Left Ear: External ear normal.      Nose: Nose normal.      Mouth/Throat:      Mouth: Mucous membranes are moist.      Pharynx: Oropharynx is clear. Eyes:      Extraocular Movements: Extraocular movements intact. Conjunctiva/sclera: Conjunctivae normal.      Pupils: Pupils are equal, round, and reactive to light. Comments: No nystagmus   Cardiovascular:      Rate and Rhythm: Normal rate and regular rhythm. Pulses: Normal pulses. Heart sounds: Normal heart sounds. Pulmonary:      Effort: Pulmonary effort is normal.      Breath sounds: Normal breath sounds. No wheezing or rales. Abdominal:      General: Bowel sounds are normal. There is no distension. Palpations: Abdomen is soft. Tenderness: There is no abdominal tenderness. There is no right CVA tenderness or left CVA tenderness.    Musculoskeletal:         General: No tenderness or deformity. Normal range of motion. Cervical back: Normal range of motion and neck supple. No rigidity. Right lower leg: No edema. Left lower leg: No edema. Skin:     General: Skin is warm and dry. Capillary Refill: Capillary refill takes less than 2 seconds. Coloration: Skin is not jaundiced. Neurological:      Mental Status: He is disoriented. Psychiatric:      Comments: Agitation and confusion       Assessment:        Primary Problem  Hyponatremia     Principal Problem:    Hyponatremia  Active Problems:    Mass of lingula of lung    Alcoholism ([facility] [address]    Essential hypertension    COPD exacerbation (HCC)    Tobacco dependence  Resolved Problems:    * No resolved hospital problems. *      Past Medical History:   Diagnosis Date    Alcohol abuse     hx of alcoholism; States he drinks 5 beers per day; pt has D/T's    Avascular necrosis of femoral head ([facility] [address] 11/25/2014    Overview:  S/p bilateral hip replacements    History of hip replacement     left/right    Hypertension     Mass of left lung     Rib fracture     Scabies     Stage 3 severe COPD by GOLD classification ([facility] [address] 11/08/2021    Tachycardia 10/28/2020    does not follow with Cardiology        Plan:         IV normal saline at 75 mL/hour   Solu-Medrol 40 mg every 8 hours   Doxycycline 100 mg p.o. twice daily. Patient has agitation and confusion pulling on medical lines requiring soft bilateral wrist restraints   IV vancomycin for cellulitis bilateral lower extremity   Xopenex 1 puff inhalation every 4 hours   Continue metoprolol 100 mg p.o. XL twice daily  Increase Cardizem 30 mg p.o. every 6 hours  to optimize control of elevated heart rate.     Venous Doppler bilateral lower extremity report is negative for DVT   Atrovent HFA every 6 hours   Check sodium level every 6 hours  2D echo shows an ejection fraction of 55%   12 lead EKG shows sinus tachycardia   CT lung report shows lingular mass   DVT prophylaxis Lovenox subQ daily  Replace electrolytes as per sliding scale  Home medications reviewed and appropriate medications continued  Reviewed labs and imaging studies from last 24 hours and results explained to patient      Electronically signed by Annetta Napier MD

## 2022-09-15 NOTE — CARE COORDINATION
ONGOING DISCHARGE PLAN:    Patient is alert and oriented x4. Spoke with patient regarding discharge plan and patient confirms that plan is still home with no needs. Na 131-on 1500mL FR per nephro  On 4L nc, precedex drip, restraints, 34P7 steroids  PO doxy IV vanco  OP bronch and biopsy with Dr Laura Hedrick after 9/29. Will continue to follow for additional discharge needs.     Electronically signed by Blair Grant RN on 9/15/2022 at 4:02 PM

## 2022-09-15 NOTE — PROGRESS NOTES
[address] [facility]    OCCUPATIONAL THERAPY MISSED TREATMENT NOTE   INPATIENT   Date: 9/15/22  Patient Name: Jeanne Fields       Room:   MRN: [id number]   Account #: [de-identified]    : 1965  (61 y.o.)  Gender: male   Referring Practitioner: Ivonne Mclaughlin MD  Diagnosis: Hyponatremia             REASON FOR MISSED TREATMENT:  Hold treatment per nursing request   -    pt sleeping upon arrival. RN states to let him sleep. Will continue to follow for OT needs.       LC Maynard

## 2022-09-15 NOTE — PROGRESS NOTES
Pt making sexually explicit gestures while RN placed external urinary catheter. RN attempted to redirect pt. Pt laughed and repeated gesture.

## 2022-09-16 LAB
BUN BLDV-MCNC: 14 MG/DL (ref 6–20)
CREAT SERPL-MCNC: 0.47 MG/DL (ref 0.7–1.2)
GFR AFRICAN AMERICAN: >60 ML/MIN
GFR NON-AFRICAN AMERICAN: >60 ML/MIN
GFR SERPL CREATININE-BSD FRML MDRD: ABNORMAL ML/MIN/{1.73_M2}
MAGNESIUM: 2.1 MG/DL (ref 1.6–2.6)
PHOSPHORUS: 2.8 MG/DL (ref 2.5–4.5)
SODIUM BLD-SCNC: 128 MMOL/L (ref 135–144)
SODIUM BLD-SCNC: 130 MMOL/L (ref 135–144)
SODIUM BLD-SCNC: 131 MMOL/L (ref 135–144)
SODIUM BLD-SCNC: 133 MMOL/L (ref 135–144)

## 2022-09-16 PROCEDURE — 94761 N-INVAS EAR/PLS OXIMETRY MLT: CPT

## 2022-09-16 PROCEDURE — 6360000002 HC RX W HCPCS: Performed by: EMERGENCY MEDICINE

## 2022-09-16 PROCEDURE — 6370000000 HC RX 637 (ALT 250 FOR IP): Performed by: FAMILY MEDICINE

## 2022-09-16 PROCEDURE — 94640 AIRWAY INHALATION TREATMENT: CPT

## 2022-09-16 PROCEDURE — 2580000003 HC RX 258: Performed by: EMERGENCY MEDICINE

## 2022-09-16 PROCEDURE — 6370000000 HC RX 637 (ALT 250 FOR IP): Performed by: NURSE PRACTITIONER

## 2022-09-16 PROCEDURE — 84100 ASSAY OF PHOSPHORUS: CPT

## 2022-09-16 PROCEDURE — 6360000002 HC RX W HCPCS: Performed by: FAMILY MEDICINE

## 2022-09-16 PROCEDURE — 84295 ASSAY OF SERUM SODIUM: CPT

## 2022-09-16 PROCEDURE — 2580000003 HC RX 258: Performed by: FAMILY MEDICINE

## 2022-09-16 PROCEDURE — 84520 ASSAY OF UREA NITROGEN: CPT

## 2022-09-16 PROCEDURE — 36415 COLL VENOUS BLD VENIPUNCTURE: CPT

## 2022-09-16 PROCEDURE — 2060000000 HC ICU INTERMEDIATE R&B

## 2022-09-16 PROCEDURE — 2700000000 HC OXYGEN THERAPY PER DAY

## 2022-09-16 PROCEDURE — 82565 ASSAY OF CREATININE: CPT

## 2022-09-16 PROCEDURE — 83735 ASSAY OF MAGNESIUM: CPT

## 2022-09-16 RX ORDER — DILTIAZEM HYDROCHLORIDE 180 MG/1
180 CAPSULE, COATED, EXTENDED RELEASE ORAL DAILY
Status: DISCONTINUED | OUTPATIENT
Start: 2022-09-16 | End: 2022-09-19 | Stop reason: HOSPADM

## 2022-09-16 RX ADMIN — METOPROLOL SUCCINATE 100 MG: 50 TABLET, EXTENDED RELEASE ORAL at 09:02

## 2022-09-16 RX ADMIN — LEVALBUTEROL TARTRATE 1 PUFF: 45 AEROSOL, METERED ORAL at 08:48

## 2022-09-16 RX ADMIN — BUDESONIDE AND FORMOTEROL FUMARATE DIHYDRATE 2 PUFF: 160; 4.5 AEROSOL RESPIRATORY (INHALATION) at 19:25

## 2022-09-16 RX ADMIN — DOXYCYCLINE 100 MG: 100 CAPSULE ORAL at 09:02

## 2022-09-16 RX ADMIN — ENOXAPARIN SODIUM 40 MG: 100 INJECTION SUBCUTANEOUS at 09:02

## 2022-09-16 RX ADMIN — Medication 2 PUFF: at 13:06

## 2022-09-16 RX ADMIN — BENZOCAINE 1 LOZENGE: 2.6; 15 LOZENGE ORAL at 09:21

## 2022-09-16 RX ADMIN — LEVALBUTEROL TARTRATE 1 PUFF: 45 AEROSOL, METERED ORAL at 13:06

## 2022-09-16 RX ADMIN — METOPROLOL SUCCINATE 100 MG: 50 TABLET, EXTENDED RELEASE ORAL at 23:16

## 2022-09-16 RX ADMIN — FOLIC ACID 1 MG: 1 TABLET ORAL at 09:02

## 2022-09-16 RX ADMIN — Medication 100 MG: at 09:02

## 2022-09-16 RX ADMIN — LISINOPRIL 10 MG: 10 TABLET ORAL at 09:02

## 2022-09-16 RX ADMIN — METHYLPREDNISOLONE SODIUM SUCCINATE 40 MG: 40 INJECTION, POWDER, FOR SOLUTION INTRAMUSCULAR; INTRAVENOUS at 23:16

## 2022-09-16 RX ADMIN — SODIUM CHLORIDE, PRESERVATIVE FREE 10 ML: 5 INJECTION INTRAVENOUS at 23:20

## 2022-09-16 RX ADMIN — VANCOMYCIN HYDROCHLORIDE 1500 MG: 1.5 INJECTION, POWDER, LYOPHILIZED, FOR SOLUTION INTRAVENOUS at 23:25

## 2022-09-16 RX ADMIN — Medication 2 PUFF: at 08:48

## 2022-09-16 RX ADMIN — LEVALBUTEROL TARTRATE 1 PUFF: 45 AEROSOL, METERED ORAL at 19:26

## 2022-09-16 RX ADMIN — DILTIAZEM HYDROCHLORIDE 30 MG: 30 TABLET, FILM COATED ORAL at 06:16

## 2022-09-16 RX ADMIN — DOXYCYCLINE 100 MG: 100 CAPSULE ORAL at 23:16

## 2022-09-16 RX ADMIN — DILTIAZEM HYDROCHLORIDE 30 MG: 30 TABLET, FILM COATED ORAL at 17:28

## 2022-09-16 RX ADMIN — TRAZODONE HYDROCHLORIDE 50 MG: 50 TABLET ORAL at 23:16

## 2022-09-16 RX ADMIN — BENZOCAINE 1 LOZENGE: 2.6; 15 LOZENGE ORAL at 00:15

## 2022-09-16 RX ADMIN — DILTIAZEM HYDROCHLORIDE 30 MG: 30 TABLET, FILM COATED ORAL at 11:46

## 2022-09-16 RX ADMIN — Medication 2 PUFF: at 19:23

## 2022-09-16 RX ADMIN — METHYLPREDNISOLONE SODIUM SUCCINATE 40 MG: 40 INJECTION, POWDER, FOR SOLUTION INTRAMUSCULAR; INTRAVENOUS at 06:16

## 2022-09-16 RX ADMIN — LEVALBUTEROL TARTRATE 1 PUFF: 45 AEROSOL, METERED ORAL at 02:58

## 2022-09-16 RX ADMIN — METHYLPREDNISOLONE SODIUM SUCCINATE 40 MG: 40 INJECTION, POWDER, FOR SOLUTION INTRAMUSCULAR; INTRAVENOUS at 14:08

## 2022-09-16 RX ADMIN — VANCOMYCIN HYDROCHLORIDE 1500 MG: 1.5 INJECTION, POWDER, LYOPHILIZED, FOR SOLUTION INTRAVENOUS at 09:09

## 2022-09-16 RX ADMIN — DILTIAZEM HYDROCHLORIDE 30 MG: 30 TABLET, FILM COATED ORAL at 00:10

## 2022-09-16 RX ADMIN — BUDESONIDE AND FORMOTEROL FUMARATE DIHYDRATE 2 PUFF: 160; 4.5 AEROSOL RESPIRATORY (INHALATION) at 08:48

## 2022-09-16 ASSESSMENT — ENCOUNTER SYMPTOMS
COLOR CHANGE: 0
VOICE CHANGE: 0
ANAL BLEEDING: 0
BACK PAIN: 0
SHORTNESS OF BREATH: 1
PHOTOPHOBIA: 0
CHOKING: 0
COUGH: 1
STRIDOR: 0
ABDOMINAL DISTENTION: 0
TROUBLE SWALLOWING: 0
RECTAL PAIN: 0
BLOOD IN STOOL: 0

## 2022-09-16 ASSESSMENT — PAIN SCALES - GENERAL
PAINLEVEL_OUTOF10: 0

## 2022-09-16 NOTE — PROGRESS NOTES
(ATIVAN) tablet 1 mg, Q1H PRN   Or  LORazepam (ATIVAN) injection 1 mg, Q1H PRN   Or  LORazepam (ATIVAN) tablet 2 mg, Q1H PRN   Or  LORazepam (ATIVAN) injection 2 mg, Q1H PRN   Or  LORazepam (ATIVAN) tablet 3 mg, Q1H PRN   Or  LORazepam (ATIVAN) injection 3 mg, Q1H PRN   Or  LORazepam (ATIVAN) tablet 4 mg, Q1H PRN   Or  LORazepam (ATIVAN) injection 4 mg, Q1H PRN      Data:     Code Status:  Full Code    Family History   Problem Relation Age of Onset    Other Mother         mental health        Social History     Socioeconomic History    Marital status: Single     Spouse name: Not on file    Number of children: Not on file    Years of education: Not on file    Highest education level: Not on file   Occupational History    Not on file   Tobacco Use    Smoking status: Every Day     Packs/day: 1.00     Years: 44.00     Pack years: 44.00     Types: Cigarettes     Start date: 11    Smokeless tobacco: Never    Tobacco comments:     pt educated on both alcohol and smoking cessation   Substance and Sexual Activity    Alcohol use: Yes     Alcohol/week: 35.0 standard drinks     Types: 35 Cans of beer per week     Comment: hx of alcoholism; States he drinks 5 beers per day; pt has D/T's    Drug use: Not Currently     Types: Cocaine, Marijuana (Weed)     Comment: Quit cocaine at age 24, quit marijuana at age 24    Sexual activity: Not on file   Other Topics Concern    Not on file   Social History Narrative    Not on file     Social Determinants of Health     Financial Resource Strain: High Risk    Difficulty of Paying Living Expenses: Hard   Food Insecurity: No Food Insecurity    Worried About Running Out of Food in the Last Year: Never true    Ran Out of Food in the Last Year: Never true   Transportation Needs: Not on file   Physical Activity: Not on file   Stress: Not on file   Social Connections: Not on file   Intimate Partner Violence: Not on file   Housing Stability: Not on file       I/O (24Hr):     Intake/Output Summary (Last 24 hours) at 9/16/2022 1756  Last data filed at 9/16/2022 1434  Gross per 24 hour   Intake 2278.26 ml   Output 1400 ml   Net 878.26 ml     Radiology:  XR CHEST PORTABLE    Result Date: 9/13/2022  1. Redemonstration of lingular mass concerning for primary neoplasm. 2.  No new airspace disease identified in the interval.     CT CHEST PULMONARY EMBOLISM W CONTRAST    Result Date: 9/13/2022  1. Minimal peripheral ground-glass opacities in the right upper lobe are noted, while nonspecific, this may correspond to the provided history of COVID infection. 2.  Suboptimal contrast timing for evaluation of the peripheral pulmonary arteries. No evidence for central pulmonary embolism. 3.  Redemonstration of lingular mass, now measuring 4.7 cm versus 4.1 cm on recent CT. This remains concerning for primary neoplasm.        Labs:  Recent Results (from the past 24 hour(s))   Sodium    Collection Time: 09/15/22  7:06 PM   Result Value Ref Range    Sodium 132 (L) 135 - 144 mmol/L   Sodium    Collection Time: 09/16/22  1:58 AM   Result Value Ref Range    Sodium 131 (L) 135 - 144 mmol/L   BUN & Creatinine    Collection Time: 09/16/22  5:32 AM   Result Value Ref Range    BUN 14 6 - 20 mg/dL    Creatinine 0.47 (L) 0.70 - 1.20 mg/dL    GFR Non-African American >60 >60 mL/min    GFR African American >60 >60 mL/min    GFR Comment         Sodium    Collection Time: 09/16/22  9:20 AM   Result Value Ref Range    Sodium 133 (L) 135 - 144 mmol/L   Magnesium    Collection Time: 09/16/22  9:20 AM   Result Value Ref Range    Magnesium 2.1 1.6 - 2.6 mg/dL   Phosphorus    Collection Time: 09/16/22  9:20 AM   Result Value Ref Range    Phosphorus 2.8 2.5 - 4.5 mg/dL   Sodium    Collection Time: 09/16/22  3:02 PM   Result Value Ref Range    Sodium 130 (L) 135 - 144 mmol/L       Physical Examination:        Vitals:  /77   Pulse 80   Temp 97.8 °F (36.6 °C)   Resp 20   Ht 6' 1\" (1.854 m)   Wt 202 lb 13.2 oz (92 kg)   SpO2 90%   BMI 26.76 kg/m²   Temp (24hrs), Av.7 °F (36.5 °C), Min:97.4 °F (36.3 °C), Max:97.9 °F (36.6 °C)    No results for input(s): POCGLU in the last 72 hours. Physical Exam  Vitals reviewed. Constitutional:       Appearance: Normal appearance. He is not diaphoretic. HENT:      Head: Normocephalic and atraumatic. Right Ear: External ear normal.      Left Ear: External ear normal.      Nose: Nose normal.      Mouth/Throat:      Mouth: Mucous membranes are moist.      Pharynx: Oropharynx is clear. Eyes:      Conjunctiva/sclera: Conjunctivae normal.   Cardiovascular:      Rate and Rhythm: Normal rate and regular rhythm. Pulses: Normal pulses. Heart sounds: Normal heart sounds. Pulmonary:      Effort: Pulmonary effort is normal.      Breath sounds: Normal breath sounds. No wheezing or rales. Abdominal:      General: Bowel sounds are normal. There is no distension. Palpations: Abdomen is soft. Tenderness: There is no abdominal tenderness. There is no right CVA tenderness or left CVA tenderness. Musculoskeletal:         General: No tenderness or deformity. Normal range of motion. Cervical back: Normal range of motion and neck supple. No rigidity. Right lower leg: No edema. Left lower leg: No edema. Skin:     General: Skin is warm and dry. Capillary Refill: Capillary refill takes less than 2 seconds. Coloration: Skin is not jaundiced. Neurological:      General: No focal deficit present. Mental Status: Mental status is at baseline. Psychiatric:         Mood and Affect: Mood normal.         Behavior: Behavior normal.       Assessment:        Primary Problem  Hyponatremia     Principal Problem:    Hyponatremia  Active Problems:    Mass of lingula of lung    Alcoholism ([facility] [address]    Essential hypertension    COPD exacerbation (HCC)    Tobacco dependence  Resolved Problems:    * No resolved hospital problems.  *      Past Medical History:   Diagnosis Date Alcohol abuse     hx of alcoholism; States he drinks 5 beers per day; pt has D/T's    Avascular necrosis of femoral head ([facility] [address] 11/25/2014    Overview:  S/p bilateral hip replacements    History of hip replacement     left/right    Hypertension     Mass of left lung     Rib fracture     Scabies     Stage 3 severe COPD by GOLD classification ([facility] [address] 11/08/2021    Tachycardia 10/28/2020    does not follow with Cardiology        Plan:        V normal saline at 75 mL/hour   Solu-Medrol 40 mg every 8 hours  Lung mass biopsy per pulmonology on 9/19/2022   Doxycycline 100 mg p.o. twice daily.    IV vancomycin for cellulitis bilateral lower extremity   Xopenex 1 puff inhalation every 4 hours   Continue metoprolol 100 mg p.o. XL twice daily  Cardizem 180 mg XL p.o. daily    Venous Doppler bilateral lower extremity report is negative for DVT   Atrovent HFA every 6 hours   Check sodium level every 6 hours  2D echo shows an ejection fraction of 55%   12 lead EKG shows sinus tachycardia   CT lung report shows lingular mass   DVT prophylaxis Lovenox subQ daily  Replace electrolytes as per sliding scale  Home medications reviewed and appropriate medications continued  Reviewed labs and imaging studies from last 24 hours and results explained to patient    Electronically signed by Chivo Amin MD

## 2022-09-16 NOTE — CARE COORDINATION
ONGOING DISCHARGE PLAN:    Pt is Out of COVID Isolation    Patient is alert and oriented x4. Spoke with patient regarding discharge plan and patient confirms that plan is still to return to home. Denies VNS. Currently sating, 90% on 4LNC. Pt. Does NOT wear at home. Will need to follow for this. Remains on IV Vanco, & PO Doxy. Remains on IV Steroids 40 MG, Q8.     NA today 131. CIWA Scale. Possible Transfer to PCU today. Will continue to follow for additional discharge needs.     Electronically signed by Bunny Marin RN on 9/16/2022 at 3:01 PM

## 2022-09-16 NOTE — PLAN OF CARE
Problem: Discharge Planning  Goal: Discharge to home or other facility with appropriate resources  9/16/2022 1514 by Maykel Griffith RN  Outcome: Progressing  Flowsheets (Taken 9/16/2022 1048)  Discharge to home or other facility with appropriate resources:   Identify barriers to discharge with patient and caregiver   Arrange for needed discharge resources and transportation as appropriate   Identify discharge learning needs (meds, wound care, etc)   Arrange for interpreters to assist at discharge as needed   Refer to discharge planning if patient needs post-hospital services based on physician order or complex needs related to functional status, cognitive ability or social support system  9/16/2022 0314 by Brian Uribe RN  Outcome: Progressing     Problem: ABCDS Injury Assessment  Goal: Absence of physical injury  9/16/2022 1514 by Maykel Griffith RN  Outcome: Progressing  Flowsheets (Taken 9/16/2022 0850)  Absence of Physical Injury: Implement safety measures based on patient assessment  9/16/2022 0314 by Brian Uribe RN  Outcome: Progressing  Flowsheets (Taken 9/15/2022 2155)  Absence of Physical Injury: Implement safety measures based on patient assessment     Problem: Safety - Adult  Goal: Free from fall injury  9/16/2022 1514 by Maykel Griffith RN  Outcome: Progressing  Flowsheets (Taken 9/16/2022 0850)  Free From Fall Injury:   Instruct family/caregiver on patient safety   Based on caregiver fall risk screen, instruct family/caregiver to ask for assistance with transferring infant if caregiver noted to have fall risk factors  9/16/2022 0314 by Brian Uribe RN  Outcome: Progressing  Flowsheets (Taken 9/15/2022 2155)  Free From Fall Injury: Instruct family/caregiver on patient safety     Problem: Pain  Goal: Verbalizes/displays adequate comfort level or baseline comfort level  9/16/2022 1514 by Maykel Griffith RN  Outcome: Progressing  9/16/2022 0314 by Brian Uribe RN  Outcome: Progressing Problem: Safety - Medical Restraint  Goal: Remains free of injury from restraints (Restraint for Interference with Medical Device)  Description: INTERVENTIONS:  1. Determine that other, less restrictive measures have been tried or would not be effective before applying the restraint  2. Evaluate the patient's condition at the time of restraint application  3. Inform patient/family regarding the reason for restraint  4.  Q2H: Monitor safety, psychosocial status, comfort, nutrition and hydration  9/16/2022 1514 by Kvng Marin RN  Outcome: Progressing  Flowsheets (Taken 9/16/2022 0800)  Remains free of injury from restraints (restraint for interference with medical device):   Determine that other, less restrictive measures have been tried or would not be effective before applying the restraint   Evaluate the patient's condition at the time of restraint application   Inform patient/family regarding the reason for restraint   Every 2 hours: Monitor safety, psychosocial status, comfort, nutrition and hydration  9/16/2022 0314 by Guanako Lozano RN  Outcome: Progressing  Flowsheets  Taken 9/15/2022 1852 by Kvng Mrain RN  Remains free of injury from restraints (restraint for interference with medical device):   Determine that other, less restrictive measures have been tried or would not be effective before applying the restraint   Evaluate the patient's condition at the time of restraint application   Inform patient/family regarding the reason for restraint   Every 2 hours: Monitor safety, psychosocial status, comfort, nutrition and hydration  Taken 9/15/2022 1700 by Kvng Marin RN  Remains free of injury from restraints (restraint for interference with medical device):   Determine that other, less restrictive measures have been tried or would not be effective before applying the restraint   Evaluate the patient's condition at the time of restraint application   Inform patient/family regarding the reason for

## 2022-09-16 NOTE — PROGRESS NOTES
BRONCHOSPASM/BRONCHOCONSTRICTION     [x]         IMPROVE AERATION/BREATH SOUNDS  [x]   ADMINISTER BRONCHODILATOR THERAPY AS APPROPRIATE  [x]   ASSESS BREATH SOUNDS  []   IMPLEMENT AEROSOL/MDI PROTOCOL  [x]   PATIENT EDUCATION AS NEEDED   PROVIDE ADEQUATE OXYGENATION WITH ACCEPTABLE SP02/ABG'S    [x]  IDENTIFY APPROPRIATE OXYGEN THERAPY  [x]   MONITOR SP02/ABG'S AS NEEDED   [x]   PATIENT EDUCATION AS NEEDED   Pt currently on 3-4lnc SpO2 93% pt very  pleasant at this time . Pt wants out of restraints. RN will address the issue. Diminished breath sounds throughout.  Pt in no distress at this time

## 2022-09-16 NOTE — PROGRESS NOTES
Increased RUE swelling, upon assessment RFA IV site infiltrated. Precedex drip stopped. IV removed. Pharmacy notified of site infiltration with Precedex, per the pharmacist supportive care to the site is all that is required. RUE is elevated currently. Patient denies any pain from the site. Will continue to monitor. Dr. Melisa Townsend notified. Ok to keep drip off and restart CIWA protocol.

## 2022-09-16 NOTE — PROGRESS NOTES
Pt requesting throat lozenges for sore throat. Writer spoke with Alan Garibay CNP, and orders received.  See orders

## 2022-09-16 NOTE — PROGRESS NOTES
Patient transferred to [facility] 2091. Belongings and telesitter transferred with him. Patient assisted into wheelchair SB assist. Assisted into bed and call light in reach.

## 2022-09-16 NOTE — PROGRESS NOTES
PULMONARY PROGRESS NOTE:    REASON FOR VISIT: COPD, lung mass  Interval History:    Shortness of Breath: no  Cough: no  Sputum: no          Hemoptysis: no  Chest Pain: no  Fever: no                   Swelling Feet: no  Headache: no                                           Nausea, Emesis, Abdominal Pain: no  Diarrhea: no         Constipation: no    Events since last visit: CT guided biopsy ordered 9/15 - not done yet    PAST MEDICAL HISTORY:      Scheduled Meds:   vancomycin  1,500 mg IntraVENous Q12H    thiamine mononitrate  100 mg Oral Daily    folic acid  1 mg Oral Daily    dilTIAZem  30 mg Oral 4 times per day    budesonide-formoterol  2 puff Inhalation BID    ipratropium  2 puff Inhalation Q6H WA    levalbuterol  1 puff Inhalation Q6H    nicotine  1 patch TransDERmal Daily    vancomycin (VANCOCIN) intermittent dosing (placeholder)   Other RX Placeholder    lisinopril  10 mg Oral Daily    metoprolol succinate  100 mg Oral BID    traZODone  50 mg Oral Nightly    sodium chloride flush  10 mL IntraVENous 2 times per day    enoxaparin  40 mg SubCUTAneous Daily    methylPREDNISolone  40 mg IntraVENous Q8H    doxycycline monohydrate  100 mg Oral 2 times per day    sodium chloride flush  5-40 mL IntraVENous 2 times per day     Continuous Infusions:   dexmedetomidine Stopped (09/16/22 0912)    sodium chloride      sodium chloride 30 mL/hr at 09/16/22 1146    sodium chloride       PRN Meds:Benzocaine-Menthol, sodium chloride flush, sodium chloride flush, sodium chloride, potassium chloride **OR** potassium alternative oral replacement **OR** potassium chloride, magnesium sulfate, ondansetron **OR** ondansetron, magnesium hydroxide, acetaminophen **OR** acetaminophen, metoprolol, sodium chloride flush, sodium chloride, LORazepam **OR** LORazepam **OR** LORazepam **OR** LORazepam **OR** LORazepam **OR** LORazepam **OR** LORazepam **OR** LORazepam        PHYSICAL EXAMINATION:  BP (!) 118/57   Pulse 97   Temp 97.6 °F (36.4 °C) (Oral)   Resp 26   Ht 6' 1\" (1.854 m)   Wt 202 lb 13.2 oz (92 kg)   SpO2 97%   BMI 26.76 kg/m²     General : Awake, alert,   Neck - supple, no lymphadenopathy, JVD not raised  Heart - regular rhythm, S1 and S2 normal; no additional sounds heard  Lungs - Air Entry- fair bilaterally; breath sounds : vesicular;   rales/crackles - absent  Abdomen - soft, no tenderness  Upper Extremities  - no cyanosis, mottling; edema : absent  Lower Extremities: no cyanosis, mottling; edema : absent    Current Laboratory, Radiologic, Microbiologic, and Diagnostic studies reviewed  Data ReviewCBC:   Recent Labs     09/13/22  1715 09/14/22  0728   WBC 12.6* 6.5   RBC 3.70* 3.37*   HGB 14.0 13.0*   HCT 40.2* 36.9*    153     BMP:   Recent Labs     09/13/22  1745 09/14/22  0728 09/14/22  1044 09/15/22  0715 09/15/22  1252 09/15/22  1906 09/16/22  0158 09/16/22  0532 09/16/22  0920   GLUCOSE 76 125*  --   --   --   --   --   --   --    * 124*   < > 129*   < > 132* 131*  --  133*   K 4.2 4.2  --   --   --   --   --   --   --    BUN 6 8  --  11  --   --   --  14  --    CREATININE 0.42* <0.40*  --  <0.40*  --   --   --  0.47*  --    CALCIUM 9.1 8.9  --   --   --   --   --   --   --     < > = values in this interval not displayed. ABGs: No results for input(s): PHART, PO2ART, LUU9ZGL, XUA5ZVP, BEART, X4HFGWHG, SJR1NJH in the last 72 hours. PT/INR:  No results found for: PTINR    ASSESSMENT / PLAN:    Lingular lung mass   Slight increase in size since LDCT from 7/11/22  Was previously scheduled for outpatient bronchoscopy and biopsy at [facility]. V's with Dr. Ana Perez on 9/15 which was cancelled due to positive covid test. Per notes would not be done until after 9/29. COPD exac- ABX, steroids   Recent covid- initially tested positive on 8/29  AMS- related to alcohol withdraw? Hyponatremia- improved   Nephrology following.  FR of 1500 ml ordered  Alcohol dependence  CIWA  Nicotine dependence- nicotine patch   CT guided needle biopsy - check with IR; CHRIS YORK      Electronically signed by Elizabeth Barrera MD on 09/16/22 at 12:38 PM.

## 2022-09-16 NOTE — PROGRESS NOTES
Nephrology Progress Note    Subjective/   61y.o. year old male who we are seeing in consultation for hyponatremia    Interval history:  Patient seen and examined and currently out of COVID-19 isolation. Pt is  on nasal cannula oxygen and denies worsening shortness of breath. Serum sodium is 133 mmol/l. His appetite is improving. History of Present Illness: This is a 61 y.o. male with past medical history of COPD, patient presented to the hospital with complaints of shortness of breath cough and headache. Patient was recently admitted to the hospital from 8/28/2022 to 9/1/2022 patient was admitted for COVID-19 pneumonia, hemoptysis. After discharge patient continued to have cough shortness of breath but no hemoptysis, patient started to have headache, sore throat body aches. On initial investigation CT chest with IV contrast showed minimal peripheral groundglass opacities in the right upper lobe nonspecific but may be consistent with COVID infection, no PE.   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   Labs showed serum sodium of 120 on admission, patient admits to alcohol drinking and not compliant with fluid restriction     Objective/     Vitals:    09/16/22 0845 09/16/22 0849 09/16/22 0850 09/16/22 0851   BP:       Pulse:  98 97 97   Resp:  22 17 26   Temp: 97.6 °F (36.4 °C)      TempSrc: Oral      SpO2:  93% 91% 93%   Weight:       Height:         24HR INTAKE/OUTPUT:    Intake/Output Summary (Last 24 hours) at 9/16/2022 1059  Last data filed at 9/16/2022 4248  Gross per 24 hour   Intake 2278.26 ml   Output 2000 ml   Net 278.26 ml       Patient Vitals for the past 96 hrs (Last 3 readings):   Weight   09/16/22 0620 202 lb 13.2 oz (92 kg)   09/15/22 0600 201 lb 11.5 oz (91.5 kg)   09/15/22 0156 201 lb 11.5 oz (91.5 kg)         Constitutional:  Alert, awake, no apparent distress  Cardiovascular:  S1, S2 without m/r/g  Respiratory: Bilateral expiratory wheeze  Abdomen: +bs, soft, nt  Ext:  LE edema    Data/  Recent Labs     09/13/22  1715 09/14/22  0728   WBC 12.6* 6.5   HGB 14.0 13.0*   HCT 40.2* 36.9*   .6* 109.3*    153       Recent Labs     09/13/22  1745 09/14/22  0728 09/14/22  1044 09/15/22  0715 09/15/22  1252 09/15/22  1906 09/16/22  0158 09/16/22  0532 09/16/22  0920   * 124*   < > 129*   < > 132* 131*  --  133*   K 4.2 4.2  --   --   --   --   --   --   --    CL 86* 93*  --   --   --   --   --   --   --    CO2 21 21  --   --   --   --   --   --   --    GLUCOSE 76 125*  --   --   --   --   --   --   --    MG 1.9  --   --   --   --   --   --   --   --    BUN 6 8  --  11  --   --   --  14  --    CREATININE 0.42* <0.40*  --  <0.40*  --   --   --  0.47*  --    LABGLOM >60 Can not be calculated  --  Can not be calculated  --   --   --  >60  --    GFRAA >60 Can not be calculated  --  Can not be calculated  --   --   --  >60  --     < > = values in this interval not displayed. Assessment/   Acute on chronic hyponatremia, hypoosmolar hyponatremia urine sodium is 20 which is consistent with hypovolemic hyponatremia, patient is responsive to saline - underlying SIADH cannot be ruled out given history of lung mass-  Serum sodium improving to 133 mmol/l with isotonic saline  Essential hypertension-blood pressure well controlled  3.   4.7 cm right lung mass    Plan/   Decrease IV fluids with 0.9 saline to 30 cc/h - oral intake is improving  Serum sodium checks q 12 hrly  Serum sodium target not to increase more than 9 mmol in 24 hours. Continue fluid restriction less than 1500 mils per day  5.   Check phosphorus and magnesium    Lucinda Mondragon MD

## 2022-09-16 NOTE — PLAN OF CARE
Problem: Discharge Planning  Goal: Discharge to home or other facility with appropriate resources  9/16/2022 0314 by Frankie Sun RN  Outcome: Progressing  9/15/2022 1511 by Hernán Wiley RN  Outcome: Progressing  Flowsheets (Taken 9/15/2022 0900)  Discharge to home or other facility with appropriate resources:   Identify barriers to discharge with patient and caregiver   Arrange for needed discharge resources and transportation as appropriate   Identify discharge learning needs (meds, wound care, etc)   Arrange for interpreters to assist at discharge as needed   Refer to discharge planning if patient needs post-hospital services based on physician order or complex needs related to functional status, cognitive ability or social support system     Problem: ABCDS Injury Assessment  Goal: Absence of physical injury  9/16/2022 0314 by Frnakie Sun RN  Outcome: Progressing  Flowsheets (Taken 9/15/2022 2155)  Absence of Physical Injury: Implement safety measures based on patient assessment  9/15/2022 1511 by Hernán Wiley RN  Outcome: Progressing  Flowsheets (Taken 9/15/2022 0900)  Absence of Physical Injury: Implement safety measures based on patient assessment     Problem: Safety - Adult  Goal: Free from fall injury  9/16/2022 0314 by Frankie Sun RN  Outcome: Progressing  Flowsheets (Taken 9/15/2022 2155)  Free From Fall Injury: Instruct family/caregiver on patient safety  9/15/2022 1511 by Hernán Wiley RN  Outcome: Progressing  Flowsheets (Taken 9/15/2022 0900)  Free From Fall Injury:   Instruct family/caregiver on patient safety   Based on caregiver fall risk screen, instruct family/caregiver to ask for assistance with transferring infant if caregiver noted to have fall risk factors     Problem: Pain  Goal: Verbalizes/displays adequate comfort level or baseline comfort level  9/16/2022 0314 by Frankie Sun RN  Outcome: Progressing  9/15/2022 1511 by Hernán Wiley RN  Outcome: Progressing  Flowsheets (Taken 9/15/2022 0900)  Verbalizes/displays adequate comfort level or baseline comfort level:   Encourage patient to monitor pain and request assistance   Assess pain using appropriate pain scale   Administer analgesics based on type and severity of pain and evaluate response   Implement non-pharmacological measures as appropriate and evaluate response   Consider cultural and social influences on pain and pain management   Notify Licensed Independent Practitioner if interventions unsuccessful or patient reports new pain     Problem: Safety - Medical Restraint  Goal: Remains free of injury from restraints (Restraint for Interference with Medical Device)  9/16/2022 0314 by Bobby Hammond RN  Outcome: Progressing  Flowsheets  Taken 9/15/2022 1852 by Yannick Sanchez RN  Remains free of injury from restraints (restraint for interference with medical device):   Determine that other, less restrictive measures have been tried or would not be effective before applying the restraint   Evaluate the patient's condition at the time of restraint application   Inform patient/family regarding the reason for restraint   Every 2 hours: Monitor safety, psychosocial status, comfort, nutrition and hydration  Taken 9/15/2022 1700 by Yannick Sanchez RN  Remains free of injury from restraints (restraint for interference with medical device):   Determine that other, less restrictive measures have been tried or would not be effective before applying the restraint   Evaluate the patient's condition at the time of restraint application   Inform patient/family regarding the reason for restraint   Every 2 hours: Monitor safety, psychosocial status, comfort, nutrition and hydration  Taken 9/15/2022 1600 by Yannick Sanchez RN  Remains free of injury from restraints (restraint for interference with medical device):   Determine that other, less restrictive measures have been tried or would not be effective before applying the restraint   Evaluate the patient's condition at the time of restraint application   Inform patient/family regarding the reason for restraint   Every 2 hours: Monitor safety, psychosocial status, comfort, nutrition and hydration  9/15/2022 1511 by Casie Talavera RN  Outcome: Progressing  Flowsheets  Taken 9/15/2022 1500  Remains free of injury from restraints (restraint for interference with medical device):   Determine that other, less restrictive measures have been tried or would not be effective before applying the restraint   Evaluate the patient's condition at the time of restraint application   Inform patient/family regarding the reason for restraint   Every 2 hours: Monitor safety, psychosocial status, comfort, nutrition and hydration  Taken 9/15/2022 1400  Remains free of injury from restraints (restraint for interference with medical device):   Determine that other, less restrictive measures have been tried or would not be effective before applying the restraint   Evaluate the patient's condition at the time of restraint application   Inform patient/family regarding the reason for restraint   Every 2 hours: Monitor safety, psychosocial status, comfort, nutrition and hydration  Taken 9/15/2022 1300  Remains free of injury from restraints (restraint for interference with medical device):   Determine that other, less restrictive measures have been tried or would not be effective before applying the restraint   Evaluate the patient's condition at the time of restraint application   Inform patient/family regarding the reason for restraint   Every 2 hours: Monitor safety, psychosocial status, comfort, nutrition and hydration  Taken 9/15/2022 1203  Remains free of injury from restraints (restraint for interference with medical device):   Determine that other, less restrictive measures have been tried or would not be effective before applying the restraint   Evaluate the patient's condition at the time of restraint application   Inform patient/family regarding the reason for restraint   Every 2 hours: Monitor safety, psychosocial status, comfort, nutrition and hydration  Taken 9/15/2022 1100  Remains free of injury from restraints (restraint for interference with medical device):   Determine that other, less restrictive measures have been tried or would not be effective before applying the restraint   Evaluate the patient's condition at the time of restraint application   Inform patient/family regarding the reason for restraint   Every 2 hours: Monitor safety, psychosocial status, comfort, nutrition and hydration  Taken 9/15/2022 1000  Remains free of injury from restraints (restraint for interference with medical device):   Determine that other, less restrictive measures have been tried or would not be effective before applying the restraint   Evaluate the patient's condition at the time of restraint application   Inform patient/family regarding the reason for restraint   Every 2 hours: Monitor safety, psychosocial status, comfort, nutrition and hydration  Taken 9/15/2022 0800  Remains free of injury from restraints (restraint for interference with medical device):   Determine that other, less restrictive measures have been tried or would not be effective before applying the restraint   Evaluate the patient's condition at the time of restraint application   Inform patient/family regarding the reason for restraint   Every 2 hours: Monitor safety, psychosocial status, comfort, nutrition and hydration     Problem: Skin/Tissue Integrity  Goal: Absence of new skin breakdown  9/16/2022 0314 by Ronnie Arias RN  Outcome: Progressing  9/15/2022 1511 by Fercho Mccarty RN  Outcome: Progressing

## 2022-09-17 LAB
ABSOLUTE EOS #: 0 K/UL (ref 0–0.4)
ABSOLUTE LYMPH #: 0.29 K/UL (ref 1–4.8)
ABSOLUTE MONO #: 0.29 K/UL (ref 0.1–1.3)
ALBUMIN SERPL-MCNC: 3 G/DL (ref 3.5–5.2)
ALP BLD-CCNC: 103 U/L (ref 40–129)
ALT SERPL-CCNC: 67 U/L (ref 5–41)
ANION GAP SERPL CALCULATED.3IONS-SCNC: 9 MMOL/L (ref 9–17)
AST SERPL-CCNC: 60 U/L
BASOPHILS # BLD: 0 % (ref 0–2)
BASOPHILS ABSOLUTE: 0 K/UL (ref 0–0.2)
BILIRUB SERPL-MCNC: 0.8 MG/DL (ref 0.3–1.2)
BUN BLDV-MCNC: 15 MG/DL (ref 6–20)
CALCIUM SERPL-MCNC: 8.8 MG/DL (ref 8.6–10.4)
CHLORIDE BLD-SCNC: 94 MMOL/L (ref 98–107)
CO2: 26 MMOL/L (ref 20–31)
CREAT SERPL-MCNC: 0.4 MG/DL (ref 0.7–1.2)
EOSINOPHILS RELATIVE PERCENT: 0 % (ref 0–4)
GFR AFRICAN AMERICAN: >60 ML/MIN
GFR NON-AFRICAN AMERICAN: >60 ML/MIN
GFR SERPL CREATININE-BSD FRML MDRD: ABNORMAL ML/MIN/{1.73_M2}
GLUCOSE BLD-MCNC: 131 MG/DL (ref 70–99)
HCT VFR BLD CALC: 35.5 % (ref 41–53)
HEMOGLOBIN: 12.2 G/DL (ref 13.5–17.5)
LYMPHOCYTES # BLD: 4 % (ref 24–44)
MCH RBC QN AUTO: 38.2 PG (ref 26–34)
MCHC RBC AUTO-ENTMCNC: 34.3 G/DL (ref 31–37)
MCV RBC AUTO: 111.3 FL (ref 80–100)
MONOCYTES # BLD: 4 % (ref 1–7)
MORPHOLOGY: ABNORMAL
MORPHOLOGY: ABNORMAL
PDW BLD-RTO: 13.5 % (ref 11.5–14.9)
PLATELET # BLD: 120 K/UL (ref 150–450)
PMV BLD AUTO: 7.8 FL (ref 6–12)
POTASSIUM SERPL-SCNC: 3.7 MMOL/L (ref 3.7–5.3)
RBC # BLD: 3.19 M/UL (ref 4.5–5.9)
SEG NEUTROPHILS: 92 % (ref 36–66)
SEGMENTED NEUTROPHILS ABSOLUTE COUNT: 6.62 K/UL (ref 1.3–9.1)
SODIUM BLD-SCNC: 129 MMOL/L (ref 135–144)
TOTAL PROTEIN: 6.1 G/DL (ref 6.4–8.3)
VANCOMYCIN RANDOM DATE LAST DOSE: NORMAL
VANCOMYCIN RANDOM DOSE AMOUNT: 1500
VANCOMYCIN RANDOM TIME LAST DOSE: 2325
VANCOMYCIN RANDOM: 21.2 UG/ML
WBC # BLD: 7.2 K/UL (ref 3.5–11)

## 2022-09-17 PROCEDURE — 6360000002 HC RX W HCPCS: Performed by: FAMILY MEDICINE

## 2022-09-17 PROCEDURE — 80053 COMPREHEN METABOLIC PANEL: CPT

## 2022-09-17 PROCEDURE — 6370000000 HC RX 637 (ALT 250 FOR IP): Performed by: INTERNAL MEDICINE

## 2022-09-17 PROCEDURE — 80202 ASSAY OF VANCOMYCIN: CPT

## 2022-09-17 PROCEDURE — 94761 N-INVAS EAR/PLS OXIMETRY MLT: CPT

## 2022-09-17 PROCEDURE — 36415 COLL VENOUS BLD VENIPUNCTURE: CPT

## 2022-09-17 PROCEDURE — 2580000003 HC RX 258: Performed by: EMERGENCY MEDICINE

## 2022-09-17 PROCEDURE — 6360000002 HC RX W HCPCS: Performed by: EMERGENCY MEDICINE

## 2022-09-17 PROCEDURE — 2700000000 HC OXYGEN THERAPY PER DAY

## 2022-09-17 PROCEDURE — 6370000000 HC RX 637 (ALT 250 FOR IP): Performed by: FAMILY MEDICINE

## 2022-09-17 PROCEDURE — 94640 AIRWAY INHALATION TREATMENT: CPT

## 2022-09-17 PROCEDURE — 85025 COMPLETE CBC W/AUTO DIFF WBC: CPT

## 2022-09-17 PROCEDURE — 2060000000 HC ICU INTERMEDIATE R&B

## 2022-09-17 RX ORDER — SODIUM CHLORIDE 1000 MG
1 TABLET, SOLUBLE MISCELLANEOUS 2 TIMES DAILY
Status: DISCONTINUED | OUTPATIENT
Start: 2022-09-17 | End: 2022-09-19 | Stop reason: HOSPADM

## 2022-09-17 RX ADMIN — DOXYCYCLINE 100 MG: 100 CAPSULE ORAL at 20:31

## 2022-09-17 RX ADMIN — ENOXAPARIN SODIUM 40 MG: 100 INJECTION SUBCUTANEOUS at 09:34

## 2022-09-17 RX ADMIN — METOPROLOL SUCCINATE 100 MG: 50 TABLET, EXTENDED RELEASE ORAL at 09:33

## 2022-09-17 RX ADMIN — SODIUM CHLORIDE 1 G: 1 TABLET ORAL at 20:31

## 2022-09-17 RX ADMIN — METHYLPREDNISOLONE SODIUM SUCCINATE 40 MG: 40 INJECTION, POWDER, FOR SOLUTION INTRAMUSCULAR; INTRAVENOUS at 23:39

## 2022-09-17 RX ADMIN — BUDESONIDE AND FORMOTEROL FUMARATE DIHYDRATE 2 PUFF: 160; 4.5 AEROSOL RESPIRATORY (INHALATION) at 07:27

## 2022-09-17 RX ADMIN — VANCOMYCIN HYDROCHLORIDE 1500 MG: 1.5 INJECTION, POWDER, LYOPHILIZED, FOR SOLUTION INTRAVENOUS at 23:46

## 2022-09-17 RX ADMIN — LEVALBUTEROL TARTRATE 1 PUFF: 45 AEROSOL, METERED ORAL at 03:23

## 2022-09-17 RX ADMIN — DILTIAZEM HYDROCHLORIDE 180 MG: 180 CAPSULE, COATED, EXTENDED RELEASE ORAL at 09:33

## 2022-09-17 RX ADMIN — LISINOPRIL 10 MG: 10 TABLET ORAL at 09:34

## 2022-09-17 RX ADMIN — METHYLPREDNISOLONE SODIUM SUCCINATE 40 MG: 40 INJECTION, POWDER, FOR SOLUTION INTRAMUSCULAR; INTRAVENOUS at 06:04

## 2022-09-17 RX ADMIN — METHYLPREDNISOLONE SODIUM SUCCINATE 40 MG: 40 INJECTION, POWDER, FOR SOLUTION INTRAMUSCULAR; INTRAVENOUS at 16:45

## 2022-09-17 RX ADMIN — Medication 100 MG: at 09:33

## 2022-09-17 RX ADMIN — BUDESONIDE AND FORMOTEROL FUMARATE DIHYDRATE 2 PUFF: 160; 4.5 AEROSOL RESPIRATORY (INHALATION) at 18:55

## 2022-09-17 RX ADMIN — LEVALBUTEROL TARTRATE 1 PUFF: 45 AEROSOL, METERED ORAL at 07:27

## 2022-09-17 RX ADMIN — TRAZODONE HYDROCHLORIDE 50 MG: 50 TABLET ORAL at 20:31

## 2022-09-17 RX ADMIN — Medication 2 PUFF: at 18:55

## 2022-09-17 RX ADMIN — DOXYCYCLINE 100 MG: 100 CAPSULE ORAL at 09:34

## 2022-09-17 RX ADMIN — FOLIC ACID 1 MG: 1 TABLET ORAL at 09:34

## 2022-09-17 RX ADMIN — Medication 2 PUFF: at 07:27

## 2022-09-17 RX ADMIN — METOPROLOL SUCCINATE 100 MG: 50 TABLET, EXTENDED RELEASE ORAL at 20:31

## 2022-09-17 RX ADMIN — LEVALBUTEROL TARTRATE 1 PUFF: 45 AEROSOL, METERED ORAL at 18:56

## 2022-09-17 NOTE — PROGRESS NOTES
Vancomycin Dosing by Pharmacy - Daily Note   Vancomycin Therapy Day:  5  Indication: ssti    Allergies:  Patient has no known allergies. Actual Weight:    Wt Readings from Last 1 Encounters:   09/17/22 194 lb 3.6 oz (88.1 kg)       Labs/Ancillary Data  Estimated Creatinine Clearance: 227 mL/min (A) (based on SCr of 0.4 mg/dL (L)). Recent Labs     09/14/22  0728 09/15/22  0715 09/16/22  0532 09/17/22  0549   CREATININE <0.40* <0.40* 0.47* 0.40*   BUN 8 11 14 15   WBC 6.5  --   --  7.2     Procalcitonin   Date Value Ref Range Status   08/29/2022 0.20 (H) <0.09 ng/mL Final     Comment:           Suspected Sepsis:  <0.50 ng/mL     Low likelihood of sepsis. 0.50-2.00 ng/mL     Increased likelihood of sepsis. Antibiotics encouraged. >2.00 ng/mL     High risk of sepsis/shock. Antibiotics strongly encouraged. Suspected Lower Resp Tract Infections:  <0.24 ng/mL     Low likelihood of bacterial infection. >0.24 ng/mL     Increased likelihood of bacterial infection. Antibiotics encouraged. With successful antibiotic therapy, PCT levels should decrease rapidly. (Half-life of 24 to   36 hours.)        Procalcitonin values from samples collected within the first 6 hours of systemic infection   may still be low. Retesting may be indicated. Values from day 1 and day 4 can be entered into the Change in Procalcitonin Calculator   ([email or web address] [place]-pct-[email or web address]. [employer]) to determine the patient's Mortality Risk Prognosis        In healthy neonates, plasma Procalcitonin (PCT) concentrations increase gradually after   birth, reaching peak values at about 24 hours of age then decrease to normal values below   0.5 ng/mL by 48-72 hours of age.          Intake/Output Summary (Last 24 hours) at 9/17/2022 0654  Last data filed at 9/17/2022 0539  Gross per 24 hour   Intake 984 ml   Output 2900 ml   Net -1916 ml     Temp: 97.9    Culture Date / Source  /  Results  See micro   Recent vancomycin administrations vancomycin (VANCOCIN) 1,500 mg in dextrose 5 % 250 mL IVPB (ADDAVIAL) (mg) 1,500 mg New Bag 09/16/22 2325     1,500 mg New Bag  0909     1,500 mg New Bag 09/15/22 2107     1,500 mg New Bag  0939    vancomycin (VANCOCIN) 1,250 mg in dextrose 5 % 250 mL IVPB (ADDAVIAL) (mg) 1,250 mg New Bag 09/14/22 2324     1,250 mg New Bag  1130                    Vancomycin Concentrations:   TROUGH:  No results for input(s): VANCOTROUGH in the last 72 hours. RANDOM:    Recent Labs     09/15/22  0539 09/17/22  0549   VANCORANDOM 13.2 21.2       MRSA Nasal Swab: N/A. Non-respiratory infection. Remberto Major PLAN     Continue current dose of 1500 mg q12h IV  Ensured BUN/sCr ordered at baseline and every 48 hours x at least 3 levels, then at least weekly. Pharmacy will continue to monitor patient and adjust therapy as indicated      Vancomycin Target Concentration Parameters  Treatment  Population Target AUC/NIK Target Trough   Invasive MRSA Infection (bacteremia, pneumonia, meningitis, endocarditis, osteomyelitis)  Sepsis (undifferentiated) 400-600 N/A   Infection due to non-MRSA pathogen  Empiric treatment of non-invasive MRSA infection  (SSTI, UTI) <500 10-15 mg/L   CrCl < 29 mL/min  Rapidly fluctuating serum creatinine   KRYSTA N/A < 15 mg/L     Renal replacement therapy is dosed by levels, per hospital protocol. Abbreviations  * Pauc: probability that AUC is >400 (efficacy); Pconc: probability that Ctrough is above 20 ?g/mL (toxicity); Tox: Probability of nephrotoxicity, based on Lodsherrell et al. Clin Infect Dis 2009. Loading dose: N/A  Regimen: 1500 mg IV every 12 hours. Start time: 11:25 on 09/17/2022  Exposure target: AUC24 (range)400-600 mg/L.hr   AUC24,ss: 505 mg/L.hr  Probability of AUC24 > 400: 95 %  Ctrough,ss: 15.1 mg/L  Probability of Ctrough,ss > 20: 15 %  Probability of nephrotoxicity (Kade IVANNA 2009): 10 %    Thank you for the consult. Pharmacy will continue to follow.     Rafael Ramirez, PharmD, BCPS  9/17/2022 6:55 AM

## 2022-09-17 NOTE — PROGRESS NOTES
Nephrology Progress Note    Subjective/   61y.o. year old male who we are seeing in consultation for hyponatremia    Interval history:  Patient seen and examined and currently out of COVID-19 isolation. Pt denies worsening shortness of breath. Serum sodium is 129 mmol/l. His appetite is improving. History of Present Illness: This is a 61 y.o. male with past medical history of COPD, patient presented to the hospital with complaints of shortness of breath cough and headache. Patient was recently admitted to the hospital from 8/28/2022 to 9/1/2022 patient was admitted for COVID-19 pneumonia, hemoptysis. After discharge patient continued to have cough shortness of breath but no hemoptysis, patient started to have headache, sore throat body aches. On initial investigation CT chest with IV contrast showed minimal peripheral groundglass opacities in the right upper lobe nonspecific but may be consistent with COVID infection, no PE.   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   Labs showed serum sodium of 120 on admission, patient admits to alcohol drinking and not compliant with fluid restriction     Objective/     Vitals:    09/17/22 0645 09/17/22 0727 09/17/22 0729 09/17/22 1235   BP: (!) 164/108   139/87   Pulse: (!) 105 94 94 (!) 102   Resp: 20 20 20 20   Temp: 97.9 °F (36.6 °C)   98 °F (36.7 °C)   TempSrc: Oral   Oral   SpO2: 99% 98% 98% 98%   Weight:       Height:         24HR INTAKE/OUTPUT:    Intake/Output Summary (Last 24 hours) at 9/17/2022 1539  Last data filed at 9/17/2022 0539  Gross per 24 hour   Intake 984 ml   Output 2400 ml   Net -1416 ml       Patient Vitals for the past 96 hrs (Last 3 readings):   Weight   09/17/22 0312 194 lb 3.6 oz (88.1 kg)   09/16/22 0620 202 lb 13.2 oz (92 kg)   09/15/22 0600 201 lb 11.5 oz (91.5 kg)         Constitutional:  Alert, awake, no apparent distress  Cardiovascular:  S1, S2 without m/r/g  Respiratory: Bilateral expiratory wheeze  Abdomen: +bs, soft, nt  Ext:  LE edema    Data/  Recent Labs     09/17/22  0549   WBC 7.2   HGB 12.2*   HCT 35.5*   .3*   *       Recent Labs     09/15/22  0715 09/15/22  1252 09/16/22  0532 09/16/22  0920 09/16/22  1502 09/16/22 2005 09/17/22  0549   *   < >  --  133* 130* 128* 129*   K  --   --   --   --   --   --  3.7   CL  --   --   --   --   --   --  94*   CO2  --   --   --   --   --   --  26   GLUCOSE  --   --   --   --   --   --  131*   PHOS  --   --   --  2.8  --   --   --    MG  --   --   --  2.1  --   --   --    BUN 11  --  14  --   --   --  15   CREATININE <0.40*  --  0.47*  --   --   --  0.40*   LABGLOM Can not be calculated  --  >60  --   --   --  >60   GFRAA Can not be calculated  --  >60  --   --   --  >60    < > = values in this interval not displayed. Assessment/   Acute on chronic hyponatremia, hypoosmolar hyponatremia urine sodium is 20 which is consistent with hypovolemic hyponatremia, patient is responsive to saline - underlying SIADH cannot be ruled out given history of lung mass-  Serum sodium improving to 133 mmol/l with isotonic saline  Essential hypertension-blood pressure well controlled  3.   4.7 cm right lung mass-biopsy and bronchoscopy on Monday    Plan/   Discontinue 0.9 saline and continue  1500 mils fluid restriction  Serum sodium checks q 12 hrly  Serum sodium target not to increase more than 9 mmol in 24 hours.   Sodium chloride tablets 1 g twice daily      Cameron Roth MD

## 2022-09-17 NOTE — CARE COORDINATION
ONGOING DISCHARGE PLAN:    Patient is alert and oriented x4. Spoke with patient regarding discharge plan and patient confirms that plan is still to go home with no needs for VNS. PO doxy, IV solumedrol 40 mg Q8, IV VAnco, areosols,CIWA scale    Pt needs lung biospy and bronchoscopy,  NPO after MN for Monday . Following for home O2, currently on 2lpm NC at 98%. Will continue to follow for additional discharge needs.     Electronically signed by Cathie Sims RN on 9/17/2022 at 2:17 PM

## 2022-09-17 NOTE — PLAN OF CARE
Problem: Discharge Planning  Goal: Discharge to home or other facility with appropriate resources  9/17/2022 0357 by Linda Dobbins RN  Outcome: Progressing     Problem: Safety - Adult  Goal: Free from fall injury  9/17/2022 0357 by Linda Dobbins RN  Outcome: Progressing  Note: No falls noted this shift. Patient ambulates with x1 staff assistance without difficulty. Bed kept in low position. Safe environment maintained. Bedside table & call light in reach. Uses call light appropriately when needing assistance. Problem: Skin/Tissue Integrity  Goal: Absence of new skin breakdown  Description: 1. Monitor for areas of redness and/or skin breakdown  2. Assess vascular access sites hourly  3. Every 4-6 hours minimum:  Change oxygen saturation probe site  4. Every 4-6 hours:  If on nasal continuous positive airway pressure, respiratory therapy assess nares and determine need for appliance change or resting period. 9/17/2022 0357 by Linda Dobbins RN  Outcome: Progressing  Note: Multiple skin abnormalities noted upon assessment of pt. No new skin breakdown evident. Will continue to monitor for changes in skin integrity. Problem: Pain  Goal: Verbalizes/displays adequate comfort level or baseline comfort level  9/17/2022 0357 by Linda Dobbins RN  Outcome: Adequate for Discharge  Note: Pt complained of no pain throughout shift.

## 2022-09-17 NOTE — PROGRESS NOTES
[facility]    Progress Note    9/17/2022    9:48 AM    Name:   Bri Uribe  MRN:     [id number]     Acct:      [de-identified]   Room:   2091/2091-01  IP Day:  4  Admit Date:  9/13/2022  5:00 PM    PCP:   JAEL Chance CNP  Code Status:  Full Code    Subjective:     C/C:   Chief Complaint   Patient presents with    Shortness of Breath     Hx of COPD, SOB worse over the last 2 days  Ro010h in triage  RA sating 100%, resps labored around 30     Interval History Status: improved. Shortness of breath is improved  No acute events overnight  Afebrile  BP stable but tachycardia present  Respiratory status stable on room air  No leucocytosis  Hemoglobin stable  Platelets dropped to 120  Sodium 129   Mildly elevated liver enzymes    Review of Systems:     Constitutional:  negative for chills, fevers, sweats  Respiratory:  negative for  hemoptysis, wheezing  Cardiovascular:  negative for chest pain, chest pressure/discomfort,  palpitations  Gastrointestinal:  negative for abdominal pain, constipation, diarrhea, nausea, vomiting  Neurological:  negative for dizziness, headache    Medications:      Allergies:  No Known Allergies    Current Meds:   Scheduled Meds:    dilTIAZem  180 mg Oral Daily    vancomycin  1,500 mg IntraVENous Q12H    thiamine mononitrate  100 mg Oral Daily    folic acid  1 mg Oral Daily    budesonide-formoterol  2 puff Inhalation BID    ipratropium  2 puff Inhalation Q6H WA    levalbuterol  1 puff Inhalation Q6H    nicotine  1 patch TransDERmal Daily    vancomycin (VANCOCIN) intermittent dosing (placeholder)   Other RX Placeholder    lisinopril  10 mg Oral Daily    metoprolol succinate  100 mg Oral BID    traZODone  50 mg Oral Nightly    sodium chloride flush  10 mL IntraVENous 2 times per day    enoxaparin  40 mg SubCUTAneous Daily    methylPREDNISolone  40 mg IntraVENous Q8H    doxycycline monohydrate  100 mg Oral 2 times per day    sodium chloride flush  5-40 mL IntraVENous 2 times per day     Continuous Infusions:    dexmedetomidine Stopped (22 0912)    sodium chloride      sodium chloride 30 mL/hr at 22 1146    sodium chloride       PRN Meds: Benzocaine-Menthol, sodium chloride flush, sodium chloride flush, sodium chloride, potassium chloride **OR** potassium alternative oral replacement **OR** potassium chloride, magnesium sulfate, ondansetron **OR** ondansetron, magnesium hydroxide, acetaminophen **OR** acetaminophen, metoprolol, sodium chloride flush, sodium chloride, LORazepam **OR** LORazepam **OR** LORazepam **OR** LORazepam **OR** LORazepam **OR** LORazepam **OR** LORazepam **OR** LORazepam    Data:     Past Medical History:   has a past medical history of Alcohol abuse, Avascular necrosis of femoral head ([address] History of hip replacement, Hypertension, Mass of left lung, Rib fracture, Scabies, Stage 3 severe COPD by GOLD classification ([address] and Tachycardia. Social History:   reports that he has been smoking cigarettes. He started smoking about 36 years ago. He has a 44.00 pack-year smoking history. He has never used smokeless tobacco. He reports current alcohol use of about 35.0 standard drinks per week. He reports that he does not currently use drugs after having used the following drugs: Cocaine and Marijuana Irais Mcknight). Family History:   Family History   Problem Relation Age of Onset    Other Mother         mental health        Vitals:  BP (!) 164/108   Pulse 94   Temp 97.9 °F (36.6 °C) (Oral)   Resp 20   Ht 6' 1\" (1.854 m)   Wt 194 lb 3.6 oz (88.1 kg)   SpO2 98%   BMI 25.62 kg/m²   Temp (24hrs), Av.9 °F (36.6 °C), Min:97.8 °F (36.6 °C), Max:97.9 °F (36.6 °C)    No results for input(s): POCGLU in the last 72 hours. I/O (24Hr):     Intake/Output Summary (Last 24 hours) at 2022 0948  Last data filed at 2022 0539  Gross per 24 hour   Intake 984 ml   Output 2900 ml   Net -1916 ml Labs:  Hematology:  Recent Labs     09/17/22  0549   WBC 7.2   RBC 3.19*   HGB 12.2*   HCT 35.5*   .3*   MCH 38.2*   MCHC 34.3   RDW 13.5   *   MPV 7.8     Chemistry:  Recent Labs     09/14/22  1044 09/14/22  1556 09/14/22  1716 09/14/22  1902 09/15/22  0157 09/15/22  0715 09/15/22  1252 09/16/22  0532 09/16/22  0920 09/16/22  1502 09/16/22 2005 09/17/22  0549   *   < >  --  126*   < > 129*   < >  --  133* 130* 128* 129*   K  --   --   --   --   --   --   --   --   --   --   --  3.7   CL  --   --   --   --   --   --   --   --   --   --   --  94*   CO2  --   --   --   --   --   --   --   --   --   --   --  26   GLUCOSE  --   --   --   --   --   --   --   --   --   --   --  131*   BUN  --   --   --   --   --  11  --  14  --   --   --  15   CREATININE  --   --   --   --   --  <0.40*  --  0.47*  --   --   --  0.40*   MG  --   --   --   --   --   --   --   --  2.1  --   --   --    ANIONGAP  --   --   --   --   --   --   --   --   --   --   --  9   LABGLOM  --   --   --   --   --  Can not be calculated  --  >60  --   --   --  >60   GFRAA  --   --   --   --   --  Can not be calculated  --  >60  --   --   --  >60   CALCIUM  --   --   --   --   --   --   --   --   --   --   --  8.8   PHOS  --   --   --   --   --   --   --   --  2.8  --   --   --    TROPHS 10  --  10 10  --   --   --   --   --   --   --   --     < > = values in this interval not displayed.      Recent Labs     09/17/22  0549   PROT 6.1*   LABALBU 3.0*   AST 60*   ALT 67*   ALKPHOS 103   BILITOT 0.8     ABG:  Lab Results   Component Value Date/Time    PHART 7.390 08/28/2022 08:20 PM    YKH2NCG 36.3 08/28/2022 08:20 PM    PO2ART 163.0 08/28/2022 08:20 PM    WKT2FXK 21.9 08/28/2022 08:20 PM    NBEA 3.0 08/28/2022 08:20 PM    T4LOTVNW 96.7 08/28/2022 08:20 PM    FIO2 NOT REPORTED 05/28/2019 09:30 PM     Lab Results   Component Value Date/Time    SPECIAL NOT REPORTED 05/24/2017 04:22 AM     Lab Results   Component Value Date/Time CULTURE NO SIGNIFICANT GROWTH 08/29/2022 05:28 PM       Radiology:  XR CHEST PORTABLE    Result Date: 9/13/2022  1. Redemonstration of lingular mass concerning for primary neoplasm. 2.  No new airspace disease identified in the interval.     CT CHEST PULMONARY EMBOLISM W CONTRAST    Result Date: 9/13/2022  1. Minimal peripheral ground-glass opacities in the right upper lobe are noted, while nonspecific, this may correspond to the provided history of COVID infection. 2.  Suboptimal contrast timing for evaluation of the peripheral pulmonary arteries. No evidence for central pulmonary embolism. 3.  Redemonstration of lingular mass, now measuring 4.7 cm versus 4.1 cm on recent CT. This remains concerning for primary neoplasm. Physical Examination:        General appearance:  alert, cooperative and no distress  Mental Status:  oriented to person, place and time and normal affect  Lungs:  clear to auscultation bilaterally, normal effort  Heart:  regular rate and rhythm, no murmur  Abdomen:   mildly distended. normal bowel sounds,   Extremities:   trace bowel sounds    Assessment:        Hospital Problems             Last Modified POA    * (Principal) Hyponatremia 9/13/2022 Yes    Mass of lingula of lung 9/13/2022 Yes    Alcoholism ([facility] [address] 9/15/2022 Yes    Essential hypertension 9/13/2022 Yes    COPD exacerbation ([address] 9/13/2022 Yes    Tobacco dependence 9/15/2022 Yes       Plan:         Hyponatremia-  improving. Appreciate recommendations from Nephrology. 1500 mL fluid restriction. Salt tablets  COPD exacerbation- IV solumedrol. Xopenex, Atrovent. Doxycyline   Lower extremity cellulitis-  patient is on IV vancomycin.   Will check CRP, will discontinue if improving  Lung mass-Bronchoscopy per pulm  Mild thrombocytopenia-  monitor for bleeding   Hypertension-  lisinopril 10 mg   Alcohol use-  thiamine, folate    Smoker-  nicotine patch  Lovenox for DVT prophylaxis    Addie Mcadams MD  9/17/2022  9:48 AM

## 2022-09-17 NOTE — PROGRESS NOTES
Pulmonary Critical Care Progress Note  Suresh Parmar MD     Patient seen for the follow up of  Hyponatremia, COPD, Lung mass    Subjective:  No significant events overnight. He is sitting in bed and is anxious to go outside. He denies any shortness of breath or chest pain. He has a non productive cough. His appetite is excellent. Examination:  Vitals: BP (!) 164/108   Pulse 94   Temp 97.9 °F (36.6 °C) (Oral)   Resp 20   Ht 6' 1\" (1.854 m)   Wt 194 lb 3.6 oz (88.1 kg)   SpO2 98%   BMI 25.62 kg/m²   General appearance: alert and cooperative with exam  Neck: No JVD  Lungs: diminished air exchange, no active wheezing  Heart: regular rate and rhythm, S1, S2 normal, no gallop  Abdomen: Soft, non tender, + BS  Extremities: no cyanosis or clubbing. No significant edema    LABs:  CBC:   Recent Labs     09/17/22  0549   WBC 7.2   HGB 12.2*   HCT 35.5*   *     BMP:   Recent Labs     09/16/22  0532 09/16/22 0920 09/16/22 2005 09/17/22  0549   NA  --    < > 128* 129*   K  --   --   --  3.7   CO2  --   --   --  26   BUN 14  --   --  15   CREATININE 0.47*  --   --  0.40*   LABGLOM >60  --   --  >60   GLUCOSE  --   --   --  131*    < > = values in this interval not displayed.        LIVER PROFILE:  Recent Labs     09/17/22  0549   AST 60*   ALT 67*   LABALBU 3.0*     ABG:  Lab Results   Component Value Date/Time    PHART 7.390 08/28/2022 08:20 PM    [id number] 36.3 08/28/2022 08:20 PM    PO2ART 163.0 08/28/2022 08:20 PM    LWC6UHN 21.9 08/28/2022 08:20 PM    Y8ZUWIMG 96.7 08/28/2022 08:20 PM    FIO2 NOT REPORTED 05/28/2019 09:30 PM       Lab Results   Component Value Date/Time    PHART 7.390 08/28/2022 08:20 PM    TVD5DBU 36.3 08/28/2022 08:20 PM    PO2ART 163.0 08/28/2022 08:20 PM    ILK0ZPP 21.9 08/28/2022 08:20 PM    NBEA 3.0 08/28/2022 08:20 PM    Y0YVVUFV 96.7 08/28/2022 08:20 PM    FIO2 NOT REPORTED 05/28/2019 09:30 PM     Radiology:  Chest xray  9/13/2022      Impression:  Acute hypoxic respiratory failure  Acute exacerbation of COPD  Left lung mass   Alcohol abuse   History of COVID, 8/28/2022  Active smoker    Recommendations:  Continue antibiotics, doxycycline and IV vancomycin   IV solu-medrol 40 mg every 8 hours  Ipratropium Q 4 hours and prn  Symbicort inhaler  Xopenex  Nicotine patch  Bronchoscopy and biopsy of lung mass with Dr. Laura Hedrick was canceled due to positive COVID test, will be having bronchoscopy on Monday  X-ray chest in am  Labs: CBC and BMP in am  Monitor sodium levels  Oxygen via nasal cannula  DVT prophylaxis with low molecular weight heparin  Will follow with you    Electronically signed by     Justin Beckham MD on 9/17/2022 at 10:10 AM  Pulmonary Critical Care and Sleep Medicine,  [facility]  Cell: [phone]  Office: [phone]

## 2022-09-18 LAB
ANION GAP SERPL CALCULATED.3IONS-SCNC: 10 MMOL/L (ref 9–17)
BUN BLDV-MCNC: 17 MG/DL (ref 6–20)
BUN BLDV-MCNC: 17 MG/DL (ref 6–20)
C-REACTIVE PROTEIN: <3 MG/L (ref 0–5)
CALCIUM SERPL-MCNC: 8.9 MG/DL (ref 8.6–10.4)
CHLORIDE BLD-SCNC: 94 MMOL/L (ref 98–107)
CO2: 26 MMOL/L (ref 20–31)
CREAT SERPL-MCNC: 0.47 MG/DL (ref 0.7–1.2)
CREAT SERPL-MCNC: 0.47 MG/DL (ref 0.7–1.2)
GFR AFRICAN AMERICAN: >60 ML/MIN
GFR AFRICAN AMERICAN: >60 ML/MIN
GFR NON-AFRICAN AMERICAN: >60 ML/MIN
GFR NON-AFRICAN AMERICAN: >60 ML/MIN
GFR SERPL CREATININE-BSD FRML MDRD: ABNORMAL ML/MIN/{1.73_M2}
GFR SERPL CREATININE-BSD FRML MDRD: ABNORMAL ML/MIN/{1.73_M2}
GLUCOSE BLD-MCNC: 145 MG/DL (ref 70–99)
HCT VFR BLD CALC: 36.8 % (ref 41–53)
HEMOGLOBIN: 12.5 G/DL (ref 13.5–17.5)
MCH RBC QN AUTO: 37.6 PG (ref 26–34)
MCHC RBC AUTO-ENTMCNC: 34 G/DL (ref 31–37)
MCV RBC AUTO: 110.8 FL (ref 80–100)
PDW BLD-RTO: 13.9 % (ref 11.5–14.9)
PLATELET # BLD: 112 K/UL (ref 150–450)
PMV BLD AUTO: 8.4 FL (ref 6–12)
POTASSIUM SERPL-SCNC: 3.7 MMOL/L (ref 3.7–5.3)
RBC # BLD: 3.32 M/UL (ref 4.5–5.9)
SODIUM BLD-SCNC: 130 MMOL/L (ref 135–144)
WBC # BLD: 7.8 K/UL (ref 3.5–11)

## 2022-09-18 PROCEDURE — 84520 ASSAY OF UREA NITROGEN: CPT

## 2022-09-18 PROCEDURE — 85027 COMPLETE CBC AUTOMATED: CPT

## 2022-09-18 PROCEDURE — 2060000000 HC ICU INTERMEDIATE R&B

## 2022-09-18 PROCEDURE — 36415 COLL VENOUS BLD VENIPUNCTURE: CPT

## 2022-09-18 PROCEDURE — 80048 BASIC METABOLIC PNL TOTAL CA: CPT

## 2022-09-18 PROCEDURE — 82565 ASSAY OF CREATININE: CPT

## 2022-09-18 PROCEDURE — 94640 AIRWAY INHALATION TREATMENT: CPT

## 2022-09-18 PROCEDURE — 86140 C-REACTIVE PROTEIN: CPT

## 2022-09-18 PROCEDURE — 6370000000 HC RX 637 (ALT 250 FOR IP): Performed by: NURSE PRACTITIONER

## 2022-09-18 PROCEDURE — 6370000000 HC RX 637 (ALT 250 FOR IP): Performed by: INTERNAL MEDICINE

## 2022-09-18 PROCEDURE — 6370000000 HC RX 637 (ALT 250 FOR IP): Performed by: FAMILY MEDICINE

## 2022-09-18 PROCEDURE — 2580000003 HC RX 258: Performed by: FAMILY MEDICINE

## 2022-09-18 PROCEDURE — 6360000002 HC RX W HCPCS: Performed by: INTERNAL MEDICINE

## 2022-09-18 PROCEDURE — 6360000002 HC RX W HCPCS: Performed by: FAMILY MEDICINE

## 2022-09-18 PROCEDURE — 94761 N-INVAS EAR/PLS OXIMETRY MLT: CPT

## 2022-09-18 RX ORDER — METHYLPREDNISOLONE SODIUM SUCCINATE 40 MG/ML
30 INJECTION, POWDER, LYOPHILIZED, FOR SOLUTION INTRAMUSCULAR; INTRAVENOUS EVERY 12 HOURS
Status: DISCONTINUED | OUTPATIENT
Start: 2022-09-18 | End: 2022-09-19 | Stop reason: HOSPADM

## 2022-09-18 RX ORDER — LANOLIN ALCOHOL/MO/W.PET/CERES
6 CREAM (GRAM) TOPICAL NIGHTLY PRN
Status: DISCONTINUED | OUTPATIENT
Start: 2022-09-18 | End: 2022-09-19 | Stop reason: HOSPADM

## 2022-09-18 RX ADMIN — Medication 2 PUFF: at 13:28

## 2022-09-18 RX ADMIN — BUDESONIDE AND FORMOTEROL FUMARATE DIHYDRATE 2 PUFF: 160; 4.5 AEROSOL RESPIRATORY (INHALATION) at 07:01

## 2022-09-18 RX ADMIN — LEVALBUTEROL TARTRATE 1 PUFF: 45 AEROSOL, METERED ORAL at 07:01

## 2022-09-18 RX ADMIN — DILTIAZEM HYDROCHLORIDE 180 MG: 180 CAPSULE, COATED, EXTENDED RELEASE ORAL at 07:27

## 2022-09-18 RX ADMIN — TRAZODONE HYDROCHLORIDE 50 MG: 50 TABLET ORAL at 21:54

## 2022-09-18 RX ADMIN — BUDESONIDE AND FORMOTEROL FUMARATE DIHYDRATE 2 PUFF: 160; 4.5 AEROSOL RESPIRATORY (INHALATION) at 19:20

## 2022-09-18 RX ADMIN — FOLIC ACID 1 MG: 1 TABLET ORAL at 07:27

## 2022-09-18 RX ADMIN — Medication 100 MG: at 07:27

## 2022-09-18 RX ADMIN — LISINOPRIL 10 MG: 10 TABLET ORAL at 07:27

## 2022-09-18 RX ADMIN — SODIUM CHLORIDE, PRESERVATIVE FREE 10 ML: 5 INJECTION INTRAVENOUS at 21:55

## 2022-09-18 RX ADMIN — Medication 2 PUFF: at 19:20

## 2022-09-18 RX ADMIN — SODIUM CHLORIDE, PRESERVATIVE FREE 10 ML: 5 INJECTION INTRAVENOUS at 07:28

## 2022-09-18 RX ADMIN — LEVALBUTEROL TARTRATE 1 PUFF: 45 AEROSOL, METERED ORAL at 13:28

## 2022-09-18 RX ADMIN — DOXYCYCLINE 100 MG: 100 CAPSULE ORAL at 07:28

## 2022-09-18 RX ADMIN — METOPROLOL SUCCINATE 100 MG: 50 TABLET, EXTENDED RELEASE ORAL at 07:27

## 2022-09-18 RX ADMIN — Medication 6 MG: at 00:22

## 2022-09-18 RX ADMIN — SODIUM CHLORIDE 1 G: 1 TABLET ORAL at 07:27

## 2022-09-18 RX ADMIN — METOPROLOL SUCCINATE 100 MG: 50 TABLET, EXTENDED RELEASE ORAL at 21:54

## 2022-09-18 RX ADMIN — Medication 2 PUFF: at 07:00

## 2022-09-18 RX ADMIN — METHYLPREDNISOLONE SODIUM SUCCINATE 30 MG: 40 INJECTION, POWDER, FOR SOLUTION INTRAMUSCULAR; INTRAVENOUS at 19:08

## 2022-09-18 RX ADMIN — METHYLPREDNISOLONE SODIUM SUCCINATE 40 MG: 40 INJECTION, POWDER, FOR SOLUTION INTRAMUSCULAR; INTRAVENOUS at 06:24

## 2022-09-18 NOTE — PROGRESS NOTES
[facility]    Progress Note    9/18/2022    9:45 AM    Name:   Alexandria Maxwell  MRN:     [id number]     Acct:      [de-identified]   Room:   2091/2091-01   Day:  5  Admit Date:  9/13/2022  5:00 PM    PCP:   Blanche Lombard, APRN - CNP  Code Status:  Full Code    Subjective:     C/C:   Chief Complaint   Patient presents with    Shortness of Breath     Hx of COPD, SOB worse over the last 2 days  [id number] in triage  RA sating 100%, resps labored around 30     Interval History Status: improved. Shortness of breath is improved  No acute events overnight  Patient able to ambulate  Afebrile  BP stable   Respiratory status stable on room air  No leucocytosis  Hemoglobin stable  Platelets dropped to 112  Sodium 130   Mildly elevated liver enzymes    Review of Systems:     Constitutional:  negative for chills, fevers, sweats  Respiratory:  negative for  hemoptysis, wheezing  Cardiovascular:  negative for chest pain, chest pressure/discomfort,  palpitations  Gastrointestinal:  negative for abdominal pain, constipation, diarrhea, nausea, vomiting  Neurological:  negative for dizziness, headache    Medications:      Allergies:  No Known Allergies    Current Meds:   Scheduled Meds:    methylPREDNISolone  30 mg IntraVENous Q12H    sodium chloride  1 g Oral BID    dilTIAZem  180 mg Oral Daily    thiamine mononitrate  100 mg Oral Daily    folic acid  1 mg Oral Daily    budesonide-formoterol  2 puff Inhalation BID    ipratropium  2 puff Inhalation Q6H WA    levalbuterol  1 puff Inhalation Q6H    nicotine  1 patch TransDERmal Daily    vancomycin (VANCOCIN) intermittent dosing (placeholder)   Other RX Placeholder    lisinopril  10 mg Oral Daily    metoprolol succinate  100 mg Oral BID    traZODone  50 mg Oral Nightly    sodium chloride flush  10 mL IntraVENous 2 times per day    enoxaparin  40 mg SubCUTAneous Daily    doxycycline monohydrate  100 mg Oral 2 times per day    sodium chloride flush  5-40 mL IntraVENous 2 times per day     Continuous Infusions:    sodium chloride      sodium chloride       PRN Meds: melatonin, Benzocaine-Menthol, sodium chloride flush, sodium chloride flush, sodium chloride, potassium chloride **OR** potassium alternative oral replacement **OR** potassium chloride, magnesium sulfate, ondansetron **OR** ondansetron, magnesium hydroxide, acetaminophen **OR** acetaminophen, metoprolol, sodium chloride flush, sodium chloride, LORazepam **OR** LORazepam **OR** LORazepam **OR** LORazepam **OR** LORazepam **OR** LORazepam **OR** LORazepam **OR** LORazepam    Data:     Past Medical History:   has a past medical history of Alcohol abuse, Avascular necrosis of femoral head ([address] History of hip replacement, Hypertension, Mass of left lung, Rib fracture, Scabies, Stage 3 severe COPD by GOLD classification ([address] and Tachycardia. Social History:   reports that he has been smoking cigarettes. He started smoking about 36 years ago. He has a 44.00 pack-year smoking history. He has never used smokeless tobacco. He reports current alcohol use of about 35.0 standard drinks per week. He reports that he does not currently use drugs after having used the following drugs: Cocaine and Marijuana Rebecca Neo). Family History:   Family History   Problem Relation Age of Onset    Other Mother         mental health        Vitals:  BP (!) 146/87   Pulse 99   Temp 97.6 °F (36.4 °C) (Oral)   Resp 16   Ht 6' 1\" (1.854 m)   Wt 194 lb 3.6 oz (88.1 kg)   SpO2 95%   BMI 25.62 kg/m²   Temp (24hrs), Av.8 °F (36.6 °C), Min:97.6 °F (36.4 °C), Max:98 °F (36.7 °C)    No results for input(s): POCGLU in the last 72 hours. I/O (24Hr):     Intake/Output Summary (Last 24 hours) at 2022 0952  Last data filed at 2022 0628  Gross per 24 hour   Intake --   Output 2300 ml   Net -2300 ml       Labs:  Hematology:  Recent Labs     22  0549 22  0544   WBC 7.2 7.8   RBC 3.19* 3.32*   HGB 12.2* 12.5*   HCT 35.5* 36.8*   .3* 110.8*   MCH 38.2* 37.6*   MCHC 34.3 34.0   RDW 13.5 13.9   * 112*   MPV 7.8 8.4   CRP  --  <3.0     Chemistry:  Recent Labs     09/16/22  0920 09/16/22  1502 09/16/22 2005 09/17/22  0549 09/18/22  0544 09/18/22  0545   *   < > 128* 129*  --  130*   K  --   --   --  3.7  --  3.7   CL  --   --   --  94*  --  94*   CO2  --   --   --  26  --  26   GLUCOSE  --   --   --  131*  --  145*   BUN  --   --   --  15 17 17   CREATININE  --   --   --  0.40* 0.47* 0.47*   MG 2.1  --   --   --   --   --    ANIONGAP  --   --   --  9  --  10   LABGLOM  --   --   --  >60 >60 >60   GFRAA  --   --   --  >60 >60 >60   CALCIUM  --   --   --  8.8  --  8.9   PHOS 2.8  --   --   --   --   --     < > = values in this interval not displayed. Recent Labs     09/17/22  0549   PROT 6.1*   LABALBU 3.0*   AST 60*   ALT 67*   ALKPHOS 103   BILITOT 0.8     ABG:  Lab Results   Component Value Date/Time    PHART 7.390 08/28/2022 08:20 PM    SYM2QDQ 36.3 08/28/2022 08:20 PM    PO2ART 163.0 08/28/2022 08:20 PM    BDQ6QTD 21.9 08/28/2022 08:20 PM    NBEA 3.0 08/28/2022 08:20 PM    Z4YIQFMP 96.7 08/28/2022 08:20 PM    FIO2 NOT REPORTED 05/28/2019 09:30 PM     Lab Results   Component Value Date/Time    SPECIAL NOT REPORTED 05/24/2017 04:22 AM     Lab Results   Component Value Date/Time    CULTURE NO SIGNIFICANT GROWTH 08/29/2022 05:28 PM       Radiology:  XR CHEST PORTABLE    Result Date: 9/13/2022  1. Redemonstration of lingular mass concerning for primary neoplasm. 2.  No new airspace disease identified in the interval.     CT CHEST PULMONARY EMBOLISM W CONTRAST    Result Date: 9/13/2022  1. Minimal peripheral ground-glass opacities in the right upper lobe are noted, while nonspecific, this may correspond to the provided history of COVID infection. 2.  Suboptimal contrast timing for evaluation of the peripheral pulmonary arteries. No evidence for central pulmonary embolism. 3.  Redemonstration of lingular mass, now measuring 4.7 cm versus 4.1 cm on recent CT. This remains concerning for primary neoplasm. Physical Examination:        General appearance:  alert, cooperative and no distress  Mental Status:  oriented to person, place and time and normal affect  Lungs:  bilateral wheeze present  Heart:  regular rate and rhythm, no murmur  Abdomen:   mildly distended. normal bowel sounds,   Extremities:   pedal edema present    Assessment:        Hospital Problems             Last Modified POA    * (Principal) Hyponatremia 9/13/2022 Yes    Mass of lingula of lung 9/13/2022 Yes    Alcoholism ([facility] [address] 9/15/2022 Yes    Essential hypertension 9/13/2022 Yes    COPD exacerbation ([facility] [address] 9/13/2022 Yes    Tobacco dependence 9/15/2022 Yes       Plan:         Hyponatremia-  improving. Appreciate recommendations from Nephrology. 1500 mL fluid restriction. Salt tablets  COPD exacerbation- IV solumedrol. Xopenex, Atrovent. Doxycyline   Lower extremity cellulitis-  patient is on IV vancomycin. CRP neg.  Will DC Vanco  Lung mass-Bronchoscopy per pulm  Mild thrombocytopenia-  monitor for bleeding   Hypertension-  lisinopril 10 mg   Alcohol use-  thiamine, folate    Smoker-  nicotine patch  Lovenox for DVT prophylaxis    Leidy Matthews MD  9/18/2022  9:45 AM

## 2022-09-18 NOTE — PLAN OF CARE
Problem: Discharge Planning  Goal: Discharge to home or other facility with appropriate resources  Outcome: Progressing     Problem: ABCDS Injury Assessment  Goal: Absence of physical injury  Outcome: Progressing     Problem: Safety - Adult  Goal: Free from fall injury  Outcome: Progressing     Problem: Pain  Goal: Verbalizes/displays adequate comfort level or baseline comfort level  Outcome: Progressing     Problem: Safety - Medical Restraint  Goal: Remains free of injury from restraints (Restraint for Interference with Medical Device)  Description: INTERVENTIONS:  1. Determine that other, less restrictive measures have been tried or would not be effective before applying the restraint  2. Evaluate the patient's condition at the time of restraint application  3. Inform patient/family regarding the reason for restraint  4. Q2H: Monitor safety, psychosocial status, comfort, nutrition and hydration  Outcome: Progressing     Problem: Skin/Tissue Integrity  Goal: Absence of new skin breakdown  Description: 1. Monitor for areas of redness and/or skin breakdown  2. Assess vascular access sites hourly  3. Every 4-6 hours minimum:  Change oxygen saturation probe site  4. Every 4-6 hours:  If on nasal continuous positive airway pressure, respiratory therapy assess nares and determine need for appliance change or resting period.   Outcome: Progressing

## 2022-09-18 NOTE — PROGRESS NOTES
Pt's IV infiltrated, RN attempted new IV 2x but failed. Another RN attempting new IV now. Will continue to monitor.

## 2022-09-18 NOTE — PROGRESS NOTES
Pulmonary Critical Care Progress Note  Phoebe Piña MD     Patient seen for the follow up of  Hyponatremia, COPD, Lung mass    Subjective:  No significant events overnight. Patient is ambulating and going outside for smoking. He denies any chest pain. His shortness of breath is getting better. He has cough which he attributes to his smoking. He is tolerating orals. Examination:  Vitals: BP (!) 146/87   Pulse 99   Temp 97.6 °F (36.4 °C) (Oral)   Resp 16   Ht 6' 1\" (1.854 m)   Wt 194 lb 3.6 oz (88.1 kg)   SpO2 95%   BMI 25.62 kg/m²   General appearance: alert and cooperative with exam  Neck: No JVD  Lungs: diminished air exchange, no active wheezing  Heart: regular rate and rhythm, S1, S2 normal, no gallop  Abdomen: Soft, non tender, + BS  Extremities: no cyanosis or clubbing.  No significant edema    LABs:  CBC:   Recent Labs     09/17/22  0549   WBC 7.2   HGB 12.2*   HCT 35.5*   *     BMP:   Recent Labs     09/16/22 2005 09/17/22  0549 09/18/22  0544   * 129*  --    K  --  3.7  --    CO2  --  26  --    BUN  --  15 17   CREATININE  --  0.40* 0.47*   LABGLOM  --  >60 >60   GLUCOSE  --  131*  --        LIVER PROFILE:  Recent Labs     09/17/22  0549   AST 60*   ALT 67*   LABALBU 3.0*     ABG:  Lab Results   Component Value Date/Time    PHART 7.390 08/28/2022 08:20 PM    SNN1NFL 36.3 08/28/2022 08:20 PM    PO2ART 163.0 08/28/2022 08:20 PM    VSG8LGX 21.9 08/28/2022 08:20 PM    V2MPZZMO 96.7 08/28/2022 08:20 PM    FIO2 NOT REPORTED 05/28/2019 09:30 PM       Lab Results   Component Value Date/Time    PHART 7.390 08/28/2022 08:20 PM    JNV2MBK 36.3 08/28/2022 08:20 PM    PO2ART 163.0 08/28/2022 08:20 PM    YUX5CXM 21.9 08/28/2022 08:20 PM    NBEA 3.0 08/28/2022 08:20 PM    Z9BYSMQZ 96.7 08/28/2022 08:20 PM    FIO2 NOT REPORTED 05/28/2019 09:30 PM     Radiology:  Chest xray  9/13/2022      Impression:  Acute hypoxic respiratory failure  Acute exacerbation of COPD  Left lung mass   Alcohol abuse History of COVID, 8/28/2022  Active smoker    Recommendations:  Continue antibiotics, IV vancomycin, doxycycline 5 day course completed  Decrease IV Solu-Medrol dose to 30 mg IV every 8 12 hours   Ipratropium Q 4 hours and prn  Symbicort inhaler  Xopenex  Nicotine patch  Bronchoscopy and biopsy of lung mass with Dr. Wyatt Orona was canceled due to positive COVID test, will be having bronchoscopy on Monday.  NPO at midnight  Labs: CBC and BMP in am  Monitor sodium levels  Oxygen via nasal cannula as needed  DVT prophylaxis with low molecular weight heparin  Will follow with you    Electronically signed by   Radha Carrera MD on 9/18/2022 at 9:29 AM  Pulmonary Critical Care and Sleep Medicine,  [facility]  Cell: [phone]  Office: [phone]

## 2022-09-18 NOTE — PROGRESS NOTES
Nephrology Progress Note    Subjective/   61y.o. year old male who we are seeing in consultation for hyponatremia    Interval history:  Patient seen and examined  Pt denies worsening shortness of breath. Serum sodium is 130 mmol/l. His appetite is improving. History of Present Illness: This is a 61 y.o. male with past medical history of COPD, patient presented to the hospital with complaints of shortness of breath cough and headache. Patient was recently admitted to the hospital from 8/28/2022 to 9/1/2022 patient was admitted for COVID-19 pneumonia, hemoptysis. After discharge patient continued to have cough shortness of breath but no hemoptysis, patient started to have headache, sore throat body aches. On initial investigation CT chest with IV contrast showed minimal peripheral groundglass opacities in the right upper lobe nonspecific but may be consistent with COVID infection, no PE.   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   Labs showed serum sodium of 120 on admission, patient admits to alcohol drinking and not compliant with fluid restriction     Objective/     Vitals:    09/18/22 0645 09/18/22 0702 09/18/22 1328 09/18/22 1334   BP: (!) 146/87   110/69   Pulse: (!) 101 99 (!) 105 93   Resp: 16 16 18 18   Temp: 97.6 °F (36.4 °C)   97.5 °F (36.4 °C)   TempSrc: Oral   Oral   SpO2: 96% 95% 96% 94%   Weight:       Height:         24HR INTAKE/OUTPUT:    Intake/Output Summary (Last 24 hours) at 9/18/2022 1506  Last data filed at 9/18/2022 0168  Gross per 24 hour   Intake --   Output 2300 ml   Net -2300 ml       Patient Vitals for the past 96 hrs (Last 3 readings):   Weight   09/17/22 0312 194 lb 3.6 oz (88.1 kg)   09/16/22 0620 202 lb 13.2 oz (92 kg)   09/15/22 0600 201 lb 11.5 oz (91.5 kg)         Constitutional:  Alert, awake, no apparent distress  Cardiovascular:  S1, S2 without m/r/g  Respiratory: Bilateral expiratory wheeze  Abdomen: +bs, soft, nt  Ext:  LE edema    Data/  Recent Labs     09/17/22  0549 09/18/22  0544   WBC 7.2 7.8   HGB 12.2* 12.5*   HCT 35.5* 36.8*   .3* 110.8*   * 112*       Recent Labs     09/16/22  0920 09/16/22  1502 09/16/22 2005 09/17/22  0549 09/18/22  0544 09/18/22  0545   *   < > 128* 129*  --  130*   K  --   --   --  3.7  --  3.7   CL  --   --   --  94*  --  94*   CO2  --   --   --  26  --  26   GLUCOSE  --   --   --  131*  --  145*   PHOS 2.8  --   --   --   --   --    MG 2.1  --   --   --   --   --    BUN  --   --   --  15 17 17   CREATININE  --   --   --  0.40* 0.47* 0.47*   LABGLOM  --   --   --  >60 >60 >60   GFRAA  --   --   --  >60 >60 >60    < > = values in this interval not displayed. Assessment/   Acute on chronic hyponatremia, hypoosmolar hyponatremia urine sodium is 20 which is consistent with hypovolemic hyponatremia, patient is responsive to saline - underlying SIADH cannot be ruled out given history of lung mass-  Serum sodium improving to 130 mmol/l    Essential hypertension-blood pressure well controlled    3.   4.7 cm right lung mass-biopsy and bronchoscopy on Monday    Plan/   Continue  1500 mils fluid restriction  Serum sodium checks q 12 hrly  Serum sodium target not to increase more than 9 mmol in 24 hours.   Sodium chloride tablets 1 g twice daily      Austin Daniel MD

## 2022-09-18 NOTE — CARE COORDINATION
ONGOING DISCHARGE PLAN:    Patient is alert and oriented x4. Spoke with patient regarding discharge plan and patient confirms that plan is still to go home  with no needs. Pt needs lung biospy and bronchoscopy,  NPO after MN for Monday . Following for home O2, currently on 2lpm NC at 98%. Will continue to follow for additional discharge needs.     Electronically signed by Emily Peguero RN on 9/18/2022 at 12:34 PM

## 2022-09-18 NOTE — PROGRESS NOTES
Physical Therapy        Physical Therapy Cancel Note      DATE: 2022    NAME: Brandon Noel  MRN: [id number]   : 1965      Patient not seen this date for Physical Therapy due to:    Patient independent with functional mobility. Will discontinue PT  at this time. Please reorder PT if future needs arise.        Electronically signed by Altagracia Whitney PT on 2022 at 3:21 PM

## 2022-09-19 VITALS
HEIGHT: 73 IN | TEMPERATURE: 97.7 F | BODY MASS INDEX: 25.74 KG/M2 | WEIGHT: 194.22 LBS | SYSTOLIC BLOOD PRESSURE: 124 MMHG | OXYGEN SATURATION: 98 % | HEART RATE: 85 BPM | DIASTOLIC BLOOD PRESSURE: 77 MMHG | RESPIRATION RATE: 18 BRPM

## 2022-09-19 PROCEDURE — 6370000000 HC RX 637 (ALT 250 FOR IP): Performed by: FAMILY MEDICINE

## 2022-09-19 PROCEDURE — 94761 N-INVAS EAR/PLS OXIMETRY MLT: CPT

## 2022-09-19 PROCEDURE — 6370000000 HC RX 637 (ALT 250 FOR IP): Performed by: INTERNAL MEDICINE

## 2022-09-19 PROCEDURE — 94640 AIRWAY INHALATION TREATMENT: CPT

## 2022-09-19 RX ORDER — SODIUM CHLORIDE 1000 MG
1 TABLET, SOLUBLE MISCELLANEOUS 2 TIMES DAILY
Qty: 90 TABLET | Refills: 3 | Status: SHIPPED | OUTPATIENT
Start: 2022-09-19

## 2022-09-19 RX ORDER — DILTIAZEM HYDROCHLORIDE 180 MG/1
180 CAPSULE, COATED, EXTENDED RELEASE ORAL DAILY
Qty: 30 CAPSULE | Refills: 3 | Status: SHIPPED | OUTPATIENT
Start: 2022-09-19

## 2022-09-19 RX ORDER — PREDNISONE 10 MG/1
TABLET ORAL
Qty: 18 TABLET | Refills: 0 | Status: SHIPPED | OUTPATIENT
Start: 2022-09-19 | End: 2022-10-03 | Stop reason: SDUPTHER

## 2022-09-19 RX ORDER — THIAMINE MONONITRATE (VIT B1) 100 MG
100 TABLET ORAL DAILY
Qty: 30 TABLET | Refills: 0 | Status: SHIPPED | OUTPATIENT
Start: 2022-09-19 | End: 2022-11-01

## 2022-09-19 RX ORDER — FOLIC ACID 1 MG/1
1 TABLET ORAL DAILY
Qty: 30 TABLET | Refills: 3 | Status: SHIPPED | OUTPATIENT
Start: 2022-09-19

## 2022-09-19 RX ADMIN — DILTIAZEM HYDROCHLORIDE 180 MG: 180 CAPSULE, COATED, EXTENDED RELEASE ORAL at 13:05

## 2022-09-19 RX ADMIN — FOLIC ACID 1 MG: 1 TABLET ORAL at 13:04

## 2022-09-19 RX ADMIN — Medication 2 PUFF: at 08:19

## 2022-09-19 RX ADMIN — Medication 100 MG: at 13:05

## 2022-09-19 RX ADMIN — SODIUM CHLORIDE 1 G: 1 TABLET ORAL at 13:05

## 2022-09-19 RX ADMIN — BUDESONIDE AND FORMOTEROL FUMARATE DIHYDRATE 2 PUFF: 160; 4.5 AEROSOL RESPIRATORY (INHALATION) at 08:18

## 2022-09-19 RX ADMIN — METOPROLOL SUCCINATE 100 MG: 50 TABLET, EXTENDED RELEASE ORAL at 13:04

## 2022-09-19 RX ADMIN — LEVALBUTEROL TARTRATE 1 PUFF: 45 AEROSOL, METERED ORAL at 03:29

## 2022-09-19 RX ADMIN — LEVALBUTEROL TARTRATE 1 PUFF: 45 AEROSOL, METERED ORAL at 08:18

## 2022-09-19 RX ADMIN — LISINOPRIL 10 MG: 10 TABLET ORAL at 13:04

## 2022-09-19 ASSESSMENT — ENCOUNTER SYMPTOMS
BLOOD IN STOOL: 0
BACK PAIN: 0
COLOR CHANGE: 0
VOICE CHANGE: 0
SHORTNESS OF BREATH: 0
RECTAL PAIN: 0
ANAL BLEEDING: 0
TROUBLE SWALLOWING: 0
PHOTOPHOBIA: 0
CHOKING: 0
ABDOMINAL DISTENTION: 0
COUGH: 0
STRIDOR: 0

## 2022-09-19 NOTE — DISCHARGE SUMMARY
Discharge Summary      Patient ID: Tom Stevenson    MRN: [id number]     Acct:  [de-identified]       Patient's PCP: JAEL Ramos CNP    Admit Date: 9/13/2022     Discharge Date: 9/19/2022 9/19/2022      Admitting Physician: Vidya Soto MD    Discharge Physician: Kia Nelson MD     Discharge Diagnoses:    Primary Problem  Hyponatremia    Principal Problem:    Hyponatremia  Active Problems:    Mass of lingula of lung    Alcoholism ([facility] [address]    Essential hypertension    COPD exacerbation ([facility] [address]    Tobacco dependence  Resolved Problems:    * No resolved hospital problems. *    Past Medical History:   Diagnosis Date    Alcohol abuse     hx of alcoholism; States he drinks 5 beers per day; pt has D/T's    Avascular necrosis of femoral head ([facility] [address] 11/25/2014    Overview:  S/p bilateral hip replacements    History of hip replacement     left/right    Hypertension     Mass of left lung     Rib fracture     Scabies     Stage 3 severe COPD by GOLD classification ([facility] [address] 11/08/2021    Tachycardia 10/28/2020    does not follow with Cardiology     The patient was seen and examined on day of discharge and this discharge summary is in conjunction with daily progress note from day of discharge. Code Status:  Prior    Hospital Course:   H&P Reviewed. Patient with  lung mass,tobacco dependence and alcohol dependencewas admitted with hyponatremia. Patient was started on IV normal saline nephrology in pulmonology services were consulted. Patient was started on sodium pills for hyponatremia. patient was alert and oriented and refused bronchoscopy for lung mass. Patient is being discharged in stable condition. Follow-up with pulmonology and Nephrology as outpatient    Discharge day progress note: Today   Patient was seen and examined at bedside today. Hemodynamically stable. No chest pain, shortness of breath, fever, chills, nausea, vomiting, palpitations, or abdominal pain reported.   No events reported tenderness. Musculoskeletal:         General: No tenderness or deformity. Normal range of motion. Cervical back: Normal range of motion and neck supple. No rigidity. Right lower leg: No edema. Left lower leg: No edema. Skin:     General: Skin is warm and dry. Capillary Refill: Capillary refill takes less than 2 seconds. Coloration: Skin is not jaundiced. Neurological:      General: No focal deficit present. Mental Status: Mental status is at baseline.    Psychiatric:         Mood and Affect: Mood normal.         Behavior: Behavior normal.       Consults:  PHARMACY TO DOSE VANCOMYCIN  IP CONSULT TO CRITICAL CARE  IP CONSULT TO CRITICAL CARE  IP CONSULT TO NEPHROLOGY  IP CONSULT TO SOCIAL WORK    Significant Diagnostic Studies: as above, and as follows:    Treatments: as above    Disposition: home    Discharged Condition: Stable    Follow Up:  JAEL Roberson CNP in one week  Pulmonology in 2 weeks   Nephrology in 2 weeks   CMP in 1 week    Discharge Medications:      Medication List        START taking these medications      dilTIAZem 180 MG extended release capsule  Commonly known as: CARDIZEM CD  Take 1 capsule by mouth daily     folic acid 1 MG tablet  Commonly known as: FOLVITE  Take 1 tablet by mouth daily     predniSONE 10 MG tablet  Commonly known as: DELTASONE  Take 3 tabs for 3 days, then 2 tabs for 3 days and then 1 tab for 3 days     sodium chloride 1 g tablet  Take 1 tablet by mouth 2 times daily     vitamin B-1 100 MG tablet  Commonly known as: THIAMINE  Take 1 tablet by mouth daily            CONTINUE taking these medications      * albuterol (2.5 MG/3ML) 0.083% nebulizer solution  Commonly known as: PROVENTIL  INHALE 3 MLS (ONE VIAL) INTO THE LUNGS EVERY 6 HOURS VIA NEBULIZER AS NEEDED FOR WHEEZING     * albuterol sulfate  (90 Base) MCG/ACT inhaler  Commonly known as: ProAir HFA  Inhale 2 puffs into the lungs every 6 hours as needed for Wheezing     lisinopril 10 MG tablet  Commonly known as: PRINIVIL;ZESTRIL  Take 1 tablet by mouth daily     metoprolol succinate 100 MG extended release tablet  Commonly known as: TOPROL XL  Take 1 tablet by mouth 2 times daily     Trelegy Ellipta 200-62.5-25 MCG/INH Aepb  Generic drug: Fluticasone-Umeclidin-Vilant  Inhale 1 puff into the lungs Daily           * This list has 2 medication(s) that are the same as other medications prescribed for you. Read the directions carefully, and ask your doctor or other care provider to review them with you. Where to Get Your Medications        These medications were sent to [address], [address]  [address] [facility] [address]      Phone: [phone]   dilTIAZem 180 MG extended release capsule  folic acid 1 MG tablet  predniSONE 10 MG tablet  sodium chloride 1 g tablet  vitamin B-1 100 MG tablet                  Time Spent on discharge is more than  35 min in the examination, evaluation, counseling and review of medications and discharge plan.       Electronically signed by Susen Dandy, MD     Copy sent to Dr. Luther Lima, APRN - CNP

## 2022-09-19 NOTE — CARE COORDINATION
ONGOING DISCHARGE PLAN:    Patient is alert and oriented x4. Spoke with patient regarding discharge plan and patient confirms that plan is still to return to home alone. Denies VNS. Remains on IV Steroids, 30 Q 12. Currently sating 98% on RA. CT guided Lung BX today. Pulmonary following. NA today 130. FR & Salt tablets continued. Nephro following. Will continue to follow for additional discharge needs.     Electronically signed by Betsy Parks RN on 9/19/2022 at 10:03 AM

## 2022-09-19 NOTE — PROGRESS NOTES
PULMONARY PROGRESS NOTE:    REASON FOR VISIT: COPD, lung mass  Interval History:    Shortness of Breath: baseline  Cough: yes  Sputum: yes     Hemoptysis: no  Chest Pain: no  Fever: no                   Swelling Feet: no  Headache: no                                           Nausea, Emesis, Abdominal Pain: no  Diarrhea: no         Constipation: no    Events since last visit: refusing bronch    PAST MEDICAL HISTORY:      Scheduled Meds:   methylPREDNISolone  30 mg IntraVENous Q12H    sodium chloride  1 g Oral BID    dilTIAZem  180 mg Oral Daily    thiamine mononitrate  100 mg Oral Daily    folic acid  1 mg Oral Daily    budesonide-formoterol  2 puff Inhalation BID    ipratropium  2 puff Inhalation Q6H WA    levalbuterol  1 puff Inhalation Q6H    nicotine  1 patch TransDERmal Daily    lisinopril  10 mg Oral Daily    metoprolol succinate  100 mg Oral BID    traZODone  50 mg Oral Nightly    sodium chloride flush  10 mL IntraVENous 2 times per day    enoxaparin  40 mg SubCUTAneous Daily    sodium chloride flush  5-40 mL IntraVENous 2 times per day     Continuous Infusions:   sodium chloride      sodium chloride       PRN Meds:melatonin, Benzocaine-Menthol, sodium chloride flush, sodium chloride flush, sodium chloride, potassium chloride **OR** potassium alternative oral replacement **OR** potassium chloride, magnesium sulfate, ondansetron **OR** ondansetron, magnesium hydroxide, acetaminophen **OR** acetaminophen, metoprolol, sodium chloride flush, sodium chloride, LORazepam **OR** LORazepam **OR** LORazepam **OR** LORazepam **OR** LORazepam **OR** LORazepam **OR** LORazepam **OR** LORazepam    PHYSICAL EXAMINATION:  /77   Pulse 85   Temp 97.7 °F (36.5 °C) (Oral)   Resp 18   Ht 6' 1\" (1.854 m)   Wt 194 lb 3.6 oz (88.1 kg)   SpO2 98%   BMI 25.62 kg/m²     General : Awake, alert  Neck - supple, no lymphadenopathy, JVD not raised  Heart - regular rhythm, S1 and S2 normal; no additional sounds heard  Lungs - Air Entry- fair bilaterally; breath sounds : vesicular;   rales/crackles - absent, 98% on RA  Abdomen - soft, no tenderness  Upper Extremities  - no cyanosis, mottling; edema : absent  Lower Extremities: no cyanosis, mottling; edema : absent    Current Laboratory, Radiologic, Microbiologic, and Diagnostic studies reviewed  Data ReviewCBC:   Recent Labs     09/17/22  0549 09/18/22  0544   WBC 7.2 7.8   RBC 3.19* 3.32*   HGB 12.2* 12.5*   HCT 35.5* 36.8*   * 112*       BMP:   Recent Labs     09/16/22 2005 09/17/22  0549 09/18/22  0544 09/18/22  0545   GLUCOSE  --  131*  --  145*   * 129*  --  130*   K  --  3.7  --  3.7   BUN  --  15 17 17   CREATININE  --  0.40* 0.47* 0.47*   CALCIUM  --  8.8  --  8.9       ABGs: No results for input(s): PHART, PO2ART, NDO9XLI, OVK2RRP, BEART, H7VLNJPS, RUL9VRR in the last 72 hours. PT/INR:  No results found for: PTINR    ASSESSMENT / PLAN:    Lingular lung mass   Slight increase in size since LDCT from 7/11/22  Was previously scheduled for outpatient bronchoscopy and biopsy at [facility]. V's with Dr. Howard Peacock on 9/15 which was cancelled due to positive covid test.   COPD exac- ABX, steroids   Recent covid- initially tested positive on 8/29  AMS- related to alcohol withdraw? Hyponatremia- improved   Nephrology following.  FR of 1500 ml ordered  Alcohol dependence  CIWA  Nicotine dependence- nicotine patch   Refusing bronch  Pulmonary signing off    Plan of care discussed with Dr Sunday Rubin  Electronically signed by JAEL Perez - CNP on 09/19/22 at 12:20 PM.

## 2022-09-19 NOTE — PLAN OF CARE
Problem: Discharge Planning  Goal: Discharge to home or other facility with appropriate resources  Outcome: Progressing     Problem: ABCDS Injury Assessment  Goal: Absence of physical injury  Outcome: Progressing     Problem: Safety - Adult  Goal: Free from fall injury  Outcome: Progressing     Problem: Pain  Goal: Verbalizes/displays adequate comfort level or baseline comfort level  Outcome: Progressing

## 2022-09-19 NOTE — PROGRESS NOTES
09/19/22 1509   Encounter Summary   Encounter Overview/Reason  Spiritual/Emotional Needs   Service Provided For: Patient   Referral/Consult From: Rounding   Last Encounter  09/19/22   Complexity of Encounter Moderate   Spiritual/Emotional needs   Type Spiritual Support   Assessment/Intervention/Outcome   Assessment Calm;Coping   Intervention Active listening;Prayer (assurance of)/[place];Sustaining Presence/Ministry of presence   Outcome Acceptance; Coping;Receptive

## 2022-09-19 NOTE — PROGRESS NOTES
Per patient CT biopsy cancelled d/t pt refused. Patient now wants to eat npo dc'd per protocol, no cosign needed. Per Dr. Keary Holstein ok to dc, Dr. Radha Ford on her to dc patient.

## 2022-09-19 NOTE — PROGRESS NOTES
Nephrology Progress Note    Reason for consultation: Management of hyponatremia. Consulting physician: Crystal Wolfe MD.    Interval history: Patient was seen and examined today and he is scheduled for lung biopsy that this morning. He does not have a headache or dizziness and is categorically refusing any further blood draws. Most recent serum sodium was yesterday at 130 mmol/L. History of Present Illness: This is a 61 y.o. male with past medical history of COPD, patient presented to the hospital with complaints of shortness of breath cough and headache. Patient was recently admitted to the hospital from 8/28/2022 to 9/1/2022 patient was admitted for COVID-19 pneumonia, hemoptysis. After discharge patient continued to have cough shortness of breath but no hemoptysis, patient started to have headache, sore throat body aches. On initial investigation CT chest with IV contrast showed minimal peripheral groundglass opacities in the right upper lobe nonspecific but may be consistent with COVID infection, no PE.   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   Labs showed serum sodium of 120 on admission, patient admits to alcohol drinking and not compliant with fluid restriction     Objective/     Vitals:    09/18/22 2348 09/19/22 0329 09/19/22 0645 09/19/22 0820   BP: 133/81  124/77    Pulse: 87 (!) 112 85    Resp: 20 20 18    Temp: 97.7 °F (36.5 °C)  97.7 °F (36.5 °C)    TempSrc:   Oral    SpO2: 94% 95% 98% 98%   Weight:       Height:         24HR INTAKE/OUTPUT:    Intake/Output Summary (Last 24 hours) at 9/19/2022 1052  Last data filed at 9/19/2022 0805  Gross per 24 hour   Intake 0 ml   Output --   Net 0 ml       Patient Vitals for the past 96 hrs (Last 3 readings):   Weight   09/17/22 0312 194 lb 3.6 oz (88.1 kg)   09/16/22 0620 202 lb 13.2 oz (92 kg)       Constitutional:  Alert, awake, no apparent distress  Cardiovascular:  S1, S2 without pericardial rub or gallop. Respiratory: Bilateral expiratory wheeze  Abdomen: Soft with normal bowel sounds; no palpable organomegaly. Extremities: Trace lower extremity edema. Data/  Recent Labs     09/17/22 0549 09/18/22  0544   WBC 7.2 7.8   HGB 12.2* 12.5*   HCT 35.5* 36.8*   .3* 110.8*   * 112*       Recent Labs     09/16/22 2005 09/17/22 0549 09/18/22 0544 09/18/22  0545   * 129*  --  130*   K  --  3.7  --  3.7   CL  --  94*  --  94*   CO2  --  26  --  26   GLUCOSE  --  131*  --  145*   BUN  --  15 17 17   CREATININE  --  0.40* 0.47* 0.47*   LABGLOM  --  >60 >60 >60   GFRAA  --  >60 >60 >60       Assessment/plan:    1. Hyponatremia and chronic hyponatremia - acute hyponatremia in this patient is secondary to hypovolemia suggested by random urine sodium 20 mmol/L. Underlying chronic hyponatremia likely due to SIADH from probable lung malignancy. Plan: Fluid restriction 1500 mL/day. Continue sodium chloride tablet 1000 mg p.o. twice daily. Will check serum sodium daily. 2.  Systemic hypertension - blood pressure control is adequate. 3.  Right lung mass [4.7 cm] - for biopsy today. Prognosis is guarded.     Jaki Lucia  Attending Clinical Nephrologist

## 2022-09-19 NOTE — PROGRESS NOTES
[facility]   OCCUPATIONAL THERAPY MISSED TREATMENT NOTE   INPATIENT   Date: 22  Patient Name: Nilda Patterson       Room:   MRN: [id number]   Account #: [de-identified]    : 1965  (61 y.o.)  Gender: male   Referring Practitioner: Michelle Day MD  Diagnosis: Hyponatremia             REASON FOR MISSED TREATMENT:  Patient unable to participate   -  Pt's belongings on bed in preparation for DC. RN Gurvinder Espitia deferred treatment. Attempt made 1110.          [address], [place]

## 2022-09-20 ENCOUNTER — TELEPHONE (OUTPATIENT)
Dept: FAMILY MEDICINE CLINIC | Age: 57
End: 2022-09-20

## 2022-09-20 NOTE — TELEPHONE ENCOUNTER
Called and spoke with pt and advised him of your message pt stated that the 23rd is not a good day he needs to relax he just got ut the hospital yesterday, pt didn't really sound like he wants it done.  Stated to him I would let you know and get back to him

## 2022-09-20 NOTE — TELEPHONE ENCOUNTER
[facility] Transitions Initial Follow Up Call    Call within 2 business days of discharge: Yes     Patient: Iliana Shaikh Patient : 1965 MRN: [id number]    [unfilled]    RARS: Readmission Risk Score: 15.9       Spoke with: The patient. Discharge department/facility: D/C [facility] 22 HYPONATREMIA    Non-face-to-face services provided:  Scheduled appointment with PCP-appointment made with Vandana Mckeon CNP on 22. Scheduled appointment with Specialist-appointment made with Dr Rolo Llamas on 22 and with Dr Berkley Moore on 10/10/22.   Obtained and reviewed discharge summary and/or continuity of care documents    Follow Up  Future Appointments   Date Time Provider Lona Camarena   2022 11:45 AM Jefferson Mann MD [address] [place] [address]   2022 11:30 AM JAEL Spencer CNP [facility]   10/10/2022 11:30 AM Alessia Betancourt MD Resp Spec Cally Ernst RN

## 2022-09-23 ENCOUNTER — TELEPHONE (OUTPATIENT)
Dept: CASE MANAGEMENT | Age: 57
End: 2022-09-23

## 2022-09-23 NOTE — TELEPHONE ENCOUNTER
Name: Brandon Noel  : 1965  MRN: [id number]    Oncology Navigation Follow-Up Note  Navigator left VM for pt. Requesting a return call asking for clarification if he plans to F/U with our office?    Electronically signed by Yahir Moe RN on 2022 at 12:23 PM

## 2022-09-26 ENCOUNTER — OFFICE VISIT (OUTPATIENT)
Dept: FAMILY MEDICINE CLINIC | Age: 57
End: 2022-09-26
Payer: COMMERCIAL

## 2022-09-26 VITALS
OXYGEN SATURATION: 96 % | RESPIRATION RATE: 30 BRPM | WEIGHT: 199.8 LBS | SYSTOLIC BLOOD PRESSURE: 106 MMHG | TEMPERATURE: 96.6 F | HEART RATE: 106 BPM | BODY MASS INDEX: 26.36 KG/M2 | DIASTOLIC BLOOD PRESSURE: 64 MMHG

## 2022-09-26 DIAGNOSIS — J12.82 PNEUMONIA DUE TO COVID-19 VIRUS: Primary | ICD-10-CM

## 2022-09-26 DIAGNOSIS — J44.1 CHRONIC OBSTRUCTIVE PULMONARY DISEASE WITH ACUTE EXACERBATION (HCC): ICD-10-CM

## 2022-09-26 DIAGNOSIS — G47.00 INSOMNIA, UNSPECIFIED TYPE: ICD-10-CM

## 2022-09-26 DIAGNOSIS — U07.1 PNEUMONIA DUE TO COVID-19 VIRUS: Primary | ICD-10-CM

## 2022-09-26 DIAGNOSIS — E87.1 HYPONATREMIA: ICD-10-CM

## 2022-09-26 DIAGNOSIS — Z09 HOSPITAL DISCHARGE FOLLOW-UP: ICD-10-CM

## 2022-09-26 PROCEDURE — 99214 OFFICE O/P EST MOD 30 MIN: CPT | Performed by: NURSE PRACTITIONER

## 2022-09-26 PROCEDURE — 1111F DSCHRG MED/CURRENT MED MERGE: CPT | Performed by: NURSE PRACTITIONER

## 2022-09-26 RX ORDER — HYDROXYZINE HYDROCHLORIDE 25 MG/1
25 TABLET, FILM COATED ORAL NIGHTLY
Qty: 30 TABLET | Refills: 0 | Status: SHIPPED | OUTPATIENT
Start: 2022-09-26 | End: 2022-10-27 | Stop reason: SDUPTHER

## 2022-09-26 RX ORDER — HYDROXYZINE HYDROCHLORIDE 25 MG/1
25 TABLET, FILM COATED ORAL EVERY 8 HOURS PRN
Qty: 30 TABLET | Refills: 0 | Status: SHIPPED | OUTPATIENT
Start: 2022-09-26 | End: 2022-09-26 | Stop reason: CLARIF

## 2022-09-26 ASSESSMENT — PATIENT HEALTH QUESTIONNAIRE - PHQ9
3. TROUBLE FALLING OR STAYING ASLEEP: 3
SUM OF ALL RESPONSES TO PHQ QUESTIONS 1-9: 15
SUM OF ALL RESPONSES TO PHQ9 QUESTIONS 1 & 2: 6
6. FEELING BAD ABOUT YOURSELF - OR THAT YOU ARE A FAILURE OR HAVE LET YOURSELF OR YOUR FAMILY DOWN: 0
9. THOUGHTS THAT YOU WOULD BE BETTER OFF DEAD, OR OF HURTING YOURSELF: 0
4. FEELING TIRED OR HAVING LITTLE ENERGY: 3
2. FEELING DOWN, DEPRESSED OR HOPELESS: 3
SUM OF ALL RESPONSES TO PHQ QUESTIONS 1-9: 15
7. TROUBLE CONCENTRATING ON THINGS, SUCH AS READING THE NEWSPAPER OR WATCHING TELEVISION: 3
10. IF YOU CHECKED OFF ANY PROBLEMS, HOW DIFFICULT HAVE THESE PROBLEMS MADE IT FOR YOU TO DO YOUR WORK, TAKE CARE OF THINGS AT HOME, OR GET ALONG WITH OTHER PEOPLE: 3
5. POOR APPETITE OR OVEREATING: 0
SUM OF ALL RESPONSES TO PHQ QUESTIONS 1-9: 15
1. LITTLE INTEREST OR PLEASURE IN DOING THINGS: 3
SUM OF ALL RESPONSES TO PHQ QUESTIONS 1-9: 15
8. MOVING OR SPEAKING SO SLOWLY THAT OTHER PEOPLE COULD HAVE NOTICED. OR THE OPPOSITE, BEING SO FIGETY OR RESTLESS THAT YOU HAVE BEEN MOVING AROUND A LOT MORE THAN USUAL: 0

## 2022-09-26 NOTE — PROGRESS NOTES
Cellulitis of lower extremity    Alcoholism ([facility]    Essential hypertension    Hyperkeratosis of b/l Soles    Alcohol withdrawal (HCC)    Tinea pedis of both feet    Suicidal ideation    Dyspnea    COPD exacerbation (HCC)    Gastroesophageal reflux disease without esophagitis    Cigarette smoker    Avascular necrosis of femoral head (HCC)    Tachycardia    Stage 3 severe COPD by GOLD classification (HCC)    Tobacco dependence    Chronic obstructive pulmonary disease (HCC)    Lung malignancy ([facility]    Cough with hemoptysis    Sepsis ([facility]    COVID-19    Mass of lingula of lung    Hyponatremia       Medications listed as ordered at the time of discharge from hospital     Medication List            Accurate as of September 26, 2022 11:59 PM. If you have any questions, ask your nurse or doctor.                 START taking these medications      hydrOXYzine HCl 25 MG tablet  Commonly known as: ATARAX  Take 1 tablet by mouth at bedtime  Started by: JAEL Newton CNP            CONTINUE taking these medications      * albuterol (2.5 MG/3ML) 0.083% nebulizer solution  Commonly known as: PROVENTIL  INHALE 3 MLS (ONE VIAL) INTO THE LUNGS EVERY 6 HOURS VIA NEBULIZER AS NEEDED FOR WHEEZING     * albuterol sulfate  (90 Base) MCG/ACT inhaler  Commonly known as: ProAir HFA  Inhale 2 puffs into the lungs every 6 hours as needed for Wheezing     dilTIAZem 180 MG extended release capsule  Commonly known as: CARDIZEM CD  Take 1 capsule by mouth daily     folic acid 1 MG tablet  Commonly known as: FOLVITE  Take 1 tablet by mouth daily     lisinopril 10 MG tablet  Commonly known as: PRINIVIL;ZESTRIL  Take 1 tablet by mouth daily     metoprolol succinate 100 MG extended release tablet  Commonly known as: TOPROL XL  Take 1 tablet by mouth 2 times daily     predniSONE 10 MG tablet  Commonly known as: DELTASONE  Take 3 tabs for 3 days, then 2 tabs for 3 days and then 1 tab for 3 days     sodium chloride 1 g tablet  Take 1 tablet by mouth 2 times daily     Trelegy Ellipta 200-62.5-25 MCG/INH Aepb  Generic drug: Fluticasone-Umeclidin-Vilant  Inhale 1 puff into the lungs Daily     vitamin B-1 100 MG tablet  Commonly known as: THIAMINE  Take 1 tablet by mouth daily           * This list has 2 medication(s) that are the same as other medications prescribed for you. Read the directions carefully, and ask your doctor or other care provider to review them with you.                    Where to Get Your Medications        These medications were sent to [facility] [address] [facility]  [address] [address] [address] [address]      Phone: [phone]   hydrOXYzine HCl 25 MG tablet           Medications marked \"taking\" at this time  Outpatient Medications Marked as Taking for the 9/26/22 encounter (Office Visit) with JAEL Fishman - CNP   Medication Sig Dispense Refill    hydrOXYzine HCl (ATARAX) 25 MG tablet Take 1 tablet by mouth at bedtime 30 tablet 0    thiamine mononitrate (THIAMINE) 100 MG tablet Take 1 tablet by mouth daily 30 tablet 0    dilTIAZem (CARDIZEM CD) 180 MG extended release capsule Take 1 capsule by mouth daily 30 capsule 3    folic acid (FOLVITE) 1 MG tablet Take 1 tablet by mouth daily 30 tablet 3    predniSONE (DELTASONE) 10 MG tablet Take 3 tabs for 3 days, then 2 tabs for 3 days and then 1 tab for 3 days 18 tablet 0    sodium chloride 1 g tablet Take 1 tablet by mouth 2 times daily 90 tablet 3    metoprolol succinate (TOPROL XL) 100 MG extended release tablet Take 1 tablet by mouth 2 times daily 30 tablet 3    lisinopril (PRINIVIL;ZESTRIL) 10 MG tablet Take 1 tablet by mouth daily 90 tablet 0    Fluticasone-Umeclidin-Vilant (TRELEGY ELLIPTA) 200-62.5-25 MCG/INH AEPB Inhale 1 puff into the lungs Daily 1 each 2    albuterol sulfate HFA (PROAIR HFA) 108 (90 Base) MCG/ACT inhaler Inhale 2 puffs into the lungs every 6 hours as needed for Wheezing 1 each 1        Medications patient taking as of now reconciled against medications ordered at time of hospital discharge: Yes    A comprehensive review of systems was negative except for what was noted in the HPI. Objective:    /64 (Site: Left Upper Arm, Position: Sitting, Cuff Size: Large Adult)   Pulse (!) 106   Temp (!) 96.6 °F (35.9 °C) (Infrared)   Resp 30   Wt 199 lb 12.8 oz (90.6 kg)   SpO2 96%   BMI 26.36 kg/m²     Constitutional: obesity he appears well-developed and well-nourished. No distress. HENT:   Mouth/Throat: No oropharyngeal exudate. Eyes: Conjunctivae are normal. Pupils are equal, round, and reactive to light. No scleral icterus. Neck: No JVD present. No thyromegaly present. Cardiovascular: Normal rate, regular rhythm and normal heart sounds. Exam reveals no gallop and no friction rub. No murmur heard. Pulmonary/Chest: mildly labored breathing but no respiratory distress. Abdominal: Soft. Bowel sounds are normal. he exhibits no distension and no mass. There is no tenderness. There is no rebound and no guarding. Lymphadenopathy:   he has no cervical adenopathy. Neurological: he is alert and oriented to person, place, and time. No cranial nerve deficit. he exhibits normal muscle tone. Skin: he is not diaphoretic. An electronic signature was used to authenticate this note.   --Denys [place], JAEL - CNP

## 2022-10-03 ENCOUNTER — SCHEDULED TELEPHONE ENCOUNTER (OUTPATIENT)
Dept: PULMONOLOGY | Age: 57
End: 2022-10-03
Payer: COMMERCIAL

## 2022-10-03 DIAGNOSIS — J44.9 STAGE 3 SEVERE COPD BY GOLD CLASSIFICATION (HCC): ICD-10-CM

## 2022-10-03 DIAGNOSIS — J43.2 CENTRILOBULAR EMPHYSEMA (HCC): ICD-10-CM

## 2022-10-03 DIAGNOSIS — J44.1 COPD EXACERBATION (HCC): Primary | ICD-10-CM

## 2022-10-03 DIAGNOSIS — R91.8 MASS OF LEFT LUNG: ICD-10-CM

## 2022-10-03 PROCEDURE — 99442 PR PHYS/QHP TELEPHONE EVALUATION 11-20 MIN: CPT | Performed by: INTERNAL MEDICINE

## 2022-10-03 RX ORDER — PREDNISONE 10 MG/1
TABLET ORAL
Qty: 18 TABLET | Refills: 0 | Status: SHIPPED | OUTPATIENT
Start: 2022-10-03 | End: 2022-11-01

## 2022-10-03 NOTE — PROGRESS NOTES
10/3/2022    TELEHEALTH EVALUATION -- Audio/Visual (During [address] emergency)    Patient and physician are located in their individual locations. This is visit is completed via NOZA application / [email or web address] / Telephone     No chief complaint on file. HPI:    Adriana Johns (:  1965) has requested an audio/video evaluation for the following concern(s):    Patient has had recurrent hospitalization for COPD exacerbation. He has been recently discharged from hospital and completed his prednisone therapy on Wednesday. But last night he felt like he had to go back to the ER because he is having increased wheezing and sputum production. I started him back on prednisone taper. Extensive discussion about diagnostic evaluation for left upper lobe lung mass. Patient is at present reluctant to proceed with either CT-guided biopsy or bronchoscopy.        Immunization   Immunization History   Administered Date(s) Administered    COVID-19, PFIZER PURPLE top, DILUTE for use, (age 15 y+), 30mcg/0.3mL 2021, 2021    Influenza, FLUCELVAX, (age 10 mo+), MDCK, PF, 0.5mL 2021    Td (Adult), 5 Lf Tetanus Toxoid, Pf (Tenivac, Decavac) 1998    Tdap (Boostrix, Adacel) 10/24/2014        Pneumococcal Vaccine     [] Up to date    [] Indicated   [] Refused  [] Contraindicated       Influenza Vaccine   [] Up to date    [] Indicated   [] Refused  [] Contraindicated        Pulmonary Rehab   [] Completed   [] Indicated   [] Refused  [] Contraindicated   PAST MEDICAL HISTORY:       Diagnosis Date    Alcohol abuse     hx of alcoholism; States he drinks 5 beers per day; pt has D/T's    Avascular necrosis of femoral head ([address] 2014    Overview:  S/p bilateral hip replacements    History of hip replacement     left/right    Hypertension     Mass of left lung     Rib fracture     Scabies     Stage 3 severe COPD by GOLD classification ([facility] 2021    Tachycardia 10/28/2020    does not follow with Cardiology         Family History:       Problem Relation Age of Onset    Other Mother         mental health        SURGICAL HISTORY:   Past Surgical History:   Procedure Laterality Date    NECK SURGERY      TOTAL HIP ARTHROPLASTY Bilateral     VASECTOMY             SOCIAL AND OCCUPATIONAL HEALTH:       Social History     Socioeconomic History    Marital status: Single     Spouse name: None    Number of children: None    Years of education: None    Highest education level: None   Tobacco Use    Smoking status: Every Day     Packs/day: 1.00     Years: 44.00     Pack years: 44.00     Types: Cigarettes     Start date: 1986    Smokeless tobacco: Never    Tobacco comments:     pt educated on both alcohol and smoking cessation   Substance and Sexual Activity    Alcohol use: Yes     Alcohol/week: 35.0 standard drinks     Types: 35 Cans of beer per week     Comment: hx of alcoholism; States he drinks 5 beers per day; pt has D/T's    Drug use: Not Currently     Types: Cocaine, Marijuana (Weed)     Comment: Quit cocaine at age 24, quit marijuana at age 24     Social Determinants of Health     Financial Resource Strain: High Risk    Difficulty of Paying Living Expenses: Hard   Food Insecurity: No Food Insecurity    Worried About Running Out of Food in the Last Year: Never true    Ran Out of Food in the Last Year: Never true        TOBACCO:   reports that he has been smoking cigarettes. He started smoking about 36 years ago. He has a 44.00 pack-year smoking history. He has never used smokeless tobacco.  ETOH:   reports current alcohol use of about 35.0 standard drinks per week. ALLERGIES:      No Known Allergies      Home Meds:   Prior to Admission medications    Medication Sig Start Date End Date Taking?  Authorizing Provider   predniSONE (DELTASONE) 10 MG tablet Take 3 tabs for 3 days, then 2 tabs for 3 days and then 1 tab for 3 days 10/3/22  Yes Armand Allen MD   hydrOXYzine HCl (ATARAX) 25 MG tablet Take 1 tablet by mouth at bedtime 9/26/22 10/26/22 Yes JAEL Askew CNP   thiamine mononitrate (THIAMINE) 100 MG tablet Take 1 tablet by mouth daily 9/19/22 10/19/22 Yes Elba De Jesus MD   dilTIAZem (CARDIZEM CD) 180 MG extended release capsule Take 1 capsule by mouth daily 9/19/22  Yes Elba De Jesus MD   folic acid (FOLVITE) 1 MG tablet Take 1 tablet by mouth daily 9/19/22  Yes Elba De Jesus MD   sodium chloride 1 g tablet Take 1 tablet by mouth 2 times daily 9/19/22  Yes Elba De Jesus MD   metoprolol succinate (TOPROL XL) 100 MG extended release tablet Take 1 tablet by mouth 2 times daily 8/31/22  Yes Sindi Ramírez MD   lisinopril (PRINIVIL;ZESTRIL) 10 MG tablet Take 1 tablet by mouth daily 8/18/22  Yes JAEL Askew CNP   Fluticasone-Umeclidin-Vilant (TRELEGY ELLIPTA) 200-62.5-25 MCG/INH AEPB Inhale 1 puff into the lungs Daily 7/19/22  Yes Farooq Lin MD   albuterol sulfate HFA (PROAIR HFA) 108 (90 Base) MCG/ACT inhaler Inhale 2 puffs into the lungs every 6 hours as needed for Wheezing 6/13/22  Yes JAEL Askew CNP   albuterol (PROVENTIL) (2.5 MG/3ML) 0.083% nebulizer solution INHALE 3 MLS (ONE VIAL) INTO THE LUNGS EVERY 6 HOURS VIA NEBULIZER AS NEEDED FOR WHEEZING 1/26/22  Yes Farooq Lin MD              Wt Readings from Last 3 Encounters:   09/26/22 199 lb 12.8 oz (90.6 kg)   09/17/22 194 lb 3.6 oz (88.1 kg)   09/09/22 204 lb 6.4 oz (92.7 kg)           ECHO Complete 2D W Doppler W Color    Result Date: 9/15/2022  [facility] Transthoracic Echocardiography Report (TTE)  Patient Name [address]       Date of Study               09/14/2022               BIJAN T   Date of      1965  Gender                      Male  Birth   Age          61 year(s)  Race                           Room Number  2014        Height:                     73 inch, 185.42 cm   Corporate ID [id number]    Weight:                     198 pounds, 89.8 kg  #   Patient Acct [de-identified]   BSA: 2.14 m^2      BMI:      26.12  #                                                              kg/m^2   MR #         [id number]      Sonographer                 Mandy Denson   Accession #  [id number]  Interpreting Physician      [address]   Fellow                   Referring Nurse                           Practitioner   Interpreting             Referring Physician         Sudhir Alarcon  Type of Study   TTE procedure:2D Echocardiogram, M-Mode, Doppler, Color Doppler. Procedure Date Date: 09/14/2022 Start: 02:25 PM Study Location: [facility] Technical Quality: Fair visualization Indications:Shortness of breath. Patient Status: Inpatient Height: 73 inches Weight: 198 pounds BSA: 2.14 m^2 BMI: 26.12 kg/m^2 Rhythm: Sinus tachycardia HR: 119 bpm BP: 106/74 mmHg CONCLUSIONS Summary Left ventricle is normal in size and wall thickness. Global left ventricular systolic function is normal. Estimated LV EF >55 %. No significant valvular regurgitation or stenosis seen. Signature ----------------------------------------------------------------------------  Electronically signed by Mandy Denson(Sonographer) on 09/14/2022 03:06  PM ---------------------------------------------------------------------------- ----------------------------------------------------------------------------  Electronically signed by Asad Can(Interpreting physician) on 09/15/2022  09:14 AM ---------------------------------------------------------------------------- FINDINGS Left Atrium Left atrium is normal in size. Left Ventricle Left ventricle is normal in size and wall thickness. Global left ventricular systolic function is normal. Estimated LV EF >55 %. Right Atrium Right atrium is normal in size. Right Ventricle Normal right ventricular size and function. Mitral Valve No obvious valvular abnormality seen. No evidence of mitral regurgitation. Aortic Valve No obvious valvular abnormality seen.  No evidence of aortic insufficiency or stenosis. Tricuspid Valve No obvious valvular abnormality seen. Insignificant tricuspid regurgitation, unable to estimate RVSP. Pulmonic Valve Pulmonic valve was not well visualized. No evidence of pulmonic insufficiency or stenosis. Pericardial Effusion No significant pericardial effusion is seen. Pleural Effusion No pleural effusion seen. Miscellaneous Normal aortic root dimension. E/E' average = 5. M-mode / 2D Measurements & Calculations:   LVIDd:3.82 cm(3.7 - 5.6 cm)      Diastolic ZJKLHV:33.5 ml  ZNVMZ:2.91 cm(2.2 - 4.0 cm)      Systolic DARYSF:1.09 ml  JTZA:1.25 cm(0.6 - 1.1 cm)       Aortic Root:3.1 cm(2.0 - 3.7 cm)  LVPWd:0.81 cm(0.6 - 1.1 cm)      LA Dimension: 3.1 cm(1.9 - 4.0 cm)  Fractional Shortenin %       LA volume/Index: 34.6 ml /16m^2  Calculated LVEF (%): 87.49 %     LVOT:2 cm   Mitral:                              Aortic   Peak E-Wave: 0.57 m/s                Peak Velocity: 1.49 m/s  Peak A-Wave: 0.99 m/s                Mean Velocity: 1.02 m/s  E/A Ratio: 0.58                      Peak Gradient: 8.88 mmHg  Peak Gradient: 1.31 mmHg             Mean Gradient: 5 mmHg  Deceleration Time: 183 msec                                        Area (continuity): 2.72 cm^2                                       AV VTI: 20 cm  Septal Wall E' velocity:0.12 m/s Lateral Wall E' velocity:0.11 m/s    XR CHEST PORTABLE    Result Date: 2022  EXAMINATION: ONE XRAY VIEW OF THE CHEST 2022 5:34 pm COMPARISON: Radiographs 2022, 2019. Chest CT 2022. HISTORY: ORDERING SYSTEM PROVIDED HISTORY: Shortness of breath TECHNOLOGIST PROVIDED HISTORY: Shortness of breath Reason for Exam: sob, cough, tachycardia FINDINGS: Redemonstration of lobulated mass measuring approximately 4 cm in the mid right lung field. No new airspace disease, pneumothorax or effusion. Cardiac and mediastinal contours appear unchanged. No acute osseous abnormality identified.      1.  Redemonstration of lingular mass concerning for primary neoplasm. 2.  No new airspace disease identified in the interval.     CT CHEST PULMONARY EMBOLISM W CONTRAST    Result Date: 9/13/2022  EXAMINATION: CTA OF THE CHEST 9/13/2022 6:50 pm TECHNIQUE: CTA of the chest was performed after the administration of intravenous contrast.  Multiplanar reformatted images are provided for review. MIP images are provided for review. Automated exposure control, iterative reconstruction, and/or weight based adjustment of the mA/kV was utilized to reduce the radiation dose to as low as reasonably achievable. COMPARISON: Chest radiograph today. Chest CT 07/11/2022. HISTORY: ORDERING SYSTEM PROVIDED HISTORY: Known lung mass with tachycardia TECHNOLOGIST PROVIDED HISTORY: Known lung mass with tachycardia Decision Support Exception - unselect if not a suspected or confirmed emergency medical condition->Emergency Medical Condition (MA) Reason for Exam: Known lung mass with tachycardia Additional signs and symptoms: covid positive FINDINGS: Pulmonary Arteries: Suboptimal contrast timing limits evaluation of the segmental branches. No evidence for central pulmonary embolism. Mediastinum: No evidence of mediastinal lymphadenopathy. The heart and pericardium demonstrate no acute abnormality. There is no acute abnormality of the thoracic aorta. Lungs/pleura: Lingular mass again demonstrated measuring 4.7 x 2.8 cm, previously 4.1 x 3.0 cm on prior CT. Minimal subpleural ground-glass attenuation in the anterior right upper lobes and inferior right upper lobe near the minor fissure, nonspecific. No evidence for edema. No effusion. The central airway is patent. Upper Abdomen: No acute findings. Soft Tissues/Bones: No acute bone or soft tissue abnormality. 1.  Minimal peripheral ground-glass opacities in the right upper lobe are noted, while nonspecific, this may correspond to the provided history of COVID infection.  2.  Suboptimal contrast timing for evaluation of the peripheral pulmonary arteries. No evidence for central pulmonary embolism. 3.  Redemonstration of lingular mass, now measuring 4.7 cm versus 4.1 cm on recent CT. This remains concerning for primary neoplasm. VL Lower Extremity Bilateral Venous Duplex    Result Date: 9/14/2022    [facility]  Vascular Lower Extremities DVT Study Procedure   Patient Name  STONE       Date of Study           09/14/2022                BIJAN LA   Date of Birth 1965  Gender                  Male   Age           61 year(s)  Race                       Room Number   2014        Height:                 73 inch, 185.42 cm   Corporate ID  [id number]    Weight:                 198 pounds, 89.8 kg  #   Patient Acct  [de-identified]   BSA:        2.14 m^2    BMI:      26.12 kg/m^2  #   MR #          [id number]      Sonographer             Scooby Roach   Accession #   [id number]  Interpreting Physician  Morenita Crespo   Referring                 Referring Physician     Mejia Ceron  Nurse  Practitioner  Procedure Type of Study:   Veins: Lower Extremities DVT Study, Venous Scan Lower Bilateral.  Indications for Study:Leg Swelling. Patient Status: In Patient. Technical Quality:Adequate visualization. Conclusions   Summary   Simultaneous real time imaging utilizing B-Mode, color doppler and  spectral waveform analysis was performed on the bilateral lower  extremities for venous examination of the deep and superficial systems. Findings are:   Right:  No evidence of deep or superficial venous thrombosis. Left:  No evidence of deep or superficial venous thrombosis.    Signature   ----------------------------------------------------------------  Electronically signed by Harpreet Leal(Sonographer) on  09/14/2022 02:57 PM  ----------------------------------------------------------------   ----------------------------------------------------------------  Electronically signed by Deyanira Cabello(Interpreting physician) on 09/14/2022 06:17 PM  ----------------------------------------------------------------  Findings:   Right Impression:                    Left Impression:  The common femoral, femoral, and     The common femoral, femoral, and  popliteal veins demonstrate normal   popliteal veins demonstrate normal  compressibility and augmentation. compressibility and augmentation. Non visualization of the posterior   Non visualization of the posterior  tibial and peroneal veins. tibial and peroneal veins. Normal compressibility of the great  Normal compressibility of the great  saphenous vein. saphenous vein. Normal compressibility of the small  Normal compressibility of the small  saphenous vein. saphenous vein. Velocities are measured in cm/s ; Diameters are measured in cm Right Lower Extremities DVT Study Measurements Right 2D Measurements +------------------------------------+----------+---------------+----------+ ! Location                            ! Visualized! Compressibility! Thrombosis! +------------------------------------+----------+---------------+----------+ ! Common Femoral                      !Yes       ! Yes            ! None      ! +------------------------------------+----------+---------------+----------+ ! Prox Femoral                        !Yes       ! Yes            ! None      ! +------------------------------------+----------+---------------+----------+ ! Mid Femoral                         !Yes       ! Yes            ! None      ! +------------------------------------+----------+---------------+----------+ ! Dist Femoral                        !Yes       ! Yes            ! None      ! +------------------------------------+----------+---------------+----------+ ! Popliteal                           !Yes       ! Yes            ! None      ! +------------------------------------+----------+---------------+----------+ ! Sapheno Femoral Junction !Yes       !Yes            ! None      ! +------------------------------------+----------+---------------+----------+ ! PTV                                 ! No        !               !          ! +------------------------------------+----------+---------------+----------+ ! Peroneal                            !No        !               !          ! +------------------------------------+----------+---------------+----------+ ! Gastroc                             ! Yes       ! Yes            ! None      ! +------------------------------------+----------+---------------+----------+ ! GSV Thigh                           ! Yes       ! Yes            ! None      ! +------------------------------------+----------+---------------+----------+ ! GSV Knee                            ! Yes       ! Yes            ! None      ! +------------------------------------+----------+---------------+----------+ ! GSV Ankle                           ! Yes       ! Yes            ! None      ! +------------------------------------+----------+---------------+----------+ ! SSV                                 ! Yes       ! Yes            ! None      ! +------------------------------------+----------+---------------+----------+ Right Doppler Measurements +---------------------------+------+------+--------------------------------+ ! Location                   ! Signal!Reflux! Reflux (msec)                   ! +---------------------------+------+------+--------------------------------+ ! Common Femoral             !Phasic!      !                                ! +---------------------------+------+------+--------------------------------+ ! Prox Femoral               !Phasic!      !                                ! +---------------------------+------+------+--------------------------------+ ! Popliteal                  !Phasic!      !                                ! +---------------------------+------+------+--------------------------------+ Left Lower Extremities DVT Study Measurements Left 2D Measurements +------------------------------------+----------+---------------+----------+ ! Location                            ! Visualized! Compressibility! Thrombosis! +------------------------------------+----------+---------------+----------+ ! Common Femoral                      !Yes       ! Yes            ! None      ! +------------------------------------+----------+---------------+----------+ ! Prox Femoral                        !Yes       ! Yes            ! None      ! +------------------------------------+----------+---------------+----------+ ! Mid Femoral                         !Yes       ! Yes            ! None      ! +------------------------------------+----------+---------------+----------+ ! Dist Femoral                        !Yes       ! Yes            ! None      ! +------------------------------------+----------+---------------+----------+ ! Popliteal                           !Yes       ! Yes            ! None      ! +------------------------------------+----------+---------------+----------+ ! Sapheno Femoral Junction            ! Yes       ! Yes            ! None      ! +------------------------------------+----------+---------------+----------+ ! PTV                                 ! No        !               !          ! +------------------------------------+----------+---------------+----------+ ! Peroneal                            !No        !               !          ! +------------------------------------+----------+---------------+----------+ ! Gastroc                             ! Yes       ! Yes            ! None      ! +------------------------------------+----------+---------------+----------+ ! GSV Thigh                           ! Yes       ! Yes            ! None      ! +------------------------------------+----------+---------------+----------+ ! GSV Knee                            ! Yes       ! Yes            ! None      ! +------------------------------------+----------+---------------+----------+ ! GSV Ankle                           ! Yes       ! Yes            ! None      ! +------------------------------------+----------+---------------+----------+ ! SSV                                 ! Yes       ! Yes            ! None      ! +------------------------------------+----------+---------------+----------+ Left Doppler Measurements +---------------------------+------+------+--------------------------------+ ! Location                   ! Signal!Reflux! Reflux (msec)                   ! +---------------------------+------+------+--------------------------------+ ! Common Femoral             !Phasic!      !                                ! +---------------------------+------+------+--------------------------------+ ! Prox Femoral               !Phasic!      !                                ! +---------------------------+------+------+--------------------------------+ ! Popliteal                  !Phasic!      !                                ! +---------------------------+------+------+--------------------------------+            ASSESSMENT:   Diagnosis Orders   1. COPD exacerbation (HCC)  predniSONE (DELTASONE) 10 MG tablet      2. Mass of left lung        3. Stage 3 severe COPD by GOLD classification ([address]        4. Centrilobular emphysema ([address]              Plan:   Patient is complaining of increased wheezing and sputum production but no hemoptysis. He feels that his COPD is exacerbated. Continues to smoke half a pack per day. We will start him on prednisone taper and advised him to continue his bronchodilator therapy with Trelegy and albuterol. He is not sure if he wants to pursue either CT-guided biopsy or bronchoscopy. We will reach out to him next week on Monday to discuss his response to prednisone therapy and whether he would like to proceed with diagnostic evaluation for lung mass.          Requested Prescriptions     Signed Prescriptions Disp Refills    predniSONE (DELTASONE) 10 MG tablet 18 tablet 0     Sig: Take 3 tabs for 3 days, then 2 tabs for 3 days and then 1 tab for 3 days       Medications Discontinued During This Encounter   Medication Reason    predniSONE (DELTASONE) 10 MG tablet Lion Kingsley received counseling on the following healthy behaviors: nutrition, exercise and medication adherence    Patient given educational materials : see patient instruction       Discussed use, benefit, and side effects of prescribed medications. Barriers to medication compliance addressed. All patient questions answered. Pt voiced understanding. An  electronic signature was used to authenticate this note. --Fawn Llanos MD on 10/3/2022 at 4:19 PM      Please note that this chart was generated using voice recognition Dragon dictation software. Although every effort was made to ensure the accuracy of this automated transcription, some errors in transcription may have occurred. Pursuant to the emergency declaration under the [facility], [facility] waiver authority and the [employer] Act, this Virtual  Visit was conducted, with patient's consent, to reduce the patient's risk of exposure to COVID-19 and provide continuity of care for an established patient. Services were provided through a video synchronous discussion virtually to substitute for in-person clinic visit. Consent:  He and/or health care decision maker is aware that that he may receive a bill for this telephone service, depending on his insurance coverage, and has provided verbal consent to proceed: Yes      I affirm this is a Patient Initiated Episode with an Established Patient who has not had a related appointment within my department in the past 7 days or scheduled within the next 24 hours.     Total Time: minutes: 11-20 minutes    Note: not billable if this call serves to triage the patient into an appointment for the relevant concern

## 2022-10-04 ENCOUNTER — TELEPHONE (OUTPATIENT)
Dept: CASE MANAGEMENT | Age: 57
End: 2022-10-04

## 2022-10-04 NOTE — TELEPHONE ENCOUNTER
Name: Wynetta Canavan  : 1965  MRN: [id number]    Oncology Navigation Follow-Up Note    Navigator reviewing chart and recent pulmonary notes. Pt. Not willing to have needle or Bronch Bx at this time. Navigation complete-due to pt. Unwilling to proceed with further workup or see THUY Chen, please send navigation complete letter to pt.  Thanks,Ashlyn  Electronically signed by Mini Hutchison RN on 10/4/2022 at 11:13 AM

## 2022-10-04 NOTE — LETTER
[address] [facility]  Maria Guadalupe Smith Labrum      10/4/2022      Ryanne Chávez  [address] [place] [address] [address]  [place] [id number]    Dear Ryanne Chávez:    As your Oncology Nurse Navigator, it is my responsibility to assist you in navigating your way through your oncology treatment. Unfortunately, I have not been able to reach you to provide you this assistance. Because I have not received any response from you, I will no longer be making attempts to contact you. I do encourage you to reach out to me at anytime that I can be of assistance in the coordination of your cancer care. Kindest Regards,    Cecilio Stuart RN  Oncology Nurse Navigator  [address], [address] [facility]  Phone: [phone]  Fax: [phone]  Email: [email or web address]

## 2022-10-05 DIAGNOSIS — J44.9 STAGE 3 SEVERE COPD BY GOLD CLASSIFICATION (HCC): ICD-10-CM

## 2022-10-06 RX ORDER — FLUTICASONE FUROATE, UMECLIDINIUM BROMIDE AND VILANTEROL TRIFENATATE 200; 62.5; 25 UG/1; UG/1; UG/1
1 POWDER RESPIRATORY (INHALATION) DAILY
Qty: 1 EACH | Refills: 2 | Status: SHIPPED | OUTPATIENT
Start: 2022-10-06

## 2022-10-27 ENCOUNTER — TELEPHONE (OUTPATIENT)
Dept: FAMILY MEDICINE CLINIC | Age: 57
End: 2022-10-27

## 2022-10-27 DIAGNOSIS — G47.00 INSOMNIA, UNSPECIFIED TYPE: ICD-10-CM

## 2022-10-27 RX ORDER — HYDROXYZINE HYDROCHLORIDE 25 MG/1
25 TABLET, FILM COATED ORAL NIGHTLY
Qty: 30 TABLET | Refills: 1 | Status: ON HOLD | OUTPATIENT
Start: 2022-10-27

## 2022-10-27 NOTE — TELEPHONE ENCOUNTER
THE PATIENT HAS AN APPOINTMENT WITH HIS NEW PCP IN 12/22 BUT WILL NEED REFILLS ON HIS ATARAX UNTIL THEN.

## 2022-11-01 ENCOUNTER — SCHEDULED TELEPHONE ENCOUNTER (OUTPATIENT)
Dept: PULMONOLOGY | Age: 57
End: 2022-11-01
Payer: COMMERCIAL

## 2022-11-01 DIAGNOSIS — J44.9 STAGE 3 SEVERE COPD BY GOLD CLASSIFICATION (HCC): Primary | ICD-10-CM

## 2022-11-01 DIAGNOSIS — Z87.891 PERSONAL HISTORY OF TOBACCO USE: ICD-10-CM

## 2022-11-01 DIAGNOSIS — R91.8 MASS OF LEFT LUNG: ICD-10-CM

## 2022-11-01 DIAGNOSIS — J43.2 CENTRILOBULAR EMPHYSEMA (HCC): ICD-10-CM

## 2022-11-01 PROCEDURE — 99442 PR PHYS/QHP TELEPHONE EVALUATION 11-20 MIN: CPT | Performed by: INTERNAL MEDICINE

## 2022-11-01 RX ORDER — MIDODRINE HYDROCHLORIDE 5 MG/1
TABLET ORAL
COMMUNITY
Start: 2022-10-17 | End: 2022-11-22

## 2022-11-01 RX ORDER — DILTIAZEM HYDROCHLORIDE 120 MG/1
240 CAPSULE, EXTENDED RELEASE ORAL DAILY
Status: ON HOLD | COMMUNITY
Start: 2022-10-17

## 2022-11-01 RX ORDER — PREDNISONE 10 MG/1
TABLET ORAL
Qty: 30 TABLET | Refills: 0 | Status: SHIPPED | OUTPATIENT
Start: 2022-11-01 | End: 2022-11-22 | Stop reason: DRUGHIGH

## 2022-11-01 RX ORDER — DOXYCYCLINE HYCLATE 100 MG/1
CAPSULE ORAL
COMMUNITY
Start: 2022-10-24 | End: 2022-11-22

## 2022-11-01 RX ORDER — BUDESONIDE AND FORMOTEROL FUMARATE DIHYDRATE 160; 4.5 UG/1; UG/1
AEROSOL RESPIRATORY (INHALATION)
COMMUNITY
Start: 2022-10-27 | End: 2022-11-01

## 2022-11-01 NOTE — PROGRESS NOTES
2022    TELEHEALTH EVALUATION -- Audio/Visual (During WZYRJ-22 public health emergency)    Patient and physician are located in their individual locations. This is visit is completed via [employer] application / [email or web address] / Telephone     Chief Complaint   Patient presents with    COPD    Follow-Up from [address] [place] [address] of lung        HPI:    Kp Lincoln (:  1965) has requested an audio/video evaluation for the following concern(s):  1 week ago he was admitted to [facility] treated with COPD exacerbation. CT of the chest October shows enlarging left lower lobe mass  Patient is not back to his baseline has completed steroids is still on antibiotics. Is using Symbicort as well as Trelegy in addition to albuterol. Last visit  Patient has had recurrent hospitalization for COPD exacerbation. He has been recently discharged from hospital and completed his prednisone therapy on Wednesday. But last night he felt like he had to go back to the ER because he is having increased wheezing and sputum production. I started him back on prednisone taper. Extensive discussion about diagnostic evaluation for left upper lobe lung mass. Patient is at present reluctant to proceed with either CT-guided biopsy or bronchoscopy.        Immunization   Immunization History   Administered Date(s) Administered    COVID-19, PFIZER PURPLE top, DILUTE for use, (age 15 y+), 30mcg/0.3mL 2021, 2021    Influenza, FLUCELVAX, (age 10 mo+), MDCK, PF, 0.5mL 2021    Td (Adult), 5 Lf Tetanus Toxoid, Pf (Tenivac, Decavac) 1998    Tdap (Boostrix, Adacel) 10/24/2014        Pneumococcal Vaccine     [] Up to date    [] Indicated   [] Refused  [] Contraindicated       Influenza Vaccine   [] Up to date    [] Indicated   [] Refused  [] Contraindicated        Pulmonary Rehab   [] Completed   [] Indicated   [] Refused  [] Contraindicated   PAST MEDICAL HISTORY:       Diagnosis Date    Alcohol abuse     hx of alcoholism; States he drinks 5 beers per day; pt has D/T's    Avascular necrosis of femoral head ([facility] 2014    Overview:  S/p bilateral hip replacements    History of hip replacement     left/right    Hypertension     Mass of left lung     Rib fracture     Scabies     Stage 3 severe COPD by GOLD classification ([facility] 2021    Tachycardia 10/28/2020    does not follow with Cardiology         Family History:       Problem Relation Age of Onset    Other Mother         mental health        SURGICAL HISTORY:   Past Surgical History:   Procedure Laterality Date    NECK SURGERY      TOTAL HIP ARTHROPLASTY Bilateral     VASECTOMY             SOCIAL AND OCCUPATIONAL HEALTH:       Social History     Socioeconomic History    Marital status: Single     Spouse name: None    Number of children: None    Years of education: None    Highest education level: None   Tobacco Use    Smoking status: Former     Packs/day: 1.00     Years: 44.00     Pack years: 44.00     Types: Cigarettes     Start date:      Quit date: 2022     Years since quittin.1    Smokeless tobacco: Never    Tobacco comments:     pt educated on both alcohol and smoking cessation   Vaping Use    Vaping Use: Never used   Substance and Sexual Activity    Alcohol use: Yes     Alcohol/week: 35.0 standard drinks     Types: 35 Cans of beer per week     Comment: hx of alcoholism; States he drinks 5 beers per day; pt has D/T's    Drug use: Not Currently     Types: Cocaine, Marijuana (Weed)     Comment: Quit cocaine at age 24, quit marijuana at age 24     Social Determinants of Health     Financial Resource Strain: High Risk    Difficulty of Paying Living Expenses: Hard   Food Insecurity: No Food Insecurity    Worried About Running Out of Food in the Last Year: Never true    Ran Out of Food in the Last Year: Never true        TOBACCO:   reports that he quit smoking about 2 months ago. His smoking use included cigarettes.  He started smoking about 36 years ago. He has a 44.00 pack-year smoking history. He has never used smokeless tobacco.  ETOH:   reports current alcohol use of about 35.0 standard drinks per week. ALLERGIES:      No Known Allergies      Home Meds:   Prior to Admission medications    Medication Sig Start Date End Date Taking?  Authorizing Provider   dilTIAZem (DILACOR XR) 180 MG extended release capsule TAKE 1 CAPSULE BY MOUTH DAILY 10/17/22  Yes Historical Provider, MD   doxycycline hyclate (VIBRAMYCIN) 100 MG capsule TAKE 1 CAPSULE BY MOUTH TWICE DAILY EVERY MORNING AND AT BEDTIME FOR 7 DAYS 10/24/22  Yes Historical Provider, MD   midodrine (PROAMATINE) 5 MG tablet TAKE 1 TABLET BY MOUTH EVERY 8 HOURS AS NEEDED FOR SBP<110 10/17/22  Yes Historical Provider, MD   predniSONE (DELTASONE) 10 MG tablet 4 tabs once a day for 3 days, 3 tabs once a day for 3 days, 2 tabs once a day for 3 days,1 tabs once a day for 3 days 11/1/22  Yes Hannah Tran MD   hydrOXYzine HCl (ATARAX) 25 MG tablet Take 1 tablet by mouth at bedtime 10/27/22  Yes Gomez Soliz MD   TRELEGY ELLIPTA 200-62.5-25 MCG/INH AEPB INHALE 1 PUFF INTO THE LUNGS DAILY 10/6/22  Yes Hannah Tran MD   thiamine mononitrate (THIAMINE) 100 MG tablet Take 1 tablet by mouth daily 9/19/22 11/1/22 Yes Mariana Earl MD   dilTIAZem (CARDIZEM CD) 180 MG extended release capsule Take 1 capsule by mouth daily 9/19/22  Yes Mariana Earl MD   folic acid (FOLVITE) 1 MG tablet Take 1 tablet by mouth daily 9/19/22  Yes Mariana Earl MD   sodium chloride 1 g tablet Take 1 tablet by mouth 2 times daily 9/19/22  Yes Mariana Earl MD   metoprolol succinate (TOPROL XL) 100 MG extended release tablet Take 1 tablet by mouth 2 times daily 8/31/22  Yes Luz Hernández MD   lisinopril (PRINIVIL;ZESTRIL) 10 MG tablet Take 1 tablet by mouth daily 8/18/22  Yes JAEL Tucker - CNP   albuterol sulfate HFA (PROAIR HFA) 108 (90 Base) MCG/ACT inhaler Inhale 2 puffs into the lungs every 6 hours as needed for Wheezing 22  Yes Radha Parker APRN - CNP   albuterol (PROVENTIL) (2.5 MG/3ML) 0.083% nebulizer solution INHALE 3 MLS (ONE VIAL) INTO THE LUNGS EVERY 6 HOURS VIA NEBULIZER AS NEEDED FOR WHEEZING 22  Yes Zuleyma Deluna MD              Wt Readings from Last 3 Encounters:   22 199 lb 12.8 oz (90.6 kg)   22 194 lb 3.6 oz (88.1 kg)   22 204 lb 6.4 oz (92.7 kg)                 ASSESSMENT:   Diagnosis Orders   1. Stage 3 severe COPD by GOLD classification ([facility])  predniSONE (DELTASONE) 10 MG tablet      2. Mass of left lung  CT GUIDED NEEDLE PLACEMENT    Cytology, Non-GYN      3. Centrilobular emphysema ([address]        4. Personal history of tobacco use              Plan:   Patient released 1 week ago from [facility] for exacerbation of COPD. Inhalers reviewed. Stop Symbicort continue Trelegy and use albuterol 4 times a day. He is not feeling back to his baseline we will send prednisone taper and finish his antibiotic course. He is agreeable to proceed with biopsy of the lung mass. We will arrange at [facility] due to his prior poor experience at [facility]. Requested Prescriptions     Signed Prescriptions Disp Refills    predniSONE (DELTASONE) 10 MG tablet 30 tablet 0     Si tabs once a day for 3 days, 3 tabs once a day for 3 days, 2 tabs once a day for 3 days,1 tabs once a day for 3 days       Medications Discontinued During This Encounter   Medication Reason    SYMBICORT 160-4.5 MCG/ACT AERO LIST CLEANUP    predniSONE (DELTASONE) 10 MG tablet LIST CLEANUP       Caralee Or received counseling on the following healthy behaviors: nutrition, exercise and medication adherence    Patient given educational materials : see patient instruction       Discussed use, benefit, and side effects of prescribed medications. Barriers to medication compliance addressed. All patient questions answered. Pt voiced understanding.        An  electronic signature was used to authenticate this note. --Samson Williamson MD on 11/1/2022 at 2:05 PM      Please note that this chart was generated using voice recognition Dragon dictation software. Although every effort was made to ensure the accuracy of this automated transcription, some errors in transcription may have occurred. Pursuant to the emergency declaration under the [address] [place] [address] [facility] authority and the [employer] Act, this Virtual  Visit was conducted, with patient's consent, to reduce the patient's risk of exposure to COVID-19 and provide continuity of care for an established patient. Services were provided through a video synchronous discussion virtually to substitute for in-person clinic visit. Consent:  He and/or health care decision maker is aware that that he may receive a bill for this telephone service, depending on his insurance coverage, and has provided verbal consent to proceed: Yes      I affirm this is a Patient Initiated Episode with an Established Patient who has not had a related appointment within my department in the past 7 days or scheduled within the next 24 hours.     Total Time: minutes: 11-20 minutes    Note: not billable if this call serves to triage the patient into an appointment for the relevant concern

## 2022-11-01 NOTE — PATIENT INSTRUCTIONS
Patient requests CT needle biopsy be performed through [facility]. Once the visit note is signed Jody Stone, will fax the orders, and OV note to [facility] at (f) [phone]. [facility] IR dept, stated once the pts information is reviewed by their providers they will call the pt to schedule. Pt was informed and was asked to please call and schedule a follow up appt with Dr. Asya Benjamin for 1 week after his bx. Cori Hinkle will follow and will request report from [facility] once completed. AVS was mailed to pt, address was confirmed.

## 2022-11-08 ENCOUNTER — TELEPHONE (OUTPATIENT)
Dept: PULMONOLOGY | Age: 57
End: 2022-11-08

## 2022-11-08 NOTE — TELEPHONE ENCOUNTER
Received Fax from [facility] with patient's hospital notes.  Scanned into chart in [address] [place] [address] and routed to Dr. Ny Reed

## 2022-11-08 NOTE — TELEPHONE ENCOUNTER
A call was made to [facility] to ask if he had received a call from [address] in order to schedule the CT needle Bx ordered by Dr. Isa Rai. [facility] stated that he had not been scheduled for the surgery yet because he had just been in patient at [facility] again for 4 days due to collapse of his lung. [facility] stated he thinks its too late for a biopsy. He was discharged 11/6/22 by [facility]. A call was made to medical records at [facility] to request notes from this hospitalization and treatments. Record is being faxed to [facility] and will be scanned to  and routed to Dr. Isa Rai.

## 2022-11-09 ENCOUNTER — TELEPHONE (OUTPATIENT)
Dept: PULMONOLOGY | Age: 57
End: 2022-11-09

## 2022-11-09 NOTE — TELEPHONE ENCOUNTER
Received 3 way call from Pt and Sandi Jeffries from his insurance company  Pt was D/C from   [facility] DX collapsed lung - pt wasn't referral to an SNF - he doesn't have a PCP to facilitate this request.   Requested imaging from [facility] AND SCHEDULED F/U Yenifer

## 2022-11-14 DIAGNOSIS — I10 ESSENTIAL HYPERTENSION: ICD-10-CM

## 2022-11-14 RX ORDER — LISINOPRIL 10 MG/1
10 TABLET ORAL DAILY
Qty: 90 TABLET | Refills: 0 | Status: ON HOLD | OUTPATIENT
Start: 2022-11-14

## 2022-11-22 ENCOUNTER — HOSPITAL ENCOUNTER (INPATIENT)
Age: 57
LOS: 18 days | Discharge: OTHER INSTITUTION | End: 2022-12-10
Attending: EMERGENCY MEDICINE | Admitting: INTERNAL MEDICINE
Payer: COMMERCIAL

## 2022-11-22 ENCOUNTER — APPOINTMENT (OUTPATIENT)
Dept: CT IMAGING | Age: 57
End: 2022-11-22
Payer: COMMERCIAL

## 2022-11-22 ENCOUNTER — APPOINTMENT (OUTPATIENT)
Dept: GENERAL RADIOLOGY | Age: 57
End: 2022-11-22
Payer: COMMERCIAL

## 2022-11-22 DIAGNOSIS — E46 MALNUTRITION, UNSPECIFIED TYPE (HCC): ICD-10-CM

## 2022-11-22 DIAGNOSIS — M87.059 AVASCULAR NECROSIS OF FEMORAL HEAD, UNSPECIFIED LATERALITY (HCC): ICD-10-CM

## 2022-11-22 DIAGNOSIS — A41.9 SEPTIC SHOCK (HCC): ICD-10-CM

## 2022-11-22 DIAGNOSIS — J96.21 ACUTE ON CHRONIC RESPIRATORY FAILURE WITH HYPOXIA (HCC): Primary | ICD-10-CM

## 2022-11-22 DIAGNOSIS — J96.90 RESPIRATORY FAILURE, UNSPECIFIED CHRONICITY, UNSPECIFIED WHETHER WITH HYPOXIA OR HYPERCAPNIA (HCC): ICD-10-CM

## 2022-11-22 DIAGNOSIS — R65.21 SEPTIC SHOCK (HCC): ICD-10-CM

## 2022-11-22 DIAGNOSIS — F10.931 ALCOHOL WITHDRAWAL SYNDROME, WITH DELIRIUM (HCC): ICD-10-CM

## 2022-11-22 LAB
ABSOLUTE EOS #: 0 K/UL (ref 0–0.4)
ABSOLUTE LYMPH #: 0.62 K/UL (ref 1–4.8)
ABSOLUTE MONO #: 1.44 K/UL (ref 0.1–1.3)
ALLEN TEST: ABNORMAL
ALLEN TEST: ABNORMAL
ANION GAP SERPL CALCULATED.3IONS-SCNC: 7 MMOL/L (ref 9–17)
BASOPHILS # BLD: 0 % (ref 0–2)
BASOPHILS ABSOLUTE: 0 K/UL (ref 0–0.2)
BUN BLDV-MCNC: 12 MG/DL (ref 6–20)
CALCIUM SERPL-MCNC: 9 MG/DL (ref 8.6–10.4)
CARBOXYHEMOGLOBIN: 1.1 % (ref 0–5)
CARBOXYHEMOGLOBIN: 1.4 % (ref 0–5)
CARBOXYHEMOGLOBIN: 2 %
CHLORIDE BLD-SCNC: 90 MMOL/L (ref 98–107)
CO2: 35 MMOL/L (ref 20–31)
CREAT SERPL-MCNC: <0.4 MG/DL (ref 0.7–1.2)
EOSINOPHILS RELATIVE PERCENT: 0 % (ref 0–4)
FIO2: 30
FIO2: 40
GFR SERPL CREATININE-BSD FRML MDRD: ABNORMAL ML/MIN/1.73M2
GLUCOSE BLD-MCNC: 145 MG/DL (ref 70–99)
HCO3 ARTERIAL: 32.2 MMOL/L (ref 22–26)
HCO3 ARTERIAL: 35.1 MMOL/L (ref 22–26)
HCO3 VENOUS: 36.7 MMOL/L (ref 24–30)
HCT VFR BLD CALC: 34.5 % (ref 41–53)
HEMOGLOBIN: 11.1 G/DL (ref 13.5–17.5)
INFLUENZA A: NOT DETECTED
INFLUENZA B: NOT DETECTED
LACTIC ACID, SEPSIS: 1.5 MMOL/L (ref 0.5–1.9)
LACTIC ACID: 2.3 MMOL/L (ref 0.5–2.2)
LYMPHOCYTES # BLD: 3 % (ref 24–44)
MCH RBC QN AUTO: 36.4 PG (ref 26–34)
MCHC RBC AUTO-ENTMCNC: 32.3 G/DL (ref 31–37)
MCV RBC AUTO: 112.7 FL (ref 80–100)
METHEMOGLOBIN: 0.5 % (ref 0–1.9)
METHEMOGLOBIN: 0.6 %
METHEMOGLOBIN: 1.1 % (ref 0–1.9)
MODE: ABNORMAL
MODE: ABNORMAL
MONOCYTES # BLD: 7 % (ref 1–7)
MORPHOLOGY: ABNORMAL
O2 DEVICE/FLOW/%: ABNORMAL
O2 DEVICE/FLOW/%: ABNORMAL
O2 SAT, ARTERIAL: 93.4 % (ref 95–98)
O2 SAT, ARTERIAL: 95.8 % (ref 95–98)
O2 SAT, VEN: 76.8 %
PATIENT TEMP: 37
PATIENT TEMP: 37
PCO2 ARTERIAL: 43.9 MMHG (ref 35–45)
PCO2 ARTERIAL: 51.7 MMHG (ref 35–45)
PCO2, VEN: 98.8 MM HG (ref 39–55)
PDW BLD-RTO: 15.8 % (ref 11.5–14.9)
PEEP/CPAP: 8
PEEP/CPAP: 8
PH ARTERIAL: 7.4 (ref 7.35–7.45)
PH ARTERIAL: 7.51 (ref 7.35–7.45)
PH VENOUS: 7.18 (ref 7.32–7.42)
PLATELET # BLD: 160 K/UL (ref 150–450)
PMV BLD AUTO: 8.3 FL (ref 6–12)
PO2 ARTERIAL: 70.9 MMHG (ref 80–100)
PO2 ARTERIAL: 85.2 MMHG (ref 80–100)
PO2, VEN: 53.4 MM HG (ref 30–50)
POSITIVE BASE EXCESS, ART: 12.1 MMOL/L (ref 0–2)
POSITIVE BASE EXCESS, ART: 7.5 MMOL/L (ref 0–2)
POSITIVE BASE EXCESS, VEN: 8.3 MMOL/L (ref 0–2)
POTASSIUM SERPL-SCNC: 4 MMOL/L (ref 3.7–5.3)
PT. POSITION: ABNORMAL
PT. POSITION: ABNORMAL
RBC # BLD: 3.06 M/UL (ref 4.5–5.9)
RESPIRATORY RATE: 20
RESPIRATORY RATE: 20
SAMPLE SITE: ABNORMAL
SAMPLE SITE: ABNORMAL
SARS-COV-2 RNA, RT PCR: NOT DETECTED
SEG NEUTROPHILS: 90 % (ref 36–66)
SEGMENTED NEUTROPHILS ABSOLUTE COUNT: 18.44 K/UL (ref 1.3–9.1)
SET RATE: 20
SET RATE: 20
SODIUM BLD-SCNC: 132 MMOL/L (ref 135–144)
SOURCE: NORMAL
SPECIMEN DESCRIPTION: NORMAL
TEXT FOR RESPIRATORY: ABNORMAL
TOTAL RATE: 20
TOTAL RATE: 20
TROPONIN, HIGH SENSITIVITY: 25 NG/L (ref 0–22)
TROPONIN, HIGH SENSITIVITY: 29 NG/L (ref 0–22)
VT: 500
VT: 500
WBC # BLD: 20.5 K/UL (ref 3.5–11)

## 2022-11-22 PROCEDURE — 6360000002 HC RX W HCPCS: Performed by: INTERNAL MEDICINE

## 2022-11-22 PROCEDURE — 2500000003 HC RX 250 WO HCPCS: Performed by: EMERGENCY MEDICINE

## 2022-11-22 PROCEDURE — 85025 COMPLETE CBC W/AUTO DIFF WBC: CPT

## 2022-11-22 PROCEDURE — 83605 ASSAY OF LACTIC ACID: CPT

## 2022-11-22 PROCEDURE — 2580000003 HC RX 258: Performed by: EMERGENCY MEDICINE

## 2022-11-22 PROCEDURE — 36600 WITHDRAWAL OF ARTERIAL BLOOD: CPT

## 2022-11-22 PROCEDURE — 93005 ELECTROCARDIOGRAM TRACING: CPT

## 2022-11-22 PROCEDURE — 94761 N-INVAS EAR/PLS OXIMETRY MLT: CPT

## 2022-11-22 PROCEDURE — 5A1955Z RESPIRATORY VENTILATION, GREATER THAN 96 CONSECUTIVE HOURS: ICD-10-PCS | Performed by: EMERGENCY MEDICINE

## 2022-11-22 PROCEDURE — 94660 CPAP INITIATION&MGMT: CPT

## 2022-11-22 PROCEDURE — 36556 INSERT NON-TUNNEL CV CATH: CPT

## 2022-11-22 PROCEDURE — 87040 BLOOD CULTURE FOR BACTERIA: CPT

## 2022-11-22 PROCEDURE — 6360000002 HC RX W HCPCS: Performed by: EMERGENCY MEDICINE

## 2022-11-22 PROCEDURE — 0BH18EZ INSERTION OF ENDOTRACHEAL AIRWAY INTO TRACHEA, VIA NATURAL OR ARTIFICIAL OPENING ENDOSCOPIC: ICD-10-PCS | Performed by: EMERGENCY MEDICINE

## 2022-11-22 PROCEDURE — 94640 AIRWAY INHALATION TREATMENT: CPT

## 2022-11-22 PROCEDURE — 82800 BLOOD PH: CPT

## 2022-11-22 PROCEDURE — 2500000003 HC RX 250 WO HCPCS

## 2022-11-22 PROCEDURE — 80048 BASIC METABOLIC PNL TOTAL CA: CPT

## 2022-11-22 PROCEDURE — 96374 THER/PROPH/DIAG INJ IV PUSH: CPT

## 2022-11-22 PROCEDURE — 70450 CT HEAD/BRAIN W/O DYE: CPT

## 2022-11-22 PROCEDURE — 82805 BLOOD GASES W/O2 SATURATION: CPT

## 2022-11-22 PROCEDURE — 2580000003 HC RX 258: Performed by: INTERNAL MEDICINE

## 2022-11-22 PROCEDURE — 96375 TX/PRO/DX INJ NEW DRUG ADDON: CPT

## 2022-11-22 PROCEDURE — 36680 INSERT NEEDLE BONE CAVITY: CPT

## 2022-11-22 PROCEDURE — 87636 SARSCOV2 & INF A&B AMP PRB: CPT

## 2022-11-22 PROCEDURE — 94002 VENT MGMT INPAT INIT DAY: CPT

## 2022-11-22 PROCEDURE — 31500 INSERT EMERGENCY AIRWAY: CPT

## 2022-11-22 PROCEDURE — 2500000003 HC RX 250 WO HCPCS: Performed by: INTERNAL MEDICINE

## 2022-11-22 PROCEDURE — 36415 COLL VENOUS BLD VENIPUNCTURE: CPT

## 2022-11-22 PROCEDURE — 96365 THER/PROPH/DIAG IV INF INIT: CPT

## 2022-11-22 PROCEDURE — 71045 X-RAY EXAM CHEST 1 VIEW: CPT

## 2022-11-22 PROCEDURE — 06HY33Z INSERTION OF INFUSION DEVICE INTO LOWER VEIN, PERCUTANEOUS APPROACH: ICD-10-PCS | Performed by: EMERGENCY MEDICINE

## 2022-11-22 PROCEDURE — 6360000002 HC RX W HCPCS

## 2022-11-22 PROCEDURE — 84484 ASSAY OF TROPONIN QUANT: CPT

## 2022-11-22 PROCEDURE — 2000000000 HC ICU R&B

## 2022-11-22 PROCEDURE — 2700000000 HC OXYGEN THERAPY PER DAY

## 2022-11-22 PROCEDURE — 6370000000 HC RX 637 (ALT 250 FOR IP): Performed by: INTERNAL MEDICINE

## 2022-11-22 PROCEDURE — 94664 DEMO&/EVAL PT USE INHALER: CPT

## 2022-11-22 PROCEDURE — 99285 EMERGENCY DEPT VISIT HI MDM: CPT

## 2022-11-22 RX ORDER — ENOXAPARIN SODIUM 100 MG/ML
40 INJECTION SUBCUTANEOUS DAILY
Status: DISCONTINUED | OUTPATIENT
Start: 2022-11-22 | End: 2022-11-22 | Stop reason: SDUPTHER

## 2022-11-22 RX ORDER — PREDNISONE 20 MG/1
20 TABLET ORAL DAILY
Status: ON HOLD | COMMUNITY
End: 2022-12-10 | Stop reason: HOSPADM

## 2022-11-22 RX ORDER — NOREPINEPHRINE BIT/0.9 % NACL 16MG/250ML
1-100 INFUSION BOTTLE (ML) INTRAVENOUS CONTINUOUS
Status: DISCONTINUED | OUTPATIENT
Start: 2022-11-22 | End: 2022-11-24

## 2022-11-22 RX ORDER — MIDAZOLAM HYDROCHLORIDE 1 MG/ML
2 INJECTION INTRAMUSCULAR; INTRAVENOUS
Status: DISCONTINUED | OUTPATIENT
Start: 2022-11-22 | End: 2022-11-24

## 2022-11-22 RX ORDER — NOREPINEPHRINE BIT/0.9 % NACL 16MG/250ML
INFUSION BOTTLE (ML) INTRAVENOUS
Status: COMPLETED
Start: 2022-11-22 | End: 2022-11-22

## 2022-11-22 RX ORDER — 0.9 % SODIUM CHLORIDE 0.9 %
1000 INTRAVENOUS SOLUTION INTRAVENOUS ONCE
Status: COMPLETED | OUTPATIENT
Start: 2022-11-22 | End: 2022-11-22

## 2022-11-22 RX ORDER — SENNA PLUS 8.6 MG/1
1 TABLET ORAL 2 TIMES DAILY PRN
COMMUNITY

## 2022-11-22 RX ORDER — EPINEPHRINE 0.1 MG/ML
SYRINGE (ML) INJECTION
Status: DISPENSED
Start: 2022-11-22 | End: 2022-11-23

## 2022-11-22 RX ORDER — PROPOFOL 10 MG/ML
5-50 INJECTION, EMULSION INTRAVENOUS ONCE
Status: DISCONTINUED | OUTPATIENT
Start: 2022-11-22 | End: 2022-11-22 | Stop reason: DRUGHIGH

## 2022-11-22 RX ORDER — MIDAZOLAM HYDROCHLORIDE 1 MG/ML
INJECTION INTRAMUSCULAR; INTRAVENOUS
Status: DISPENSED
Start: 2022-11-22 | End: 2022-11-23

## 2022-11-22 RX ORDER — IPRATROPIUM BROMIDE AND ALBUTEROL SULFATE 2.5; .5 MG/3ML; MG/3ML
1 SOLUTION RESPIRATORY (INHALATION) EVERY 4 HOURS
Status: DISCONTINUED | OUTPATIENT
Start: 2022-11-22 | End: 2022-12-10 | Stop reason: HOSPADM

## 2022-11-22 RX ORDER — SODIUM CHLORIDE 0.9 % (FLUSH) 0.9 %
5-40 SYRINGE (ML) INJECTION EVERY 12 HOURS SCHEDULED
Status: DISCONTINUED | OUTPATIENT
Start: 2022-11-22 | End: 2022-12-10 | Stop reason: HOSPADM

## 2022-11-22 RX ORDER — IPRATROPIUM BROMIDE AND ALBUTEROL SULFATE 2.5; .5 MG/3ML; MG/3ML
1 SOLUTION RESPIRATORY (INHALATION)
Status: DISCONTINUED | OUTPATIENT
Start: 2022-11-22 | End: 2022-12-02

## 2022-11-22 RX ORDER — SENNA AND DOCUSATE SODIUM 50; 8.6 MG/1; MG/1
2 TABLET, FILM COATED ORAL 2 TIMES DAILY
Status: ON HOLD | COMMUNITY
End: 2022-12-10 | Stop reason: HOSPADM

## 2022-11-22 RX ORDER — MORPHINE SULFATE 4 MG/ML
4 INJECTION, SOLUTION INTRAMUSCULAR; INTRAVENOUS ONCE
Status: COMPLETED | OUTPATIENT
Start: 2022-11-22 | End: 2022-11-22

## 2022-11-22 RX ORDER — PROPOFOL 10 MG/ML
INJECTION, EMULSION INTRAVENOUS
Status: COMPLETED
Start: 2022-11-22 | End: 2022-11-22

## 2022-11-22 RX ORDER — ENOXAPARIN SODIUM 100 MG/ML
40 INJECTION SUBCUTANEOUS DAILY
Status: DISCONTINUED | OUTPATIENT
Start: 2022-11-23 | End: 2022-12-10 | Stop reason: HOSPADM

## 2022-11-22 RX ORDER — SODIUM CHLORIDE 0.9 % (FLUSH) 0.9 %
5-40 SYRINGE (ML) INJECTION PRN
Status: DISCONTINUED | OUTPATIENT
Start: 2022-11-22 | End: 2022-12-10 | Stop reason: HOSPADM

## 2022-11-22 RX ORDER — MORPHINE SULFATE 100 MG/5ML
5 SOLUTION ORAL
COMMUNITY

## 2022-11-22 RX ORDER — ACETAMINOPHEN 325 MG/1
650 TABLET ORAL EVERY 4 HOURS PRN
COMMUNITY

## 2022-11-22 RX ORDER — LORAZEPAM 0.5 MG/1
0.5 TABLET ORAL EVERY 4 HOURS PRN
COMMUNITY

## 2022-11-22 RX ORDER — OMEPRAZOLE 40 MG/1
40 CAPSULE, DELAYED RELEASE ORAL DAILY
COMMUNITY

## 2022-11-22 RX ORDER — SODIUM CHLORIDE 9 MG/ML
INJECTION, SOLUTION INTRAVENOUS PRN
Status: DISCONTINUED | OUTPATIENT
Start: 2022-11-22 | End: 2022-12-10 | Stop reason: HOSPADM

## 2022-11-22 RX ORDER — POTASSIUM CHLORIDE 20 MEQ/1
20 TABLET, EXTENDED RELEASE ORAL 2 TIMES DAILY
COMMUNITY

## 2022-11-22 RX ORDER — ALBUTEROL SULFATE 2.5 MG/3ML
SOLUTION RESPIRATORY (INHALATION)
Status: DISPENSED
Start: 2022-11-22 | End: 2022-11-23

## 2022-11-22 RX ORDER — ISOSORBIDE MONONITRATE 30 MG/1
30 TABLET, EXTENDED RELEASE ORAL DAILY
COMMUNITY

## 2022-11-22 RX ORDER — NICOTINE POLACRILEX 4 MG
15 LOZENGE BUCCAL PRN
Status: ON HOLD | COMMUNITY
End: 2022-12-10 | Stop reason: HOSPADM

## 2022-11-22 RX ORDER — BUDESONIDE 0.5 MG/2ML
0.5 INHALANT ORAL 2 TIMES DAILY
Status: DISCONTINUED | OUTPATIENT
Start: 2022-11-22 | End: 2022-11-22

## 2022-11-22 RX ORDER — METHYLPREDNISOLONE SODIUM SUCCINATE 125 MG/2ML
125 INJECTION, POWDER, LYOPHILIZED, FOR SOLUTION INTRAMUSCULAR; INTRAVENOUS ONCE
Status: COMPLETED | OUTPATIENT
Start: 2022-11-22 | End: 2022-11-22

## 2022-11-22 RX ORDER — EPINEPHRINE 0.1 MG/ML
0.01 SYRINGE (ML) INJECTION ONCE
Status: COMPLETED | OUTPATIENT
Start: 2022-11-22 | End: 2022-11-22

## 2022-11-22 RX ORDER — ACETAMINOPHEN 325 MG/1
650 TABLET ORAL EVERY 4 HOURS PRN
Status: DISCONTINUED | OUTPATIENT
Start: 2022-11-22 | End: 2022-12-10 | Stop reason: HOSPADM

## 2022-11-22 RX ORDER — BUDESONIDE 0.5 MG/2ML
0.5 INHALANT ORAL 2 TIMES DAILY
Status: DISCONTINUED | OUTPATIENT
Start: 2022-11-22 | End: 2022-12-10 | Stop reason: HOSPADM

## 2022-11-22 RX ORDER — BUDESONIDE 0.5 MG/2ML
1 INHALANT ORAL 2 TIMES DAILY
COMMUNITY

## 2022-11-22 RX ORDER — ONDANSETRON 2 MG/ML
4 INJECTION INTRAMUSCULAR; INTRAVENOUS EVERY 6 HOURS PRN
Status: DISCONTINUED | OUTPATIENT
Start: 2022-11-22 | End: 2022-12-10 | Stop reason: HOSPADM

## 2022-11-22 RX ORDER — GUAIFENESIN 600 MG/1
600 TABLET, EXTENDED RELEASE ORAL 2 TIMES DAILY
COMMUNITY

## 2022-11-22 RX ORDER — MORPHINE SULFATE 4 MG/ML
INJECTION, SOLUTION INTRAMUSCULAR; INTRAVENOUS
Status: COMPLETED
Start: 2022-11-22 | End: 2022-11-22

## 2022-11-22 RX ORDER — ONDANSETRON 4 MG/1
4 TABLET, ORALLY DISINTEGRATING ORAL EVERY 8 HOURS PRN
Status: DISCONTINUED | OUTPATIENT
Start: 2022-11-22 | End: 2022-12-10 | Stop reason: HOSPADM

## 2022-11-22 RX ORDER — 0.9 % SODIUM CHLORIDE 0.9 %
500 INTRAVENOUS SOLUTION INTRAVENOUS ONCE
Status: COMPLETED | OUTPATIENT
Start: 2022-11-22 | End: 2022-11-22

## 2022-11-22 RX ORDER — CHLORDIAZEPOXIDE HYDROCHLORIDE 25 MG/1
25 CAPSULE, GELATIN COATED ORAL 3 TIMES DAILY PRN
Status: ON HOLD | COMMUNITY
Start: 2022-11-22 | End: 2022-12-10

## 2022-11-22 RX ORDER — METHYLPREDNISOLONE SODIUM SUCCINATE 40 MG/ML
40 INJECTION, POWDER, LYOPHILIZED, FOR SOLUTION INTRAMUSCULAR; INTRAVENOUS EVERY 8 HOURS
Status: DISCONTINUED | OUTPATIENT
Start: 2022-11-22 | End: 2022-11-25

## 2022-11-22 RX ORDER — ALBUTEROL SULFATE 2.5 MG/3ML
2.5 SOLUTION RESPIRATORY (INHALATION) 4 TIMES DAILY
Status: DISCONTINUED | OUTPATIENT
Start: 2022-11-22 | End: 2022-11-23

## 2022-11-22 RX ORDER — OXYCODONE HYDROCHLORIDE AND ACETAMINOPHEN 5; 325 MG/1; MG/1
2 TABLET ORAL EVERY 6 HOURS PRN
Status: ON HOLD | COMMUNITY
Start: 2022-11-21 | End: 2022-12-10 | Stop reason: HOSPADM

## 2022-11-22 RX ORDER — NICOTINE 21 MG/24HR
1 PATCH, TRANSDERMAL 24 HOURS TRANSDERMAL EVERY 24 HOURS
COMMUNITY

## 2022-11-22 RX ORDER — PROPOFOL 10 MG/ML
5-50 INJECTION, EMULSION INTRAVENOUS CONTINUOUS
Status: DISCONTINUED | OUTPATIENT
Start: 2022-11-22 | End: 2022-11-24

## 2022-11-22 RX ORDER — MIDAZOLAM HYDROCHLORIDE 1 MG/ML
2 INJECTION INTRAMUSCULAR; INTRAVENOUS ONCE
Status: COMPLETED | OUTPATIENT
Start: 2022-11-22 | End: 2022-11-22

## 2022-11-22 RX ORDER — CEFUROXIME AXETIL 500 MG/1
500 TABLET ORAL 2 TIMES DAILY
Status: ON HOLD | COMMUNITY
Start: 2022-11-22 | End: 2022-12-10 | Stop reason: HOSPADM

## 2022-11-22 RX ADMIN — ALBUTEROL SULFATE 2.5 MG: 2.5 SOLUTION RESPIRATORY (INHALATION) at 15:18

## 2022-11-22 RX ADMIN — BUDESONIDE 500 MCG: 0.5 SUSPENSION RESPIRATORY (INHALATION) at 21:07

## 2022-11-22 RX ADMIN — Medication 8 MCG/MIN: at 14:11

## 2022-11-22 RX ADMIN — Medication 8 MCG/MIN: at 14:32

## 2022-11-22 RX ADMIN — IPRATROPIUM BROMIDE AND ALBUTEROL SULFATE 1 AMPULE: 2.5; .5 SOLUTION RESPIRATORY (INHALATION) at 23:59

## 2022-11-22 RX ADMIN — PROPOFOL 5 MCG/KG/MIN: 10 INJECTION, EMULSION INTRAVENOUS at 13:30

## 2022-11-22 RX ADMIN — Medication 0.01 MG: at 13:33

## 2022-11-22 RX ADMIN — METHYLPREDNISOLONE SODIUM SUCCINATE 40 MG: 40 INJECTION, POWDER, FOR SOLUTION INTRAMUSCULAR; INTRAVENOUS at 20:26

## 2022-11-22 RX ADMIN — Medication 16 MG: at 13:38

## 2022-11-22 RX ADMIN — Medication 2 MCG/MIN: at 13:30

## 2022-11-22 RX ADMIN — MIDAZOLAM 2 MG: 1 INJECTION, SOLUTION INTRAMUSCULAR; INTRAVENOUS at 14:26

## 2022-11-22 RX ADMIN — SODIUM CHLORIDE 1000 ML: 9 INJECTION, SOLUTION INTRAVENOUS at 14:00

## 2022-11-22 RX ADMIN — SODIUM CHLORIDE 1000 ML: 9 INJECTION, SOLUTION INTRAVENOUS at 13:16

## 2022-11-22 RX ADMIN — ALBUTEROL SULFATE 2.5 MG: 2.5 SOLUTION RESPIRATORY (INHALATION) at 19:34

## 2022-11-22 RX ADMIN — SODIUM CHLORIDE 500 ML: 9 INJECTION, SOLUTION INTRAVENOUS at 12:35

## 2022-11-22 RX ADMIN — Medication 6 MCG/MIN: at 13:45

## 2022-11-22 RX ADMIN — Medication 0.01 MG: at 13:23

## 2022-11-22 RX ADMIN — PROPOFOL 20 MCG/KG/MIN: 10 INJECTION, EMULSION INTRAVENOUS at 17:52

## 2022-11-22 RX ADMIN — Medication 4 MCG/MIN: at 13:42

## 2022-11-22 RX ADMIN — SODIUM CHLORIDE, PRESERVATIVE FREE 20 MG: 5 INJECTION INTRAVENOUS at 20:26

## 2022-11-22 RX ADMIN — PROPOFOL 10 MCG/KG/MIN: 10 INJECTION, EMULSION INTRAVENOUS at 14:23

## 2022-11-22 RX ADMIN — METHYLPREDNISOLONE SODIUM SUCCINATE 125 MG: 125 INJECTION, POWDER, FOR SOLUTION INTRAMUSCULAR; INTRAVENOUS at 12:36

## 2022-11-22 RX ADMIN — MORPHINE SULFATE 4 MG: 4 INJECTION, SOLUTION INTRAMUSCULAR; INTRAVENOUS at 14:33

## 2022-11-22 RX ADMIN — VANCOMYCIN HYDROCHLORIDE 2250 MG: 1.25 INJECTION, POWDER, LYOPHILIZED, FOR SOLUTION INTRAVENOUS at 18:08

## 2022-11-22 RX ADMIN — CEFEPIME 2000 MG: 2 INJECTION, POWDER, FOR SOLUTION INTRAVENOUS at 17:02

## 2022-11-22 RX ADMIN — MORPHINE SULFATE 4 MG: 4 INJECTION, SOLUTION INTRAMUSCULAR; INTRAVENOUS at 16:46

## 2022-11-22 ASSESSMENT — PULMONARY FUNCTION TESTS
PIF_VALUE: 24
PIF_VALUE: 19
PIF_VALUE: 21
PIF_VALUE: 43
PIF_VALUE: 23
PIF_VALUE: 21
PIF_VALUE: 21
PIF_VALUE: 25
PIF_VALUE: 23
PIF_VALUE: 19
PIF_VALUE: 19
PIF_VALUE: 23
PIF_VALUE: 21
PIF_VALUE: 23
PIF_VALUE: 21
PIF_VALUE: 24
PIF_VALUE: 21
PIF_VALUE: 21
PIF_VALUE: 19
PIF_VALUE: 25
PIF_VALUE: 21
PIF_VALUE: 19
PIF_VALUE: 20
PIF_VALUE: 19
PIF_VALUE: 23
PIF_VALUE: 21
PIF_VALUE: 19
PIF_VALUE: 23
PIF_VALUE: 22
PIF_VALUE: 19
PIF_VALUE: 19
PIF_VALUE: 21

## 2022-11-22 ASSESSMENT — PAIN - FUNCTIONAL ASSESSMENT: PAIN_FUNCTIONAL_ASSESSMENT: WONG-BAKER FACES

## 2022-11-22 NOTE — ED NOTES
Intubated @ 1325 by Dr. Nancy Mandujano, 25 at the teeth.    Positive color change  Equal bilateral breath sounds      Courtney Clemens, RN  11/22/22 [address] [facility], RN  11/22/22 7623

## 2022-11-22 NOTE — PROGRESS NOTES
Vancomycin Dosing by Pharmacy - Initial Note   TODAY'S DATE:  11/22/2022  Patient name, age:  German Guerrero, 61 y.o. Indication: Sepsis of unknown origin, empiric  Additional antimicrobials:  cefepime    Allergies:  Patient has no known allergies. Actual Weight:    Wt Readings from Last 1 Encounters:   11/22/22 185 lb 13.6 oz (84.3 kg)     Labs/Ancillary Data  Estimated Creatinine Clearance: 227 mL/min (based on SCr of 0.4 mg/dL). Recent Labs     11/22/22  1302   CREATININE <0.40*   BUN 12   WBC 20.5*     Procalcitonin   Date Value Ref Range Status   08/29/2022 0.20 (H) <0.09 ng/mL Final     Comment:           Suspected Sepsis:  <0.50 ng/mL     Low likelihood of sepsis. 0.50-2.00 ng/mL     Increased likelihood of sepsis. Antibiotics encouraged. >2.00 ng/mL     High risk of sepsis/shock. Antibiotics strongly encouraged. Suspected Lower Resp Tract Infections:  <0.24 ng/mL     Low likelihood of bacterial infection. >0.24 ng/mL     Increased likelihood of bacterial infection. Antibiotics encouraged. With successful antibiotic therapy, PCT levels should decrease rapidly. (Half-life of 24 to   36 hours.)        Procalcitonin values from samples collected within the first 6 hours of systemic infection   may still be low. Retesting may be indicated. Values from day 1 and day 4 can be entered into the Change in Procalcitonin Calculator   (www.[employer]-pct-[email or web address] com) to determine the patient's Mortality Risk Prognosis        In healthy neonates, plasma Procalcitonin (PCT) concentrations increase gradually after   birth, reaching peak values at about 24 hours of age then decrease to normal values below   0.5 ng/mL by 48-72 hours of age.          Intake/Output Summary (Last 24 hours) at 11/22/2022 1838  Last data filed at 11/22/2022 1812  Gross per 24 hour   Intake --   Output 950 ml   Net -950 ml     Temp: 97.3 F (36.3 C)    Recent vancomycin administrations                     vancomycin (VANCOCIN) 2,250 mg in dextrose 5 % 500 mL IVPB (mg) 2,250 mg New Bag 11/22/22 1808                  Culture Date / Source  /  Results  Pending    MRSA Nasal Swab: N/A. Non-respiratory infection. Tanda Bun PLAN   Initial loading dose of 25mg/kg (max of 2,500 mg) = 2250  mg  x 1, then  vancomycin 1250 mg IV every 12 hours. Ensured BUN/sCr ordered at baseline and every 48 hours x at least 3 levels, then at least weekly. Vancomycin level ordered for 11/23/22 @ 1200. Will use bayesian method for dosing. This level will not be a trough. Target AUC/NIK: 400-600. Vancomycin Target Concentration Parameters  Treatment  Population Target AUC/NIK Target Trough   Invasive MRSA Infection (bacteremia, pneumonia, meningitis, endocarditis, osteomyelitis)  Sepsis (undifferentiated) 400-600 N/A   Infection due to non-MRSA pathogen  Empiric treatment of non-invasive MRSA infection  (SSTI, UTI) <500 10-15 mg/L   CrCl < 29 mL/min  Rapidly fluctuating serum creatinine   KRYSTA N/A < 15 mg/L     Renal replacement therapy is dosed by levels, per hospital protocol. Abbreviations  * Pauc: probability that AUC is >400 (efficacy); Pconc: probability that Ctrough is above 20 ?g/mL (toxicity); Tox: Probability of nephrotoxicity, based on Lodise et al. Clin Infect Dis 2009. Loading dose: N/A  Regimen: 1250 mg IV every 12 hours. Start time: 06:08 on 11/23/2022  Exposure target: AUC24 (range)400-600 mg/L.hr   AUC24,ss: 463 mg/L.hr  Probability of AUC24 > 400: 65 %  Ctrough,ss: 13.5 mg/L  Probability of Ctrough,ss > 20: 23 %  Probability of nephrotoxicity (Lodise IVANNA 2009): 9 %    Thank you for the consult. Pharmacy will continue to follow.    Lalit Hess, PharmD, BCPS

## 2022-11-22 NOTE — PROGRESS NOTES
Medication History completed:    New medications: senna-docusate, senna, prednisone, potassium chloride, Percocet, omeprazole, nicotine patches, morphine sulfate concentrated oral solution, lorazepam, isosorbide mononitrate, guaifenesin ER tablets, glucose gel, chlordiazepoxide, cefuroxime, budesonide nebulizer solution, acetaminophen    Medications discontinued: thiamine, sodium chloride, midodrine, folic acid, doxycyline, albuterol nebulizer solution    Changes to dosing:   Metoprolol changed to tartrate formulation taking 25 mg twice daily  Diltiazem changed to 12 hr ER capsule formulation taking 240 mg (two 120 mg capsules) daily    Stated allergies: NKDA    Other pertinent information: Medications confirmed with facility list [facility]). The patient started a 7 day course of cefuroxime on 11/22/22.      Thank you,  Edi Ballesteros, PharmD, BCPS  [phone]

## 2022-11-22 NOTE — ED NOTES
Report given to Russell Carter RN from ICU. Report method in person   The following was reviewed with receiving RN:   Current vital signs:  /82   Pulse 82   Temp 97.6 °F (36.4 °C)   Resp 20   Ht 6' 1\" (1.854 m)   Wt 199 lb (90.3 kg)   SpO2 100%   BMI 26.25 kg/m²                MEWS Score: 4     Any medication or safety alerts were reviewed. Any pending diagnostics and notifications were also reviewed, as well as any safety concerns or issues, abnormal labs, abnormal imaging, and abnormal assessment findings. Questions were answered.             Marely Ornelas RN  11/22/22 0263

## 2022-11-22 NOTE — ED NOTES
Spoke with Andreas Lozoya from [facility]. She stated that pt arrived to them yesterday from [employer]. He was at [facility] and receiving detox treatments for alcohol. Blu Phillips states that pt was A/O X4 and fully ambulatory as of this morning. Blu Phillips stated that she had medicated pt with Morphine, Ativan and his morning beer around 0800. Blu Phillips noticed a rapid decline in pt's status as the day progressed.      Mimi Carpenter, JAYLEN  11/22/22 929 [facility], RN  11/22/22 5929

## 2022-11-22 NOTE — ED NOTES
Mode of arrival (squad #, walk in, police, etc) : EMS        Chief complaint(s): Respiratory distress        Arrival Note (brief scenario, treatment PTA, etc). : Pt arrives to the ED at baseline mentation from facility being non-verbal but responsive to voice; pt is experiencing increased SOB and inability to maintain airway appropriately. Respiratory at bedside when patient arrived and MD decision to place pt on Bipap prior to intubation due to pending code status. Pt was recently admitted to hospice care and code status is unknown at this time. C= \"Have you ever felt that you should Cut down on your drinking? \"  Refused  A= \"Have people Annoyed you by criticizing your drinking? \"  Refused  G= \"Have you ever felt bad or Guilty about your drinking? \"  Refused  E= \"Have you ever had a drink as an Eye-opener first thing in the morning to steady your nerves or to help a hangover? \"  Refused      Deferred []      Reason for deferring: Pt is incapacitated at this time and unable to answer questions     *If yes to two or more: probable alcohol abuse. Erasmo Hoffmann RN  11/22/22 2595

## 2022-11-22 NOTE — PROGRESS NOTES
The transport originated from ER-6. Pt. was transported to ICU-5. Assisting with the transport was Lakesha YORK. Appropriate devices were applied to monitor the patient's condition during transport. Patient transported  via 100% O2 via ventilator. Patient tolerated well.         Trang Herman RCP  5:51 PM

## 2022-11-22 NOTE — ED NOTES
Pt placed on ashtyn hugger d/t shivering and cold extremities.      Fabiola Villalba RN  11/22/22 6463

## 2022-11-22 NOTE — ED NOTES
Pt currently tolerating bipap well, spo2 maintained at 95% on 50% O2     Ronnie Crabtree RN  11/22/22 2446

## 2022-11-22 NOTE — ED PROVIDER NOTES
Anxiety. For up to 3 days starting 22    DILTIAZEM (CARDIZEM 12 HR) 120 MG EXTENDED RELEASE CAPSULE    Take 240 mg by mouth daily    GLUCOSE (GLUTOSE) 40 % GEL    Take 15 g by mouth as needed (BG <70)    GUAIFENESIN (MUCINEX) 600 MG EXTENDED RELEASE TABLET    Take 600 mg by mouth 2 times daily    HYDROXYZINE HCL (ATARAX) 25 MG TABLET    Take 1 tablet by mouth at bedtime    ISOSORBIDE MONONITRATE (IMDUR) 30 MG EXTENDED RELEASE TABLET    Take 30 mg by mouth daily    LISINOPRIL (PRINIVIL;ZESTRIL) 10 MG TABLET    Take 1 tablet by mouth daily    LORAZEPAM (ATIVAN) 0.5 MG TABLET    Take 0.5 mg by mouth every 4 hours as needed for Anxiety (or respiratory discomfort). METOPROLOL TARTRATE (LOPRESSOR) 25 MG TABLET    Take 25 mg by mouth 2 times daily    MORPHINE SULFATE 20 MG/ML CONCENTRATED ORAL SOLUTION    Take 5 mg by mouth every hour as needed for Pain (or respiratory discomfort). NICOTINE (NICODERM CQ) 21 MG/24HR    Place 1 patch onto the skin every 24 hours    OMEPRAZOLE (PRILOSEC) 40 MG DELAYED RELEASE CAPSULE    Take 40 mg by mouth daily    OXYCODONE-ACETAMINOPHEN (PERCOCET) 5-325 MG PER TABLET    Take 2 tablets by mouth every 6 hours as needed for Pain (max 8 tabs per day). POTASSIUM CHLORIDE (KLOR-CON M) 20 MEQ EXTENDED RELEASE TABLET    Take 20 mEq by mouth 2 times daily    PREDNISONE (DELTASONE) 20 MG TABLET    Take 20 mg by mouth daily    SENNA (SENOKOT) 8.6 MG TABLET    Take 1 tablet by mouth 2 times daily as needed for Constipation    SENNOSIDES-DOCUSATE SODIUM (SENOKOT-S) 8.6-50 MG TABLET    Take 2 tablets by mouth in the morning and at bedtime    TRELEGY ELLIPTA 200-62.5-25 MCG/INH AEPB    INHALE 1 PUFF INTO THE LUNGS DAILY     ALLERGIES     has No Known Allergies. FAMILY HISTORY     He indicated that his mother is alive. He indicated that his father is . SOCIALHISTORY      reports that he quit smoking about 2 months ago. His smoking use included cigarettes.  He started smoking about 36 years ago. He has a 44.00 pack-year smoking history. He has never used smokeless tobacco. He reports current alcohol use of about 35.0 standard drinks per week. He reports that he does not currently use drugs after having used the following drugs: Cocaine and Marijuana [place]). PHYSICAL EXAM     INITIAL VITALS: /83   Pulse 95   Temp 97.4 °F (36.3 °C) (Oral)   Resp 18   Ht 6' 1\" (1.854 m)   Wt 199 lb (90.3 kg)   SpO2 100%   BMI 26.25 kg/m²    Physical Exam  Vitals and nursing note reviewed. Constitutional:       Appearance: He is well-developed. Comments: Patient arrives on non-rebreather. Minimally responsive. He appears morbidly ill. HENT:      Head: Atraumatic. Eyes:      Conjunctiva/sclera: Conjunctivae normal.      Pupils: Pupils are equal, round, and reactive to light. Neck:      Vascular: No JVD. Trachea: No tracheal deviation. Cardiovascular:      Rate and Rhythm: Regular rhythm. Tachycardia present. Heart sounds: Normal heart sounds. Pulmonary:      Comments: Rapid shallow respirations with diminished but present breath sounds bilaterally, crackles on the right. Abdominal:      General: Bowel sounds are normal. There is no distension. Palpations: Abdomen is soft. Tenderness: There is no abdominal tenderness. Musculoskeletal:      Cervical back: Normal range of motion and neck supple. Comments: Mottling noted to all four extremities, cool to touch. There are palpable pulses in all four extremities. There is a large area of ecchymosis noted to right elbow. Neurological:      Comments: Opens his eyes to his name, does not follow any commands, no purposeful movements. MEDICAL DECISION MAKING:   Patient arrives from [facility] in respiratory distress, RR in the highs 30s with shallow resps on 15lpm via face mask, he is oriented only to voice, not following commands, no purposeful movements.  Patient started on BiPAP, IV access maintained and gentle IVF started given sig history of hyponatremia want to check sodium level before aggressive hydration. Appears more comfortable on BiPAP. Confirming code stats and goals of care with family. 3:26 PM  Upon arrival I had extensive conversations with patients daughter, Ivin Libman, and his sister Alexandrea Bryant, about patients goals of care and his very poor prognosis. Per their report he does not have a legally designated POA. Recent admission requiring intubation at [facility] 2 weeks ago. PMH of copd, lung mass, hyponatremia. Apparently patient entered hospice care yesterday, he was reportedly alert and oriented at that time. He did not sign DNR paperwork at the time because, per his sister, he felt tired and confused after his recent long hospital admission and wanted to meet with his children later this week when they visit from [place] to discuss. I discussed with patient's family his prognosis is extremely poor. I think it is likely he will have difficulty weaning from vent and might require trach and PEG. I informed the family this aggressive treatment would generally not be considered consistent with a hospice approach to end of life care. After extensive discussion his daughter asked that we pursue all invasive life sustaining measures. 3:26 PM  Patient tolerated intubation and central line placement. IO line placed prior to intubation due to poor peripheral access. Given push dose epi x2 before and immediately after intubation due to hypotension. Started on propofol and levophed gtt. EKG sinus tach , no acute ST segment elevation. Labs reviewed. Notable for elevated WBC. Initial VBG with pH 7.17 and CO2 51, improved after intubation to pH 7.4. Lactic acid wnl. CXR with RLL infiltrated. Started on broad spectrum abx for sepsis and steroids/breathing tx for presumed copd exacerbation. Plan for admission.         CRITICAL CARE:   CRITICAL CARE: There was a high probability of clinically significant/life threatening deterioration in this patient's condition which required my urgent intervention. Total critical care time was 90 minutes. This excludes any time for separately reportable procedures. PROCEDURES:    Intubation    Date/Time: 11/22/2022 3:03 PM  Performed by: Luan Long MD  Authorized by: Luan Long MD     Consent:     Consent obtained:  Emergent situation    Consent given by: daughter Teresa Michele. Risks, benefits, and alternatives were discussed: yes      Risks discussed:  Death and hypoxia    Alternatives discussed:  No treatment  Universal protocol:     Patient identity confirmed:  Hospital-assigned identification number  Pre-procedure details:     Indication: failure to oxygenate, failure to protect airway, failure to ventilate and predicted clinical deterioration      Patient status:  Altered mental status    Look externally: no concerns      Mouth opening - incisor distance:  2 finger widths    Hyoid-mental distance: 3 or more finger widths      Obstruction: none      Neck mobility: normal      Pharmacologic strategy: RSI      Induction agents:  Etomidate    Paralytics:  Succinylcholine  Procedure details:     Preoxygenation:  BiLevel    Intubation method:  Oral    Intubation technique: video assisted      Laryngoscope blade:   Mac 4    Bougie used: no      Grade view: II      Tube size (mm):  7.5    Tube type:  Cuffed    Number of attempts:  1    Ventilation between attempts: no      Tube visualized through cords: yes    Placement assessment:     Tube secured with:  ETT bailey    Breath sounds:  Equal    Placement verification: chest rise, colorimetric ETCO2, CXR verification and equal breath sounds      CXR findings:  Appropriate position  Post-procedure details:     Procedure completion:  Tolerated with difficulty    Complications: hypotension    Central Line    Date/Time: 11/22/2022 3:06 PM  Performed by: Luan Long MD  Authorized by: Michael Hernandes Lacie Wu MD     Consent:     Consent obtained:  Verbal    Consent given by: daughter Milagros Almeida. Risks, benefits, and alternatives were discussed: yes    Pre-procedure details:     Indication(s): central venous access      Hand hygiene: Hand hygiene performed prior to insertion      Sterile barrier technique: All elements of maximal sterile technique followed      Skin preparation:  Chlorhexidine    Skin preparation agent: Skin preparation agent completely dried prior to procedure    Sedation:     Sedation type: Moderate sedation  Anesthesia:     Anesthesia method:  Local infiltration    Local anesthetic:  Lidocaine 1% w/o epi  Procedure details:     Location:  R femoral    Patient position:  Supine    Procedural supplies:  Triple lumen    Landmarks identified: yes      Ultrasound guidance: yes      Ultrasound guidance timing: real time      Sterile ultrasound techniques: Sterile gel and sterile probe covers were used      Number of attempts:  1    Successful placement: yes    Post-procedure details:     Post-procedure:  Line sutured and dressing applied    Post-procedure assessment: Able to flush through all ports. Does not draw blood through distal port but draws from other ports with no difficulty.     Procedure completion:  Tolerated  Intraosseous Line Insertion    Date/Time: 11/22/2022 3:08 PM  Performed by: Liudmila Cano MD  Authorized by: Liudmila Cano MD     Consent:     Consent obtained:  Emergent situation  Pre-procedure details:     Site preparation:  Chlorhexidine  Anesthesia:     Anesthesia method:  None  Procedure details:     Insertion site:  L proximal tibia    Insertion device:  Drill device    Insertion: Needle was inserted through the bony cortex      Number of attempts:  1    Insertion confirmation:  Aspiration of blood/marrow, easy infusion of fluids and stability of the needle  Post-procedure details:     Secured with:  Transparent dressing    Procedure completion: Tolerated    DIAGNOSTIC RESULTS   EKG: All EKG's are interpreted by the Emergency Department Physician who either signs or Co-signs this chart inthe absence of a cardiologist.      RADIOLOGY:All plain film, CT, MRI, and formal ultrasound images (except ED bedside ultrasound) are read by the radiologist, see reports below, unless otherwise noted in MDM or here. XR CHEST PORTABLE   Final Result   1. Appropriate positioning of the endotracheal and enteric tubes. 2.  A lingular mass appears to have increased in size since the prior   examination and is suspicious for malignancy. Recommend tissue sampling if   not previously performed. 3.  New right basilar opacities may represent infection or atelectasis. CT HEAD WO CONTRAST    (Results Pending)     LABS: All lab results were reviewed by myself, and all abnormals are listed below.   Labs Reviewed   BLOOD GAS, VENOUS - Abnormal; Notable for the following components:       Result Value    pH, Tyler 7.179 (*)     pCO2, Tyler 98.8 (*)     pO2, Tyler 53.4 (*)     HCO3, Venous 36.7 (*)     Positive Base Excess, Tyler 8.3 (*)     All other components within normal limits   BASIC METABOLIC PANEL - Abnormal; Notable for the following components:    Glucose 145 (*)     Creatinine <0.40 (*)     Sodium 132 (*)     Chloride 90 (*)     CO2 35 (*)     Anion Gap 7 (*)     All other components within normal limits   CBC WITH AUTO DIFFERENTIAL - Abnormal; Notable for the following components:    WBC 20.5 (*)     RBC 3.06 (*)     Hemoglobin 11.1 (*)     Hematocrit 34.5 (*)     .7 (*)     MCH 36.4 (*)     RDW 15.8 (*)     Seg Neutrophils 90 (*)     Lymphocytes 3 (*)     Segs Absolute 18.44 (*)     Absolute Lymph # 0.62 (*)     Absolute Mono # 1.44 (*)     All other components within normal limits   COVID-19 & INFLUENZA COMBO   CULTURE, BLOOD 1   CULTURE, BLOOD 2   LACTATE, SEPSIS   LACTATE, SEPSIS   TROPONIN   BLOOD GAS, ARTERIAL   TROPONIN     EMERGENCY DEPARTMENT COURSE:   Vitals:    Vitals:    11/22/22 1432 11/22/22 1450 11/22/22 1518 11/22/22 1520   BP: 110/79 125/79  120/83   Pulse: 89 86 95 95   Resp: 18 16 21 18   Temp:       TempSrc:       SpO2: 98% 98% 100% 100%   Weight:       Height:           The patient was given the following medications while in the emergency department:  Orders Placed This Encounter   Medications    sodium chloride flush 0.9 % injection 5-40 mL    sodium chloride flush 0.9 % injection 5-40 mL    0.9 % sodium chloride infusion    0.9 % sodium chloride IV bolus 500 mL     Order Specific Question:   Was full 30mL/kg fluid bolus ordered? Answer:   No     Order Specific Question:   Reason for ordering fluid bolus less than 30mL/kg     Answer: Other: Please specify in comments     Comments:   chornic hyponatermia    methylPREDNISolone sodium (SOLU-MEDROL) injection 125 mg    ipratropium-albuterol (DUONEB) nebulizer solution 1 ampule     Order Specific Question:   Initiate RT Bronchodilator Protocol     Answer:   Yes - ED protocol    propofol 1000 MG/100ML injection     [place], [place]: cabinet override    EPINEPHrine 1 MG/10ML injection     [place], [place]: cabinet override    norepinephrine-sodium chloride (LEVOPHED) 16-0.9 MG/250ML-% infusion     [place], [place]: cabinet override    cefepime (MAXIPIME) 2,000 mg in sodium chloride 0.9 % 50 mL IVPB mini-bag     Order Specific Question:   Antimicrobial Indications     Answer:   Sepsis of Unknown Etiology    DISCONTD: propofol injection     Order Specific Question:   Titrate Infusion? Answer:   Yes     Order Specific Question:   Initial Infusion Dose:      Answer:   20 mcg/kg/min     Order Specific Question:   Goal of Therapy:     Answer:   RASS of -1 to 0     Order Specific Question:   Contact Provider if:     Answer:   New onset HR less than 50 bpm     Order Specific Question:   Contact Provider if:     Answer:   New onset SBP less than 90 mmHg     Order Specific Question:   Contact Provider if:     Answer:   Patient is receiving maximum dose and is not achieving the goal of therapy     Order Specific Question:   Contact Provider if:     Answer:   Triglycerides greater than 500 mg/dL    norepinephrine (LEVOPHED) 16 mg in sodium chloride 0.9 % 250 mL infusion     Order Specific Question:   Titrate Infusion? Answer:   Yes     Order Specific Question:   Initial Infusion Dose: Answer:   5 mcg/min     Order Specific Question:   Goal of Therapy is: Answer:   MAP greater than 65 mmHg     Order Specific Question:   Contact Provider if:     Answer:   Patient is receiving the maximum dose and is not achieving the goal of therapy    EPINEPHrine 1 MG/10ML injection 0.01 mg    EPINEPHrine 1 MG/10ML injection 0.01 mg    0.9 % sodium chloride bolus    vancomycin (VANCOCIN) intermittent dosing (placeholder)     Order Specific Question:   Antimicrobial Indications     Answer:   Sepsis of Unknown Etiology    vancomycin (VANCOCIN) 2,250 mg in dextrose 5 % 500 mL IVPB     Order Specific Question:   Antimicrobial Indications     Answer:   Sepsis of Unknown Etiology    propofol injection     Order Specific Question:   Titrate Infusion? Answer:   Yes     Order Specific Question:   Initial Infusion Dose:      Answer:   20 mcg/kg/min     Order Specific Question:   Goal of Therapy:     Answer:   RASS of -1 to 0     Order Specific Question:   Contact Provider if:     Answer:   New onset HR less than 50 bpm     Order Specific Question:   Contact Provider if:     Answer:   New onset SBP less than 90 mmHg     Order Specific Question:   Contact Provider if:     Answer:   Patient is receiving maximum dose and is not achieving the goal of therapy     Order Specific Question:   Contact Provider if:     Answer:   Triglycerides greater than 500 mg/dL    midazolam (VERSED) injection 2 mg    midazolam (VERSED) 2 MG/2ML injection     Erinn Li: cabinet override    morphine 4 MG/ML injection     Randy Velasco: cabinet override    0.9 % sodium chloride bolus    morphine sulfate (PF) injection 4 mg    albuterol (PROVENTIL) nebulizer solution 2.5 mg     Order Specific Question:   Initiate RT Bronchodilator Protocol     Answer:   No    albuterol (PROVENTIL) (2.5 MG/3ML) 0.083% nebulizer solution     KASH WASHINGTON: cabinet override     CONSULTS:  PHARMACY TO DOSE VANCOMYCIN  IP CONSULT TO INTERNAL MEDICINE    FINAL IMPRESSION      1. Acute on chronic respiratory failure with hypoxia (HCC)    2. Septic shock [facility])          DISPOSITION/PLAN   DISPOSITION Decision To Admit 11/22/2022 02:45:40 PM      PATIENT REFERRED TO:  No follow-up provider specified.   DISCHARGE MEDICATIONS:  New Prescriptions    No medications on file     Emerald Garsia MD  AttendingEmergency Physician                        Isaac Millard MD  11/22/22 9534

## 2022-11-22 NOTE — PROGRESS NOTES
Pt intubated with glidescope per Dr. Verner Dilling. #7.5 ET tube 27cm @ the lip. Pt put on servo vent, Vt 500, Rate 20, 40%, Peep 8.

## 2022-11-23 ENCOUNTER — APPOINTMENT (OUTPATIENT)
Dept: GENERAL RADIOLOGY | Age: 57
End: 2022-11-23
Payer: COMMERCIAL

## 2022-11-23 LAB
ABSOLUTE BANDS #: 0.47 K/UL (ref 0–1)
ABSOLUTE EOS #: 0 K/UL (ref 0–0.4)
ABSOLUTE LYMPH #: 0.47 K/UL (ref 1–4.8)
ABSOLUTE MONO #: 0.31 K/UL (ref 0.1–1.3)
ALLEN TEST: ABNORMAL
ANION GAP SERPL CALCULATED.3IONS-SCNC: 11 MMOL/L (ref 9–17)
BANDS: 3 % (ref 0–10)
BASOPHILS # BLD: 0 % (ref 0–2)
BASOPHILS ABSOLUTE: 0 K/UL (ref 0–0.2)
BUN BLDV-MCNC: 9 MG/DL (ref 6–20)
CALCIUM SERPL-MCNC: 8.9 MG/DL (ref 8.6–10.4)
CARBOXYHEMOGLOBIN: 1 % (ref 0–5)
CHLORIDE BLD-SCNC: 92 MMOL/L (ref 98–107)
CO2: 31 MMOL/L (ref 20–31)
CREAT SERPL-MCNC: <0.4 MG/DL (ref 0.7–1.2)
EOSINOPHILS RELATIVE PERCENT: 0 % (ref 0–4)
FIO2: 30
GFR SERPL CREATININE-BSD FRML MDRD: ABNORMAL ML/MIN/1.73M2
GLUCOSE BLD-MCNC: 103 MG/DL (ref 70–99)
HCO3 ARTERIAL: 35.5 MMOL/L (ref 22–26)
HCT VFR BLD CALC: 32.8 % (ref 41–53)
HEMOGLOBIN: 11 G/DL (ref 13.5–17.5)
LYMPHOCYTES # BLD: 3 % (ref 24–44)
MCH RBC QN AUTO: 36.8 PG (ref 26–34)
MCHC RBC AUTO-ENTMCNC: 33.6 G/DL (ref 31–37)
MCV RBC AUTO: 109.3 FL (ref 80–100)
METHEMOGLOBIN: 1.1 % (ref 0–1.9)
MODE: ABNORMAL
MONOCYTES # BLD: 2 % (ref 1–7)
MORPHOLOGY: ABNORMAL
MORPHOLOGY: ABNORMAL
O2 DEVICE/FLOW/%: ABNORMAL
O2 SAT, ARTERIAL: 93.7 % (ref 95–98)
PATIENT TEMP: 37
PCO2 ARTERIAL: 50.6 MMHG (ref 35–45)
PDW BLD-RTO: 15.4 % (ref 11.5–14.9)
PEEP/CPAP: 8
PH ARTERIAL: 7.46 (ref 7.35–7.45)
PLATELET # BLD: 103 K/UL (ref 150–450)
PMV BLD AUTO: 8.6 FL (ref 6–12)
PO2 ARTERIAL: 74.1 MMHG (ref 80–100)
POSITIVE BASE EXCESS, ART: 11.6 MMOL/L (ref 0–2)
POTASSIUM SERPL-SCNC: 3.7 MMOL/L (ref 3.7–5.3)
PT. POSITION: ABNORMAL
RBC # BLD: 3 M/UL (ref 4.5–5.9)
REASON FOR REJECTION: NORMAL
RESPIRATORY RATE: 16
SAMPLE SITE: ABNORMAL
SEG NEUTROPHILS: 92 % (ref 36–66)
SEGMENTED NEUTROPHILS ABSOLUTE COUNT: 14.35 K/UL (ref 1.3–9.1)
SET RATE: 16
SODIUM BLD-SCNC: 134 MMOL/L (ref 135–144)
TEXT FOR RESPIRATORY: ABNORMAL
TOTAL RATE: 16
VANCOMYCIN RANDOM DATE LAST DOSE: NORMAL
VANCOMYCIN RANDOM DOSE AMOUNT: 1250
VANCOMYCIN RANDOM TIME LAST DOSE: 523
VANCOMYCIN RANDOM: 14.3 UG/ML
VT: 500
WBC # BLD: 15.6 K/UL (ref 3.5–11)
ZZ NTE CLEAN UP: ORDERED TEST: NORMAL
ZZ NTE WITH NAME CLEAN UP: SPECIMEN SOURCE: NORMAL

## 2022-11-23 PROCEDURE — 2580000003 HC RX 258: Performed by: EMERGENCY MEDICINE

## 2022-11-23 PROCEDURE — 82805 BLOOD GASES W/O2 SATURATION: CPT

## 2022-11-23 PROCEDURE — 2580000003 HC RX 258: Performed by: INTERNAL MEDICINE

## 2022-11-23 PROCEDURE — 6370000000 HC RX 637 (ALT 250 FOR IP): Performed by: INTERNAL MEDICINE

## 2022-11-23 PROCEDURE — 2500000003 HC RX 250 WO HCPCS: Performed by: EMERGENCY MEDICINE

## 2022-11-23 PROCEDURE — A4216 STERILE WATER/SALINE, 10 ML: HCPCS | Performed by: INTERNAL MEDICINE

## 2022-11-23 PROCEDURE — 2700000000 HC OXYGEN THERAPY PER DAY

## 2022-11-23 PROCEDURE — 6360000002 HC RX W HCPCS: Performed by: EMERGENCY MEDICINE

## 2022-11-23 PROCEDURE — 2500000003 HC RX 250 WO HCPCS: Performed by: NURSE PRACTITIONER

## 2022-11-23 PROCEDURE — 36600 WITHDRAWAL OF ARTERIAL BLOOD: CPT

## 2022-11-23 PROCEDURE — 80048 BASIC METABOLIC PNL TOTAL CA: CPT

## 2022-11-23 PROCEDURE — 85025 COMPLETE CBC W/AUTO DIFF WBC: CPT

## 2022-11-23 PROCEDURE — 2500000003 HC RX 250 WO HCPCS: Performed by: INTERNAL MEDICINE

## 2022-11-23 PROCEDURE — 36415 COLL VENOUS BLD VENIPUNCTURE: CPT

## 2022-11-23 PROCEDURE — 6360000002 HC RX W HCPCS: Performed by: INTERNAL MEDICINE

## 2022-11-23 PROCEDURE — 2000000000 HC ICU R&B

## 2022-11-23 PROCEDURE — 94761 N-INVAS EAR/PLS OXIMETRY MLT: CPT

## 2022-11-23 PROCEDURE — 80202 ASSAY OF VANCOMYCIN: CPT

## 2022-11-23 PROCEDURE — 71045 X-RAY EXAM CHEST 1 VIEW: CPT

## 2022-11-23 PROCEDURE — 94640 AIRWAY INHALATION TREATMENT: CPT

## 2022-11-23 PROCEDURE — 94003 VENT MGMT INPAT SUBQ DAY: CPT

## 2022-11-23 RX ORDER — METOPROLOL TARTRATE 5 MG/5ML
5 INJECTION INTRAVENOUS EVERY 10 MIN PRN
Status: DISCONTINUED | OUTPATIENT
Start: 2022-11-23 | End: 2022-12-07

## 2022-11-23 RX ORDER — DEXMEDETOMIDINE HYDROCHLORIDE 4 UG/ML
.1-1.5 INJECTION, SOLUTION INTRAVENOUS CONTINUOUS
Status: DISCONTINUED | OUTPATIENT
Start: 2022-11-23 | End: 2022-11-24

## 2022-11-23 RX ADMIN — DEXMEDETOMIDINE HYDROCHLORIDE 0.6 MCG/KG/HR: 400 INJECTION INTRAVENOUS at 20:53

## 2022-11-23 RX ADMIN — BUDESONIDE 500 MCG: 0.5 SUSPENSION RESPIRATORY (INHALATION) at 19:53

## 2022-11-23 RX ADMIN — IPRATROPIUM BROMIDE AND ALBUTEROL SULFATE 1 AMPULE: 2.5; .5 SOLUTION RESPIRATORY (INHALATION) at 03:48

## 2022-11-23 RX ADMIN — IPRATROPIUM BROMIDE AND ALBUTEROL SULFATE 1 AMPULE: 2.5; .5 SOLUTION RESPIRATORY (INHALATION) at 11:34

## 2022-11-23 RX ADMIN — IPRATROPIUM BROMIDE AND ALBUTEROL SULFATE 1 AMPULE: 2.5; .5 SOLUTION RESPIRATORY (INHALATION) at 15:49

## 2022-11-23 RX ADMIN — CEFEPIME 2000 MG: 2 INJECTION, POWDER, FOR SOLUTION INTRAVENOUS at 17:03

## 2022-11-23 RX ADMIN — MIDAZOLAM 2 MG: 1 INJECTION, SOLUTION INTRAMUSCULAR; INTRAVENOUS at 05:17

## 2022-11-23 RX ADMIN — ENOXAPARIN SODIUM 40 MG: 100 INJECTION SUBCUTANEOUS at 09:29

## 2022-11-23 RX ADMIN — BUDESONIDE 500 MCG: 0.5 SUSPENSION RESPIRATORY (INHALATION) at 08:09

## 2022-11-23 RX ADMIN — METOPROLOL TARTRATE 5 MG: 5 INJECTION, SOLUTION INTRAVENOUS at 14:05

## 2022-11-23 RX ADMIN — PROPOFOL 20 MCG/KG/MIN: 10 INJECTION, EMULSION INTRAVENOUS at 00:09

## 2022-11-23 RX ADMIN — CEFEPIME 2000 MG: 2 INJECTION, POWDER, FOR SOLUTION INTRAVENOUS at 03:19

## 2022-11-23 RX ADMIN — DEXMEDETOMIDINE HYDROCHLORIDE 0.2 MCG/KG/HR: 400 INJECTION INTRAVENOUS at 14:50

## 2022-11-23 RX ADMIN — SODIUM CHLORIDE, PRESERVATIVE FREE 20 MG: 5 INJECTION INTRAVENOUS at 19:49

## 2022-11-23 RX ADMIN — Medication 2 MCG/MIN: at 10:38

## 2022-11-23 RX ADMIN — IPRATROPIUM BROMIDE AND ALBUTEROL SULFATE 1 AMPULE: 2.5; .5 SOLUTION RESPIRATORY (INHALATION) at 19:53

## 2022-11-23 RX ADMIN — SODIUM CHLORIDE, PRESERVATIVE FREE 10 ML: 5 INJECTION INTRAVENOUS at 07:58

## 2022-11-23 RX ADMIN — SODIUM CHLORIDE, PRESERVATIVE FREE 10 ML: 5 INJECTION INTRAVENOUS at 19:49

## 2022-11-23 RX ADMIN — VANCOMYCIN HYDROCHLORIDE 1250 MG: 1.25 INJECTION, POWDER, LYOPHILIZED, FOR SOLUTION INTRAVENOUS at 05:23

## 2022-11-23 RX ADMIN — VANCOMYCIN HYDROCHLORIDE 1250 MG: 1.25 INJECTION, POWDER, LYOPHILIZED, FOR SOLUTION INTRAVENOUS at 17:57

## 2022-11-23 RX ADMIN — IPRATROPIUM BROMIDE AND ALBUTEROL SULFATE 1 AMPULE: 2.5; .5 SOLUTION RESPIRATORY (INHALATION) at 08:09

## 2022-11-23 RX ADMIN — SODIUM CHLORIDE, PRESERVATIVE FREE 20 MG: 5 INJECTION INTRAVENOUS at 09:29

## 2022-11-23 RX ADMIN — METHYLPREDNISOLONE SODIUM SUCCINATE 40 MG: 40 INJECTION, POWDER, FOR SOLUTION INTRAMUSCULAR; INTRAVENOUS at 12:12

## 2022-11-23 RX ADMIN — METHYLPREDNISOLONE SODIUM SUCCINATE 40 MG: 40 INJECTION, POWDER, FOR SOLUTION INTRAMUSCULAR; INTRAVENOUS at 03:16

## 2022-11-23 RX ADMIN — METHYLPREDNISOLONE SODIUM SUCCINATE 40 MG: 40 INJECTION, POWDER, FOR SOLUTION INTRAMUSCULAR; INTRAVENOUS at 19:49

## 2022-11-23 RX ADMIN — MIDAZOLAM 2 MG: 1 INJECTION, SOLUTION INTRAMUSCULAR; INTRAVENOUS at 00:06

## 2022-11-23 RX ADMIN — MIDAZOLAM 2 MG: 1 INJECTION, SOLUTION INTRAMUSCULAR; INTRAVENOUS at 07:58

## 2022-11-23 RX ADMIN — MIDAZOLAM 2 MG: 1 INJECTION, SOLUTION INTRAMUSCULAR; INTRAVENOUS at 03:53

## 2022-11-23 RX ADMIN — SODIUM CHLORIDE, PRESERVATIVE FREE 10 ML: 5 INJECTION INTRAVENOUS at 08:00

## 2022-11-23 ASSESSMENT — PULMONARY FUNCTION TESTS
PIF_VALUE: 19
PIF_VALUE: 18
PIF_VALUE: 19
PIF_VALUE: 25
PIF_VALUE: 19
PIF_VALUE: 18
PIF_VALUE: 19
PIF_VALUE: 18
PIF_VALUE: 32
PIF_VALUE: 19
PIF_VALUE: 18
PIF_VALUE: 19
PIF_VALUE: 17
PIF_VALUE: 19
PIF_VALUE: 21
PIF_VALUE: 18
PIF_VALUE: 18
PIF_VALUE: 19
PIF_VALUE: 18
PIF_VALUE: 19

## 2022-11-23 ASSESSMENT — PAIN SCALES - GENERAL
PAINLEVEL_OUTOF10: 0

## 2022-11-23 NOTE — CARE COORDINATION
CASE MANAGEMENT NOTE:    Admission Date:  11/22/2022 Khoa Spivey is a 61 y.o.  male    Admitted for : Respiratory failure ([facility] [J96.90]  Septic shock ([facility] [A41.9, R65.21]  Acute on chronic respiratory failure with hypoxia ([facility] [J96.21]    Met with:  Patient's Sister, Capri De Leon     PCP:  Kerrie De Leon                                Insurance: [place]      Is patient alert and oriented at time of discussion:  Pt is alert, is on the VENT    Current Residence/ Living Arrangements:  in nursing home , From [address] [place] [address] PTA:  Yes, [address] has been following, [address], [facility] [place] [phone]    Does patient go to outpatient dialysis: No  If yes, location and chair time: NA  Who is their nephrologist? NA    Is patient agreeable to VNS: No    Freedom of choice provided:  No    List of [address] provided: No    VNS chosen:  No    DME:  straight cane    Home Oxygen: No    Nebulizer: Yes    CPAP/BIPAP: No    Supplier: N/A    Potential Assistance Needed: Yes,Return to OV    SNF needed: Yes, Return to [address] [place] [address] of choice and list provided: No    Pharmacy:  [address] [place] [address]        Is patient currently receiving oral anticoagulation therapy? No    Is the Patient an SHANTHI MARTINEZ [facility] [place] with Readmission Risk Score greater than 14%? No  If yes, pt needs a follow up appointment made within 7 days. Family Members/Caregivers that pt would like involved in their care:    Yes    If yes, list name here:  Daughter, Marii Garcia, Here from [place]- [phone]. Transportation Provider:  Ambulance             Discharge Plan:  11/23/22 TACHO Abbott-30%FIO2, DME- Romayne Harms to Pt's Saleem Donnelly, [phone],EER at [facility], [facility] there, Daniela Michele to [facility], w/ [address], Pt. Revoked, Now full code. [address] still can follow & they have beds at [facility]. OV, Can Take Back, Will need Pre-Cert. Pt.didn't want to return, but Capri Fail.  States, he needs & she will address. IV Cefepime, IV Steroids, 40 Q8. Gets 4 Beers a Day at OV, 3 with meals & I HS. Meseret will need signed/completed//KB                 Electronically signed by: Yogi Boogie RN on 11/23/2022 at 1:10 PM

## 2022-11-23 NOTE — PROGRESS NOTES
Patient is off sedation following commands, little agitated. -120's, off levo. Been tolerating weaning trial, been on weaning trial since 1145. Dr. Aida Moreno notified. Orders received to extubate. BIPAP 14/8 prn.  May need precedex when on BIPAP

## 2022-11-23 NOTE — PROGRESS NOTES
Patient very agitated. Stating he wants to leave and go to [facility], [facility] will give him beer.        Orders received for Precedex gtt

## 2022-11-23 NOTE — PLAN OF CARE
Problem: Discharge Planning  Goal: Discharge to home or other facility with appropriate resources  11/23/2022 1759 by Sinan Posey RN  Outcome: Progressing  Flowsheets  Taken 11/23/2022 1230  Discharge to home or other facility with appropriate resources: Refer to discharge planning if patient needs post-hospital services based on physician order or complex needs related to functional status, cognitive ability or social support system  Taken 11/23/2022 0730  Discharge to home or other facility with appropriate resources: Refer to discharge planning if patient needs post-hospital services based on physician order or complex needs related to functional status, cognitive ability or social support system     Problem: Pain  Goal: Verbalizes/displays adequate comfort level or baseline comfort level  11/23/2022 1759 by Sinan Posey RN  Outcome: Progressing  Flowsheets  Taken 11/23/2022 1621  Verbalizes/displays adequate comfort level or baseline comfort level:   Encourage patient to monitor pain and request assistance   Assess pain using appropriate pain scale   Administer analgesics based on type and severity of pain and evaluate response  Taken 11/23/2022 1200  Verbalizes/displays adequate comfort level or baseline comfort level:   Encourage patient to monitor pain and request assistance   Administer analgesics based on type and severity of pain and evaluate response   Assess pain using appropriate pain scale  Taken 11/23/2022 0730  Verbalizes/displays adequate comfort level or baseline comfort level:   Encourage patient to monitor pain and request assistance   Assess pain using appropriate pain scale   Administer analgesics based on type and severity of pain and evaluate response     Problem: Safety - Medical Restraint  Goal: Remains free of injury from restraints (Restraint for Interference with Medical Device)  Description: INTERVENTIONS:  1.  Determine that other, less restrictive measures have been tried or would not be effective before applying the restraint  2. Evaluate the patient's condition at the time of restraint application  3. Inform patient/family regarding the reason for restraint  4.  Q2H: Monitor safety, psychosocial status, comfort, nutrition and hydration  11/23/2022 1759 by Marifer Barksdale RN  Outcome: Progressing  Flowsheets  Taken 11/23/2022 1300  Remains free of injury from restraints (restraint for interference with medical device): Determine that other, less restrictive measures have been tried or would not be effective before applying the restraint  Taken 11/23/2022 1200  Remains free of injury from restraints (restraint for interference with medical device): Determine that other, less restrictive measures have been tried or would not be effective before applying the restraint  Taken 11/23/2022 1100  Remains free of injury from restraints (restraint for interference with medical device):   Determine that other, less restrictive measures have been tried or would not be effective before applying the restraint   Evaluate the patient's condition at the time of restraint application  Taken 62/17/1932 1000  Remains free of injury from restraints (restraint for interference with medical device):   Determine that other, less restrictive measures have been tried or would not be effective before applying the restraint   Evaluate the patient's condition at the time of restraint application   Inform patient/family regarding the reason for restraint  Taken 11/23/2022 0900  Remains free of injury from restraints (restraint for interference with medical device):   Determine that other, less restrictive measures have been tried or would not be effective before applying the restraint   Evaluate the patient's condition at the time of restraint application  Taken 90/53/6280 0800  Remains free of injury from restraints (restraint for interference with medical device):   Determine that other, less restrictive measures have been tried or would not be effective before applying the restraint   Evaluate the patient's condition at the time of restraint application

## 2022-11-23 NOTE — H&P
HISTORY & PHYSICAL     Patient's Name: Carrie Mckeon MRN: [id number] YOB: 1965 Age: 61 yrs  Date of service: 11/23/2022  Referring Physician: JAEL Fofana CNP    REASON FOR ADMISSION: respiratory failure, suspected lung CA, COPD  ? History of Present Illness:   61 yr old male with PMH COPD, lung mass / suspected lung cancer, ETOH abuse. He has been in and out of the hospital for several months. He was here in August with COVID infection. He follows outpatient with pulmonologist Dr Poly Marcus. He was discharged from [facility] on 11-21-22 and went to [facility]. At that time, he wished to be under hospice care and declined a biopsy of left lung mass. He was at [facility] about 24 hrs before having a great deal of respiratory distress and came to [address] [place] [address] where he was intubated on 11-22-22. He also needed pressor therapy overnight but this morning it was stopped. He is waking up and following commands. His most recent CT chest last week showed the left lung mass is increasing in size over the past month. Admission CXR with rt base patchy opacities.     Past Medical History:     Past Medical History:   Diagnosis Date    Alcohol abuse     hx of alcoholism; States he drinks 5 beers per day; pt has D/T's    Avascular necrosis of femoral head ([address] 11/25/2014    Overview:  S/p bilateral hip replacements    History of hip replacement     left/right    Hypertension     Mass of left lung     Rib fracture     Scabies     Stage 3 severe COPD by GOLD classification ([address] 11/08/2021    Tachycardia 10/28/2020    does not follow with Cardiology        Past Surgical History:     Past Surgical History:   Procedure Laterality Date    NECK SURGERY      TOTAL HIP ARTHROPLASTY Bilateral     VASECTOMY         Social History:     Social History     Tobacco Use    Smoking status: Former     Packs/day: 1.00     Years: 44.00     Pack years: 44.00     Types: Cigarettes     Start date: 12     Quit date: 09/2022 Years since quittin.2    Smokeless tobacco: Never    Tobacco comments:     pt educated on both alcohol and smoking cessation   Vaping Use    Vaping Use: Never used   Substance Use Topics    Alcohol use: Yes     Alcohol/week: 35.0 standard drinks     Types: 35 Cans of beer per week     Comment: hx of alcoholism; States he drinks 5 beers per day; pt has D/T's    Drug use: Not Currently     Types: Cocaine, Marijuana (Weed)     Comment: Quit cocaine at age 24, quit marijuana at age 24       Family History:     Family History   Problem Relation Age of Onset    Other Mother         mental health        Current Medications:     Medication Administration Record reviewed. sodium chloride flush  5-40 mL IntraVENous 2 times per day    vancomycin (VANCOCIN) intermittent dosing (placeholder)   Other RX Placeholder    sodium chloride flush  5-40 mL IntraVENous 2 times per day    enoxaparin  40 mg SubCUTAneous Daily    vancomycin  1,250 mg IntraVENous Q12H    cefepime  2,000 mg IntraVENous Q12H    methylPREDNISolone  40 mg IntraVENous Q8H    famotidine (PEPCID) injection  20 mg IntraVENous BID    ipratropium-albuterol  1 ampule Inhalation Q4H    budesonide  0.5 mg Nebulization BID       sodium chloride      norepinephrine Stopped (22 1241)    propofol Stopped (22 1115)    sodium chloride         Allergies:     No Known Allergies    Review of Systems:     Gen: No changes in weight or appetite. No fevers, chills, night sweats, or fatigue. Skin: No jaundice, rashes, lesions, pruritis, bruising, or hair/nail changes. HEENT/Neck: No dizzy/lightheaded, no recent head injury/trauma, no changes in vision or hearing. Cardiovascular: Denies chest pain at rest or with exertion. No dyspnea with exertion. No PND, murmurs, palpitations, cold extremities, or edema. Respiratory: increased difficulty breathing  GI: No abdominal pain, nausea, vomiting, diarrhea, or constipation. No dysphagia.  Denies melena or hematochezia. : Denies dysuria, hematuria, nocturia, oliguria, incontinence, frequency, or flank pain. Heme/Onc: Denies history easy bleeding, easy bruising, or recent transfusions. Endocrine: No changes in body/hair pigmentation, polyuria, polydipsia, or polyphagia. Neurological: No paresthesias, involuntary movements, new headaches, new onset incoordination, new significant loss of memory or concentration, or recent loss of consciousness. Msklt: No new arthralgias or myalgias. No new weakness, stiffness, or difficulty walking. Psychological: No history of depression or anxiety. ? Physical Exam:     Vitals were reviewed  /61   Pulse (!) 124   Temp 98.1 °F (36.7 °C) (Axillary)   Resp (!) 31   Ht 6' 1\" (1.854 m)   Wt 185 lb 13.6 oz (84.3 kg)   SpO2 98%   BMI 24.52 kg/m²   General: Patient appears nontoxic. AO x3. HEENT: Head is normal cephalic atraumatic. Hearing is grossly intact. Posterior pharynx is moist. No thrush. Neck: Supple. Thyroid non enlarged. No carotid bruits. Lungs: CTAb. No rales, rhonchi, or wheezing. Heart: Regular rate and rhythm. No murmur appreciated. Abdomen: Soft. Bowel sound in all 4 quadrants. Nontender. Nondistended. Extremities: No edema. Pulses +2/4 dorsal pedally. Neurological: Nonfocal. CN grossly intact. No lateralizing lesion. Current Labs and readiologic studies were reviewed. ?      ASSESSMENT / PLAN:     Acute hypoxic respiratory failure    Mechanical ventilation    Wean trial - poss extubation today  PNA - ABx  Hypotension    Wean pressor to keep MAP 65  COPD    BD  Suspected lung CA    Has been declining Biopsy  Discussed with RN and RT    Consult - palliative care to assist family with decision making  Selected home medications will be restarted. New Medications:  DVT prophylaxis:  GI Prophylaxis  IVF:   Activity: up with assist after extubation  Diet: NPO  Labs / Tests:   Consult discharge planning  Consult PT/OT  ?   This is a late note on patient seen by me earlier today.   Electronically signed by Tonia Beavers MD on 11/23/22 at 9:42 PM

## 2022-11-23 NOTE — PROGRESS NOTES
Order obtained for extubation. SpO2 of 98% on 30% FiO2. Patient extubated and placed on room air. Post extubation SpO2 is 97% with HR  130 bpm and RR 20 breaths/min. Patient had strong cough that was productive of thick, clear yellow sputum. Extubation was well tolerated by the patient.    Breath Sounds: clear throughout    [facility]   1:37PM

## 2022-11-23 NOTE — PLAN OF CARE
Problem: Discharge Planning  Goal: Discharge to home or other facility with appropriate resources  Outcome: Progressing  Flowsheets (Taken 11/22/2022 7376)  Discharge to home or other facility with appropriate resources: Refer to discharge planning if patient needs post-hospital services based on physician order or complex needs related to functional status, cognitive ability or social support system

## 2022-11-23 NOTE — PROGRESS NOTES
Vancomycin Dosing by Pharmacy - Daily Note   Vancomycin Therapy Day:  2  Indication: sepsis    Allergies:  Patient has no known allergies. Actual Weight:    Wt Readings from Last 1 Encounters:   11/22/22 185 lb 13.6 oz (84.3 kg)       Labs/Ancillary Data  Estimated Creatinine Clearance: 227 mL/min (based on SCr of 0.4 mg/dL). Recent Labs     11/22/22  1302 11/23/22  0411 11/23/22  0500   CREATININE <0.40* <0.40*  --    BUN 12 9  --    WBC 20.5*  --  15.6*     Procalcitonin   Date Value Ref Range Status   08/29/2022 0.20 (H) <0.09 ng/mL Final     Comment:           Suspected Sepsis:  <0.50 ng/mL     Low likelihood of sepsis. 0.50-2.00 ng/mL     Increased likelihood of sepsis. Antibiotics encouraged. >2.00 ng/mL     High risk of sepsis/shock. Antibiotics strongly encouraged. Suspected Lower Resp Tract Infections:  <0.24 ng/mL     Low likelihood of bacterial infection. >0.24 ng/mL     Increased likelihood of bacterial infection. Antibiotics encouraged. With successful antibiotic therapy, PCT levels should decrease rapidly. (Half-life of 24 to   36 hours.)        Procalcitonin values from samples collected within the first 6 hours of systemic infection   may still be low. Retesting may be indicated. Values from day 1 and day 4 can be entered into the Change in Procalcitonin Calculator   (www.[facility]-pct-calculator. WUT) to determine the patient's Mortality Risk Prognosis        In healthy neonates, plasma Procalcitonin (PCT) concentrations increase gradually after   birth, reaching peak values at about 24 hours of age then decrease to normal values below   0.5 ng/mL by 48-72 hours of age.          Intake/Output Summary (Last 24 hours) at 11/23/2022 1621  Last data filed at 11/23/2022 7174  Gross per 24 hour   Intake 277.77 ml   Output 2500 ml   Net -2222.23 ml     Temp: 97.8 F    Culture Date / Source  /  Results  See micro  Recent vancomycin administrations                     vancomycin (VANCOCIN) 1,250 mg in dextrose 5 % 250 mL IVPB (ADDAVIAL) (mg) 1,250 mg New Bag 11/23/22 0523    vancomycin (VANCOCIN) 2,250 mg in dextrose 5 % 500 mL IVPB (mg) 2,250 mg New Bag 11/22/22 1808                    Vancomycin Concentrations:   TROUGH:  No results for input(s): VANCOTROUGH in the last 72 hours. RANDOM:    Recent Labs     11/23/22  1522   VANCORANDOM 14.3       MRSA Nasal Swab: N/A. Non-respiratory infection. Pink Mikey PLAN     Continue current dose of 1250 mg q12h IV  Ensured BUN/sCr ordered at baseline and every 48 hours x at least 3 levels, then at least weekly. Pharmacy will continue to monitor patient and adjust therapy as indicated      Vancomycin Target Concentration Parameters  Treatment  Population Target AUC/NIK Target Trough   Invasive MRSA Infection (bacteremia, pneumonia, meningitis, endocarditis, osteomyelitis)  Sepsis (undifferentiated) 400-600 N/A   Infection due to non-MRSA pathogen  Empiric treatment of non-invasive MRSA infection  (SSTI, UTI) <500 10-15 mg/L   CrCl < 29 mL/min  Rapidly fluctuating serum creatinine   KRYSTA N/A < 15 mg/L     Renal replacement therapy is dosed by levels, per hospital protocol. Abbreviations  * Pauc: probability that AUC is >400 (efficacy); Pconc: probability that Ctrough is above 20 ?g/mL (toxicity); Tox: Probability of nephrotoxicity, based on Kade et al. Clin Infect Dis 2009. Loading dose: N/A  Regimen: 1250 mg IV every 12 hours. Start time: 17:23 on 11/23/2022  Exposure target: AUC24 (range)400-600 mg/L.hr   AUC24,ss: 470 mg/L.hr  Probability of AUC24 > 400: 79 %  Ctrough,ss: 13.8 mg/L  Probability of Ctrough,ss > 20: 10 %  Probability of nephrotoxicity (Kade IVANNA 2009): 9 %    Thank you for the consult. Pharmacy will continue to follow.   [facility]    11/23/2022   4:25 PM

## 2022-11-23 NOTE — PLAN OF CARE
Problem: Discharge Planning  Goal: Discharge to home or other facility with appropriate resources  11/23/2022 0545 by Francisco Halsted, RN  Outcome: Progressing  11/22/2022 1904 by Burgess Todd RN  Outcome: Progressing  Flowsheets (Taken 11/22/2022 1745)  Discharge to home or other facility with appropriate resources: Refer to discharge planning if patient needs post-hospital services based on physician order or complex needs related to functional status, cognitive ability or social support system     Problem: Pain  Goal: Verbalizes/displays adequate comfort level or baseline comfort level  11/23/2022 0545 by Francisco Halsted, RN  Outcome: Progressing  11/22/2022 1904 by Burgess Todd RN  Outcome: Progressing  Flowsheets (Taken 11/22/2022 1745)  Verbalizes/displays adequate comfort level or baseline comfort level:   Encourage patient to monitor pain and request assistance   Assess pain using appropriate pain scale   Administer analgesics based on type and severity of pain and evaluate response     Problem: Safety - Medical Restraint  Goal: Remains free of injury from restraints (Restraint for Interference with Medical Device)  Description: INTERVENTIONS:  1. Determine that other, less restrictive measures have been tried or would not be effective before applying the restraint  2. Evaluate the patient's condition at the time of restraint application  3. Inform patient/family regarding the reason for restraint  4.  Q2H: Monitor safety, psychosocial status, comfort, nutrition and hydration  Outcome: Progressing  Flowsheets  Taken 11/22/2022 1800 by Burgess Todd RN  Remains free of injury from restraints (restraint for interference with medical device):   Determine that other, less restrictive measures have been tried or would not be effective before applying the restraint   Evaluate the patient's condition at the time of restraint application  Taken 55/90/1114 1700 by Burgess Brooks, [address] free of injury from restraints (restraint for interference with medical device):   Determine that other, less restrictive measures have been tried or would not be effective before applying the restraint   Evaluate the patient's condition at the time of restraint application     Problem: Skin/Tissue Integrity  Goal: Absence of new skin breakdown  Description: 1. Monitor for areas of redness and/or skin breakdown  2. Assess vascular access sites hourly  3. Every 4-6 hours minimum:  Change oxygen saturation probe site  4. Every 4-6 hours:  If on nasal continuous positive airway pressure, respiratory therapy assess nares and determine need for appliance change or resting period.   Outcome: Progressing     Problem: Safety - Adult  Goal: Free from fall injury  Outcome: Progressing     Problem: ABCDS Injury Assessment  Goal: Absence of physical injury  Outcome: Progressing

## 2022-11-24 LAB
ABSOLUTE EOS #: 0 K/UL (ref 0–0.4)
ABSOLUTE LYMPH #: 0.23 K/UL (ref 1–4.8)
ABSOLUTE MONO #: 0.17 K/UL (ref 0.1–1.3)
ANION GAP SERPL CALCULATED.3IONS-SCNC: 7 MMOL/L (ref 9–17)
BASOPHILS # BLD: 0 % (ref 0–2)
BASOPHILS ABSOLUTE: 0 K/UL (ref 0–0.2)
BUN BLDV-MCNC: 15 MG/DL (ref 6–20)
CALCIUM SERPL-MCNC: 9.2 MG/DL (ref 8.6–10.4)
CHLORIDE BLD-SCNC: 97 MMOL/L (ref 98–107)
CO2: 35 MMOL/L (ref 20–31)
CREAT SERPL-MCNC: <0.4 MG/DL (ref 0.7–1.2)
EOSINOPHILS RELATIVE PERCENT: 0 % (ref 0–4)
GFR SERPL CREATININE-BSD FRML MDRD: ABNORMAL ML/MIN/1.73M2
GLUCOSE BLD-MCNC: 148 MG/DL (ref 70–99)
HCT VFR BLD CALC: 30 % (ref 41–53)
HEMOGLOBIN: 10.1 G/DL (ref 13.5–17.5)
LYMPHOCYTES # BLD: 4 % (ref 24–44)
MAGNESIUM: 1.9 MG/DL (ref 1.6–2.6)
MCH RBC QN AUTO: 37.7 PG (ref 26–34)
MCHC RBC AUTO-ENTMCNC: 33.6 G/DL (ref 31–37)
MCV RBC AUTO: 112.3 FL (ref 80–100)
MONOCYTES # BLD: 3 % (ref 1–7)
MORPHOLOGY: ABNORMAL
PDW BLD-RTO: 15.8 % (ref 11.5–14.9)
PLATELET # BLD: 104 K/UL (ref 150–450)
PMV BLD AUTO: 8.4 FL (ref 6–12)
POTASSIUM SERPL-SCNC: 3.5 MMOL/L (ref 3.7–5.3)
RBC # BLD: 2.67 M/UL (ref 4.5–5.9)
SEG NEUTROPHILS: 93 % (ref 36–66)
SEGMENTED NEUTROPHILS ABSOLUTE COUNT: 5.3 K/UL (ref 1.3–9.1)
SODIUM BLD-SCNC: 139 MMOL/L (ref 135–144)
WBC # BLD: 5.7 K/UL (ref 3.5–11)

## 2022-11-24 PROCEDURE — 83735 ASSAY OF MAGNESIUM: CPT

## 2022-11-24 PROCEDURE — 36415 COLL VENOUS BLD VENIPUNCTURE: CPT

## 2022-11-24 PROCEDURE — 94761 N-INVAS EAR/PLS OXIMETRY MLT: CPT

## 2022-11-24 PROCEDURE — 6360000002 HC RX W HCPCS: Performed by: EMERGENCY MEDICINE

## 2022-11-24 PROCEDURE — 94640 AIRWAY INHALATION TREATMENT: CPT

## 2022-11-24 PROCEDURE — 6370000000 HC RX 637 (ALT 250 FOR IP): Performed by: INTERNAL MEDICINE

## 2022-11-24 PROCEDURE — 2060000000 HC ICU INTERMEDIATE R&B

## 2022-11-24 PROCEDURE — 6360000002 HC RX W HCPCS: Performed by: INTERNAL MEDICINE

## 2022-11-24 PROCEDURE — 2580000003 HC RX 258: Performed by: EMERGENCY MEDICINE

## 2022-11-24 PROCEDURE — 94660 CPAP INITIATION&MGMT: CPT

## 2022-11-24 PROCEDURE — 80048 BASIC METABOLIC PNL TOTAL CA: CPT

## 2022-11-24 PROCEDURE — 2700000000 HC OXYGEN THERAPY PER DAY

## 2022-11-24 PROCEDURE — 85025 COMPLETE CBC W/AUTO DIFF WBC: CPT

## 2022-11-24 PROCEDURE — 2500000003 HC RX 250 WO HCPCS: Performed by: INTERNAL MEDICINE

## 2022-11-24 PROCEDURE — A4216 STERILE WATER/SALINE, 10 ML: HCPCS | Performed by: INTERNAL MEDICINE

## 2022-11-24 PROCEDURE — 2580000003 HC RX 258: Performed by: INTERNAL MEDICINE

## 2022-11-24 RX ORDER — POTASSIUM CHLORIDE 20 MEQ/1
40 TABLET, EXTENDED RELEASE ORAL PRN
Status: DISCONTINUED | OUTPATIENT
Start: 2022-11-24 | End: 2022-12-10 | Stop reason: HOSPADM

## 2022-11-24 RX ORDER — LISINOPRIL 10 MG/1
10 TABLET ORAL DAILY
Status: DISCONTINUED | OUTPATIENT
Start: 2022-11-24 | End: 2022-12-04

## 2022-11-24 RX ORDER — POTASSIUM CHLORIDE 7.45 MG/ML
10 INJECTION INTRAVENOUS PRN
Status: DISCONTINUED | OUTPATIENT
Start: 2022-11-24 | End: 2022-12-10 | Stop reason: HOSPADM

## 2022-11-24 RX ADMIN — SODIUM CHLORIDE, PRESERVATIVE FREE 10 ML: 5 INJECTION INTRAVENOUS at 08:33

## 2022-11-24 RX ADMIN — IPRATROPIUM BROMIDE AND ALBUTEROL SULFATE 1 AMPULE: 2.5; .5 SOLUTION RESPIRATORY (INHALATION) at 15:34

## 2022-11-24 RX ADMIN — VANCOMYCIN HYDROCHLORIDE 1250 MG: 1.25 INJECTION, POWDER, LYOPHILIZED, FOR SOLUTION INTRAVENOUS at 05:38

## 2022-11-24 RX ADMIN — BUDESONIDE 500 MCG: 0.5 SUSPENSION RESPIRATORY (INHALATION) at 07:51

## 2022-11-24 RX ADMIN — IPRATROPIUM BROMIDE AND ALBUTEROL SULFATE 1 AMPULE: 2.5; .5 SOLUTION RESPIRATORY (INHALATION) at 07:50

## 2022-11-24 RX ADMIN — LISINOPRIL 10 MG: 10 TABLET ORAL at 15:31

## 2022-11-24 RX ADMIN — METHYLPREDNISOLONE SODIUM SUCCINATE 40 MG: 40 INJECTION, POWDER, FOR SOLUTION INTRAMUSCULAR; INTRAVENOUS at 04:18

## 2022-11-24 RX ADMIN — CEFEPIME 2000 MG: 2 INJECTION, POWDER, FOR SOLUTION INTRAVENOUS at 17:01

## 2022-11-24 RX ADMIN — IPRATROPIUM BROMIDE AND ALBUTEROL SULFATE 1 AMPULE: 2.5; .5 SOLUTION RESPIRATORY (INHALATION) at 11:34

## 2022-11-24 RX ADMIN — IPRATROPIUM BROMIDE AND ALBUTEROL SULFATE 1 AMPULE: 2.5; .5 SOLUTION RESPIRATORY (INHALATION) at 20:24

## 2022-11-24 RX ADMIN — IPRATROPIUM BROMIDE AND ALBUTEROL SULFATE 1 AMPULE: 2.5; .5 SOLUTION RESPIRATORY (INHALATION) at 04:00

## 2022-11-24 RX ADMIN — POTASSIUM CHLORIDE 40 MEQ: 20 TABLET, EXTENDED RELEASE ORAL at 13:32

## 2022-11-24 RX ADMIN — VANCOMYCIN HYDROCHLORIDE 1250 MG: 1.25 INJECTION, POWDER, LYOPHILIZED, FOR SOLUTION INTRAVENOUS at 23:04

## 2022-11-24 RX ADMIN — METOPROLOL TARTRATE 25 MG: 25 TABLET, FILM COATED ORAL at 20:21

## 2022-11-24 RX ADMIN — SODIUM CHLORIDE, PRESERVATIVE FREE 20 MG: 5 INJECTION INTRAVENOUS at 08:34

## 2022-11-24 RX ADMIN — DEXMEDETOMIDINE HYDROCHLORIDE 0.6 MCG/KG/HR: 400 INJECTION INTRAVENOUS at 04:23

## 2022-11-24 RX ADMIN — METHYLPREDNISOLONE SODIUM SUCCINATE 40 MG: 40 INJECTION, POWDER, FOR SOLUTION INTRAMUSCULAR; INTRAVENOUS at 20:21

## 2022-11-24 RX ADMIN — SODIUM CHLORIDE, PRESERVATIVE FREE 10 ML: 5 INJECTION INTRAVENOUS at 08:34

## 2022-11-24 RX ADMIN — ENOXAPARIN SODIUM 40 MG: 100 INJECTION SUBCUTANEOUS at 08:33

## 2022-11-24 RX ADMIN — METHYLPREDNISOLONE SODIUM SUCCINATE 40 MG: 40 INJECTION, POWDER, FOR SOLUTION INTRAMUSCULAR; INTRAVENOUS at 13:32

## 2022-11-24 RX ADMIN — DILTIAZEM HYDROCHLORIDE 240 MG: 60 CAPSULE, EXTENDED RELEASE ORAL at 16:55

## 2022-11-24 RX ADMIN — IPRATROPIUM BROMIDE AND ALBUTEROL SULFATE 1 AMPULE: 2.5; .5 SOLUTION RESPIRATORY (INHALATION) at 23:21

## 2022-11-24 RX ADMIN — CEFEPIME 2000 MG: 2 INJECTION, POWDER, FOR SOLUTION INTRAVENOUS at 04:20

## 2022-11-24 ASSESSMENT — PAIN SCALES - GENERAL
PAINLEVEL_OUTOF10: 0

## 2022-11-24 NOTE — PROGRESS NOTES
: absent  Lower Extremities: no cyanosis, mottling; edema : absent    Current Laboratory, Radiologic, Microbiologic, and Diagnostic studies reviewed  Data ReviewCBC:   Recent Labs     11/22/22  1302 11/23/22  0500 11/24/22  0500   WBC 20.5* 15.6* 5.7   RBC 3.06* 3.00* 2.67*   HGB 11.1* 11.0* 10.1*   HCT 34.5* 32.8* 30.0*    103* 104*     BMP:   Recent Labs     11/22/22  1302 11/23/22  0411 11/24/22  0500   GLUCOSE 145* 103* 148*   * 134* 139   K 4.0 3.7 3.5*   BUN 12 9 15   CREATININE <0.40* <0.40* <0.40*   CALCIUM 9.0 8.9 9.2     ABGs:   Recent Labs     11/22/22  1443 11/22/22  2053 11/23/22  0441   PHART 7.403 7.511* 7.455*   PO2ART 70.9* 85.2 74.1*   HEM3NQZ 51.7* 43.9 50.6*   EDQ9CKW 32.2* 35.1* 35.5*   K1XAGRKK 93.4* 95.8 93.7*      PT/INR:  No results found for: PTINR    ASSESSMENT / PLAN:    Acute hypoxic respiratory failure    Mechanical ventilation - extubated 11/23/22  PNA - HCAP/ ABx  Hypotension - resoled  COPD    BD, steroid  Suspected lung CA    Has been declining Biopsy  Alcohol use - beer with meals  DW patient, family  OK fr transfer out of ICU    Electronically signed by Magnus James MD on 11/24/22 at 11:43 AM.

## 2022-11-24 NOTE — PROGRESS NOTES
BRONCHOSPASM/BRONCHOCONSTRICTION     [x]         IMPROVE AERATION/BREATH SOUNDS  [x]   ADMINISTER BRONCHODILATOR THERAPY AS APPROPRIATE  [x]   ASSESS BREATH SOUNDS  []   IMPLEMENT AEROSOL/MDI PROTOCOL  [x]   PATIENT EDUCATION AS NEEDED   PROVIDE ADEQUATE OXYGENATION WITH ACCEPTABLE SP02/ABG'S    [x]  IDENTIFY APPROPRIATE OXYGEN THERAPY  [x]   MONITOR SP02/ABG'S AS NEEDED   [x]   PATIENT EDUCATION AS NEEDED   NONINVASIVE VENTILATION    PROVIDE OPTIMAL VENTILATION/ACCEPTABLE SPO2   IMPLEMENT NONINVASIVE VENTILATION PROTOCOL   MAINTAIN ACCEPTABLE SPO2   ASSESS SKIN INTEGRITY/BREAKDOWN SCORE   PATIENT EDUCATION AS NEEDED   BIPAP AS NEEDED       Pt currently on 2lnc current SpO2 98% diminished breath sounds throughout with the exception of upper left diminished expiratory wheezing.  Pt refused bipap overnight pt in no distress at this time

## 2022-11-24 NOTE — PROGRESS NOTES
11/24/22 1102   Encounter Summary   Encounter Overview/Reason  Initial Encounter   Service Provided For: Patient   Referral/Consult From: Palliative Care   Last Encounter  11/24/22   Complexity of Encounter Low   Encounter    Type Initial Screen/Assessment   Palliative Care   Type Palliative Care, Initial/Spiritual Assessment   Assessment/Intervention/Outcome   Assessment Unable to assess  (sleeping)   Intervention Prayer (assurance of)/[place]

## 2022-11-24 NOTE — PROGRESS NOTES
Vancomycin Dosing by Pharmacy - Daily Note   Vancomycin Therapy Day:  3  Indication: sepsis    Allergies:  Patient has no known allergies. Actual Weight:    Wt Readings from Last 1 Encounters:   11/22/22 185 lb 13.6 oz (84.3 kg)       Labs/Ancillary Data  Estimated Creatinine Clearance: 227 mL/min (based on SCr of 0.4 mg/dL). Recent Labs     11/22/22  1302 11/23/22  0411 11/23/22  0500 11/24/22  0500   CREATININE <0.40* <0.40*  --  <0.40*   BUN 12 9  --  15   WBC 20.5*  --  15.6* 5.7     Procalcitonin   Date Value Ref Range Status   08/29/2022 0.20 (H) <0.09 ng/mL Final     Comment:           Suspected Sepsis:  <0.50 ng/mL     Low likelihood of sepsis. 0.50-2.00 ng/mL     Increased likelihood of sepsis. Antibiotics encouraged. >2.00 ng/mL     High risk of sepsis/shock. Antibiotics strongly encouraged. Suspected Lower Resp Tract Infections:  <0.24 ng/mL     Low likelihood of bacterial infection. >0.24 ng/mL     Increased likelihood of bacterial infection. Antibiotics encouraged. With successful antibiotic therapy, PCT levels should decrease rapidly. (Half-life of 24 to   36 hours.)        Procalcitonin values from samples collected within the first 6 hours of systemic infection   may still be low. Retesting may be indicated. Values from day 1 and day 4 can be entered into the Change in Procalcitonin Calculator   (www[facility]. Quarterly) to determine the patient's Mortality Risk Prognosis        In healthy neonates, plasma Procalcitonin (PCT) concentrations increase gradually after   birth, reaching peak values at about 24 hours of age then decrease to normal values below   0.5 ng/mL by 48-72 hours of age.          Intake/Output Summary (Last 24 hours) at 11/24/2022 0911  Last data filed at 11/24/2022 0557  Gross per 24 hour   Intake 1379.4 ml   Output 1200 ml   Net 179.4 ml     Temp: 97.6 F    Culture Date / Source  /  Results  See micro  Recent vancomycin administrations vancomycin (VANCOCIN) 1,250 mg in dextrose 5 % 250 mL IVPB (ADDAVIAL) (mg) 1,250 mg New Bag 11/24/22 0538     1,250 mg New Bag 11/23/22 1757     1,250 mg New Bag  0523    vancomycin (VANCOCIN) 2,250 mg in dextrose 5 % 500 mL IVPB (mg) 2,250 mg New Bag 11/22/22 1808                    Vancomycin Concentrations:   TROUGH:  No results for input(s): VANCOTROUGH in the last 72 hours. RANDOM:    Recent Labs     11/23/22  1522   VANCORANDOM 14.3       MRSA Nasal Swab: N/A. Non-respiratory infection. Tanda Bun PLAN     Continue current dose of 1250 mg q12h IV  Ensured BUN/sCr ordered at baseline and every 48 hours x at least 3 levels, then at least weekly. Repeat vancomycin concentration ordered for TBD. Pharmacy will continue to monitor patient and adjust therapy as indicated      Vancomycin Target Concentration Parameters  Treatment  Population Target AUC/NIK Target Trough   Invasive MRSA Infection (bacteremia, pneumonia, meningitis, endocarditis, osteomyelitis)  Sepsis (undifferentiated) 400-600 N/A   Infection due to non-MRSA pathogen  Empiric treatment of non-invasive MRSA infection  (SSTI, UTI) <500 10-15 mg/L   CrCl < 29 mL/min  Rapidly fluctuating serum creatinine   KRYSTA N/A < 15 mg/L     Renal replacement therapy is dosed by levels, per hospital protocol. Abbreviations  * Pauc: probability that AUC is >400 (efficacy); Pconc: probability that Ctrough is above 20 ?g/mL (toxicity); Tox: Probability of nephrotoxicity, based on Kade et al. Clin Infect Dis 2009. Loading dose: N/A  Regimen: 1250 mg IV every 12 hours. Start time: 17:38 on 11/24/2022  Exposure target: AUC24 (range)400-600 mg/L.hr   AUC24,ss: 471 mg/L.hr  Probability of AUC24 > 400: 79 %  Ctrough,ss: 13.8 mg/L  Probability of Ctrough,ss > 20: 11 %  Probability of nephrotoxicity (Kade IVANNA 2009): 9 %    Thank you for the consult. Pharmacy will continue to follow. Ruby Centeno. Ph.  11/24/2022  9:12 AM

## 2022-11-24 NOTE — PROGRESS NOTES
Patients -130's patient calm sitting in bed. Dr. Gold Bellamy notified.        Orders received to resume patients  lopressor 25 2 x daily Cardizem 240 daily  and lisinopril 10mg daily

## 2022-11-25 PROCEDURE — 94640 AIRWAY INHALATION TREATMENT: CPT

## 2022-11-25 PROCEDURE — 6360000002 HC RX W HCPCS: Performed by: INTERNAL MEDICINE

## 2022-11-25 PROCEDURE — 2580000003 HC RX 258: Performed by: EMERGENCY MEDICINE

## 2022-11-25 PROCEDURE — 6370000000 HC RX 637 (ALT 250 FOR IP): Performed by: INTERNAL MEDICINE

## 2022-11-25 PROCEDURE — 6360000002 HC RX W HCPCS: Performed by: EMERGENCY MEDICINE

## 2022-11-25 PROCEDURE — 99222 1ST HOSP IP/OBS MODERATE 55: CPT | Performed by: NURSE PRACTITIONER

## 2022-11-25 PROCEDURE — 2060000000 HC ICU INTERMEDIATE R&B

## 2022-11-25 PROCEDURE — 2700000000 HC OXYGEN THERAPY PER DAY

## 2022-11-25 PROCEDURE — 2580000003 HC RX 258: Performed by: INTERNAL MEDICINE

## 2022-11-25 PROCEDURE — 94761 N-INVAS EAR/PLS OXIMETRY MLT: CPT

## 2022-11-25 RX ORDER — OXYCODONE HYDROCHLORIDE AND ACETAMINOPHEN 5; 325 MG/1; MG/1
1 TABLET ORAL EVERY 4 HOURS PRN
Status: DISCONTINUED | OUTPATIENT
Start: 2022-11-25 | End: 2022-12-10 | Stop reason: HOSPADM

## 2022-11-25 RX ORDER — METHYLPREDNISOLONE SODIUM SUCCINATE 40 MG/ML
40 INJECTION, POWDER, LYOPHILIZED, FOR SOLUTION INTRAMUSCULAR; INTRAVENOUS EVERY 12 HOURS
Status: DISCONTINUED | OUTPATIENT
Start: 2022-11-25 | End: 2022-11-28

## 2022-11-25 RX ADMIN — IPRATROPIUM BROMIDE AND ALBUTEROL SULFATE 1 AMPULE: 2.5; .5 SOLUTION RESPIRATORY (INHALATION) at 08:53

## 2022-11-25 RX ADMIN — LISINOPRIL 10 MG: 10 TABLET ORAL at 10:01

## 2022-11-25 RX ADMIN — IPRATROPIUM BROMIDE AND ALBUTEROL SULFATE 1 AMPULE: 2.5; .5 SOLUTION RESPIRATORY (INHALATION) at 11:59

## 2022-11-25 RX ADMIN — OXYCODONE HYDROCHLORIDE AND ACETAMINOPHEN 1 TABLET: 5; 325 TABLET ORAL at 20:52

## 2022-11-25 RX ADMIN — METHYLPREDNISOLONE SODIUM SUCCINATE 40 MG: 40 INJECTION, POWDER, FOR SOLUTION INTRAMUSCULAR; INTRAVENOUS at 16:39

## 2022-11-25 RX ADMIN — CEFEPIME 2000 MG: 2 INJECTION, POWDER, FOR SOLUTION INTRAVENOUS at 16:41

## 2022-11-25 RX ADMIN — METOPROLOL TARTRATE 25 MG: 25 TABLET, FILM COATED ORAL at 09:45

## 2022-11-25 RX ADMIN — METOPROLOL TARTRATE 25 MG: 25 TABLET, FILM COATED ORAL at 20:52

## 2022-11-25 RX ADMIN — SODIUM CHLORIDE, PRESERVATIVE FREE 10 ML: 5 INJECTION INTRAVENOUS at 20:54

## 2022-11-25 RX ADMIN — ENOXAPARIN SODIUM 40 MG: 100 INJECTION SUBCUTANEOUS at 09:45

## 2022-11-25 RX ADMIN — OXYCODONE HYDROCHLORIDE AND ACETAMINOPHEN 1 TABLET: 5; 325 TABLET ORAL at 15:19

## 2022-11-25 RX ADMIN — IPRATROPIUM BROMIDE AND ALBUTEROL SULFATE 1 AMPULE: 2.5; .5 SOLUTION RESPIRATORY (INHALATION) at 21:04

## 2022-11-25 RX ADMIN — POTASSIUM BICARBONATE 40 MEQ: 782 TABLET, EFFERVESCENT ORAL at 10:03

## 2022-11-25 RX ADMIN — OXYCODONE HYDROCHLORIDE AND ACETAMINOPHEN 1 TABLET: 5; 325 TABLET ORAL at 10:09

## 2022-11-25 RX ADMIN — IPRATROPIUM BROMIDE AND ALBUTEROL SULFATE 1 AMPULE: 2.5; .5 SOLUTION RESPIRATORY (INHALATION) at 16:18

## 2022-11-25 RX ADMIN — CEFEPIME 2000 MG: 2 INJECTION, POWDER, FOR SOLUTION INTRAVENOUS at 04:18

## 2022-11-25 RX ADMIN — BUDESONIDE 500 MCG: 0.5 SUSPENSION RESPIRATORY (INHALATION) at 08:53

## 2022-11-25 RX ADMIN — IPRATROPIUM BROMIDE AND ALBUTEROL SULFATE 1 AMPULE: 2.5; .5 SOLUTION RESPIRATORY (INHALATION) at 03:30

## 2022-11-25 RX ADMIN — METHYLPREDNISOLONE SODIUM SUCCINATE 40 MG: 40 INJECTION, POWDER, FOR SOLUTION INTRAMUSCULAR; INTRAVENOUS at 04:10

## 2022-11-25 RX ADMIN — VANCOMYCIN HYDROCHLORIDE 1250 MG: 1.25 INJECTION, POWDER, LYOPHILIZED, FOR SOLUTION INTRAVENOUS at 12:05

## 2022-11-25 RX ADMIN — VANCOMYCIN HYDROCHLORIDE 1250 MG: 1.25 INJECTION, POWDER, LYOPHILIZED, FOR SOLUTION INTRAVENOUS at 23:53

## 2022-11-25 RX ADMIN — DILTIAZEM HYDROCHLORIDE 240 MG: 60 CAPSULE, EXTENDED RELEASE ORAL at 09:45

## 2022-11-25 ASSESSMENT — PAIN SCALES - GENERAL
PAINLEVEL_OUTOF10: 6
PAINLEVEL_OUTOF10: 8
PAINLEVEL_OUTOF10: 6
PAINLEVEL_OUTOF10: 8
PAINLEVEL_OUTOF10: 8

## 2022-11-25 ASSESSMENT — PAIN DESCRIPTION - DESCRIPTORS
DESCRIPTORS: SHARP
DESCRIPTORS: DISCOMFORT

## 2022-11-25 ASSESSMENT — PAIN DESCRIPTION - ORIENTATION
ORIENTATION: LOWER
ORIENTATION: LEFT

## 2022-11-25 ASSESSMENT — PAIN DESCRIPTION - LOCATION
LOCATION: HAND
LOCATION: BACK

## 2022-11-25 NOTE — FLOWSHEET NOTE
8/10 low back pain and only has tylenol for an active order. Pt states percocet is home med and is effective for him.  May I order it prn?

## 2022-11-25 NOTE — PROGRESS NOTES
BRONCHOSPASM/BRONCHOCONSTRICTION     [x]         IMPROVE AERATION/BREATH SOUNDS  [x]   ADMINISTER BRONCHODILATOR THERAPY AS APPROPRIATE  [x]   ASSESS BREATH SOUNDS  []   IMPLEMENT AEROSOL/MDI PROTOCOL  [x]   PATIENT EDUCATION AS NEEDED    PATIENT REFUSES TO WEAR BIPAP     [x] Risks and benefits explained to patient   [x] Patient refuses to wear Bipap stating \"No, I'm not wearing that machine. \"  [x] Patient verbalizes understanding of information presented.

## 2022-11-25 NOTE — PROGRESS NOTES
Due to priority cases and half day IR Alma Delia Gregorio advised to contact Access Rn for PICC line placement. If IR has the availability to accommodate PICC line placement IR will contact floor later.

## 2022-11-25 NOTE — ACP (ADVANCE CARE PLANNING)
..Advance Care Planning     Advance Care Planning (ACP) Physician/NP/PA Conversation    Date of Conversation: 11/22/2022  Conducted with: Patient with Decision Making Capacity    Healthcare Decision Maker:  No healthcare decision makers have been documented. Click here to complete [address] including selection of the Healthcare Decision Maker Relationship (ie \"Primary\")     Today we documented Decision Maker(s) consistent with Legal Next of Kin hierarchy. Atleast 2 of 3 children are decision makers when patient is unable. Need to add other children's names/numbers if HCPOA is not completed. Care Preferences:    Hospitalization: \"If your health worsens and it becomes clear that your chance of recovery is unlikely, what would be your preference regarding hospitalization? \"  The patient would prefer hospitalization. Ventilation: \"If you were unable to breath on your own and your chance of recovery was unlikely, what would be your preference about the use of a ventilator (breathing machine) if it was available to you? \"  The patient would desire the use of a ventilator. Resuscitation: \"In the event your heart stopped as a result of an underlying serious health condition, would you want attempts made to restart your heart, or would you prefer a natural death? \"  No, do NOT attempt to resuscitate.     treatment goals, benefit/burden of treatment options, ventilation preferences, hospitalization preferences, resuscitation preferences, and hospice care    Conversation Outcomes / Follow-Up Plan:  ACP in process - information provided, considering goals and options  Reviewed DNR/DNI and patient elects DNR order - completed portable DNR form & placed order    Length of Voluntary ACP Conversation in minutes:  <16 minutes (Non-Billable)    Stephanie Boateng, APRN - CNP

## 2022-11-25 NOTE — PROGRESS NOTES
Vancomycin Dosing by Pharmacy - Daily Note   Vancomycin Therapy Day:  4  Indication: sepsis    Allergies:  Patient has no known allergies. Actual Weight:    Wt Readings from Last 1 Encounters:   11/25/22 192 lb 14.4 oz (87.5 kg)       Labs/Ancillary Data  Estimated Creatinine Clearance: 227 mL/min (based on SCr of 0.4 mg/dL). Recent Labs     11/22/22  1302 11/23/22  0411 11/23/22  0500 11/24/22  0500   CREATININE <0.40* <0.40*  --  <0.40*   BUN 12 9  --  15   WBC 20.5*  --  15.6* 5.7     Procalcitonin   Date Value Ref Range Status   08/29/2022 0.20 (H) <0.09 ng/mL Final     Comment:           Suspected Sepsis:  <0.50 ng/mL     Low likelihood of sepsis. 0.50-2.00 ng/mL     Increased likelihood of sepsis. Antibiotics encouraged. >2.00 ng/mL     High risk of sepsis/shock. Antibiotics strongly encouraged. Suspected Lower Resp Tract Infections:  <0.24 ng/mL     Low likelihood of bacterial infection. >0.24 ng/mL     Increased likelihood of bacterial infection. Antibiotics encouraged. With successful antibiotic therapy, PCT levels should decrease rapidly. (Half-life of 24 to   36 hours.)        Procalcitonin values from samples collected within the first 6 hours of systemic infection   may still be low. Retesting may be indicated. Values from day 1 and day 4 can be entered into the Change in Procalcitonin Calculator   ([email or web address]-[email or web address] com) to determine the patient's Mortality Risk Prognosis        In healthy neonates, plasma Procalcitonin (PCT) concentrations increase gradually after   birth, reaching peak values at about 24 hours of age then decrease to normal values below   0.5 ng/mL by 48-72 hours of age.          Intake/Output Summary (Last 24 hours) at 11/25/2022 1213  Last data filed at 11/25/2022 0751  Gross per 24 hour   Intake 960 ml   Output 900 ml   Net 60 ml     Temp: 97.3 F    Culture Date / Source  /  Results  See micro  Recent vancomycin administrations vancomycin (VANCOCIN) 1,250 mg in dextrose 5 % 250 mL IVPB (ADDAVIAL) (mg) 1,250 mg New Bag 11/25/22 1205     1,250 mg New Bag 11/24/22 2304     1,250 mg New Bag  0538     1,250 mg New Bag 11/23/22 1757     1,250 mg New Bag  0523    vancomycin (VANCOCIN) 2,250 mg in dextrose 5 % 500 mL IVPB (mg) 2,250 mg New Bag 11/22/22 1808                    Vancomycin Concentrations:   TROUGH:  No results for input(s): VANCOTROUGH in the last 72 hours. RANDOM:    Recent Labs     11/23/22  1522   VANCORANDOM 14.3       MRSA Nasal Swab: N/A. Non-respiratory infection. Jasbir Ortiz PLAN     Continue current dose of 1250 mg q12h IV  Ensured BUN/sCr ordered at baseline and every 48 hours x at least 3 levels, then at least weekly. Repeat vancomycin concentration ordered for TBD. Pharmacy will continue to monitor patient and adjust therapy as indicated      Vancomycin Target Concentration Parameters  Treatment  Population Target AUC/NIK Target Trough   Invasive MRSA Infection (bacteremia, pneumonia, meningitis, endocarditis, osteomyelitis)  Sepsis (undifferentiated) 400-600 N/A   Infection due to non-MRSA pathogen  Empiric treatment of non-invasive MRSA infection  (SSTI, UTI) <500 10-15 mg/L   CrCl < 29 mL/min  Rapidly fluctuating serum creatinine   KRYSTA N/A < 15 mg/L     Renal replacement therapy is dosed by levels, per hospital protocol. Abbreviations  * Pauc: probability that AUC is >400 (efficacy); Pconc: probability that Ctrough is above 20 ?g/mL (toxicity); Tox: Probability of nephrotoxicity, based on Lodise et al. Clin Infect Dis 2009. Loading dose: N/A  Regimen: 1250 mg IV every 12 hours. Start time: 00:05 on 11/26/2022  Exposure target: AUC24 (range)400-600 mg/L.hr   AUC24,ss: 473 mg/L.hr  Probability of AUC24 > 400: 80 %  Ctrough,ss: 14 mg/L  Probability of Ctrough,ss > 20: 11 %    Probability of nephrotoxicity (Lodise IVANNA 2009): 9 %      Thank you for the consult. Pharmacy will continue to follow. Luci Valles Phy, Debbie  11/25/2022  12:15 PM

## 2022-11-25 NOTE — CONSULTS
Palliative Care Inpatient Consult    NAME:  Archie Mims  MEDICAL RECORD NUMBER:  [id number]  AGE: 61 y.o. GENDER: male  : 1965  TODAY'S DATE:  2022    Reasons for Consultation:    Symptom and/or pain management  Provision of information regarding PC and/or hospice philosophies  Complex, time-intensive communication and interdisciplinary psychosocial support  Clarification of goals of care and/or assistance with difficult decision-making  Guidance in regards to resources and transition(s)    Members of PC team contributing to this consultation are : Anahi Tsai, Palliative Care APRN-CNP    Plan      Palliative Interaction: I went to see patient and he was sitting up in bed awake with son/daughter present. I explained the palliative role to them. I discussed patients chronic history including COPD not on home 02, ETOH abuse- drinks beer daily, and new Dx of lung mass. Patient reports I also discussed hospitalized problems with patient in detail. I asked about goals, and patient reports speaking to Oncologist and agreeing to Bx here in the hospital. He reports that pulmonologist reported that Bronchoscopy would need to be done and patient reports being agreeable to vent. Patient reports that he likely wants to figure out prognosis with cancer, but states he has talked to pulmonologist and reports 1-2 years with treatment. I discussed the differences in palliative care vs hospice care. Patient is unsure what hospice was consulted in rehab. He is no longer wanting hospice at this time as he wants to go through workup. We discussed palliative care options outpatient and brochures were given for each. I discussed patients ETOH abuse and smoking and encouraged cessation. We discussed that if he pursues treatment, patient do better with cessation. I discussed patient code status and explained each level. I gave handout on this.  Patient reports wanting everything done until hearts stops then nothing more. He elects for [facility] with intubation and signed DNR form today. I discussed HCPOA and patient reports wanting daughter but reports that his sister told him that she is too young. Daughter is 21years of age and is able to be primary agent if he elects. We discussed the important's of him expressing wishes and her honoring these as things change in his health. Patient would like time to discuss. Will consult spiritual care to follow up. Patient was given blank copy. I offered patient and family support. Daughter reports that patient will likely do Bx and seek treatment. Unsure of DC plan as patient was not happy with [facility]. He reports that this hospitalization could have been avoided if he was given his meds when needed. Education/support to staff  Education/support to family  Education/support to patient  Discharge planning/helping to coordinate care  Communications with primary service  Providing support for coping/adaptation/distress of family  Providing support for coping/adaptation/distress of patient  Substance abuse issues  Discussing meaning/purpose   Decision making regarding life prolonging treatment  Decisional capacity assessed  Continue with current plan of care  Clarification of medical condition to patient and family  Code status clarified: [facility]  Palliative care orders introduced  Provided information about hospice  Validating patient/family distress  Continued communication updates  Recognizing, reflecting, and empathizing with the patient's anticipatory grief  Principle Problem/Diagnosis:  Respiratory failure ([address]    Additional Assessments:   Principal Problem:    Respiratory failure ([address]  Resolved Problems:    * No resolved hospital problems. *    1- Symptom management/ pain control     Pain Assessment:  The patient is not having any pain.                Anxiety:  fatigue, shortness of breath                          Dyspnea:  acute dyspnea and chronic dyspnea                          Fatigue:  exercise intolerance    Other: We feel the patient symptoms are being controlled. his current regimen is reviewed by myself and discussed with the staff. 2- Goals of care evaluation   The patient goals of care are improve or maintain function/quality of life, remain at home, and support for family/caregiver   Goals of care discussed with:    [] Patient independently    [x] Patient and Family    [] Family or Healthcare DPOA independently    [] Unable to discuss with patient, family/DPOA not present    3- Code Status  DNRCC-A    4- Other recommendations   - We will continue to provide comfort and support to the patient and the family  Please call with any palliative questions or concerns. Palliative Care Team is available via perfect serve or via phone. Palliative Care will continue to follow Mr. Orozco Perdido care as needed. History of Present Illness     The patient is a 61 y.o. Non- / non  male who presents with Respiratory Distress      Referred to Palliative Care by   [x] Physician   [] Nursing  [] Family Request   [] Other:       He was admitted to the primary service for Respiratory failure ([facility] [J96.90]  Septic shock ([address] [A41.9, R65.21]  Acute on chronic respiratory failure with hypoxia ([address] [J96.21]. His hospital course has been associated with Respiratory failure ([address] septic shock, ETOH abuse, and suspected lung cancer. The patient has a complicated medical history and has been hospitalized since 11/22/2022 12:17 PM. I reviewed patients EMR, spoke to RN, and patient/family to collect history. Patient has history of Avascular necrosis of femoral head, stage 3 COPD, HTN, ETOH abuse, HTN, and mass of left lung. Patient presented [facility] the ED from [facility] with complaints of difficulty breathing. Per EMS, patient is responsive to verbal stimuli which is his baseline. He was recently DC from [facility] after admission requiring intubation. Difficulty breathing started on day of arrival. Per EMS, he is in hospice but does not have a signed DNR form. He declined previous Bx of lung mas. Patient was on non rebreather when arrived and minimally responsive. Patients respiration were in the upper 30's and respirations were shallow. Patient was started on Bipap. Family had decided to pursue aggressive measures and patient was intubated and pressor support was required. Patient follows outpatient with Dr. Chico Cervantes. Patients admitting pertinent labs included; pH 7.179, pC02 98, HC03 36, Wbc 20.5, Hgb 11.1, troponin 25-29, and lactic 2.3. CXR revealed a lingular mass appears to have increased in size since prior exam and suspicious for malignancy. New right basilar opacities may represent infection or atelectasis. Patient is a full code. Palliative care consulted for goals, code status, and support. Patient was extubated on the .      Active Hospital Problems    Diagnosis Date Noted    Respiratory failure [facility]) [J96.90] 2022     Priority: Medium       PAST MEDICAL HISTORY      Diagnosis Date    Alcohol abuse     hx of alcoholism; States he drinks 5 beers per day; pt has D/T's    Avascular necrosis of femoral head ([facility] [facility] 2014    Overview:  S/p bilateral hip replacements    History of hip replacement     left/right    Hypertension     Mass of left lung     Rib fracture     Scabies     Stage 3 severe COPD by GOLD classification ([facility] [address] 2021    Tachycardia 10/28/2020    does not follow with Cardiology       PAST SURGICAL HISTORY  Past Surgical History:   Procedure Laterality Date    NECK SURGERY      TOTAL HIP ARTHROPLASTY Bilateral     VASECTOMY         SOCIAL HISTORY  Social History     Tobacco Use    Smoking status: Former     Packs/day: 1.00     Years: 44.00     Pack years: 44.00     Types: Cigarettes     Start date:      Quit date: 2022     Years since quittin.2    Smokeless tobacco: Never    Tobacco comments:     pt educated on both alcohol and smoking cessation   Vaping Use    Vaping Use: Never used   Substance Use Topics    Alcohol use: Yes     Alcohol/week: 35.0 standard drinks     Types: 35 Cans of beer per week     Comment: hx of alcoholism; States he drinks 5 beers per day; pt has D/T's    Drug use: Not Currently     Types: Cocaine, Marijuana (Weed)     Comment: Quit cocaine at age 24, quit marijuana at age 24       FAMILY HISTORY  . Echo Hawkins Family History   Problem Relation Age of Onset    Other Mother         mental health         ALLERGIES  No Known Allergies      MEDICATIONS  Current Medications    metoprolol tartrate  25 mg Oral BID    lisinopril  10 mg Oral Daily    dilTIAZem  240 mg Oral Daily    sodium chloride flush  5-40 mL IntraVENous 2 times per day    vancomycin (VANCOCIN) intermittent dosing (placeholder)   Other RX Placeholder    sodium chloride flush  5-40 mL IntraVENous 2 times per day    enoxaparin  40 mg SubCUTAneous Daily    vancomycin  1,250 mg IntraVENous Q12H    cefepime  2,000 mg IntraVENous Q12H    methylPREDNISolone  40 mg IntraVENous Q8H    ipratropium-albuterol  1 ampule Inhalation Q4H    budesonide  0.5 mg Nebulization BID     potassium chloride **OR** potassium alternative oral replacement **OR** potassium chloride, metoprolol, sodium chloride flush, sodium chloride, ipratropium-albuterol, sodium chloride flush, sodium chloride, acetaminophen, ondansetron **OR** ondansetron  IV Drips/Infusions   sodium chloride      sodium chloride       Home Medications  No current facility-administered medications on file prior to encounter.      Current Outpatient Medications on File Prior to Encounter   Medication Sig Dispense Refill    acetaminophen (TYLENOL) 325 MG tablet Take 650 mg by mouth every 4 hours as needed for Pain or Fever      cefUROXime (CEFTIN) 500 MG tablet Take 500 mg by mouth 2 times daily For 7 days starting 11/22/22      glucose (GLUTOSE) 40 % GEL Take 15 g by mouth as needed (BG <70) guaiFENesin (MUCINEX) 600 MG extended release tablet Take 600 mg by mouth 2 times daily      isosorbide mononitrate (IMDUR) 30 MG extended release tablet Take 30 mg by mouth daily      LORazepam (ATIVAN) 0.5 MG tablet Take 0.5 mg by mouth every 4 hours as needed for Anxiety (or respiratory discomfort). metoprolol tartrate (LOPRESSOR) 25 MG tablet Take 25 mg by mouth 2 times daily      morphine sulfate 20 MG/ML concentrated oral solution Take 5 mg by mouth every hour as needed for Pain (or respiratory discomfort).       nicotine (NICODERM CQ) 21 MG/24HR Place 1 patch onto the skin every 24 hours      omeprazole (PRILOSEC) 40 MG delayed release capsule Take 40 mg by mouth daily      potassium chloride (KLOR-CON M) 20 MEQ extended release tablet Take 20 mEq by mouth 2 times daily      predniSONE (DELTASONE) 20 MG tablet Take 20 mg by mouth daily      budesonide (PULMICORT) 0.5 MG/2ML nebulizer suspension Take 1 ampule by nebulization 2 times daily      senna (SENOKOT) 8.6 MG tablet Take 1 tablet by mouth 2 times daily as needed for Constipation      sennosides-docusate sodium (SENOKOT-S) 8.6-50 MG tablet Take 2 tablets by mouth in the morning and at bedtime      lisinopril (PRINIVIL;ZESTRIL) 10 MG tablet Take 1 tablet by mouth daily 90 tablet 0    dilTIAZem (CARDIZEM 12 HR) 120 MG extended release capsule Take 240 mg by mouth daily      hydrOXYzine HCl (ATARAX) 25 MG tablet Take 1 tablet by mouth at bedtime 30 tablet 1    TRELEGY ELLIPTA 200-62.5-25 MCG/INH AEPB INHALE 1 PUFF INTO THE LUNGS DAILY 1 each 2    albuterol sulfate HFA (PROAIR HFA) 108 (90 Base) MCG/ACT inhaler Inhale 2 puffs into the lungs every 6 hours as needed for Wheezing 1 each 1         Data         /66   Pulse (!) 104   Temp 97.3 °F (36.3 °C) (Oral)   Resp 20   Ht 6' 1\" (1.854 m)   Wt 192 lb 14.4 oz (87.5 kg)   SpO2 94%   BMI 25.45 kg/m²     Wt Readings from Last 3 Encounters:   11/25/22 192 lb 14.4 oz (87.5 kg)   09/26/22 199 lb 12.8 oz (90.6 kg)   09/17/22 194 lb 3.6 oz (88.1 kg)        Lab Results   Component Value Date    WBC 5.7 11/24/2022    HGB 10.1 (L) 11/24/2022    HCT 30.0 (L) 11/24/2022    .3 (H) 11/24/2022     (L) 11/24/2022    ,   Lab Results   Component Value Date/Time     11/24/2022 05:00 AM    K 3.5 11/24/2022 05:00 AM    CL 97 11/24/2022 05:00 AM    CO2 35 11/24/2022 05:00 AM    BUN 15 11/24/2022 05:00 AM    CREATININE <0.40 11/24/2022 05:00 AM    GLUCOSE 148 11/24/2022 05:00 AM    CALCIUM 9.2 11/24/2022 05:00 AM    ,   Lab Results   Component Value Date    INR 0.9 09/13/2022    INR 1.0 08/29/2022    INR 0.9 08/02/2022    PROTIME 12.1 09/13/2022    PROTIME 12.8 08/29/2022    PROTIME 12.5 08/02/2022   , .   Lab Results   Component Value Date    ALT 67 (H) 09/17/2022    AST 60 (H) 09/17/2022    ALKPHOS 103 09/17/2022    BILITOT 0.8 09/17/2022   , No results found for: CKTOTAL, CKMB, CKMBINDEX, TROPONINI,   Lab Results   Component Value Date/Time    COLORU Dark Yellow 08/29/2022 05:28 PM    NITRU POSITIVE 08/29/2022 05:28 PM    GLUCOSEU NEGATIVE 08/29/2022 05:28 PM    KETUA SMALL 08/29/2022 05:28 PM    UROBILINOGEN ELEVATED 08/29/2022 05:28 PM    BILIRUBINUR NEGATIVE 08/29/2022 05:28 PM   , No results found for: AMYLASE, No results found for: LIPASE,   Lab Results   Component Value Date    DDIMER 4.89 (H) 08/29/2022   , No results found for: BNP, No results found for: CEA, No results found for: , No results found for: AFPAMN,   Lab Results   Component Value Date    LACTA 2.3 (H) 11/22/2022   ,     CT Result (most recent):  CT HEAD WO CONTRAST 11/22/2022    Narrative  EXAMINATION:  CT OF THE HEAD WITHOUT CONTRAST  11/22/2022 2:59 pm    TECHNIQUE:  CT of the head was performed without the administration of intravenous  contrast. Automated exposure control, iterative reconstruction, and/or weight  based adjustment of the mA/kV was utilized to reduce the radiation dose to as  low as reasonably achievable. COMPARISON:  None. HISTORY:  ORDERING SYSTEM PROVIDED HISTORY: altered  TECHNOLOGIST PROVIDED HISTORY:    altered  Decision Support Exception - unselect if not a suspected or confirmed  emergency medical condition->Emergency Medical Condition (MA)  Reason for Exam: AMS, respiratory distress    FINDINGS:  There is no acute infarction, intracranial hemorrhage or intracranial mass  lesion. No mass effect, midline shift or extra-axial collection is noted. There are moderate nonspecific foci of periventricular and subcortical  cerebral white matter hypodensity, most likely representing chronic  microangiopathic disease in this age group. The brain parenchyma is otherwise  normal. The cerebellar tonsils are in normal position. The ventricles, sulci, and cisterns are mildly prominent suggestive of mild  generalized volume loss. The globes and orbits are within normal limits. The visualized extracranial  structures including paranasal sinuses and mastoid air cells are  unremarkable. No fracture is identified. Impression  No evidence for acute intracranial hemorrhage, territorial infarction or  intracranial mass lesion. Moderate chronic microangiopathic ischemic disease. Mild generalized volume loss. Xray Result (most recent):  XR CHEST PORTABLE 11/23/2022    Narrative  EXAMINATION:  ONE XRAY VIEW OF THE CHEST    11/23/2022 6:06 am    COMPARISON:  11/22/2022    HISTORY:  ORDERING SYSTEM PROVIDED HISTORY: resp failure, on vent  TECHNOLOGIST PROVIDED HISTORY:  resp failure, on vent  Reason for Exam: on vent    FINDINGS:  Endotracheal tube and enteric tube remain in place. Heart size is within  normal limits. Masslike opacity persists at the left base. Mild patchy  opacities at the right base are mildly improved. No new airspace  consolidation. No evidence of pneumothorax or sizable pleural effusion. Impression  1. Unchanged masslike opacities at the left base.     2.  Mildly improved patchy opacities at the right base. MRI Result (most recent):  No results found for this or any previous visit from the past 3650 days. No results found for this or any previous visit. No results found for this or any previous visit (from the past 4464 hour(s)). Code Status: Full Code     ADVANCED CARE PLANNING:  Patient has capacity for medical decisions: yes  Health Care Power of : no- wants daughter who is 21years of age. Living Will: not asked     Personal, Social, and Family History  Marital Status: single  Living situation:alone- from rehab  Importance of nina/Zoroastrianism/spiritual beliefs: [] Very [] Somewhat [] Not   Psychological Distress: mild  Does patient understand diagnosis/treatment? yes  Does caregiver understand diagnosis/treatment? yes      Assessment        REVIEW OF SYSTEMS  Constitutional: no fever, no chills or weight loss  Eyes: no eye pain or blurred vision  ENT: no hearing loss, congestion, or difficulty swallowing   Respiratory: no wheezing, chest tightness, +shortness of breath   Cardiovascular: no chest pain or pressure, no palpitations, no diaphoresis   Gastrointestinal: no nausea, vomiting, abdominal pain, diarrhea or constipation, no melena   Genitourinary: no dysuria, frequency,hematuria , or nocturia   Musculoskeletal: no myalgias or arthralgias, no back pain +generalized weakness  Skin: no rashes or sores   Neurological: no focal weakness, numbness, tingling, or headache, no seizures    PHYSICAL ASSESSMENT:  Constitutional: Alert and oriented to person, place, and time. Head: Normocephalic and atraumatic. Eyes: EOM are normal. Pupils are equal, round   Neck: Normal range of motion. Neck supple. No tracheal deviation present. Cardiovascular: Normal rate and regular rhythm, S1, S2, no murmur   Pulmonary/Chest: Effort normal and breath sounds normal. No rales or wheezes. +NC  Abdomen: Soft.  No tenderness, not distended, no ascites, no organomegaly   Musculoskeletal: Normal range of motion. No edema lower ext. Neurological: CN II-XII grossly intact, no focal neurological deficits   Skin: Normal turgor, no bleeding, no bruising     Palliative Performance Scale:  ___60%  Ambulation reduced; Significant disease; Can't do hobbies/housework; intake normal or reduced; occasional assist; LOC full/confusion  _x__50%  Mainly sit/lie; Extensive disease; Can't do any work; Considerable assist; intake normal or reduced; LOC full/confusion  ___40%  Mainly in bed; Extensive disease; Mainly assist; intake normal or reduced; LOC full/confusion   ___30%  Bed Bound; Extensive disease; Total care; intake reduced; LOCfull/confusion  ___20%  Bed Bound; Extensive disease; Total care; intake minimal; Drowsy/coma  ___10%  Bed Bound; Extensive disease; Total care; Mouth care only; Drowsy/coma  ___0       Death        Thank you for allowing Palliative Care to participate in the care of Mr. Eliz Rai . This note has been dictated by dragon, typing errors may be a possibility. The total time I spent in seeing the patient, discussing goals of care, advanced directives, code status and other major issues was more than 60 minutes      Electronically signed by   JAEL Lopes - CNP  Palliative Care Team  on 11/25/2022 at 9:20 AM    Jennifer Wilks can be reached via perfect serve.

## 2022-11-25 NOTE — PROGRESS NOTES
IR called and was told physician not here yet, coming from [address] [place] [address] and only here til 5pm. Was informed that it probably won't get done today. Should I wait til 5pm to call access rn and assume that it's not done by your dept or should I call access iv rn now to get it placed and get the ball rolling? Probably now would be better, we have a lot of other precedenting cases.

## 2022-11-25 NOTE — PROGRESS NOTES
PULMONARY PROGRESS NOTE:    REASON FOR VISIT: copd exac, resp failure  Interval History:    Shortness of Breath: ++  Cough: ++  Sputum: no          Hemoptysis: no  Chest Pain: no  Fever: no                   Swelling Feet: no  Headache: no                                           Nausea, Emesis, Abdominal Pain: no  Diarrhea: no         Constipation: no    Events since last visit: cough+; agreeable for biopsy    PAST MEDICAL HISTORY:      Scheduled Meds:   metoprolol tartrate  25 mg Oral BID    lisinopril  10 mg Oral Daily    dilTIAZem  240 mg Oral Daily    sodium chloride flush  5-40 mL IntraVENous 2 times per day    vancomycin (VANCOCIN) intermittent dosing (placeholder)   Other RX Placeholder    sodium chloride flush  5-40 mL IntraVENous 2 times per day    enoxaparin  40 mg SubCUTAneous Daily    vancomycin  1,250 mg IntraVENous Q12H    cefepime  2,000 mg IntraVENous Q12H    methylPREDNISolone  40 mg IntraVENous Q8H    ipratropium-albuterol  1 ampule Inhalation Q4H    budesonide  0.5 mg Nebulization BID     Continuous Infusions:   sodium chloride      sodium chloride       PRN Meds:oxyCODONE-acetaminophen, potassium chloride **OR** potassium alternative oral replacement **OR** potassium chloride, metoprolol, sodium chloride flush, sodium chloride, ipratropium-albuterol, sodium chloride flush, sodium chloride, acetaminophen, ondansetron **OR** ondansetron        PHYSICAL EXAMINATION:  /68   Pulse (!) 114   Temp 97.3 °F (36.3 °C) (Oral)   Resp 20   Ht 6' 1\" (1.854 m)   Wt 192 lb 14.4 oz (87.5 kg)   SpO2 93%   BMI 25.45 kg/m²     General : Awake, alert, O2 3L  Neck - supple, no lymphadenopathy, JVD not raised  Heart - regular rhythm, S1 and S2 normal; no additional sounds heard  Lungs - Air Entry- fair bilaterally; breath sounds : vesicular;   rales/crackles - absent  Abdomen - soft, no tenderness  Upper Extremities  - no cyanosis, mottling; edema : absent  Lower Extremities: no cyanosis, mottling; edema : absent    Current Laboratory, Radiologic, Microbiologic, and Diagnostic studies reviewed  Data ReviewCBC:   Recent Labs     11/22/22  1302 11/23/22  0500 11/24/22  0500   WBC 20.5* 15.6* 5.7   RBC 3.06* 3.00* 2.67*   HGB 11.1* 11.0* 10.1*   HCT 34.5* 32.8* 30.0*    103* 104*       BMP:   Recent Labs     11/22/22  1302 11/23/22  0411 11/24/22  0500   GLUCOSE 145* 103* 148*   * 134* 139   K 4.0 3.7 3.5*   BUN 12 9 15   CREATININE <0.40* <0.40* <0.40*   CALCIUM 9.0 8.9 9.2       ABGs:   Recent Labs     11/22/22  1443 11/22/22  2053 11/23/22  0441   PHART 7.403 7.511* 7.455*   PO2ART 70.9* 85.2 74.1*   LUX1CYA 51.7* 43.9 50.6*   BBJ9RKA 32.2* 35.1* 35.5*   G5OSEHKZ 93.4* 95.8 93.7*        PT/INR:  No results found for: PTINR    ASSESSMENT / PLAN:    Acute hypoxic respiratory failure    Mechanical ventilation - extubated 11/23/22  PNA - HCAP/ ABx  Hypotension - resoled  COPD    BD, steroid -  reduce steroid  Suspected lung CA - agreeable fro biopsy - IR consult for biopsy 11/28/22  Alcohol use - beer with meals  Will need SNF  DW patient, family      Electronically signed by Tonia Beavers MD on 11/25/22 at 11:57 AM.

## 2022-11-25 NOTE — FLOWSHEET NOTE
may i have IR place a picc line today for him? He's on his 2nd peripheral iv since 7am. getting solumedrol iv and vanco. Vanco was infusing while it infiltrated.

## 2022-11-25 NOTE — PROGRESS NOTES
11/25/22 1550   Encounter Summary   Encounter Overview/Reason  Spiritual/Emotional Needs   Service Provided For: Patient and family together   Referral/Consult From: Palliative Care   Last Encounter  11/25/22   Complexity of Encounter Low   Palliative Care   Type Palliative Care, Follow-up   Assessment/Intervention/Outcome   Assessment Calm;Peaceful   Intervention Active listening;Prayer (assurance of)/[place];Sustaining Presence/Ministry of presence   Outcome Coping;Engaged in conversation;Receptive

## 2022-11-25 NOTE — PROGRESS NOTES
Patient received from ICU and went into bathroom to have bowel movement. Nurse set eyes on patient but patient stated needing to use the restroom before any vitals, or assessments were done. denies tobacco/ etoh/ drug use

## 2022-11-25 NOTE — CARE COORDINATION
ONGOING DISCHARGE PLAN:    Patient is alert and oriented x4. Spoke with patient regarding discharge plan and patient confirms that plan is still to discharge to [facility]    Patient needs a pre cert     Will need a picc line     Will continue to follow for additional discharge needs.     Electronically signed by Anshu Dennison RN on 11/25/2022 at 3:16 PM

## 2022-11-26 PROCEDURE — 6360000002 HC RX W HCPCS: Performed by: INTERNAL MEDICINE

## 2022-11-26 PROCEDURE — 2580000003 HC RX 258: Performed by: EMERGENCY MEDICINE

## 2022-11-26 PROCEDURE — 94660 CPAP INITIATION&MGMT: CPT

## 2022-11-26 PROCEDURE — 2700000000 HC OXYGEN THERAPY PER DAY

## 2022-11-26 PROCEDURE — 6360000002 HC RX W HCPCS: Performed by: EMERGENCY MEDICINE

## 2022-11-26 PROCEDURE — 2060000000 HC ICU INTERMEDIATE R&B

## 2022-11-26 PROCEDURE — 94761 N-INVAS EAR/PLS OXIMETRY MLT: CPT

## 2022-11-26 PROCEDURE — 6370000000 HC RX 637 (ALT 250 FOR IP): Performed by: FAMILY MEDICINE

## 2022-11-26 PROCEDURE — 6370000000 HC RX 637 (ALT 250 FOR IP): Performed by: INTERNAL MEDICINE

## 2022-11-26 PROCEDURE — 6370000000 HC RX 637 (ALT 250 FOR IP): Performed by: EMERGENCY MEDICINE

## 2022-11-26 PROCEDURE — 2580000003 HC RX 258: Performed by: INTERNAL MEDICINE

## 2022-11-26 PROCEDURE — 94640 AIRWAY INHALATION TREATMENT: CPT

## 2022-11-26 RX ORDER — DILTIAZEM HYDROCHLORIDE 60 MG/1
120 CAPSULE, EXTENDED RELEASE ORAL DAILY
Status: DISCONTINUED | OUTPATIENT
Start: 2022-11-27 | End: 2022-11-29

## 2022-11-26 RX ORDER — NICOTINE 21 MG/24HR
1 PATCH, TRANSDERMAL 24 HOURS TRANSDERMAL DAILY
Status: DISCONTINUED | OUTPATIENT
Start: 2022-11-26 | End: 2022-12-10 | Stop reason: HOSPADM

## 2022-11-26 RX ADMIN — OXYCODONE HYDROCHLORIDE AND ACETAMINOPHEN 1 TABLET: 5; 325 TABLET ORAL at 08:35

## 2022-11-26 RX ADMIN — IPRATROPIUM BROMIDE AND ALBUTEROL SULFATE 1 AMPULE: 2.5; .5 SOLUTION RESPIRATORY (INHALATION) at 08:03

## 2022-11-26 RX ADMIN — IPRATROPIUM BROMIDE AND ALBUTEROL SULFATE 1 AMPULE: 2.5; .5 SOLUTION RESPIRATORY (INHALATION) at 20:45

## 2022-11-26 RX ADMIN — LISINOPRIL 10 MG: 10 TABLET ORAL at 08:36

## 2022-11-26 RX ADMIN — CEFEPIME 2000 MG: 2 INJECTION, POWDER, FOR SOLUTION INTRAVENOUS at 15:29

## 2022-11-26 RX ADMIN — SODIUM CHLORIDE, PRESERVATIVE FREE 10 ML: 5 INJECTION INTRAVENOUS at 22:52

## 2022-11-26 RX ADMIN — METHYLPREDNISOLONE SODIUM SUCCINATE 40 MG: 40 INJECTION, POWDER, FOR SOLUTION INTRAMUSCULAR; INTRAVENOUS at 03:58

## 2022-11-26 RX ADMIN — IPRATROPIUM BROMIDE AND ALBUTEROL SULFATE 1 AMPULE: 2.5; .5 SOLUTION RESPIRATORY (INHALATION) at 14:46

## 2022-11-26 RX ADMIN — OXYCODONE HYDROCHLORIDE AND ACETAMINOPHEN 1 TABLET: 5; 325 TABLET ORAL at 04:53

## 2022-11-26 RX ADMIN — BUDESONIDE 500 MCG: 0.5 SUSPENSION RESPIRATORY (INHALATION) at 20:53

## 2022-11-26 RX ADMIN — POTASSIUM BICARBONATE 40 MEQ: 782 TABLET, EFFERVESCENT ORAL at 08:56

## 2022-11-26 RX ADMIN — IPRATROPIUM BROMIDE AND ALBUTEROL SULFATE 1 AMPULE: 2.5; .5 SOLUTION RESPIRATORY (INHALATION) at 00:35

## 2022-11-26 RX ADMIN — METHYLPREDNISOLONE SODIUM SUCCINATE 40 MG: 40 INJECTION, POWDER, FOR SOLUTION INTRAMUSCULAR; INTRAVENOUS at 15:23

## 2022-11-26 RX ADMIN — IPRATROPIUM BROMIDE AND ALBUTEROL SULFATE 1 AMPULE: 2.5; .5 SOLUTION RESPIRATORY (INHALATION) at 12:45

## 2022-11-26 RX ADMIN — BUDESONIDE 500 MCG: 0.5 SUSPENSION RESPIRATORY (INHALATION) at 11:37

## 2022-11-26 RX ADMIN — CEFEPIME 2000 MG: 2 INJECTION, POWDER, FOR SOLUTION INTRAVENOUS at 03:57

## 2022-11-26 RX ADMIN — METOPROLOL TARTRATE 25 MG: 25 TABLET, FILM COATED ORAL at 21:22

## 2022-11-26 RX ADMIN — IPRATROPIUM BROMIDE AND ALBUTEROL SULFATE 1 AMPULE: 2.5; .5 SOLUTION RESPIRATORY (INHALATION) at 11:37

## 2022-11-26 RX ADMIN — VANCOMYCIN HYDROCHLORIDE 1250 MG: 1.25 INJECTION, POWDER, LYOPHILIZED, FOR SOLUTION INTRAVENOUS at 12:20

## 2022-11-26 RX ADMIN — OXYCODONE HYDROCHLORIDE AND ACETAMINOPHEN 1 TABLET: 5; 325 TABLET ORAL at 15:24

## 2022-11-26 RX ADMIN — METOPROLOL TARTRATE 25 MG: 25 TABLET, FILM COATED ORAL at 08:35

## 2022-11-26 RX ADMIN — SODIUM CHLORIDE, PRESERVATIVE FREE 10 ML: 5 INJECTION INTRAVENOUS at 22:50

## 2022-11-26 RX ADMIN — DILTIAZEM HYDROCHLORIDE 240 MG: 60 CAPSULE, EXTENDED RELEASE ORAL at 08:37

## 2022-11-26 RX ADMIN — ENOXAPARIN SODIUM 40 MG: 100 INJECTION SUBCUTANEOUS at 08:34

## 2022-11-26 RX ADMIN — OXYCODONE HYDROCHLORIDE AND ACETAMINOPHEN 1 TABLET: 5; 325 TABLET ORAL at 00:59

## 2022-11-26 RX ADMIN — SODIUM CHLORIDE, PRESERVATIVE FREE 10 ML: 5 INJECTION INTRAVENOUS at 08:34

## 2022-11-26 RX ADMIN — OXYCODONE HYDROCHLORIDE AND ACETAMINOPHEN 1 TABLET: 5; 325 TABLET ORAL at 19:53

## 2022-11-26 ASSESSMENT — PAIN SCALES - GENERAL
PAINLEVEL_OUTOF10: 8
PAINLEVEL_OUTOF10: 7
PAINLEVEL_OUTOF10: 8
PAINLEVEL_OUTOF10: 8
PAINLEVEL_OUTOF10: 5
PAINLEVEL_OUTOF10: 8
PAINLEVEL_OUTOF10: 3

## 2022-11-26 ASSESSMENT — PAIN DESCRIPTION - LOCATION
LOCATION: HAND;BACK
LOCATION: BACK;HAND

## 2022-11-26 ASSESSMENT — PAIN DESCRIPTION - DESCRIPTORS: DESCRIPTORS: DISCOMFORT;ACHING

## 2022-11-26 ASSESSMENT — PAIN - FUNCTIONAL ASSESSMENT
PAIN_FUNCTIONAL_ASSESSMENT: ACTIVITIES ARE NOT PREVENTED
PAIN_FUNCTIONAL_ASSESSMENT: PREVENTS OR INTERFERES SOME ACTIVE ACTIVITIES AND ADLS

## 2022-11-26 ASSESSMENT — PAIN DESCRIPTION - ORIENTATION: ORIENTATION: LEFT

## 2022-11-26 NOTE — PROGRESS NOTES
Writer called into pts room d/t pt now requiring 4lnc SpO2 88%, pt not keeping NC in and will monitor his own SpO2 . Pt also refusing bipap. Pt given prn tx current SpO2 93%.  Writer will continue to monitor

## 2022-11-26 NOTE — PLAN OF CARE
Problem: Pain  Goal: Verbalizes/displays adequate comfort level or baseline comfort level  Outcome: Progressing  Note: Pt medicated with pain medication prn. Assessed all pain characteristics including level, type, location, frequency, and onset. Non-pharmacologic interventions offered to pt as well. Pt states pain is tolerable at this time. Will continue to monitor. Problem: Skin/Tissue Integrity  Goal: Absence of new skin breakdown  Description: 1. Monitor for areas of redness and/or skin breakdown  2. Assess vascular access sites hourly  3. Every 4-6 hours minimum:  Change oxygen saturation probe site  4. Every 4-6 hours:  If on nasal continuous positive airway pressure, respiratory therapy assess nares and determine need for appliance change or resting period. Outcome: Progressing  Note: Skin assessment complete. Waffle mattress in place. Coccyx reddened. Sens[employer]are applied PRN. Turned and repositioned every two hours. Area kept free from moisture. Proper nourishment and fluids encouraged, as appropriate. Will continue to monitor for additional needs and changes in skin breakdown. Problem: Safety - Adult  Goal: Free from fall injury  Outcome: Progressing  Note: Pt assessed as a fall risk this shift. Remains free from falls and accidental injury at this time. Fall precautions in place, including falling star sign and fall risk band on pt. Floor free from obstacles, and bed is locked and in lowest position. Adequate lighting provided. Pt encouraged to call or any need. Bed alarm activated. Will continue to monitor needs during hourly rounding, and reinforce education on use of call light.

## 2022-11-26 NOTE — PROGRESS NOTES
PULMONARY PROGRESS NOTE:    REASON FOR VISIT: copd exac, resp failure  Interval History:    Shortness of Breath: ++  Cough: better  Sputum: no          Hemoptysis: no  Chest Pain: no  Fever: no                   Swelling Feet: no  Headache: no                                           Nausea, Emesis, Abdominal Pain: no  Diarrhea: no         Constipation: no    Events since last visit: agreeable for biopsy     PAST MEDICAL HISTORY:      Scheduled Meds:   [START ON 11/27/2022] dilTIAZem  120 mg Oral Daily    nicotine  1 patch TransDERmal Daily    methylPREDNISolone  40 mg IntraVENous Q12H    metoprolol tartrate  25 mg Oral BID    lisinopril  10 mg Oral Daily    sodium chloride flush  5-40 mL IntraVENous 2 times per day    vancomycin (VANCOCIN) intermittent dosing (placeholder)   Other RX Placeholder    sodium chloride flush  5-40 mL IntraVENous 2 times per day    enoxaparin  40 mg SubCUTAneous Daily    vancomycin  1,250 mg IntraVENous Q12H    cefepime  2,000 mg IntraVENous Q12H    ipratropium-albuterol  1 ampule Inhalation Q4H    budesonide  0.5 mg Nebulization BID     Continuous Infusions:   sodium chloride      sodium chloride       PRN Meds:oxyCODONE-acetaminophen, potassium chloride **OR** potassium alternative oral replacement **OR** potassium chloride, metoprolol, sodium chloride flush, sodium chloride, ipratropium-albuterol, sodium chloride flush, sodium chloride, acetaminophen, ondansetron **OR** ondansetron    PHYSICAL EXAMINATION:  /71   Pulse 72 Comment: Simultaneous filing. User may not have seen previous data. Temp 97.7 °F (36.5 °C) (Oral)   Resp 18 Comment: Simultaneous filing. User may not have seen previous data. Ht 6' 1\" (1.854 m)   Wt 192 lb 14.4 oz (87.5 kg)   SpO2 (!) 88% Comment: Simultaneous filing. User may not have seen previous data.   BMI 25.45 kg/m²     General : Awake, alert, increased WOB  Neck - supple, no lymphadenopathy, JVD not raised  Heart - regular rhythm, S1 and S2 normal; no additional sounds heard  Lungs - poor air Entry- fair bilaterally; breath sounds : diminished, 95% on 3 l nc  Abdomen - soft, no tenderness  Upper Extremities  - no cyanosis, mottling; edema : absent  Lower Extremities: no cyanosis, mottling; edema : absent    Current Laboratory, Radiologic, Microbiologic, and Diagnostic studies reviewed  Data ReviewCBC:   Recent Labs     11/24/22  0500   WBC 5.7   RBC 2.67*   HGB 10.1*   HCT 30.0*   *       BMP:   Recent Labs     11/24/22  0500   GLUCOSE 148*      K 3.5*   BUN 15   CREATININE <0.40*   CALCIUM 9.2       ABGs:   No results for input(s): PHART, PO2ART, CQL5PBX, MHZ7WNW, BEART, M8WHVXVT, EZQ1FMH in the last 72 hours.      PT/INR:  No results found for: PTINR    ASSESSMENT / PLAN:    Acute hypoxic respiratory failure     Mechanical ventilation - extubated 11/23/22  PNA - HCAP/ ABx  Hypotension - resolved  COPD     BD, steroids  Suspected lung CA - agreeable fro biopsy - IR consult for biopsy 11/28/22  Alcohol use - beer with meals  Will need SNF    Plan of care discussed with Dr Gold Bellamy  Electronically signed by JAEL Jones - CNP on 11/26/22 at 2:32 PM.

## 2022-11-26 NOTE — CONSULTS
Date:   11/26/2022  Patient name: Mag Handley  Date of admission:  11/22/2022 12:17 PM  MRN:   [id number]  YOB: 1965  PCP: JAEL Chambers CNP    Reason for Admission: Respiratory failure [facility]) [J96.90]  Septic shock ([facility] [address] [A41.9, R65.21]  Acute on chronic respiratory failure with hypoxia ([facility] [address] [J96.21]    Cardiology consult: Episodes of sinus tachycardia, sinus bradycardia, nonsustained V. tach 5-7 beats, ventricular rate about 110       Referring physician: Dr Norris Officer  Episodes of sinus tach most likely physiological  Episodes of nonsustained bradycardia most likely vasovagal  Episodes of nonsustained V. tach 6-7 beats heart rate above 110/hypoxia hypokalemia  Respiratory failure/severe COPD secondary to smoking  History of lung mass/suspected lung cancer  History of COVID-19 infection  History of alcohol abuse    Past surgical history include bilateral hip replacement, neck surgery, vasectomy      2D echo 9/14/2022  Normal LV size and wall thickness ejection fraction more than 55%  No significant valvular abnormality    ECG 9/13/2022  Sinus tachycardia otherwise normal ECG    Chest x-ray 11/23/2022  Unchanged masslike opacity at the left base, mildly improved patchy opacity at the right base  Endotracheal tube and enteric tubes remain in place, heart size is within normal limit    History of present illness    54-year-old male a nursing home resident with a past medical history of severe COPD, CA lung got readmitted on 11/22/2022 with the respiratory failure, altered mental status and he required intubation and vent support. He got extubated on 11/24/2022. He was recently admitted at [facility] with respiratory failure required vent support. He has been on beta-blockers and Cardizem, steroids, bronchodilators. On continuous ECG monitoring episodes of sinus tachycardia noted. Occasionally he gets transient sinus bradycardia.   He has had a few episodes of nonsustained broad complex rhythm heart rate about 110, 6-7 beats. No syncope no chest pain      On admission lab work showed   high-sensitivity troponin 25 and 29  Hemoglobin 11.1, WBC 20.5, platelets 771  Sodium 132, potassium 4.0, CO2 35, BUN 12, creatinine 0.40, glucose 145, calcium 9.0    Current evaluation  Patient seen and examined  Very frail looking male he was resting in upright position and he was on oxygen by nasal cannula  He has very deep voice  Able to move his limbs  He also follows verbal commands very well  Neck veins were difficult to assess  Chest examination revealed severely reduced chest expansion, breath sounds  Mild ankle edema no calf tenderness    Lab work 11/24/2022 hemoglobin 10.1, WBC 5.7, platelets 555  Sodium 139, potassium 3.5, creatinine less than 0.40, magnesium 1.9    Medications:   Scheduled Meds:   [START ON 11/27/2022] dilTIAZem  120 mg Oral Daily    nicotine  1 patch TransDERmal Daily    methylPREDNISolone  40 mg IntraVENous Q12H    metoprolol tartrate  25 mg Oral BID    lisinopril  10 mg Oral Daily    sodium chloride flush  5-40 mL IntraVENous 2 times per day    vancomycin (VANCOCIN) intermittent dosing (placeholder)   Other RX Placeholder    sodium chloride flush  5-40 mL IntraVENous 2 times per day    enoxaparin  40 mg SubCUTAneous Daily    vancomycin  1,250 mg IntraVENous Q12H    cefepime  2,000 mg IntraVENous Q12H    ipratropium-albuterol  1 ampule Inhalation Q4H    budesonide  0.5 mg Nebulization BID     Continuous Infusions:   sodium chloride      sodium chloride       CBC:   Recent Labs     11/24/22  0500   WBC 5.7   HGB 10.1*   *     BMP:    Recent Labs     11/24/22  0500      K 3.5*   CL 97*   CO2 35*   BUN 15   CREATININE <0.40*   GLUCOSE 148*     Hepatic: No results for input(s): AST, ALT, ALB, BILITOT, ALKPHOS in the last 72 hours. Troponin: No results for input(s): TROPONINI in the last 72 hours.   BNP: No results for input(s): BNP in the last 72 hours. Lipids: No results for input(s): CHOL, HDL in the last 72 hours. Invalid input(s): LDLCALCU  INR: No results for input(s): INR in the last 72 hours. Objective:   Vitals: /71   Pulse 84   Temp 97.7 °F (36.5 °C) (Oral)   Resp 18   Ht 6' 1\" (1.854 m)   Wt 192 lb 14.4 oz (87.5 kg)   SpO2 (!) 89%   BMI 25.45 kg/m²   General appearance: alert and cooperative with exam  HEENT: Head: Normal, normocephalic, atraumatic. Neck: supple, symmetrical, trachea midline  Lungs:  Poor chest expansion, severely diminished breath sounds, bilateral wheezing and crackles  Heart:  Cardiac apical impulse not palpable heart sounds distant  Abdomen:  Soft bowel sounds present  Extremities: Homans sign is negative, no sign of DVT  Neurologic: Mental status: Alert, oriented, thought content appropriate    EKG: normal sinus rhythm. ECHO: reviewed.    Ejection fraction: 55%    Assessment / Acute Cardiac Problems:   Episodes of sinus tachycardia most likely physiological  Episodes of bradycardia most likely vagal effect  Nonsustained broad complex rhythm/V. tach, heart rate up to 110 most likely secondary to hypoxia, hypokalemia  Normal LV systolic function  Severe COPD, hypoxia  Lung mass suspected carcinoma lung    Patient Active Problem List:     Cellulitis of b/l lower extremity     Cellulitis of lower extremity     Alcoholism ([address]     Essential hypertension     Hyperkeratosis of b/l [place]     Alcohol withdrawal (HCC)     Tinea pedis of both feet     Suicidal ideation     Dyspnea     COPD exacerbation (HCC)     Gastroesophageal reflux disease without esophagitis     Cigarette smoker     Avascular necrosis of femoral head (HCC)     Tachycardia     Stage 3 severe COPD by GOLD classification (HCC)     Tobacco dependence     Chronic obstructive pulmonary disease (HCC)     Lung malignancy (HCC)     Cough with hemoptysis     Sepsis ([address]     COVID-19     Mass of lingula of lung     Hyponatremia     Respiratory failure [facility])      Plan of Treatment:   Medications reviewed  Continue current dose of Cardizem, metoprolol, lisinopril  Patient is also on bronchodilators, steroids, cefepime  Keep potassium above 4 and magnesium above 2  Continue ECG monitoring  Prognosis guarded    Electronically signed by Rodrigo Perea MD on 11/26/2022 at 2:55 PM

## 2022-11-26 NOTE — PROGRESS NOTES
Pt has been with increased anxiety over the last couple hours due to worrying about the amount of beers he is allowed a day. Most recently with increaed HR above 100. Nurse reviewed order with pt, confirmed order with dietary and requested HS beer to be delivered prior to dietary departure for the day. Pt informed. Also noted pt continues to remove O2/NC if he sees his pulse ox is 90% or greater, and leaves O2 off. Frequent checks and most recently pulse ox down again to 84% on RA. Once O2 reapplied at 3 L/NC, pulse ox back up to 94%. Pt encouraged to leave O2 on for a least 1 hour. Pt acknowledged. Pt denies pain.

## 2022-11-26 NOTE — PROGRESS NOTES
11/26/22 1129   Encounter Summary   Encounter Overview/Reason  Spiritual/Emotional Needs   Service Provided For: Patient   Referral/Consult From: Palliative Care   Last Encounter  11/26/22   Complexity of Encounter Low   Palliative Care   Type Palliative Care, Follow-up   Assessment/Intervention/Outcome   Assessment Unable to assess   Intervention Prayer (assurance of)/[place]

## 2022-11-26 NOTE — PROGRESS NOTES
Vancomycin Dosing by Pharmacy - Daily Note   Vancomycin Therapy Day:  5  Indication: sepsis    Allergies:  Patient has no known allergies. Actual Weight:    Wt Readings from Last 1 Encounters:   11/26/22 192 lb 14.4 oz (87.5 kg)       Labs/Ancillary Data  Estimated Creatinine Clearance: 227 mL/min (based on SCr of 0.4 mg/dL). Recent Labs     11/24/22  0500   CREATININE <0.40*   BUN 15   WBC 5.7     Procalcitonin   Date Value Ref Range Status   08/29/2022 0.20 (H) <0.09 ng/mL Final     Comment:           Suspected Sepsis:  <0.50 ng/mL     Low likelihood of sepsis. 0.50-2.00 ng/mL     Increased likelihood of sepsis. Antibiotics encouraged. >2.00 ng/mL     High risk of sepsis/shock. Antibiotics strongly encouraged. Suspected Lower Resp Tract Infections:  <0.24 ng/mL     Low likelihood of bacterial infection. >0.24 ng/mL     Increased likelihood of bacterial infection. Antibiotics encouraged. With successful antibiotic therapy, PCT levels should decrease rapidly. (Half-life of 24 to   36 hours.)        Procalcitonin values from samples collected within the first 6 hours of systemic infection   may still be low. Retesting may be indicated. Values from day 1 and day 4 can be entered into the Change in Procalcitonin Calculator   ([email or web address]-[email or web address] Desk) to determine the patient's Mortality Risk Prognosis        In healthy neonates, plasma Procalcitonin (PCT) concentrations increase gradually after   birth, reaching peak values at about 24 hours of age then decrease to normal values below   0.5 ng/mL by 48-72 hours of age.          Intake/Output Summary (Last 24 hours) at 11/26/2022 1327  Last data filed at 11/26/2022 1017  Gross per 24 hour   Intake 250 ml   Output 1100 ml   Net -850 ml     Temp: 97.7 F    Culture Date / Source  /  Results  See micro  Recent vancomycin administrations                     vancomycin (VANCOCIN) 1,250 mg in dextrose 5 % 250 mL IVPB (ADDAVIAL) (mg) 1,250 mg New Bag 11/26/22 1220     1,250 mg New Bag 11/25/22 2353     1,250 mg New Bag  1205     1,250 mg New Bag 11/24/22 2304     1,250 mg New Bag  0538     1,250 mg New Bag 11/23/22 1757                    Vancomycin Concentrations:   TROUGH:  No results for input(s): VANCOTROUGH in the last 72 hours. RANDOM:    Recent Labs     11/23/22  1522   VANCORANDOM 14.3       MRSA Nasal Swab: N/A. Non-respiratory infection. Alyssa Ruffing PLAN     Continue current dose of 1250 mg q12h IV  Ensured BUN/sCr ordered at baseline and every 48 hours x at least 3 levels, then at least weekly. Patient refused labs today. Reordered BMP for 11/26 @1330 to re-attempt. Patient's SCr has been stable so far during admission. Repeat vancomycin concentration TBD  Pharmacy will continue to monitor patient and adjust therapy as indicated      Vancomycin Target Concentration Parameters  Treatment  Population Target AUC/NIK Target Trough   Invasive MRSA Infection (bacteremia, pneumonia, meningitis, endocarditis, osteomyelitis)  Sepsis (undifferentiated) 400-600 N/A   Infection due to non-MRSA pathogen  Empiric treatment of non-invasive MRSA infection  (SSTI, UTI) <500 10-15 mg/L   CrCl < 29 mL/min  Rapidly fluctuating serum creatinine   KRYSTA N/A < 15 mg/L     Renal replacement therapy is dosed by levels, per hospital protocol. Abbreviations  * Pauc: probability that AUC is >400 (efficacy); Pconc: probability that Ctrough is above 20 ?g/mL (toxicity); Tox: Probability of nephrotoxicity, based on Kade et al. Clin Infect Dis 2009. Loading dose: N/A  Regimen: 1250 mg IV every 12 hours. Start time: 00:20 on 11/27/2022  Exposure target: AUC24 (range)400-600 mg/L.hr   AUC24,ss: 474 mg/L.hr  Probability of AUC24 > 400: 80 %  Ctrough,ss: 14.1 mg/L  Probability of Ctrough,ss > 20: 11 %  Probability of nephrotoxicity (Lodise IVANNA 2009): 9 %      Thank you for the consult. Pharmacy will continue to follow.      Faisal López, PharmD, [address]  PGY-1 Pharmacy

## 2022-11-26 NOTE — PROGRESS NOTES
Per patient he has slept quit well this shift in comparison to other. Patient refused to use the CPAP provided. Writer educated patient on the benefit and importance of the CPAP. Patient allowed writer and CTP wash him up and changed his sheets and clothes. Patient requested pain medication as needed and is resting now with no distress at this time.

## 2022-11-26 NOTE — PROGRESS NOTES
Pt prefers to not wear O2/NC. Pt has his own pulse oximetry machine, shows to nurse 90, 91, 92% and \"says he's good\". O2/NC has always been within reach. Pt not wearing O2 at this time. Pulse ox down to 83%. O2 reapplied at 3L/NC, nurse stayed with pt. Family at bedside. Pulse ox up to 86% with nurse encouraging tp to breathe through his nose. O2 increased to 4L/NC, pulse ox up to 89%. Pt noted to mouth breathe frequently. Pt encouraged to wear bipap. Pt stated stated he was [place]. \"  RT, Olivia Kevin, informed and at bedside.

## 2022-11-26 NOTE — PROGRESS NOTES
Hospitalist Progress Note  11/26/2022 2:44 PM  Subjective:   Admit Date: 11/22/2022  PCP: Nicole Rodriguez, APRN - CNP     DNR-CCA      C/c:  Chief Complaint   Patient presents with    Respiratory Distress         Interval History: doing same, pt having some changes in heart rate, pt has been having some sob, pt still cardizem. lisinopril and lopressor    Diet: ADULT DIET; Regular                                ip days:4  Medications:   Scheduled Meds:   [START ON 11/27/2022] dilTIAZem  120 mg Oral Daily    nicotine  1 patch TransDERmal Daily    methylPREDNISolone  40 mg IntraVENous Q12H    metoprolol tartrate  25 mg Oral BID    lisinopril  10 mg Oral Daily    sodium chloride flush  5-40 mL IntraVENous 2 times per day    vancomycin (VANCOCIN) intermittent dosing (placeholder)   Other RX Placeholder    sodium chloride flush  5-40 mL IntraVENous 2 times per day    enoxaparin  40 mg SubCUTAneous Daily    vancomycin  1,250 mg IntraVENous Q12H    cefepime  2,000 mg IntraVENous Q12H    ipratropium-albuterol  1 ampule Inhalation Q4H    budesonide  0.5 mg Nebulization BID     Continuous Infusions:   sodium chloride      sodium chloride       PRN Meds:.oxyCODONE-acetaminophen, potassium chloride **OR** potassium alternative oral replacement **OR** potassium chloride, metoprolol, sodium chloride flush, sodium chloride, ipratropium-albuterol, sodium chloride flush, sodium chloride, acetaminophen, ondansetron **OR** ondansetron     CBC:   Recent Labs     11/24/22  0500   WBC 5.7   HGB 10.1*   *     BMP:    Recent Labs     11/24/22  0500      K 3.5*   CL 97*   CO2 35*   BUN 15   CREATININE <0.40*   GLUCOSE 148*     Hepatic: No results for input(s): AST, ALT, ALB, BILITOT, ALKPHOS in the last 72 hours. Troponin: No results for input(s): TROPONINI in the last 72 hours. BNP: No results for input(s): BNP in the last 72 hours. Lipids: No results for input(s): CHOL, HDL in the last 72 hours.     Invalid input(s): LDLCALCU  INR: No results for input(s): INR in the last 72 hours. Objective:   Vitals: /71   Pulse 72 Comment: Simultaneous filing. User may not have seen previous data. Temp 97.7 °F (36.5 °C) (Oral)   Resp 18 Comment: Simultaneous filing. User may not have seen previous data. Ht 6' 1\" (1.854 m)   Wt 192 lb 14.4 oz (87.5 kg)   SpO2 (!) 88% Comment: Simultaneous filing. User may not have seen previous data. BMI 25.45 kg/m²   General appearance: alert, appears stated age and cooperative  Skin: Skin color, texture, turgor normal. No rashes or lesions  Lungs: clear to auscultation bilaterally  Heart: regular rate and rhythm, S1, S2 normal, no murmur, click, rub or gallop  Abdomen: soft, non-tender; bowel sounds normal; no masses,  no organomegaly  Extremities: extremities normal, atraumatic, no cyanosis or edema  Neurologic: Mental status: Alert, oriented, thought content appropriate    Prophylaxis:   DVT with  [x] lovenox        [] heparin        [] Scd        [] none:     Radiology:  CT HEAD WO CONTRAST    Result Date: 11/22/2022  EXAMINATION: CT OF THE HEAD WITHOUT CONTRAST  11/22/2022 2:59 pm TECHNIQUE: CT of the head was performed without the administration of intravenous contrast. Automated exposure control, iterative reconstruction, and/or weight based adjustment of the mA/kV was utilized to reduce the radiation dose to as low as reasonably achievable. COMPARISON: None. HISTORY: ORDERING SYSTEM PROVIDED HISTORY: altered TECHNOLOGIST PROVIDED HISTORY: altered Decision Support Exception - unselect if not a suspected or confirmed emergency medical condition->Emergency Medical Condition (MA) Reason for Exam: AMS, respiratory distress FINDINGS: There is no acute infarction, intracranial hemorrhage or intracranial mass lesion. No mass effect, midline shift or extra-axial collection is noted.  There are moderate nonspecific foci of periventricular and subcortical cerebral white matter hypodensity, most likely representing chronic microangiopathic disease in this age group. The brain parenchyma is otherwise normal. The cerebellar tonsils are in normal position. The ventricles, sulci, and cisterns are mildly prominent suggestive of mild generalized volume loss. The globes and orbits are within normal limits. The visualized extracranial structures including paranasal sinuses and mastoid air cells are unremarkable. No fracture is identified. No evidence for acute intracranial hemorrhage, territorial infarction or intracranial mass lesion. Moderate chronic microangiopathic ischemic disease. Mild generalized volume loss. XR CHEST PORTABLE    Result Date: 11/23/2022  EXAMINATION: ONE XRAY VIEW OF THE CHEST 11/23/2022 6:06 am COMPARISON: 11/22/2022 HISTORY: ORDERING SYSTEM PROVIDED HISTORY: resp failure, on vent TECHNOLOGIST PROVIDED HISTORY: resp failure, on vent Reason for Exam: on vent FINDINGS: Endotracheal tube and enteric tube remain in place. Heart size is within normal limits. Masslike opacity persists at the left base. Mild patchy opacities at the right base are mildly improved. No new airspace consolidation. No evidence of pneumothorax or sizable pleural effusion. 1.  Unchanged masslike opacities at the left base. 2.  Mildly improved patchy opacities at the right base. XR CHEST PORTABLE    Result Date: 11/22/2022  EXAMINATION: ONE XRAY VIEW OF THE CHEST 11/22/2022 1:39 pm COMPARISON: 09/13/2022 CT chest and chest radiograph HISTORY: ORDERING SYSTEM PROVIDED HISTORY: bipap TECHNOLOGIST PROVIDED HISTORY: bipap Reason for Exam: respiratory distress, ET and OG tube placements FINDINGS: Cardiomediastinal silhouette is within normal limits of size. The endotracheal tube tip measures 3.9 cm from the brendan. Enteric tube tip and side port overlie the stomach. Redemonstrated lingular mass, which may have increased in size since the prior examination.   Right basilar opacities may represent atelectasis or infection. No pleural effusion or pneumothorax. No acute osseous abnormality. 1.  Appropriate positioning of the endotracheal and enteric tubes. 2.  A lingular mass appears to have increased in size since the prior examination and is suspicious for malignancy. Recommend tissue sampling if not previously performed. 3.  New right basilar opacities may represent infection or atelectasis. Assessment :   S/p extubation/ac resp failure/ supposed to be on bipap. pt refusing  Abnormal ekg/check 12 lead ekg     Plan:   1. Cardio to see  2. Decrease cardizem to 120mg bid    Patient Active Problem List:     Cellulitis of b/l lower extremity     Cellulitis of lower extremity     Alcoholism ([address]     Essential hypertension     Hyperkeratosis of b/l [place]     Alcohol withdrawal (HCC)     Tinea pedis of both feet     Suicidal ideation     Dyspnea     COPD exacerbation (HCC)     Gastroesophageal reflux disease without esophagitis     Cigarette smoker     Avascular necrosis of femoral head (HCC)     Tachycardia     Stage 3 severe COPD by GOLD classification ([address]     Tobacco dependence     Chronic obstructive pulmonary disease (HCC)     Lung malignancy ([address]     Cough with hemoptysis     Sepsis ([address]     COVID-19     Mass of lingula of lung     Hyponatremia     Respiratory failure ([address]      Anticipated Disposition upon discharge: [] Home                                                                         [] Home with Home Health                                                                         [] [facility]                                                                         [] [address] ,[address]      Patient is admitted as inpatient status because of co-morbidities listed above, severity of signs and symptoms as outlined, requirement for current medical therapies and most importantly because of direct risk to patient if care not provided in a hospital setting.           Alan Riding Olvin Mcgraw MD, MD  Rounding Hospitalist

## 2022-11-26 NOTE — CARE COORDINATION
ONGOING DISCHARGE PLAN:     Patient is alert and oriented x4. Spoke with patient regarding discharge plan and patient confirms that plan is still to discharge to [facility]. Patient needs a pre cert . [address] is following.      IV cefepime and Vanco,  solumedrol 40 mg q12,     CT needle biopsy on Monday for poss lung cancer     Will continue to follow for additional discharge needs    Electronically signed by Michael Lucas RN on 11/26/2022 at 4:29 PM

## 2022-11-27 LAB
ABSOLUTE EOS #: 0 K/UL (ref 0–0.4)
ABSOLUTE LYMPH #: 0.57 K/UL (ref 1–4.8)
ABSOLUTE MONO #: 0.57 K/UL (ref 0.1–1.3)
ANION GAP SERPL CALCULATED.3IONS-SCNC: 7 MMOL/L (ref 9–17)
ATYPICAL LYMPHOCYTE ABSOLUTE COUNT: 0.28 K/UL
ATYPICAL LYMPHOCYTES: 2 %
BASOPHILS # BLD: 0 % (ref 0–2)
BASOPHILS ABSOLUTE: 0 K/UL (ref 0–0.2)
BUN BLDV-MCNC: 15 MG/DL (ref 6–20)
CALCIUM SERPL-MCNC: 9.5 MG/DL (ref 8.6–10.4)
CHLORIDE BLD-SCNC: 93 MMOL/L (ref 98–107)
CO2: 33 MMOL/L (ref 20–31)
CREAT SERPL-MCNC: 0.43 MG/DL (ref 0.7–1.2)
CULTURE: NORMAL
CULTURE: NORMAL
EOSINOPHILS RELATIVE PERCENT: 0 % (ref 0–4)
GFR SERPL CREATININE-BSD FRML MDRD: >60 ML/MIN/1.73M2
GLUCOSE BLD-MCNC: 116 MG/DL (ref 70–99)
HCT VFR BLD CALC: 33.7 % (ref 41–53)
HEMOGLOBIN: 11.1 G/DL (ref 13.5–17.5)
LYMPHOCYTES # BLD: 4 % (ref 24–44)
Lab: NORMAL
MCH RBC QN AUTO: 36.8 PG (ref 26–34)
MCHC RBC AUTO-ENTMCNC: 32.8 G/DL (ref 31–37)
MCV RBC AUTO: 112 FL (ref 80–100)
MONOCYTES # BLD: 4 % (ref 1–7)
MORPHOLOGY: ABNORMAL
MORPHOLOGY: ABNORMAL
PDW BLD-RTO: 15.2 % (ref 11.5–14.9)
PLATELET # BLD: 168 K/UL (ref 150–450)
PMV BLD AUTO: 8.4 FL (ref 6–12)
POTASSIUM SERPL-SCNC: 4.6 MMOL/L (ref 3.7–5.3)
RBC # BLD: 3.01 M/UL (ref 4.5–5.9)
SEG NEUTROPHILS: 90 % (ref 36–66)
SEGMENTED NEUTROPHILS ABSOLUTE COUNT: 12.78 K/UL (ref 1.3–9.1)
SODIUM BLD-SCNC: 133 MMOL/L (ref 135–144)
SPECIMEN DESCRIPTION: NORMAL
SPECIMEN DESCRIPTION: NORMAL
WBC # BLD: 14.2 K/UL (ref 3.5–11)

## 2022-11-27 PROCEDURE — 94660 CPAP INITIATION&MGMT: CPT

## 2022-11-27 PROCEDURE — 94640 AIRWAY INHALATION TREATMENT: CPT

## 2022-11-27 PROCEDURE — 6370000000 HC RX 637 (ALT 250 FOR IP): Performed by: FAMILY MEDICINE

## 2022-11-27 PROCEDURE — 2500000003 HC RX 250 WO HCPCS: Performed by: INTERNAL MEDICINE

## 2022-11-27 PROCEDURE — 6360000002 HC RX W HCPCS: Performed by: INTERNAL MEDICINE

## 2022-11-27 PROCEDURE — 6370000000 HC RX 637 (ALT 250 FOR IP): Performed by: NURSE PRACTITIONER

## 2022-11-27 PROCEDURE — 2580000003 HC RX 258: Performed by: EMERGENCY MEDICINE

## 2022-11-27 PROCEDURE — 2060000000 HC ICU INTERMEDIATE R&B

## 2022-11-27 PROCEDURE — 94761 N-INVAS EAR/PLS OXIMETRY MLT: CPT

## 2022-11-27 PROCEDURE — 6360000002 HC RX W HCPCS: Performed by: EMERGENCY MEDICINE

## 2022-11-27 PROCEDURE — 85025 COMPLETE CBC W/AUTO DIFF WBC: CPT

## 2022-11-27 PROCEDURE — 80048 BASIC METABOLIC PNL TOTAL CA: CPT

## 2022-11-27 PROCEDURE — 2580000003 HC RX 258: Performed by: INTERNAL MEDICINE

## 2022-11-27 PROCEDURE — 2700000000 HC OXYGEN THERAPY PER DAY

## 2022-11-27 PROCEDURE — 36415 COLL VENOUS BLD VENIPUNCTURE: CPT

## 2022-11-27 PROCEDURE — 6370000000 HC RX 637 (ALT 250 FOR IP): Performed by: EMERGENCY MEDICINE

## 2022-11-27 PROCEDURE — 6370000000 HC RX 637 (ALT 250 FOR IP): Performed by: INTERNAL MEDICINE

## 2022-11-27 RX ORDER — GAUZE BANDAGE 2" X 2"
100 BANDAGE TOPICAL DAILY
Status: DISCONTINUED | OUTPATIENT
Start: 2022-11-27 | End: 2022-11-28

## 2022-11-27 RX ORDER — LORAZEPAM 0.5 MG/1
0.5 TABLET ORAL EVERY 4 HOURS PRN
Status: DISCONTINUED | OUTPATIENT
Start: 2022-11-27 | End: 2022-12-10 | Stop reason: HOSPADM

## 2022-11-27 RX ORDER — DEXMEDETOMIDINE HYDROCHLORIDE 4 UG/ML
.1-1.5 INJECTION, SOLUTION INTRAVENOUS CONTINUOUS
Status: DISCONTINUED | OUTPATIENT
Start: 2022-11-27 | End: 2022-11-29

## 2022-11-27 RX ORDER — FOLIC ACID 1 MG/1
1 TABLET ORAL DAILY
Status: DISCONTINUED | OUTPATIENT
Start: 2022-11-27 | End: 2022-11-28

## 2022-11-27 RX ORDER — GUAIFENESIN DEXTROMETHORPHAN HYDROBROMIDE ORAL SOLUTION 10; 100 MG/5ML; MG/5ML
5 SOLUTION ORAL EVERY 4 HOURS PRN
Status: DISCONTINUED | OUTPATIENT
Start: 2022-11-27 | End: 2022-12-10 | Stop reason: HOSPADM

## 2022-11-27 RX ORDER — DEXMEDETOMIDINE HYDROCHLORIDE 4 UG/ML
.1-1.5 INJECTION INTRAVENOUS CONTINUOUS
Status: DISCONTINUED | OUTPATIENT
Start: 2022-11-27 | End: 2022-11-27

## 2022-11-27 RX ADMIN — IPRATROPIUM BROMIDE AND ALBUTEROL SULFATE 1 AMPULE: 2.5; .5 SOLUTION RESPIRATORY (INHALATION) at 20:04

## 2022-11-27 RX ADMIN — DEXTROMETHORPHAN HYDROBROMIDE, GUAIFENESIN 5 ML: 10; 100 LIQUID ORAL at 14:40

## 2022-11-27 RX ADMIN — CEFEPIME 2000 MG: 2 INJECTION, POWDER, FOR SOLUTION INTRAVENOUS at 04:13

## 2022-11-27 RX ADMIN — IPRATROPIUM BROMIDE AND ALBUTEROL SULFATE 1 AMPULE: 2.5; .5 SOLUTION RESPIRATORY (INHALATION) at 12:40

## 2022-11-27 RX ADMIN — IPRATROPIUM BROMIDE AND ALBUTEROL SULFATE 1 AMPULE: 2.5; .5 SOLUTION RESPIRATORY (INHALATION) at 23:59

## 2022-11-27 RX ADMIN — IPRATROPIUM BROMIDE AND ALBUTEROL SULFATE 1 AMPULE: 2.5; .5 SOLUTION RESPIRATORY (INHALATION) at 07:22

## 2022-11-27 RX ADMIN — METHYLPREDNISOLONE SODIUM SUCCINATE 40 MG: 40 INJECTION, POWDER, FOR SOLUTION INTRAMUSCULAR; INTRAVENOUS at 04:14

## 2022-11-27 RX ADMIN — IPRATROPIUM BROMIDE AND ALBUTEROL SULFATE 1 AMPULE: 2.5; .5 SOLUTION RESPIRATORY (INHALATION) at 03:49

## 2022-11-27 RX ADMIN — CEFEPIME 2000 MG: 2 INJECTION, POWDER, FOR SOLUTION INTRAVENOUS at 15:58

## 2022-11-27 RX ADMIN — DILTIAZEM HYDROCHLORIDE 120 MG: 60 CAPSULE, EXTENDED RELEASE ORAL at 10:23

## 2022-11-27 RX ADMIN — METOPROLOL TARTRATE 25 MG: 25 TABLET, FILM COATED ORAL at 10:24

## 2022-11-27 RX ADMIN — VANCOMYCIN HYDROCHLORIDE 1250 MG: 1.25 INJECTION, POWDER, LYOPHILIZED, FOR SOLUTION INTRAVENOUS at 00:13

## 2022-11-27 RX ADMIN — BUDESONIDE 500 MCG: 0.5 SUSPENSION RESPIRATORY (INHALATION) at 20:12

## 2022-11-27 RX ADMIN — THIAMINE HCL TAB 100 MG 100 MG: 100 TAB at 17:56

## 2022-11-27 RX ADMIN — SODIUM CHLORIDE, PRESERVATIVE FREE 10 ML: 5 INJECTION INTRAVENOUS at 10:24

## 2022-11-27 RX ADMIN — FOLIC ACID 1 MG: 1 TABLET ORAL at 17:56

## 2022-11-27 RX ADMIN — SODIUM CHLORIDE, PRESERVATIVE FREE 10 ML: 5 INJECTION INTRAVENOUS at 09:00

## 2022-11-27 RX ADMIN — METHYLPREDNISOLONE SODIUM SUCCINATE 40 MG: 40 INJECTION, POWDER, FOR SOLUTION INTRAMUSCULAR; INTRAVENOUS at 15:52

## 2022-11-27 RX ADMIN — VANCOMYCIN HYDROCHLORIDE 1250 MG: 1.25 INJECTION, POWDER, LYOPHILIZED, FOR SOLUTION INTRAVENOUS at 13:17

## 2022-11-27 RX ADMIN — BUDESONIDE 500 MCG: 0.5 SUSPENSION RESPIRATORY (INHALATION) at 07:22

## 2022-11-27 RX ADMIN — LISINOPRIL 10 MG: 10 TABLET ORAL at 10:24

## 2022-11-27 RX ADMIN — LORAZEPAM 0.5 MG: 0.5 TABLET ORAL at 15:52

## 2022-11-27 RX ADMIN — IPRATROPIUM BROMIDE AND ALBUTEROL SULFATE 1 AMPULE: 2.5; .5 SOLUTION RESPIRATORY (INHALATION) at 11:00

## 2022-11-27 RX ADMIN — OXYCODONE HYDROCHLORIDE AND ACETAMINOPHEN 1 TABLET: 5; 325 TABLET ORAL at 00:09

## 2022-11-27 RX ADMIN — DEXMEDETOMIDINE HYDROCHLORIDE 0.2 MCG/KG/HR: 400 INJECTION INTRAVENOUS at 23:28

## 2022-11-27 RX ADMIN — OXYCODONE HYDROCHLORIDE AND ACETAMINOPHEN 1 TABLET: 5; 325 TABLET ORAL at 10:24

## 2022-11-27 RX ADMIN — IPRATROPIUM BROMIDE AND ALBUTEROL SULFATE 1 AMPULE: 2.5; .5 SOLUTION RESPIRATORY (INHALATION) at 00:09

## 2022-11-27 RX ADMIN — METOPROLOL TARTRATE 25 MG: 25 TABLET, FILM COATED ORAL at 23:42

## 2022-11-27 RX ADMIN — ENOXAPARIN SODIUM 40 MG: 100 INJECTION SUBCUTANEOUS at 10:23

## 2022-11-27 RX ADMIN — IPRATROPIUM BROMIDE AND ALBUTEROL SULFATE 1 AMPULE: 2.5; .5 SOLUTION RESPIRATORY (INHALATION) at 15:27

## 2022-11-27 RX ADMIN — OXYCODONE HYDROCHLORIDE AND ACETAMINOPHEN 1 TABLET: 5; 325 TABLET ORAL at 04:13

## 2022-11-27 ASSESSMENT — PAIN DESCRIPTION - LOCATION: LOCATION: BACK

## 2022-11-27 ASSESSMENT — PAIN SCALES - GENERAL
PAINLEVEL_OUTOF10: 7
PAINLEVEL_OUTOF10: 8
PAINLEVEL_OUTOF10: 6
PAINLEVEL_OUTOF10: 7

## 2022-11-27 NOTE — CARDIO/PULMONARY
PT placed on BIPAP about an hour ago and when I came back at this time he had his BIPAP off and said it mad to much noise and would not wear it. Pt placed on cont pox and at 4  L/c read 93%. Pt still had slightly labored breathing.

## 2022-11-27 NOTE — PROGRESS NOTES
BRONCHOSPASM/BRONCHOCONSTRICTION     [x]         IMPROVE AERATION/BREATH SOUNDS  [x]   ADMINISTER BRONCHODILATOR THERAPY AS APPROPRIATE  [x]   ASSESS BREATH SOUNDS  []   IMPLEMENT AEROSOL/MDI PROTOCOL  [x]   PATIENT EDUCATION AS NEEDED     PROVIDE ADEQUATE OXYGENATION WITH ACCEPTABLE SP02/ABG'S    [x]  IDENTIFY APPROPRIATE OXYGEN THERAPY  [x]   MONITOR SP02/ABG'S AS NEEDED   [x]   PATIENT EDUCATION AS NEEDED     On 3L. Did not wear BiPAP last night, pt encouraged to wear today. Had shallow and increased WOB this AM. Coughing up tan/blood-tinged mucus.

## 2022-11-27 NOTE — PROGRESS NOTES
Date:   11/27/2022  Patient name: Letty Flores  Date of admission:  11/22/2022 12:17 PM  MRN:   [id number]  YOB: 1965  PCP: JAEL Banegas CNP    Reason for Admission: Respiratory failure [facility]) [J96.90]  Septic shock ([address] [A41.9, R65.21]  Acute on chronic respiratory failure with hypoxia [facility]) [J96.21]    Cardiology follow up: Episodes of sinus tachycardia, sinus bradycardia, nonsustained V. tach 5-7 beats, ventricular rate about 110       Referring physician: Dr Noemy Maravilla  Episodes of sinus tach most likely physiological  Episodes of nonsustained bradycardia most likely vasovagal  Episodes of nonsustained V. tach 6-7 beats heart rate above 110/hypoxia hypokalemia  Respiratory failure/severe COPD secondary to smoking  History of lung mass/suspected lung cancer  History of COVID-19 infection  History of alcohol abuse     Past surgical history include bilateral hip replacement, neck surgery, vasectomy        2D echo 9/14/2022  Normal LV size and wall thickness ejection fraction more than 55%  No significant valvular abnormality     ECG 9/13/2022  Sinus tachycardia otherwise normal ECG     Chest x-ray 11/23/2022  Unchanged masslike opacity at the left base, mildly improved patchy opacity at the right base  Endotracheal tube and enteric tubes remain in place, heart size is within normal limit       History of present illness     58-year-old male a nursing home resident with a past medical history of severe COPD, CA lung got readmitted on 11/22/2022 with the respiratory failure, altered mental status and he required intubation and vent support. He got extubated on 11/24/2022. He was recently admitted at [facility] with respiratory failure required vent support. He has been on beta-blockers and Cardizem, steroids, bronchodilators. On continuous ECG monitoring episodes of sinus tachycardia noted. Occasionally he gets transient sinus bradycardia.   He has had a few episodes of nonsustained broad complex rhythm heart rate about 110, 6-7 beats. No syncope no chest pain        On admission lab work showed   high-sensitivity troponin 25 and 29  Hemoglobin 11.1, WBC 20.5, platelets 439  Sodium 132, potassium 4.0, CO2 35, BUN 12, creatinine 0.40, glucose 145, calcium 9.0     Current evaluation  Patient seen and examined  A frail looking male  He was getting his body/cleaning  He became very short of breath hypoxic  He could hardly talk  I ordered urgent respiratory therapy/aerosol treatment  Patient denied any chest pain    WBC 14.2, hemoglobin 11.1, platelets 386  Sodium 133, potassium 4.6, BUN 15, creatinine 0.43, glucose 116    Medications:   Scheduled Meds:   dilTIAZem  120 mg Oral Daily    nicotine  1 patch TransDERmal Daily    methylPREDNISolone  40 mg IntraVENous Q12H    metoprolol tartrate  25 mg Oral BID    lisinopril  10 mg Oral Daily    sodium chloride flush  5-40 mL IntraVENous 2 times per day    vancomycin (VANCOCIN) intermittent dosing (placeholder)   Other RX Placeholder    sodium chloride flush  5-40 mL IntraVENous 2 times per day    enoxaparin  40 mg SubCUTAneous Daily    vancomycin  1,250 mg IntraVENous Q12H    cefepime  2,000 mg IntraVENous Q12H    ipratropium-albuterol  1 ampule Inhalation Q4H    budesonide  0.5 mg Nebulization BID     Continuous Infusions:   sodium chloride      sodium chloride       CBC:   Recent Labs     11/27/22  0529   WBC 14.2*   HGB 11.1*        BMP:    Recent Labs     11/27/22  0529   *   K 4.6   CL 93*   CO2 33*   BUN 15   CREATININE 0.43*   GLUCOSE 116*     Hepatic: No results for input(s): AST, ALT, ALB, BILITOT, ALKPHOS in the last 72 hours. Troponin: No results for input(s): TROPONINI in the last 72 hours. BNP: No results for input(s): BNP in the last 72 hours. Lipids: No results for input(s): CHOL, HDL in the last 72 hours.     Invalid input(s): LDLCALCU  INR: No results for input(s): INR in the last 72 hours.    Objective:   Vitals: BP (!) 137/104   Pulse (!) 105   Temp 97.7 °F (36.5 °C) (Oral)   Resp 28   Ht 6' 1\" (1.854 m)   Wt 198 lb 13.7 oz (90.2 kg)   SpO2 93%   BMI 26.24 kg/m²   General appearance: Very frail looking male  HEENT: Head: Normal, normocephalic, atraumatic. Neck: supple, symmetrical, trachea midline  Lungs:  Very poor chest expansion, severely diminished breath sound  Heart:  Cardiac apical impulse not palpable heart sounds distant  Abdomen:  Soft bowel sounds present  Extremities: Homans sign is negative, no sign of DVT  Neurologic: Mental status: Alert, oriented, thought content appropriate    EKG: normal sinus rhythm. ECHO: reviewed.    Ejection fraction: 55%    Assessment / Acute Cardiac Problems:     Episodes of sinus tachycardia most likely physiological  Episodes of bradycardia most likely vagal effect  Nonsustained broad complex rhythm/V. tach, heart rate up to 110 most likely secondary to hypoxia, hypokalemia  Normal LV systolic function  Severe COPD, hypoxia  Lung mass suspected carcinoma lung      Patient Active Problem List:     Cellulitis of b/l lower extremity     Cellulitis of lower extremity     Alcoholism ([address]     Essential hypertension     Hyperkeratosis of b/l [place]     Alcohol withdrawal (HCC)     Tinea pedis of both feet     Suicidal ideation     Dyspnea     COPD exacerbation (HCC)     Gastroesophageal reflux disease without esophagitis     Cigarette smoker     Avascular necrosis of femoral head (HCC)     Tachycardia     Stage 3 severe COPD by GOLD classification (HCC)     Tobacco dependence     Chronic obstructive pulmonary disease (HCC)     Lung malignancy (HCC)     Cough with hemoptysis     Sepsis ([address]     COVID-19     Mass of lingula of lung     Hyponatremia     Respiratory failure ([address]      Plan of Treatment:   Medications reviewed  Continue current dose of Cardizem, metoprolol, lisinopril  Patient is also on bronchodilators, steroids, cefepime  Keep potassium above 4 and magnesium above 2  Continue ECG monitoring  Prognosis guarded    Electronically signed by Judah العلي MD on 11/27/2022 at 1:54 PM

## 2022-11-27 NOTE — FLOWSHEET NOTE
11/27/22 0620   Treatment Team Notification   Reason for Communication Review case   Team Member Name Dr. Harjit Reddy Attending Provider   Method of Communication Call   Response Waiting for response   Notification Time 0621       Shade Sender was called via answering services in regards to a potassium level of 3.5 since patient has no PRN sliding scale schedule.  Awaiting MD's call back

## 2022-11-27 NOTE — PROGRESS NOTES
Vancomycin Dosing by Pharmacy - Daily Note   Vancomycin Therapy Day:  6  Indication: sepsis    Allergies:  Patient has no known allergies. Actual Weight:    Wt Readings from Last 1 Encounters:   11/27/22 198 lb 13.7 oz (90.2 kg)       Labs/Ancillary Data  Estimated Creatinine Clearance: 212 mL/min (A) (based on SCr of 0.43 mg/dL (L)). Recent Labs     11/27/22  0529   CREATININE 0.43*   BUN 15   WBC 14.2*     Procalcitonin   Date Value Ref Range Status   08/29/2022 0.20 (H) <0.09 ng/mL Final     Comment:           Suspected Sepsis:  <0.50 ng/mL     Low likelihood of sepsis. 0.50-2.00 ng/mL     Increased likelihood of sepsis. Antibiotics encouraged. >2.00 ng/mL     High risk of sepsis/shock. Antibiotics strongly encouraged. Suspected Lower Resp Tract Infections:  <0.24 ng/mL     Low likelihood of bacterial infection. >0.24 ng/mL     Increased likelihood of bacterial infection. Antibiotics encouraged. With successful antibiotic therapy, PCT levels should decrease rapidly. (Half-life of 24 to   36 hours.)        Procalcitonin values from samples collected within the first 6 hours of systemic infection   may still be low. Retesting may be indicated. Values from day 1 and day 4 can be entered into the Change in Procalcitonin Calculator   ([email or web address]-pct-[email or web address]. Ramen) to determine the patient's Mortality Risk Prognosis        In healthy neonates, plasma Procalcitonin (PCT) concentrations increase gradually after   birth, reaching peak values at about 24 hours of age then decrease to normal values below   0.5 ng/mL by 48-72 hours of age.          Intake/Output Summary (Last 24 hours) at 11/27/2022 1122  Last data filed at 11/27/2022 0850  Gross per 24 hour   Intake 240 ml   Output 1175 ml   Net -935 ml     Temp: 97.7 F    Culture Date / Source  /  Results  See micro  Recent vancomycin administrations                     vancomycin (VANCOCIN) 1,250 mg in dextrose 5 % 250 mL IVPB (ADDAVIAL) (mg) 1,250 mg New Bag 11/27/22 0013     1,250 mg New Bag 11/26/22 1220     1,250 mg New Bag 11/25/22 2353     1,250 mg New Bag  1205     1,250 mg New Bag 11/24/22 2304                    Vancomycin Concentrations:   TROUGH:  No results for input(s): VANCOTROUGH in the last 72 hours. RANDOM:  No results for input(s): VANCORANDOM in the last 72 hours. MRSA Nasal Swab: N/A. Non-respiratory infection. [place] [employer]     Continue current dose of 1250 mg q12h IV  Ensured BUN/sCr ordered at baseline and every 48 hours x at least 3 levels, then at least weekly. Repeat vancomycin concentration ordered for 11/28 @ 0600   Pharmacy will continue to monitor patient and adjust therapy as indicated      Vancomycin Target Concentration Parameters  Treatment  Population Target AUC/NIK Target Trough   Invasive MRSA Infection (bacteremia, pneumonia, meningitis, endocarditis, osteomyelitis)  Sepsis (undifferentiated) 400-600 N/A   Infection due to non-MRSA pathogen  Empiric treatment of non-invasive MRSA infection  (SSTI, UTI) <500 10-15 mg/L   CrCl < 29 mL/min  Rapidly fluctuating serum creatinine   KRYSTA N/A < 15 mg/L     Renal replacement therapy is dosed by levels, per hospital protocol. Abbreviations  * Pauc: probability that AUC is >400 (efficacy); Pconc: probability that Ctrough is above 20 ?g/mL (toxicity); Tox: Probability of nephrotoxicity, based on Kade et al. Clin Infect Dis 2009. Loading dose: N/A  Regimen: 1250 mg IV every 12 hours. Start time: 12:13 on 11/27/2022  Exposure target: AUC24 (range)400-600 mg/L.hr   AUC24,ss: 474 mg/L.hr  Probability of AUC24 > 400: 80 %  Ctrough,ss: 14.2 mg/L  Probability of Ctrough,ss > 20: 12 %  Probability of nephrotoxicity (Kade IVANNA 2009): 9 %    Thank you for the consult. Pharmacy will continue to follow.      Marisol Falcon, PharmD, [address]  PGY-1 Pharmacy Resident  11/27/2022 11:26 AM

## 2022-11-27 NOTE — PROGRESS NOTES
Hospitalist Progress Note  11/27/2022 3:08 PM  Subjective:   Admit Date: 11/22/2022  PCP: JAEL Rojas - CNP     DNR-CCA      C/c:  Chief Complaint   Patient presents with    Respiratory Distress         Interval History: pt doing slightly better. Not wearing bipap, slightly puffing and huffing    Diet: ADULT DIET; Regular                                ip days:5  Medications:   Scheduled Meds:   dilTIAZem  120 mg Oral Daily    nicotine  1 patch TransDERmal Daily    methylPREDNISolone  40 mg IntraVENous Q12H    metoprolol tartrate  25 mg Oral BID    lisinopril  10 mg Oral Daily    sodium chloride flush  5-40 mL IntraVENous 2 times per day    vancomycin (VANCOCIN) intermittent dosing (placeholder)   Other RX Placeholder    sodium chloride flush  5-40 mL IntraVENous 2 times per day    enoxaparin  40 mg SubCUTAneous Daily    vancomycin  1,250 mg IntraVENous Q12H    cefepime  2,000 mg IntraVENous Q12H    ipratropium-albuterol  1 ampule Inhalation Q4H    budesonide  0.5 mg Nebulization BID     Continuous Infusions:   sodium chloride      sodium chloride       PRN Meds:.dextromethorphan-guaiFENesin, oxyCODONE-acetaminophen, potassium chloride **OR** potassium alternative oral replacement **OR** potassium chloride, metoprolol, sodium chloride flush, sodium chloride, ipratropium-albuterol, sodium chloride flush, sodium chloride, acetaminophen, ondansetron **OR** ondansetron     CBC:   Recent Labs     11/27/22  0529   WBC 14.2*   HGB 11.1*        BMP:    Recent Labs     11/27/22  0529   *   K 4.6   CL 93*   CO2 33*   BUN 15   CREATININE 0.43*   GLUCOSE 116*     Hepatic: No results for input(s): AST, ALT, ALB, BILITOT, ALKPHOS in the last 72 hours. Troponin: No results for input(s): TROPONINI in the last 72 hours. BNP: No results for input(s): BNP in the last 72 hours. Lipids: No results for input(s): CHOL, HDL in the last 72 hours.     Invalid input(s): LDLCALCU  INR: No results for input(s): INR in the last 72 hours. Objective:   Vitals: BP (!) 137/104   Pulse (!) 105   Temp 97.7 °F (36.5 °C) (Oral)   Resp 28   Ht 6' 1\" (1.854 m)   Wt 198 lb 13.7 oz (90.2 kg)   SpO2 93%   BMI 26.24 kg/m²   General appearance: alert, appears stated age and cooperative  Skin: Skin color, texture, turgor normal. No rashes or lesions  Lungs: clear to auscultation bilaterally  Heart: regular rate and rhythm, S1, S2 normal, no murmur, click, rub or gallop  Abdomen: soft, non-tender; bowel sounds normal; no masses,  no organomegaly  Extremities: extremities normal, atraumatic, no cyanosis or edema  Neurologic: Mental status: Alert, oriented, thought content appropriate    Prophylaxis:   DVT with  [x] lovenox        [] heparin        [] Scd        [] none:     Radiology:  CT HEAD WO CONTRAST    Result Date: 11/22/2022  EXAMINATION: CT OF THE HEAD WITHOUT CONTRAST  11/22/2022 2:59 pm TECHNIQUE: CT of the head was performed without the administration of intravenous contrast. Automated exposure control, iterative reconstruction, and/or weight based adjustment of the mA/kV was utilized to reduce the radiation dose to as low as reasonably achievable. COMPARISON: None. HISTORY: ORDERING SYSTEM PROVIDED HISTORY: altered TECHNOLOGIST PROVIDED HISTORY: altered Decision Support Exception - unselect if not a suspected or confirmed emergency medical condition->Emergency Medical Condition (MA) Reason for Exam: AMS, respiratory distress FINDINGS: There is no acute infarction, intracranial hemorrhage or intracranial mass lesion. No mass effect, midline shift or extra-axial collection is noted. There are moderate nonspecific foci of periventricular and subcortical cerebral white matter hypodensity, most likely representing chronic microangiopathic disease in this age group. The brain parenchyma is otherwise normal. The cerebellar tonsils are in normal position.  The ventricles, sulci, and cisterns are mildly prominent suggestive of mild generalized volume loss. The globes and orbits are within normal limits. The visualized extracranial structures including paranasal sinuses and mastoid air cells are unremarkable. No fracture is identified. No evidence for acute intracranial hemorrhage, territorial infarction or intracranial mass lesion. Moderate chronic microangiopathic ischemic disease. Mild generalized volume loss. XR CHEST PORTABLE    Result Date: 11/23/2022  EXAMINATION: ONE XRAY VIEW OF THE CHEST 11/23/2022 6:06 am COMPARISON: 11/22/2022 HISTORY: ORDERING SYSTEM PROVIDED HISTORY: resp failure, on vent TECHNOLOGIST PROVIDED HISTORY: resp failure, on vent Reason for Exam: on vent FINDINGS: Endotracheal tube and enteric tube remain in place. Heart size is within normal limits. Masslike opacity persists at the left base. Mild patchy opacities at the right base are mildly improved. No new airspace consolidation. No evidence of pneumothorax or sizable pleural effusion. 1.  Unchanged masslike opacities at the left base. 2.  Mildly improved patchy opacities at the right base. XR CHEST PORTABLE    Result Date: 11/22/2022  EXAMINATION: ONE XRAY VIEW OF THE CHEST 11/22/2022 1:39 pm COMPARISON: 09/13/2022 CT chest and chest radiograph HISTORY: ORDERING SYSTEM PROVIDED HISTORY: bipap TECHNOLOGIST PROVIDED HISTORY: bipap Reason for Exam: respiratory distress, ET and OG tube placements FINDINGS: Cardiomediastinal silhouette is within normal limits of size. The endotracheal tube tip measures 3.9 cm from the brendan. Enteric tube tip and side port overlie the stomach. Redemonstrated lingular mass, which may have increased in size since the prior examination. Right basilar opacities may represent atelectasis or infection. No pleural effusion or pneumothorax. No acute osseous abnormality. 1.  Appropriate positioning of the endotracheal and enteric tubes.  2.  A lingular mass appears to have increased in size since the prior examination and is suspicious for malignancy. Recommend tissue sampling if not previously performed. 3.  New right basilar opacities may represent infection or atelectasis. Assessment : Ac resp failure/bipap  Alcoholism. anxiety     Plan:   1. Will ask dr Dariana Hurt to see  2. See order    Patient Active Problem List:     Cellulitis of b/l lower extremity     Cellulitis of lower extremity     Alcoholism ([address]     Essential hypertension     Hyperkeratosis of b/l [place]     Alcohol withdrawal (HCC)     Tinea pedis of both feet     Suicidal ideation     Dyspnea     COPD exacerbation (HCC)     Gastroesophageal reflux disease without esophagitis     Cigarette smoker     Avascular necrosis of femoral head (HCC)     Tachycardia     Stage 3 severe COPD by GOLD classification ([address]     Tobacco dependence     Chronic obstructive pulmonary disease (HCC)     Lung malignancy (HCC)     Cough with hemoptysis     Sepsis ([address]     COVID-19     Mass of lingula of lung     Hyponatremia     Respiratory failure ([address]      Anticipated Disposition upon discharge: [] Home                                                                         [] Home with Home Health                                                                         [] [facility]                                                                         [] [address] [facility] [address]      Patient is admitted as inpatient status because of co-morbidities listed above, severity of signs and symptoms as outlined, requirement for current medical therapies and most importantly because of direct risk to patient if care not provided in a hospital setting.           Lisandra Cohn MD, MD  Rounding Hospitalist

## 2022-11-27 NOTE — PROGRESS NOTES
Dr. Julián Ledezma called writer back at shift change and asked that sliding scale be ordered for patient and used for replacement per protocol.  Please see new orders/MAR

## 2022-11-27 NOTE — PROGRESS NOTES
PULMONARY PROGRESS NOTE:    REASON FOR VISIT: copd exac, resp failure  Interval History:    Shortness of Breath: ++  Cough: better  Sputum: no          Hemoptysis: no  Chest Pain: no  Fever: no                   Swelling Feet: no  Headache: no                                           Nausea, Emesis, Abdominal Pain: no  Diarrhea: no         Constipation: no    Events since last visit: agreeable for biopsy     PAST MEDICAL HISTORY:      Scheduled Meds:   dilTIAZem  120 mg Oral Daily    nicotine  1 patch TransDERmal Daily    methylPREDNISolone  40 mg IntraVENous Q12H    metoprolol tartrate  25 mg Oral BID    lisinopril  10 mg Oral Daily    sodium chloride flush  5-40 mL IntraVENous 2 times per day    vancomycin (VANCOCIN) intermittent dosing (placeholder)   Other RX Placeholder    sodium chloride flush  5-40 mL IntraVENous 2 times per day    enoxaparin  40 mg SubCUTAneous Daily    vancomycin  1,250 mg IntraVENous Q12H    cefepime  2,000 mg IntraVENous Q12H    ipratropium-albuterol  1 ampule Inhalation Q4H    budesonide  0.5 mg Nebulization BID     Continuous Infusions:   sodium chloride      sodium chloride       PRN Meds:dextromethorphan-guaiFENesin, LORazepam, oxyCODONE-acetaminophen, potassium chloride **OR** potassium alternative oral replacement **OR** potassium chloride, metoprolol, sodium chloride flush, sodium chloride, ipratropium-albuterol, sodium chloride flush, sodium chloride, acetaminophen, ondansetron **OR** ondansetron    PHYSICAL EXAMINATION:  BP (!) 137/104   Pulse (!) 103   Temp 97.7 °F (36.5 °C) (Oral)   Resp 24   Ht 6' 1\" (1.854 m)   Wt 198 lb 13.7 oz (90.2 kg)   SpO2 93%   BMI 26.24 kg/m²     General : Awake, alert, increased WOB  Neck - supple, no lymphadenopathy, JVD not raised  Heart - tachycardia 120  Lungs - poor air Entry- fair bilaterally; breath sounds : diminished, 93% on 4 l nc / 85% on ra  Abdomen - soft, no tenderness  Upper Extremities  - no cyanosis, mottling; edema : absent  Lower Extremities: no cyanosis, mottling; edema : absent    Current Laboratory, Radiologic, Microbiologic, and Diagnostic studies reviewed  Data ReviewCBC:   Recent Labs     11/27/22  0529   WBC 14.2*   RBC 3.01*   HGB 11.1*   HCT 33.7*          BMP:   Recent Labs     11/27/22  0529   GLUCOSE 116*   *   K 4.6   BUN 15   CREATININE 0.43*   CALCIUM 9.5       ABGs:   No results for input(s): PHART, PO2ART, RIF7VZU, WII8YKT, BEART, Y7TTAGUB, AWW4NRJ in the last 72 hours.      PT/INR:  No results found for: PTINR    ASSESSMENT / PLAN:    Acute hypoxic respiratory failure     Mechanical ventilation - extubated 11/23/22  PNA - HCAP/ ABx  Hypotension - resolved  COPD     BD, steroids     Encouraged use of bipap to decrease WOB  Suspected lung CA - agreeable fro biopsy - IR consult for biopsy 11/28/22  Alcohol use      Add thiamine / folic acid     Monitor for DTs     beer with meals  Will need SNF    Plan of care discussed with Dr Sofia Bray  Electronically signed by JAEL Aquino - CNP on 11/27/22 at 4:02 PM.

## 2022-11-27 NOTE — CARE COORDINATION
ONGOING DISCHARGE PLAN:     Patient is alert and oriented x4. Spoke with patient regarding discharge plan and patient confirms that plan is still to discharge to [facility]. Patient needs a pre cert . [address] is following.       IV cefepime and Vanco,  solumedrol 40 mg q12,      CT needle biopsy on Monday for poss lung cancer     Will continue to follow for additional discharge needs  Electronically signed by Edilma Riojas RN on 11/27/2022 at 1:35 PM

## 2022-11-27 NOTE — PLAN OF CARE
Problem: Discharge Planning  Goal: Discharge to home or other facility with appropriate resources  11/27/2022 0408 by Nasir Kunz RN  Outcome: Progressing     Problem: Pain  Goal: Verbalizes/displays adequate comfort level or baseline comfort level  11/27/2022 0409 by Nasir Kunz RN  Outcome: Progressing  Note: Pt medicated with pain medication prn. Assessed all pain characteristics including level, type, location, frequency, and onset. Non-pharmacologic interventions offered to pt as well. Pt states pain is tolerable at this time. Will continue to monitor. Problem: Skin/Tissue Integrity  Goal: Absence of new skin breakdown  Description: 1. Monitor for areas of redness and/or skin breakdown  2. Assess vascular access sites hourly  3. Every 4-6 hours minimum:  Change oxygen saturation probe site  4. Every 4-6 hours:  If on nasal continuous positive airway pressure, respiratory therapy assess nares and determine need for appliance change or resting period. 11/27/2022 0409 by Nasir Kunz RN  Outcome: Progressing  Note: Skin assessment complete. Coccyx reddened. Sensicare applied PRN. Turned and repositioned every two hours. Area kept free from moisture. Proper nourishment and fluids encouraged, as appropriate. Will continue to monitor for additional needs and changes in skin breakdown. Problem: Safety - Adult  Goal: Free from fall injury  11/27/2022 0409 by Nasir Kunz RN  Outcome: Progressing  Note: Skin assessment complete. Waffle mattress in place. Coccyx reddened. Sensicare applied PRN. Turned and repositioned every two hours. Area kept free from moisture. Proper nourishment and fluids encouraged, as appropriate. Will continue to monitor for additional needs and changes in skin breakdown.

## 2022-11-28 ENCOUNTER — ANESTHESIA (OUTPATIENT)
Dept: ICU | Age: 57
End: 2022-11-28
Payer: COMMERCIAL

## 2022-11-28 ENCOUNTER — APPOINTMENT (OUTPATIENT)
Dept: GENERAL RADIOLOGY | Age: 57
End: 2022-11-28
Payer: COMMERCIAL

## 2022-11-28 ENCOUNTER — ANESTHESIA EVENT (OUTPATIENT)
Dept: ICU | Age: 57
End: 2022-11-28
Payer: COMMERCIAL

## 2022-11-28 LAB
ABSOLUTE EOS #: 0 K/UL (ref 0–0.4)
ABSOLUTE LYMPH #: 0.53 K/UL (ref 1–4.8)
ABSOLUTE MONO #: 0.53 K/UL (ref 0.1–1.3)
ALLEN TEST: ABNORMAL
ANION GAP SERPL CALCULATED.3IONS-SCNC: 3 MMOL/L (ref 9–17)
BASOPHILS # BLD: 0 % (ref 0–2)
BASOPHILS ABSOLUTE: 0 K/UL (ref 0–0.2)
BUN BLDV-MCNC: 16 MG/DL (ref 6–20)
CALCIUM SERPL-MCNC: 9.1 MG/DL (ref 8.6–10.4)
CARBOXYHEMOGLOBIN: 1.2 % (ref 0–5)
CARBOXYHEMOGLOBIN: 1.2 % (ref 0–5)
CARBOXYHEMOGLOBIN: 1.3 % (ref 0–5)
CHLORIDE BLD-SCNC: 91 MMOL/L (ref 98–107)
CO2: 36 MMOL/L (ref 20–31)
CREAT SERPL-MCNC: <0.4 MG/DL (ref 0.7–1.2)
EOSINOPHILS RELATIVE PERCENT: 0 % (ref 0–4)
FIO2: 50
FIO2: 75
GFR SERPL CREATININE-BSD FRML MDRD: ABNORMAL ML/MIN/1.73M2
GLUCOSE BLD-MCNC: 102 MG/DL (ref 75–110)
GLUCOSE BLD-MCNC: 111 MG/DL (ref 70–99)
HCO3 ARTERIAL: 39.1 MMOL/L (ref 22–26)
HCO3 ARTERIAL: 40.3 MMOL/L (ref 22–26)
HCO3 ARTERIAL: 41 MMOL/L (ref 22–26)
HCT VFR BLD CALC: 31.9 % (ref 41–53)
HEMOGLOBIN: 10.8 G/DL (ref 13.5–17.5)
INR BLD: 0.9
LYMPHOCYTES # BLD: 6 % (ref 24–44)
MCH RBC QN AUTO: 37.7 PG (ref 26–34)
MCHC RBC AUTO-ENTMCNC: 34 G/DL (ref 31–37)
MCV RBC AUTO: 110.7 FL (ref 80–100)
METHEMOGLOBIN: 0.9 % (ref 0–1.9)
METHEMOGLOBIN: 1.1 % (ref 0–1.9)
METHEMOGLOBIN: 1.2 % (ref 0–1.9)
MODE: ABNORMAL
MODE: ABNORMAL
MONOCYTES # BLD: 6 % (ref 1–7)
MORPHOLOGY: ABNORMAL
O2 DEVICE/FLOW/%: ABNORMAL
O2 SAT, ARTERIAL: 91.7 % (ref 95–98)
O2 SAT, ARTERIAL: 92 % (ref 95–98)
O2 SAT, ARTERIAL: 97.2 % (ref 95–98)
PATIENT TEMP: 37
PCO2 ARTERIAL: 115 MMHG (ref 35–45)
PCO2 ARTERIAL: 54.1 MMHG (ref 35–45)
PCO2 ARTERIAL: 79.4 MMHG (ref 35–45)
PDW BLD-RTO: 14.7 % (ref 11.5–14.9)
PEEP/CPAP: 10
PH ARTERIAL: 7.16 (ref 7.35–7.45)
PH ARTERIAL: 7.3 (ref 7.35–7.45)
PH ARTERIAL: 7.48 (ref 7.35–7.45)
PLATELET # BLD: 104 K/UL (ref 150–450)
PMV BLD AUTO: 8.3 FL (ref 6–12)
PO2 ARTERIAL: 225 MMHG (ref 80–100)
PO2 ARTERIAL: 59.4 MMHG (ref 80–100)
PO2 ARTERIAL: 76.5 MMHG (ref 80–100)
POSITIVE BASE EXCESS, ART: 12.3 MMOL/L (ref 0–2)
POSITIVE BASE EXCESS, ART: 12.7 MMOL/L (ref 0–2)
POSITIVE BASE EXCESS, ART: 16.8 MMOL/L (ref 0–2)
POTASSIUM SERPL-SCNC: 4.6 MMOL/L (ref 3.7–5.3)
PROTHROMBIN TIME: 12.1 SEC (ref 11.8–14.6)
PT. POSITION: ABNORMAL
RBC # BLD: 2.88 M/UL (ref 4.5–5.9)
RESPIRATORY RATE: 18
RESPIRATORY RATE: 20
RESPIRATORY RATE: 22
SAMPLE SITE: ABNORMAL
SEG NEUTROPHILS: 88 % (ref 36–66)
SEGMENTED NEUTROPHILS ABSOLUTE COUNT: 7.74 K/UL (ref 1.3–9.1)
SET RATE: 18
SET RATE: 20
SODIUM BLD-SCNC: 130 MMOL/L (ref 135–144)
TEXT FOR RESPIRATORY: ABNORMAL
TEXT FOR RESPIRATORY: ABNORMAL
TOTAL RATE: 20
TOTAL RATE: 22
VANCOMYCIN RANDOM DATE LAST DOSE: NORMAL
VANCOMYCIN RANDOM DATE LAST DOSE: NORMAL
VANCOMYCIN RANDOM DOSE AMOUNT: 1250
VANCOMYCIN RANDOM DOSE AMOUNT: NORMAL
VANCOMYCIN RANDOM TIME LAST DOSE: 1317
VANCOMYCIN RANDOM TIME LAST DOSE: 1320
VANCOMYCIN RANDOM: 16.6 UG/ML
VANCOMYCIN RANDOM: 27.7 UG/ML
VT: 500
WBC # BLD: 8.8 K/UL (ref 3.5–11)

## 2022-11-28 PROCEDURE — 0BH17EZ INSERTION OF ENDOTRACHEAL AIRWAY INTO TRACHEA, VIA NATURAL OR ARTIFICIAL OPENING: ICD-10-PCS | Performed by: FAMILY MEDICINE

## 2022-11-28 PROCEDURE — 2580000003 HC RX 258: Performed by: EMERGENCY MEDICINE

## 2022-11-28 PROCEDURE — 36415 COLL VENOUS BLD VENIPUNCTURE: CPT

## 2022-11-28 PROCEDURE — 71045 X-RAY EXAM CHEST 1 VIEW: CPT

## 2022-11-28 PROCEDURE — 82947 ASSAY GLUCOSE BLOOD QUANT: CPT

## 2022-11-28 PROCEDURE — 2500000003 HC RX 250 WO HCPCS: Performed by: INTERNAL MEDICINE

## 2022-11-28 PROCEDURE — 80202 ASSAY OF VANCOMYCIN: CPT

## 2022-11-28 PROCEDURE — 6360000002 HC RX W HCPCS: Performed by: NURSE PRACTITIONER

## 2022-11-28 PROCEDURE — 6360000002 HC RX W HCPCS: Performed by: EMERGENCY MEDICINE

## 2022-11-28 PROCEDURE — 2500000003 HC RX 250 WO HCPCS: Performed by: ANESTHESIOLOGY

## 2022-11-28 PROCEDURE — 31500 INSERT EMERGENCY AIRWAY: CPT

## 2022-11-28 PROCEDURE — 94640 AIRWAY INHALATION TREATMENT: CPT

## 2022-11-28 PROCEDURE — 6370000000 HC RX 637 (ALT 250 FOR IP): Performed by: INTERNAL MEDICINE

## 2022-11-28 PROCEDURE — 6360000002 HC RX W HCPCS: Performed by: INTERNAL MEDICINE

## 2022-11-28 PROCEDURE — 85610 PROTHROMBIN TIME: CPT

## 2022-11-28 PROCEDURE — 2500000003 HC RX 250 WO HCPCS: Performed by: NURSE PRACTITIONER

## 2022-11-28 PROCEDURE — 82805 BLOOD GASES W/O2 SATURATION: CPT

## 2022-11-28 PROCEDURE — 31500 INSERT EMERGENCY AIRWAY: CPT | Performed by: ANESTHESIOLOGY

## 2022-11-28 PROCEDURE — 2580000003 HC RX 258: Performed by: INTERNAL MEDICINE

## 2022-11-28 PROCEDURE — 6360000002 HC RX W HCPCS: Performed by: ANESTHESIOLOGY

## 2022-11-28 PROCEDURE — 6370000000 HC RX 637 (ALT 250 FOR IP): Performed by: FAMILY MEDICINE

## 2022-11-28 PROCEDURE — 80048 BASIC METABOLIC PNL TOTAL CA: CPT

## 2022-11-28 PROCEDURE — 02HV33Z INSERTION OF INFUSION DEVICE INTO SUPERIOR VENA CAVA, PERCUTANEOUS APPROACH: ICD-10-PCS | Performed by: INTERNAL MEDICINE

## 2022-11-28 PROCEDURE — 2580000003 HC RX 258: Performed by: NURSE PRACTITIONER

## 2022-11-28 PROCEDURE — 85025 COMPLETE CBC W/AUTO DIFF WBC: CPT

## 2022-11-28 PROCEDURE — 2700000000 HC OXYGEN THERAPY PER DAY

## 2022-11-28 PROCEDURE — 94660 CPAP INITIATION&MGMT: CPT

## 2022-11-28 PROCEDURE — 2000000000 HC ICU R&B

## 2022-11-28 PROCEDURE — 36600 WITHDRAWAL OF ARTERIAL BLOOD: CPT

## 2022-11-28 RX ORDER — METHYLPREDNISOLONE SODIUM SUCCINATE 40 MG/ML
40 INJECTION, POWDER, LYOPHILIZED, FOR SOLUTION INTRAMUSCULAR; INTRAVENOUS EVERY 6 HOURS
Status: DISCONTINUED | OUTPATIENT
Start: 2022-11-28 | End: 2022-12-02

## 2022-11-28 RX ORDER — NOREPINEPHRINE BIT/0.9 % NACL 16MG/250ML
1-100 INFUSION BOTTLE (ML) INTRAVENOUS CONTINUOUS
Status: DISCONTINUED | OUTPATIENT
Start: 2022-11-28 | End: 2022-12-07

## 2022-11-28 RX ORDER — MIDAZOLAM HYDROCHLORIDE 1 MG/ML
INJECTION INTRAMUSCULAR; INTRAVENOUS
Status: COMPLETED | OUTPATIENT
Start: 2022-11-28 | End: 2022-11-28

## 2022-11-28 RX ORDER — PROPOFOL 10 MG/ML
5-50 INJECTION, EMULSION INTRAVENOUS CONTINUOUS
Status: DISCONTINUED | OUTPATIENT
Start: 2022-11-28 | End: 2022-12-08

## 2022-11-28 RX ORDER — SUCCINYLCHOLINE/SOD CL,ISO/PF 200MG/10ML
SYRINGE (ML) INTRAVENOUS
Status: COMPLETED | OUTPATIENT
Start: 2022-11-28 | End: 2022-11-28

## 2022-11-28 RX ADMIN — PROPOFOL 40 MCG/KG/MIN: 10 INJECTION, EMULSION INTRAVENOUS at 20:46

## 2022-11-28 RX ADMIN — SODIUM CHLORIDE, PRESERVATIVE FREE 10 ML: 5 INJECTION INTRAVENOUS at 09:10

## 2022-11-28 RX ADMIN — IPRATROPIUM BROMIDE AND ALBUTEROL SULFATE 1 AMPULE: 2.5; .5 SOLUTION RESPIRATORY (INHALATION) at 20:08

## 2022-11-28 RX ADMIN — DEXMEDETOMIDINE HYDROCHLORIDE 0.8 MCG/KG/HR: 400 INJECTION INTRAVENOUS at 05:58

## 2022-11-28 RX ADMIN — PROPOFOL 20 MCG/KG/MIN: 10 INJECTION, EMULSION INTRAVENOUS at 12:04

## 2022-11-28 RX ADMIN — IPRATROPIUM BROMIDE AND ALBUTEROL SULFATE 1 AMPULE: 2.5; .5 SOLUTION RESPIRATORY (INHALATION) at 12:31

## 2022-11-28 RX ADMIN — PROPOFOL 40 MCG/KG/MIN: 10 INJECTION, EMULSION INTRAVENOUS at 16:25

## 2022-11-28 RX ADMIN — SODIUM CHLORIDE, PRESERVATIVE FREE 10 ML: 5 INJECTION INTRAVENOUS at 09:11

## 2022-11-28 RX ADMIN — MIDAZOLAM 2 MG: 1 INJECTION INTRAMUSCULAR; INTRAVENOUS at 11:42

## 2022-11-28 RX ADMIN — DEXMEDETOMIDINE HYDROCHLORIDE 0.8 MCG/KG/HR: 400 INJECTION INTRAVENOUS at 10:36

## 2022-11-28 RX ADMIN — METHYLPREDNISOLONE SODIUM SUCCINATE 40 MG: 40 INJECTION, POWDER, FOR SOLUTION INTRAMUSCULAR; INTRAVENOUS at 22:24

## 2022-11-28 RX ADMIN — CEFEPIME 2000 MG: 2 INJECTION, POWDER, FOR SOLUTION INTRAVENOUS at 16:24

## 2022-11-28 RX ADMIN — Medication 5 MCG/MIN: at 23:33

## 2022-11-28 RX ADMIN — IPRATROPIUM BROMIDE AND ALBUTEROL SULFATE 1 AMPULE: 2.5; .5 SOLUTION RESPIRATORY (INHALATION) at 03:03

## 2022-11-28 RX ADMIN — METHYLPREDNISOLONE SODIUM SUCCINATE 40 MG: 40 INJECTION, POWDER, FOR SOLUTION INTRAMUSCULAR; INTRAVENOUS at 16:22

## 2022-11-28 RX ADMIN — VANCOMYCIN HYDROCHLORIDE 1250 MG: 1.25 INJECTION, POWDER, LYOPHILIZED, FOR SOLUTION INTRAVENOUS at 13:20

## 2022-11-28 RX ADMIN — IPRATROPIUM BROMIDE AND ALBUTEROL SULFATE 1 AMPULE: 2.5; .5 SOLUTION RESPIRATORY (INHALATION) at 16:29

## 2022-11-28 RX ADMIN — Medication 120 MG: at 11:43

## 2022-11-28 RX ADMIN — Medication 5 MCG/MIN: at 12:00

## 2022-11-28 RX ADMIN — THIAMINE HYDROCHLORIDE: 100 INJECTION, SOLUTION INTRAMUSCULAR; INTRAVENOUS at 13:20

## 2022-11-28 RX ADMIN — VANCOMYCIN HYDROCHLORIDE 1250 MG: 1.25 INJECTION, POWDER, LYOPHILIZED, FOR SOLUTION INTRAVENOUS at 03:36

## 2022-11-28 RX ADMIN — IPRATROPIUM BROMIDE AND ALBUTEROL SULFATE 1 AMPULE: 2.5; .5 SOLUTION RESPIRATORY (INHALATION) at 08:24

## 2022-11-28 RX ADMIN — VANCOMYCIN HYDROCHLORIDE 1250 MG: 1.25 INJECTION, POWDER, LYOPHILIZED, FOR SOLUTION INTRAVENOUS at 23:08

## 2022-11-28 ASSESSMENT — PULMONARY FUNCTION TESTS
PIF_VALUE: 24
PIF_VALUE: 28
PIF_VALUE: 37
PIF_VALUE: 23
PIF_VALUE: 24
PIF_VALUE: 23
PIF_VALUE: 25
PIF_VALUE: 26
PIF_VALUE: 28
PIF_VALUE: 23
PIF_VALUE: 26
PIF_VALUE: 25
PIF_VALUE: 25
PIF_VALUE: 26
PIF_VALUE: 40
PIF_VALUE: 26
PIF_VALUE: 24
PIF_VALUE: 25
PIF_VALUE: 25
PIF_VALUE: 24
PIF_VALUE: 26
PIF_VALUE: 34
PIF_VALUE: 25
PIF_VALUE: 23
PIF_VALUE: 25
PIF_VALUE: 32
PIF_VALUE: 28
PIF_VALUE: 24
PIF_VALUE: 26
PIF_VALUE: 24

## 2022-11-28 ASSESSMENT — LIFESTYLE VARIABLES: SMOKING_STATUS: 0

## 2022-11-28 ASSESSMENT — ENCOUNTER SYMPTOMS
SHORTNESS OF BREATH: 1
STRIDOR: 0

## 2022-11-28 ASSESSMENT — COPD QUESTIONNAIRES: CAT_SEVERITY: NO INTERVAL CHANGE

## 2022-11-28 NOTE — PROGRESS NOTES
Hospitalist Progress Note  11/28/2022 6:50 PM  Subjective:   Admit Date: 11/22/2022  PCP: Krystina Shaw, APRN - CNP     DNR-CCA      C/c:  Chief Complaint   Patient presents with    Respiratory Distress         Interval History: pt hypoxic requiring transfer to icu, pt blood gases bad, requiring emergent intubation, pt on pressors, on precedex drip.     Diet: No diet orders on file                                ip days:6  Medications:   Scheduled Meds:   thiamine and folic acid IVPB   IntraVENous Daily    dilTIAZem  120 mg Oral Daily    nicotine  1 patch TransDERmal Daily    methylPREDNISolone  40 mg IntraVENous Q12H    metoprolol tartrate  25 mg Oral BID    lisinopril  10 mg Oral Daily    sodium chloride flush  5-40 mL IntraVENous 2 times per day    vancomycin (VANCOCIN) intermittent dosing (placeholder)   Other RX Placeholder    sodium chloride flush  5-40 mL IntraVENous 2 times per day    enoxaparin  40 mg SubCUTAneous Daily    vancomycin  1,250 mg IntraVENous Q12H    cefepime  2,000 mg IntraVENous Q12H    ipratropium-albuterol  1 ampule Inhalation Q4H    budesonide  0.5 mg Nebulization BID     Continuous Infusions:   norepinephrine Stopped (11/28/22 1222)    propofol 40 mcg/kg/min (11/28/22 1625)    dexmedetomidine HCl in NaCl 0.8 mcg/kg/hr (11/28/22 1036)    sodium chloride      sodium chloride       PRN Meds:.dextromethorphan-guaiFENesin, LORazepam, oxyCODONE-acetaminophen, potassium chloride **OR** potassium alternative oral replacement **OR** potassium chloride, metoprolol, sodium chloride flush, sodium chloride, ipratropium-albuterol, sodium chloride flush, sodium chloride, acetaminophen, ondansetron **OR** ondansetron     CBC:   Recent Labs     11/27/22  0529 11/28/22  1400   WBC 14.2* 8.8   HGB 11.1* 10.8*    104*     BMP:    Recent Labs     11/27/22  0529 11/28/22  1400   * 130*   K 4.6 4.6   CL 93* 91*   CO2 33* 36*   BUN 15 16   CREATININE 0.43* <0.40*   GLUCOSE 116* 111*     Hepatic: No results for input(s): AST, ALT, ALB, BILITOT, ALKPHOS in the last 72 hours. Troponin: No results for input(s): TROPONINI in the last 72 hours. BNP: No results for input(s): BNP in the last 72 hours. Lipids: No results for input(s): CHOL, HDL in the last 72 hours. Invalid input(s): LDLCALCU  INR:   Recent Labs     11/28/22  1400   INR 0.9       Objective:   Vitals: /82   Pulse (!) 107   Temp 97.4 °F (36.3 °C) (Axillary)   Resp 21   Ht 6' 1\" (1.854 m)   Wt 198 lb 13.7 oz (90.2 kg)   SpO2 97%   BMI 26.24 kg/m²   General appearance: sedated. intubated  Skin: Skin color, texture, turgor normal. No rashes or lesions  Lungs: clear to auscultation bilaterally  Heart: regular rate and rhythm, S1, S2 normal, no murmur, click, rub or gallop  Abdomen: soft, non-tender; bowel sounds normal; no masses,  no organomegaly  Extremities: extremities normal, atraumatic, no cyanosis or edema  Neurologic: Mental status: Alert, oriented, thought content appropriate    Prophylaxis:   DVT with  [x] lovenox        [] heparin        [] Scd        [] none:     Radiology:  CT HEAD WO CONTRAST    Result Date: 11/22/2022  EXAMINATION: CT OF THE HEAD WITHOUT CONTRAST  11/22/2022 2:59 pm TECHNIQUE: CT of the head was performed without the administration of intravenous contrast. Automated exposure control, iterative reconstruction, and/or weight based adjustment of the mA/kV was utilized to reduce the radiation dose to as low as reasonably achievable. COMPARISON: None. HISTORY: ORDERING SYSTEM PROVIDED HISTORY: altered TECHNOLOGIST PROVIDED HISTORY: altered Decision Support Exception - unselect if not a suspected or confirmed emergency medical condition->Emergency Medical Condition (MA) Reason for Exam: AMS, respiratory distress FINDINGS: There is no acute infarction, intracranial hemorrhage or intracranial mass lesion. No mass effect, midline shift or extra-axial collection is noted.  There are moderate nonspecific foci of periventricular and subcortical cerebral white matter hypodensity, most likely representing chronic microangiopathic disease in this age group. The brain parenchyma is otherwise normal. The cerebellar tonsils are in normal position. The ventricles, sulci, and cisterns are mildly prominent suggestive of mild generalized volume loss. The globes and orbits are within normal limits. The visualized extracranial structures including paranasal sinuses and mastoid air cells are unremarkable. No fracture is identified. No evidence for acute intracranial hemorrhage, territorial infarction or intracranial mass lesion. Moderate chronic microangiopathic ischemic disease. Mild generalized volume loss. XR CHEST PORTABLE    Result Date: 11/23/2022  EXAMINATION: ONE XRAY VIEW OF THE CHEST 11/23/2022 6:06 am COMPARISON: 11/22/2022 HISTORY: ORDERING SYSTEM PROVIDED HISTORY: resp failure, on vent TECHNOLOGIST PROVIDED HISTORY: resp failure, on vent Reason for Exam: on vent FINDINGS: Endotracheal tube and enteric tube remain in place. Heart size is within normal limits. Masslike opacity persists at the left base. Mild patchy opacities at the right base are mildly improved. No new airspace consolidation. No evidence of pneumothorax or sizable pleural effusion. 1.  Unchanged masslike opacities at the left base. 2.  Mildly improved patchy opacities at the right base. XR CHEST PORTABLE    Result Date: 11/22/2022  EXAMINATION: ONE XRAY VIEW OF THE CHEST 11/22/2022 1:39 pm COMPARISON: 09/13/2022 CT chest and chest radiograph HISTORY: ORDERING SYSTEM PROVIDED HISTORY: bipap TECHNOLOGIST PROVIDED HISTORY: bipap Reason for Exam: respiratory distress, ET and OG tube placements FINDINGS: Cardiomediastinal silhouette is within normal limits of size. The endotracheal tube tip measures 3.9 cm from the brendan. Enteric tube tip and side port overlie the stomach.   Redemonstrated lingular mass, which may have increased in size since the prior examination. Right basilar opacities may represent atelectasis or infection. No pleural effusion or pneumothorax. No acute osseous abnormality. 1.  Appropriate positioning of the endotracheal and enteric tubes. 2.  A lingular mass appears to have increased in size since the prior examination and is suspicious for malignancy. Recommend tissue sampling if not previously performed. 3.  New right basilar opacities may represent infection or atelectasis. Assessment : Ac resp failure/intubation  Anxiety  Alcoholic  Hypotension  Pt critical     Plan:   1.   Continue present plan        Patient Active Problem List:     Cellulitis of b/l lower extremity     Cellulitis of lower extremity     Alcoholism ([address]     Essential hypertension     Hyperkeratosis of b/l [place]     Alcohol withdrawal (HCC)     Tinea pedis of both feet     Suicidal ideation     Dyspnea     COPD exacerbation (HCC)     Gastroesophageal reflux disease without esophagitis     Cigarette smoker     Avascular necrosis of femoral head (HCC)     Tachycardia     Stage 3 severe COPD by GOLD classification ([address]     Tobacco dependence     Chronic obstructive pulmonary disease (HCC)     Lung malignancy (HCC)     Cough with hemoptysis     Sepsis ([address]     COVID-19     Mass of lingula of lung     Hyponatremia     Respiratory failure ([address]      Anticipated Disposition upon discharge: [] Home                                                                         [] Home with Home Health                                                                         [] [facility]                                                                         [] [address] [facility] [address] ,[address]      Patient is admitted as inpatient status because of co-morbidities listed above, severity of signs and symptoms as outlined, requirement for current medical therapies and most importantly because of direct risk to patient if care not provided in a hospital setting.           Haydee Oliveira MD, MD  Rounding Hospitalist

## 2022-11-28 NOTE — PLAN OF CARE
Problem: Discharge Planning  Goal: Discharge to home or other facility with appropriate resources  11/28/2022 1359 by Brittni Portillo RN  Outcome: Progressing  11/28/2022 1348 by Brittni Portillo RN  Outcome: Progressing     Problem: Pain  Goal: Verbalizes/displays adequate comfort level or baseline comfort level  11/28/2022 1359 by Brittni Portillo RN  Outcome: Progressing  11/28/2022 1348 by Brittni Portillo RN  Outcome: Progressing     Problem: Skin/Tissue Integrity  Goal: Absence of new skin breakdown  Description: 1. Monitor for areas of redness and/or skin breakdown  2. Assess vascular access sites hourly  3. Every 4-6 hours minimum:  Change oxygen saturation probe site  4. Every 4-6 hours:  If on nasal continuous positive airway pressure, respiratory therapy assess nares and determine need for appliance change or resting period. 11/28/2022 1359 by Brittni Portillo RN  Outcome: Progressing  11/28/2022 1348 by Brittni Portillo RN  Outcome: Progressing     Problem: Safety - Adult  Goal: Free from fall injury  11/28/2022 1359 by Brittni Portillo RN  Outcome: Progressing  11/28/2022 1348 by Brittni Portillo RN  Outcome: Progressing     Problem: ABCDS Injury Assessment  Goal: Absence of physical injury  11/28/2022 1359 by Brittni Portillo RN  Outcome: Progressing  11/28/2022 1348 by Brittni Portillo RN  Outcome: Progressing     Problem: Safety - Medical Restraint  Goal: Remains free of injury from restraints (Restraint for Interference with Medical Device)  Description: INTERVENTIONS:  1. Determine that other, less restrictive measures have been tried or would not be effective before applying the restraint  2. Evaluate the patient's condition at the time of restraint application  3. Inform patient/family regarding the reason for restraint  4.  Q2H: Monitor safety, psychosocial status, comfort, nutrition and hydration  Outcome: Progressing

## 2022-11-28 NOTE — PROGRESS NOTES
Pt transferred via bed on monitor to ICU 15. All of pt's belongings sent with pt. Pt oriented to room and call light, telemetry placed, bed is low/locked, side rails are up x2, bed alarm remains on.

## 2022-11-28 NOTE — PROGRESS NOTES
Spoke to daughter, Nathen Rao, who is unsure of whether patient wants to proceed with lung biopsy because when Dr. Rachelle Carrasquillo suggested lung biopsy or Hospice, the patient chose to have lung biopsy. At a later date over this past weekend he indicated to his daughter that he didn't want anything else done. In light of his recent confusion/agitation and combativeness, Nathen Rao said she will re-evaluate his status after 1030 today when her and her Santana Kayser will see the patient.   I notified her I would touch base at that point with his nurse, Merlin Phlegm

## 2022-11-28 NOTE — PROGRESS NOTES
During bedside report, pt is in moderate resp distress with accessory muscle usage. Pt continues to refuse bipap. Pt and family at bedside extensively educated and that pt might potentially transfer to ICU if bipap is not worn. Pt did not appear to be interested in conversation for he stated several times \"ICU made me go through alcohol withdrawal\" and \"I need more beer at my meals\". Family also encouraged pt to wear bipap. Pt continues to refuse.

## 2022-11-28 NOTE — PROGRESS NOTES
Date: 11/28/2022  Time: 1150  Patient identity confirmed:  Yes  Indications: Respiratory Failure  Preoxygenation: yes    Laryngoscope size and type Glidescope 3.0  Airway introducer used: Yes  Evac: No  ETT size:a 7.5 cuffed  Number of attempts:1   Cords visualized:  [x] Clearly  [] Poorly  Breath sounds present bilaterally: Yes   ETCO2   [x] Positive   ETT secured at  26cm @lips    ETT secured with holister  Chest x-ray ordered: Yes     Difficult airway:    No       If yes, was red tape placed around ETT:   No    Was this a Code Situation:    No             BP: (!) 178/86        Procedure performed by: Dr Daniel Saunders RCP  12:04 PM

## 2022-11-28 NOTE — PROGRESS NOTES
11/28/22 1629   Encounter Summary   Encounter Overview/Reason  Spiritual/Emotional Needs   Service Provided For: Patient   Referral/Consult From: Palliative Care   Last Encounter  11/28/22   Complexity of Encounter Low   Palliative Care   Type Palliative Care, Follow-up   Assessment/Intervention/Outcome   Assessment Unable to assess   Intervention Prayer (assurance of)/[place]

## 2022-11-28 NOTE — PROGRESS NOTES
Levophed started through tripple lumen picc in gray port, diprivan started through red port. OG placed at 62 at the lip.

## 2022-11-28 NOTE — PLAN OF CARE
Problem: Discharge Planning  Goal: Discharge to home or other facility with appropriate resources  11/28/2022 1637 by Jesse Taylor RN  Outcome: Progressing  11/28/2022 1359 by Jesse Taylor RN  Outcome: Progressing  11/28/2022 1348 by Jesse Taylor RN  Outcome: Progressing  Flowsheets (Taken 11/28/2022 1200)  Discharge to home or other facility with appropriate resources: Identify barriers to discharge with patient and caregiver     Problem: Pain  Goal: Verbalizes/displays adequate comfort level or baseline comfort level  11/28/2022 1637 by Jesse Taylor RN  Outcome: Progressing  11/28/2022 1359 by Jesse Taylor RN  Outcome: Progressing  11/28/2022 1348 by Jesse Taylor RN  Outcome: Progressing     Problem: Skin/Tissue Integrity  Goal: Absence of new skin breakdown  Description: 1. Monitor for areas of redness and/or skin breakdown  2. Assess vascular access sites hourly  3. Every 4-6 hours minimum:  Change oxygen saturation probe site  4. Every 4-6 hours:  If on nasal continuous positive airway pressure, respiratory therapy assess nares and determine need for appliance change or resting period.   11/28/2022 1637 by Jesse Taylor RN  Outcome: Progressing  11/28/2022 1359 by Jesse Taylor RN  Outcome: Progressing  11/28/2022 1348 by Jesse Taylor RN  Outcome: Progressing     Problem: Safety - Adult  Goal: Free from fall injury  11/28/2022 1637 by Jesse Taylor RN  Outcome: Progressing  11/28/2022 1359 by Jesse Taylor RN  Outcome: Progressing  11/28/2022 1348 by Jesse Taylor RN  Outcome: Progressing     Problem: ABCDS Injury Assessment  Goal: Absence of physical injury  11/28/2022 1637 by Jesse Taylor RN  Outcome: Progressing  11/28/2022 1359 by Jesse Taylor RN  Outcome: Progressing  11/28/2022 1348 by Jesse Taylor RN  Outcome: Progressing     Problem: Safety - Medical Restraint  Goal: Remains free of injury from restraints (Restraint for Interference with Medical Device)  Description: INTERVENTIONS:  1. Determine that other, less restrictive measures have been tried or would not be effective before applying the restraint  2. Evaluate the patient's condition at the time of restraint application  3. Inform patient/family regarding the reason for restraint  4.  Q2H: Monitor safety, psychosocial status, comfort, nutrition and hydration  11/28/2022 1637 by Kristen Kellogg RN  Outcome: Progressing  11/28/2022 1359 by Kristen Kellogg RN  Outcome: Progressing     Problem: Nutrition Deficit:  Goal: Optimize nutritional status  Outcome: Progressing

## 2022-11-28 NOTE — PROGRESS NOTES
Dr. Teresa Diehl returned perfect serve message. Updated him on pt refusal to wear bipap d/t anxiety/claustrophobia and that pt is experiencing resp distress. Order received to transfer pt to intermediate ICU, start a precedex gtt, and to order ABG's 1 hour after placed on bipap. Pt and his family at bedside updated.

## 2022-11-28 NOTE — PROGRESS NOTES
Patient took bipap off, RT placed him on nasal cannula, and he desaturated into the 40s. Clin lead Ambreen and RT Johana Kind in room. Patient placed back on bipap at this time, he is on 1.0 mcg of precedex.

## 2022-11-28 NOTE — PROGRESS NOTES
Pt to be transferred to Intermediate ICU when additional staff member arrives to unit. Family and pt updated.

## 2022-11-28 NOTE — PROGRESS NOTES
Dr. Danelle Esparza at bedside to discuss patient prognosis with family. Order for levophed placed, stat ABGs and stat CXR. Order placed for access RN to come place PICC line to start vasopressors. Access RN called at [phone], awaiting a call back from access nurse.

## 2022-11-28 NOTE — PROGRESS NOTES
Secure message sent via [employer] to Dr. Zhen Harper asking him to call unit for pt is experiencing resp distress and is currently refusing to wear ordered bipap. Awaiting call back.
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
## 2022-11-28 NOTE — PROGRESS NOTES
[address]   OCCUPATIONAL THERAPY MISSED TREATMENT NOTE   INPATIENT   Date: 22  Patient Name: Tom Comment       Room:   MRN: [id number]   Account #: [de-identified]    : 1965  (61 y.o.)  Gender: male                 REASON FOR MISSED TREATMENT:  Patient unable to participate   -    Patient intubated this date. Will continue to follow.       Electronically signed by CARLOS ALBERTO Granger on 2022 at 12:38 PM

## 2022-11-28 NOTE — ANESTHESIA PROCEDURE NOTES
Airway  Date/Time: 11/28/2022 11:40 AM  Urgency: emergent    Airway not difficult    General Information and Staff    Patient location during procedure: ICU  Anesthesiologist: Janak Suazo MD  Performed: anesthesiologist     Consent for Airway (if performed for an anesthetic, see related documentation for consents)  Patient identity confirmed: per hospital policy  Consent: No emergent situation. Verbal consent not obtained. Written consent not obtained.   Risks and benefits: risks, benefits and alternatives were not discussed      Code status verified:yes  Indications and Patient Condition  Indications for airway management: respiratory failure and cardiovascular instability  Spontaneous ventilation: present  Sedation level: moderate (conscious sedation)  Preoxygenated: yes  Patient position: sniffing  MILS maintained throughout  Mask difficulty assessment: vent by bag mask    Final Airway Details  Final airway type: endotracheal airway      Successful airway: ETT  Cuffed: yes   Successful intubation technique: direct laryngoscopy  Facilitating devices/methods: cricoid pressure  Endotracheal tube insertion site: oral  Blade: Ayo  Blade size: #3  ETT size (mm): 7.5  Cormack-Lehane Classification: grade IIa - partial view of glottis  Placement verified by: chest auscultation and radiography   Inital cuff pressure (cm H2O): 23  Measured from: lips  Number of attempts at approach: 1  Ventilation between attempts: bag mask  Number of other approaches attempted: 0

## 2022-11-28 NOTE — PROGRESS NOTES
Per floor nurse, Laurie Hernandez RN, lung biopsy on hold while patient's family making decisions for care at this time due to declining respiratory and hemodynamic status.

## 2022-11-28 NOTE — PROGRESS NOTES
Dr. Sarah Drake notified about patient's low BP. Informed him about patient's respiratory status when he comes off the bipap. Awaiting response at this time. Attempting to titrate down precedex gtt as patient tolerates.

## 2022-11-28 NOTE — PROGRESS NOTES
Patient not appropriate at this time due to respiratory status to attempt oral medications, will attempt again later.

## 2022-11-28 NOTE — PROGRESS NOTES
Blood sample was collected while dose was running. Will reschedule random level this afternoon.     Meng Winters, [facility]  11/28/22 0600

## 2022-11-28 NOTE — PROGRESS NOTES
Physical Therapy        Physical Therapy Cancel Note      DATE: 2022    NAME: Halie Ibarra  MRN: [id number]   : 1965      Patient not seen this date for Physical Therapy due to:     Other: RN hold,    22 0953   PT Whiteboard Notes   Therapy Whiteboard  RN cx, pt desats to 40's when bipap is removed, check back tomorrow MR         Electronically signed by Hyun Petersen PT on 2022 at 4:11 PM

## 2022-11-28 NOTE — PROGRESS NOTES
Vancomycin Dosing by Pharmacy - Daily Note   Vancomycin Therapy Day:  7  Indication: Sepsis    Allergies:  Patient has no known allergies. Actual Weight:    Wt Readings from Last 1 Encounters:   11/27/22 198 lb 13.7 oz (90.2 kg)       Labs/Ancillary Data  Estimated Creatinine Clearance: 227 mL/min (based on SCr of 0.4 mg/dL). Recent Labs     11/27/22  0529 11/28/22  1400   CREATININE 0.43* <0.40*   BUN 15 16   WBC 14.2* 8.8     Procalcitonin   Date Value Ref Range Status   08/29/2022 0.20 (H) <0.09 ng/mL Final     Comment:           Suspected Sepsis:  <0.50 ng/mL     Low likelihood of sepsis. 0.50-2.00 ng/mL     Increased likelihood of sepsis. Antibiotics encouraged. >2.00 ng/mL     High risk of sepsis/shock. Antibiotics strongly encouraged. Suspected Lower Resp Tract Infections:  <0.24 ng/mL     Low likelihood of bacterial infection. >0.24 ng/mL     Increased likelihood of bacterial infection. Antibiotics encouraged. With successful antibiotic therapy, PCT levels should decrease rapidly. (Half-life of 24 to   36 hours.)        Procalcitonin values from samples collected within the first 6 hours of systemic infection   may still be low. Retesting may be indicated. Values from day 1 and day 4 can be entered into the Change in Procalcitonin Calculator   (www.[employer]-pct-calculator. com) to determine the patient's Mortality Risk Prognosis        In healthy neonates, plasma Procalcitonin (PCT) concentrations increase gradually after   birth, reaching peak values at about 24 hours of age then decrease to normal values below   0.5 ng/mL by 48-72 hours of age.          Intake/Output Summary (Last 24 hours) at 11/28/2022 1833  Last data filed at 11/27/2022 2257  Gross per 24 hour   Intake --   Output 275 ml   Net -275 ml     Temp: 97.4 F    Culture Date / Source  /  Results  See micro  Recent vancomycin administrations                     vancomycin (VANCOCIN) 1,250 mg in dextrose 5 % 250 mL IVPB (ADDAVIAL) (mg) 1,250 mg New Bag 11/28/22 1320     1,250 mg New Bag  0336     1,250 mg New Bag 11/27/22 1317     1,250 mg New Bag  0013     1,250 mg New Bag 11/26/22 1220     1,250 mg New Bag 11/25/22 2353                    Vancomycin Concentrations:   TROUGH:  No results for input(s): VANCOTROUGH in the last 72 hours. RANDOM:    Recent Labs     11/28/22  0352 11/28/22  1718   VANCORANDOM 16.6 27.7       MRSA Nasal Swab: N/A. Non-respiratory infection. Loreatha Press PLAN     Continue current dose of 1250 mg q12h IV  Ensured BUN/sCr ordered at baseline and every 48 hours x at least 3 levels, then at least weekly. Pharmacy will continue to monitor patient and adjust therapy as indicated      Vancomycin Target Concentration Parameters  Treatment  Population Target AUC/NIK Target Trough   Invasive MRSA Infection (bacteremia, pneumonia, meningitis, endocarditis, osteomyelitis)  Sepsis (undifferentiated) 400-600 N/A   Infection due to non-MRSA pathogen  Empiric treatment of non-invasive MRSA infection  (SSTI, UTI) <500 10-15 mg/L   CrCl < 29 mL/min  Rapidly fluctuating serum creatinine   KRYSTA N/A < 15 mg/L     Renal replacement therapy is dosed by levels, per hospital protocol. Abbreviations  * Pauc: probability that AUC is >400 (efficacy); Pconc: probability that Ctrough is above 20 ?g/mL (toxicity); Tox: Probability of nephrotoxicity, based on Kade et al. Clin Infect Dis 2009. Loading dose: N/A  Regimen: 1250 mg IV every 12 hours. Start time: 18:33 on 11/28/2022  Exposure target: AUC24 (range)400-600 mg/L.hr   AUC24,ss: 431 mg/L.hr  Probability of AUC24 > 400: 61 %  Ctrough,ss: 10.4 mg/L  Probability of Ctrough,ss > 20: 4 %  Probability of nephrotoxicity (Lodise IVANNA 2009): 6 %    Thank you for the consult. Pharmacy will continue to follow.   [facility], [address] [place] [address]    11/28/2022   6:34 PM

## 2022-11-28 NOTE — PROGRESS NOTES
Comprehensive Nutrition Assessment    Type and Reason for Visit:  Initial, Consult (vent)    Nutrition Recommendations/Plan:   If nutrition support becomes necessary, recommend Vital High Protein at 60 ml per hour 1440 kcal, 125 g protein     Malnutrition Assessment:  Malnutrition Status: At risk for malnutrition (Comment) (11/28/22 1350)    Context:  Acute Illness     Findings of the 6 clinical characteristics of malnutrition:  Energy Intake:  No significant decrease in energy intake  Weight Loss:  No significant weight loss     Body Fat Loss:  Unable to assess     Muscle Mass Loss:  Unable to assess    Fluid Accumulation:  Mild Extremities   Strength:  Not Performed    Nutrition Assessment:    Pt was admitted from [facility] due to Respiratory Distress. He was recently vented at [facility]. Intake has been mostly greater than 75% since admit. He was vented this morning. Diprivan is at 18.9 ml per hour (500 kcal). Nutrition Related Findings:    mild RUE edema, labs: Na 826, K 4.6, Meds:Folic Acid, Thiamine, PMH: ETOH, Lung Mass, COPD Wound Type: None       Current Nutrition Intake & Therapies:    Average Meal Intake: % (prior to intubation)     No diet orders on file    Anthropometric Measures:  Height: 6' 1\" (185.4 cm)  Ideal Body Weight (IBW): 184 lbs (84 kg)    Admission Body Weight: 185 lb (83.9 kg)  Current Body Weight: 198 lb (89.8 kg),   IBW. Weight Source: Bed Scale  Current BMI (kg/m2): 26.1  Usual Body Weight: 201 lb (91.2 kg) (8/22)  % Weight Change (Calculated): -1.5                    BMI Categories: Overweight (BMI 25.0-29. 9)    Estimated Daily Nutrient Needs:  Energy Requirements Based On: Formula  Weight Used for Energy Requirements: Admission  Energy (kcal/day): [place] x 1.2= 8467-2742 kcal  Weight Used for Protein Requirements: Admission  Protein (g/day): 1.4g/kg= 115- 120 g       Nutrition Diagnosis:   Inadequate oral intake related to impaired respiratory function as evidenced by NPO or clear liquid status due to medical condition    Nutrition Interventions:   Food and/or Nutrient Delivery: Start Tube Feeding  Nutrition Education/Counseling: No recommendation at this time  Coordination of Nutrition Care: Continue to monitor while inpatient       Goals:     Goals: Meet at least 75% of estimated needs       Nutrition Monitoring and Evaluation:      Food/Nutrient Intake Outcomes: Enteral Nutrition Intake/Tolerance  Physical Signs/Symptoms Outcomes: Biochemical Data, GI Status, Fluid Status or Edema, Skin, Weight    Discharge Planning:     Too soon to determine     Isabella Hilliard, [address] [place] [address],   Contact: [phone]

## 2022-11-28 NOTE — PROGRESS NOTES
Pt continues to frequently remove bipap. Pt continues to point to precedex gtt and yells at writer to \"turn it up\". Pt educated several times now about the medication and continues to become angry and yell at [address] and other staff members.

## 2022-11-29 ENCOUNTER — APPOINTMENT (OUTPATIENT)
Dept: GENERAL RADIOLOGY | Age: 57
End: 2022-11-29
Payer: COMMERCIAL

## 2022-11-29 LAB
ABSOLUTE BANDS #: 0.3 K/UL (ref 0–1)
ABSOLUTE EOS #: 0 K/UL (ref 0–0.4)
ABSOLUTE LYMPH #: 0.61 K/UL (ref 1–4.8)
ABSOLUTE MONO #: 0.15 K/UL (ref 0.1–1.3)
ALBUMIN SERPL-MCNC: 2.9 G/DL (ref 3.5–5.2)
ALLEN TEST: ABNORMAL
ALP BLD-CCNC: 130 U/L (ref 40–129)
ALT SERPL-CCNC: 40 U/L (ref 5–41)
ANION GAP SERPL CALCULATED.3IONS-SCNC: 6 MMOL/L (ref 9–17)
AST SERPL-CCNC: 24 U/L
BACTERIA: ABNORMAL
BANDS: 2 % (ref 0–10)
BASOPHILS # BLD: 0 % (ref 0–2)
BASOPHILS ABSOLUTE: 0 K/UL (ref 0–0.2)
BILIRUB SERPL-MCNC: 1 MG/DL (ref 0.3–1.2)
BILIRUBIN URINE: NEGATIVE
BUN BLDV-MCNC: 17 MG/DL (ref 6–20)
CALCIUM SERPL-MCNC: 9.3 MG/DL (ref 8.6–10.4)
CARBOXYHEMOGLOBIN: <1 % (ref 0–5)
CASTS UA: ABNORMAL /LPF
CHLORIDE BLD-SCNC: 90 MMOL/L (ref 98–107)
CO2: 38 MMOL/L (ref 20–31)
COLOR: YELLOW
CREAT SERPL-MCNC: <0.4 MG/DL (ref 0.7–1.2)
EOSINOPHILS RELATIVE PERCENT: 0 % (ref 0–4)
EPITHELIAL CELLS UA: ABNORMAL /HPF
FIO2: 40
GFR SERPL CREATININE-BSD FRML MDRD: ABNORMAL ML/MIN/1.73M2
GLUCOSE BLD-MCNC: 132 MG/DL (ref 70–99)
GLUCOSE URINE: NEGATIVE
HCO3 ARTERIAL: 43.1 MMOL/L (ref 22–26)
HCT VFR BLD CALC: 33 % (ref 41–53)
HEMOGLOBIN: 11 G/DL (ref 13.5–17.5)
KETONES, URINE: ABNORMAL
LEUKOCYTE ESTERASE, URINE: ABNORMAL
LYMPHOCYTES # BLD: 4 % (ref 24–44)
MAGNESIUM: 1.9 MG/DL (ref 1.6–2.6)
MCH RBC QN AUTO: 36.7 PG (ref 26–34)
MCHC RBC AUTO-ENTMCNC: 33.4 G/DL (ref 31–37)
MCV RBC AUTO: 110 FL (ref 80–100)
METHEMOGLOBIN: 0.9 % (ref 0–1.9)
MODE: ABNORMAL
MONOCYTES # BLD: 1 % (ref 1–7)
MORPHOLOGY: ABNORMAL
NITRITE, URINE: NEGATIVE
O2 DEVICE/FLOW/%: ABNORMAL
O2 SAT, ARTERIAL: 96.2 % (ref 95–98)
PATIENT TEMP: 37
PCO2 ARTERIAL: 43.9 MMHG (ref 35–45)
PDW BLD-RTO: 14.5 % (ref 11.5–14.9)
PEEP/CPAP: 10
PH ARTERIAL: 7.6 (ref 7.35–7.45)
PH UA: 7 (ref 5–8)
PLATELET # BLD: 148 K/UL (ref 150–450)
PMV BLD AUTO: 8.7 FL (ref 6–12)
PO2 ARTERIAL: 82.1 MMHG (ref 80–100)
POSITIVE BASE EXCESS, ART: 21.5 MMOL/L (ref 0–2)
POTASSIUM SERPL-SCNC: 3.3 MMOL/L (ref 3.7–5.3)
PROTEIN UA: ABNORMAL
PT. POSITION: ABNORMAL
RBC # BLD: 3 M/UL (ref 4.5–5.9)
RBC UA: ABNORMAL /HPF
RESPIRATORY RATE: 20
SAMPLE SITE: ABNORMAL
SEG NEUTROPHILS: 93 % (ref 36–66)
SEGMENTED NEUTROPHILS ABSOLUTE COUNT: 14.14 K/UL (ref 1.3–9.1)
SET RATE: 20
SODIUM BLD-SCNC: 134 MMOL/L (ref 135–144)
SPECIFIC GRAVITY UA: 1.02 (ref 1–1.03)
TEXT FOR RESPIRATORY: ABNORMAL
TOTAL PROTEIN: 5.3 G/DL (ref 6.4–8.3)
TOTAL RATE: 20
TURBIDITY: CLEAR
URINE HGB: NEGATIVE
UROBILINOGEN, URINE: NORMAL
VT: 500
WBC # BLD: 15.2 K/UL (ref 3.5–11)
WBC UA: ABNORMAL /HPF

## 2022-11-29 PROCEDURE — 6370000000 HC RX 637 (ALT 250 FOR IP): Performed by: FAMILY MEDICINE

## 2022-11-29 PROCEDURE — 6360000002 HC RX W HCPCS: Performed by: INTERNAL MEDICINE

## 2022-11-29 PROCEDURE — 85025 COMPLETE CBC W/AUTO DIFF WBC: CPT

## 2022-11-29 PROCEDURE — 6370000000 HC RX 637 (ALT 250 FOR IP): Performed by: INTERNAL MEDICINE

## 2022-11-29 PROCEDURE — 51702 INSERT TEMP BLADDER CATH: CPT

## 2022-11-29 PROCEDURE — 82805 BLOOD GASES W/O2 SATURATION: CPT

## 2022-11-29 PROCEDURE — 2580000003 HC RX 258: Performed by: NURSE PRACTITIONER

## 2022-11-29 PROCEDURE — 2580000003 HC RX 258: Performed by: EMERGENCY MEDICINE

## 2022-11-29 PROCEDURE — 2500000003 HC RX 250 WO HCPCS: Performed by: NURSE PRACTITIONER

## 2022-11-29 PROCEDURE — 6360000002 HC RX W HCPCS: Performed by: EMERGENCY MEDICINE

## 2022-11-29 PROCEDURE — 2700000000 HC OXYGEN THERAPY PER DAY

## 2022-11-29 PROCEDURE — 94761 N-INVAS EAR/PLS OXIMETRY MLT: CPT

## 2022-11-29 PROCEDURE — 94640 AIRWAY INHALATION TREATMENT: CPT

## 2022-11-29 PROCEDURE — 2580000003 HC RX 258: Performed by: INTERNAL MEDICINE

## 2022-11-29 PROCEDURE — 2000000000 HC ICU R&B

## 2022-11-29 PROCEDURE — 36415 COLL VENOUS BLD VENIPUNCTURE: CPT

## 2022-11-29 PROCEDURE — 71045 X-RAY EXAM CHEST 1 VIEW: CPT

## 2022-11-29 PROCEDURE — 6360000002 HC RX W HCPCS: Performed by: NURSE PRACTITIONER

## 2022-11-29 PROCEDURE — 36600 WITHDRAWAL OF ARTERIAL BLOOD: CPT

## 2022-11-29 PROCEDURE — 80053 COMPREHEN METABOLIC PANEL: CPT

## 2022-11-29 PROCEDURE — 94003 VENT MGMT INPAT SUBQ DAY: CPT

## 2022-11-29 PROCEDURE — 81001 URINALYSIS AUTO W/SCOPE: CPT

## 2022-11-29 PROCEDURE — 83735 ASSAY OF MAGNESIUM: CPT

## 2022-11-29 RX ORDER — SODIUM CHLORIDE 9 MG/ML
INJECTION, SOLUTION INTRAVENOUS CONTINUOUS
Status: DISCONTINUED | OUTPATIENT
Start: 2022-11-29 | End: 2022-12-05

## 2022-11-29 RX ADMIN — SODIUM CHLORIDE, PRESERVATIVE FREE 10 ML: 5 INJECTION INTRAVENOUS at 20:58

## 2022-11-29 RX ADMIN — PROPOFOL 40 MCG/KG/MIN: 10 INJECTION, EMULSION INTRAVENOUS at 05:27

## 2022-11-29 RX ADMIN — DILTIAZEM HYDROCHLORIDE 30 MG: 30 TABLET, FILM COATED ORAL at 23:27

## 2022-11-29 RX ADMIN — VANCOMYCIN HYDROCHLORIDE 1500 MG: 1.5 INJECTION, POWDER, LYOPHILIZED, FOR SOLUTION INTRAVENOUS at 22:03

## 2022-11-29 RX ADMIN — IPRATROPIUM BROMIDE AND ALBUTEROL SULFATE 1 AMPULE: 2.5; .5 SOLUTION RESPIRATORY (INHALATION) at 14:41

## 2022-11-29 RX ADMIN — IPRATROPIUM BROMIDE AND ALBUTEROL SULFATE 1 AMPULE: 2.5; .5 SOLUTION RESPIRATORY (INHALATION) at 07:42

## 2022-11-29 RX ADMIN — PROPOFOL 35 MCG/KG/MIN: 10 INJECTION, EMULSION INTRAVENOUS at 00:37

## 2022-11-29 RX ADMIN — IPRATROPIUM BROMIDE AND ALBUTEROL SULFATE 1 AMPULE: 2.5; .5 SOLUTION RESPIRATORY (INHALATION) at 22:58

## 2022-11-29 RX ADMIN — METOPROLOL TARTRATE 25 MG: 25 TABLET, FILM COATED ORAL at 20:57

## 2022-11-29 RX ADMIN — THIAMINE HYDROCHLORIDE: 100 INJECTION, SOLUTION INTRAMUSCULAR; INTRAVENOUS at 09:46

## 2022-11-29 RX ADMIN — POTASSIUM BICARBONATE 40 MEQ: 782 TABLET, EFFERVESCENT ORAL at 05:27

## 2022-11-29 RX ADMIN — PROPOFOL 40 MCG/KG/MIN: 10 INJECTION, EMULSION INTRAVENOUS at 10:59

## 2022-11-29 RX ADMIN — BUDESONIDE 500 MCG: 0.5 SUSPENSION RESPIRATORY (INHALATION) at 19:32

## 2022-11-29 RX ADMIN — IPRATROPIUM BROMIDE AND ALBUTEROL SULFATE 1 AMPULE: 2.5; .5 SOLUTION RESPIRATORY (INHALATION) at 03:58

## 2022-11-29 RX ADMIN — METHYLPREDNISOLONE SODIUM SUCCINATE 40 MG: 40 INJECTION, POWDER, FOR SOLUTION INTRAMUSCULAR; INTRAVENOUS at 22:01

## 2022-11-29 RX ADMIN — METHYLPREDNISOLONE SODIUM SUCCINATE 40 MG: 40 INJECTION, POWDER, FOR SOLUTION INTRAMUSCULAR; INTRAVENOUS at 17:00

## 2022-11-29 RX ADMIN — SODIUM CHLORIDE: 9 INJECTION, SOLUTION INTRAVENOUS at 19:52

## 2022-11-29 RX ADMIN — METHYLPREDNISOLONE SODIUM SUCCINATE 40 MG: 40 INJECTION, POWDER, FOR SOLUTION INTRAMUSCULAR; INTRAVENOUS at 04:10

## 2022-11-29 RX ADMIN — PROPOFOL 40 MCG/KG/MIN: 10 INJECTION, EMULSION INTRAVENOUS at 18:57

## 2022-11-29 RX ADMIN — IPRATROPIUM BROMIDE AND ALBUTEROL SULFATE 1 AMPULE: 2.5; .5 SOLUTION RESPIRATORY (INHALATION) at 11:24

## 2022-11-29 RX ADMIN — IPRATROPIUM BROMIDE AND ALBUTEROL SULFATE 1 AMPULE: 2.5; .5 SOLUTION RESPIRATORY (INHALATION) at 19:32

## 2022-11-29 RX ADMIN — DILTIAZEM HYDROCHLORIDE 30 MG: 30 TABLET, FILM COATED ORAL at 09:51

## 2022-11-29 RX ADMIN — SODIUM CHLORIDE 100 ML/HR: 9 INJECTION, SOLUTION INTRAVENOUS at 09:57

## 2022-11-29 RX ADMIN — VANCOMYCIN HYDROCHLORIDE 1500 MG: 1.5 INJECTION, POWDER, LYOPHILIZED, FOR SOLUTION INTRAVENOUS at 11:05

## 2022-11-29 RX ADMIN — SODIUM CHLORIDE, PRESERVATIVE FREE 10 ML: 5 INJECTION INTRAVENOUS at 09:39

## 2022-11-29 RX ADMIN — BUDESONIDE 500 MCG: 0.5 SUSPENSION RESPIRATORY (INHALATION) at 07:42

## 2022-11-29 RX ADMIN — OXYCODONE HYDROCHLORIDE AND ACETAMINOPHEN 1 TABLET: 5; 325 TABLET ORAL at 20:59

## 2022-11-29 RX ADMIN — CEFEPIME 2000 MG: 2 INJECTION, POWDER, FOR SOLUTION INTRAVENOUS at 04:10

## 2022-11-29 RX ADMIN — METHYLPREDNISOLONE SODIUM SUCCINATE 40 MG: 40 INJECTION, POWDER, FOR SOLUTION INTRAMUSCULAR; INTRAVENOUS at 10:01

## 2022-11-29 RX ADMIN — ENOXAPARIN SODIUM 40 MG: 100 INJECTION SUBCUTANEOUS at 09:35

## 2022-11-29 RX ADMIN — DILTIAZEM HYDROCHLORIDE 30 MG: 30 TABLET, FILM COATED ORAL at 17:49

## 2022-11-29 RX ADMIN — IPRATROPIUM BROMIDE AND ALBUTEROL SULFATE 1 AMPULE: 2.5; .5 SOLUTION RESPIRATORY (INHALATION) at 00:25

## 2022-11-29 ASSESSMENT — PULMONARY FUNCTION TESTS
PIF_VALUE: 23
PIF_VALUE: 22
PIF_VALUE: 23
PIF_VALUE: 22
PIF_VALUE: 23
PIF_VALUE: 22
PIF_VALUE: 21
PIF_VALUE: 22
PIF_VALUE: 23
PIF_VALUE: 22
PIF_VALUE: 27
PIF_VALUE: 24
PIF_VALUE: 21
PIF_VALUE: 21
PIF_VALUE: 23
PIF_VALUE: 31
PIF_VALUE: 21
PIF_VALUE: 24
PIF_VALUE: 24
PIF_VALUE: 22
PIF_VALUE: 25
PIF_VALUE: 23
PIF_VALUE: 21
PIF_VALUE: 21
PIF_VALUE: 23
PIF_VALUE: 34
PIF_VALUE: 23
PIF_VALUE: 24
PIF_VALUE: 24
PIF_VALUE: 21
PIF_VALUE: 24
PIF_VALUE: 21
PIF_VALUE: 22
PIF_VALUE: 24
PIF_VALUE: 21
PIF_VALUE: 23
PIF_VALUE: 21
PIF_VALUE: 24
PIF_VALUE: 21
PIF_VALUE: 22
PIF_VALUE: 21
PIF_VALUE: 23
PIF_VALUE: 21
PIF_VALUE: 24
PIF_VALUE: 23
PIF_VALUE: 23
PIF_VALUE: 24
PIF_VALUE: 23
PIF_VALUE: 28
PIF_VALUE: 23
PIF_VALUE: 23
PIF_VALUE: 22
PIF_VALUE: 24

## 2022-11-29 ASSESSMENT — PAIN DESCRIPTION - DESCRIPTORS: DESCRIPTORS: ACHING

## 2022-11-29 ASSESSMENT — PAIN DESCRIPTION - LOCATION: LOCATION: BACK

## 2022-11-29 ASSESSMENT — PAIN DESCRIPTION - ORIENTATION: ORIENTATION: LOWER

## 2022-11-29 ASSESSMENT — PAIN SCALES - GENERAL
PAINLEVEL_OUTOF10: 6
PAINLEVEL_OUTOF10: 0

## 2022-11-29 NOTE — PROGRESS NOTES
PULMONARY PROGRESS NOTE:    REASON FOR VISIT: copd exac, resp failure  Interval History:     On vent , sedated, n propofol, levophed    PAST MEDICAL HISTORY:      Scheduled Meds:   vancomycin  1,500 mg IntraVENous Q12H    dilTIAZem  30 mg Oral 4 times per day    thiamine and folic acid IVPB   IntraVENous Daily    methylPREDNISolone  40 mg IntraVENous Q6H    nicotine  1 patch TransDERmal Daily    metoprolol tartrate  25 mg Oral BID    lisinopril  10 mg Oral Daily    sodium chloride flush  5-40 mL IntraVENous 2 times per day    vancomycin (VANCOCIN) intermittent dosing (placeholder)   Other RX Placeholder    sodium chloride flush  5-40 mL IntraVENous 2 times per day    enoxaparin  40 mg SubCUTAneous Daily    cefepime  2,000 mg IntraVENous Q12H    ipratropium-albuterol  1 ampule Inhalation Q4H    budesonide  0.5 mg Nebulization BID     Continuous Infusions:   sodium chloride      norepinephrine 2 mcg/min (11/29/22 0908)    propofol 40 mcg/kg/min (11/29/22 0582)    sodium chloride      sodium chloride       PRN Meds:dextromethorphan-guaiFENesin, LORazepam, oxyCODONE-acetaminophen, potassium chloride **OR** potassium alternative oral replacement **OR** potassium chloride, metoprolol, sodium chloride flush, sodium chloride, ipratropium-albuterol, sodium chloride flush, sodium chloride, acetaminophen, ondansetron **OR** ondansetron    PHYSICAL EXAMINATION:  /75   Pulse (!) 108   Temp 100.3 °F (37.9 °C) (Oral)   Resp 20   Ht 6' 1\" (1.854 m)   Wt 185 lb 10 oz (84.2 kg)   SpO2 98%   BMI 24.49 kg/m²     General : sedated, on vent, on levophed/ propofol  Neck - supple, no lymphadenopathy, JVD not raised  Heart - tachycardia 114  Lungs - poor air Entry- fair bilaterally; breath sounds : diminished, 99% sat on 75% FI02  Abdomen - soft, no tenderness  Upper Extremities  - no cyanosis, mottling; edema : absent  Lower Extremities: no cyanosis, mottling; edema : absent    Current Laboratory, Radiologic, Microbiologic, and Diagnostic studies reviewed  Data ReviewCBC:   Recent Labs     11/27/22  0529 11/28/22  1400 11/29/22  0445   WBC 14.2* 8.8 15.2*   RBC 3.01* 2.88* 3.00*   HGB 11.1* 10.8* 11.0*   HCT 33.7* 31.9* 33.0*    104* 148*       BMP:   Recent Labs     11/27/22  0529 11/28/22  1400 11/29/22  0445   GLUCOSE 116* 111* 132*   * 130* 134*   K 4.6 4.6 3.3*   BUN 15 16 17   CREATININE 0.43* <0.40* <0.40*   CALCIUM 9.5 9.1 9.3       ABGs:   Recent Labs     11/28/22  1100 11/28/22  1245 11/29/22  0625   PHART 7.159* 7.480* 7.601*   PO2ART 225.0* 59.4* 82.1   MAQ9DQJ 115.0* 54.1* 43.9   NKN8NWF 41.0* 40.3* 43.1*   K5GYXANP 97.2 91.7* 96.2        PT/INR:  No results found for: PTINR    ASSESSMENT / PLAN:    Acute hypoxic respiratory failure     Mechanical ventilation - extubated 11/23/22; reintubated 11/28/22  Cent settings adjusted  Hypotension - needs PICC     Initiate pressor therapy / levophed to keep MAP 65  PNA - HCAP/ ABx  COPD     BD, steroids  Suspected lung CA  Alcohol use      thiamine / folic acid     Monitor for DTs  IV fluids    DW RN, RT    Electronically signed by Jaime Santiago MD on 11/29/22 at 9:37 AM

## 2022-11-29 NOTE — CARE COORDINATION
DISCHARGE PLANNING NOTE:    Pt remains intubated on the vent at 30% FiO2. Pt is from [facility] and was previously current with [facility] (however revoked to come in). Active order for IV Solu-medrol 40mg every 6 hours and IV Vanco.  On levophed gtt. Will continue to follow for additional discharge needs.     Electronically signed by Tariq Wagner RN on 11/29/2022 at 12:59 PM

## 2022-11-29 NOTE — PROGRESS NOTES
Vancomycin Dosing by Pharmacy - Daily Note   Vancomycin Therapy Day:  8  Indication: sepsis     Allergies:  Patient has no known allergies. Actual Weight:    Wt Readings from Last 1 Encounters:   11/29/22 185 lb 10 oz (84.2 kg)       Labs/Ancillary Data  Estimated Creatinine Clearance: 227 mL/min (based on SCr of 0.4 mg/dL). Recent Labs     11/27/22  0529 11/28/22  1400 11/29/22  0445   CREATININE 0.43* <0.40* <0.40*   BUN 15 16 17   WBC 14.2* 8.8 15.2*     Procalcitonin   Date Value Ref Range Status   08/29/2022 0.20 (H) <0.09 ng/mL Final     Comment:           Suspected Sepsis:  <0.50 ng/mL     Low likelihood of sepsis. 0.50-2.00 ng/mL     Increased likelihood of sepsis. Antibiotics encouraged. >2.00 ng/mL     High risk of sepsis/shock. Antibiotics strongly encouraged. Suspected Lower Resp Tract Infections:  <0.24 ng/mL     Low likelihood of bacterial infection. >0.24 ng/mL     Increased likelihood of bacterial infection. Antibiotics encouraged. With successful antibiotic therapy, PCT levels should decrease rapidly. (Half-life of 24 to   36 hours.)        Procalcitonin values from samples collected within the first 6 hours of systemic infection   may still be low. Retesting may be indicated. Values from day 1 and day 4 can be entered into the Change in Procalcitonin Calculator   (www.[facility] [place]-pct-calculator. [employer]) to determine the patient's Mortality Risk Prognosis        In healthy neonates, plasma Procalcitonin (PCT) concentrations increase gradually after   birth, reaching peak values at about 24 hours of age then decrease to normal values below   0.5 ng/mL by 48-72 hours of age.          Intake/Output Summary (Last 24 hours) at 11/29/2022 0827  Last data filed at 11/29/2022 0534  Gross per 24 hour   Intake 3606.92 ml   Output 2825 ml   Net 781.92 ml     Temp: 100.8    Culture Date / Source  /  Results  See micro   Recent vancomycin administrations                     vancomycin (VANCOCIN) 1,250 mg in dextrose 5 % 250 mL IVPB (ADDAVIAL) (mg) 1,250 mg New Bag 11/28/22 2308     1,250 mg New Bag  1320     1,250 mg New Bag  0336     1,250 mg New Bag 11/27/22 1317     1,250 mg New Bag  0013     1,250 mg New Bag 11/26/22 1220                    Vancomycin Concentrations:   TROUGH:  No results for input(s): VANCOTROUGH in the last 72 hours. RANDOM:    Recent Labs     11/28/22  0352 11/28/22  1718   VANCORANDOM 16.6 27.7       MRSA Nasal Swab: N/A. Non-respiratory infection. Gordo Ogren PLAN     Decrease dose to 1500 mg q12h IV  Ensured BUN/sCr ordered at baseline and every 48 hours x at least 3 levels, then at least weekly. Pharmacy will continue to monitor patient and adjust therapy as indicated      Vancomycin Target Concentration Parameters  Treatment  Population Target AUC/NIK Target Trough   Invasive MRSA Infection (bacteremia, pneumonia, meningitis, endocarditis, osteomyelitis)  Sepsis (undifferentiated) 400-600 N/A   Infection due to non-MRSA pathogen  Empiric treatment of non-invasive MRSA infection  (SSTI, UTI) <500 10-15 mg/L   CrCl < 29 mL/min  Rapidly fluctuating serum creatinine   KRYSTA N/A < 15 mg/L     Renal replacement therapy is dosed by levels, per hospital protocol. Abbreviations  * Pauc: probability that AUC is >400 (efficacy); Pconc: probability that Ctrough is above 20 ?g/mL (toxicity); Tox: Probability of nephrotoxicity, based on Kade et al. Clin Infect Dis 2009. Loading dose: N/A  Regimen: 1500 mg IV every 12 hours. Start time: 08:26 on 11/29/2022  Exposure target: AUC24 (range)400-600 mg/L.hr   AUC24,ss: 515 mg/L.hr  Probability of AUC24 > 400: 83 %  Ctrough,ss: 12.7 mg/L  Probability of Ctrough,ss > 20: 11 %  Probability of nephrotoxicity (Lodise IVANNA 2009): 8 %    Thank you for the consult. Pharmacy will continue to follow.     Ronit Rasheed PharmD, BCPS  11/29/2022 8:29 AM

## 2022-11-29 NOTE — PROGRESS NOTES
Date:   11/29/2022  Patient name: Shawn Mari  Date of admission:  11/22/2022 12:17 PM  MRN:   [id number]  YOB: 1965  PCP: JAEL Reed CNP    Reason for Admission: Respiratory failure [facility]) [J96.90]  Septic shock ([address] [A41.9, R65.21]  Acute on chronic respiratory failure with hypoxia [facility]) [J96.21]    Cardiology follow up: Episodes of sinus tachycardia, sinus bradycardia, nonsustained V. tach 5-7 beats, ventricular rate about 110       Referring physician: Dr Homero Cranker  Episodes of sinus tach most likely physiological  Episodes of nonsustained bradycardia most likely vasovagal  Episodes of nonsustained V. tach 6-7 beats heart rate above 110/hypoxia hypokalemia  Respiratory failure/severe COPD secondary to smoking  History of lung mass/suspected lung cancer  History of COVID-19 infection  History of alcohol abuse     Past surgical history include bilateral hip replacement, neck surgery, vasectomy        2D echo 9/14/2022  Normal LV size and wall thickness ejection fraction more than 55%  No significant valvular abnormality     ECG 9/13/2022  Sinus tachycardia otherwise normal ECG     Chest x-ray 11/23/2022  Unchanged masslike opacity at the left base, mildly improved patchy opacity at the right base  Endotracheal tube and enteric tubes remain in place, heart size is within normal limit    History of present illness     21-year-old male a nursing home resident with a past medical history of severe COPD, CA lung got readmitted on 11/22/2022 with the respiratory failure, altered mental status and he required intubation and vent support. He got extubated on 11/24/2022. He was recently admitted at [facility] with respiratory failure required vent support. He has been on beta-blockers and Cardizem, steroids, bronchodilators. On continuous ECG monitoring episodes of sinus tachycardia noted. Occasionally he gets transient sinus bradycardia.   He has had a few episodes of nonsustained broad complex rhythm heart rate about 110, 6-7 beats.   No syncope no chest pain        On admission lab work showed   high-sensitivity troponin 25 and 29  Hemoglobin 11.1, WBC 20.5, platelets 776  Sodium 132, potassium 4.0, CO2 35, BUN 12, creatinine 0.40, glucose 145, calcium 9.0     Current evaluation  Patient seen and examined in ICU yesterday and today  He remains on ventilator, no response to verbal command  Continues to have sinus tachycardia  X-ray today showed a stable lingular mass with an associated left pleural effusion and volume loss in the left lung base    Temperature 98.4, blood pressure 95/50, oxygen saturation 100% FiO2 30%    Sodium 134, potassium 3.3, chloride 90, CO2 38, BUN 17, creatinine less than 0.40, glucose 132, albumin 2.9  WBC 15.2, hemoglobin 11.0, platelet 457    Medications:   Scheduled Meds:   vancomycin  1,500 mg IntraVENous Q12H    dilTIAZem  30 mg Oral 4 times per day    thiamine and folic acid IVPB   IntraVENous Daily    methylPREDNISolone  40 mg IntraVENous Q6H    nicotine  1 patch TransDERmal Daily    metoprolol tartrate  25 mg Oral BID    lisinopril  10 mg Oral Daily    sodium chloride flush  5-40 mL IntraVENous 2 times per day    vancomycin (VANCOCIN) intermittent dosing (placeholder)   Other RX Placeholder    sodium chloride flush  5-40 mL IntraVENous 2 times per day    enoxaparin  40 mg SubCUTAneous Daily    ipratropium-albuterol  1 ampule Inhalation Q4H    budesonide  0.5 mg Nebulization BID     Continuous Infusions:   sodium chloride 100 mL/hr (11/29/22 0957)    norepinephrine 1 mcg/min (11/29/22 1101)    propofol 40 mcg/kg/min (11/29/22 1259)    sodium chloride      sodium chloride       CBC:   Recent Labs     11/27/22 0529 11/28/22  1400 11/29/22 0445   WBC 14.2* 8.8 15.2*   HGB 11.1* 10.8* 11.0*    104* 148*     BMP:    Recent Labs     11/27/22 0529 11/28/22  1400 11/29/22 0445   * 130* 134*   K 4.6 4.6 3.3*   CL 93* 91* 90* CO2 33* 36* 38*   BUN 15 16 17   CREATININE 0.43* <0.40* <0.40*   GLUCOSE 116* 111* 132*     Hepatic:   Recent Labs     11/29/22  0445   AST 24   ALT 40   BILITOT 1.0   ALKPHOS 130*     Troponin: No results for input(s): TROPONINI in the last 72 hours. BNP: No results for input(s): BNP in the last 72 hours. Lipids: No results for input(s): CHOL, HDL in the last 72 hours. Invalid input(s): LDLCALCU  INR:   Recent Labs     11/28/22  1400   INR 0.9       Objective:   Vitals: BP (!) 88/58   Pulse (!) 113   Temp 98.4 °F (36.9 °C) (Axillary)   Resp 18   Ht 6' 1\" (1.854 m)   Wt 185 lb 10 oz (84.2 kg)   SpO2 100%   BMI 24.49 kg/m²   General appearance: Frail looking male on ventilator  HEENT: Head: Normal, normocephalic, atraumatic. Neck:  Difficult to assess neck veins  Lungs: diminished breath sounds bibasilar  Heart:  Cardiac apical impulse not palpable heart sounds distant  Abdomen:  Hypoactive bowel sounds  Extremities:  No obvious significant edema  Neurologic: Mental status: Patient is sedated    EKG: normal sinus rhythm. Sinus tachycardia  ECHO: reviewed.    Ejection fraction: 55%    Assessment / Acute Cardiac Problems:   Episodes of sinus tachycardia most likely physiological  Episodes of bradycardia most likely vagal effect  Nonsustained broad complex rhythm/V. tach, heart rate up to 110 most likely secondary to hypoxia, hypokalemia  Normal LV systolic function  Severe COPD, hypoxia  Lung mass suspected carcinoma lung    11/29/2022 patient's remains on ventilator continue to have sinus tachycardia, he is on Levophed and propofol    Patient Active Problem List:     Cellulitis of b/l lower extremity     Cellulitis of lower extremity     Alcoholism ([address]     Essential hypertension     Hyperkeratosis of b/l Soles     Alcohol withdrawal ([address]     Tinea pedis of both feet     Suicidal ideation     Dyspnea     COPD exacerbation ([address]     Gastroesophageal reflux disease without esophagitis     Cigarette smoker     Avascular necrosis of femoral head (HCC)     Tachycardia     Stage 3 severe COPD by GOLD classification (HCC)     Tobacco dependence     Chronic obstructive pulmonary disease (HCC)     Lung malignancy (HCC)     Cough with hemoptysis     Sepsis ([address]     COVID-19     Mass of lingula of lung     Hyponatremia     Respiratory failure ([facility]      Plan of Treatment:   Medications reviewed  Continue current dose of Cardizem 30 mg 4 times a day, lisinopril 10 mg a day, metoprolol  Patient is also on norepinephrine  He is also on vancomycin,, cefepime, nicotine patch  Replace potassium  Pulmonology notes reviewed  Prognosis very poor    Electronically signed by Brant Miller MD on 11/29/2022 at 5:21 PM

## 2022-11-29 NOTE — PROGRESS NOTES
Physician Progress Note      Sylvie Pillai  CSN #:                  [id number]  :                       1965  ADMIT DATE:       2022 12:17 PM  [address] [place] DATE:  Pete Renae  PROVIDER #:         MD          QUERY TEXT:    Patient admitted with pneumonia. Documentation reflects sepsis with septic   shock in ED notes from . If possible, please document in the progress   notes and discharge summary if Sepsis with septic shock was: The medical record reflects the following:  Risk Factors: Admitted with Pneumonia  Clinical Indicators: WBC 20.5; HR ; afebrile; LA 1.5-->2.3; CXR-->New   right basilar opacities may represent infection or atelectasis, a lingular   mass appears to have increased in size since the prior examination and is   suspicious for malignancy; per ED notes--> septic shock; BP as low as 67/49   MAP 56; RR 18-36; per H/P--> acute hypoxic respiratory failure, pneumonia,   hypotension  Treatment: 2.5L IVF bolus; IV Levophed, IV Cefepime, IV Vanco, monitoring,   intubation with mechanical vent support;  Options provided:  -- Sepsis with septic shock confirmed after study  -- Sepsis with septic shock ruled out after study  -- Other - I will add my own diagnosis  -- Disagree - Not applicable / Not valid  -- Disagree - Clinically unable to determine / Unknown  -- Refer to Clinical Documentation Reviewer    PROVIDER RESPONSE TEXT:    Sepsis with septic shock confirmed after study.     Query created by: Kellee Boyd on 2022 1:53 PM      Electronically signed by:  August Friday MD 2022 6:11 PM

## 2022-11-29 NOTE — PROGRESS NOTES
Physical Therapy        Physical Therapy Cancel Note      DATE: 2022    NAME: Shawn Mari  MRN: [id number]   : 1965      Patient not seen this date for Physical Therapy due to:    Pt is on ventilator and sedated. PT will continue to follow and will attempt when able.         Electronically signed by Idris Faith PT on 2022 at 12:19 PM

## 2022-11-29 NOTE — DISCHARGE INSTR - COC
Continuity of Care Form    Patient Name: Adriana Johns   :  1965  MRN:  [id number]    Admit date:  2022  Discharge date:  12/10/22    Code Status Order: DNR-CCA   Advance Directives:     Admitting Physician:  Radha Garsia MD  PCP: Martín Keyes, JAEL - CNP    Discharging Nurse: Marychuy Hood Unit/Room#:   Discharging Unit Phone Number: [phone]    Emergency Contact:   Extended Emergency Contact Information  Primary Emergency Contact: [address]  Address: [facility], [address] Phone: [phone]  Relation: Child    Past Surgical History:  Past Surgical History:   Procedure Laterality Date    NECK SURGERY      TOTAL HIP ARTHROPLASTY Bilateral     VASECTOMY         Immunization History:   Immunization History   Administered Date(s) Administered    COVID-19, PFIZER PURPLE top, DILUTE for use, (age 15 y+), 30mcg/0.3mL 2021, 2021    Influenza, FLUCELVAX, (age 10 mo+), MDCK, PF, 0.5mL 2021    Td (Adult), 5 Lf Tetanus Toxoid, Pf (Tenivac, Decavac) 1998    Tdap (Boostrix, Adacel) 10/24/2014       Active Problems:  Patient Active Problem List   Diagnosis Code    Cellulitis of b/l lower extremity L03.116    Cellulitis of lower extremity L03.119    Al[address]ism ([facility] F10.20    Essential hypertension I10    Hyperkeratosis of b/l Soles Q82.8    Alcohol withdrawal ([facility] F10.939    Tinea pedis of both feet B35.3    Suicidal ideation R45.851    Dyspnea R06.00    COPD exacerbation (HCC) J44.1    Gastroesophageal reflux disease without esophagitis K21.9    Cigarette smoker F17.210    Avascular necrosis of femoral head (HCC) M87.059    Tachycardia R00.0    Stage 3 severe COPD by GOLD classification ([facility] J44.9    Tobacco dependence F17.200    Chronic obstructive pulmonary disease ([address] J44.9    Lung malignancy ([facility] C34.90    Cough with hemoptysis R04.2    Sepsis ([facility] A41.9    COVID-19 U07.1    Mass of lingula of lung R91.8    Hyponatremia E87.1    Respiratory failure ([facility] J96.90       Isolation/Infection:   Isolation            No Isolation          Patient Infection Status       Infection Onset Added Last Indicated Last Indicated By Review Planned Expiration Resolved Resolved By    None active    Resolved    COVID-19 (Rule Out) 22 COVID-19 & Influenza Combo (Ordered)   22 Rule-Out Test Resulted    COVID-19 22 COVID-19, Rapid   09/15/22 Zaid Arceo RN    Pt was + on 2022    COVID-19 (Rule Out) 22 COVID-19, Rapid (Ordered)   22 Rule-Out Test Resulted    COVID-19 22 Respiratory Panel, Molecular, with COVID-19 (Restricted: peds pts or suitable admitted adults)   22     COVID-19 (Rule Out) 22 Respiratory Panel, Molecular, with COVID-19 (Restricted: peds pts or suitable admitted adults) (Ordered)   22 Rule-Out Test Resulted    COVID-19 (Rule Out) 10/28/20 10/28/20 10/30/20 COVID-19 (Ordered)   10/30/20 Rule-Out Test Resulted    COVID-19 (Rule Out) 10/28/20 10/28/20 10/28/20 COVID-19 (Ordered)   10/28/20 Rule-Out Test Resulted            Nurse Assessment:  Last Vital Signs: BP (!) 88/58   Pulse (!) 113   Temp 98.4 °F (36.9 °C) (Axillary)   Resp 18   Ht 6' 1\" (1.854 m)   Wt 185 lb 10 oz (84.2 kg)   SpO2 100%   BMI 24.49 kg/m²     Last documented pain score (0-10 scale): Pain Level: 0  Last Weight:   Wt Readings from Last 1 Encounters:   22 185 lb 10 oz (84.2 kg)     Mental Status:  oriented    IV Access:  - PICC - site  L Cephalic, insertion date: 22    Nursing Mobility/ADLs:  Walking   Dependent  Transfer  Dependent  Bathing  Dependent  Dressing  Dependent  Toileting  Dependent  Feeding  Dependent  Med Admin  Dependent  Med Delivery   crushed via PEG tube    Wound Care Documentation and Therapy:        Elimination:  Continence:    Bowel: No  Bladder: No  Urinary Catheter: Indication for Use of Catheter: Acute urinary retention/obstruction   Colostomy/Ileostomy/Ileal Conduit: No       Date of Last BM: 12/10/22    Intake/Output Summary (Last 24 hours) at 11/29/2022 1557  Last data filed at 11/29/2022 0534  Gross per 24 hour   Intake 3606.92 ml   Output 2825 ml   Net 781.92 ml     I/O last 3 completed shifts: In: 3606.9 [I.V.:598.5; NG/GT:60; IV Piggyback:2948.5]  Out: 3100 [Urine:2575; Emesis/NG output:525]    Safety Concerns: At Risk for Falls    Impairments/Disabilities:      Speech    Nutrition Therapy:  Current Nutrition Therapy:   - Tube Feedings:  Vital AF    Routes of Feeding: Gastrostomy Tube  Liquids: No Liquids  Daily Fluid Restriction: no  Last Modified Barium Swallow with Video (Video Swallowing Test): not done    Treatments at the Time of Hospital Discharge:   Respiratory Treatments: DuoNeb q4h while awake. Pulmicort BID  Oxygen Therapy:   Trach Collar  @ 35%  Ventilator:    - Ventilator Settings:  AS NEEDED  Vt (Set, mL): 500 mL  Resp Rate (Set): 16 bmp  FiO2 : 35 %    PEEP/CPAP (cmH2O): 5       Rehab Therapies: Physical Therapy and Occupational Therapy  Weight Bearing Status/Restrictions: No weight bearing restrictions  Other Medical Equipment (for information only, NOT a DME order):  hospital bed  Other Treatments: Skilled Nursing assessment and monitoring. Medication education and monitoring per protocol. Patient's personal belongings (please select all that are sent with patient):  Glasses, tablet, phone, chargers.     RN SIGNATURE:  Electronically signed by Navin Mclain RN on 12/10/22 at 11:30 AM EST    CASE MANAGEMENT/SOCIAL WORK SECTION    Inpatient Status Date:     Readmission Risk Assessment Score:  Readmission Risk              Risk of Unplanned Readmission:  31           Discharging to [facility]   [address] [facility] [place], [address]  Phone: [phone]  Fax: [phone]     Pt came from SNF, refer at discharge:  [facility]   [address]  [place], [address]  Phone: [phone]  Fax: [phone]       Dialysis Facility (if applicable)   Name:  Address:  Dialysis Schedule:  Phone:  Fax:    / signature: Electronically signed by Tariq Wagner RN on 11/29/22 at 3:58 PM EST    PHYSICIAN SECTION    Prognosis: Guarded    Condition at Discharge: Unstable    Rehab Potential (if transferring to Rehab): Guarded    Recommended Labs or Other Treatments After Discharge: no    Physician Certification: I certify the above information and transfer of Halie Ibarra  is necessary for the continuing treatment of the diagnosis listed and that he requires LTAC for less 30 days.      Update Admission H&P: No change in H&P    PHYSICIAN SIGNATURE:  Electronically signed by JAEL Jeong NP on 12/10/22 at 12:59 PM EST

## 2022-11-29 NOTE — PROGRESS NOTES
[address]   OCCUPATIONAL THERAPY MISSED TREATMENT NOTE   INPATIENT   Date: 22  Patient Name: Mag Handley       Room: 4946/8552-38  MRN: [id number]   Account #: [de-identified]    : 1965  (61 y.o.)  Gender: male                 REASON FOR MISSED TREATMENT:  22    -    Pt currently vented and sedated. OT will continue to follow and will attempt when able.       1 Ana Rosa Spencer      Electronically signed by CARLOS ALBERTO Pascual on 2022 at 11:56 AM

## 2022-11-30 ENCOUNTER — APPOINTMENT (OUTPATIENT)
Dept: GENERAL RADIOLOGY | Age: 57
End: 2022-11-30
Payer: COMMERCIAL

## 2022-11-30 LAB
ABSOLUTE BANDS #: 0.22 K/UL (ref 0–1)
ABSOLUTE EOS #: 0 K/UL (ref 0–0.4)
ABSOLUTE LYMPH #: 0.07 K/UL (ref 1–4.8)
ABSOLUTE MONO #: 0.37 K/UL (ref 0.1–1.3)
ALBUMIN SERPL-MCNC: 2.6 G/DL (ref 3.5–5.2)
ALLEN TEST: ABNORMAL
ALP BLD-CCNC: 104 U/L (ref 40–129)
ALT SERPL-CCNC: 33 U/L (ref 5–41)
ANION GAP SERPL CALCULATED.3IONS-SCNC: 8 MMOL/L (ref 9–17)
AST SERPL-CCNC: 17 U/L
BANDS: 3 % (ref 0–10)
BASOPHILS # BLD: 0 % (ref 0–2)
BASOPHILS ABSOLUTE: 0 K/UL (ref 0–0.2)
BILIRUB SERPL-MCNC: 0.8 MG/DL (ref 0.3–1.2)
BUN BLDV-MCNC: 17 MG/DL (ref 6–20)
CALCIUM SERPL-MCNC: 8.4 MG/DL (ref 8.6–10.4)
CHLORIDE BLD-SCNC: 95 MMOL/L (ref 98–107)
CO2: 36 MMOL/L (ref 20–31)
CREAT SERPL-MCNC: <0.4 MG/DL (ref 0.7–1.2)
EOSINOPHILS RELATIVE PERCENT: 0 % (ref 0–4)
FIO2: 30
GFR SERPL CREATININE-BSD FRML MDRD: ABNORMAL ML/MIN/1.73M2
GLUCOSE BLD-MCNC: 121 MG/DL (ref 70–99)
HCO3 ARTERIAL: 42.5 MMOL/L (ref 22–26)
HCT VFR BLD CALC: 26.9 % (ref 41–53)
HCT VFR BLD CALC: 27.5 % (ref 41–53)
HCT VFR BLD CALC: 27.5 % (ref 41–53)
HCT VFR BLD CALC: 28.4 % (ref 41–53)
HEMOGLOBIN: 9.1 G/DL (ref 13.5–17.5)
HEMOGLOBIN: 9.4 G/DL (ref 13.5–17.5)
HEMOGLOBIN: 9.5 G/DL (ref 13.5–17.5)
HEMOGLOBIN: 9.7 G/DL (ref 13.5–17.5)
LYMPHOCYTES # BLD: 1 % (ref 24–44)
MAGNESIUM: 2 MG/DL (ref 1.6–2.6)
MCH RBC QN AUTO: 37.3 PG (ref 26–34)
MCHC RBC AUTO-ENTMCNC: 34 G/DL (ref 31–37)
MCV RBC AUTO: 109.6 FL (ref 80–100)
METHEMOGLOBIN: 1.1 % (ref 0–1.9)
MODE: ABNORMAL
MONOCYTES # BLD: 5 % (ref 1–7)
MORPHOLOGY: ABNORMAL
O2 DEVICE/FLOW/%: ABNORMAL
O2 SAT, ARTERIAL: 93.9 % (ref 95–98)
PATIENT TEMP: 37
PCO2 ARTERIAL: 47.9 MMHG (ref 35–45)
PDW BLD-RTO: 14.7 % (ref 11.5–14.9)
PEEP/CPAP: 10
PH ARTERIAL: 7.56 (ref 7.35–7.45)
PLATELET # BLD: 91 K/UL (ref 150–450)
PMV BLD AUTO: 8.5 FL (ref 6–12)
PO2 ARTERIAL: 72.1 MMHG (ref 80–100)
POSITIVE BASE EXCESS, ART: 20.2 MMOL/L (ref 0–2)
POTASSIUM SERPL-SCNC: 2.9 MMOL/L (ref 3.7–5.3)
POTASSIUM SERPL-SCNC: 3.2 MMOL/L (ref 3.7–5.3)
PT. POSITION: ABNORMAL
RBC # BLD: 2.59 M/UL (ref 4.5–5.9)
RESPIRATORY RATE: 14
SAMPLE SITE: ABNORMAL
SEG NEUTROPHILS: 91 % (ref 36–66)
SEGMENTED NEUTROPHILS ABSOLUTE COUNT: 6.64 K/UL (ref 1.3–9.1)
SET RATE: 14
SODIUM BLD-SCNC: 139 MMOL/L (ref 135–144)
TEXT FOR RESPIRATORY: ABNORMAL
TOTAL PROTEIN: 4.6 G/DL (ref 6.4–8.3)
TOTAL RATE: 18
VT: 500
WBC # BLD: 7.3 K/UL (ref 3.5–11)

## 2022-11-30 PROCEDURE — 2700000000 HC OXYGEN THERAPY PER DAY

## 2022-11-30 PROCEDURE — 94761 N-INVAS EAR/PLS OXIMETRY MLT: CPT

## 2022-11-30 PROCEDURE — 6360000002 HC RX W HCPCS: Performed by: NURSE PRACTITIONER

## 2022-11-30 PROCEDURE — 94640 AIRWAY INHALATION TREATMENT: CPT

## 2022-11-30 PROCEDURE — 2580000003 HC RX 258: Performed by: INTERNAL MEDICINE

## 2022-11-30 PROCEDURE — 94003 VENT MGMT INPAT SUBQ DAY: CPT

## 2022-11-30 PROCEDURE — A4216 STERILE WATER/SALINE, 10 ML: HCPCS | Performed by: FAMILY MEDICINE

## 2022-11-30 PROCEDURE — 6360000002 HC RX W HCPCS: Performed by: INTERNAL MEDICINE

## 2022-11-30 PROCEDURE — 71045 X-RAY EXAM CHEST 1 VIEW: CPT

## 2022-11-30 PROCEDURE — 85025 COMPLETE CBC W/AUTO DIFF WBC: CPT

## 2022-11-30 PROCEDURE — 36415 COLL VENOUS BLD VENIPUNCTURE: CPT

## 2022-11-30 PROCEDURE — 85018 HEMOGLOBIN: CPT

## 2022-11-30 PROCEDURE — 6370000000 HC RX 637 (ALT 250 FOR IP): Performed by: INTERNAL MEDICINE

## 2022-11-30 PROCEDURE — 82805 BLOOD GASES W/O2 SATURATION: CPT

## 2022-11-30 PROCEDURE — 2580000003 HC RX 258: Performed by: NURSE PRACTITIONER

## 2022-11-30 PROCEDURE — 83735 ASSAY OF MAGNESIUM: CPT

## 2022-11-30 PROCEDURE — 2500000003 HC RX 250 WO HCPCS: Performed by: NURSE PRACTITIONER

## 2022-11-30 PROCEDURE — 85014 HEMATOCRIT: CPT

## 2022-11-30 PROCEDURE — 6360000002 HC RX W HCPCS: Performed by: EMERGENCY MEDICINE

## 2022-11-30 PROCEDURE — 80053 COMPREHEN METABOLIC PANEL: CPT

## 2022-11-30 PROCEDURE — 36600 WITHDRAWAL OF ARTERIAL BLOOD: CPT

## 2022-11-30 PROCEDURE — 6370000000 HC RX 637 (ALT 250 FOR IP): Performed by: FAMILY MEDICINE

## 2022-11-30 PROCEDURE — 84132 ASSAY OF SERUM POTASSIUM: CPT

## 2022-11-30 PROCEDURE — 2580000003 HC RX 258: Performed by: EMERGENCY MEDICINE

## 2022-11-30 PROCEDURE — 2000000000 HC ICU R&B

## 2022-11-30 PROCEDURE — C9113 INJ PANTOPRAZOLE SODIUM, VIA: HCPCS | Performed by: FAMILY MEDICINE

## 2022-11-30 PROCEDURE — 6360000002 HC RX W HCPCS: Performed by: FAMILY MEDICINE

## 2022-11-30 PROCEDURE — 2580000003 HC RX 258: Performed by: FAMILY MEDICINE

## 2022-11-30 RX ORDER — FOLIC ACID 1 MG/1
1 TABLET ORAL DAILY
Status: DISCONTINUED | OUTPATIENT
Start: 2022-11-30 | End: 2022-11-30

## 2022-11-30 RX ORDER — GAUZE BANDAGE 2" X 2"
100 BANDAGE TOPICAL DAILY
Status: DISCONTINUED | OUTPATIENT
Start: 2022-11-30 | End: 2022-11-30

## 2022-11-30 RX ADMIN — POTASSIUM CHLORIDE 10 MEQ: 7.46 INJECTION, SOLUTION INTRAVENOUS at 07:08

## 2022-11-30 RX ADMIN — SODIUM CHLORIDE: 9 INJECTION, SOLUTION INTRAVENOUS at 05:39

## 2022-11-30 RX ADMIN — METHYLPREDNISOLONE SODIUM SUCCINATE 40 MG: 40 INJECTION, POWDER, FOR SOLUTION INTRAMUSCULAR; INTRAVENOUS at 03:40

## 2022-11-30 RX ADMIN — THIAMINE HYDROCHLORIDE: 100 INJECTION, SOLUTION INTRAMUSCULAR; INTRAVENOUS at 13:56

## 2022-11-30 RX ADMIN — POTASSIUM CHLORIDE 10 MEQ: 7.46 INJECTION, SOLUTION INTRAVENOUS at 10:26

## 2022-11-30 RX ADMIN — POTASSIUM CHLORIDE 10 MEQ: 7.46 INJECTION, SOLUTION INTRAVENOUS at 13:55

## 2022-11-30 RX ADMIN — IPRATROPIUM BROMIDE AND ALBUTEROL SULFATE 1 AMPULE: 2.5; .5 SOLUTION RESPIRATORY (INHALATION) at 10:20

## 2022-11-30 RX ADMIN — POTASSIUM CHLORIDE 10 MEQ: 7.46 INJECTION, SOLUTION INTRAVENOUS at 05:50

## 2022-11-30 RX ADMIN — POTASSIUM CHLORIDE 10 MEQ: 7.46 INJECTION, SOLUTION INTRAVENOUS at 11:57

## 2022-11-30 RX ADMIN — PROPOFOL 40 MCG/KG/MIN: 10 INJECTION, EMULSION INTRAVENOUS at 04:04

## 2022-11-30 RX ADMIN — VANCOMYCIN HYDROCHLORIDE 1500 MG: 1.5 INJECTION, POWDER, LYOPHILIZED, FOR SOLUTION INTRAVENOUS at 22:39

## 2022-11-30 RX ADMIN — BUDESONIDE 500 MCG: 0.5 SUSPENSION RESPIRATORY (INHALATION) at 08:29

## 2022-11-30 RX ADMIN — SODIUM CHLORIDE, PRESERVATIVE FREE 10 ML: 5 INJECTION INTRAVENOUS at 08:16

## 2022-11-30 RX ADMIN — POTASSIUM CHLORIDE 10 MEQ: 7.46 INJECTION, SOLUTION INTRAVENOUS at 08:11

## 2022-11-30 RX ADMIN — PROPOFOL 35 MCG/KG/MIN: 10 INJECTION, EMULSION INTRAVENOUS at 21:00

## 2022-11-30 RX ADMIN — PROPOFOL 36.95 MCG/KG/MIN: 10 INJECTION, EMULSION INTRAVENOUS at 16:56

## 2022-11-30 RX ADMIN — METHYLPREDNISOLONE SODIUM SUCCINATE 40 MG: 40 INJECTION, POWDER, FOR SOLUTION INTRAMUSCULAR; INTRAVENOUS at 22:34

## 2022-11-30 RX ADMIN — IPRATROPIUM BROMIDE AND ALBUTEROL SULFATE 1 AMPULE: 2.5; .5 SOLUTION RESPIRATORY (INHALATION) at 15:19

## 2022-11-30 RX ADMIN — IPRATROPIUM BROMIDE AND ALBUTEROL SULFATE 1 AMPULE: 2.5; .5 SOLUTION RESPIRATORY (INHALATION) at 19:51

## 2022-11-30 RX ADMIN — PROPOFOL 40 MCG/KG/MIN: 10 INJECTION, EMULSION INTRAVENOUS at 00:15

## 2022-11-30 RX ADMIN — IPRATROPIUM BROMIDE AND ALBUTEROL SULFATE 1 AMPULE: 2.5; .5 SOLUTION RESPIRATORY (INHALATION) at 03:48

## 2022-11-30 RX ADMIN — IPRATROPIUM BROMIDE AND ALBUTEROL SULFATE 1 AMPULE: 2.5; .5 SOLUTION RESPIRATORY (INHALATION) at 08:29

## 2022-11-30 RX ADMIN — METHYLPREDNISOLONE SODIUM SUCCINATE 40 MG: 40 INJECTION, POWDER, FOR SOLUTION INTRAMUSCULAR; INTRAVENOUS at 10:33

## 2022-11-30 RX ADMIN — BUDESONIDE 500 MCG: 0.5 SUSPENSION RESPIRATORY (INHALATION) at 23:14

## 2022-11-30 RX ADMIN — DILTIAZEM HYDROCHLORIDE 30 MG: 30 TABLET, FILM COATED ORAL at 05:06

## 2022-11-30 RX ADMIN — METHYLPREDNISOLONE SODIUM SUCCINATE 40 MG: 40 INJECTION, POWDER, FOR SOLUTION INTRAMUSCULAR; INTRAVENOUS at 17:01

## 2022-11-30 RX ADMIN — SODIUM CHLORIDE, PRESERVATIVE FREE 10 ML: 5 INJECTION INTRAVENOUS at 20:57

## 2022-11-30 RX ADMIN — PROPOFOL 40 MCG/KG/MIN: 10 INJECTION, EMULSION INTRAVENOUS at 08:52

## 2022-11-30 RX ADMIN — OXYCODONE HYDROCHLORIDE AND ACETAMINOPHEN 1 TABLET: 5; 325 TABLET ORAL at 02:40

## 2022-11-30 RX ADMIN — POTASSIUM CHLORIDE 10 MEQ: 7.46 INJECTION, SOLUTION INTRAVENOUS at 20:56

## 2022-11-30 RX ADMIN — ENOXAPARIN SODIUM 40 MG: 100 INJECTION SUBCUTANEOUS at 08:09

## 2022-11-30 RX ADMIN — SODIUM CHLORIDE, PRESERVATIVE FREE 40 MG: 5 INJECTION INTRAVENOUS at 11:11

## 2022-11-30 RX ADMIN — VANCOMYCIN HYDROCHLORIDE 1500 MG: 1.5 INJECTION, POWDER, LYOPHILIZED, FOR SOLUTION INTRAVENOUS at 11:20

## 2022-11-30 RX ADMIN — IPRATROPIUM BROMIDE AND ALBUTEROL SULFATE 1 AMPULE: 2.5; .5 SOLUTION RESPIRATORY (INHALATION) at 23:14

## 2022-11-30 RX ADMIN — POTASSIUM CHLORIDE 10 MEQ: 7.46 INJECTION, SOLUTION INTRAVENOUS at 22:36

## 2022-11-30 ASSESSMENT — PULMONARY FUNCTION TESTS
PIF_VALUE: 21
PIF_VALUE: 20
PIF_VALUE: 18
PIF_VALUE: 20
PIF_VALUE: 20
PIF_VALUE: 21
PIF_VALUE: 18
PIF_VALUE: 20
PIF_VALUE: 20
PIF_VALUE: 19
PIF_VALUE: 20
PIF_VALUE: 18
PIF_VALUE: 21
PIF_VALUE: 20
PIF_VALUE: 17
PIF_VALUE: 21
PIF_VALUE: 19
PIF_VALUE: 21
PIF_VALUE: 21
PIF_VALUE: 20
PIF_VALUE: 19
PIF_VALUE: 21
PIF_VALUE: 20
PIF_VALUE: 18
PIF_VALUE: 18
PIF_VALUE: 19
PIF_VALUE: 19
PIF_VALUE: 20
PIF_VALUE: 21
PIF_VALUE: 21
PIF_VALUE: 17
PIF_VALUE: 21
PIF_VALUE: 20

## 2022-11-30 ASSESSMENT — PAIN SCALES - GENERAL
PAINLEVEL_OUTOF10: 4
PAINLEVEL_OUTOF10: 3
PAINLEVEL_OUTOF10: 6

## 2022-11-30 ASSESSMENT — PAIN DESCRIPTION - LOCATION
LOCATION: BACK

## 2022-11-30 ASSESSMENT — PAIN DESCRIPTION - ORIENTATION: ORIENTATION: LOWER

## 2022-11-30 ASSESSMENT — PAIN DESCRIPTION - DESCRIPTORS: DESCRIPTORS: ACHING

## 2022-11-30 NOTE — PROGRESS NOTES
11/30/22 1510   Encounter Summary   Encounter Overview/Reason  Spiritual/Emotional Needs   Service Provided For: Patient   Referral/Consult From: Palliative Care   Last Encounter  11/30/22   Complexity of Encounter Low   Palliative Care   Type Palliative Care, Follow-up   Assessment/Intervention/Outcome   Assessment Unable to assess   Intervention Prayer (assurance of)/[place]

## 2022-11-30 NOTE — PROGRESS NOTES
Physical Therapy        Physical Therapy Cancel Note      DATE: 2022    NAME: Cha Vanessa  MRN: [id number]   : 1965      Patient not seen this date for Physical Therapy due to:    22 Pt ventilator and sedated this date; not appropriate for PT assessment. PT to check QD and assess when able to safely participate.  LP      Electronically signed by Marycarmen Marquez PT on 2022 at 8:47 AM

## 2022-11-30 NOTE — PROGRESS NOTES
PULMONARY PROGRESS NOTE:    REASON FOR VISIT:copd exac, resp failure  Interval History: On vent, sedated    Events since last visit: Has dark drainage coming from NG tube. Hgb dropped from 11 to 9.7.      PAST MEDICAL HISTORY:      Scheduled Meds:   pantoprazole (PROTONIX) 40 mg injection  40 mg IntraVENous Daily    thiamine and folic acid IVPB   IntraVENous Daily    vancomycin  1,500 mg IntraVENous Q12H    dilTIAZem  30 mg Oral 4 times per day    methylPREDNISolone  40 mg IntraVENous Q6H    nicotine  1 patch TransDERmal Daily    metoprolol tartrate  25 mg Oral BID    lisinopril  10 mg Oral Daily    sodium chloride flush  5-40 mL IntraVENous 2 times per day    vancomycin (VANCOCIN) intermittent dosing (placeholder)   Other RX Placeholder    sodium chloride flush  5-40 mL IntraVENous 2 times per day    enoxaparin  40 mg SubCUTAneous Daily    ipratropium-albuterol  1 ampule Inhalation Q4H    budesonide  0.5 mg Nebulization BID     Continuous Infusions:   sodium chloride 100 mL/hr at 11/30/22 0539    norepinephrine Stopped (11/30/22 0451)    propofol 15 mcg/kg/min (11/30/22 1430)    sodium chloride      sodium chloride       PRN Meds:dextromethorphan-guaiFENesin, LORazepam, oxyCODONE-acetaminophen, potassium chloride **OR** potassium alternative oral replacement **OR** potassium chloride, metoprolol, sodium chloride flush, sodium chloride, ipratropium-albuterol, sodium chloride flush, sodium chloride, acetaminophen, ondansetron **OR** ondansetron        PHYSICAL EXAMINATION:  BP (!) 147/70   Pulse 97   Temp 98.4 °F (36.9 °C)   Resp 24   Ht 6' 1\" (1.854 m)   Wt 186 lb 1.1 oz (84.4 kg)   SpO2 99%   BMI 24.55 kg/m²     General : sedated, on vent   Neck - supple, no lymphadenopathy, JVD not raised  Heart - regular rhythm, S1 and S2 normal; no additional sounds heard  Lungs - poor Air Entry bilaterally; breath sounds : vesicular;   rales/crackles - absent  Abdomen - soft, no tenderness  Upper Extremities  - no cyanosis, mottling; edema : absent  Lower Extremities: no cyanosis, mottling; edema : absent    Current Laboratory, Radiologic, Microbiologic, and Diagnostic studies reviewed  Data ReviewCBC:   Recent Labs     11/28/22  1400 11/29/22  0445 11/30/22  0403 11/30/22  1155   WBC 8.8 15.2* 7.3  --    RBC 2.88* 3.00* 2.59*  --    HGB 10.8* 11.0* 9.7* 9.5*   HCT 31.9* 33.0* 28.4* 27.5*   * 148* 91*  --      BMP:   Recent Labs     11/28/22  1400 11/29/22  0445 11/30/22  0403   GLUCOSE 111* 132* 121*   * 134* 139   K 4.6 3.3* 2.9*   BUN 16 17 17   CREATININE <0.40* <0.40* <0.40*   CALCIUM 9.1 9.3 8.4*     ABGs:   Recent Labs     11/28/22  1245 11/29/22  0625 11/30/22  0432   PHART 7.480* 7.601* 7.556*   PO2ART 59.4* 82.1 72.1*   QHR8EPA 54.1* 43.9 47.9*   GAS2LBU 40.3* 43.1* 42.5*   J5CWTPWW 91.7* 96.2 93.9*      PT/INR:  No results found for: PTINR    ASSESSMENT / PLAN:  Acute hypoxic respiratory failure     Mechanical ventilation - extubated 11/23/22; reintubated 11/28/22  Cent settings adjusted  Hypotension - needs PICC     Initiate pressor therapy / levophed to keep MAP 65  PNA - HCAP/ ABx  COPD     BD, steroids  Suspected lung CA  Alcohol use      thiamine / folic acid     Monitor for DTs  IV fluids  H&H q 6      This is a late note on patient seen by me earlier today.   Electronically signed by Sidney Resendiz MD on 11/30/22 at 7:45 PM

## 2022-11-30 NOTE — PLAN OF CARE
Problem: Nutrition Deficit:  Goal: Optimize nutritional status  11/30/2022 1649 by Renee Cortez RN  Outcome: Not Progressing  Note: Asked about nutrition, doctors stated they will address it tomorrow.    11/30/2022 0526 by Nena Zaidi RN  Outcome: Progressing

## 2022-11-30 NOTE — PLAN OF CARE
Problem: Discharge Planning  Goal: Discharge to home or other facility with appropriate resources  11/30/2022 0526 by Raphael Bradley RN  Outcome: Progressing  11/29/2022 1944 by Nadya Dominguez RN  Outcome: Progressing     Problem: Pain  Goal: Verbalizes/displays adequate comfort level or baseline comfort level  11/30/2022 0526 by Raphael Bradley RN  Outcome: Progressing  Flowsheets (Taken 11/29/2022 2000)  Verbalizes/displays adequate comfort level or baseline comfort level:   Encourage patient to monitor pain and request assistance   Assess pain using appropriate pain scale   Administer analgesics based on type and severity of pain and evaluate response   Implement non-pharmacological measures as appropriate and evaluate response  11/29/2022 1944 by Nadya Dominguez RN  Outcome: Progressing     Problem: Skin/Tissue Integrity  Goal: Absence of new skin breakdown  Description: 1. Monitor for areas of redness and/or skin breakdown  2. Assess vascular access sites hourly  3. Every 4-6 hours minimum:  Change oxygen saturation probe site  4. Every 4-6 hours:  If on nasal continuous positive airway pressure, respiratory therapy assess nares and determine need for appliance change or resting period. 11/30/2022 0526 by Raphael Bradley RN  Outcome: Progressing  11/29/2022 1944 by Nadya Dominguez RN  Outcome: Progressing     Problem: Safety - Adult  Goal: Free from fall injury  11/30/2022 0526 by Raphael Bradley RN  Outcome: Progressing  11/29/2022 1944 by Nadya Dominguez RN  Outcome: Progressing     Problem: ABCDS Injury Assessment  Goal: Absence of physical injury  11/30/2022 0526 by Raphael Bradley RN  Outcome: Progressing  11/29/2022 1944 by Nadya Dominguez RN  Outcome: Progressing     Problem: Safety - Medical Restraint  Goal: Remains free of injury from restraints (Restraint for Interference with Medical Device)  Description: INTERVENTIONS:  1.  Determine that other, less restrictive measures have been tried or would not be effective before applying the restraint  2. Evaluate the patient's condition at the time of restraint application  3. Inform patient/family regarding the reason for restraint  4.  Q2H: Monitor safety, psychosocial status, comfort, nutrition and hydration  11/30/2022 0526 by Geradine Crigler, RN  Outcome: Progressing  Flowsheets (Taken 11/29/2022 2000)  Remains free of injury from restraints (restraint for interference with medical device):   Determine that other, less restrictive measures have been tried or would not be effective before applying the restraint   Evaluate the patient's condition at the time of restraint application   Inform patient/family regarding the reason for restraint   Every 2 hours: Monitor safety, psychosocial status, comfort, nutrition and hydration  11/29/2022 1944 by Marizol Chávez RN  Outcome: Progressing     Problem: Nutrition Deficit:  Goal: Optimize nutritional status  11/30/2022 0526 by Geradine Crigler, RN  Outcome: Progressing  11/29/2022 1944 by Marizol Chávez RN  Outcome: Progressing

## 2022-11-30 NOTE — PROGRESS NOTES
[address]   OCCUPATIONAL THERAPY MISSED TREATMENT NOTE   INPATIENT   Date: 22  Patient Name: Mag Handley       Room: 9126/1552-33  MRN: [id number]   Account #: [de-identified]    : 1965  (61 y.o.)  Gender: male                 REASON FOR MISSED TREATMENT:  22    -    Pt remains vented and sedated. OT will continue to follow and attempt as appropriate.       3820      Electronically signed by CARLOS ALBERTO Pascual on 2022 at 8:47 AM

## 2022-11-30 NOTE — PROGRESS NOTES
Dr Marcy Marshall notified of abnormal ABG, vent change orders received and states he will evaluate this AM to see if pt can be extubated

## 2022-11-30 NOTE — PROGRESS NOTES
Patient RR increased to 40 and not slowing down. Patient placed back on full support, PRVC, 30% RR 14, PEEP 10, . Pt RR back down to 20.

## 2022-11-30 NOTE — PLAN OF CARE
Problem: Discharge Planning  Goal: Discharge to home or other facility with appropriate resources  Outcome: Progressing     Problem: Pain  Goal: Verbalizes/displays adequate comfort level or baseline comfort level  Outcome: Progressing     Problem: Safety - Medical Restraint  Goal: Remains free of injury from restraints (Restraint for Interference with Medical Device)  Description: INTERVENTIONS:  1. Determine that other, less restrictive measures have been tried or would not be effective before applying the restraint  2. Evaluate the patient's condition at the time of restraint application  3. Inform patient/family regarding the reason for restraint  4. Q2H: Monitor safety, psychosocial status, comfort, nutrition and hydration  Outcome: Progressing   Pt in wrist restraints to protect artificial airway. Pt does attempt to pull ETT and lines when restraints released for ROM. Problem: Skin/Tissue Integrity  Goal: Absence of new skin breakdown  Description: 1. Monitor for areas of redness and/or skin breakdown  2. Assess vascular access sites hourly  3. Every 4-6 hours minimum:  Change oxygen saturation probe site  4. Every 4-6 hours:  If on nasal continuous positive airway pressure, respiratory therapy assess nares and determine need for appliance change or resting period. Outcome: Progressing   No new skin breakdown noted.    Problem: Safety - Adult  Goal: Free from fall injury  Outcome: Progressing     Problem: ABCDS Injury Assessment  Goal: Absence of physical injury  Outcome: Progressing     Problem: Nutrition Deficit:  Goal: Optimize nutritional status  Outcome: Progressing

## 2022-11-30 NOTE — PROGRESS NOTES
Dr Sarah Drake is aware patient did not have a cuff leak. Patient placed back on vent setting after a coughing episode. Patient had thick tan sputum. Patient placed on low dose sedation.  Also informed of repeat hgb of 9.5

## 2022-11-30 NOTE — PROGRESS NOTES
11/30/22 1304   Ventilator Settings   FiO2  30 %   PEEP/CPAP (cmH2O) 7   Pressure Support (cm H2O) 12 cm H2O   Vent Patient Data (Readings)   Vt (Measured) 758 mL   Peak Inspiratory Pressure (cmH2O) 19 cmH2O   Rate Measured 11.6 br/min   Minute Volume (L/min) 7.9 Liters   Mean Airway Pressure (cmH2O) 10.3 cmH20   Plateau Pressure (cm H2O) 60.2 cm H2O     Pt on cpap wean since 11:04 and tolerating well. However, when air was evacuated from ETT's  balloon to check for cuff leak, pt did not have a leak coming from around the tube.  Angie YORK updated

## 2022-11-30 NOTE — CARE COORDINATION
DISCHARGE PLANNING NOTE:     Pt remains intubated on the vent at 30% FiO2. Pt is from [facility] and was previously current with [facility] (however revoked to come in). Active order for IV Solu-medrol 40mg every 6 hours and IV Vanco.  On Propofol gtt. Will continue to follow for additional discharge needs.     Electronically signed by Ida Naylor RN on 11/30/2022 at 9:38 AM

## 2022-11-30 NOTE — PROGRESS NOTES
Patient placed on CPAP 10 / PS 8 per Dr. Maricarmen Montejo order. Patient tolerating well at this time, RR 20, 100% SpO2, HR 92.

## 2022-11-30 NOTE — PROGRESS NOTES
Vancomycin Dosing by Pharmacy - Daily Note   Vancomycin Therapy Day:  9  Indication: sepsis     Allergies:  Patient has no known allergies. Actual Weight:    Wt Readings from Last 1 Encounters:   11/30/22 186 lb 1.1 oz (84.4 kg)       Labs/Ancillary Data  Estimated Creatinine Clearance: 227 mL/min (based on SCr of 0.4 mg/dL). Recent Labs     11/28/22  1400 11/29/22  0445 11/30/22  0403   CREATININE <0.40* <0.40* <0.40*   BUN 16 17 17   WBC 8.8 15.2* 7.3     Procalcitonin   Date Value Ref Range Status   08/29/2022 0.20 (H) <0.09 ng/mL Final     Comment:           Suspected Sepsis:  <0.50 ng/mL     Low likelihood of sepsis. 0.50-2.00 ng/mL     Increased likelihood of sepsis. Antibiotics encouraged. >2.00 ng/mL     High risk of sepsis/shock. Antibiotics strongly encouraged. Suspected Lower Resp Tract Infections:  <0.24 ng/mL     Low likelihood of bacterial infection. >0.24 ng/mL     Increased likelihood of bacterial infection. Antibiotics encouraged. With successful antibiotic therapy, PCT levels should decrease rapidly. (Half-life of 24 to   36 hours.)        Procalcitonin values from samples collected within the first 6 hours of systemic infection   may still be low. Retesting may be indicated. Values from day 1 and day 4 can be entered into the Change in Procalcitonin Calculator   (www.[employer]-pct-calculator. [employer]) to determine the patient's Mortality Risk Prognosis        In healthy neonates, plasma Procalcitonin (PCT) concentrations increase gradually after   birth, reaching peak values at about 24 hours of age then decrease to normal values below   0.5 ng/mL by 48-72 hours of age.          Intake/Output Summary (Last 24 hours) at 11/30/2022 0816  Last data filed at 11/30/2022 0400  Gross per 24 hour   Intake 1538.23 ml   Output 2400 ml   Net -861.77 ml     Temp: 99.5    Culture Date / Source  /  Results  See micro   Recent vancomycin administrations                     vancomycin (VANCOCIN) 1,500 mg in dextrose 5 % 250 mL IVPB (ADDAVIAL) (mg) 1,500 mg New Bag 11/29/22 2203     1,500 mg New Bag  1105    vancomycin (VANCOCIN) 1,250 mg in dextrose 5 % 250 mL IVPB (ADDAVIAL) (mg) 1,250 mg New Bag 11/28/22 2308     1,250 mg New Bag  1320     1,250 mg New Bag  0336     1,250 mg New Bag 11/27/22 1317                    Vancomycin Concentrations:   TROUGH:  No results for input(s): VANCOTROUGH in the last 72 hours. RANDOM:    Recent Labs     11/28/22  0352 11/28/22  1718   VANCORANDOM 16.6 27.7       MRSA Nasal Swab: N/A. Non-respiratory infection. Norm Bambi PLAN     Continue current dose of 1500 mg q12h IV  Ensured BUN/sCr ordered at baseline and every 48 hours x at least 3 levels, then at least weekly. Repeat vancomycin concentration ordered for 12/01 @ 0600   Pharmacy will continue to monitor patient and adjust therapy as indicated      Vancomycin Target Concentration Parameters  Treatment  Population Target AUC/NIK Target Trough   Invasive MRSA Infection (bacteremia, pneumonia, meningitis, endocarditis, osteomyelitis)  Sepsis (undifferentiated) 400-600 N/A   Infection due to non-MRSA pathogen  Empiric treatment of non-invasive MRSA infection  (SSTI, UTI) <500 10-15 mg/L   CrCl < 29 mL/min  Rapidly fluctuating serum creatinine   KRYSTA N/A < 15 mg/L     Renal replacement therapy is dosed by levels, per hospital protocol. Abbreviations  * Pauc: probability that AUC is >400 (efficacy); Pconc: probability that Ctrough is above 20 ?g/mL (toxicity); Tox: Probability of nephrotoxicity, based on Kade et al. Clin Infect Dis 2009. Loading dose: N/A  Regimen: 1500 mg IV every 12 hours. Start time: 10:03 on 11/30/2022  Exposure target: AUC24 (range)400-600 mg/L.hr   AUC24,ss: 499 mg/L.hr  Probability of AUC24 > 400: 80 %  Ctrough,ss: 11.4 mg/L  Probability of Ctrough,ss > 20: 8 %  Probability of nephrotoxicity (Kade IVANNA 2009): 7 %    Thank you for the consult. Pharmacy will continue to follow.     Cindy Braden, PharmD, [facility]  11/30/2022 8:17 AM

## 2022-11-30 NOTE — PROGRESS NOTES
Dr Daya Galloway notified that pt RR=40 for 1 hour while on CPAP mode that he ordered and RT switched pt back to [facility] on original settings.  Dr Magana Ev this and ordered to decrease TV to 450

## 2022-11-30 NOTE — PROGRESS NOTES
Comprehensive Nutrition Assessment    Type and Reason for Visit:  Reassess    Nutrition Recommendations/Plan:   Continue NPO as ordered. Follow for nutrition progression. Malnutrition Assessment:  Malnutrition Status: At risk for malnutrition (Comment) (11/28/22 1350)    Context:  Acute Illness     Findings of the 6 clinical characteristics of malnutrition:  Energy Intake:  No significant decrease in energy intake  Weight Loss:  No significant weight loss     Body Fat Loss:  Unable to assess     Muscle Mass Loss:  Unable to assess    Fluid Accumulation:  Mild Extremities   Strength:  Not Performed    Nutrition Assessment:    Pt remains intubated with RN indicating no plans to start tube feedings today since some blood in NG; hemoglobin had slight decrease today from 9.7 to 9.5. Pt was on CPAP wean since 11:04 am and tolerated well but no cuff leak. Pt placed back on vent settings after a coughing episode. Propofol this morning was at 21.6 ml, restarted this afternoon at only 5.4 ml. Nutrition Related Findings:    Edema: +2 BUE's, trace BLE's. BM-11/26. Labs & meds reviewed. Wound Type: None       Current Nutrition Intake & Therapies:    Average Meal Intake: % (prior to intubation)     No diet orders on file    Anthropometric Measures:  Height: 6' 1\" (185.4 cm)  Ideal Body Weight (IBW): 184 lbs (84 kg)    Admission Body Weight: 185 lb (83.9 kg)  Current Body Weight: 186 lb (84.4 kg),   IBW. Weight Source: Bed Scale  Current BMI (kg/m2): 24.5  Usual Body Weight: 201 lb (91.2 kg) (8/22)  % Weight Change (Calculated): -1.5                    BMI Categories: Overweight (BMI 25.0-29. 9)    Estimated Daily Nutrient Needs:  Energy Requirements Based On: Formula  Weight Used for Energy Requirements: Admission  Energy (kcal/day): [place] x 1.2= 1570-0328 kcal  Weight Used for Protein Requirements: Admission  Protein (g/day): 1.4g/kg= 115- 120 g         Nutrition Diagnosis:   Inadequate oral intake related to impaired respiratory function as evidenced by NPO or clear liquid status due to medical condition    Nutrition Interventions:   Food and/or Nutrient Delivery: Continue NPO  Nutrition Education/Counseling: No recommendation at this time  Coordination of Nutrition Care: Continue to monitor while inpatient       Goals:  Previous Goal Met: No Progress toward Goal(s)  Goals: Meet at least 75% of estimated needs       Nutrition Monitoring and Evaluation:      Food/Nutrient Intake Outcomes: None Identified  Physical Signs/Symptoms Outcomes: Biochemical Data, GI Status, Fluid Status or Edema, Skin, Weight    Discharge Planning:     Too soon to determine     Ruma Hamlin, [address],   [phone]

## 2022-12-01 ENCOUNTER — APPOINTMENT (OUTPATIENT)
Dept: GENERAL RADIOLOGY | Age: 57
End: 2022-12-01
Payer: COMMERCIAL

## 2022-12-01 LAB
ABSOLUTE BANDS #: 0.06 K/UL (ref 0–1)
ABSOLUTE EOS #: 0 K/UL (ref 0–0.4)
ABSOLUTE LYMPH #: 0.06 K/UL (ref 1–4.8)
ABSOLUTE MONO #: 0.17 K/UL (ref 0.1–1.3)
ALBUMIN SERPL-MCNC: 2.6 G/DL (ref 3.5–5.2)
ALLEN TEST: ABNORMAL
ALP BLD-CCNC: 100 U/L (ref 40–129)
ALT SERPL-CCNC: 30 U/L (ref 5–41)
ANION GAP SERPL CALCULATED.3IONS-SCNC: 4 MMOL/L (ref 9–17)
AST SERPL-CCNC: 16 U/L
ATYPICAL LYMPHOCYTE ABSOLUTE COUNT: 0.06 K/UL
ATYPICAL LYMPHOCYTES: 1 %
BANDS: 1 % (ref 0–10)
BASOPHILS # BLD: 0 % (ref 0–2)
BASOPHILS ABSOLUTE: 0 K/UL (ref 0–0.2)
BILIRUB SERPL-MCNC: 0.6 MG/DL (ref 0.3–1.2)
BUN BLDV-MCNC: 16 MG/DL (ref 6–20)
CALCIUM SERPL-MCNC: 8.6 MG/DL (ref 8.6–10.4)
CARBOXYHEMOGLOBIN: <1 % (ref 0–5)
CHLORIDE BLD-SCNC: 97 MMOL/L (ref 98–107)
CO2: 34 MMOL/L (ref 20–31)
CREAT SERPL-MCNC: <0.4 MG/DL (ref 0.7–1.2)
EOSINOPHILS RELATIVE PERCENT: 0 % (ref 0–4)
FIO2: 30
GFR SERPL CREATININE-BSD FRML MDRD: ABNORMAL ML/MIN/1.73M2
GLUCOSE BLD-MCNC: 120 MG/DL (ref 70–99)
HCO3 ARTERIAL: 37.2 MMOL/L (ref 22–26)
HCT VFR BLD CALC: 26.8 % (ref 41–53)
HCT VFR BLD CALC: 27.2 % (ref 41–53)
HCT VFR BLD CALC: 28.3 % (ref 41–53)
HCT VFR BLD CALC: 30.1 % (ref 41–53)
HEMOGLOBIN: 10.3 G/DL (ref 13.5–17.5)
HEMOGLOBIN: 9.2 G/DL (ref 13.5–17.5)
HEMOGLOBIN: 9.4 G/DL (ref 13.5–17.5)
HEMOGLOBIN: 9.6 G/DL (ref 13.5–17.5)
LYMPHOCYTES # BLD: 1 % (ref 24–44)
MAGNESIUM: 2 MG/DL (ref 1.6–2.6)
MCH RBC QN AUTO: 37.7 PG (ref 26–34)
MCHC RBC AUTO-ENTMCNC: 34.4 G/DL (ref 31–37)
MCV RBC AUTO: 109.8 FL (ref 80–100)
METHEMOGLOBIN: 0.9 % (ref 0–1.9)
MODE: ABNORMAL
MONOCYTES # BLD: 3 % (ref 1–7)
MORPHOLOGY: ABNORMAL
O2 DEVICE/FLOW/%: ABNORMAL
O2 SAT, ARTERIAL: 96.2 % (ref 95–98)
PATIENT TEMP: 37
PCO2 ARTERIAL: 49.4 MMHG (ref 35–45)
PDW BLD-RTO: 14.6 % (ref 11.5–14.9)
PEEP/CPAP: 7
PH ARTERIAL: 7.49 (ref 7.35–7.45)
PLATELET # BLD: 80 K/UL (ref 150–450)
PMV BLD AUTO: 8.5 FL (ref 6–12)
PO2 ARTERIAL: 92 MMHG (ref 80–100)
POSITIVE BASE EXCESS, ART: 13.8 MMOL/L (ref 0–2)
POTASSIUM SERPL-SCNC: 3.2 MMOL/L (ref 3.7–5.3)
PT. POSITION: ABNORMAL
RBC # BLD: 2.48 M/UL (ref 4.5–5.9)
RESPIRATORY RATE: 18
SAMPLE SITE: ABNORMAL
SEG NEUTROPHILS: 94 % (ref 36–66)
SEGMENTED NEUTROPHILS ABSOLUTE COUNT: 5.25 K/UL (ref 1.3–9.1)
SET RATE: 14
SODIUM BLD-SCNC: 135 MMOL/L (ref 135–144)
TEXT FOR RESPIRATORY: ABNORMAL
TOTAL PROTEIN: 4.5 G/DL (ref 6.4–8.3)
TOTAL RATE: 18
TRIGL SERPL-MCNC: 111 MG/DL
VANCOMYCIN RANDOM DATE LAST DOSE: NORMAL
VANCOMYCIN RANDOM DOSE AMOUNT: 1500
VANCOMYCIN RANDOM TIME LAST DOSE: 2239
VANCOMYCIN RANDOM: 18.3 UG/ML
VT: 450
WBC # BLD: 5.6 K/UL (ref 3.5–11)

## 2022-12-01 PROCEDURE — 83735 ASSAY OF MAGNESIUM: CPT

## 2022-12-01 PROCEDURE — 2580000003 HC RX 258: Performed by: NURSE PRACTITIONER

## 2022-12-01 PROCEDURE — 6370000000 HC RX 637 (ALT 250 FOR IP): Performed by: INTERNAL MEDICINE

## 2022-12-01 PROCEDURE — 94003 VENT MGMT INPAT SUBQ DAY: CPT

## 2022-12-01 PROCEDURE — 6370000000 HC RX 637 (ALT 250 FOR IP): Performed by: FAMILY MEDICINE

## 2022-12-01 PROCEDURE — 85014 HEMATOCRIT: CPT

## 2022-12-01 PROCEDURE — 2000000000 HC ICU R&B

## 2022-12-01 PROCEDURE — 82805 BLOOD GASES W/O2 SATURATION: CPT

## 2022-12-01 PROCEDURE — 94640 AIRWAY INHALATION TREATMENT: CPT

## 2022-12-01 PROCEDURE — 36415 COLL VENOUS BLD VENIPUNCTURE: CPT

## 2022-12-01 PROCEDURE — 85025 COMPLETE CBC W/AUTO DIFF WBC: CPT

## 2022-12-01 PROCEDURE — 2580000003 HC RX 258: Performed by: FAMILY MEDICINE

## 2022-12-01 PROCEDURE — 71045 X-RAY EXAM CHEST 1 VIEW: CPT

## 2022-12-01 PROCEDURE — C9113 INJ PANTOPRAZOLE SODIUM, VIA: HCPCS | Performed by: FAMILY MEDICINE

## 2022-12-01 PROCEDURE — 2700000000 HC OXYGEN THERAPY PER DAY

## 2022-12-01 PROCEDURE — 6360000002 HC RX W HCPCS: Performed by: INTERNAL MEDICINE

## 2022-12-01 PROCEDURE — 84478 ASSAY OF TRIGLYCERIDES: CPT

## 2022-12-01 PROCEDURE — 2500000003 HC RX 250 WO HCPCS: Performed by: NURSE PRACTITIONER

## 2022-12-01 PROCEDURE — 2500000003 HC RX 250 WO HCPCS: Performed by: INTERNAL MEDICINE

## 2022-12-01 PROCEDURE — 2580000003 HC RX 258: Performed by: EMERGENCY MEDICINE

## 2022-12-01 PROCEDURE — 36600 WITHDRAWAL OF ARTERIAL BLOOD: CPT

## 2022-12-01 PROCEDURE — 80053 COMPREHEN METABOLIC PANEL: CPT

## 2022-12-01 PROCEDURE — A4216 STERILE WATER/SALINE, 10 ML: HCPCS | Performed by: FAMILY MEDICINE

## 2022-12-01 PROCEDURE — 6360000002 HC RX W HCPCS: Performed by: EMERGENCY MEDICINE

## 2022-12-01 PROCEDURE — 6360000002 HC RX W HCPCS: Performed by: FAMILY MEDICINE

## 2022-12-01 PROCEDURE — 6360000002 HC RX W HCPCS: Performed by: NURSE PRACTITIONER

## 2022-12-01 PROCEDURE — 80202 ASSAY OF VANCOMYCIN: CPT

## 2022-12-01 PROCEDURE — 85018 HEMOGLOBIN: CPT

## 2022-12-01 PROCEDURE — 94761 N-INVAS EAR/PLS OXIMETRY MLT: CPT

## 2022-12-01 RX ORDER — METOPROLOL TARTRATE 5 MG/5ML
5 INJECTION INTRAVENOUS EVERY 4 HOURS
Status: DISCONTINUED | OUTPATIENT
Start: 2022-12-01 | End: 2022-12-09

## 2022-12-01 RX ORDER — METOCLOPRAMIDE HYDROCHLORIDE 5 MG/ML
10 INJECTION INTRAMUSCULAR; INTRAVENOUS EVERY 6 HOURS
Status: DISCONTINUED | OUTPATIENT
Start: 2022-12-01 | End: 2022-12-10 | Stop reason: HOSPADM

## 2022-12-01 RX ADMIN — PROPOFOL 40 MCG/KG/MIN: 10 INJECTION, EMULSION INTRAVENOUS at 22:08

## 2022-12-01 RX ADMIN — SODIUM CHLORIDE, PRESERVATIVE FREE 40 MG: 5 INJECTION INTRAVENOUS at 09:49

## 2022-12-01 RX ADMIN — METOPROLOL TARTRATE 5 MG: 5 INJECTION, SOLUTION INTRAVENOUS at 19:12

## 2022-12-01 RX ADMIN — METOPROLOL TARTRATE 5 MG: 5 INJECTION, SOLUTION INTRAVENOUS at 12:24

## 2022-12-01 RX ADMIN — METHYLPREDNISOLONE SODIUM SUCCINATE 40 MG: 40 INJECTION, POWDER, FOR SOLUTION INTRAMUSCULAR; INTRAVENOUS at 16:36

## 2022-12-01 RX ADMIN — PROPOFOL 35 MCG/KG/MIN: 10 INJECTION, EMULSION INTRAVENOUS at 02:21

## 2022-12-01 RX ADMIN — METOPROLOL TARTRATE 25 MG: 25 TABLET, FILM COATED ORAL at 09:49

## 2022-12-01 RX ADMIN — THIAMINE HYDROCHLORIDE: 100 INJECTION, SOLUTION INTRAMUSCULAR; INTRAVENOUS at 09:57

## 2022-12-01 RX ADMIN — IPRATROPIUM BROMIDE AND ALBUTEROL SULFATE 1 AMPULE: 2.5; .5 SOLUTION RESPIRATORY (INHALATION) at 15:40

## 2022-12-01 RX ADMIN — IPRATROPIUM BROMIDE AND ALBUTEROL SULFATE 1 AMPULE: 2.5; .5 SOLUTION RESPIRATORY (INHALATION) at 02:59

## 2022-12-01 RX ADMIN — METOPROLOL TARTRATE 5 MG: 5 INJECTION, SOLUTION INTRAVENOUS at 16:36

## 2022-12-01 RX ADMIN — METOCLOPRAMIDE HYDROCHLORIDE 10 MG: 5 INJECTION INTRAMUSCULAR; INTRAVENOUS at 19:12

## 2022-12-01 RX ADMIN — METHYLPREDNISOLONE SODIUM SUCCINATE 40 MG: 40 INJECTION, POWDER, FOR SOLUTION INTRAMUSCULAR; INTRAVENOUS at 22:19

## 2022-12-01 RX ADMIN — ENOXAPARIN SODIUM 40 MG: 100 INJECTION SUBCUTANEOUS at 09:49

## 2022-12-01 RX ADMIN — METHYLPREDNISOLONE SODIUM SUCCINATE 40 MG: 40 INJECTION, POWDER, FOR SOLUTION INTRAMUSCULAR; INTRAVENOUS at 05:34

## 2022-12-01 RX ADMIN — BUDESONIDE 500 MCG: 0.5 SUSPENSION RESPIRATORY (INHALATION) at 07:34

## 2022-12-01 RX ADMIN — METHYLPREDNISOLONE SODIUM SUCCINATE 40 MG: 40 INJECTION, POWDER, FOR SOLUTION INTRAMUSCULAR; INTRAVENOUS at 09:49

## 2022-12-01 RX ADMIN — IPRATROPIUM BROMIDE AND ALBUTEROL SULFATE 1 AMPULE: 2.5; .5 SOLUTION RESPIRATORY (INHALATION) at 20:17

## 2022-12-01 RX ADMIN — METOPROLOL TARTRATE 5 MG: 5 INJECTION, SOLUTION INTRAVENOUS at 23:41

## 2022-12-01 RX ADMIN — IPRATROPIUM BROMIDE AND ALBUTEROL SULFATE 1 AMPULE: 2.5; .5 SOLUTION RESPIRATORY (INHALATION) at 23:30

## 2022-12-01 RX ADMIN — SODIUM CHLORIDE, PRESERVATIVE FREE 10 ML: 5 INJECTION INTRAVENOUS at 19:14

## 2022-12-01 RX ADMIN — IPRATROPIUM BROMIDE AND ALBUTEROL SULFATE 1 AMPULE: 2.5; .5 SOLUTION RESPIRATORY (INHALATION) at 07:33

## 2022-12-01 RX ADMIN — IPRATROPIUM BROMIDE AND ALBUTEROL SULFATE 1 AMPULE: 2.5; .5 SOLUTION RESPIRATORY (INHALATION) at 11:35

## 2022-12-01 ASSESSMENT — PULMONARY FUNCTION TESTS
PIF_VALUE: 19
PIF_VALUE: 21
PIF_VALUE: 18
PIF_VALUE: 17
PIF_VALUE: 17
PIF_VALUE: 25
PIF_VALUE: 21
PIF_VALUE: 19
PIF_VALUE: 18
PIF_VALUE: 18
PIF_VALUE: 17
PIF_VALUE: 17
PIF_VALUE: 18
PIF_VALUE: 19
PIF_VALUE: 18

## 2022-12-01 ASSESSMENT — PAIN SCALES - GENERAL
PAINLEVEL_OUTOF10: 0
PAINLEVEL_OUTOF10: 0

## 2022-12-01 NOTE — PLAN OF CARE
Problem: Discharge Planning  Goal: Discharge to home or other facility with appropriate resources  Outcome: Progressing  Flowsheets  Taken 12/1/2022 1600  Discharge to home or other facility with appropriate resources: Identify barriers to discharge with patient and caregiver  Taken 12/1/2022 0800  Discharge to home or other facility with appropriate resources: Identify barriers to discharge with patient and caregiver     Problem: Pain  Goal: Verbalizes/displays adequate comfort level or baseline comfort level  Outcome: Progressing  Flowsheets  Taken 12/1/2022 1600  Verbalizes/displays adequate comfort level or baseline comfort level: Encourage patient to monitor pain and request assistance  Taken 12/1/2022 1200  Verbalizes/displays adequate comfort level or baseline comfort level: Encourage patient to monitor pain and request assistance  Taken 12/1/2022 0800  Verbalizes/displays adequate comfort level or baseline comfort level: Encourage patient to monitor pain and request assistance     Problem: Skin/Tissue Integrity  Goal: Absence of new skin breakdown  Description: 1. Monitor for areas of redness and/or skin breakdown  2. Assess vascular access sites hourly  3. Every 4-6 hours minimum:  Change oxygen saturation probe site  4. Every 4-6 hours:  If on nasal continuous positive airway pressure, respiratory therapy assess nares and determine need for appliance change or resting period.   Outcome: Progressing     Problem: Safety - Adult  Goal: Free from fall injury  Outcome: Progressing  Flowsheets (Taken 12/1/2022 0800)  Free From Fall Injury: Instruct family/caregiver on patient safety     Problem: ABCDS Injury Assessment  Goal: Absence of physical injury  Outcome: Progressing  Flowsheets (Taken 12/1/2022 0800)  Absence of Physical Injury: Implement safety measures based on patient assessment     Problem: Safety - Medical Restraint  Goal: Remains free of injury from restraints (Restraint for Interference with Medical Device)  Description: INTERVENTIONS:  1. Determine that other, less restrictive measures have been tried or would not be effective before applying the restraint  2. Evaluate the patient's condition at the time of restraint application  3. Inform patient/family regarding the reason for restraint  4.  Q2H: Monitor safety, psychosocial status, comfort, nutrition and hydration  Outcome: Progressing  Flowsheets  Taken 12/1/2022 1600 by Nile Cole RN  Remains free of injury from restraints (restraint for interference with medical device): Every 2 hours: Monitor safety, psychosocial status, comfort, nutrition and hydration  Taken 12/1/2022 1400 by Nile Cole RN  Remains free of injury from restraints (restraint for interference with medical device): Every 2 hours: Monitor safety, psychosocial status, comfort, nutrition and hydration  Taken 12/1/2022 1200 by Nile Cole RN  Remains free of injury from restraints (restraint for interference with medical device): Every 2 hours: Monitor safety, psychosocial status, comfort, nutrition and hydration  Taken 12/1/2022 1000 by Nile Cole RN  Remains free of injury from restraints (restraint for interference with medical device): Every 2 hours: Monitor safety, psychosocial status, comfort, nutrition and hydration  Taken 12/1/2022 0800 by Nile Cole RN  Remains free of injury from restraints (restraint for interference with medical device): Every 2 hours: Monitor safety, psychosocial status, comfort, nutrition and hydration  Taken 12/1/2022 0700 by Nile Cole RN  Remains free of injury from restraints (restraint for interference with medical device): Every 2 hours: Monitor safety, psychosocial status, comfort, nutrition and hydration  Taken 12/1/2022 0600 by Fidelia Carlos RN  Remains free of injury from restraints (restraint for interference with medical device): Every 2 hours: Monitor safety, psychosocial status, comfort, nutrition and hydration  Taken 12/1/2022 0400 by Richa Quintana RN  Remains free of injury from restraints (restraint for interference with medical device): Every 2 hours: Monitor safety, psychosocial status, comfort, nutrition and hydration

## 2022-12-01 NOTE — PLAN OF CARE
Problem: Discharge Planning  Goal: Discharge to home or other facility with appropriate resources  Outcome: Progressing     Problem: Pain  Goal: Verbalizes/displays adequate comfort level or baseline comfort level  12/1/2022 0048 by Virgen Garcia RN  Outcome: Progressing  11/30/2022 1649 by Milind Silva RN  Outcome: Progressing     Problem: Skin/Tissue Integrity  Goal: Absence of new skin breakdown  Description: 1. Monitor for areas of redness and/or skin breakdown  2. Assess vascular access sites hourly  3. Every 4-6 hours minimum:  Change oxygen saturation probe site  4. Every 4-6 hours:  If on nasal continuous positive airway pressure, respiratory therapy assess nares and determine need for appliance change or resting period. 12/1/2022 0048 by Virgen Garcia RN  Outcome: Progressing  11/30/2022 1649 by Milind Silva RN  Outcome: Progressing     Problem: Safety - Adult  Goal: Free from fall injury  Outcome: Progressing     Problem: ABCDS Injury Assessment  Goal: Absence of physical injury  Outcome: Progressing     Problem: Safety - Medical Restraint  Goal: Remains free of injury from restraints (Restraint for Interference with Medical Device)  Description: INTERVENTIONS:  1. Determine that other, less restrictive measures have been tried or would not be effective before applying the restraint  2. Evaluate the patient's condition at the time of restraint application  3. Inform patient/family regarding the reason for restraint  4.  Q2H: Monitor safety, psychosocial status, comfort, nutrition and hydration  12/1/2022 0048 by Virgen Garcia RN  Outcome: Progressing  Flowsheets  Taken 11/30/2022 2200  Remains free of injury from restraints (restraint for interference with medical device): Every 2 hours: Monitor safety, psychosocial status, comfort, nutrition and hydration  Taken 11/30/2022 2000  Remains free of injury from restraints (restraint for interference with medical device): Every 2 hours: Monitor safety, psychosocial status, comfort, nutrition and hydration  11/30/2022 1649 by Enrico Daigle RN  Outcome: Progressing  Note: When restraints are removed patient will grab for tubes     Problem: Nutrition Deficit:  Goal: Optimize nutritional status  12/1/2022 0048 by Tracee Perkins RN  Outcome: Progressing  11/30/2022 1649 by Enrico Daigle RN  Outcome: Not Progressing  Note: Asked about nutrition, doctors stated they will address it tomorrow. Problem: Nutrition Deficit:  Goal: Optimize nutritional status  12/1/2022 0048 by Tracee Perkins RN  Outcome: Progressing  11/30/2022 1649 by Enrico Daigle RN  Outcome: Not Progressing  Note: Asked about nutrition, doctors stated they will address it tomorrow.

## 2022-12-01 NOTE — PROGRESS NOTES
Physical Therapy        Physical Therapy Cancel Note      DATE: 2022    NAME: August Epley  MRN: [id number]   : 1965      Patient not seen this date for Physical Therapy due to:  Patient unable to participate   -    Pt remains vented and sedated. PT will continue to follow and attempt as able.         Electronically signed by Dustin Young PT on 2022 at 10:46 AM

## 2022-12-01 NOTE — PROGRESS NOTES
BRONCHOSPASM/BRONCHOCONSTRICTION     [x]         IMPROVE AERATION/BREATH SOUNDS  [x]   ADMINISTER BRONCHODILATOR THERAPY AS APPROPRIATE  []   ASSESS BREATH SOUNDS  [x]   IMPLEMENT AEROSOL/MDI PROTOCOL  [x]   PATIENT EDUCATION AS NEEDED   PROVIDE ADEQUATE OXYGENATION WITH ACCEPTABLE SP02/ABG'S    [x]  IDENTIFY APPROPRIATE OXYGEN THERAPY  [x]   MONITOR SP02/ABG'S AS NEEDED   []   PATIENT EDUCATION AS NEEDED     Pt placed on cpap/ps at 7:00am however pt tolerating pt does not have cuff leak at this time.

## 2022-12-01 NOTE — PROGRESS NOTES
Hospitalist Progress Note  11/30/2022 10:21 PM  Subjective:   Admit Date: 11/22/2022  PCP: JAEL Burris - CNP     DNR-CCA      C/c:  Chief Complaint   Patient presents with    Respiratory Distress         Interval History: still intubated/sedated    Diet: No diet orders on file                                ip days:8  Medications:   Scheduled Meds:   pantoprazole (PROTONIX) 40 mg injection  40 mg IntraVENous Daily    thiamine and folic acid IVPB   IntraVENous Daily    vancomycin  1,500 mg IntraVENous Q12H    dilTIAZem  30 mg Oral 4 times per day    methylPREDNISolone  40 mg IntraVENous Q6H    nicotine  1 patch TransDERmal Daily    metoprolol tartrate  25 mg Oral BID    lisinopril  10 mg Oral Daily    sodium chloride flush  5-40 mL IntraVENous 2 times per day    vancomycin (VANCOCIN) intermittent dosing (placeholder)   Other RX Placeholder    sodium chloride flush  5-40 mL IntraVENous 2 times per day    enoxaparin  40 mg SubCUTAneous Daily    ipratropium-albuterol  1 ampule Inhalation Q4H    budesonide  0.5 mg Nebulization BID     Continuous Infusions:   sodium chloride 100 mL/hr at 11/30/22 0539    norepinephrine Stopped (11/30/22 0451)    propofol 35 mcg/kg/min (11/30/22 2100)    sodium chloride      sodium chloride       PRN Meds:.dextromethorphan-guaiFENesin, LORazepam, oxyCODONE-acetaminophen, potassium chloride **OR** potassium alternative oral replacement **OR** potassium chloride, metoprolol, sodium chloride flush, sodium chloride, ipratropium-albuterol, sodium chloride flush, sodium chloride, acetaminophen, ondansetron **OR** ondansetron     CBC:   Recent Labs     11/28/22  1400 11/29/22  0445 11/30/22  0403 11/30/22  1155 11/30/22  1751   WBC 8.8 15.2* 7.3  --   --    HGB 10.8* 11.0* 9.7* 9.5* 9.4*   * 148* 91*  --   --      BMP:    Recent Labs     11/28/22  1400 11/29/22  0445 11/30/22  0403 11/30/22  1751   * 134* 139  --    K 4.6 3.3* 2.9* 3.2*   CL 91* 90* 95*  --    CO2 36* 38* 36*  -- BUN 16 17 17  --    CREATININE <0.40* <0.40* <0.40*  --    GLUCOSE 111* 132* 121*  --      Hepatic:   Recent Labs     11/29/22  0445 11/30/22  0403   AST 24 17   ALT 40 33   BILITOT 1.0 0.8   ALKPHOS 130* 104     Troponin: No results for input(s): TROPONINI in the last 72 hours. BNP: No results for input(s): BNP in the last 72 hours. Lipids: No results for input(s): CHOL, HDL in the last 72 hours. Invalid input(s): LDLCALCU  INR:   Recent Labs     11/28/22  1400   INR 0.9       Objective:   Vitals: /77   Pulse 94   Temp 98 °F (36.7 °C) (Axillary)   Resp 20   Ht 6' 1\" (1.854 m)   Wt 186 lb 1.1 oz (84.4 kg)   SpO2 92%   BMI 24.55 kg/m²   General appearance: sedated/intubated  Skin: Skin color, texture, turgor normal. No rashes or lesions  Lungs: clear to auscultation bilaterally  Heart: regular rate and rhythm, S1, S2 normal, no murmur, click, rub or gallop  Abdomen: soft, non-tender; bowel sounds normal; no masses,  no organomegaly  Extremities: extremities normal, atraumatic, no cyanosis or edema  Neurologic: Mental status: Alert, oriented, thought content appropriate    Prophylaxis:   DVT with  [x] lovenox        [] heparin        [] Scd        [] none:     Radiology:  CT HEAD WO CONTRAST    Result Date: 11/22/2022  EXAMINATION: CT OF THE HEAD WITHOUT CONTRAST  11/22/2022 2:59 pm TECHNIQUE: CT of the head was performed without the administration of intravenous contrast. Automated exposure control, iterative reconstruction, and/or weight based adjustment of the mA/kV was utilized to reduce the radiation dose to as low as reasonably achievable. COMPARISON: None.  HISTORY: ORDERING SYSTEM PROVIDED HISTORY: altered TECHNOLOGIST PROVIDED HISTORY: altered Decision Support Exception - unselect if not a suspected or confirmed emergency medical condition->Emergency Medical Condition (MA) Reason for Exam: AMS, respiratory distress FINDINGS: There is no acute infarction, intracranial hemorrhage or intracranial mass lesion. No mass effect, midline shift or extra-axial collection is noted. There are moderate nonspecific foci of periventricular and subcortical cerebral white matter hypodensity, most likely representing chronic microangiopathic disease in this age group. The brain parenchyma is otherwise normal. The cerebellar tonsils are in normal position. The ventricles, sulci, and cisterns are mildly prominent suggestive of mild generalized volume loss. The globes and orbits are within normal limits. The visualized extracranial structures including paranasal sinuses and mastoid air cells are unremarkable. No fracture is identified. No evidence for acute intracranial hemorrhage, territorial infarction or intracranial mass lesion. Moderate chronic microangiopathic ischemic disease. Mild generalized volume loss. XR CHEST PORTABLE    Result Date: 11/23/2022  EXAMINATION: ONE XRAY VIEW OF THE CHEST 11/23/2022 6:06 am COMPARISON: 11/22/2022 HISTORY: ORDERING SYSTEM PROVIDED HISTORY: resp failure, on vent TECHNOLOGIST PROVIDED HISTORY: resp failure, on vent Reason for Exam: on vent FINDINGS: Endotracheal tube and enteric tube remain in place. Heart size is within normal limits. Masslike opacity persists at the left base. Mild patchy opacities at the right base are mildly improved. No new airspace consolidation. No evidence of pneumothorax or sizable pleural effusion. 1.  Unchanged masslike opacities at the left base. 2.  Mildly improved patchy opacities at the right base. XR CHEST PORTABLE    Result Date: 11/22/2022  EXAMINATION: ONE XRAY VIEW OF THE CHEST 11/22/2022 1:39 pm COMPARISON: 09/13/2022 CT chest and chest radiograph HISTORY: ORDERING SYSTEM PROVIDED HISTORY: bipap TECHNOLOGIST PROVIDED HISTORY: bipap Reason for Exam: respiratory distress, ET and OG tube placements FINDINGS: Cardiomediastinal silhouette is within normal limits of size.   The endotracheal tube tip measures 3.9 cm from the brendan. Enteric tube tip and side port overlie the stomach. Redemonstrated lingular mass, which may have increased in size since the prior examination. Right basilar opacities may represent atelectasis or infection. No pleural effusion or pneumothorax. No acute osseous abnormality. 1.  Appropriate positioning of the endotracheal and enteric tubes. 2.  A lingular mass appears to have increased in size since the prior examination and is suspicious for malignancy. Recommend tissue sampling if not previously performed. 3.  New right basilar opacities may represent infection or atelectasis. Assessment :   ARF/needing intubation/sedated  Alcoholism/thiamine     Plan:   1. Continue present plan  2.   See order    Patient Active Problem List:     Cellulitis of b/l lower extremity     Cellulitis of lower extremity     Alcoholism ([address]     Essential hypertension     Hyperkeratosis of b/l Soles     Alcohol withdrawal (HCC)     Tinea pedis of both feet     Suicidal ideation     Dyspnea     COPD exacerbation (HCC)     Gastroesophageal reflux disease without esophagitis     Cigarette smoker     Avascular necrosis of femoral head (HCC)     Tachycardia     Stage 3 severe COPD by GOLD classification ([address]     Tobacco dependence     Chronic obstructive pulmonary disease (HCC)     Lung malignancy ([address]     Cough with hemoptysis     Sepsis ([address]     COVID-19     Mass of lingula of lung     Hyponatremia     Respiratory failure ([address]      Anticipated Disposition upon discharge: [] Home                                                                         [] Home with Home Health                                                                         [] Dhruv Jasson                                                                         [] [facility] [address]      Patient is admitted as inpatient status because of co-morbidities listed above, severity of signs and symptoms as outlined, requirement for current medical therapies and most importantly because of direct risk to patient if care not provided in a hospital setting.           Polo Askew MD, MD  [facility]

## 2022-12-01 NOTE — PROGRESS NOTES
Vancomycin Dosing by Pharmacy - Daily Note   Vancomycin Therapy Day:  10  Indication: sepsis    Allergies:  Patient has no known allergies. Actual Weight:    Wt Readings from Last 1 Encounters:   11/30/22 186 lb 1.1 oz (84.4 kg)       Labs/Ancillary Data  Estimated Creatinine Clearance: 227 mL/min (based on SCr of 0.4 mg/dL). Recent Labs     11/29/22  0445 11/30/22  0403 12/01/22  0425   CREATININE <0.40* <0.40* <0.40*   BUN 17 17 16   WBC 15.2* 7.3 5.6     Procalcitonin   Date Value Ref Range Status   08/29/2022 0.20 (H) <0.09 ng/mL Final     Comment:           Suspected Sepsis:  <0.50 ng/mL     Low likelihood of sepsis. 0.50-2.00 ng/mL     Increased likelihood of sepsis. Antibiotics encouraged. >2.00 ng/mL     High risk of sepsis/shock. Antibiotics strongly encouraged. Suspected Lower Resp Tract Infections:  <0.24 ng/mL     Low likelihood of bacterial infection. >0.24 ng/mL     Increased likelihood of bacterial infection. Antibiotics encouraged. With successful antibiotic therapy, PCT levels should decrease rapidly. (Half-life of 24 to   36 hours.)        Procalcitonin values from samples collected within the first 6 hours of systemic infection   may still be low. Retesting may be indicated. Values from day 1 and day 4 can be entered into the Change in Procalcitonin Calculator   (www.[employer]-pct-[email or web address] com) to determine the patient's Mortality Risk Prognosis        In healthy neonates, plasma Procalcitonin (PCT) concentrations increase gradually after   birth, reaching peak values at about 24 hours of age then decrease to normal values below   0.5 ng/mL by 48-72 hours of age.          Intake/Output Summary (Last 24 hours) at 12/1/2022 1302  Last data filed at 12/1/2022 0553  Gross per 24 hour   Intake 1677.24 ml   Output 1800 ml   Net -122.76 ml     Temp: 98.4 F    Culture Date / Source  /  Results  11/22 - blood x2    Recent vancomycin administrations                     vancomycin (VANCOCIN) 1,500 mg in dextrose 5 % 250 mL IVPB (ADDAVIAL) ()  Restarted 11/30/22 2243     1,500 mg New Bag  2239     1,500 mg New Bag  1120     1,500 mg New Bag 11/29/22 2203     1,500 mg New Bag  1105    vancomycin (VANCOCIN) 1,250 mg in dextrose 5 % 250 mL IVPB (ADDAVIAL) (mg) 1,250 mg New Bag 11/28/22 2308     1,250 mg New Bag  1320                    Vancomycin Concentrations:   TROUGH:  No results for input(s): VANCOTROUGH in the last 72 hours. RANDOM:    Recent Labs     11/28/22  1718 12/01/22  1042   VANCORANDOM 27.7 18.3       MRSA Nasal Swab: N/A. Non-respiratory infection. Vahid Patella PLAN     Decrease dose to 1500 mg q18h IV  Ensured BUN/sCr ordered at baseline and every 48 hours x at least 3 levels, then at least weekly. Repeat vancomycin concentration ordered for 12/3 @ 0600   Pharmacy will continue to monitor patient and adjust therapy as indicated      Vancomycin Target Concentration Parameters  Treatment  Population Target AUC/NIK Target Trough   Invasive MRSA Infection (bacteremia, pneumonia, meningitis, endocarditis, osteomyelitis)  Sepsis (undifferentiated) 400-600 N/A   Infection due to non-MRSA pathogen  Empiric treatment of non-invasive MRSA infection  (SSTI, UTI) <500 10-15 mg/L   CrCl < 29 mL/min  Rapidly fluctuating serum creatinine   KRYSTA N/A < 15 mg/L     Renal replacement therapy is dosed by levels, per hospital protocol. Abbreviations  * Pauc: probability that AUC is >400 (efficacy); Pconc: probability that Ctrough is above 20 ?g/mL (toxicity); Tox: Probability of nephrotoxicity, based on Kade et al. Clin Infect Dis 2009. Thank you for the consult. Pharmacy will continue to follow.     Chiqui Huntley RPH,PharmD,  12/1/2022, 1:06 PM

## 2022-12-01 NOTE — PROGRESS NOTES
Date:   12/1/2022  Patient name: Azam Gonzalez  Date of admission:  11/22/2022 12:17 PM  MRN:   [id number]  YOB: 1965  PCP: JAEL Tavera CNP    Reason for Admission: Respiratory failure [facility]) [J96.90]  Septic shock ([address] [A41.9, R65.21]  Acute on chronic respiratory failure with hypoxia [facility]) [J96.21]    Cardiology follow up: Episodes of sinus tachycardia, sinus bradycardia, nonsustained V. tach 5-7 beats, ventricular rate about 110       Referring physician: Dr Trev Hargrove  Episodes of sinus tach most likely physiological  Episodes of nonsustained bradycardia most likely vasovagal  Episodes of nonsustained V. tach 6-7 beats heart rate above 110/hypoxia hypokalemia  Respiratory failure/severe COPD secondary to smoking  History of lung mass/suspected lung cancer  History of COVID-19 infection  History of alcohol abuse  Protein malnutrition, albumin 2.6     Past surgical history include bilateral hip replacement, neck surgery, vasectomy    2D echo 9/14/2022  Normal LV size and wall thickness ejection fraction more than 55%  No significant valvular abnormality     ECG 9/13/2022  Sinus tachycardia otherwise normal ECG     Chest x-ray 11/23/2022  Unchanged masslike opacity at the left base, mildly improved patchy opacity at the right base  Endotracheal tube and enteric tubes remain in place, heart size is within normal limit     History of present illness     78-year-old male a nursing home resident with a past medical history of severe COPD, CA lung got readmitted on 11/22/2022 with the respiratory failure, altered mental status and he required intubation and vent support. He got extubated on 11/24/2022. He was recently admitted at [facility] with respiratory failure required vent support. He has been on beta-blockers and Cardizem, steroids, bronchodilators. On continuous ECG monitoring episodes of sinus tachycardia noted.   Occasionally he gets transient sinus bradycardia. He has had a few episodes of nonsustained broad complex rhythm heart rate about 110, 6-7 beats.   No syncope no chest pain        On admission lab work showed   high-sensitivity troponin 25 and 29  Hemoglobin 11.1, WBC 20.5, platelets 727  Sodium 132, potassium 4.0, CO2 35, BUN 12, creatinine 0.40, glucose 145, calcium 9.0     Current evaluation  Patient seen and examined in ICU yesterday and today  Medications and labs checked, chest x-ray finding reviewed  He was on ventilator  He was awake  Did not appear in any significant distress  He has NG tube, yes aspiration  Provement in the upper extremities edema  ECG monitor sinus rhythm  Chest x-ray showed endotracheal tube, enteric tube, left arm PICC line, stable heart size, masslike opacity in the left lung base is unchanged, tiny left pleural effusion is unchanged, no pneumothorax    Blood pressure 162/77, heart rate 85, oxygen saturation 100% FiO2 30%    Potassium 3.2, sodium 135, BUN 16, creatinine 0.40, albumin 2.6  Hemoglobin 9.6    Medications:   Scheduled Meds:   metoprolol  5 mg IntraVENous Q4H    [START ON 12/2/2022] vancomycin  1,500 mg IntraVENous Q18H    pantoprazole (PROTONIX) 40 mg injection  40 mg IntraVENous Daily    thiamine and folic acid IVPB   IntraVENous Daily    dilTIAZem  30 mg Oral 4 times per day    methylPREDNISolone  40 mg IntraVENous Q6H    nicotine  1 patch TransDERmal Daily    [Held by provider] metoprolol tartrate  25 mg Oral BID    lisinopril  10 mg Oral Daily    sodium chloride flush  5-40 mL IntraVENous 2 times per day    vancomycin (VANCOCIN) intermittent dosing (placeholder)   Other RX Placeholder    sodium chloride flush  5-40 mL IntraVENous 2 times per day    enoxaparin  40 mg SubCUTAneous Daily    ipratropium-albuterol  1 ampule Inhalation Q4H    budesonide  0.5 mg Nebulization BID     Continuous Infusions:   sodium chloride 100 mL/hr at 12/01/22 2422    norepinephrine Stopped (11/30/22 1596)    propofol Stopped (12/01/22 6299)    sodium chloride      sodium chloride       CBC:   Recent Labs     11/29/22  0445 11/30/22  0403 11/30/22  1155 12/01/22  0425 12/01/22  1042 12/01/22  1619   WBC 15.2* 7.3  --  5.6  --   --    HGB 11.0* 9.7*   < > 9.4* 10.3* 9.6*   * 91*  --  80*  --   --     < > = values in this interval not displayed. BMP:    Recent Labs     11/29/22 0445 11/30/22  0403 11/30/22  1751 12/01/22  0425   * 139  --  135   K 3.3* 2.9* 3.2* 3.2*   CL 90* 95*  --  97*   CO2 38* 36*  --  34*   BUN 17 17  --  16   CREATININE <0.40* <0.40*  --  <0.40*   GLUCOSE 132* 121*  --  120*     Hepatic:   Recent Labs     11/29/22 0445 11/30/22 0403 12/01/22  0425   AST 24 17 16   ALT 40 33 30   BILITOT 1.0 0.8 0.6   ALKPHOS 130* 104 100     Troponin: No results for input(s): TROPONINI in the last 72 hours. BNP: No results for input(s): BNP in the last 72 hours. Lipids: No results for input(s): CHOL, HDL in the last 72 hours. Invalid input(s): LDLCALCU  INR: No results for input(s): INR in the last 72 hours. Objective:   Vitals: BP (!) 162/77   Pulse 83   Temp 97.5 °F (36.4 °C) (Oral)   Resp 21   Ht 6' 1\" (1.854 m)   Wt 186 lb 1.1 oz (84.4 kg)   SpO2 100%   BMI 24.55 kg/m²   General appearance: Frail looking male on ventilator  HEENT: Head: Normal, normocephalic, atraumatic. Neck:  Difficult to assess neck vein  Lungs: diminished breath sounds bibasilar  Heart: regular rate and rhythm  Abdomen:  Obese soft hypoactive bowel sounds  Extremities: Upper extremities edema noted  Neurologic: Mental status: Awake    EKG: normal sinus rhythm. ECHO: reviewed.    Ejection fraction: 55%    Assessment / Acute Cardiac Problems:   Episodes of sinus tachycardia most likely physiological  Episodes of bradycardia most likely vagal effect  Nonsustained broad complex rhythm/V. tach, heart rate up to 110 most likely secondary to hypoxia, hypokalemia  Normal LV systolic function  Severe COPD, hypoxia  Lung mass suspected carcinoma lung    12/1/2022 patient's remains on ventilator, improvement in the tachycardia continue to have large NG tube suction    Patient Active Problem List:     Cellulitis of b/l lower extremity     Cellulitis of lower extremity     Alcoholism ([address]     Essential hypertension     Hyperkeratosis of b/l Soles     Alcohol withdrawal (HCC)     Tinea pedis of both feet     Suicidal ideation     Dyspnea     COPD exacerbation (HCC)     Gastroesophageal reflux disease without esophagitis     Cigarette smoker     Avascular necrosis of femoral head (HCC)     Tachycardia     Stage 3 severe COPD by GOLD classification ([address]     Tobacco dependence     Chronic obstructive pulmonary disease (HCC)     Lung malignancy (HCC)     Cough with hemoptysis     Sepsis ([address]     COVID-19     Mass of lingula of lung     Hyponatremia     Respiratory failure ([address]      Plan of Treatment:   Medications reviewed  Continue IV Lopressor 5 mg every 4 hours  Replace potassium  Intake output record  Keep patient well-hydrated  Prognosis remains guarded    Electronically signed by Vazquez Weber MD on 12/1/2022 at 5:34 PM

## 2022-12-01 NOTE — PROGRESS NOTES
Writer sent secure message to Lynelle Hamman to discuss lung biopsy orders. Awaiting call back at this time.

## 2022-12-01 NOTE — PROGRESS NOTES
12/01/22 1244   Encounter Summary   Encounter Overview/Reason  Spiritual/Emotional Needs   Service Provided For: Patient   Referral/Consult From: Palliative Care   Last Encounter  12/01/22   Complexity of Encounter Low   Palliative Care   Type Palliative Care, Follow-up   Assessment/Intervention/Outcome   Assessment Unable to assess   Intervention Prayer (assurance of)/Joppa

## 2022-12-01 NOTE — CARE COORDINATION
DISCHARGE PLANNING NOTE:     Pt remains intubated on the vent at 30% FiO2. Pt is from [facility] and was previously current with [facility] (however revoked to come in). Active order for IV Solu-medrol 40mg every 6 hours and IV Vanco.       Will continue to follow for additional discharge needs.     Electronically signed by Monica Martin RN on 12/1/2022 at 4:05 PM

## 2022-12-01 NOTE — PROGRESS NOTES
[address]   OCCUPATIONAL THERAPY MISSED TREATMENT NOTE   INPATIENT   Date: 22  Patient Name: Adriana Johns       Room: 0914/1152-62  MRN: [id number]   Account #: [de-identified]    : 1965  (61 y.o.)  Gender: male                 REASON FOR MISSED TREATMENT:  Patient unable to participate   -    Pt remains vented and sedated. OT will continue to follow and attempt as able.   [facility],

## 2022-12-01 NOTE — PROGRESS NOTES
Hospitalist Progress Note  12/1/2022 5:58 PM  Subjective:   Admit Date: 11/22/2022  PCP: JAEL Collado - CNP     DNR-CCA      C/c:  Chief Complaint   Patient presents with    Respiratory Distress         Interval History: pt still remains intubated, weaning trial failed, lot of secretion, urine output less on propofol    Diet: No diet orders on file                                ip days:9  Medications:   Scheduled Meds:   metoprolol  5 mg IntraVENous Q4H    [START ON 12/2/2022] vancomycin  1,500 mg IntraVENous Q18H    pantoprazole (PROTONIX) 40 mg injection  40 mg IntraVENous Daily    thiamine and folic acid IVPB   IntraVENous Daily    dilTIAZem  30 mg Oral 4 times per day    methylPREDNISolone  40 mg IntraVENous Q6H    nicotine  1 patch TransDERmal Daily    [Held by provider] metoprolol tartrate  25 mg Oral BID    lisinopril  10 mg Oral Daily    sodium chloride flush  5-40 mL IntraVENous 2 times per day    vancomycin (VANCOCIN) intermittent dosing (placeholder)   Other RX Placeholder    sodium chloride flush  5-40 mL IntraVENous 2 times per day    enoxaparin  40 mg SubCUTAneous Daily    ipratropium-albuterol  1 ampule Inhalation Q4H    budesonide  0.5 mg Nebulization BID     Continuous Infusions:   sodium chloride 100 mL/hr at 12/01/22 0553    norepinephrine Stopped (11/30/22 0451)    propofol Stopped (12/01/22 0854)    sodium chloride      sodium chloride       PRN Meds:.dextromethorphan-guaiFENesin, LORazepam, oxyCODONE-acetaminophen, potassium chloride **OR** potassium alternative oral replacement **OR** potassium chloride, metoprolol, sodium chloride flush, sodium chloride, ipratropium-albuterol, sodium chloride flush, sodium chloride, acetaminophen, ondansetron **OR** ondansetron     CBC:   Recent Labs     11/29/22  0445 11/30/22  0403 11/30/22  1155 12/01/22  0425 12/01/22  1042 12/01/22  1619   WBC 15.2* 7.3  --  5.6  --   --    HGB 11.0* 9.7*   < > 9.4* 10.3* 9.6*   * 91*  --  80*  --   --     < > = values in this interval not displayed. BMP:    Recent Labs     11/29/22 0445 11/30/22  0403 11/30/22  1751 12/01/22 0425   * 139  --  135   K 3.3* 2.9* 3.2* 3.2*   CL 90* 95*  --  97*   CO2 38* 36*  --  34*   BUN 17 17  --  16   CREATININE <0.40* <0.40*  --  <0.40*   GLUCOSE 132* 121*  --  120*     Hepatic:   Recent Labs     11/29/22 0445 11/30/22  0403 12/01/22 0425   AST 24 17 16   ALT 40 33 30   BILITOT 1.0 0.8 0.6   ALKPHOS 130* 104 100     Troponin: No results for input(s): TROPONINI in the last 72 hours. BNP: No results for input(s): BNP in the last 72 hours. Lipids: No results for input(s): CHOL, HDL in the last 72 hours. Invalid input(s): LDLCALCU  INR: No results for input(s): INR in the last 72 hours. Objective:   Vitals: BP (!) 162/77   Pulse 83   Temp 97.5 °F (36.4 °C) (Oral)   Resp 21   Ht 6' 1\" (1.854 m)   Wt 186 lb 1.1 oz (84.4 kg)   SpO2 100%   BMI 24.55 kg/m²   General appearance: alert, intubated,  Skin: Skin color, texture, turgor normal. No rashes or lesions  Lungs: clear to auscultation bilaterally  Heart: regular rate and rhythm, S1, S2 normal, no murmur, click, rub or gallop  Abdomen: soft, non-tender; bowel sounds normal; no masses,  no organomegaly  Extremities: extremities normal, atraumatic, no cyanosis or edema  Neurologic: Mental status: Alert, oriented, thought content appropriate    Prophylaxis:   DVT with  [x] lovenox        [] heparin        [] Scd        [] none:     Radiology:  CT HEAD WO CONTRAST    Result Date: 11/22/2022  EXAMINATION: CT OF THE HEAD WITHOUT CONTRAST  11/22/2022 2:59 pm TECHNIQUE: CT of the head was performed without the administration of intravenous contrast. Automated exposure control, iterative reconstruction, and/or weight based adjustment of the mA/kV was utilized to reduce the radiation dose to as low as reasonably achievable. COMPARISON: None.  HISTORY: ORDERING SYSTEM PROVIDED HISTORY: altered TECHNOLOGIST PROVIDED HISTORY: altered Decision Support Exception - unselect if not a suspected or confirmed emergency medical condition->Emergency Medical Condition (MA) Reason for Exam: AMS, respiratory distress FINDINGS: There is no acute infarction, intracranial hemorrhage or intracranial mass lesion. No mass effect, midline shift or extra-axial collection is noted. There are moderate nonspecific foci of periventricular and subcortical cerebral white matter hypodensity, most likely representing chronic microangiopathic disease in this age group. The brain parenchyma is otherwise normal. The cerebellar tonsils are in normal position. The ventricles, sulci, and cisterns are mildly prominent suggestive of mild generalized volume loss. The globes and orbits are within normal limits. The visualized extracranial structures including paranasal sinuses and mastoid air cells are unremarkable. No fracture is identified. No evidence for acute intracranial hemorrhage, territorial infarction or intracranial mass lesion. Moderate chronic microangiopathic ischemic disease. Mild generalized volume loss. XR CHEST PORTABLE    Result Date: 11/23/2022  EXAMINATION: ONE XRAY VIEW OF THE CHEST 11/23/2022 6:06 am COMPARISON: 11/22/2022 HISTORY: ORDERING SYSTEM PROVIDED HISTORY: resp failure, on vent TECHNOLOGIST PROVIDED HISTORY: resp failure, on vent Reason for Exam: on vent FINDINGS: Endotracheal tube and enteric tube remain in place. Heart size is within normal limits. Masslike opacity persists at the left base. Mild patchy opacities at the right base are mildly improved. No new airspace consolidation. No evidence of pneumothorax or sizable pleural effusion. 1.  Unchanged masslike opacities at the left base. 2.  Mildly improved patchy opacities at the right base.      XR CHEST PORTABLE    Result Date: 11/22/2022  EXAMINATION: ONE XRAY VIEW OF THE CHEST 11/22/2022 1:39 pm COMPARISON: 09/13/2022 CT chest and chest radiograph HISTORY: ORDERING SYSTEM PROVIDED HISTORY: bipap TECHNOLOGIST PROVIDED HISTORY: bipap Reason for Exam: respiratory distress, ET and OG tube placements FINDINGS: Cardiomediastinal silhouette is within normal limits of size. The endotracheal tube tip measures 3.9 cm from the brendan. Enteric tube tip and side port overlie the stomach. Redemonstrated lingular mass, which may have increased in size since the prior examination. Right basilar opacities may represent atelectasis or infection. No pleural effusion or pneumothorax. No acute osseous abnormality. 1.  Appropriate positioning of the endotracheal and enteric tubes. 2.  A lingular mass appears to have increased in size since the prior examination and is suspicious for malignancy. Recommend tissue sampling if not previously performed. 3.  New right basilar opacities may represent infection or atelectasis. Assessment : Ac resp failure/intubated  H/o of alcoholism  copd     Plan:   1. Continue present care  2.   See order    Patient Active Problem List:     Cellulitis of b/l lower extremity     Cellulitis of lower extremity     Alcoholism ([address]     Essential hypertension     Hyperkeratosis of b/l [place]     Alcohol withdrawal (HCC)     Tinea pedis of both feet     Suicidal ideation     Dyspnea     COPD exacerbation (HCC)     Gastroesophageal reflux disease without esophagitis     Cigarette smoker     Avascular necrosis of femoral head (HCC)     Tachycardia     Stage 3 severe COPD by GOLD classification ([address]     Tobacco dependence     Chronic obstructive pulmonary disease (HCC)     Lung malignancy (HCC)     Cough with hemoptysis     Sepsis ([address]     COVID-19     Mass of lingula of lung     Hyponatremia     Respiratory failure ([address]      Anticipated Disposition upon discharge: [] Home                                                                         [] Home with Home Health                                                                         [] Skilled Nursing Facility                                                                         [] [address]      Patient is admitted as inpatient status because of co-morbidities listed above, severity of signs and symptoms as outlined, requirement for current medical therapies and most importantly because of direct risk to patient if care not provided in a hospital setting.           Juan Nunez MD, MD  Rounding Hospitalist

## 2022-12-02 ENCOUNTER — APPOINTMENT (OUTPATIENT)
Dept: GENERAL RADIOLOGY | Age: 57
End: 2022-12-02
Payer: COMMERCIAL

## 2022-12-02 LAB
ABSOLUTE EOS #: 0 K/UL (ref 0–0.4)
ABSOLUTE LYMPH #: 0.55 K/UL (ref 1–4.8)
ABSOLUTE MONO #: 0.33 K/UL (ref 0.1–1.3)
ALLEN TEST: ABNORMAL
ANION GAP SERPL CALCULATED.3IONS-SCNC: 7 MMOL/L (ref 9–17)
BASOPHILS # BLD: 0 % (ref 0–2)
BASOPHILS ABSOLUTE: 0 K/UL (ref 0–0.2)
BUN BLDV-MCNC: 18 MG/DL (ref 6–20)
CALCIUM SERPL-MCNC: 8.6 MG/DL (ref 8.6–10.4)
CARBOXYHEMOGLOBIN: 1 % (ref 0–5)
CHLORIDE BLD-SCNC: 101 MMOL/L (ref 98–107)
CO2: 31 MMOL/L (ref 20–31)
CREAT SERPL-MCNC: <0.4 MG/DL (ref 0.7–1.2)
EOSINOPHILS RELATIVE PERCENT: 0 % (ref 0–4)
FIO2: 30
GFR SERPL CREATININE-BSD FRML MDRD: ABNORMAL ML/MIN/1.73M2
GLUCOSE BLD-MCNC: 96 MG/DL (ref 70–99)
HCO3 ARTERIAL: 33.9 MMOL/L (ref 22–26)
HCT VFR BLD CALC: 28.2 % (ref 41–53)
HCT VFR BLD CALC: 29.3 % (ref 41–53)
HCT VFR BLD CALC: 29.6 % (ref 41–53)
HEMOGLOBIN: 10.1 G/DL (ref 13.5–17.5)
HEMOGLOBIN: 9.8 G/DL (ref 13.5–17.5)
HEMOGLOBIN: 9.8 G/DL (ref 13.5–17.5)
LYMPHOCYTES # BLD: 5 % (ref 24–44)
MAGNESIUM: 2 MG/DL (ref 1.6–2.6)
MCH RBC QN AUTO: 37.7 PG (ref 26–34)
MCHC RBC AUTO-ENTMCNC: 34.1 G/DL (ref 31–37)
MCV RBC AUTO: 110.8 FL (ref 80–100)
METHEMOGLOBIN: 1 % (ref 0–1.9)
MODE: ABNORMAL
MONOCYTES # BLD: 3 % (ref 1–7)
MORPHOLOGY: ABNORMAL
O2 DEVICE/FLOW/%: ABNORMAL
O2 SAT, ARTERIAL: 94.7 % (ref 95–98)
PATIENT TEMP: 37.1
PCO2 ARTERIAL: 45.3 MMHG (ref 35–45)
PDW BLD-RTO: 14.9 % (ref 11.5–14.9)
PEEP/CPAP: 5
PH ARTERIAL: 7.48 (ref 7.35–7.45)
PLATELET # BLD: 92 K/UL (ref 150–450)
PMV BLD AUTO: 8.7 FL (ref 6–12)
PO2 ARTERIAL: 79 MMHG (ref 80–100)
POSITIVE BASE EXCESS, ART: 10.4 MMOL/L (ref 0–2)
POTASSIUM SERPL-SCNC: 3.2 MMOL/L (ref 3.7–5.3)
POTASSIUM SERPL-SCNC: 3.3 MMOL/L (ref 3.7–5.3)
PT. POSITION: ABNORMAL
RBC # BLD: 2.67 M/UL (ref 4.5–5.9)
SAMPLE SITE: ABNORMAL
SEG NEUTROPHILS: 92 % (ref 36–66)
SEGMENTED NEUTROPHILS ABSOLUTE COUNT: 10.02 K/UL (ref 1.3–9.1)
SET RATE: 14
SODIUM BLD-SCNC: 139 MMOL/L (ref 135–144)
TEXT FOR RESPIRATORY: ABNORMAL
TOTAL RATE: 14
VT: 450
WBC # BLD: 10.9 K/UL (ref 3.5–11)

## 2022-12-02 PROCEDURE — A4216 STERILE WATER/SALINE, 10 ML: HCPCS | Performed by: FAMILY MEDICINE

## 2022-12-02 PROCEDURE — 2580000003 HC RX 258: Performed by: INTERNAL MEDICINE

## 2022-12-02 PROCEDURE — 6360000002 HC RX W HCPCS: Performed by: INTERNAL MEDICINE

## 2022-12-02 PROCEDURE — 71045 X-RAY EXAM CHEST 1 VIEW: CPT

## 2022-12-02 PROCEDURE — 6370000000 HC RX 637 (ALT 250 FOR IP): Performed by: INTERNAL MEDICINE

## 2022-12-02 PROCEDURE — 2700000000 HC OXYGEN THERAPY PER DAY

## 2022-12-02 PROCEDURE — 6370000000 HC RX 637 (ALT 250 FOR IP): Performed by: FAMILY MEDICINE

## 2022-12-02 PROCEDURE — 2060000000 HC ICU INTERMEDIATE R&B

## 2022-12-02 PROCEDURE — 2580000003 HC RX 258: Performed by: FAMILY MEDICINE

## 2022-12-02 PROCEDURE — 94640 AIRWAY INHALATION TREATMENT: CPT

## 2022-12-02 PROCEDURE — C9113 INJ PANTOPRAZOLE SODIUM, VIA: HCPCS | Performed by: FAMILY MEDICINE

## 2022-12-02 PROCEDURE — 36600 WITHDRAWAL OF ARTERIAL BLOOD: CPT

## 2022-12-02 PROCEDURE — 82805 BLOOD GASES W/O2 SATURATION: CPT

## 2022-12-02 PROCEDURE — 80048 BASIC METABOLIC PNL TOTAL CA: CPT

## 2022-12-02 PROCEDURE — 6360000002 HC RX W HCPCS: Performed by: FAMILY MEDICINE

## 2022-12-02 PROCEDURE — 6360000002 HC RX W HCPCS: Performed by: NURSE PRACTITIONER

## 2022-12-02 PROCEDURE — 94003 VENT MGMT INPAT SUBQ DAY: CPT

## 2022-12-02 PROCEDURE — 2500000003 HC RX 250 WO HCPCS: Performed by: INTERNAL MEDICINE

## 2022-12-02 PROCEDURE — 2000000000 HC ICU R&B

## 2022-12-02 PROCEDURE — 83735 ASSAY OF MAGNESIUM: CPT

## 2022-12-02 PROCEDURE — 85025 COMPLETE CBC W/AUTO DIFF WBC: CPT

## 2022-12-02 PROCEDURE — 36415 COLL VENOUS BLD VENIPUNCTURE: CPT

## 2022-12-02 PROCEDURE — 85018 HEMOGLOBIN: CPT

## 2022-12-02 PROCEDURE — 6360000002 HC RX W HCPCS: Performed by: EMERGENCY MEDICINE

## 2022-12-02 PROCEDURE — 2500000003 HC RX 250 WO HCPCS: Performed by: NURSE PRACTITIONER

## 2022-12-02 PROCEDURE — 2580000003 HC RX 258: Performed by: NURSE PRACTITIONER

## 2022-12-02 PROCEDURE — 84132 ASSAY OF SERUM POTASSIUM: CPT

## 2022-12-02 PROCEDURE — 85014 HEMATOCRIT: CPT

## 2022-12-02 RX ORDER — METHYLPREDNISOLONE SODIUM SUCCINATE 125 MG/2ML
60 INJECTION, POWDER, LYOPHILIZED, FOR SOLUTION INTRAMUSCULAR; INTRAVENOUS EVERY 6 HOURS
Status: DISCONTINUED | OUTPATIENT
Start: 2022-12-02 | End: 2022-12-09

## 2022-12-02 RX ADMIN — METOPROLOL TARTRATE 5 MG: 5 INJECTION, SOLUTION INTRAVENOUS at 16:01

## 2022-12-02 RX ADMIN — PROPOFOL 40 MCG/KG/MIN: 10 INJECTION, EMULSION INTRAVENOUS at 17:24

## 2022-12-02 RX ADMIN — POTASSIUM CHLORIDE 10 MEQ: 7.46 INJECTION, SOLUTION INTRAVENOUS at 08:05

## 2022-12-02 RX ADMIN — PROPOFOL 40 MCG/KG/MIN: 10 INJECTION, EMULSION INTRAVENOUS at 22:46

## 2022-12-02 RX ADMIN — POTASSIUM CHLORIDE 40 MEQ: 20 TABLET, EXTENDED RELEASE ORAL at 09:07

## 2022-12-02 RX ADMIN — SODIUM CHLORIDE: 9 INJECTION, SOLUTION INTRAVENOUS at 23:00

## 2022-12-02 RX ADMIN — POTASSIUM CHLORIDE 10 MEQ: 7.46 INJECTION, SOLUTION INTRAVENOUS at 10:26

## 2022-12-02 RX ADMIN — OXYCODONE HYDROCHLORIDE AND ACETAMINOPHEN 1 TABLET: 5; 325 TABLET ORAL at 19:17

## 2022-12-02 RX ADMIN — METHYLPREDNISOLONE SODIUM SUCCINATE 40 MG: 40 INJECTION, POWDER, FOR SOLUTION INTRAMUSCULAR; INTRAVENOUS at 04:50

## 2022-12-02 RX ADMIN — VANCOMYCIN HYDROCHLORIDE 1500 MG: 1.5 INJECTION, POWDER, LYOPHILIZED, FOR SOLUTION INTRAVENOUS at 05:00

## 2022-12-02 RX ADMIN — POTASSIUM BICARBONATE 40 MEQ: 782 TABLET, EFFERVESCENT ORAL at 19:00

## 2022-12-02 RX ADMIN — IPRATROPIUM BROMIDE AND ALBUTEROL SULFATE 1 AMPULE: 2.5; .5 SOLUTION RESPIRATORY (INHALATION) at 23:50

## 2022-12-02 RX ADMIN — IPRATROPIUM BROMIDE AND ALBUTEROL SULFATE 1 AMPULE: 2.5; .5 SOLUTION RESPIRATORY (INHALATION) at 12:26

## 2022-12-02 RX ADMIN — IPRATROPIUM BROMIDE AND ALBUTEROL SULFATE 1 AMPULE: 2.5; .5 SOLUTION RESPIRATORY (INHALATION) at 20:36

## 2022-12-02 RX ADMIN — ENOXAPARIN SODIUM 40 MG: 100 INJECTION SUBCUTANEOUS at 08:43

## 2022-12-02 RX ADMIN — BUDESONIDE 500 MCG: 0.5 SUSPENSION RESPIRATORY (INHALATION) at 20:35

## 2022-12-02 RX ADMIN — OXYCODONE HYDROCHLORIDE AND ACETAMINOPHEN 1 TABLET: 5; 325 TABLET ORAL at 23:56

## 2022-12-02 RX ADMIN — IPRATROPIUM BROMIDE AND ALBUTEROL SULFATE 1 AMPULE: 2.5; .5 SOLUTION RESPIRATORY (INHALATION) at 03:07

## 2022-12-02 RX ADMIN — METOPROLOL TARTRATE 5 MG: 5 INJECTION, SOLUTION INTRAVENOUS at 04:54

## 2022-12-02 RX ADMIN — METHYLPREDNISOLONE SODIUM SUCCINATE 40 MG: 40 INJECTION, POWDER, FOR SOLUTION INTRAMUSCULAR; INTRAVENOUS at 08:44

## 2022-12-02 RX ADMIN — METOCLOPRAMIDE HYDROCHLORIDE 10 MG: 5 INJECTION INTRAMUSCULAR; INTRAVENOUS at 11:49

## 2022-12-02 RX ADMIN — BUDESONIDE 500 MCG: 0.5 SUSPENSION RESPIRATORY (INHALATION) at 08:20

## 2022-12-02 RX ADMIN — THIAMINE HYDROCHLORIDE: 100 INJECTION, SOLUTION INTRAMUSCULAR; INTRAVENOUS at 08:45

## 2022-12-02 RX ADMIN — METOCLOPRAMIDE HYDROCHLORIDE 10 MG: 5 INJECTION INTRAMUSCULAR; INTRAVENOUS at 06:08

## 2022-12-02 RX ADMIN — METOPROLOL TARTRATE 5 MG: 5 INJECTION, SOLUTION INTRAVENOUS at 19:17

## 2022-12-02 RX ADMIN — METOPROLOL TARTRATE 5 MG: 5 INJECTION, SOLUTION INTRAVENOUS at 08:44

## 2022-12-02 RX ADMIN — POTASSIUM CHLORIDE 10 MEQ: 7.46 INJECTION, SOLUTION INTRAVENOUS at 06:08

## 2022-12-02 RX ADMIN — PROPOFOL 30 MCG/KG/MIN: 10 INJECTION, EMULSION INTRAVENOUS at 07:59

## 2022-12-02 RX ADMIN — METHYLPREDNISOLONE SODIUM SUCCINATE 60 MG: 125 INJECTION, POWDER, FOR SOLUTION INTRAMUSCULAR; INTRAVENOUS at 21:49

## 2022-12-02 RX ADMIN — METOPROLOL TARTRATE 5 MG: 5 INJECTION, SOLUTION INTRAVENOUS at 23:55

## 2022-12-02 RX ADMIN — METHYLPREDNISOLONE SODIUM SUCCINATE 60 MG: 125 INJECTION, POWDER, FOR SOLUTION INTRAMUSCULAR; INTRAVENOUS at 16:01

## 2022-12-02 RX ADMIN — METOCLOPRAMIDE HYDROCHLORIDE 10 MG: 5 INJECTION INTRAMUSCULAR; INTRAVENOUS at 23:55

## 2022-12-02 RX ADMIN — PROPOFOL 30 MCG/KG/MIN: 10 INJECTION, EMULSION INTRAVENOUS at 06:26

## 2022-12-02 RX ADMIN — METOCLOPRAMIDE HYDROCHLORIDE 10 MG: 5 INJECTION INTRAMUSCULAR; INTRAVENOUS at 17:27

## 2022-12-02 RX ADMIN — IPRATROPIUM BROMIDE AND ALBUTEROL SULFATE 1 AMPULE: 2.5; .5 SOLUTION RESPIRATORY (INHALATION) at 08:19

## 2022-12-02 RX ADMIN — SODIUM CHLORIDE, PRESERVATIVE FREE 40 MG: 5 INJECTION INTRAVENOUS at 08:44

## 2022-12-02 RX ADMIN — METOCLOPRAMIDE HYDROCHLORIDE 10 MG: 5 INJECTION INTRAMUSCULAR; INTRAVENOUS at 01:13

## 2022-12-02 RX ADMIN — IPRATROPIUM BROMIDE AND ALBUTEROL SULFATE 1 AMPULE: 2.5; .5 SOLUTION RESPIRATORY (INHALATION) at 16:32

## 2022-12-02 RX ADMIN — SODIUM CHLORIDE: 9 INJECTION, SOLUTION INTRAVENOUS at 14:20

## 2022-12-02 RX ADMIN — SODIUM CHLORIDE: 9 INJECTION, SOLUTION INTRAVENOUS at 04:58

## 2022-12-02 RX ADMIN — DILTIAZEM HYDROCHLORIDE 30 MG: 30 TABLET, FILM COATED ORAL at 23:55

## 2022-12-02 RX ADMIN — METOPROLOL TARTRATE 5 MG: 5 INJECTION, SOLUTION INTRAVENOUS at 11:49

## 2022-12-02 ASSESSMENT — PULMONARY FUNCTION TESTS
PIF_VALUE: 17
PIF_VALUE: 16
PIF_VALUE: 15
PIF_VALUE: 18
PIF_VALUE: 18
PIF_VALUE: 15
PIF_VALUE: 15
PIF_VALUE: 18
PIF_VALUE: 13
PIF_VALUE: 13
PIF_VALUE: 18
PIF_VALUE: 17
PIF_VALUE: 13
PIF_VALUE: 17
PIF_VALUE: 16
PIF_VALUE: 16
PIF_VALUE: 18
PIF_VALUE: 17
PIF_VALUE: 18
PIF_VALUE: 16
PIF_VALUE: 21
PIF_VALUE: 16
PIF_VALUE: 17
PIF_VALUE: 17
PIF_VALUE: 16
PIF_VALUE: 18
PIF_VALUE: 16
PIF_VALUE: 17

## 2022-12-02 ASSESSMENT — PAIN SCALES - GENERAL
PAINLEVEL_OUTOF10: 4
PAINLEVEL_OUTOF10: 0
PAINLEVEL_OUTOF10: 6
PAINLEVEL_OUTOF10: 7
PAINLEVEL_OUTOF10: 0
PAINLEVEL_OUTOF10: 0

## 2022-12-02 ASSESSMENT — PAIN DESCRIPTION - ORIENTATION
ORIENTATION: LOWER
ORIENTATION: LOWER

## 2022-12-02 ASSESSMENT — PAIN DESCRIPTION - LOCATION
LOCATION: BACK

## 2022-12-02 ASSESSMENT — PAIN DESCRIPTION - DESCRIPTORS
DESCRIPTORS: ACHING
DESCRIPTORS: ACHING

## 2022-12-02 NOTE — PROGRESS NOTES
Writer spoke with Cleven Cam concerning re-starting the tube feed. MD advised ok to hold off on re-starting the tube feed.

## 2022-12-02 NOTE — PROGRESS NOTES
Writer sent secure message to Ruy Gardner to update MD on pt wean trial and lack of a cuff leak. Awaiting call back at this time.

## 2022-12-02 NOTE — PROGRESS NOTES
Writer sent secure message to  to discuss Tube feeds being re-started. Pt is having large amounts of Gastric contents out of the NG per shift. Kimberly Fleischer wants to discuss other options for nutrition such as TPN until gastric contents slows down. Awaiting call back at this time.

## 2022-12-02 NOTE — PROGRESS NOTES
PULMONARY PROGRESS NOTE:    REASON FOR VISIT:copd exac, resp failure  Interval History:     On vent, sedated    Events since last visit: not tolerating TF, high residue     PAST MEDICAL HISTORY:      Scheduled Meds:   metoprolol  5 mg IntraVENous Q4H    [START ON 12/2/2022] vancomycin  1,500 mg IntraVENous Q18H    metoclopramide  10 mg IntraVENous Q6H    pantoprazole (PROTONIX) 40 mg injection  40 mg IntraVENous Daily    thiamine and folic acid IVPB   IntraVENous Daily    dilTIAZem  30 mg Oral 4 times per day    methylPREDNISolone  40 mg IntraVENous Q6H    nicotine  1 patch TransDERmal Daily    [Held by provider] metoprolol tartrate  25 mg Oral BID    lisinopril  10 mg Oral Daily    sodium chloride flush  5-40 mL IntraVENous 2 times per day    vancomycin (VANCOCIN) intermittent dosing (placeholder)   Other RX Placeholder    sodium chloride flush  5-40 mL IntraVENous 2 times per day    enoxaparin  40 mg SubCUTAneous Daily    ipratropium-albuterol  1 ampule Inhalation Q4H    budesonide  0.5 mg Nebulization BID     Continuous Infusions:   sodium chloride 150 mL/hr at 12/01/22 1808    norepinephrine Stopped (11/30/22 0451)    propofol 40 mcg/kg/min (12/01/22 1910)    sodium chloride      sodium chloride       PRN Meds:dextromethorphan-guaiFENesin, LORazepam, oxyCODONE-acetaminophen, potassium chloride **OR** potassium alternative oral replacement **OR** potassium chloride, metoprolol, sodium chloride flush, sodium chloride, ipratropium-albuterol, sodium chloride flush, sodium chloride, acetaminophen, ondansetron **OR** ondansetron        PHYSICAL EXAMINATION:  BP (!) 155/80   Pulse 83   Temp 98 °F (36.7 °C) (Oral)   Resp 14   Ht 6' 1\" (1.854 m)   Wt 186 lb 1.1 oz (84.4 kg)   SpO2 100%   BMI 24.55 kg/m²     General : sedated, on vent   Neck - supple, no lymphadenopathy, JVD not raised  Heart - regular rhythm, S1 and S2 normal; no additional sounds heard  Lungs - poor Air Entry bilaterally; breath sounds : vesicular;   rales/crackles - absent  Abdomen - soft, no tenderness  Upper Extremities  - no cyanosis, mottling; edema : absent  Lower Extremities: no cyanosis, mottling; edema : absent    Current Laboratory, Radiologic, Microbiologic, and Diagnostic studies reviewed  Data ReviewCBC:   Recent Labs     11/29/22  0445 11/30/22  0403 11/30/22  1155 12/01/22  0425 12/01/22  1042 12/01/22  1619   WBC 15.2* 7.3  --  5.6  --   --    RBC 3.00* 2.59*  --  2.48*  --   --    HGB 11.0* 9.7*   < > 9.4* 10.3* 9.6*   HCT 33.0* 28.4*   < > 27.2* 30.1* 28.3*   * 91*  --  80*  --   --     < > = values in this interval not displayed. BMP:   Recent Labs     11/29/22  0445 11/30/22  0403 11/30/22  1751 12/01/22  0425   GLUCOSE 132* 121*  --  120*   * 139  --  135   K 3.3* 2.9* 3.2* 3.2*   BUN 17 17  --  16   CREATININE <0.40* <0.40*  --  <0.40*   CALCIUM 9.3 8.4*  --  8.6       ABGs:   Recent Labs     11/29/22  0625 11/30/22  0432 12/01/22  0615   PHART 7.601* 7.556* 7.485*   PO2ART 82.1 72.1* 92.0   MKQ3QEV 43.9 47.9* 49.4*   AJH5GUV 43.1* 42.5* 37.2*   M2OVYAAV 96.2 93.9* 96.2        PT/INR:  No results found for: PTINR    ASSESSMENT / PLAN:  Acute hypoxic respiratory failure     Mechanical ventilation - extubated 11/23/22; reintubated 11/28/22  Cent settings adjusted  Hypotension - needs PICC     Initiate pressor therapy / levophed to keep MAP 65  PNA - HCAP/ ABx  COPD     BD, steroids  Suspected lung CA  Alcohol use      thiamine / folic acid     Monitor for DTs  IV fluids  H&H q 6  Add reglan      This is a late note on patient seen by me earlier today.   Electronically signed by Brunilda Gann MD on 12/01/22 at 9:36 PM

## 2022-12-02 NOTE — PROGRESS NOTES
[address]   OCCUPATIONAL THERAPY MISSED TREATMENT NOTE   INPATIENT   Date: 22  Patient Name: Jose Guadalupe Bell       Room: 7402/7194-54  MRN: [id number]   Account #: [de-identified]    : 1965  (61 y.o.)  Gender: male                 REASON FOR MISSED TREATMENT:  Patient unable to participate   -    Pt remains vented and sedated. OT will continue to follow and attempt once appropriate.    Wander Serna, OT

## 2022-12-02 NOTE — PLAN OF CARE
Problem: Discharge Planning  Goal: Discharge to home or other facility with appropriate resources  Outcome: Progressing  Flowsheets  Taken 12/2/2022 1600  Discharge to home or other facility with appropriate resources: Identify barriers to discharge with patient and caregiver  Taken 12/2/2022 1200  Discharge to home or other facility with appropriate resources: Identify barriers to discharge with patient and caregiver  Taken 12/2/2022 0800  Discharge to home or other facility with appropriate resources: Identify barriers to discharge with patient and caregiver     Problem: Pain  Goal: Verbalizes/displays adequate comfort level or baseline comfort level  Outcome: Progressing  Flowsheets  Taken 12/2/2022 1600  Verbalizes/displays adequate comfort level or baseline comfort level: Assess pain using appropriate pain scale  Taken 12/2/2022 1200  Verbalizes/displays adequate comfort level or baseline comfort level: Assess pain using appropriate pain scale  Taken 12/2/2022 0800  Verbalizes/displays adequate comfort level or baseline comfort level: Assess pain using appropriate pain scale     Problem: Skin/Tissue Integrity  Goal: Absence of new skin breakdown  Description: 1. Monitor for areas of redness and/or skin breakdown  2. Assess vascular access sites hourly  3. Every 4-6 hours minimum:  Change oxygen saturation probe site  4. Every 4-6 hours:  If on nasal continuous positive airway pressure, respiratory therapy assess nares and determine need for appliance change or resting period.   Outcome: Progressing     Problem: Safety - Adult  Goal: Free from fall injury  Outcome: Progressing  Flowsheets (Taken 12/2/2022 0800)  Free From Fall Injury: Instruct family/caregiver on patient safety     Problem: ABCDS Injury Assessment  Goal: Absence of physical injury  Outcome: Progressing  Flowsheets (Taken 12/2/2022 0800)  Absence of Physical Injury: Implement safety measures based on patient assessment     Problem: Safety - Medical Restraint  Goal: Remains free of injury from restraints (Restraint for Interference with Medical Device)  Description: INTERVENTIONS:  1. Determine that other, less restrictive measures have been tried or would not be effective before applying the restraint  2. Evaluate the patient's condition at the time of restraint application  3. Inform patient/family regarding the reason for restraint  4.  Q2H: Monitor safety, psychosocial status, comfort, nutrition and hydration  Outcome: Progressing  Flowsheets  Taken 12/2/2022 1600  Remains free of injury from restraints (restraint for interference with medical device): Every 2 hours: Monitor safety, psychosocial status, comfort, nutrition and hydration  Taken 12/2/2022 1400  Remains free of injury from restraints (restraint for interference with medical device): Every 2 hours: Monitor safety, psychosocial status, comfort, nutrition and hydration  Taken 12/2/2022 1200  Remains free of injury from restraints (restraint for interference with medical device): Every 2 hours: Monitor safety, psychosocial status, comfort, nutrition and hydration  Taken 12/2/2022 1000  Remains free of injury from restraints (restraint for interference with medical device): Every 2 hours: Monitor safety, psychosocial status, comfort, nutrition and hydration  Taken 12/2/2022 0800  Remains free of injury from restraints (restraint for interference with medical device): Every 2 hours: Monitor safety, psychosocial status, comfort, nutrition and hydration

## 2022-12-02 NOTE — PROGRESS NOTES
Vancomycin Dosing by Pharmacy - Daily Note   Vancomycin Therapy Day:  11  Indication: sepsis     Allergies:  Patient has no known allergies. Actual Weight:    Wt Readings from Last 1 Encounters:   12/02/22 198 lb 6.6 oz (90 kg)       Labs/Ancillary Data  Estimated Creatinine Clearance: 227 mL/min (based on SCr of 0.4 mg/dL). Recent Labs     11/30/22  0403 12/01/22  0425 12/02/22  0455   CREATININE <0.40* <0.40* <0.40*   BUN 17 16 18   WBC 7.3 5.6  --      Procalcitonin   Date Value Ref Range Status   08/29/2022 0.20 (H) <0.09 ng/mL Final     Comment:           Suspected Sepsis:  <0.50 ng/mL     Low likelihood of sepsis. 0.50-2.00 ng/mL     Increased likelihood of sepsis. Antibiotics encouraged. >2.00 ng/mL     High risk of sepsis/shock. Antibiotics strongly encouraged. Suspected Lower Resp Tract Infections:  <0.24 ng/mL     Low likelihood of bacterial infection. >0.24 ng/mL     Increased likelihood of bacterial infection. Antibiotics encouraged. With successful antibiotic therapy, PCT levels should decrease rapidly. (Half-life of 24 to   36 hours.)        Procalcitonin values from samples collected within the first 6 hours of systemic infection   may still be low. Retesting may be indicated. Values from day 1 and day 4 can be entered into the Change in Procalcitonin Calculator   ([email or web address]-pct-calculator. [employer]) to determine the patient's Mortality Risk Prognosis        In healthy neonates, plasma Procalcitonin (PCT) concentrations increase gradually after   birth, reaching peak values at about 24 hours of age then decrease to normal values below   0.5 ng/mL by 48-72 hours of age.          Intake/Output Summary (Last 24 hours) at 12/2/2022 0857  Last data filed at 12/2/2022 3183  Gross per 24 hour   Intake 1135 ml   Output 1950 ml   Net -815 ml     Temp: 99    Culture Date / Source  /  Results  See micro   Recent vancomycin administrations                     vancomycin (VANCOCIN) 1,500 mg in dextrose 5 % 250 mL IVPB (ADDAVIAL) (mg) 1,500 mg New Bag 12/02/22 0500    vancomycin (VANCOCIN) 1,500 mg in dextrose 5 % 250 mL IVPB (ADDAVIAL) ()  Restarted 11/30/22 2243     1,500 mg New Bag  2239     1,500 mg New Bag  1120     1,500 mg New Bag 11/29/22 2203     1,500 mg New Bag  1105                    Vancomycin Concentrations:   TROUGH:  No results for input(s): VANCOTROUGH in the last 72 hours. RANDOM:    Recent Labs     12/01/22  1042   VANCORANDOM 18.3       MRSA Nasal Swab: N/A. Non-respiratory infection. Richa Martin PLAN     Decrease dose to 750 mg q8h IV  Ensured BUN/sCr ordered at baseline and every 48 hours x at least 3 levels, then at least weekly. Pharmacy will continue to monitor patient and adjust therapy as indicated      Vancomycin Target Concentration Parameters  Treatment  Population Target AUC/NIK Target Trough   Invasive MRSA Infection (bacteremia, pneumonia, meningitis, endocarditis, osteomyelitis)  Sepsis (undifferentiated) 400-600 N/A   Infection due to non-MRSA pathogen  Empiric treatment of non-invasive MRSA infection  (SSTI, UTI) <500 10-15 mg/L   CrCl < 29 mL/min  Rapidly fluctuating serum creatinine   KRYSTA N/A < 15 mg/L     Renal replacement therapy is dosed by levels, per hospital protocol. Abbreviations  * Pauc: probability that AUC is >400 (efficacy); Pconc: probability that Ctrough is above 20 ?g/mL (toxicity); Tox: Probability of nephrotoxicity, based on Lodise et al. Clin Infect Dis 2009. Loading dose: N/A  Regimen: 750 mg IV every 8 hours. Start time: 17:00 on 12/03/2022  Exposure target: AUC24 (range)400-600 mg/L.hr   AUC24,ss: 480 mg/L.hr  Probability of AUC24 > 400: 93 %  Ctrough,ss: 16.7 mg/L  Probability of Ctrough,ss > 20: 6 %  Probability of nephrotoxicity (Lodise IVANNA 2009): 12 %    Thank you for the consult. Pharmacy will continue to follow.     George Wolfe PharmD, BCPS  12/2/2022 9:00 AM

## 2022-12-02 NOTE — PROGRESS NOTES
Writer sent secure message to George Isaacs to discuss possible removal of midline access, the pt does have a triple lumen PICC in the left arm. Writer also notified provider for NG output for night shift being 550.

## 2022-12-02 NOTE — PROGRESS NOTES
Physical Therapy        Physical Therapy Cancel Note      DATE: 2022    NAME: Mag Handley  MRN: [id number]   : 1965      Patient not seen this date for Physical Therapy due to:    Patient is not appropriate for PT evaluation/treatment at this time d/t pt failed wean trial remaining sedated on vent    Electronically signed by Tootie Deluna PT on 2022 at 7:42 AM

## 2022-12-02 NOTE — PROGRESS NOTES
Writer spoke with pts daughter Samuel Reaves gave an update on the pt and informed her pf the POC for the day. Writer will call back with any further updates or if any changes are made to the POC.

## 2022-12-02 NOTE — PROGRESS NOTES
PULMONARY PROGRESS NOTE:    REASON FOR VISIT:copd exac, resp failure  Interval History: On vent, sedated with 40 mcg propofol.  SBT for a few hrs this morning, tachypnea and no cuff leak    Events since last visit: NG w/ large amt of gastric drainage    PAST MEDICAL HISTORY:      Scheduled Meds:   vancomycin  750 mg IntraVENous Q8H    methylPREDNISolone  60 mg IntraVENous Q6H    metoprolol  5 mg IntraVENous Q4H    metoclopramide  10 mg IntraVENous Q6H    pantoprazole (PROTONIX) 40 mg injection  40 mg IntraVENous Daily    thiamine and folic acid IVPB   IntraVENous Daily    dilTIAZem  30 mg Oral 4 times per day    nicotine  1 patch TransDERmal Daily    [Held by provider] metoprolol tartrate  25 mg Oral BID    lisinopril  10 mg Oral Daily    sodium chloride flush  5-40 mL IntraVENous 2 times per day    vancomycin (VANCOCIN) intermittent dosing (placeholder)   Other RX Placeholder    sodium chloride flush  5-40 mL IntraVENous 2 times per day    enoxaparin  40 mg SubCUTAneous Daily    ipratropium-albuterol  1 ampule Inhalation Q4H    budesonide  0.5 mg Nebulization BID     Continuous Infusions:   sodium chloride 150 mL/hr at 12/02/22 0458    norepinephrine Stopped (11/30/22 0451)    propofol 40 mcg/kg/min (12/02/22 1000)    sodium chloride      sodium chloride       PRN Meds:dextromethorphan-guaiFENesin, LORazepam, oxyCODONE-acetaminophen, potassium chloride **OR** potassium alternative oral replacement **OR** potassium chloride, metoprolol, sodium chloride flush, sodium chloride, sodium chloride flush, sodium chloride, acetaminophen, ondansetron **OR** ondansetron        PHYSICAL EXAMINATION:  BP (!) 145/75   Pulse 85   Temp 98.5 °F (36.9 °C) (Oral)   Resp 16   Ht 6' 1\" (1.854 m)   Wt 198 lb 6.6 oz (90 kg)   SpO2 99%   BMI 26.18 kg/m²     General : sedated, on vent   Neck - supple, no lymphadenopathy, JVD not raised  Heart - regular rhythm, S1 and S2 normal; no additional sounds heard  Lungs - poor Air Entry bilaterally; breath sounds : vesicular  Abdomen - soft, no tenderness  Upper Extremities  - no cyanosis, mottling; edema : absent  Lower Extremities: no cyanosis, mottling; edema : absent    Current Laboratory, Radiologic, Microbiologic, and Diagnostic studies reviewed  Data ReviewCBC:   Recent Labs     11/30/22  0403 11/30/22  1155 12/01/22  0425 12/01/22  1042 12/01/22  2238 12/02/22  0449 12/02/22  1121   WBC 7.3  --  5.6  --   --   --  10.9   RBC 2.59*  --  2.48*  --   --   --  2.67*   HGB 9.7*   < > 9.4*   < > 9.2* 9.8* 10.1*   HCT 28.4*   < > 27.2*   < > 26.8* 28.2* 29.6*   PLT 91*  --  80*  --   --   --  92*    < > = values in this interval not displayed. BMP:   Recent Labs     11/30/22  0403 11/30/22  1751 12/01/22  0425 12/02/22  0455   GLUCOSE 121*  --  120* 96     --  135 139   K 2.9* 3.2* 3.2* 3.2*   BUN 17  --  16 18   CREATININE <0.40*  --  <0.40* <0.40*   CALCIUM 8.4*  --  8.6 8.6       ABGs:   Recent Labs     11/30/22  0432 12/01/22  0615 12/02/22  0440   PHART 7.556* 7.485* 7.482*   PO2ART 72.1* 92.0 79.0*   [id number] 47.9* 49.4* 45.3*   TMK1DJX 42.5* 37.2* 33.9*   Q4FPCLPV 93.9* 96.2 94.7*        PT/INR:  No results found for: PTINR    ASSESSMENT / PLAN:  Acute hypoxic respiratory failure     Mechanical ventilation - extubated 11/23/22; reintubated 11/28/22  Cent settings adjusted  Hypotension - needs PICC     monitor off levophed   PNA - HCAP/ ABx  COPD     BD, increase steroids  Suspected lung CA  Alcohol use      thiamine / folic acid     Monitor for DTs  IV fluids  H&H q 6  reglan  D/c vanco  Restart TF and slowly increase to goal when NG drainage improves    This is a late note on patient seen by me earlier today.   Electronically signed by Alicia Bernal MD on 12/02/22 at 6:00 PM

## 2022-12-02 NOTE — CARE COORDINATION
ONGOING DISCHARGE PLAN:    Pt remains on Vent, 30% FIO2. Pt. Is from [facility], Revoked [facility]. They are still following & have Beds at [facility]. Pt. Remains on IV Steroids, 60 Q6. Pulmonary continues to follow. Will continue to follow for additional discharge needs.     Electronically signed by Vicki Awad RN on 12/2/2022 at 3:07 PM

## 2022-12-03 ENCOUNTER — APPOINTMENT (OUTPATIENT)
Dept: GENERAL RADIOLOGY | Age: 57
End: 2022-12-03
Payer: COMMERCIAL

## 2022-12-03 LAB
ABSOLUTE EOS #: 0 K/UL (ref 0–0.4)
ABSOLUTE LYMPH #: 0.16 K/UL (ref 1–4.8)
ABSOLUTE MONO #: 0.41 K/UL (ref 0.1–1.3)
ALLEN TEST: ABNORMAL
ANION GAP SERPL CALCULATED.3IONS-SCNC: 7 MMOL/L (ref 9–17)
BASOPHILS # BLD: 0 % (ref 0–2)
BASOPHILS ABSOLUTE: 0 K/UL (ref 0–0.2)
BUN BLDV-MCNC: 16 MG/DL (ref 6–20)
CALCIUM SERPL-MCNC: 8.4 MG/DL (ref 8.6–10.4)
CARBOXYHEMOGLOBIN: 1 % (ref 0–5)
CHLORIDE BLD-SCNC: 107 MMOL/L (ref 98–107)
CO2: 28 MMOL/L (ref 20–31)
CREAT SERPL-MCNC: <0.4 MG/DL (ref 0.7–1.2)
EOSINOPHILS RELATIVE PERCENT: 0 % (ref 0–4)
FIO2: 30
GFR SERPL CREATININE-BSD FRML MDRD: ABNORMAL ML/MIN/1.73M2
GLUCOSE BLD-MCNC: 95 MG/DL (ref 70–99)
HCO3 ARTERIAL: 31.3 MMOL/L (ref 22–26)
HCT VFR BLD CALC: 27.2 % (ref 41–53)
HEMOGLOBIN: 9.2 G/DL (ref 13.5–17.5)
LYMPHOCYTES # BLD: 2 % (ref 24–44)
MAGNESIUM: 1.9 MG/DL (ref 1.6–2.6)
MCH RBC QN AUTO: 37 PG (ref 26–34)
MCHC RBC AUTO-ENTMCNC: 33.8 G/DL (ref 31–37)
MCV RBC AUTO: 109.3 FL (ref 80–100)
METHEMOGLOBIN: 1.1 % (ref 0–1.9)
MODE: ABNORMAL
MONOCYTES # BLD: 5 % (ref 1–7)
MORPHOLOGY: ABNORMAL
O2 DEVICE/FLOW/%: ABNORMAL
O2 SAT, ARTERIAL: 95 % (ref 95–98)
PATIENT TEMP: 37.1
PCO2 ARTERIAL: 45.6 MMHG (ref 35–45)
PDW BLD-RTO: 14.5 % (ref 11.5–14.9)
PEEP/CPAP: 5
PH ARTERIAL: 7.45 (ref 7.35–7.45)
PHOSPHORUS: 3.1 MG/DL (ref 2.5–4.5)
PLATELET # BLD: 82 K/UL (ref 150–450)
PMV BLD AUTO: 8.5 FL (ref 6–12)
PO2 ARTERIAL: 83.8 MMHG (ref 80–100)
POSITIVE BASE EXCESS, ART: 7.2 MMOL/L (ref 0–2)
POTASSIUM SERPL-SCNC: 3.7 MMOL/L (ref 3.7–5.3)
PT. POSITION: ABNORMAL
RBC # BLD: 2.49 M/UL (ref 4.5–5.9)
SAMPLE SITE: ABNORMAL
SEG NEUTROPHILS: 93 % (ref 36–66)
SEGMENTED NEUTROPHILS ABSOLUTE COUNT: 7.63 K/UL (ref 1.3–9.1)
SET RATE: 14
SODIUM BLD-SCNC: 142 MMOL/L (ref 135–144)
TEXT FOR RESPIRATORY: ABNORMAL
TOTAL RATE: 14
VT: 450
WBC # BLD: 8.2 K/UL (ref 3.5–11)

## 2022-12-03 PROCEDURE — 85025 COMPLETE CBC W/AUTO DIFF WBC: CPT

## 2022-12-03 PROCEDURE — 6370000000 HC RX 637 (ALT 250 FOR IP): Performed by: FAMILY MEDICINE

## 2022-12-03 PROCEDURE — 82805 BLOOD GASES W/O2 SATURATION: CPT

## 2022-12-03 PROCEDURE — 94003 VENT MGMT INPAT SUBQ DAY: CPT

## 2022-12-03 PROCEDURE — 2700000000 HC OXYGEN THERAPY PER DAY

## 2022-12-03 PROCEDURE — 6370000000 HC RX 637 (ALT 250 FOR IP): Performed by: INTERNAL MEDICINE

## 2022-12-03 PROCEDURE — 71045 X-RAY EXAM CHEST 1 VIEW: CPT

## 2022-12-03 PROCEDURE — 94640 AIRWAY INHALATION TREATMENT: CPT

## 2022-12-03 PROCEDURE — 6360000002 HC RX W HCPCS: Performed by: NURSE PRACTITIONER

## 2022-12-03 PROCEDURE — A4216 STERILE WATER/SALINE, 10 ML: HCPCS | Performed by: FAMILY MEDICINE

## 2022-12-03 PROCEDURE — C9113 INJ PANTOPRAZOLE SODIUM, VIA: HCPCS | Performed by: FAMILY MEDICINE

## 2022-12-03 PROCEDURE — 2500000003 HC RX 250 WO HCPCS: Performed by: INTERNAL MEDICINE

## 2022-12-03 PROCEDURE — 84100 ASSAY OF PHOSPHORUS: CPT

## 2022-12-03 PROCEDURE — 6360000002 HC RX W HCPCS: Performed by: FAMILY MEDICINE

## 2022-12-03 PROCEDURE — 6360000002 HC RX W HCPCS: Performed by: INTERNAL MEDICINE

## 2022-12-03 PROCEDURE — 2000000000 HC ICU R&B

## 2022-12-03 PROCEDURE — 2060000000 HC ICU INTERMEDIATE R&B

## 2022-12-03 PROCEDURE — 2580000003 HC RX 258: Performed by: FAMILY MEDICINE

## 2022-12-03 PROCEDURE — 2500000003 HC RX 250 WO HCPCS: Performed by: NURSE PRACTITIONER

## 2022-12-03 PROCEDURE — 36600 WITHDRAWAL OF ARTERIAL BLOOD: CPT

## 2022-12-03 PROCEDURE — 83735 ASSAY OF MAGNESIUM: CPT

## 2022-12-03 PROCEDURE — 80048 BASIC METABOLIC PNL TOTAL CA: CPT

## 2022-12-03 PROCEDURE — 94761 N-INVAS EAR/PLS OXIMETRY MLT: CPT

## 2022-12-03 PROCEDURE — 2580000003 HC RX 258: Performed by: NURSE PRACTITIONER

## 2022-12-03 RX ORDER — FUROSEMIDE 10 MG/ML
20 INJECTION INTRAMUSCULAR; INTRAVENOUS 2 TIMES DAILY
Status: DISCONTINUED | OUTPATIENT
Start: 2022-12-03 | End: 2022-12-09

## 2022-12-03 RX ADMIN — SODIUM CHLORIDE, PRESERVATIVE FREE 40 MG: 5 INJECTION INTRAVENOUS at 07:53

## 2022-12-03 RX ADMIN — LISINOPRIL 10 MG: 10 TABLET ORAL at 07:53

## 2022-12-03 RX ADMIN — IPRATROPIUM BROMIDE AND ALBUTEROL SULFATE 1 AMPULE: 2.5; .5 SOLUTION RESPIRATORY (INHALATION) at 20:15

## 2022-12-03 RX ADMIN — IPRATROPIUM BROMIDE AND ALBUTEROL SULFATE 1 AMPULE: 2.5; .5 SOLUTION RESPIRATORY (INHALATION) at 11:28

## 2022-12-03 RX ADMIN — METOPROLOL TARTRATE 5 MG: 5 INJECTION, SOLUTION INTRAVENOUS at 07:52

## 2022-12-03 RX ADMIN — SODIUM CHLORIDE: 9 INJECTION, SOLUTION INTRAVENOUS at 05:27

## 2022-12-03 RX ADMIN — FUROSEMIDE 20 MG: 10 INJECTION, SOLUTION INTRAMUSCULAR; INTRAVENOUS at 18:08

## 2022-12-03 RX ADMIN — PROPOFOL 40 MCG/KG/MIN: 10 INJECTION, EMULSION INTRAVENOUS at 11:50

## 2022-12-03 RX ADMIN — DILTIAZEM HYDROCHLORIDE 30 MG: 30 TABLET, FILM COATED ORAL at 18:07

## 2022-12-03 RX ADMIN — PROPOFOL 45 MCG/KG/MIN: 10 INJECTION, EMULSION INTRAVENOUS at 21:22

## 2022-12-03 RX ADMIN — METHYLPREDNISOLONE SODIUM SUCCINATE 60 MG: 125 INJECTION, POWDER, FOR SOLUTION INTRAMUSCULAR; INTRAVENOUS at 04:18

## 2022-12-03 RX ADMIN — METHYLPREDNISOLONE SODIUM SUCCINATE 60 MG: 125 INJECTION, POWDER, FOR SOLUTION INTRAMUSCULAR; INTRAVENOUS at 09:51

## 2022-12-03 RX ADMIN — PROPOFOL 40 MCG/KG/MIN: 10 INJECTION, EMULSION INTRAVENOUS at 07:52

## 2022-12-03 RX ADMIN — METHYLPREDNISOLONE SODIUM SUCCINATE 60 MG: 125 INJECTION, POWDER, FOR SOLUTION INTRAMUSCULAR; INTRAVENOUS at 15:16

## 2022-12-03 RX ADMIN — DILTIAZEM HYDROCHLORIDE 30 MG: 30 TABLET, FILM COATED ORAL at 13:15

## 2022-12-03 RX ADMIN — IPRATROPIUM BROMIDE AND ALBUTEROL SULFATE 1 AMPULE: 2.5; .5 SOLUTION RESPIRATORY (INHALATION) at 07:29

## 2022-12-03 RX ADMIN — METOPROLOL TARTRATE 5 MG: 5 INJECTION, SOLUTION INTRAVENOUS at 23:38

## 2022-12-03 RX ADMIN — OXYCODONE HYDROCHLORIDE AND ACETAMINOPHEN 1 TABLET: 5; 325 TABLET ORAL at 20:06

## 2022-12-03 RX ADMIN — METHYLPREDNISOLONE SODIUM SUCCINATE 60 MG: 125 INJECTION, POWDER, FOR SOLUTION INTRAMUSCULAR; INTRAVENOUS at 21:46

## 2022-12-03 RX ADMIN — SODIUM CHLORIDE: 9 INJECTION, SOLUTION INTRAVENOUS at 19:27

## 2022-12-03 RX ADMIN — PROPOFOL 40 MCG/KG/MIN: 10 INJECTION, EMULSION INTRAVENOUS at 17:05

## 2022-12-03 RX ADMIN — DILTIAZEM HYDROCHLORIDE 30 MG: 30 TABLET, FILM COATED ORAL at 05:18

## 2022-12-03 RX ADMIN — METOPROLOL TARTRATE 5 MG: 5 INJECTION, SOLUTION INTRAVENOUS at 04:15

## 2022-12-03 RX ADMIN — IPRATROPIUM BROMIDE AND ALBUTEROL SULFATE 1 AMPULE: 2.5; .5 SOLUTION RESPIRATORY (INHALATION) at 15:53

## 2022-12-03 RX ADMIN — OXYCODONE HYDROCHLORIDE AND ACETAMINOPHEN 1 TABLET: 5; 325 TABLET ORAL at 04:18

## 2022-12-03 RX ADMIN — METOPROLOL TARTRATE 5 MG: 5 INJECTION, SOLUTION INTRAVENOUS at 20:06

## 2022-12-03 RX ADMIN — THIAMINE HYDROCHLORIDE: 100 INJECTION, SOLUTION INTRAMUSCULAR; INTRAVENOUS at 09:52

## 2022-12-03 RX ADMIN — METOCLOPRAMIDE HYDROCHLORIDE 10 MG: 5 INJECTION INTRAMUSCULAR; INTRAVENOUS at 06:01

## 2022-12-03 RX ADMIN — METOPROLOL TARTRATE 5 MG: 5 INJECTION, SOLUTION INTRAVENOUS at 11:51

## 2022-12-03 RX ADMIN — METOPROLOL TARTRATE 5 MG: 5 INJECTION, SOLUTION INTRAVENOUS at 15:16

## 2022-12-03 RX ADMIN — METOCLOPRAMIDE HYDROCHLORIDE 10 MG: 5 INJECTION INTRAMUSCULAR; INTRAVENOUS at 11:51

## 2022-12-03 RX ADMIN — METOCLOPRAMIDE HYDROCHLORIDE 10 MG: 5 INJECTION INTRAMUSCULAR; INTRAVENOUS at 20:06

## 2022-12-03 RX ADMIN — DILTIAZEM HYDROCHLORIDE 30 MG: 30 TABLET, FILM COATED ORAL at 23:38

## 2022-12-03 RX ADMIN — BUDESONIDE 500 MCG: 0.5 SUSPENSION RESPIRATORY (INHALATION) at 07:40

## 2022-12-03 RX ADMIN — IPRATROPIUM BROMIDE AND ALBUTEROL SULFATE 1 AMPULE: 2.5; .5 SOLUTION RESPIRATORY (INHALATION) at 03:21

## 2022-12-03 ASSESSMENT — PULMONARY FUNCTION TESTS
PIF_VALUE: 16
PIF_VALUE: 19
PIF_VALUE: 17
PIF_VALUE: 22
PIF_VALUE: 17
PIF_VALUE: 16
PIF_VALUE: 21
PIF_VALUE: 16
PIF_VALUE: 20
PIF_VALUE: 19
PIF_VALUE: 19
PIF_VALUE: 16
PIF_VALUE: 22
PIF_VALUE: 20
PIF_VALUE: 18
PIF_VALUE: 18
PIF_VALUE: 17
PIF_VALUE: 17
PIF_VALUE: 16
PIF_VALUE: 17

## 2022-12-03 ASSESSMENT — PAIN SCALES - GENERAL
PAINLEVEL_OUTOF10: 6
PAINLEVEL_OUTOF10: 6

## 2022-12-03 ASSESSMENT — PAIN DESCRIPTION - ORIENTATION: ORIENTATION: LOWER

## 2022-12-03 ASSESSMENT — PAIN DESCRIPTION - DESCRIPTORS: DESCRIPTORS: ACHING

## 2022-12-03 ASSESSMENT — PAIN DESCRIPTION - LOCATION
LOCATION: BACK
LOCATION: BACK

## 2022-12-03 NOTE — ACP (ADVANCE CARE PLANNING)
Advance Care Planning     General Advance Care Planning (ACP) Conversation    Date of Conversation: 11/22/2022  Conducted with: PT's daughter Loida Narvaez asked to review the ACP documents. PT was on ventilator and not able to make any decision at the time of conversation. Healthcare Decision Maker:  No healthcare decision makers have been documented. Click here to complete [address] including selection of the Healthcare Decision Maker Relationship (ie \"Primary\")   Today we  reviewed the document. PT's daughter, Loida Narvaez. Madina Bee that PT does not have a legal wife, but three children, two sons and a daughter, who all live in [place]. PT moved to [facility]. There is an aunt in [facility]. Content/Action Overview:  PT was not able to communicate and reviewed the document with daughter.   Reviewed DNR/DNI and patient         Length of Voluntary ACP Conversation in minutes:  <16 minutes (Non-Billable)    Roselyn Frausto

## 2022-12-03 NOTE — PLAN OF CARE
Problem: Discharge Planning  Goal: Discharge to home or other facility with appropriate resources  12/2/2022 2024 by Abelino Bruno RN  Outcome: Progressing  12/2/2022 1642 by Luciana Ruiz RN  Outcome: Progressing  Flowsheets  Taken 12/2/2022 1600  Discharge to home or other facility with appropriate resources: Identify barriers to discharge with patient and caregiver  Taken 12/2/2022 1200  Discharge to home or other facility with appropriate resources: Identify barriers to discharge with patient and caregiver  Taken 12/2/2022 0800  Discharge to home or other facility with appropriate resources: Identify barriers to discharge with patient and caregiver     Problem: Pain  Goal: Verbalizes/displays adequate comfort level or baseline comfort level  12/2/2022 2024 by Abelino Bruno RN  Outcome: Progressing  12/2/2022 1642 by Luciana Ruiz RN  Outcome: Progressing  Flowsheets  Taken 12/2/2022 1600  Verbalizes/displays adequate comfort level or baseline comfort level: Assess pain using appropriate pain scale  Taken 12/2/2022 1200  Verbalizes/displays adequate comfort level or baseline comfort level: Assess pain using appropriate pain scale  Taken 12/2/2022 0800  Verbalizes/displays adequate comfort level or baseline comfort level: Assess pain using appropriate pain scale     Problem: Skin/Tissue Integrity  Goal: Absence of new skin breakdown  Description: 1. Monitor for areas of redness and/or skin breakdown  2. Assess vascular access sites hourly  3. Every 4-6 hours minimum:  Change oxygen saturation probe site  4. Every 4-6 hours:  If on nasal continuous positive airway pressure, respiratory therapy assess nares and determine need for appliance change or resting period.   12/2/2022 2024 by Abelino Bruno RN  Outcome: Progressing  12/2/2022 1642 by Luciana Ruiz RN  Outcome: Progressing     Problem: Safety - Adult  Goal: Free from fall injury  12/2/2022 2024 by Abelino Bruno RN  Outcome: Progressing  12/2/2022 1642 by Luciana Ruiz RN  Outcome: Progressing  Flowsheets (Taken 12/2/2022 0800)  Free From Fall Injury: Instruct family/caregiver on patient safety     Problem: ABCDS Injury Assessment  Goal: Absence of physical injury  12/2/2022 2024 by Abelino Bruno RN  Outcome: Progressing  12/2/2022 1642 by Luciana Ruiz RN  Outcome: Progressing  Flowsheets (Taken 12/2/2022 0800)  Absence of Physical Injury: Implement safety measures based on patient assessment     Problem: Safety - Medical Restraint  Goal: Remains free of injury from restraints (Restraint for Interference with Medical Device)  Description: INTERVENTIONS:  1. Determine that other, less restrictive measures have been tried or would not be effective before applying the restraint  2. Evaluate the patient's condition at the time of restraint application  3. Inform patient/family regarding the reason for restraint  4.  Q2H: Monitor safety, psychosocial status, comfort, nutrition and hydration  12/2/2022 2024 by Abelino Bruno RN  Outcome: Progressing  Flowsheets (Taken 12/2/2022 1800 by Luciana Ruiz RN)  Remains free of injury from restraints (restraint for interference with medical device): Every 2 hours: Monitor safety, psychosocial status, comfort, nutrition and hydration  12/2/2022 1642 by Luciana Ruiz RN  Outcome: Progressing  Flowsheets  Taken 12/2/2022 1600  Remains free of injury from restraints (restraint for interference with medical device): Every 2 hours: Monitor safety, psychosocial status, comfort, nutrition and hydration  Taken 12/2/2022 1400  Remains free of injury from restraints (restraint for interference with medical device): Every 2 hours: Monitor safety, psychosocial status, comfort, nutrition and hydration  Taken 12/2/2022 1200  Remains free of injury from restraints (restraint for interference with medical device): Every 2 hours: Monitor safety, psychosocial status, comfort, nutrition and hydration  Taken 12/2/2022 1000  Remains free of injury from restraints (restraint for interference with medical device): Every 2 hours: Monitor safety, psychosocial status, comfort, nutrition and hydration  Taken 12/2/2022 0800  Remains free of injury from restraints (restraint for interference with medical device): Every 2 hours: Monitor safety, psychosocial status, comfort, nutrition and hydration     Problem: Nutrition Deficit:  Goal: Optimize nutritional status  Outcome: Progressing

## 2022-12-03 NOTE — PROGRESS NOTES
ED nursed called [place] office per request from PT's daughterCleveneal Zavala, who wanted to review the ACP documents. PT was not capable to fill out the paper and writer reviewed the documents to JEMAL and prayed with her. 12/03/22 1546   Encounter Summary   Encounter Overview/Reason  Advance Care Planning   Service Provided For: Family   Referral/Consult From: Family   Last Encounter  12/03/22   Complexity of Encounter Moderate   Begin Time 1515   End Time  1540   Total Time Calculated 25 min   Advance Care Planning   Type ACP conversation   Assessment/Intervention/Outcome   Assessment Calm; Anxious   Intervention Prayer (assurance of)/[place];Sustaining Presence/Ministry of presence   Outcome Engaged in conversation;Receptive

## 2022-12-03 NOTE — PROGRESS NOTES
PULMONARY PROGRESS NOTE:    REASON FOR VISIT:copd exac, resp failure  Interval History: On vent, sedated with 40 mcg propofol.  SBT for a few hrs this morning, tachypnea and no cuff leak    Events since last visit: none    PAST MEDICAL HISTORY:      Scheduled Meds:   methylPREDNISolone  60 mg IntraVENous Q6H    metoprolol  5 mg IntraVENous Q4H    metoclopramide  10 mg IntraVENous Q6H    pantoprazole (PROTONIX) 40 mg injection  40 mg IntraVENous Daily    thiamine and folic acid IVPB   IntraVENous Daily    dilTIAZem  30 mg Oral 4 times per day    nicotine  1 patch TransDERmal Daily    [Held by provider] metoprolol tartrate  25 mg Oral BID    lisinopril  10 mg Oral Daily    sodium chloride flush  5-40 mL IntraVENous 2 times per day    sodium chloride flush  5-40 mL IntraVENous 2 times per day    enoxaparin  40 mg SubCUTAneous Daily    ipratropium-albuterol  1 ampule Inhalation Q4H    budesonide  0.5 mg Nebulization BID     Continuous Infusions:   sodium chloride 150 mL/hr at 12/03/22 0527    norepinephrine Stopped (11/30/22 0451)    propofol 40 mcg/kg/min (12/03/22 1150)    sodium chloride      sodium chloride       PRN Meds:dextromethorphan-guaiFENesin, LORazepam, oxyCODONE-acetaminophen, potassium chloride **OR** potassium alternative oral replacement **OR** potassium chloride, metoprolol, sodium chloride flush, sodium chloride, sodium chloride flush, sodium chloride, acetaminophen, ondansetron **OR** ondansetron        PHYSICAL EXAMINATION:  /84   Pulse 82   Temp 98 °F (36.7 °C) (Axillary)   Resp 18   Ht 6' 1\" (1.854 m)   Wt 197 lb 5 oz (89.5 kg)   SpO2 100%   BMI 26.03 kg/m²     General : sedated, on vent   Neck - supple, no lymphadenopathy, JVD not raised  Heart - regular rhythm, S1 and S2 normal; no additional sounds heard  Lungs - poor Air Entry bilaterally; breath sounds : vesicular  Abdomen - soft, no tenderness  Upper Extremities  - no cyanosis, mottling; edema : absent  Lower Extremities: no cyanosis, mottling; edema : absent    Current Laboratory, Radiologic, Microbiologic, and Diagnostic studies reviewed  Data ReviewCBC:   Recent Labs     12/01/22  0425 12/01/22  1042 12/02/22  1121 12/02/22  1722 12/03/22  0515   WBC 5.6  --  10.9  --  8.2   RBC 2.48*  --  2.67*  --  2.49*   HGB 9.4*   < > 10.1* 9.8* 9.2*   HCT 27.2*   < > 29.6* 29.3* 27.2*   PLT 80*  --  92*  --  82*    < > = values in this interval not displayed.        BMP:   Recent Labs     12/01/22  0425 12/02/22  0455 12/02/22  1722 12/03/22  0515   GLUCOSE 120* 96  --  95    139  --  142   K 3.2* 3.2* 3.3* 3.7   BUN 16 18  --  16   CREATININE <0.40* <0.40*  --  <0.40*   CALCIUM 8.6 8.6  --  8.4*       ABGs:   Recent Labs     12/01/22  0615 12/02/22  0440 12/03/22  0600   PHART 7.485* 7.482* 7.445   PO2ART 92.0 79.0* 83.8   JCE0KRR 49.4* 45.3* 45.6*   CNX4UCD 37.2* 33.9* 31.3*   Y3XVIWLH 96.2 94.7* 95.0        PT/INR:  No results found for: PTINR    ASSESSMENT / PLAN:  Acute hypoxic respiratory failure     Mechanical ventilation - extubated 11/23/22; reintubated 11/28/22  Vent settings adjusted  Hypotension -  PICC     monitor off levophed   PNA - HCAP/ ABx  COPD     BD, increase steroids - no cuff leak  Suspected lung CA  Alcohol use      thiamine / folic acid     Monitor for DTs  IV fluids  H&H q 6  reglan  D/c vanco  Restart TF and slowly increase to goal when NG drainage improves  DW patient's family  DW RN      Electronically signed by Oneil Ravi MD on 12/03/22 at 12:49 PM

## 2022-12-03 NOTE — PROGRESS NOTES
Date:   12/3/2022  Patient name: Letty Flores  Date of admission:  11/22/2022 12:17 PM  MRN:   [id number]  YOB: 1965  PCP: JAEL Banegas CNP    Reason for Admission: Respiratory failure [facility]) [J96.90]  Septic shock ([address] [A41.9, R65.21]  Acute on chronic respiratory failure with hypoxia [facility]) [J96.21]    Cardiology follow up: Episodes of sinus tachycardia, sinus bradycardia, nonsustained V. tach 5-7 beats, ventricular rate about 110       Referring physician: Dr Noemy Maravilla  Episodes of sinus tach most likely physiological  Episodes of nonsustained bradycardia most likely vasovagal  Episodes of nonsustained V. tach 6-7 beats heart rate above 110/hypoxia hypokalemia  Respiratory failure/severe COPD secondary to smoking/on ventilator  History of lung mass/suspected lung cancer  History of COVID-19 infection  History of alcohol abuse  Protein malnutrition, albumin 2.6  Anemia, thrombocytopenia     Past surgical history include bilateral hip replacement, neck surgery, vasectomy     2D echo 9/14/2022  Normal LV size and wall thickness ejection fraction more than 55%  No significant valvular abnormality     ECG 9/13/2022  Sinus tachycardia otherwise normal ECG     Chest x-ray 11/23/2022  Unchanged masslike opacity at the left base, mildly improved patchy opacity at the right base  Endotracheal tube and enteric tubes remain in place, heart size is within normal limit     History of present illness     58-year-old male a nursing home resident with a past medical history of severe COPD, CA lung got readmitted on 11/22/2022 with the respiratory failure, altered mental status and he required intubation and vent support. He got extubated on 11/24/2022. He was recently admitted at [facility] with respiratory failure required vent support. He has been on beta-blockers and Cardizem, steroids, bronchodilators. On continuous ECG monitoring episodes of sinus tachycardia noted. Occasionally he gets transient sinus bradycardia. He has had a few episodes of nonsustained broad complex rhythm heart rate about 110, 6-7 beats. No syncope no chest pain.     On admission lab work showed   high-sensitivity troponin 25 and 29  Hemoglobin 11.1, WBC 20.5, platelets 774  Sodium 132, potassium 4.0, CO2 35, BUN 12, creatinine 0.40, glucose 145, calcium 9.0     Current evaluation  Patient seen and examined in ICU yesterday and today  Medications and labs checked, chest x-ray finding reviewed  He was on ventilator, no response to verbal commands sedated  He has general anasarca  ECG monitor sinus rhythm heart rate 72  Blood pressure 146/80, respiratory rate 80, FiO2 30, oxygen saturation 100%  Good urine output    Chest x-ray showed poor hardware remains in satisfactory position, 5 cm soft tissue mass left lower lobe along with a small left parapneumonic effusion, the right lung is clear, no pneumothorax    Sodium 142, potassium 3.7, BUN 16, creatinine less than 0.40  WBC 8.2, hemoglobin 9.2, , platelets 82    Medications:   Scheduled Meds:   methylPREDNISolone  60 mg IntraVENous Q6H    metoprolol  5 mg IntraVENous Q4H    metoclopramide  10 mg IntraVENous Q6H    pantoprazole (PROTONIX) 40 mg injection  40 mg IntraVENous Daily    thiamine and folic acid IVPB   IntraVENous Daily    dilTIAZem  30 mg Oral 4 times per day    nicotine  1 patch TransDERmal Daily    [Held by provider] metoprolol tartrate  25 mg Oral BID    lisinopril  10 mg Oral Daily    sodium chloride flush  5-40 mL IntraVENous 2 times per day    sodium chloride flush  5-40 mL IntraVENous 2 times per day    enoxaparin  40 mg SubCUTAneous Daily    ipratropium-albuterol  1 ampule Inhalation Q4H    budesonide  0.5 mg Nebulization BID     Continuous Infusions:   sodium chloride 150 mL/hr at 12/03/22 0506    norepinephrine Stopped (11/30/22 5281)    propofol 40 mcg/kg/min (12/03/22 1150)    sodium chloride      sodium chloride       CBC: Recent Labs     12/01/22  0425 12/01/22  1042 12/02/22  1121 12/02/22  1722 12/03/22  0515   WBC 5.6  --  10.9  --  8.2   HGB 9.4*   < > 10.1* 9.8* 9.2*   PLT 80*  --  92*  --  82*    < > = values in this interval not displayed. BMP:    Recent Labs     12/01/22  0425 12/02/22  0455 12/02/22  1722 12/03/22  0515    139  --  142   K 3.2* 3.2* 3.3* 3.7   CL 97* 101  --  107   CO2 34* 31  --  28   BUN 16 18  --  16   CREATININE <0.40* <0.40*  --  <0.40*   GLUCOSE 120* 96  --  95     Hepatic:   Recent Labs     12/01/22 0425   AST 16   ALT 30   BILITOT 0.6   ALKPHOS 100     Troponin: No results for input(s): TROPONINI in the last 72 hours. BNP: No results for input(s): BNP in the last 72 hours. Lipids: No results for input(s): CHOL, HDL in the last 72 hours. Invalid input(s): LDLCALCU  INR: No results for input(s): INR in the last 72 hours. Objective:   Vitals: BP (!) 146/79   Pulse 87   Temp 98.7 °F (37.1 °C) (Axillary)   Resp 20   Ht 6' 1\" (1.854 m)   Wt 197 lb 5 oz (89.5 kg)   SpO2 100%   BMI 26.03 kg/m²   General appearance: Middle-age male general anasarca on ventilator  HEENT: Head: Normal, normocephalic, atraumatic. Neck:  Difficult to assess neck veins  Lungs: diminished breath sounds bilaterally  Heart: regular rate and rhythm  Abdomen:  Obese hypoactive bowel sounds  Extremities:  Edema all extremities  Neurologic: Mental status: Sedated    EKG: normal sinus rhythm. ECHO: reviewed.    Ejection fraction: 55%    Assessment / Acute Cardiac Problems:     Episodes of sinus tachycardia most likely physiological  Episodes of bradycardia most likely vagal effect  Nonsustained broad complex rhythm/V. tach, heart rate up to 110 most likely secondary to hypoxia, hypokalemia  Normal LV systolic function  Severe COPD, hypoxia  Lung mass suspected carcinoma lung    12/3/2022 patient remains on ventilator continue to have general anasarca    Patient Active Problem List:     Cellulitis of b/l lower extremity     Cellulitis of lower extremity     Alcoholism ([facility] [address]     Essential hypertension     Hyperkeratosis of b/l [place]     Alcohol withdrawal (HCC)     Tinea pedis of both feet     Suicidal ideation     Dyspnea     COPD exacerbation (HCC)     Gastroesophageal reflux disease without esophagitis     Cigarette smoker     Avascular necrosis of femoral head (HCC)     Tachycardia     Stage 3 severe COPD by GOLD classification (HCC)     Tobacco dependence     Chronic obstructive pulmonary disease (HCC)     Lung malignancy (HCC)     Cough with hemoptysis     Sepsis ([facility] [address]     COVID-19     Mass of lingula of lung     Hyponatremia     Respiratory failure ([facility] [address]      Plan of Treatment:   Medications reviewed  Continue IV Lopressor 5 mg every 4 hours  Continue with vent support  Patient is also on steroids, Cardizem 30 mg every 4 hours NG tube, subcu Lovenox  Patient is also on lisinopril 10 mg a day, cefepime, folic acid, regular, bronchodilators  Add Lasix 20 mg IV twice a day    Prognosis remains guarded    Electronically signed by Juanpablo Marcelino MD on 12/3/2022 at 2:39 PM

## 2022-12-03 NOTE — PROGRESS NOTES
Hospitalist Progress Note  12/2/2022 9:19 PM  Subjective:   Admit Date: 11/22/2022  PCP: JAEL Burris - CNP     DNR-CCA      C/c:  Chief Complaint   Patient presents with    Respiratory Distress         Interval History: pt still intubated/ vitals stable    Diet: No diet orders on file                                ip days:10  Medications:   Scheduled Meds:   methylPREDNISolone  60 mg IntraVENous Q6H    metoprolol  5 mg IntraVENous Q4H    metoclopramide  10 mg IntraVENous Q6H    pantoprazole (PROTONIX) 40 mg injection  40 mg IntraVENous Daily    thiamine and folic acid IVPB   IntraVENous Daily    dilTIAZem  30 mg Oral 4 times per day    nicotine  1 patch TransDERmal Daily    [Held by provider] metoprolol tartrate  25 mg Oral BID    lisinopril  10 mg Oral Daily    sodium chloride flush  5-40 mL IntraVENous 2 times per day    sodium chloride flush  5-40 mL IntraVENous 2 times per day    enoxaparin  40 mg SubCUTAneous Daily    ipratropium-albuterol  1 ampule Inhalation Q4H    budesonide  0.5 mg Nebulization BID     Continuous Infusions:   sodium chloride 150 mL/hr at 12/02/22 1420    norepinephrine Stopped (11/30/22 0451)    propofol 40 mcg/kg/min (12/02/22 1724)    sodium chloride      sodium chloride       PRN Meds:.dextromethorphan-guaiFENesin, LORazepam, oxyCODONE-acetaminophen, potassium chloride **OR** potassium alternative oral replacement **OR** potassium chloride, metoprolol, sodium chloride flush, sodium chloride, sodium chloride flush, sodium chloride, acetaminophen, ondansetron **OR** ondansetron     CBC:   Recent Labs     11/30/22  0403 11/30/22  1155 12/01/22  0425 12/01/22  1042 12/02/22  0449 12/02/22  1121 12/02/22  1722   WBC 7.3  --  5.6  --   --  10.9  --    HGB 9.7*   < > 9.4*   < > 9.8* 10.1* 9.8*   PLT 91*  --  80*  --   --  92*  --     < > = values in this interval not displayed.      BMP:    Recent Labs     11/30/22  0403 11/30/22  1751 12/01/22  0425 12/02/22  0455 12/02/22  1722    --  135 139  --    K 2.9*   < > 3.2* 3.2* 3.3*   CL 95*  --  97* 101  --    CO2 36*  --  34* 31  --    BUN 17  --  16 18  --    CREATININE <0.40*  --  <0.40* <0.40*  --    GLUCOSE 121*  --  120* 96  --     < > = values in this interval not displayed. Hepatic:   Recent Labs     11/30/22  0403 12/01/22  0425   AST 17 16   ALT 33 30   BILITOT 0.8 0.6   ALKPHOS 104 100     Troponin: No results for input(s): TROPONINI in the last 72 hours. BNP: No results for input(s): BNP in the last 72 hours. Lipids: No results for input(s): CHOL, HDL in the last 72 hours. Invalid input(s): LDLCALCU  INR: No results for input(s): INR in the last 72 hours. Objective:   Vitals: /74   Pulse 90   Temp 98.2 °F (36.8 °C) (Oral)   Resp 20   Ht 6' 1\" (1.854 m)   Wt 198 lb 6.6 oz (90 kg)   SpO2 100%   BMI 26.18 kg/m²   General appearance: sedated  appears stated age and intubated  Skin: Skin color, texture, turgor normal. No rashes or lesions  Lungs: clear to auscultation bilaterally  Heart: regular rate and rhythm, S1, S2 normal, no murmur, click, rub or gallop  Abdomen: soft, non-tender; bowel sounds normal; no masses,  no organomegaly  Extremities: extremities normal, atraumatic, no cyanosis or edema  Neurologic: Mental status: Alert, oriented, thought content appropriate    Prophylaxis:   DVT with  [x] lovenox        [] heparin        [] Scd        [] none:     Radiology:  CT HEAD WO CONTRAST    Result Date: 11/22/2022  EXAMINATION: CT OF THE HEAD WITHOUT CONTRAST  11/22/2022 2:59 pm TECHNIQUE: CT of the head was performed without the administration of intravenous contrast. Automated exposure control, iterative reconstruction, and/or weight based adjustment of the mA/kV was utilized to reduce the radiation dose to as low as reasonably achievable. COMPARISON: None.  HISTORY: ORDERING SYSTEM PROVIDED HISTORY: altered TECHNOLOGIST PROVIDED HISTORY: altered Decision Support Exception - unselect if not a suspected or confirmed emergency medical condition->Emergency Medical Condition (MA) Reason for Exam: AMS, respiratory distress FINDINGS: There is no acute infarction, intracranial hemorrhage or intracranial mass lesion. No mass effect, midline shift or extra-axial collection is noted. There are moderate nonspecific foci of periventricular and subcortical cerebral white matter hypodensity, most likely representing chronic microangiopathic disease in this age group. The brain parenchyma is otherwise normal. The cerebellar tonsils are in normal position. The ventricles, sulci, and cisterns are mildly prominent suggestive of mild generalized volume loss. The globes and orbits are within normal limits. The visualized extracranial structures including paranasal sinuses and mastoid air cells are unremarkable. No fracture is identified. No evidence for acute intracranial hemorrhage, territorial infarction or intracranial mass lesion. Moderate chronic microangiopathic ischemic disease. Mild generalized volume loss. XR CHEST PORTABLE    Result Date: 11/23/2022  EXAMINATION: ONE XRAY VIEW OF THE CHEST 11/23/2022 6:06 am COMPARISON: 11/22/2022 HISTORY: ORDERING SYSTEM PROVIDED HISTORY: resp failure, on vent TECHNOLOGIST PROVIDED HISTORY: resp failure, on vent Reason for Exam: on vent FINDINGS: Endotracheal tube and enteric tube remain in place. Heart size is within normal limits. Masslike opacity persists at the left base. Mild patchy opacities at the right base are mildly improved. No new airspace consolidation. No evidence of pneumothorax or sizable pleural effusion. 1.  Unchanged masslike opacities at the left base. 2.  Mildly improved patchy opacities at the right base.      XR CHEST PORTABLE    Result Date: 11/22/2022  EXAMINATION: ONE XRAY VIEW OF THE CHEST 11/22/2022 1:39 pm COMPARISON: 09/13/2022 CT chest and chest radiograph HISTORY: ORDERING SYSTEM PROVIDED HISTORY: bipap TECHNOLOGIST PROVIDED HISTORY: bipap Reason for Exam: respiratory distress, ET and OG tube placements FINDINGS: Cardiomediastinal silhouette is within normal limits of size. The endotracheal tube tip measures 3.9 cm from the brendan. Enteric tube tip and side port overlie the stomach. Redemonstrated lingular mass, which may have increased in size since the prior examination. Right basilar opacities may represent atelectasis or infection. No pleural effusion or pneumothorax. No acute osseous abnormality. 1.  Appropriate positioning of the endotracheal and enteric tubes. 2.  A lingular mass appears to have increased in size since the prior examination and is suspicious for malignancy. Recommend tissue sampling if not previously performed. 3.  New right basilar opacities may represent infection or atelectasis. Assessment : Ac resp failure/intubated  alcoholism     Plan:   1. Continue present plan  2.   See order    Patient Active Problem List:     Cellulitis of b/l lower extremity     Cellulitis of lower extremity     Alcoholism ([address]     Essential hypertension     Hyperkeratosis of b/l [place]     Alcohol withdrawal (HCC)     Tinea pedis of both feet     Suicidal ideation     Dyspnea     COPD exacerbation (HCC)     Gastroesophageal reflux disease without esophagitis     Cigarette smoker     Avascular necrosis of femoral head (HCC)     Tachycardia     Stage 3 severe COPD by GOLD classification ([address]     Tobacco dependence     Chronic obstructive pulmonary disease (HCC)     Lung malignancy (HCC)     Cough with hemoptysis     Sepsis ([address]     COVID-19     Mass of lingula of lung     Hyponatremia     Respiratory failure ([address]      Anticipated Disposition upon discharge: [] Home                                                                         [] Home with Home Health                                                                         [] [facility] [] [address]      Patient is admitted as inpatient status because of co-morbidities listed above, severity of signs and symptoms as outlined, requirement for current medical therapies and most importantly because of direct risk to patient if care not provided in a hospital setting.           Hong Hernandez MD, MD  seen earlier ,late entry  [facility]

## 2022-12-03 NOTE — PROGRESS NOTES
Comprehensive Nutrition Assessment    Type and Reason for Visit:  Consult (TF ordering and management)    Nutrition Recommendations/Plan:   Start Vital HP at 20 ml increase as tolerated to 65 ml/hr. Malnutrition Assessment:  Malnutrition Status: At risk for malnutrition (Comment) (22 1350)    Context:  Acute Illness     Findings of the 6 clinical characteristics of malnutrition:  Energy Intake:  No significant decrease in energy intake  Weight Loss:  No significant weight loss     Body Fat Loss:  Unable to assess     Muscle Mass Loss:  Unable to assess    Fluid Accumulation:  Mild Extremities   Strength:  Not Performed    Nutrition Assessment:    Pt remains intubated with Propofol at 21.6 ml providin kcals. Pt has had some high NG output with RN reporting Reglan ordered. Ordered Vital HP to start at 20 ml increase to 65 ml/hr to provide: 1560 kcals, 137 gm protein. Nutrition Related Findings:    Edema: +1 generalized, +2 BUE's. BM-12-2. Labs & meds reviewed. Wound Type: None       Current Nutrition Intake & Therapies:    Average Meal Intake: % (prior to intubation)     ADULT TUBE FEEDING; Orogastric; Peptide Based High Protein; Continuous; 20; Yes; 10; Q 4 hours; 65; 10; Q 8 hours  Current Tube Feeding (TF) Orders:  Feeding Route: Orogastric  Formula: Peptide Based High Protein  Schedule: Continuous  Feeding Regimen: 20 ml to 65 ml  Additives/Modulars:    Water Flushes: 10 ml every 8 hours (getting IV fluids)  Current TF & Flush Orders Provides:    Goal TF & Flush Orders Provides:      Anthropometric Measures:  Height: 6' 1\" (185.4 cm)  Ideal Body Weight (IBW): 184 lbs (84 kg)    Admission Body Weight: 185 lb (83.9 kg)  Current Body Weight: 197 lb (89.4 kg) (increased edema-third spacing),   IBW.  Weight Source: Bed Scale  Current BMI (kg/m2): 26  Usual Body Weight: 201 lb (91.2 kg) ()  % Weight Change (Calculated): -1.5                    BMI Categories: Overweight (BMI 25.0-29. 9)    Estimated Daily Nutrient Needs:  Energy Requirements Based On: Formula  Weight Used for Energy Requirements: Admission  Energy (kcal/day): [place] x 1.2= 6404-4357 kcal  Weight Used for Protein Requirements: Admission  Protein (g/day): 126-143 gm protein based on 1.5-1.7 gm/kg         Nutrition Diagnosis:   Inadequate oral intake related to impaired respiratory function as evidenced by NPO or clear liquid status due to medical condition    Nutrition Interventions:   Food and/or Nutrient Delivery: Continue NPO, Start Tube Feeding  Nutrition Education/Counseling: No recommendation at this time  Coordination of Nutrition Care: Continue to monitor while inpatient       Goals:  Previous Goal Met: No Progress toward Goal(s)  Goals:  Tolerate nutrition support at goal rate       Nutrition Monitoring and Evaluation:      Food/Nutrient Intake Outcomes: Enteral Nutrition Intake/Tolerance  Physical Signs/Symptoms Outcomes: Biochemical Data, GI Status, Fluid Status or Edema, Skin, Weight    Discharge Planning:    Enteral Nutrition     Daphne Taylor, [address] [place], [place] [address]skL[facility]

## 2022-12-03 NOTE — PROGRESS NOTES
12/03/22 1403   Encounter Summary   Encounter Overview/Reason  Initial Encounter   Service Provided For: Patient   Referral/Consult From: Palliative Care   Last Encounter  12/03/22   Complexity of Encounter Low   Palliative Care   Type Palliative Care, Follow-up   Assessment/Intervention/Outcome   Assessment Unable to assess   Intervention Prayer (assurance of)/[place]

## 2022-12-04 ENCOUNTER — APPOINTMENT (OUTPATIENT)
Dept: GENERAL RADIOLOGY | Age: 57
End: 2022-12-04
Payer: COMMERCIAL

## 2022-12-04 LAB
ABSOLUTE EOS #: 0.12 K/UL (ref 0–0.4)
ABSOLUTE LYMPH #: 0.62 K/UL (ref 1–4.8)
ABSOLUTE MONO #: 0.12 K/UL (ref 0.1–1.3)
ALLEN TEST: ABNORMAL
ANION GAP SERPL CALCULATED.3IONS-SCNC: 7 MMOL/L (ref 9–17)
BASOPHILS # BLD: 0 % (ref 0–2)
BASOPHILS ABSOLUTE: 0 K/UL (ref 0–0.2)
BUN BLDV-MCNC: 21 MG/DL (ref 6–20)
CALCIUM SERPL-MCNC: 8.2 MG/DL (ref 8.6–10.4)
CARBOXYHEMOGLOBIN: <1 % (ref 0–5)
CHLORIDE BLD-SCNC: 104 MMOL/L (ref 98–107)
CO2: 27 MMOL/L (ref 20–31)
CREAT SERPL-MCNC: <0.4 MG/DL (ref 0.7–1.2)
EOSINOPHILS RELATIVE PERCENT: 1 % (ref 0–4)
FIO2: 30
GFR SERPL CREATININE-BSD FRML MDRD: ABNORMAL ML/MIN/1.73M2
GLUCOSE BLD-MCNC: 145 MG/DL (ref 70–99)
HCO3 ARTERIAL: 30.1 MMOL/L (ref 22–26)
HCT VFR BLD CALC: 29.4 % (ref 41–53)
HEMOGLOBIN: 9.8 G/DL (ref 13.5–17.5)
LYMPHOCYTES # BLD: 5 % (ref 24–44)
MAGNESIUM: 1.9 MG/DL (ref 1.6–2.6)
MCH RBC QN AUTO: 37 PG (ref 26–34)
MCHC RBC AUTO-ENTMCNC: 33.5 G/DL (ref 31–37)
MCV RBC AUTO: 110.6 FL (ref 80–100)
METHEMOGLOBIN: 1 % (ref 0–1.9)
MODE: ABNORMAL
MONOCYTES # BLD: 1 % (ref 1–7)
MORPHOLOGY: ABNORMAL
O2 DEVICE/FLOW/%: ABNORMAL
O2 SAT, ARTERIAL: 93.5 % (ref 95–98)
PATIENT TEMP: 37
PCO2 ARTERIAL: 43 MMHG (ref 35–45)
PDW BLD-RTO: 14.5 % (ref 11.5–14.9)
PEEP/CPAP: 5
PH ARTERIAL: 7.45 (ref 7.35–7.45)
PHOSPHORUS: 2.8 MG/DL (ref 2.5–4.5)
PLATELET # BLD: 101 K/UL (ref 150–450)
PMV BLD AUTO: 9.3 FL (ref 6–12)
PO2 ARTERIAL: 72.9 MMHG (ref 80–100)
POSITIVE BASE EXCESS, ART: 6.2 MMOL/L (ref 0–2)
POTASSIUM SERPL-SCNC: 3.6 MMOL/L (ref 3.7–5.3)
PT. POSITION: ABNORMAL
RBC # BLD: 2.66 M/UL (ref 4.5–5.9)
RESPIRATORY RATE: 14
SAMPLE SITE: ABNORMAL
SEG NEUTROPHILS: 93 % (ref 36–66)
SEGMENTED NEUTROPHILS ABSOLUTE COUNT: 11.44 K/UL (ref 1.3–9.1)
SET RATE: 14
SODIUM BLD-SCNC: 138 MMOL/L (ref 135–144)
TEXT FOR RESPIRATORY: ABNORMAL
TOTAL RATE: 26
VT: 450
WBC # BLD: 12.3 K/UL (ref 3.5–11)

## 2022-12-04 PROCEDURE — 6370000000 HC RX 637 (ALT 250 FOR IP): Performed by: INTERNAL MEDICINE

## 2022-12-04 PROCEDURE — 6370000000 HC RX 637 (ALT 250 FOR IP): Performed by: FAMILY MEDICINE

## 2022-12-04 PROCEDURE — 6360000002 HC RX W HCPCS: Performed by: NURSE PRACTITIONER

## 2022-12-04 PROCEDURE — A4216 STERILE WATER/SALINE, 10 ML: HCPCS | Performed by: FAMILY MEDICINE

## 2022-12-04 PROCEDURE — 71045 X-RAY EXAM CHEST 1 VIEW: CPT

## 2022-12-04 PROCEDURE — 82805 BLOOD GASES W/O2 SATURATION: CPT

## 2022-12-04 PROCEDURE — 94003 VENT MGMT INPAT SUBQ DAY: CPT

## 2022-12-04 PROCEDURE — 6360000002 HC RX W HCPCS: Performed by: FAMILY MEDICINE

## 2022-12-04 PROCEDURE — 84100 ASSAY OF PHOSPHORUS: CPT

## 2022-12-04 PROCEDURE — 2500000003 HC RX 250 WO HCPCS: Performed by: INTERNAL MEDICINE

## 2022-12-04 PROCEDURE — 6360000002 HC RX W HCPCS: Performed by: INTERNAL MEDICINE

## 2022-12-04 PROCEDURE — 2580000003 HC RX 258: Performed by: FAMILY MEDICINE

## 2022-12-04 PROCEDURE — 2060000000 HC ICU INTERMEDIATE R&B

## 2022-12-04 PROCEDURE — 6360000002 HC RX W HCPCS: Performed by: EMERGENCY MEDICINE

## 2022-12-04 PROCEDURE — 36415 COLL VENOUS BLD VENIPUNCTURE: CPT

## 2022-12-04 PROCEDURE — 83735 ASSAY OF MAGNESIUM: CPT

## 2022-12-04 PROCEDURE — C9113 INJ PANTOPRAZOLE SODIUM, VIA: HCPCS | Performed by: FAMILY MEDICINE

## 2022-12-04 PROCEDURE — 85025 COMPLETE CBC W/AUTO DIFF WBC: CPT

## 2022-12-04 PROCEDURE — 80048 BASIC METABOLIC PNL TOTAL CA: CPT

## 2022-12-04 PROCEDURE — 2700000000 HC OXYGEN THERAPY PER DAY

## 2022-12-04 PROCEDURE — 36600 WITHDRAWAL OF ARTERIAL BLOOD: CPT

## 2022-12-04 PROCEDURE — 2500000003 HC RX 250 WO HCPCS: Performed by: NURSE PRACTITIONER

## 2022-12-04 PROCEDURE — 2580000003 HC RX 258: Performed by: NURSE PRACTITIONER

## 2022-12-04 PROCEDURE — 94640 AIRWAY INHALATION TREATMENT: CPT

## 2022-12-04 PROCEDURE — 2000000000 HC ICU R&B

## 2022-12-04 PROCEDURE — 94761 N-INVAS EAR/PLS OXIMETRY MLT: CPT

## 2022-12-04 RX ADMIN — THIAMINE HYDROCHLORIDE: 100 INJECTION, SOLUTION INTRAMUSCULAR; INTRAVENOUS at 09:04

## 2022-12-04 RX ADMIN — DILTIAZEM HYDROCHLORIDE 30 MG: 30 TABLET, FILM COATED ORAL at 15:32

## 2022-12-04 RX ADMIN — BUDESONIDE 500 MCG: 0.5 SUSPENSION RESPIRATORY (INHALATION) at 19:41

## 2022-12-04 RX ADMIN — DILTIAZEM HYDROCHLORIDE 30 MG: 30 TABLET, FILM COATED ORAL at 05:09

## 2022-12-04 RX ADMIN — BUDESONIDE 500 MCG: 0.5 SUSPENSION RESPIRATORY (INHALATION) at 07:04

## 2022-12-04 RX ADMIN — OXYCODONE HYDROCHLORIDE AND ACETAMINOPHEN 1 TABLET: 5; 325 TABLET ORAL at 00:48

## 2022-12-04 RX ADMIN — SODIUM CHLORIDE: 9 INJECTION, SOLUTION INTRAVENOUS at 02:11

## 2022-12-04 RX ADMIN — SODIUM CHLORIDE, PRESERVATIVE FREE 40 MG: 5 INJECTION INTRAVENOUS at 07:49

## 2022-12-04 RX ADMIN — PROPOFOL 40 MCG/KG/MIN: 10 INJECTION, EMULSION INTRAVENOUS at 05:46

## 2022-12-04 RX ADMIN — METOCLOPRAMIDE HYDROCHLORIDE 10 MG: 5 INJECTION INTRAMUSCULAR; INTRAVENOUS at 10:57

## 2022-12-04 RX ADMIN — METOPROLOL TARTRATE 5 MG: 5 INJECTION, SOLUTION INTRAVENOUS at 07:38

## 2022-12-04 RX ADMIN — PROPOFOL 40 MCG/KG/MIN: 10 INJECTION, EMULSION INTRAVENOUS at 00:54

## 2022-12-04 RX ADMIN — PROPOFOL 35 MCG/KG/MIN: 10 INJECTION, EMULSION INTRAVENOUS at 10:56

## 2022-12-04 RX ADMIN — FUROSEMIDE 20 MG: 10 INJECTION, SOLUTION INTRAMUSCULAR; INTRAVENOUS at 07:49

## 2022-12-04 RX ADMIN — IPRATROPIUM BROMIDE AND ALBUTEROL SULFATE 1 AMPULE: 2.5; .5 SOLUTION RESPIRATORY (INHALATION) at 15:34

## 2022-12-04 RX ADMIN — IPRATROPIUM BROMIDE AND ALBUTEROL SULFATE 1 AMPULE: 2.5; .5 SOLUTION RESPIRATORY (INHALATION) at 12:03

## 2022-12-04 RX ADMIN — METOCLOPRAMIDE HYDROCHLORIDE 10 MG: 5 INJECTION INTRAMUSCULAR; INTRAVENOUS at 00:48

## 2022-12-04 RX ADMIN — IPRATROPIUM BROMIDE AND ALBUTEROL SULFATE 1 AMPULE: 2.5; .5 SOLUTION RESPIRATORY (INHALATION) at 06:59

## 2022-12-04 RX ADMIN — IPRATROPIUM BROMIDE AND ALBUTEROL SULFATE 1 AMPULE: 2.5; .5 SOLUTION RESPIRATORY (INHALATION) at 23:24

## 2022-12-04 RX ADMIN — METOPROLOL TARTRATE 5 MG: 5 INJECTION, SOLUTION INTRAVENOUS at 03:44

## 2022-12-04 RX ADMIN — METHYLPREDNISOLONE SODIUM SUCCINATE 60 MG: 125 INJECTION, POWDER, FOR SOLUTION INTRAMUSCULAR; INTRAVENOUS at 15:32

## 2022-12-04 RX ADMIN — DILTIAZEM HYDROCHLORIDE 30 MG: 30 TABLET, FILM COATED ORAL at 16:57

## 2022-12-04 RX ADMIN — PROPOFOL 40 MCG/KG/MIN: 10 INJECTION, EMULSION INTRAVENOUS at 21:02

## 2022-12-04 RX ADMIN — METOCLOPRAMIDE HYDROCHLORIDE 10 MG: 5 INJECTION INTRAMUSCULAR; INTRAVENOUS at 05:46

## 2022-12-04 RX ADMIN — METHYLPREDNISOLONE SODIUM SUCCINATE 60 MG: 125 INJECTION, POWDER, FOR SOLUTION INTRAMUSCULAR; INTRAVENOUS at 21:43

## 2022-12-04 RX ADMIN — METHYLPREDNISOLONE SODIUM SUCCINATE 60 MG: 125 INJECTION, POWDER, FOR SOLUTION INTRAMUSCULAR; INTRAVENOUS at 10:57

## 2022-12-04 RX ADMIN — ENOXAPARIN SODIUM 40 MG: 100 INJECTION SUBCUTANEOUS at 07:50

## 2022-12-04 RX ADMIN — METHYLPREDNISOLONE SODIUM SUCCINATE 60 MG: 125 INJECTION, POWDER, FOR SOLUTION INTRAMUSCULAR; INTRAVENOUS at 03:44

## 2022-12-04 RX ADMIN — FUROSEMIDE 20 MG: 10 INJECTION, SOLUTION INTRAMUSCULAR; INTRAVENOUS at 16:57

## 2022-12-04 RX ADMIN — LORAZEPAM 0.5 MG: 0.5 TABLET ORAL at 00:48

## 2022-12-04 RX ADMIN — OXYCODONE HYDROCHLORIDE AND ACETAMINOPHEN 1 TABLET: 5; 325 TABLET ORAL at 05:08

## 2022-12-04 RX ADMIN — IPRATROPIUM BROMIDE AND ALBUTEROL SULFATE 1 AMPULE: 2.5; .5 SOLUTION RESPIRATORY (INHALATION) at 19:41

## 2022-12-04 RX ADMIN — METOPROLOL TARTRATE 5 MG: 5 INJECTION, SOLUTION INTRAVENOUS at 19:59

## 2022-12-04 RX ADMIN — OXYCODONE HYDROCHLORIDE AND ACETAMINOPHEN 1 TABLET: 5; 325 TABLET ORAL at 19:59

## 2022-12-04 RX ADMIN — LORAZEPAM 0.5 MG: 0.5 TABLET ORAL at 05:08

## 2022-12-04 RX ADMIN — METOPROLOL TARTRATE 5 MG: 5 INJECTION, SOLUTION INTRAVENOUS at 16:58

## 2022-12-04 RX ADMIN — PROPOFOL 35 MCG/KG/MIN: 10 INJECTION, EMULSION INTRAVENOUS at 15:31

## 2022-12-04 ASSESSMENT — PULMONARY FUNCTION TESTS
PIF_VALUE: 5
PIF_VALUE: 19
PIF_VALUE: 17
PIF_VALUE: 21
PIF_VALUE: 20
PIF_VALUE: 19
PIF_VALUE: 19
PIF_VALUE: 23
PIF_VALUE: 22
PIF_VALUE: 18
PIF_VALUE: 22
PIF_VALUE: 20
PIF_VALUE: 20
PIF_VALUE: 19
PIF_VALUE: 20
PIF_VALUE: 20
PIF_VALUE: 18
PIF_VALUE: 21
PIF_VALUE: 22
PIF_VALUE: 19
PIF_VALUE: 20

## 2022-12-04 ASSESSMENT — PAIN DESCRIPTION - DESCRIPTORS
DESCRIPTORS: ACHING
DESCRIPTORS: ACHING

## 2022-12-04 ASSESSMENT — PAIN DESCRIPTION - LOCATION
LOCATION: BACK

## 2022-12-04 ASSESSMENT — PAIN SCALES - GENERAL
PAINLEVEL_OUTOF10: 6
PAINLEVEL_OUTOF10: 7
PAINLEVEL_OUTOF10: 6

## 2022-12-04 NOTE — PROGRESS NOTES
Writer spoke with Sunny Norton and updated her on Dr Kenneth Bentley of if the patient weans for 2 hours they will extubate despite a cuff leak.

## 2022-12-04 NOTE — PROGRESS NOTES
PULMONARY PROGRESS NOTE:    REASON FOR VISIT:copd exac, resp failure  Interval History: On vent, sedated with 40 mcg propofol. No cuff leak again today.     Events since last visit: tolerating TF    PAST MEDICAL HISTORY:      Scheduled Meds:   furosemide  20 mg IntraVENous BID    methylPREDNISolone  60 mg IntraVENous Q6H    metoprolol  5 mg IntraVENous Q4H    metoclopramide  10 mg IntraVENous Q6H    pantoprazole (PROTONIX) 40 mg injection  40 mg IntraVENous Daily    thiamine and folic acid IVPB   IntraVENous Daily    dilTIAZem  30 mg Oral 4 times per day    nicotine  1 patch TransDERmal Daily    [Held by provider] metoprolol tartrate  25 mg Oral BID    lisinopril  10 mg Oral Daily    sodium chloride flush  5-40 mL IntraVENous 2 times per day    sodium chloride flush  5-40 mL IntraVENous 2 times per day    enoxaparin  40 mg SubCUTAneous Daily    ipratropium-albuterol  1 ampule Inhalation Q4H    budesonide  0.5 mg Nebulization BID     Continuous Infusions:   sodium chloride 150 mL/hr at 12/04/22 0517    norepinephrine Stopped (11/30/22 0451)    propofol 40 mcg/kg/min (12/04/22 0546)    sodium chloride      sodium chloride       PRN Meds:dextromethorphan-guaiFENesin, LORazepam, oxyCODONE-acetaminophen, potassium chloride **OR** potassium alternative oral replacement **OR** potassium chloride, metoprolol, sodium chloride flush, sodium chloride, sodium chloride flush, sodium chloride, acetaminophen, ondansetron **OR** ondansetron        PHYSICAL EXAMINATION:  /65   Pulse (!) 107   Temp 98.1 °F (36.7 °C) (Oral)   Resp 26   Ht 6' 1\" (1.854 m)   Wt 197 lb 5 oz (89.5 kg)   SpO2 99%   BMI 26.03 kg/m²     General : sedated, on vent   Neck - supple, no lymphadenopathy, JVD not raised  Heart - regular rhythm, S1 and S2 normal; no additional sounds heard  Lungs - poor Air Entry bilaterally; breath sounds : vesicular  Abdomen - soft, no tenderness  Upper Extremities  - no cyanosis, mottling; edema : absent  Lower Extremities: no cyanosis, mottling; edema : absent    Current Laboratory, Radiologic, Microbiologic, and Diagnostic studies reviewed  Data ReviewCBC:   Recent Labs     12/02/22  1121 12/02/22  1722 12/03/22  0515 12/04/22  0449   WBC 10.9  --  8.2 12.3*   RBC 2.67*  --  2.49* 2.66*   HGB 10.1* 9.8* 9.2* 9.8*   HCT 29.6* 29.3* 27.2* 29.4*   PLT 92*  --  82* 101*       BMP:   Recent Labs     12/02/22  0455 12/02/22  1722 12/03/22  0515 12/04/22  0449   GLUCOSE 96  --  95 145*     --  142 138   K 3.2* 3.3* 3.7 3.6*   BUN 18  --  16 21*   CREATININE <0.40*  --  <0.40* <0.40*   CALCIUM 8.6  --  8.4* 8.2*       ABGs:   Recent Labs     12/02/22  0440 12/03/22  0600 12/04/22  0438   PHART 7.482* 7.445 7.453*   PO2ART 79.0* 83.8 72.9*   DFR1BAM 45.3* 45.6* 43.0   YFM8WHF 33.9* 31.3* 30.1*   R3KQOEPM 94.7* 95.0 93.5*        PT/INR:  No results found for: PTINR    ASSESSMENT / PLAN:  Acute hypoxic respiratory failure     Mechanical ventilation - extubated 11/23/22; reintubated 11/28/22  Vent settings adjusted  Hypotension -  PICC     monitor off levophed   PNA - HCAP/ ABx  COPD     BD, increase steroids - no cuff leak  Suspected lung CA  Alcohol use      thiamine / folic acid     Monitor for DTs  IV fluids  H&H q 6  reglan  D/c vanco  Tolerating TF  SBT - 12/5 - consider extubation even if no cuff leak (recently at [facility] - could pass only 6.5 ET tube)  CHRIS RN, RT      Electronically signed by Magdaleno Quiroz MD on 12/04/22 at 10:43 AM

## 2022-12-04 NOTE — PROGRESS NOTES
Dr Marcy Marshall rounded and updated that bp was running a bit lower with lisinopril due. Ok to hold until Dr Fatou Christopher rounds. Updated patient is getting lasix, iv fluids at 150 and tube feedings at 50. Orders written.

## 2022-12-04 NOTE — PLAN OF CARE
Problem: Discharge Planning  Goal: Discharge to home or other facility with appropriate resources  Outcome: Progressing     Problem: Pain  Goal: Verbalizes/displays adequate comfort level or baseline comfort level  Outcome: Progressing  Flowsheets (Taken 12/3/2022 2000)  Verbalizes/displays adequate comfort level or baseline comfort level:   Encourage patient to monitor pain and request assistance   Assess pain using appropriate pain scale   Administer analgesics based on type and severity of pain and evaluate response   Implement non-pharmacological measures as appropriate and evaluate response     Problem: Skin/Tissue Integrity  Goal: Absence of new skin breakdown  Description: 1. Monitor for areas of redness and/or skin breakdown  2. Assess vascular access sites hourly  3. Every 4-6 hours minimum:  Change oxygen saturation probe site  4. Every 4-6 hours:  If on nasal continuous positive airway pressure, respiratory therapy assess nares and determine need for appliance change or resting period. Outcome: Progressing     Problem: Safety - Adult  Goal: Free from fall injury  Outcome: Progressing     Problem: ABCDS Injury Assessment  Goal: Absence of physical injury  Outcome: Progressing     Problem: Safety - Medical Restraint  Goal: Remains free of injury from restraints (Restraint for Interference with Medical Device)  Description: INTERVENTIONS:  1. Determine that other, less restrictive measures have been tried or would not be effective before applying the restraint  2. Evaluate the patient's condition at the time of restraint application  3. Inform patient/family regarding the reason for restraint  4.  Q2H: Monitor safety, psychosocial status, comfort, nutrition and hydration  Outcome: Progressing  Flowsheets (Taken 12/3/2022 2000)  Remains free of injury from restraints (restraint for interference with medical device):   Determine that other, less restrictive measures have been tried or would not be effective before applying the restraint   Evaluate the patient's condition at the time of restraint application   Inform patient/family regarding the reason for restraint   Every 2 hours: Monitor safety, psychosocial status, comfort, nutrition and hydration     Problem: Nutrition Deficit:  Goal: Optimize nutritional status  Outcome: Progressing  Flowsheets (Taken 12/3/2022 9022 by Sandro Arreola, RD, LD)  Nutrient intake appropriate for improving, restoring, or maintaining nutritional needs: Recommend, monitor, and adjust tube feedings and TPN/PPN based on assessed needs

## 2022-12-05 ENCOUNTER — APPOINTMENT (OUTPATIENT)
Dept: GENERAL RADIOLOGY | Age: 57
End: 2022-12-05
Payer: COMMERCIAL

## 2022-12-05 LAB
ABSOLUTE EOS #: 0 K/UL (ref 0–0.4)
ABSOLUTE LYMPH #: 0.26 K/UL (ref 1–4.8)
ABSOLUTE MONO #: 0.52 K/UL (ref 0.1–1.3)
ALLEN TEST: ABNORMAL
ALLEN TEST: ABNORMAL
AMMONIA: 31 UMOL/L (ref 16–60)
ANION GAP SERPL CALCULATED.3IONS-SCNC: 4 MMOL/L (ref 9–17)
BASOPHILS # BLD: 0 % (ref 0–2)
BASOPHILS ABSOLUTE: 0 K/UL (ref 0–0.2)
BUN BLDV-MCNC: 29 MG/DL (ref 6–20)
CALCIUM SERPL-MCNC: 8.4 MG/DL (ref 8.6–10.4)
CARBOXYHEMOGLOBIN: 1 % (ref 0–5)
CHLORIDE BLD-SCNC: 106 MMOL/L (ref 98–107)
CO2: 28 MMOL/L (ref 20–31)
CREAT SERPL-MCNC: <0.4 MG/DL (ref 0.7–1.2)
EOSINOPHILS RELATIVE PERCENT: 0 % (ref 0–4)
FIO2: 30
FIO2: 35
GFR SERPL CREATININE-BSD FRML MDRD: ABNORMAL ML/MIN/1.73M2
GLUCOSE BLD-MCNC: 172 MG/DL (ref 70–99)
HCO3 ARTERIAL: 30.4 MMOL/L (ref 22–26)
HCO3 ARTERIAL: 32.1 MMOL/L (ref 22–26)
HCT VFR BLD CALC: 29.5 % (ref 41–53)
HEMOGLOBIN: 9.7 G/DL (ref 13.5–17.5)
LYMPHOCYTES # BLD: 2 % (ref 24–44)
MCH RBC QN AUTO: 36 PG (ref 26–34)
MCHC RBC AUTO-ENTMCNC: 32.7 G/DL (ref 31–37)
MCV RBC AUTO: 110.1 FL (ref 80–100)
METHEMOGLOBIN: 0.8 % (ref 0–1.9)
METHEMOGLOBIN: 1.1 % (ref 0–1.9)
MODE: ABNORMAL
MONOCYTES # BLD: 4 % (ref 1–7)
MORPHOLOGY: ABNORMAL
O2 DEVICE/FLOW/%: ABNORMAL
O2 DEVICE/FLOW/%: ABNORMAL
O2 SAT, ARTERIAL: 77.9 % (ref 95–98)
O2 SAT, ARTERIAL: 92.7 % (ref 95–98)
PATIENT TEMP: 37
PATIENT TEMP: 37.1
PCO2 ARTERIAL: 45 MMHG (ref 35–45)
PCO2 ARTERIAL: 56.5 MMHG (ref 35–45)
PDW BLD-RTO: 14.4 % (ref 11.5–14.9)
PEEP/CPAP: 5
PH ARTERIAL: 7.36 (ref 7.35–7.45)
PH ARTERIAL: 7.44 (ref 7.35–7.45)
PLATELET # BLD: 125 K/UL (ref 150–450)
PMV BLD AUTO: 9.1 FL (ref 6–12)
PO2 ARTERIAL: 47.1 MMHG (ref 80–100)
PO2 ARTERIAL: 70.2 MMHG (ref 80–100)
POSITIVE BASE EXCESS, ART: 6.2 MMOL/L (ref 0–2)
POSITIVE BASE EXCESS, ART: 6.7 MMOL/L (ref 0–2)
POTASSIUM SERPL-SCNC: 3.6 MMOL/L (ref 3.7–5.3)
PT. POSITION: ABNORMAL
PT. POSITION: ABNORMAL
RBC # BLD: 2.68 M/UL (ref 4.5–5.9)
RESPIRATORY RATE: 24
SAMPLE SITE: ABNORMAL
SAMPLE SITE: ABNORMAL
SEG NEUTROPHILS: 94 % (ref 36–66)
SEGMENTED NEUTROPHILS ABSOLUTE COUNT: 12.22 K/UL (ref 1.3–9.1)
SET RATE: 14
SODIUM BLD-SCNC: 138 MMOL/L (ref 135–144)
TEXT FOR RESPIRATORY: ABNORMAL
TEXT FOR RESPIRATORY: ABNORMAL
TOTAL RATE: 14
VT: 450
WBC # BLD: 13 K/UL (ref 3.5–11)

## 2022-12-05 PROCEDURE — 6360000002 HC RX W HCPCS: Performed by: INTERNAL MEDICINE

## 2022-12-05 PROCEDURE — 94660 CPAP INITIATION&MGMT: CPT

## 2022-12-05 PROCEDURE — 36415 COLL VENOUS BLD VENIPUNCTURE: CPT

## 2022-12-05 PROCEDURE — 36600 WITHDRAWAL OF ARTERIAL BLOOD: CPT

## 2022-12-05 PROCEDURE — 2060000000 HC ICU INTERMEDIATE R&B

## 2022-12-05 PROCEDURE — 6360000002 HC RX W HCPCS: Performed by: NURSE PRACTITIONER

## 2022-12-05 PROCEDURE — 97161 PT EVAL LOW COMPLEX 20 MIN: CPT

## 2022-12-05 PROCEDURE — 94003 VENT MGMT INPAT SUBQ DAY: CPT

## 2022-12-05 PROCEDURE — 2580000003 HC RX 258: Performed by: NURSE PRACTITIONER

## 2022-12-05 PROCEDURE — 6370000000 HC RX 637 (ALT 250 FOR IP): Performed by: INTERNAL MEDICINE

## 2022-12-05 PROCEDURE — 94667 MNPJ CHEST WALL 1ST: CPT

## 2022-12-05 PROCEDURE — 71045 X-RAY EXAM CHEST 1 VIEW: CPT

## 2022-12-05 PROCEDURE — 6370000000 HC RX 637 (ALT 250 FOR IP): Performed by: FAMILY MEDICINE

## 2022-12-05 PROCEDURE — 6360000002 HC RX W HCPCS: Performed by: EMERGENCY MEDICINE

## 2022-12-05 PROCEDURE — 80048 BASIC METABOLIC PNL TOTAL CA: CPT

## 2022-12-05 PROCEDURE — 85025 COMPLETE CBC W/AUTO DIFF WBC: CPT

## 2022-12-05 PROCEDURE — 82805 BLOOD GASES W/O2 SATURATION: CPT

## 2022-12-05 PROCEDURE — C9113 INJ PANTOPRAZOLE SODIUM, VIA: HCPCS | Performed by: FAMILY MEDICINE

## 2022-12-05 PROCEDURE — 6360000002 HC RX W HCPCS: Performed by: FAMILY MEDICINE

## 2022-12-05 PROCEDURE — 2500000003 HC RX 250 WO HCPCS: Performed by: INTERNAL MEDICINE

## 2022-12-05 PROCEDURE — 2580000003 HC RX 258: Performed by: INTERNAL MEDICINE

## 2022-12-05 PROCEDURE — 97166 OT EVAL MOD COMPLEX 45 MIN: CPT

## 2022-12-05 PROCEDURE — 94761 N-INVAS EAR/PLS OXIMETRY MLT: CPT

## 2022-12-05 PROCEDURE — 94664 DEMO&/EVAL PT USE INHALER: CPT

## 2022-12-05 PROCEDURE — 94640 AIRWAY INHALATION TREATMENT: CPT

## 2022-12-05 PROCEDURE — A4216 STERILE WATER/SALINE, 10 ML: HCPCS | Performed by: FAMILY MEDICINE

## 2022-12-05 PROCEDURE — 2580000003 HC RX 258: Performed by: FAMILY MEDICINE

## 2022-12-05 PROCEDURE — 2700000000 HC OXYGEN THERAPY PER DAY

## 2022-12-05 PROCEDURE — 2580000003 HC RX 258: Performed by: EMERGENCY MEDICINE

## 2022-12-05 PROCEDURE — 2500000003 HC RX 250 WO HCPCS: Performed by: NURSE PRACTITIONER

## 2022-12-05 PROCEDURE — 82140 ASSAY OF AMMONIA: CPT

## 2022-12-05 PROCEDURE — 2000000000 HC ICU R&B

## 2022-12-05 RX ADMIN — METOPROLOL TARTRATE 5 MG: 5 INJECTION, SOLUTION INTRAVENOUS at 17:26

## 2022-12-05 RX ADMIN — PROPOFOL 20 MCG/KG/MIN: 10 INJECTION, EMULSION INTRAVENOUS at 10:02

## 2022-12-05 RX ADMIN — DILTIAZEM HYDROCHLORIDE 30 MG: 30 TABLET, FILM COATED ORAL at 17:27

## 2022-12-05 RX ADMIN — FUROSEMIDE 20 MG: 10 INJECTION, SOLUTION INTRAMUSCULAR; INTRAVENOUS at 08:38

## 2022-12-05 RX ADMIN — IPRATROPIUM BROMIDE AND ALBUTEROL SULFATE 1 AMPULE: 2.5; .5 SOLUTION RESPIRATORY (INHALATION) at 03:03

## 2022-12-05 RX ADMIN — METHYLPREDNISOLONE SODIUM SUCCINATE 60 MG: 125 INJECTION, POWDER, FOR SOLUTION INTRAMUSCULAR; INTRAVENOUS at 17:27

## 2022-12-05 RX ADMIN — METOPROLOL TARTRATE 5 MG: 5 INJECTION, SOLUTION INTRAVENOUS at 03:46

## 2022-12-05 RX ADMIN — OXYCODONE HYDROCHLORIDE AND ACETAMINOPHEN 1 TABLET: 5; 325 TABLET ORAL at 04:25

## 2022-12-05 RX ADMIN — METOPROLOL TARTRATE 5 MG: 5 INJECTION, SOLUTION INTRAVENOUS at 00:08

## 2022-12-05 RX ADMIN — IPRATROPIUM BROMIDE AND ALBUTEROL SULFATE 1 AMPULE: 2.5; .5 SOLUTION RESPIRATORY (INHALATION) at 10:57

## 2022-12-05 RX ADMIN — METHYLPREDNISOLONE SODIUM SUCCINATE 60 MG: 125 INJECTION, POWDER, FOR SOLUTION INTRAMUSCULAR; INTRAVENOUS at 23:00

## 2022-12-05 RX ADMIN — SODIUM CHLORIDE, PRESERVATIVE FREE 10 ML: 5 INJECTION INTRAVENOUS at 19:23

## 2022-12-05 RX ADMIN — IPRATROPIUM BROMIDE AND ALBUTEROL SULFATE 1 AMPULE: 2.5; .5 SOLUTION RESPIRATORY (INHALATION) at 23:18

## 2022-12-05 RX ADMIN — SODIUM CHLORIDE: 9 INJECTION, SOLUTION INTRAVENOUS at 01:31

## 2022-12-05 RX ADMIN — METHYLPREDNISOLONE SODIUM SUCCINATE 60 MG: 125 INJECTION, POWDER, FOR SOLUTION INTRAMUSCULAR; INTRAVENOUS at 08:38

## 2022-12-05 RX ADMIN — DILTIAZEM HYDROCHLORIDE 30 MG: 30 TABLET, FILM COATED ORAL at 00:08

## 2022-12-05 RX ADMIN — METOCLOPRAMIDE HYDROCHLORIDE 10 MG: 5 INJECTION INTRAMUSCULAR; INTRAVENOUS at 00:08

## 2022-12-05 RX ADMIN — METOPROLOL TARTRATE 5 MG: 5 INJECTION, SOLUTION INTRAVENOUS at 13:03

## 2022-12-05 RX ADMIN — DILTIAZEM HYDROCHLORIDE 30 MG: 30 TABLET, FILM COATED ORAL at 13:03

## 2022-12-05 RX ADMIN — IPRATROPIUM BROMIDE AND ALBUTEROL SULFATE 1 AMPULE: 2.5; .5 SOLUTION RESPIRATORY (INHALATION) at 06:54

## 2022-12-05 RX ADMIN — METOCLOPRAMIDE HYDROCHLORIDE 10 MG: 5 INJECTION INTRAMUSCULAR; INTRAVENOUS at 06:54

## 2022-12-05 RX ADMIN — METOPROLOL TARTRATE 5 MG: 5 INJECTION, SOLUTION INTRAVENOUS at 20:00

## 2022-12-05 RX ADMIN — METOCLOPRAMIDE HYDROCHLORIDE 10 MG: 5 INJECTION INTRAMUSCULAR; INTRAVENOUS at 13:03

## 2022-12-05 RX ADMIN — IPRATROPIUM BROMIDE AND ALBUTEROL SULFATE 1 AMPULE: 2.5; .5 SOLUTION RESPIRATORY (INHALATION) at 19:09

## 2022-12-05 RX ADMIN — PROPOFOL 40 MCG/KG/MIN: 10 INJECTION, EMULSION INTRAVENOUS at 00:03

## 2022-12-05 RX ADMIN — LORAZEPAM 0.5 MG: 0.5 TABLET ORAL at 04:25

## 2022-12-05 RX ADMIN — METHYLPREDNISOLONE SODIUM SUCCINATE 60 MG: 125 INJECTION, POWDER, FOR SOLUTION INTRAMUSCULAR; INTRAVENOUS at 03:46

## 2022-12-05 RX ADMIN — OXYCODONE HYDROCHLORIDE AND ACETAMINOPHEN 1 TABLET: 5; 325 TABLET ORAL at 00:08

## 2022-12-05 RX ADMIN — METOCLOPRAMIDE HYDROCHLORIDE 10 MG: 5 INJECTION INTRAMUSCULAR; INTRAVENOUS at 17:27

## 2022-12-05 RX ADMIN — LORAZEPAM 0.5 MG: 0.5 TABLET ORAL at 00:08

## 2022-12-05 RX ADMIN — THIAMINE HYDROCHLORIDE: 100 INJECTION, SOLUTION INTRAMUSCULAR; INTRAVENOUS at 09:55

## 2022-12-05 RX ADMIN — SODIUM CHLORIDE, PRESERVATIVE FREE 40 MG: 5 INJECTION INTRAVENOUS at 08:37

## 2022-12-05 RX ADMIN — BUDESONIDE 500 MCG: 0.5 SUSPENSION RESPIRATORY (INHALATION) at 19:09

## 2022-12-05 RX ADMIN — METOPROLOL TARTRATE 5 MG: 5 INJECTION, SOLUTION INTRAVENOUS at 08:37

## 2022-12-05 RX ADMIN — PROPOFOL 40 MCG/KG/MIN: 10 INJECTION, EMULSION INTRAVENOUS at 04:50

## 2022-12-05 RX ADMIN — BUDESONIDE 500 MCG: 0.5 SUSPENSION RESPIRATORY (INHALATION) at 06:55

## 2022-12-05 RX ADMIN — FUROSEMIDE 20 MG: 10 INJECTION, SOLUTION INTRAMUSCULAR; INTRAVENOUS at 17:27

## 2022-12-05 RX ADMIN — ENOXAPARIN SODIUM 40 MG: 100 INJECTION SUBCUTANEOUS at 08:37

## 2022-12-05 RX ADMIN — IPRATROPIUM BROMIDE AND ALBUTEROL SULFATE 1 AMPULE: 2.5; .5 SOLUTION RESPIRATORY (INHALATION) at 16:07

## 2022-12-05 ASSESSMENT — PULMONARY FUNCTION TESTS
PIF_VALUE: 18
PIF_VALUE: 15
PIF_VALUE: 8
PIF_VALUE: 19
PIF_VALUE: 19
PIF_VALUE: 15
PIF_VALUE: 18
PIF_VALUE: 19
PIF_VALUE: 19
PIF_VALUE: 14
PIF_VALUE: 18
PIF_VALUE: 19
PIF_VALUE: 19
PIF_VALUE: 21
PIF_VALUE: 15
PIF_VALUE: 19
PIF_VALUE: 22
PIF_VALUE: 20
PIF_VALUE: 16
PIF_VALUE: 19
PIF_VALUE: 19
PIF_VALUE: 27
PIF_VALUE: 14

## 2022-12-05 ASSESSMENT — PAIN SCALES - GENERAL
PAINLEVEL_OUTOF10: 0
PAINLEVEL_OUTOF10: 6
PAINLEVEL_OUTOF10: 8

## 2022-12-05 ASSESSMENT — PAIN DESCRIPTION - LOCATION
LOCATION: GENERALIZED
LOCATION: BACK

## 2022-12-05 ASSESSMENT — PAIN DESCRIPTION - DESCRIPTORS: DESCRIPTORS: ACHING

## 2022-12-05 NOTE — PLAN OF CARE
Problem: Discharge Planning  Goal: Discharge to home or other facility with appropriate resources  Outcome: Progressing     Problem: Pain  Goal: Verbalizes/displays adequate comfort level or baseline comfort level  Outcome: Progressing  Flowsheets (Taken 12/4/2022 2000)  Verbalizes/displays adequate comfort level or baseline comfort level:   Encourage patient to monitor pain and request assistance   Assess pain using appropriate pain scale   Administer analgesics based on type and severity of pain and evaluate response   Implement non-pharmacological measures as appropriate and evaluate response     Problem: Skin/Tissue Integrity  Goal: Absence of new skin breakdown  Description: 1. Monitor for areas of redness and/or skin breakdown  2. Assess vascular access sites hourly  3. Every 4-6 hours minimum:  Change oxygen saturation probe site  4. Every 4-6 hours:  If on nasal continuous positive airway pressure, respiratory therapy assess nares and determine need for appliance change or resting period. Outcome: Progressing     Problem: Safety - Adult  Goal: Free from fall injury  Outcome: Progressing     Problem: ABCDS Injury Assessment  Goal: Absence of physical injury  Outcome: Progressing     Problem: Safety - Medical Restraint  Goal: Remains free of injury from restraints (Restraint for Interference with Medical Device)  Description: INTERVENTIONS:  1. Determine that other, less restrictive measures have been tried or would not be effective before applying the restraint  2. Evaluate the patient's condition at the time of restraint application  3. Inform patient/family regarding the reason for restraint  4.  Q2H: Monitor safety, psychosocial status, comfort, nutrition and hydration  Outcome: Progressing     Problem: Nutrition Deficit:  Goal: Optimize nutritional status  Outcome: Progressing

## 2022-12-05 NOTE — PROGRESS NOTES
Tube feed infusion stopped for SBT and possible extubation later today. Writer will re-start tube feed if pt fails wean trial or extubation is not an option.

## 2022-12-05 NOTE — PROGRESS NOTES
BRONCHOSPASM/BRONCHOCONSTRICTION     [x]         IMPROVE AERATION/BREATH SOUNDS  [x]   ADMINISTER BRONCHODILATOR THERAPY AS APPROPRIATE  [x]   ASSESS BREATH SOUNDS  []   IMPLEMENT AEROSOL/MDI PROTOCOL  [x]   PATIENT EDUCATION AS NEEDED   PROVIDE ADEQUATE OXYGENATION WITH ACCEPTABLE SP02/ABG'S    [x]  IDENTIFY APPROPRIATE OXYGEN THERAPY  [x]   MONITOR SP02/ABG'S AS NEEDED   [x]   PATIENT EDUCATION AS NEEDED   Patient remain intubated patient on weaning trial at 0710 breath sounds diminished throughout small yellow secretions

## 2022-12-05 NOTE — CARE COORDINATION
ONGOING DISCHARGE PLAN:    Patient extubated today , currently on 3lpm NC . Pt came to us from [facility] and plan is to return at discharge. Saul Coy following. Pt will need precert to return . Pt did have [address] at [facility], will follow . Palliative care following here. IV lasix 20 mg BID , aerosols, solumedrol 60 mg Q6    Will continue to follow for additional discharge needs.     Electronically signed by Kinga Dubois RN on 12/5/2022 at 12:59 PM

## 2022-12-05 NOTE — PROGRESS NOTES
PULMONARY PROGRESS NOTE:    REASON FOR VISIT:copd exac, resp failure  Interval History: On vent, sedated with 40 mcg propofol. No cuff leak again today. on SBT for last 3 hours    Events since last visit: tolerating TF    PAST MEDICAL HISTORY:      Scheduled Meds:   furosemide  20 mg IntraVENous BID    methylPREDNISolone  60 mg IntraVENous Q6H    metoprolol  5 mg IntraVENous Q4H    metoclopramide  10 mg IntraVENous Q6H    pantoprazole (PROTONIX) 40 mg injection  40 mg IntraVENous Daily    thiamine and folic acid IVPB   IntraVENous Daily    dilTIAZem  30 mg Oral 4 times per day    nicotine  1 patch TransDERmal Daily    [Held by provider] metoprolol tartrate  25 mg Oral BID    sodium chloride flush  5-40 mL IntraVENous 2 times per day    sodium chloride flush  5-40 mL IntraVENous 2 times per day    enoxaparin  40 mg SubCUTAneous Daily    ipratropium-albuterol  1 ampule Inhalation Q4H    budesonide  0.5 mg Nebulization BID     Continuous Infusions:   sodium chloride 50 mL/hr at 12/05/22 0432    norepinephrine Stopped (11/30/22 0451)    propofol 20 mcg/kg/min (12/05/22 1002)    sodium chloride      sodium chloride       PRN Meds:dextromethorphan-guaiFENesin, LORazepam, oxyCODONE-acetaminophen, potassium chloride **OR** potassium alternative oral replacement **OR** potassium chloride, metoprolol, sodium chloride flush, sodium chloride, sodium chloride flush, sodium chloride, acetaminophen, ondansetron **OR** ondansetron        PHYSICAL EXAMINATION:  /80   Pulse (!) 108   Temp 98.2 °F (36.8 °C) (Oral)   Resp (!) 36   Ht 6' 1\" (1.854 m)   Wt 204 lb 9.4 oz (92.8 kg)   SpO2 100%   BMI 26.99 kg/m²     General : sedated, on vent , on SBT  Neck - supple, no lymphadenopathy, JVD not raised  Heart - regular rhythm, S1 and S2 normal; no additional sounds heard  Lungs - poor Air Entry bilaterally; breath sounds : vesicular  Abdomen - soft, no tenderness  Upper Extremities  - no cyanosis, mottling; edema : absent  Lower Extremities: no cyanosis, mottling; edema : absent    Current Laboratory, Radiologic, Microbiologic, and Diagnostic studies reviewed  Data ReviewCBC:   Recent Labs     12/03/22  0515 12/04/22 0449 12/05/22  0423   WBC 8.2 12.3* 13.0*   RBC 2.49* 2.66* 2.68*   HGB 9.2* 9.8* 9.7*   HCT 27.2* 29.4* 29.5*   PLT 82* 101* 125*       BMP:   Recent Labs     12/03/22  0515 12/04/22 0449 12/05/22  0423   GLUCOSE 95 145* 172*    138 138   K 3.7 3.6* 3.6*   BUN 16 21* 29*   CREATININE <0.40* <0.40* <0.40*   CALCIUM 8.4* 8.2* 8.4*       ABGs:   Recent Labs     12/03/22  0600 12/04/22  0438 12/05/22  0439   PHART 7.445 7.453* 7.438   PO2ART 83.8 72.9* 70.2*   WUG4LPH 45.6* 43.0 45.0   TDN6BVV 31.3* 30.1* 30.4*   U2EVBMRG 95.0 93.5* 92.7*        PT/INR:  No results found for: PTINR    ASSESSMENT / PLAN:  Acute hypoxic respiratory failure     Mechanical ventilation - extubated 11/23/22; reintubated 11/28/22  Vent settings adjusted  Hypotension -  PICC     monitor off levophed   PNA - HCAP/ ABx  COPD     BD, increase steroids - no cuff leak  Suspected lung CA  Alcohol use      thiamine / folic acid     Monitor for DTs  IV fluids  H&H q 6  reglan  D/c vanco  Tolerating TF  SBT - 12/5 - consider extubation even if no cuff leak (recently at [facility] - could pass only 6.5 ET tube) - wean sedation to off - when more awake - extubate  DW RN, RT      Electronically signed by Artur Frausto MD on 12/05/22 at 10:03 AM

## 2022-12-05 NOTE — PROGRESS NOTES
Physical Therapy        Physical Therapy Cancel Note      DATE: 2022    NAME: Enrrique Lopez  MRN: [id number]   : 1965      Patient not seen this date for Physical Therapy due to:     Other: pt on a wean trial      Electronically signed by Casper Miranda PT on 2022 at 10:10 AM

## 2022-12-05 NOTE — PROGRESS NOTES
[address]   Occupational Therapy Evaluation  Date: 22  Patient Name: Khoa Spivey       Room: 5262/2190-55  MRN: [id number]  Account: [de-identified]   : 1965  (61 y.o.) Gender: male     Discharge Recommendations:  Further Occupational Therapy is recommended upon facility discharge. OT Equipment Recommendations  Other: TBD    Referring Practitioner: Dr. Jakob Bañuelos  Diagnosis: Respiratory failure, septic shock      Treatment Diagnosis: Impaired self-care status    Past Medical History:  has a past medical history of Alcohol abuse, Avascular necrosis of femoral head ([address] History of hip replacement, Hypertension, Mass of left lung, Rib fracture, Scabies, Stage 3 severe COPD by GOLD classification ([address] and Tachycardia. Past Surgical History:   has a past surgical history that includes Neck surgery; Total hip arthroplasty (Bilateral); and Vasectomy. Restrictions  Restrictions/Precautions  Restrictions/Precautions: General Precautions  Required Braces or Orthoses?: No  Implants present? : Metal implants (B ZACH)      Vitals  Vitals  Heart Rate: (!) 115  Heart Rate Source: Monitor  BP: 114/80  BP Location: Right upper arm  BP Method: Automatic  Patient Position: Semi fowlers  MAP (Calculated): 91  Resp: (!) 33  SpO2: 96 %  O2 Device: Nasal cannula     Subjective  Subjective: \"One day\"  Comments: When asked how long he had been at [facility]. Subjective  Pain: Pt reports 4/10 pain in throat    Social/Functional History  Social/Functional History  Type of Home: Facility  Additional Comments: Pt is unable to provide accurate history. Per chart pt was recently at [facility] where he required intubation, was discharged to SNF on Hospice, but never signed the DNR papers, wanting to talk to his family when they came in town. Prior to that occurring pt was brought in for current admission.     Objective  Orientation  Orientation Level: Oriented to person, Unable to assess (Pt having difficulty communicating/vocalizing and getting frustrated with questions)  Cognition  Overall Cognitive Status: Exceptions  Arousal/Alertness: Inconsistent responses to stimuli  Following Commands: Follows one step commands with repetition  Attention Span: Attends with cues to redirect  Initiation: Requires cues for some  Sequencing: Requires cues for some     ADL  Feeding: NPO  Grooming: Maximum assistance  Grooming Skilled Clinical Factors: Pt unable to lift hand to face to wash, OT provided A. Pt was able to use washcloth to partially wash his hands. UE Bathing: Maximum assistance  LE Bathing: Dependent/Total  UE Dressing: Dependent/Total  LE Dressing: Dependent/Total  Toileting: Dependent/Total  Additional Comments: Above levels based on pt report, observation, and clinical reasoning unless otherwise noted. Pt currently limited by decreased AROM/strength in BUE, poor activity tolerance, and impaired cognition. UE Function  LUE PROM (degrees)  LUE PROM: WFL  LUE AROM (degrees)  LUE AROM : Exceptions  LUE General AROM: Pt unable to lift arm off of the bed and unable to hold arm up when OT placed in flexion; Able to flex elbow fully with increased time and effort  Left Hand AROM (degrees)  Left Hand AROM: WFL  Tone LUE  LUE Tone: Normotonic  LUE Strength  Gross LUE Strength: Exceptions to [facility]  L Hand General: 4/5  LUE Strength Comment: Shoulder 1/5; Elbow 3/5    RUE PROM (degrees)  RUE PROM: WFL  RUE AROM (degrees)  RUE AROM : Exceptions  RUE General AROM: Pt unable to lift arm off of the bed and unable to hold arm up when OT placed in flexion;  Able to flex elbow fully with increased time and effort  Right Hand AROM (degrees)  Right Hand AROM: WFL  Tone RUE  RUE Tone: Normotonic  RUE Strength  Gross RUE Strength: Exceptions to [facility]  R Hand General: 4/5  RUE Strength Comment: Shoulder 1/5; Elbow 3/5    Fine Motor Skills/Coordination  Coordination  Movements Are Fluid And Coordinated: No  Coordination and Movement ADL T-Scale Score : 22.86  ADL Inpatient CMS 0-100% Score: 85.69  ADL Inpatient CMS G-Code Modifier : CM    Goals  Patient Goals   Patient goals : None voiced. Short Term Goals  Time Frame for Short Term Goals: By Discharge  Short Term Goal 1: Pt will complete AAROM of B shoulder/elbow x10 reps all in all planes of motion. Short Term Goal 2: Pt will actively participate in simple feeding/grooming tasks and complete with Mod A. Short Term Goal 3: Pt will actively participate in bathing tasks and complete UB with Min A and LB with Max A using AE/modified techniques as needed. Short Term Goal 4: Pt will actively participate in 15-20 minutes of therapeutic exercise/activity to promote increased independence and safety with self-care and mobility. Short Term Goal 5: OT to assess bed mobility/transfers and set goals when appropriate to do so. Plan  Occupational Therapy Plan  Times Per Week: 5-7  Times Per Day:  Once a day  Current Treatment Recommendations: Strengthening, ROM, Balance training, Functional mobility training, Endurance training, Cognitive reorientation, Pain management, Safety education & training, Patient/Caregiver education & training, Equipment evaluation, education, & procurement, Self-Care / ADL, Coordination training    OT Individual Minutes  OT Individual Minutes  Time In: 1340  Time Out: 7552  Minutes: 18     Electronically signed by CARLOS ALBERTO Gillespie on 12/5/22 at 3:53 PM EST

## 2022-12-05 NOTE — PROGRESS NOTES
Pt on bipap; he nodded when writer introduced self and asked if he would like prayer.     12/05/22 1803   Encounter Summary   Encounter Overview/Reason  Spiritual/Emotional Needs   Service Provided For: Patient   Referral/Consult From: Palliative Care   Last Encounter  12/05/22   Complexity of Encounter Moderate   Spiritual/Emotional needs   Type Spiritual Support   Palliative Care   Type Palliative Care, Follow-up   Assessment/Intervention/Outcome   Assessment Calm   Intervention Prayer (assurance of)/[place];Sustaining Presence/Ministry of presence   Outcome Expressed Gratitude

## 2022-12-05 NOTE — PROGRESS NOTES
Writer spoke with pts daughter \"Sadaf\" Writer gave Marty Gutierrez an update on the pt being extubated and is tolerating being on 3L NC at this time.

## 2022-12-05 NOTE — PROGRESS NOTES
Order obtained for extubation. SpO2 of 30 on 100% FiO2. Patient extubated and placed on 3L NC. Post extubation SpO2 is 99% with HR  129 bpm and RR 38 breaths/min. Patient had mild cough that was productive of bloody sputum. Extubation Well tolerated by patient. .   Breath Sounds: rhonchi throughout    [facility], [facility]   11:14 AM

## 2022-12-05 NOTE — PROGRESS NOTES
Physical Therapy  Facility/Department: [facility] ICU  Physical Therapy Initial Assessment    Name: August Epley  : 1965  MRN: [id number]  Date of Service: 2022    Discharge Recommendations:  Patient would benefit from continued therapy after discharge   PT Equipment Recommendations  Equipment Needed: No      Patient Diagnosis(es): The primary encounter diagnosis was Acute on chronic respiratory failure with hypoxia ([address] A diagnosis of Septic shock ([address] was also pertinent to this visit. Past Medical History:  has a past medical history of Alcohol abuse, Avascular necrosis of femoral head ([address] History of hip replacement, Hypertension, Mass of left lung, Rib fracture, Scabies, Stage 3 severe COPD by GOLD classification ([facility] and Tachycardia. Past Surgical History:  has a past surgical history that includes Neck surgery; Total hip arthroplasty (Bilateral); and Vasectomy. Assessment   Body Structures, Functions, Activity Limitations Requiring Skilled Therapeutic Intervention: Decreased functional mobility ; Decreased strength  Assessment: Impaired mobility due to decreased ability to tolerate activity- recent extubation  Decision Making: Low Complexity  History: Respiratory failure  Exam: decreased strength, mobility  Clinical Presentation: evolving  Requires PT Follow-Up: Yes  Activity Tolerance  Activity Tolerance Comments: eval limited due to recent extubation and medical issues     Plan   Physcial Therapy Plan  General Plan: 3-5 times per week  Current Treatment Recommendations: Strengthening, ROM, Functional mobility training, Endurance training  Safety Devices  Type of Devices: Call light within reach, Left in bed     Restrictions  Restrictions/Precautions  Restrictions/Precautions: General Precautions     Subjective   Pain: pt alert and cooperative  General  Family / Caregiver Present: No  Follows Commands: Within Functional Limits  General Comment  Comments: OK to see pt per RN  Subjective  Subjective: pt only able to mouth words/whisper- pt just weaned from vent this AM         Social/Functional History  Social/Functional History  Type of Home: Facility             Objective   O2 Device: Nasal cannula        PROM RLE (degrees)  RLE PROM: WFL  PROM LLE (degrees)  LLE PROM: WFL  Strength RLE  Comment: grossly 2/5  Strength LLE  Comment: grossly 2/5        Bed Mobility Training  Bed Mobility Training: No        AM-PAC Score  AM-PAC Inpatient Mobility Raw Score : 6 (12/05/22 1500)  AM-PAC Inpatient T-Scale Score : 23.55 (12/05/22 1500)  Mobility Inpatient CMS 0-100% Score: 100 (12/05/22 1500)  Mobility Inpatient CMS G-Code Modifier : CN (12/05/22 1500)     Goals  Short Term Goals  Time Frame for Short Term Goals: 4-5 days  Short Term Goal 1: rolling with modA of one  Short Term Goal 2: pt able to tolerate 15-20min of therapeutic activity/exercises  Short Term Goal 3: sitting EOB with mod/maxA of 2 x 5-10min       Therapy Time   Individual Concurrent Group Co-treatment   Time In 1340         Time Out 1358         Minutes 4144 Norbert Cornelius, PT

## 2022-12-05 NOTE — PLAN OF CARE
Problem: Discharge Planning  Goal: Discharge to home or other facility with appropriate resources  12/5/2022 1441 by Carly Dalton RN  Outcome: Progressing  Flowsheets  Taken 12/5/2022 1200  Discharge to home or other facility with appropriate resources: Identify barriers to discharge with patient and caregiver  Taken 12/5/2022 0800  Discharge to home or other facility with appropriate resources: Identify barriers to discharge with patient and caregiver  12/5/2022 0354 by James Copeland RN  Outcome: Progressing     Problem: Pain  Goal: Verbalizes/displays adequate comfort level or baseline comfort level  Recent Flowsheet Documentation  Taken 12/5/2022 1200 by Carly Dalton RN  Verbalizes/displays adequate comfort level or baseline comfort level: Encourage patient to monitor pain and request assistance  Taken 12/5/2022 0800 by Carly Dalton RN  Verbalizes/displays adequate comfort level or baseline comfort level: Encourage patient to monitor pain and request assistance  12/5/2022 0354 by James Copeland RN  Outcome: Progressing  Flowsheets (Taken 12/4/2022 2000)  Verbalizes/displays adequate comfort level or baseline comfort level:   Encourage patient to monitor pain and request assistance   Assess pain using appropriate pain scale   Administer analgesics based on type and severity of pain and evaluate response   Implement non-pharmacological measures as appropriate and evaluate response     Problem: Skin/Tissue Integrity  Goal: Absence of new skin breakdown  Description: 1. Monitor for areas of redness and/or skin breakdown  2. Assess vascular access sites hourly  3. Every 4-6 hours minimum:  Change oxygen saturation probe site  4. Every 4-6 hours:  If on nasal continuous positive airway pressure, respiratory therapy assess nares and determine need for appliance change or resting period.   12/5/2022 1441 by Carly Dalton RN  Outcome: Progressing  12/5/2022 0354 by James Copeland RN  Outcome: Progressing     Problem: Safety - Adult  Goal: Free from fall injury  12/5/2022 1441 by Danna Spencer RN  Outcome: Progressing  Flowsheets (Taken 12/5/2022 0800)  Free From Fall Injury: Instruct family/caregiver on patient safety  12/5/2022 0354 by Ginny Rey RN  Outcome: Progressing     Problem: ABCDS Injury Assessment  Goal: Absence of physical injury  12/5/2022 1441 by Danna Spencer RN  Outcome: Progressing  Flowsheets (Taken 12/5/2022 0800)  Absence of Physical Injury: Implement safety measures based on patient assessment  12/5/2022 0354 by Ginny Rey RN  Outcome: Progressing

## 2022-12-05 NOTE — PROGRESS NOTES
Comprehensive Nutrition Assessment    Type and Reason for Visit:  Reassess    Nutrition Recommendations/Plan:   Pt extubated this morning. Follow for nutrition progression. Malnutrition Assessment:  Malnutrition Status: At risk for malnutrition (Comment) (11/28/22 1350)    Context:  Acute Illness     Findings of the 6 clinical characteristics of malnutrition:  Energy Intake:  No significant decrease in energy intake  Weight Loss:  No significant weight loss     Body Fat Loss:  Unable to assess     Muscle Mass Loss:  Unable to assess    Fluid Accumulation:  Mild Extremities   Strength:  Not Performed    Nutrition Assessment:    Pt extubated today, follow for nutrition progression. Nutrition Related Findings:    Edema: +1 generalized, +2 BUE's. BM-12-2. Labs & meds reviewed. Wound Type: None       Current Nutrition Intake & Therapies:    Average Meal Intake: NPO         Anthropometric Measures:  Height: 6' 1\" (185.4 cm)  Ideal Body Weight (IBW): 184 lbs (84 kg)    Admission Body Weight: 185 lb (83.9 kg)  Current Body Weight: 204 lb (92.5 kg),   IBW. Weight Source: Bed Scale  Current BMI (kg/m2): 26.9  Usual Body Weight: 201 lb (91.2 kg) (8/22)  % Weight Change (Calculated): -1.5                    BMI Categories: Overweight (BMI 25.0-29. 9)    Estimated Daily Nutrient Needs:  Energy Requirements Based On: Formula  Weight Used for Energy Requirements: Admission  Energy (kcal/day): [place] x 1.2= 8325-4540 kcal  Weight Used for Protein Requirements: Admission  Protein (g/day): 126-143 gm protein based on 1.5-1.7 gm/kg         Nutrition Diagnosis:   Inadequate oral intake related to impaired respiratory function as evidenced by NPO or clear liquid status due to medical condition    Nutrition Interventions:   Food and/or Nutrient Delivery: Continue NPO  Nutrition Education/Counseling: No recommendation at this time  Coordination of Nutrition Care: Continue to monitor while inpatient       Goals:  Previous Goal Met: No Progress toward Goal(s)  Goals: Initiate PO diet (new goal set)       Nutrition Monitoring and Evaluation:      Food/Nutrient Intake Outcomes: Enteral Nutrition Intake/Tolerance  Physical Signs/Symptoms Outcomes: Biochemical Data, GI Status, Fluid Status or Edema, Skin, Weight    Discharge Planning:     Too soon to determine     Manuel Rollins, [address] [place] [address],   [phone]

## 2022-12-06 ENCOUNTER — ANESTHESIA EVENT (OUTPATIENT)
Dept: ICU | Age: 57
End: 2022-12-06
Payer: COMMERCIAL

## 2022-12-06 ENCOUNTER — APPOINTMENT (OUTPATIENT)
Dept: GENERAL RADIOLOGY | Age: 57
End: 2022-12-06
Payer: COMMERCIAL

## 2022-12-06 ENCOUNTER — ANESTHESIA (OUTPATIENT)
Dept: ICU | Age: 57
End: 2022-12-06
Payer: COMMERCIAL

## 2022-12-06 DIAGNOSIS — J44.9 CHRONIC OBSTRUCTIVE PULMONARY DISEASE, UNSPECIFIED COPD TYPE (HCC): ICD-10-CM

## 2022-12-06 LAB
ABSOLUTE BANDS #: 0.33 K/UL (ref 0–1)
ABSOLUTE EOS #: 0 K/UL (ref 0–0.4)
ABSOLUTE LYMPH #: 0.16 K/UL (ref 1–4.8)
ABSOLUTE MONO #: 1.14 K/UL (ref 0.1–1.3)
ALLEN TEST: ABNORMAL
ANION GAP SERPL CALCULATED.3IONS-SCNC: 5 MMOL/L (ref 9–17)
BANDS: 2 % (ref 0–10)
BASOPHILS # BLD: 0 % (ref 0–2)
BASOPHILS ABSOLUTE: 0 K/UL (ref 0–0.2)
BUN BLDV-MCNC: 25 MG/DL (ref 6–20)
CALCIUM SERPL-MCNC: 8.6 MG/DL (ref 8.6–10.4)
CARBOXYHEMOGLOBIN: <1 % (ref 0–5)
CHLORIDE BLD-SCNC: 104 MMOL/L (ref 98–107)
CO2: 32 MMOL/L (ref 20–31)
CREAT SERPL-MCNC: <0.4 MG/DL (ref 0.7–1.2)
EOSINOPHILS RELATIVE PERCENT: 0 % (ref 0–4)
FIO2: 100
GFR SERPL CREATININE-BSD FRML MDRD: ABNORMAL ML/MIN/1.73M2
GLUCOSE BLD-MCNC: 102 MG/DL (ref 70–99)
HCO3 ARTERIAL: 33.4 MMOL/L (ref 22–26)
HCT VFR BLD CALC: 33.7 % (ref 41–53)
HEMOGLOBIN: 11 G/DL (ref 13.5–17.5)
LYMPHOCYTES # BLD: 1 % (ref 24–44)
MAGNESIUM: 1.7 MG/DL (ref 1.6–2.6)
MCH RBC QN AUTO: 36 PG (ref 26–34)
MCHC RBC AUTO-ENTMCNC: 32.5 G/DL (ref 31–37)
MCV RBC AUTO: 110.7 FL (ref 80–100)
METHEMOGLOBIN: 0.9 % (ref 0–1.9)
MODE: ABNORMAL
MONOCYTES # BLD: 7 % (ref 1–7)
MORPHOLOGY: ABNORMAL
O2 DEVICE/FLOW/%: ABNORMAL
O2 SAT, ARTERIAL: 97.2 % (ref 95–98)
PATIENT TEMP: 37
PCO2 ARTERIAL: 69.1 MMHG (ref 35–45)
PDW BLD-RTO: 14.6 % (ref 11.5–14.9)
PEEP/CPAP: 8
PH ARTERIAL: 7.29 (ref 7.35–7.45)
PLATELET # BLD: 146 K/UL (ref 150–450)
PMV BLD AUTO: 8.8 FL (ref 6–12)
PO2 ARTERIAL: 146 MMHG (ref 80–100)
POSITIVE BASE EXCESS, ART: 6.8 MMOL/L (ref 0–2)
POTASSIUM SERPL-SCNC: 3.2 MMOL/L (ref 3.7–5.3)
POTASSIUM SERPL-SCNC: 4.2 MMOL/L (ref 3.7–5.3)
PT. POSITION: ABNORMAL
RBC # BLD: 3.04 M/UL (ref 4.5–5.9)
RESPIRATORY RATE: 20
SAMPLE SITE: ABNORMAL
SEG NEUTROPHILS: 90 % (ref 36–66)
SEGMENTED NEUTROPHILS ABSOLUTE COUNT: 14.67 K/UL (ref 1.3–9.1)
SET RATE: 20
SODIUM BLD-SCNC: 141 MMOL/L (ref 135–144)
TEXT FOR RESPIRATORY: ABNORMAL
TOTAL RATE: 20
TRIGL SERPL-MCNC: 147 MG/DL
VT: 450
WBC # BLD: 16.3 K/UL (ref 3.5–11)

## 2022-12-06 PROCEDURE — A4216 STERILE WATER/SALINE, 10 ML: HCPCS | Performed by: FAMILY MEDICINE

## 2022-12-06 PROCEDURE — 36600 WITHDRAWAL OF ARTERIAL BLOOD: CPT

## 2022-12-06 PROCEDURE — 84132 ASSAY OF SERUM POTASSIUM: CPT

## 2022-12-06 PROCEDURE — 83735 ASSAY OF MAGNESIUM: CPT

## 2022-12-06 PROCEDURE — 6360000002 HC RX W HCPCS: Performed by: NURSE PRACTITIONER

## 2022-12-06 PROCEDURE — 6360000002 HC RX W HCPCS: Performed by: ANESTHESIOLOGY

## 2022-12-06 PROCEDURE — 94761 N-INVAS EAR/PLS OXIMETRY MLT: CPT

## 2022-12-06 PROCEDURE — 82805 BLOOD GASES W/O2 SATURATION: CPT

## 2022-12-06 PROCEDURE — 2580000003 HC RX 258: Performed by: INTERNAL MEDICINE

## 2022-12-06 PROCEDURE — 6360000002 HC RX W HCPCS: Performed by: INTERNAL MEDICINE

## 2022-12-06 PROCEDURE — 31500 INSERT EMERGENCY AIRWAY: CPT | Performed by: ANESTHESIOLOGY

## 2022-12-06 PROCEDURE — 2500000003 HC RX 250 WO HCPCS: Performed by: ANESTHESIOLOGY

## 2022-12-06 PROCEDURE — 6370000000 HC RX 637 (ALT 250 FOR IP): Performed by: INTERNAL MEDICINE

## 2022-12-06 PROCEDURE — 2500000003 HC RX 250 WO HCPCS: Performed by: INTERNAL MEDICINE

## 2022-12-06 PROCEDURE — 2580000003 HC RX 258: Performed by: FAMILY MEDICINE

## 2022-12-06 PROCEDURE — 71045 X-RAY EXAM CHEST 1 VIEW: CPT

## 2022-12-06 PROCEDURE — 36415 COLL VENOUS BLD VENIPUNCTURE: CPT

## 2022-12-06 PROCEDURE — 2580000003 HC RX 258: Performed by: EMERGENCY MEDICINE

## 2022-12-06 PROCEDURE — C9113 INJ PANTOPRAZOLE SODIUM, VIA: HCPCS | Performed by: FAMILY MEDICINE

## 2022-12-06 PROCEDURE — 2500000003 HC RX 250 WO HCPCS: Performed by: NURSE PRACTITIONER

## 2022-12-06 PROCEDURE — 2580000003 HC RX 258: Performed by: NURSE PRACTITIONER

## 2022-12-06 PROCEDURE — 84478 ASSAY OF TRIGLYCERIDES: CPT

## 2022-12-06 PROCEDURE — 6370000000 HC RX 637 (ALT 250 FOR IP): Performed by: FAMILY MEDICINE

## 2022-12-06 PROCEDURE — 94640 AIRWAY INHALATION TREATMENT: CPT

## 2022-12-06 PROCEDURE — 51702 INSERT TEMP BLADDER CATH: CPT

## 2022-12-06 PROCEDURE — 85025 COMPLETE CBC W/AUTO DIFF WBC: CPT

## 2022-12-06 PROCEDURE — 94660 CPAP INITIATION&MGMT: CPT

## 2022-12-06 PROCEDURE — 31500 INSERT EMERGENCY AIRWAY: CPT

## 2022-12-06 PROCEDURE — 80048 BASIC METABOLIC PNL TOTAL CA: CPT

## 2022-12-06 PROCEDURE — 2000000000 HC ICU R&B

## 2022-12-06 PROCEDURE — 2700000000 HC OXYGEN THERAPY PER DAY

## 2022-12-06 PROCEDURE — 6360000002 HC RX W HCPCS: Performed by: EMERGENCY MEDICINE

## 2022-12-06 PROCEDURE — 6360000002 HC RX W HCPCS: Performed by: FAMILY MEDICINE

## 2022-12-06 RX ORDER — CHLORHEXIDINE GLUCONATE 0.12 MG/ML
15 RINSE ORAL 2 TIMES DAILY
Status: DISCONTINUED | OUTPATIENT
Start: 2022-12-06 | End: 2022-12-10

## 2022-12-06 RX ORDER — DEXMEDETOMIDINE HYDROCHLORIDE 4 UG/ML
.1-1.5 INJECTION, SOLUTION INTRAVENOUS CONTINUOUS
Status: DISCONTINUED | OUTPATIENT
Start: 2022-12-06 | End: 2022-12-10

## 2022-12-06 RX ORDER — POTASSIUM CHLORIDE 7.45 MG/ML
10 INJECTION INTRAVENOUS PRN
Status: DISCONTINUED | OUTPATIENT
Start: 2022-12-06 | End: 2022-12-10 | Stop reason: HOSPADM

## 2022-12-06 RX ORDER — ALBUTEROL SULFATE 90 UG/1
2 AEROSOL, METERED RESPIRATORY (INHALATION) EVERY 6 HOURS PRN
Qty: 1 EACH | Refills: 1 | Status: SHIPPED | OUTPATIENT
Start: 2022-12-06

## 2022-12-06 RX ORDER — NOREPINEPHRINE BIT/0.9 % NACL 16MG/250ML
1-100 INFUSION BOTTLE (ML) INTRAVENOUS CONTINUOUS
Status: DISCONTINUED | OUTPATIENT
Start: 2022-12-06 | End: 2022-12-07

## 2022-12-06 RX ORDER — PROPOFOL 10 MG/ML
INJECTION, EMULSION INTRAVENOUS CONTINUOUS PRN
Status: COMPLETED | OUTPATIENT
Start: 2022-12-06 | End: 2022-12-06

## 2022-12-06 RX ORDER — SUCCINYLCHOLINE/SOD CL,ISO/PF 200MG/10ML
SYRINGE (ML) INTRAVENOUS
Status: COMPLETED | OUTPATIENT
Start: 2022-12-06 | End: 2022-12-06

## 2022-12-06 RX ORDER — PROPOFOL 10 MG/ML
5-80 INJECTION, EMULSION INTRAVENOUS CONTINUOUS
Status: DISCONTINUED | OUTPATIENT
Start: 2022-12-06 | End: 2022-12-10

## 2022-12-06 RX ORDER — NOREPINEPHRINE BIT/0.9 % NACL 16MG/250ML
INFUSION BOTTLE (ML) INTRAVENOUS CONTINUOUS PRN
Status: COMPLETED | OUTPATIENT
Start: 2022-12-06 | End: 2022-12-06

## 2022-12-06 RX ADMIN — METOCLOPRAMIDE HYDROCHLORIDE 10 MG: 5 INJECTION INTRAMUSCULAR; INTRAVENOUS at 19:22

## 2022-12-06 RX ADMIN — IPRATROPIUM BROMIDE AND ALBUTEROL SULFATE 1 AMPULE: 2.5; .5 SOLUTION RESPIRATORY (INHALATION) at 11:49

## 2022-12-06 RX ADMIN — SODIUM CHLORIDE, PRESERVATIVE FREE 20 MG: 5 INJECTION INTRAVENOUS at 19:21

## 2022-12-06 RX ADMIN — METOCLOPRAMIDE HYDROCHLORIDE 10 MG: 5 INJECTION INTRAMUSCULAR; INTRAVENOUS at 06:00

## 2022-12-06 RX ADMIN — METOCLOPRAMIDE HYDROCHLORIDE 10 MG: 5 INJECTION INTRAMUSCULAR; INTRAVENOUS at 00:01

## 2022-12-06 RX ADMIN — POTASSIUM CHLORIDE 10 MEQ: 10 INJECTION, SOLUTION INTRAVENOUS at 09:33

## 2022-12-06 RX ADMIN — DEXMEDETOMIDINE HYDROCHLORIDE 0.8 MCG/KG/HR: 400 INJECTION INTRAVENOUS at 08:54

## 2022-12-06 RX ADMIN — SODIUM CHLORIDE, PRESERVATIVE FREE 10 ML: 5 INJECTION INTRAVENOUS at 08:38

## 2022-12-06 RX ADMIN — METHYLPREDNISOLONE SODIUM SUCCINATE 60 MG: 125 INJECTION, POWDER, FOR SOLUTION INTRAMUSCULAR; INTRAVENOUS at 10:43

## 2022-12-06 RX ADMIN — PROPOFOL INJECTABLE EMULSION 100 MG: 10 INJECTION, EMULSION INTRAVENOUS at 16:23

## 2022-12-06 RX ADMIN — SODIUM CHLORIDE, PRESERVATIVE FREE 10 ML: 5 INJECTION INTRAVENOUS at 08:40

## 2022-12-06 RX ADMIN — Medication 3 MCG/MIN: at 16:30

## 2022-12-06 RX ADMIN — BUDESONIDE 500 MCG: 0.5 SUSPENSION RESPIRATORY (INHALATION) at 08:00

## 2022-12-06 RX ADMIN — Medication 100 MG: at 16:24

## 2022-12-06 RX ADMIN — METHYLPREDNISOLONE SODIUM SUCCINATE 60 MG: 125 INJECTION, POWDER, FOR SOLUTION INTRAMUSCULAR; INTRAVENOUS at 22:24

## 2022-12-06 RX ADMIN — POTASSIUM CHLORIDE 10 MEQ: 10 INJECTION, SOLUTION INTRAVENOUS at 08:17

## 2022-12-06 RX ADMIN — CHLORHEXIDINE GLUCONATE 0.12% ORAL RINSE 15 ML: 1.2 LIQUID ORAL at 19:21

## 2022-12-06 RX ADMIN — METOPROLOL TARTRATE 5 MG: 5 INJECTION, SOLUTION INTRAVENOUS at 04:06

## 2022-12-06 RX ADMIN — ANTI-FUNGAL POWDER MICONAZOLE NITRATE TALC FREE: 1.42 POWDER TOPICAL at 19:22

## 2022-12-06 RX ADMIN — SODIUM CHLORIDE, PRESERVATIVE FREE 10 ML: 5 INJECTION INTRAVENOUS at 08:42

## 2022-12-06 RX ADMIN — ANTI-FUNGAL POWDER MICONAZOLE NITRATE TALC FREE: 1.42 POWDER TOPICAL at 13:55

## 2022-12-06 RX ADMIN — IPRATROPIUM BROMIDE AND ALBUTEROL SULFATE 1 AMPULE: 2.5; .5 SOLUTION RESPIRATORY (INHALATION) at 23:27

## 2022-12-06 RX ADMIN — IPRATROPIUM BROMIDE AND ALBUTEROL SULFATE 1 AMPULE: 2.5; .5 SOLUTION RESPIRATORY (INHALATION) at 20:14

## 2022-12-06 RX ADMIN — ENOXAPARIN SODIUM 40 MG: 100 INJECTION SUBCUTANEOUS at 08:37

## 2022-12-06 RX ADMIN — Medication 5 MCG/MIN: at 14:33

## 2022-12-06 RX ADMIN — BUDESONIDE 500 MCG: 0.5 SUSPENSION RESPIRATORY (INHALATION) at 20:14

## 2022-12-06 RX ADMIN — METOPROLOL TARTRATE 5 MG: 5 INJECTION, SOLUTION INTRAVENOUS at 08:37

## 2022-12-06 RX ADMIN — METOCLOPRAMIDE HYDROCHLORIDE 10 MG: 5 INJECTION INTRAMUSCULAR; INTRAVENOUS at 13:54

## 2022-12-06 RX ADMIN — DEXMEDETOMIDINE HYDROCHLORIDE 0.2 MCG/KG/HR: 400 INJECTION INTRAVENOUS at 02:40

## 2022-12-06 RX ADMIN — METOPROLOL TARTRATE 5 MG: 5 INJECTION, SOLUTION INTRAVENOUS at 00:01

## 2022-12-06 RX ADMIN — FUROSEMIDE 20 MG: 10 INJECTION, SOLUTION INTRAMUSCULAR; INTRAVENOUS at 08:38

## 2022-12-06 RX ADMIN — POTASSIUM CHLORIDE 10 MEQ: 10 INJECTION, SOLUTION INTRAVENOUS at 06:28

## 2022-12-06 RX ADMIN — IPRATROPIUM BROMIDE AND ALBUTEROL SULFATE 1 AMPULE: 2.5; .5 SOLUTION RESPIRATORY (INHALATION) at 03:26

## 2022-12-06 RX ADMIN — SODIUM CHLORIDE, PRESERVATIVE FREE 40 MG: 5 INJECTION INTRAVENOUS at 08:37

## 2022-12-06 RX ADMIN — POTASSIUM CHLORIDE 10 MEQ: 10 INJECTION, SOLUTION INTRAVENOUS at 11:30

## 2022-12-06 RX ADMIN — THIAMINE HYDROCHLORIDE: 100 INJECTION, SOLUTION INTRAMUSCULAR; INTRAVENOUS at 10:44

## 2022-12-06 RX ADMIN — METHYLPREDNISOLONE SODIUM SUCCINATE 60 MG: 125 INJECTION, POWDER, FOR SOLUTION INTRAMUSCULAR; INTRAVENOUS at 04:06

## 2022-12-06 RX ADMIN — IPRATROPIUM BROMIDE AND ALBUTEROL SULFATE 1 AMPULE: 2.5; .5 SOLUTION RESPIRATORY (INHALATION) at 15:03

## 2022-12-06 RX ADMIN — METHYLPREDNISOLONE SODIUM SUCCINATE 60 MG: 125 INJECTION, POWDER, FOR SOLUTION INTRAMUSCULAR; INTRAVENOUS at 16:59

## 2022-12-06 RX ADMIN — IPRATROPIUM BROMIDE AND ALBUTEROL SULFATE 1 AMPULE: 2.5; .5 SOLUTION RESPIRATORY (INHALATION) at 07:54

## 2022-12-06 ASSESSMENT — PULMONARY FUNCTION TESTS
PIF_VALUE: 25
PIF_VALUE: 22
PIF_VALUE: 26
PIF_VALUE: 22
PIF_VALUE: 25
PIF_VALUE: 22
PIF_VALUE: 22
PIF_VALUE: 25
PIF_VALUE: 21
PIF_VALUE: 29
PIF_VALUE: 22
PIF_VALUE: 22
PIF_VALUE: 28
PIF_VALUE: 22
PIF_VALUE: 22
PIF_VALUE: 25
PIF_VALUE: 22
PIF_VALUE: 26
PIF_VALUE: 22
PIF_VALUE: 26
PIF_VALUE: 25
PIF_VALUE: 25
PIF_VALUE: 26
PIF_VALUE: 24
PIF_VALUE: 29
PIF_VALUE: 26

## 2022-12-06 ASSESSMENT — PAIN SCALES - GENERAL
PAINLEVEL_OUTOF10: 0
PAINLEVEL_OUTOF10: 0

## 2022-12-06 NOTE — PROGRESS NOTES
Physical Therapy  DATE: 2022    NAME: Halie Ibarra  MRN: [id number]   : 1965    Patient not seen this date for Physical Therapy due to:      [x] Cancel by RN or physician due to: RN Anai Vale deferred this date. Will continue to follow. [] Hemodialysis    [] Critical Lab Value Level     [] Blood transfusion in progress    [] Acute or unstable cardiovascular status   _MAP < 55 or more than >115  _HR < 40 or > 130    [] Acute or unstable pulmonary status   -FiO2 > 60%   _RR < 5 or >40    _O2 sats < 85%    [] Strict Bedrest    [] Off Unit for surgery or procedure    [] Off Unit for testing       [] Pending imaging to R/O fracture    [] Refusal by Patient      [] Other      [] PT being discontinued at this time. Patient independent. No further needs. [] PT being discontinued at this time as the patient has been transferred to hospice care. No further needs.       Electronically signed by Lane Diana PTA on 22 at 11:32 AM EST

## 2022-12-06 NOTE — PROGRESS NOTES
Dr. Kiana De La Cruz updated on patient's condition, VS, work of breathing, SpO2 in the 80's. He was also notified of Vivian Walsh's (palliative care) conversation with family. Family requesting everything be done at this time. Orders received to intubate patient, Diprivan for sedation.

## 2022-12-06 NOTE — PROGRESS NOTES
Writer notified patient's daughter Junior Bauman that he was intubated and placed on ventilator. Junior Bauman was also notified that patient had Levophed on for low blood pressure and Diprivan for sedation. All questions were answered.

## 2022-12-06 NOTE — PROGRESS NOTES
Patient was becoming anxious, tachypnic breathiung in the 40s, and tachycardic. RN unable to give PO PRN Ativan due to failing bedside swallow study. Dr Rachelle Carrasquillo notified, orders to start Precedex.

## 2022-12-06 NOTE — PROGRESS NOTES
[address]   OCCUPATIONAL THERAPY MISSED TREATMENT NOTE   INPATIENT   Date: 22  Patient Name: Tom Comment       Room: 2369/1167-34  MRN: [id number]   Account #: [de-identified]    : 1965  (61 y.o.)  Gender: male   Referring Practitioner: Dr. Kate Castillo  Diagnosis: Respiratory failure, septic shock             REASON FOR MISSED TREATMENT:  Hold treatment per nursing request   -   Other - Per RN Anthony Rodriguez, cx therapy this date due to poor respiratory status.    BUFFY Velez/EMELYN

## 2022-12-06 NOTE — PROGRESS NOTES
Dr. Jerald garner at bedside. Discussed patient's condition, VS, bipap, respiratory status, Precedex drip, irregular pupils, plan of care. Patient briefly taken off bipap and placed on NC. Respirations increased to the 40's, labored, SpO2 decreased to low 80's. Patient placed back on bipap. Order for palliative consult for goals of care, and to contact family and update on patient's condition.

## 2022-12-06 NOTE — PROGRESS NOTES
Date: 12/6/2022  Time: 1628  Patient identity confirmed:  Yes  Indications: Respiratory Failure  Preoxygenation: yes    Laryngoscope size and type Glidescope 4.0  Airway introducer used: Yes  Evac: Yes  ETT size:a 7.0 cuffed  Number of attempts:1   Cords visualized:  [x] Clearly  [] Poorly  Breath sounds present bilaterally: Yes   ETCO2   [x] Positive   ETT secured at  24 @lips    ETT secured with holister  Chest x-ray ordered: Yes     Difficult airway:    No       If yes, was red tape placed around ETT:   No    Was this a Code Situation:    No             BP: 99/64        Procedure performed by: Dr Cristhian Salazar RCP  4:36 PM

## 2022-12-06 NOTE — PROGRESS NOTES
Patient became hypotensive. Precedex was turned off, patient placed in [place] position, fluid bolus given. Secured message sent to Dr. Merrick Harris.

## 2022-12-06 NOTE — PROGRESS NOTES
Writer spoke with patient's daughter regarding his condition. Writer explained that her father is currently sedated on bipap. An attempt was made to take him off bipap this morning but his respiratory status declined quickly. Writer explained that everything would be done for her father if his respiratory status changes while on bipap. He would be placed back on a ventilator and may likely need a tracheostomy and feeding tube. Goals of care were also discussed. Patient's daughter is going to speak with her aunt regarding his father's care. Daughter also notified that palliative care has been consulted and will be calling her. Daughter verbalized understanding.

## 2022-12-06 NOTE — CARE COORDINATION
ONGOING DISCHARGE PLAN:    Patient is alert and oriented x4. Spoke with patient regarding discharge plan and patient confirms that plan is still to go back to [facility]. Pt was with [facility] prior to admit, but signed off hospice for acute treatment. [address] can still follow for hospice . Extubated 12/5/22 on bipap and precedex gtt. Pt not taking PO well and choking. Consult for palliative care for goals of care and family support. IV lasix 20 mg BID, IV solumedrol 60 mg Q6     K+ 3.2 BUN 25 creat 0.40 WBC 16.3 HGB 11.0     Will continue to follow for additional discharge needs.     Electronically signed by Haily Coats RN on 12/6/2022 at 11:14 AM

## 2022-12-06 NOTE — ANESTHESIA PROCEDURE NOTES
Airway  Date/Time: 12/6/2022 4:36 PM  Urgency: urgent    Airway not difficult    General Information and Staff    Patient location during procedure: ICU  Anesthesiologist: John Holguin MD  Performed: anesthesiologist     Consent for Airway (if performed for an anesthetic, see related documentation for consents)  Patient identity confirmed: per hospital policy  Consent: The procedure was performed in an emergent situation. Verbal consent not obtained. Written consent not obtained. Risks and benefits: risks, benefits and alternatives were not discussed      Indications and Patient Condition  Indications for airway management: airway protection, hypoxemia and respiratory failure  Spontaneous ventilation: present  Sedation level: deep  Preoxygenated: yes  Patient position: sniffing  MILS maintained throughout  Mask difficulty assessment: vent by bag mask    Final Airway Details  Final airway type: endotracheal airway      Successful airway: ETT     Successful intubation technique: video laryngoscopy  Endotracheal tube insertion site: oral  Blade type: glide scope.   Blade size: #4  ETT size (mm): 7.0  Cormack-Lehane Classification: grade I - full view of glottis  Placement verified by: chest auscultation, capnometry and radiography   Measured from: lips  ETT to lips (cm): 23  Number of attempts at approach: 1  Ventilation between attempts: bag mask  Number of other approaches attempted: 0    Additional Comments  Medications used for intubation propofol 100 mg and sux 100 mg IV    Non-anticipated difficult airway: yes

## 2022-12-06 NOTE — PLAN OF CARE
Problem: Discharge Planning  Goal: Discharge to home or other facility with appropriate resources  12/6/2022 0251 by Soraida Parks RN  Outcome: Progressing  Flowsheets (Taken 12/5/2022 1600 by Dominic Dillon RN)  Discharge to home or other facility with appropriate resources: Identify barriers to discharge with patient and caregiver  12/5/2022 1441 by Dominic Dillon RN  Outcome: Progressing  Flowsheets  Taken 12/5/2022 1200  Discharge to home or other facility with appropriate resources: Identify barriers to discharge with patient and caregiver  Taken 12/5/2022 0800  Discharge to home or other facility with appropriate resources: Identify barriers to discharge with patient and caregiver     Problem: Pain  Goal: Verbalizes/displays adequate comfort level or baseline comfort level  Outcome: Progressing  Flowsheets (Taken 12/5/2022 1600 by Dominic Dillon RN)  Verbalizes/displays adequate comfort level or baseline comfort level: Encourage patient to monitor pain and request assistance     Problem: Skin/Tissue Integrity  Goal: Absence of new skin breakdown  Description: 1. Monitor for areas of redness and/or skin breakdown  2. Assess vascular access sites hourly  3. Every 4-6 hours minimum:  Change oxygen saturation probe site  4. Every 4-6 hours:  If on nasal continuous positive airway pressure, respiratory therapy assess nares and determine need for appliance change or resting period.   12/6/2022 0251 by Soraida Parks RN  Outcome: Progressing  12/5/2022 1441 by Dominic Dillon RN  Outcome: Progressing     Problem: Safety - Adult  Goal: Free from fall injury  12/6/2022 0251 by Soraida Parks RN  Outcome: Progressing  Flowsheets (Taken 12/5/2022 0800 by Dominic Dillon RN)  Free From Fall Injury: Instruct family/caregiver on patient safety  12/5/2022 1441 by Dominic Dillon RN  Outcome: Progressing  Flowsheets (Taken 12/5/2022 0800)  Free From Fall Injury: Instruct family/caregiver on patient safety Problem: ABCDS Injury Assessment  Goal: Absence of physical injury  12/6/2022 0251 by Keshawn Bills RN  Outcome: Progressing  Flowsheets (Taken 12/5/2022 0800 by Oleg Ness RN)  Absence of Physical Injury: Implement safety measures based on patient assessment  12/5/2022 1441 by Oleg Ness RN  Outcome: Progressing  Flowsheets (Taken 12/5/2022 0800)  Absence of Physical Injury: Implement safety measures based on patient assessment     Problem: Safety - Medical Restraint  Goal: Remains free of injury from restraints (Restraint for Interference with Medical Device)  Description: INTERVENTIONS:  1. Determine that other, less restrictive measures have been tried or would not be effective before applying the restraint  2. Evaluate the patient's condition at the time of restraint application  3. Inform patient/family regarding the reason for restraint  4.  Q2H: Monitor safety, psychosocial status, comfort, nutrition and hydration  12/5/2022 1441 by Oleg Ness RN  Outcome: Progressing  Flowsheets  Taken 12/5/2022 1000  Remains free of injury from restraints (restraint for interference with medical device): Determine that other, less restrictive measures have been tried or would not be effective before applying the restraint  Taken 12/5/2022 0800  Remains free of injury from restraints (restraint for interference with medical device): Determine that other, less restrictive measures have been tried or would not be effective before applying the restraint     Problem: Nutrition Deficit:  Goal: Optimize nutritional status  12/6/2022 0251 by Keshawn Bills RN  Outcome: Progressing  Flowsheets (Taken 12/6/2022 0251)  Nutrient intake appropriate for improving, restoring, or maintaining nutritional needs: Monitor oral intake, labs, and treatment plans  12/5/2022 1441 by Oelg Ness RN  Outcome: Progressing

## 2022-12-06 NOTE — PLAN OF CARE
Problem: Respiratory - Adult  Goal: Achieves optimal ventilation and oxygenation  Outcome: Not Progressing     Problem: Cardiovascular - Adult  Goal: Maintains optimal cardiac output and hemodynamic stability  Outcome: Not Progressing     Problem: Discharge Planning  Goal: Discharge to home or other facility with appropriate resources  Outcome: Not Progressing  Flowsheets  Taken 12/6/2022 1700 by Lali Moser RN  Discharge to home or other facility with appropriate resources:   Identify barriers to discharge with patient and caregiver   Refer to discharge planning if patient needs post-hospital services based on physician order or complex needs related to functional status, cognitive ability or social support system  Taken 12/6/2022 1100 by Lali Moser RN  Discharge to home or other facility with appropriate resources:   Identify barriers to discharge with patient and caregiver   Refer to discharge planning if patient needs post-hospital services based on physician order or complex needs related to functional status, cognitive ability or social support system  Taken 12/6/2022 0700 by Yanique Pham  Discharge to home or other facility with appropriate resources:   Identify barriers to discharge with patient and caregiver   Refer to discharge planning if patient needs post-hospital services based on physician order or complex needs related to functional status, cognitive ability or social support system     Problem: Respiratory - Adult  Goal: Achieves optimal ventilation and oxygenation  Outcome: Not Progressing     Problem: Cardiovascular - Adult  Goal: Maintains optimal cardiac output and hemodynamic stability  Outcome: Not Progressing   Patient re-intubated and started on Levophed.

## 2022-12-06 NOTE — PLAN OF CARE
Problem: Discharge Planning  Goal: Discharge to home or other facility with appropriate resources  Outcome: Not Progressing  Flowsheets  Taken 12/6/2022 1700 by Meenu Jeff RN  Discharge to home or other facility with appropriate resources:   Identify barriers to discharge with patient and caregiver   Refer to discharge planning if patient needs post-hospital services based on physician order or complex needs related to functional status, cognitive ability or social support system  Taken 12/6/2022 1100 by Meenu Jeff RN  Discharge to home or other facility with appropriate resources:   Identify barriers to discharge with patient and caregiver   Refer to discharge planning if patient needs post-hospital services based on physician order or complex needs related to functional status, cognitive ability or social support system  Taken 12/6/2022 0700 by Casi Alvarez  Discharge to home or other facility with appropriate resources:   Identify barriers to discharge with patient and caregiver   Refer to discharge planning if patient needs post-hospital services based on physician order or complex needs related to functional status, cognitive ability or social support system     Problem: Pain  Goal: Verbalizes/displays adequate comfort level or baseline comfort level  Outcome: Progressing     Problem: Safety - Medical Restraint  Goal: Remains free of injury from restraints (Restraint for Interference with Medical Device)  Description: INTERVENTIONS:  1. Determine that other, less restrictive measures have been tried or would not be effective before applying the restraint  2. Evaluate the patient's condition at the time of restraint application  3. Inform patient/family regarding the reason for restraint  4.  Q2H: Monitor safety, psychosocial status, comfort, nutrition and hydration  Recent Flowsheet Documentation  Taken 12/6/2022 1703 by Meenu Jeff RN  Remains free of injury from restraints (restraint for interference with medical device):   Determine that other, less restrictive measures have been tried or would not be effective before applying the restraint   Evaluate the patient's condition at the time of restraint application   Inform patient/family regarding the reason for restraint   Every 2 hours: Monitor safety, psychosocial status, comfort, nutrition and hydration     Problem: Safety - Medical Restraint  Goal: Remains free of injury from restraints (Restraint for Interference with Medical Device)  Description: INTERVENTIONS:  1. Determine that other, less restrictive measures have been tried or would not be effective before applying the restraint  2. Evaluate the patient's condition at the time of restraint application  3. Inform patient/family regarding the reason for restraint  4. Q2H: Monitor safety, psychosocial status, comfort, nutrition and hydration  Outcome: Progressing  Flowsheets (Taken 12/6/2022 1703)  Remains free of injury from restraints (restraint for interference with medical device):   Determine that other, less restrictive measures have been tried or would not be effective before applying the restraint   Evaluate the patient's condition at the time of restraint application   Inform patient/family regarding the reason for restraint   Every 2 hours: Monitor safety, psychosocial status, comfort, nutrition and hydration     Problem: Skin/Tissue Integrity  Goal: Absence of new skin breakdown  Description: 1. Monitor for areas of redness and/or skin breakdown  2. Assess vascular access sites hourly  3. Every 4-6 hours minimum:  Change oxygen saturation probe site  4. Every 4-6 hours:  If on nasal continuous positive airway pressure, respiratory therapy assess nares and determine need for appliance change or resting period.   Outcome: Progressing     Problem: ABCDS Injury Assessment  Goal: Absence of physical injury  Outcome: Progressing     Problem: Nutrition Deficit:  Goal: Optimize nutritional status  Outcome: Progressing     Problem: Discharge Planning  Goal: Discharge to home or other facility with appropriate resources  Outcome: Not Progressing  Flowsheets  Taken 12/6/2022 1700 by Keke Coburn RN  Discharge to home or other facility with appropriate resources:   Identify barriers to discharge with patient and caregiver   Refer to discharge planning if patient needs post-hospital services based on physician order or complex needs related to functional status, cognitive ability or social support system  Taken 12/6/2022 1100 by Keke Coburn RN  Discharge to home or other facility with appropriate resources:   Identify barriers to discharge with patient and caregiver   Refer to discharge planning if patient needs post-hospital services based on physician order or complex needs related to functional status, cognitive ability or social support system  Taken 12/6/2022 0700 by Bertha Montgomery  Discharge to home or other facility with appropriate resources:   Identify barriers to discharge with patient and caregiver   Refer to discharge planning if patient needs post-hospital services based on physician order or complex needs related to functional status, cognitive ability or social support system

## 2022-12-06 NOTE — PROGRESS NOTES
.. PALLIATIVE CARE TEAM    Patient: Azam Gonzalez  Room: 2008/2008-01    Reason For Consult   Goals of care evaluation  Distress management  Symptom Management  Guidance and support  Facilitate communications  Assistance in coordinating care  Recommendations for the above    Impression: Azam Gonzalez is a 61y.o. year old male with  has a past medical history of Alcohol abuse, Avascular necrosis of femoral head ([facility] [address] History of hip replacement, Hypertension, Mass of left lung, Rib fracture, Scabies, Stage 3 severe COPD by GOLD classification ([facility] [address] and Tachycardia. .  Currently hospitalized for the management of Respiratory failure . The Palliative Care Team is following to assist with goals of care and support. Code Status  DNR-CCA  PLAN:   - patient is on continuous bipap and he was just extubated for the 2nd time yesterday   - I call the patient's daughter Olivia Saunders, as she and her 2 brothers live in [address] [place] [address]. She was here of 3 weeks and now is back in [place]  - there is no written HCPOA, so the legal decision makers are the majority of the 3 kids. - Olivia Saunders states that her Dad only had contact with her and [employer]   - I tell her that legally it is majority and that she and [employer] need to be on the same page  - I ask Olivia Saunders her Dad's wishes and she said that he wanted to get better, and Olivia Saunders says \" yes we need to try.'   - I explain in detail a trach and peg tube to Olivia Saunders   - Will follow for goals of care and family support.      Vital Signs:  BP (!) 199/82   Pulse (!) 101   Temp 97 °F (36.1 °C) (Axillary)   Resp (!) 37   Ht 6' 1\" (1.854 m)   Wt 204 lb 9.4 oz (92.8 kg)   SpO2 92%   BMI 26.99 kg/m²     Patient Active Problem List   Diagnosis    Cellulitis of b/l lower extremity    Cellulitis of lower extremity    Alcoholism (HCC)    Essential hypertension    Hyperkeratosis of b/l Soles    Alcohol withdrawal (HCC)    Tinea pedis of both feet    Suicidal ideation    Dyspnea    COPD exacerbation ([facility] [address]    Gastroesophageal reflux disease without esophagitis    Cigarette smoker    Avascular necrosis of femoral head (HCC)    Tachycardia    Stage 3 severe COPD by GOLD classification (HCC)    Tobacco dependence    Chronic obstructive pulmonary disease (HCC)    Lung malignancy (HCC)    Cough with hemoptysis    Sepsis ([address]    COVID-19    Mass of lingula of lung    Hyponatremia    Respiratory failure ([facility] [address]       Palliative Interaction:Patient is on continuous bipap and he cannot communicate. I get an update from [facility] R.N that the patient was extubated for the 2nd time and it appears that he may need intubated again. [facility] asks that I call family and ask about his wishes for treatment a trach if needed. I call the patient's daughter Juan Kapoor and I introduce my role. She states that she remembers talking to Lizbet NP several days ago. I ask about her Dad and the latest updates. Juan Moore states that she is aware that he is not good off the ventilator and that he may need it again and as well may need a trach. I ask janna about her family and if there are other children for decision making for her Dad, and she states\" yes I have 2 older brothers. \" She tells me that their names are Rosalina Morn and [employer]. I tell her that since there is no written HCPOA, that the decision makers for their Dad needs to be the majority of the 3 children. She states\" none of us see Radha Aparicio and he does not have a phone, and I tell her that it can be her and Rosalina [place] as it needs to be majority. She gives me Bernardo's phone number, and I put it in the chart. I ask Juan Wyattnce if she understands her Dad's wishes, and she states, \"I know that when Lizbet NP asked him if he wanted to get better he said\" yes. \" Juan Lorenze said \" I know that he would want to try.' She asked about his alcohol and she stated\" they gave him beer on PCU. \" I tell her that he was intubated and sedated and they have him on meds and sedation and that he most likely went through withdrawal.   She asks about he Jodell Hemming and hoe that works and what his quality of life would be, and we talked at length about several scenarios and I do tell Agustin Pride that it will be a long process and as well they would place a feeding tube. I update Agustin Pride that he would need to get stronger in a rehab, and then they would proceed with a biopsy to see if the mass in his lung was cancer. After that time, it would be another process to treatment. I tell Agustin Pride that there are many variables and that she and [address] would be his voice to make decisions,  and his recovery is day to day. I answer Sadaf's questions about the trach and Peg tube as well. I offer Agustin Pride much emotional support and that we are here and will keep in contact. I tell Agustin Pride that the medical team will be in touch and when decisions need to be made they will conference her and her brother [address] in to obtain permission. Agustin Pride is in good understanding. I update Khushboo Roach R.N about the conversation with the daughter and that they would proceed with the full code and even a trach and peg tube. I do tell Khushboo Roach R.N that the patient's decision maker is the majority of his 3 children and that Agusitn Pride and [address] are on the same page. Will follow for goals of care and family support.        Patient is a DNRCC-A         Goals/Plan of care      Electronically signed by   Elpidio Kolb RN  Palliative Care Team  on 12/6/2022 at 3:31 PM

## 2022-12-06 NOTE — CONSULTS
Palliative Care is following this patient. See the original consult done 11/25/2022 per Angel Mahoney NP. Will see the patient today 12/6          . Gordo Sin [address], RN  [facility]: [phone]  [facility]: [phone]  [facility]: [phone]

## 2022-12-06 NOTE — ACP (ADVANCE CARE PLANNING)
..Advance Care Planning     Advance Care Planning Activator (Inpatient)  Conversation Note      Date of ACP Conversation: 12/6/2022     [employer] with: I talked to the patient's daughter Anastacio Bumpers. She says that there is no formal HCPOA. Bang Vega states that she has 2 older brothers Annmarie Guerrier and Aranza. Bang Vega tells me that Aranza does not have a phone and that her family does not have a relationship with Aranza. The decision makers are majority by law, and that it will be Bang Vega and gladys Guerrier. ACP Activator: Darek Jovel RN          Health Care Decision Maker: Anastacio Bumpers( daughter) [phone] and Abelino Paulson ( son) [phone] as they are the majority of the 3 children    Current Designated Health Care Decision Maker:     Click here to complete Healthcare Decision Makers including section of the Healthcare Decision Maker Relationship (ie \"Primary\")      Care Preferences    Ventilation: \"If you were in your present state of health and suddenly became very ill and were unable to breathe on your own, what would your preference be about the use of a ventilator (breathing machine) if it were available to you? \"      Would the patient desire the use of ventilator (breathing machine)?: yes    \"If your health worsens and it becomes clear that your chance of recovery is unlikely, what would your preference be about the use of a ventilator (breathing machine) if it were available to you? \"     Would the patient desire the use of ventilator (breathing machine)?: Yes      Resuscitation  \"CPR works best to restart the heart when there is a sudden event, like a heart attack, in someone who is otherwise healthy. Unfortunately, CPR does not typically restart the heart for people who have serious health conditions or who are very sick. \"    \"In the event your heart stopped as a result of an underlying serious health condition, would you want attempts to be made to restart your heart (answer \"yes\" for attempt to resuscitate) or would you prefer a natural death (answer \"no\" for do not attempt to resuscitate)? \" no       [x] Yes   [] No   Educated Patient / Aggie Driscoll regarding differences between Advance Directives and portable DNR orders.     Length of ACP Conversation in minutes:      Conversation Outcomes:  [x] ACP discussion completed  [] Existing advance directive reviewed with patient; no changes to patient's previously recorded wishes  [] New Advance Directive completed  [] Portable Do Not Rescitate prepared for Provider review and signature  [] POLST/POST/MOLST/MOST prepared for Provider review and signature      Follow-up plan:    [] Schedule follow-up conversation to continue planning  [] Referred individual to Provider for additional questions/concerns   [] Advised patient/agent/surrogate to review completed ACP document and update if needed with changes in condition, patient preferences or care setting    [] This note routed to one or more involved healthcare providers

## 2022-12-07 ENCOUNTER — ANESTHESIA (OUTPATIENT)
Dept: OPERATING ROOM | Age: 57
End: 2022-12-07
Payer: COMMERCIAL

## 2022-12-07 ENCOUNTER — ANESTHESIA EVENT (OUTPATIENT)
Dept: OPERATING ROOM | Age: 57
End: 2022-12-07
Payer: COMMERCIAL

## 2022-12-07 LAB
ABSOLUTE BANDS #: 1.78 K/UL (ref 0–1)
ABSOLUTE EOS #: 0 K/UL (ref 0–0.4)
ABSOLUTE LYMPH #: 0.89 K/UL (ref 1–4.8)
ABSOLUTE MONO #: 0.89 K/UL (ref 0.1–1.3)
ALLEN TEST: ABNORMAL
ANION GAP SERPL CALCULATED.3IONS-SCNC: 6 MMOL/L (ref 9–17)
BANDS: 12 % (ref 0–10)
BASOPHILS # BLD: 0 % (ref 0–2)
BASOPHILS ABSOLUTE: 0 K/UL (ref 0–0.2)
BUN BLDV-MCNC: 28 MG/DL (ref 6–20)
CALCIUM SERPL-MCNC: 9.1 MG/DL (ref 8.6–10.4)
CARBOXYHEMOGLOBIN: 1 % (ref 0–5)
CHLORIDE BLD-SCNC: 101 MMOL/L (ref 98–107)
CO2: 32 MMOL/L (ref 20–31)
CREAT SERPL-MCNC: <0.4 MG/DL (ref 0.7–1.2)
EOSINOPHILS RELATIVE PERCENT: 0 % (ref 0–4)
FIO2: 35
GFR SERPL CREATININE-BSD FRML MDRD: ABNORMAL ML/MIN/1.73M2
GLUCOSE BLD-MCNC: 122 MG/DL (ref 70–99)
HCO3 ARTERIAL: 35 MMOL/L (ref 22–26)
HCT VFR BLD CALC: 31.7 % (ref 41–53)
HEMOGLOBIN: 10.5 G/DL (ref 13.5–17.5)
LYMPHOCYTES # BLD: 6 % (ref 24–44)
MCH RBC QN AUTO: 36.6 PG (ref 26–34)
MCHC RBC AUTO-ENTMCNC: 33.2 G/DL (ref 31–37)
MCV RBC AUTO: 110.1 FL (ref 80–100)
METHEMOGLOBIN: 1.2 % (ref 0–1.9)
MODE: ABNORMAL
MONOCYTES # BLD: 6 % (ref 1–7)
MORPHOLOGY: ABNORMAL
MORPHOLOGY: ABNORMAL
NUCLEATED RED BLOOD CELLS: 1 PER 100 WBC
O2 DEVICE/FLOW/%: ABNORMAL
O2 SAT, ARTERIAL: 86.7 % (ref 95–98)
PATIENT TEMP: 37
PCO2 ARTERIAL: 49.2 MMHG (ref 35–45)
PDW BLD-RTO: 14.5 % (ref 11.5–14.9)
PEEP/CPAP: 8
PH ARTERIAL: 7.46 (ref 7.35–7.45)
PLATELET # BLD: 130 K/UL (ref 150–450)
PMV BLD AUTO: 9.3 FL (ref 6–12)
PO2 ARTERIAL: 50.1 MMHG (ref 80–100)
POSITIVE BASE EXCESS, ART: 11.2 MMOL/L (ref 0–2)
POTASSIUM SERPL-SCNC: 3.8 MMOL/L (ref 3.7–5.3)
PT. POSITION: ABNORMAL
RBC # BLD: 2.88 M/UL (ref 4.5–5.9)
RESPIRATORY RATE: 20
SAMPLE SITE: ABNORMAL
SEG NEUTROPHILS: 76 % (ref 36–66)
SEGMENTED NEUTROPHILS ABSOLUTE COUNT: 11.29 K/UL (ref 1.3–9.1)
SET RATE: 20
SODIUM BLD-SCNC: 139 MMOL/L (ref 135–144)
TEXT FOR RESPIRATORY: ABNORMAL
TOTAL RATE: 26
VT: 450
WBC # BLD: 14.9 K/UL (ref 3.5–11)

## 2022-12-07 PROCEDURE — 2709999900 HC NON-CHARGEABLE SUPPLY: Performed by: SURGERY

## 2022-12-07 PROCEDURE — 3600000012 HC SURGERY LEVEL 2 ADDTL 15MIN: Performed by: SURGERY

## 2022-12-07 PROCEDURE — 2580000003 HC RX 258: Performed by: FAMILY MEDICINE

## 2022-12-07 PROCEDURE — 2500000003 HC RX 250 WO HCPCS: Performed by: INTERNAL MEDICINE

## 2022-12-07 PROCEDURE — 6360000002 HC RX W HCPCS: Performed by: EMERGENCY MEDICINE

## 2022-12-07 PROCEDURE — 94003 VENT MGMT INPAT SUBQ DAY: CPT

## 2022-12-07 PROCEDURE — 6370000000 HC RX 637 (ALT 250 FOR IP): Performed by: INTERNAL MEDICINE

## 2022-12-07 PROCEDURE — 2500000003 HC RX 250 WO HCPCS

## 2022-12-07 PROCEDURE — 6360000002 HC RX W HCPCS: Performed by: SURGERY

## 2022-12-07 PROCEDURE — 88305 TISSUE EXAM BY PATHOLOGIST: CPT

## 2022-12-07 PROCEDURE — 6360000002 HC RX W HCPCS: Performed by: NURSE PRACTITIONER

## 2022-12-07 PROCEDURE — 2500000003 HC RX 250 WO HCPCS: Performed by: NURSE PRACTITIONER

## 2022-12-07 PROCEDURE — 6370000000 HC RX 637 (ALT 250 FOR IP): Performed by: SURGERY

## 2022-12-07 PROCEDURE — 6360000002 HC RX W HCPCS: Performed by: INTERNAL MEDICINE

## 2022-12-07 PROCEDURE — 80048 BASIC METABOLIC PNL TOTAL CA: CPT

## 2022-12-07 PROCEDURE — 0DH63UZ INSERTION OF FEEDING DEVICE INTO STOMACH, PERCUTANEOUS APPROACH: ICD-10-PCS | Performed by: SURGERY

## 2022-12-07 PROCEDURE — A4216 STERILE WATER/SALINE, 10 ML: HCPCS | Performed by: INTERNAL MEDICINE

## 2022-12-07 PROCEDURE — 0B110F4 BYPASS TRACHEA TO CUTANEOUS WITH TRACHEOSTOMY DEVICE, OPEN APPROACH: ICD-10-PCS | Performed by: SURGERY

## 2022-12-07 PROCEDURE — 2580000003 HC RX 258: Performed by: NURSE PRACTITIONER

## 2022-12-07 PROCEDURE — 2000000000 HC ICU R&B

## 2022-12-07 PROCEDURE — 94640 AIRWAY INHALATION TREATMENT: CPT

## 2022-12-07 PROCEDURE — 2580000003 HC RX 258: Performed by: SURGERY

## 2022-12-07 PROCEDURE — 3700000001 HC ADD 15 MINUTES (ANESTHESIA): Performed by: SURGERY

## 2022-12-07 PROCEDURE — A4216 STERILE WATER/SALINE, 10 ML: HCPCS | Performed by: FAMILY MEDICINE

## 2022-12-07 PROCEDURE — 6360000002 HC RX W HCPCS

## 2022-12-07 PROCEDURE — 36600 WITHDRAWAL OF ARTERIAL BLOOD: CPT

## 2022-12-07 PROCEDURE — 6370000000 HC RX 637 (ALT 250 FOR IP): Performed by: FAMILY MEDICINE

## 2022-12-07 PROCEDURE — 85025 COMPLETE CBC W/AUTO DIFF WBC: CPT

## 2022-12-07 PROCEDURE — 2580000003 HC RX 258: Performed by: INTERNAL MEDICINE

## 2022-12-07 PROCEDURE — 3600000002 HC SURGERY LEVEL 2 BASE: Performed by: SURGERY

## 2022-12-07 PROCEDURE — 2720000010 HC SURG SUPPLY STERILE: Performed by: SURGERY

## 2022-12-07 PROCEDURE — 2700000000 HC OXYGEN THERAPY PER DAY

## 2022-12-07 PROCEDURE — 2500000003 HC RX 250 WO HCPCS: Performed by: SURGERY

## 2022-12-07 PROCEDURE — 82805 BLOOD GASES W/O2 SATURATION: CPT

## 2022-12-07 PROCEDURE — 6360000002 HC RX W HCPCS: Performed by: FAMILY MEDICINE

## 2022-12-07 PROCEDURE — C9113 INJ PANTOPRAZOLE SODIUM, VIA: HCPCS | Performed by: FAMILY MEDICINE

## 2022-12-07 PROCEDURE — 3700000000 HC ANESTHESIA ATTENDED CARE: Performed by: SURGERY

## 2022-12-07 PROCEDURE — 94761 N-INVAS EAR/PLS OXIMETRY MLT: CPT

## 2022-12-07 RX ORDER — FENTANYL CITRATE 50 UG/ML
INJECTION, SOLUTION INTRAMUSCULAR; INTRAVENOUS PRN
Status: DISCONTINUED | OUTPATIENT
Start: 2022-12-07 | End: 2022-12-07 | Stop reason: SDUPTHER

## 2022-12-07 RX ORDER — MIDAZOLAM HYDROCHLORIDE 1 MG/ML
INJECTION INTRAMUSCULAR; INTRAVENOUS PRN
Status: DISCONTINUED | OUTPATIENT
Start: 2022-12-07 | End: 2022-12-07 | Stop reason: SDUPTHER

## 2022-12-07 RX ORDER — ROCURONIUM BROMIDE 10 MG/ML
INJECTION, SOLUTION INTRAVENOUS PRN
Status: DISCONTINUED | OUTPATIENT
Start: 2022-12-07 | End: 2022-12-07 | Stop reason: SDUPTHER

## 2022-12-07 RX ORDER — CEFAZOLIN SODIUM 1 G/3ML
INJECTION, POWDER, FOR SOLUTION INTRAMUSCULAR; INTRAVENOUS PRN
Status: DISCONTINUED | OUTPATIENT
Start: 2022-12-07 | End: 2022-12-07 | Stop reason: SDUPTHER

## 2022-12-07 RX ORDER — SODIUM CHLORIDE 9 MG/ML
INJECTION, SOLUTION INTRAVENOUS CONTINUOUS
Status: DISCONTINUED | OUTPATIENT
Start: 2022-12-07 | End: 2022-12-10 | Stop reason: HOSPADM

## 2022-12-07 RX ADMIN — METOCLOPRAMIDE HYDROCHLORIDE 10 MG: 5 INJECTION INTRAMUSCULAR; INTRAVENOUS at 12:24

## 2022-12-07 RX ADMIN — CHLORHEXIDINE GLUCONATE 0.12% ORAL RINSE 15 ML: 1.2 LIQUID ORAL at 20:40

## 2022-12-07 RX ADMIN — PROPOFOL 40 MCG/KG/MIN: 10 INJECTION, EMULSION INTRAVENOUS at 20:08

## 2022-12-07 RX ADMIN — PHENYLEPHRINE HYDROCHLORIDE 200 MCG: 10 INJECTION INTRAVENOUS at 16:36

## 2022-12-07 RX ADMIN — BUDESONIDE 500 MCG: 0.5 SUSPENSION RESPIRATORY (INHALATION) at 19:32

## 2022-12-07 RX ADMIN — FENTANYL CITRATE 50 MCG: 50 INJECTION, SOLUTION INTRAMUSCULAR; INTRAVENOUS at 16:27

## 2022-12-07 RX ADMIN — METOCLOPRAMIDE HYDROCHLORIDE 10 MG: 5 INJECTION INTRAMUSCULAR; INTRAVENOUS at 00:38

## 2022-12-07 RX ADMIN — FUROSEMIDE 20 MG: 10 INJECTION, SOLUTION INTRAMUSCULAR; INTRAVENOUS at 12:24

## 2022-12-07 RX ADMIN — ANTI-FUNGAL POWDER MICONAZOLE NITRATE TALC FREE: 1.42 POWDER TOPICAL at 20:41

## 2022-12-07 RX ADMIN — FENTANYL CITRATE 50 MCG: 50 INJECTION, SOLUTION INTRAMUSCULAR; INTRAVENOUS at 16:33

## 2022-12-07 RX ADMIN — SODIUM CHLORIDE, PRESERVATIVE FREE 10 ML: 5 INJECTION INTRAVENOUS at 20:40

## 2022-12-07 RX ADMIN — METOPROLOL TARTRATE 5 MG: 5 INJECTION, SOLUTION INTRAVENOUS at 12:24

## 2022-12-07 RX ADMIN — CHLORHEXIDINE GLUCONATE 0.12% ORAL RINSE 15 ML: 1.2 LIQUID ORAL at 07:56

## 2022-12-07 RX ADMIN — SODIUM CHLORIDE, PRESERVATIVE FREE 20 MG: 5 INJECTION INTRAVENOUS at 20:40

## 2022-12-07 RX ADMIN — PROPOFOL 30 MCG/KG/MIN: 10 INJECTION, EMULSION INTRAVENOUS at 01:37

## 2022-12-07 RX ADMIN — PROPOFOL 40 MCG/KG/MIN: 10 INJECTION, EMULSION INTRAVENOUS at 15:33

## 2022-12-07 RX ADMIN — IPRATROPIUM BROMIDE AND ALBUTEROL SULFATE 1 AMPULE: 2.5; .5 SOLUTION RESPIRATORY (INHALATION) at 03:18

## 2022-12-07 RX ADMIN — ANTI-FUNGAL POWDER MICONAZOLE NITRATE TALC FREE: 1.42 POWDER TOPICAL at 07:59

## 2022-12-07 RX ADMIN — METOPROLOL TARTRATE 5 MG: 5 INJECTION, SOLUTION INTRAVENOUS at 20:40

## 2022-12-07 RX ADMIN — DILTIAZEM HYDROCHLORIDE 30 MG: 30 TABLET, FILM COATED ORAL at 12:24

## 2022-12-07 RX ADMIN — METHYLPREDNISOLONE SODIUM SUCCINATE 60 MG: 125 INJECTION, POWDER, FOR SOLUTION INTRAMUSCULAR; INTRAVENOUS at 21:39

## 2022-12-07 RX ADMIN — SODIUM CHLORIDE, PRESERVATIVE FREE 20 MG: 5 INJECTION INTRAVENOUS at 07:56

## 2022-12-07 RX ADMIN — SODIUM CHLORIDE: 9 INJECTION, SOLUTION INTRAVENOUS at 17:49

## 2022-12-07 RX ADMIN — METOPROLOL TARTRATE 5 MG: 5 INJECTION, SOLUTION INTRAVENOUS at 04:00

## 2022-12-07 RX ADMIN — CEFAZOLIN 2 G: 1 INJECTION, POWDER, FOR SOLUTION INTRAMUSCULAR; INTRAVENOUS at 16:25

## 2022-12-07 RX ADMIN — ENOXAPARIN SODIUM 40 MG: 100 INJECTION SUBCUTANEOUS at 07:56

## 2022-12-07 RX ADMIN — IPRATROPIUM BROMIDE AND ALBUTEROL SULFATE 1 AMPULE: 2.5; .5 SOLUTION RESPIRATORY (INHALATION) at 19:32

## 2022-12-07 RX ADMIN — PROPOFOL 40 MCG/KG/MIN: 10 INJECTION, EMULSION INTRAVENOUS at 06:36

## 2022-12-07 RX ADMIN — SODIUM CHLORIDE: 9 INJECTION, SOLUTION INTRAVENOUS at 12:57

## 2022-12-07 RX ADMIN — METOCLOPRAMIDE HYDROCHLORIDE 10 MG: 5 INJECTION INTRAMUSCULAR; INTRAVENOUS at 20:40

## 2022-12-07 RX ADMIN — IPRATROPIUM BROMIDE AND ALBUTEROL SULFATE 1 AMPULE: 2.5; .5 SOLUTION RESPIRATORY (INHALATION) at 10:41

## 2022-12-07 RX ADMIN — METOCLOPRAMIDE HYDROCHLORIDE 10 MG: 5 INJECTION INTRAMUSCULAR; INTRAVENOUS at 06:08

## 2022-12-07 RX ADMIN — SODIUM CHLORIDE, PRESERVATIVE FREE 40 MG: 5 INJECTION INTRAVENOUS at 07:56

## 2022-12-07 RX ADMIN — BUDESONIDE 500 MCG: 0.5 SUSPENSION RESPIRATORY (INHALATION) at 07:07

## 2022-12-07 RX ADMIN — THIAMINE HYDROCHLORIDE: 100 INJECTION, SOLUTION INTRAMUSCULAR; INTRAVENOUS at 12:56

## 2022-12-07 RX ADMIN — IPRATROPIUM BROMIDE AND ALBUTEROL SULFATE 1 AMPULE: 2.5; .5 SOLUTION RESPIRATORY (INHALATION) at 14:09

## 2022-12-07 RX ADMIN — PHENYLEPHRINE HYDROCHLORIDE 100 MCG: 10 INJECTION INTRAVENOUS at 16:17

## 2022-12-07 RX ADMIN — ROCURONIUM BROMIDE 30 MG: 10 INJECTION, SOLUTION INTRAVENOUS at 16:09

## 2022-12-07 RX ADMIN — PHENYLEPHRINE HYDROCHLORIDE 100 MCG: 10 INJECTION INTRAVENOUS at 16:19

## 2022-12-07 RX ADMIN — PHENYLEPHRINE HYDROCHLORIDE 100 MCG: 10 INJECTION INTRAVENOUS at 16:53

## 2022-12-07 RX ADMIN — MIDAZOLAM 2 MG: 1 INJECTION INTRAMUSCULAR; INTRAVENOUS at 15:50

## 2022-12-07 RX ADMIN — METHYLPREDNISOLONE SODIUM SUCCINATE 60 MG: 125 INJECTION, POWDER, FOR SOLUTION INTRAMUSCULAR; INTRAVENOUS at 04:26

## 2022-12-07 RX ADMIN — METHYLPREDNISOLONE SODIUM SUCCINATE 60 MG: 125 INJECTION, POWDER, FOR SOLUTION INTRAMUSCULAR; INTRAVENOUS at 12:24

## 2022-12-07 RX ADMIN — IPRATROPIUM BROMIDE AND ALBUTEROL SULFATE 1 AMPULE: 2.5; .5 SOLUTION RESPIRATORY (INHALATION) at 07:07

## 2022-12-07 ASSESSMENT — COPD QUESTIONNAIRES: CAT_SEVERITY: NO INTERVAL CHANGE

## 2022-12-07 ASSESSMENT — PULMONARY FUNCTION TESTS
PIF_VALUE: 26
PIF_VALUE: 22
PIF_VALUE: 23
PIF_VALUE: 18
PIF_VALUE: 22
PIF_VALUE: 22
PIF_VALUE: 23
PIF_VALUE: 25
PIF_VALUE: 21
PIF_VALUE: 23
PIF_VALUE: 23
PIF_VALUE: 22
PIF_VALUE: 23
PIF_VALUE: 20
PIF_VALUE: 25
PIF_VALUE: 23
PIF_VALUE: 22
PIF_VALUE: 22
PIF_VALUE: 23
PIF_VALUE: 24
PIF_VALUE: 24
PIF_VALUE: 20
PIF_VALUE: 11
PIF_VALUE: 21
PIF_VALUE: 22
PIF_VALUE: 23
PIF_VALUE: 22
PIF_VALUE: 23
PIF_VALUE: 24
PIF_VALUE: 28
PIF_VALUE: 20

## 2022-12-07 ASSESSMENT — ENCOUNTER SYMPTOMS
SHORTNESS OF BREATH: 1
STRIDOR: 0

## 2022-12-07 ASSESSMENT — PAIN SCALES - GENERAL: PAINLEVEL_OUTOF10: 0

## 2022-12-07 ASSESSMENT — LIFESTYLE VARIABLES: SMOKING_STATUS: 0

## 2022-12-07 NOTE — PROGRESS NOTES
Physical Therapy  DATE: 2022    NAME: Karyle Smoker  MRN: [id number]   : 1965    Patient not seen this date for Physical Therapy due to:      [] Cancel by RN or physician due to:    [] Hemodialysis    [] Critical Lab Value Level     [] Blood transfusion in progress    [] Acute or unstable cardiovascular status   _MAP < 55 or more than >115  _HR < 40 or > 130    [] Acute or unstable pulmonary status   -FiO2 > 60%   _RR < 5 or >40    _O2 sats < 85%    [] Strict Bedrest    [] Off Unit for surgery or procedure    [] Off Unit for testing       [] Pending imaging to R/O fracture    [] Refusal by Patient      [x] Other; checked on pt @ 21 . Pt with respiratory therapy and intubated at this time. Will continue to follow. [] PT being discontinued at this time. Patient independent. No further needs. [] PT being discontinued at this time as the patient has been transferred to hospice care. No further needs.       Electronically signed by Rachel Crawford PTA on 22 at 2:50 PM EST

## 2022-12-07 NOTE — PROGRESS NOTES
Spoke with Dr Romina Marmolejo about new consult. Going to place trach and peg this evening. Informed patients daughter and asked her to be available for consent.

## 2022-12-07 NOTE — PROGRESS NOTES
Comprehensive Nutrition Assessment    Type and Reason for Visit:  Reassess    Nutrition Recommendations/Plan:   Pt is NPO going for PEG tube placement. Follow for start of tube feedings. Malnutrition Assessment:  Malnutrition Status: At risk for malnutrition (Comment) (11/28/22 1350)    Context:  Acute Illness     Findings of the 6 clinical characteristics of malnutrition:  Energy Intake:  No significant decrease in energy intake  Weight Loss:  No significant weight loss     Body Fat Loss:  Unable to assess     Muscle Mass Loss:  Unable to assess    Fluid Accumulation:  Mild Extremities   Strength:  Not Performed    Nutrition Assessment:    Pt was extubated on 12-5 having trouble needing bipap or would become tachypneic and was re-intubated later in the day. RN reports pt is having PEG tube placed later today, follow for orders to start tube feedings. Nutrition Related Findings:    Edema: +3 pitting all extremities. BM- 12/6. Labs & meds reviewed. Wound Type: None       Current Nutrition Intake & Therapies:    Average Meal Intake: NPO       Anthropometric Measures:  Height: 6' 1\" (185.4 cm)  Ideal Body Weight (IBW): 184 lbs (84 kg)    Admission Body Weight: 185 lb (83.9 kg)  Current Body Weight: 204 lb (92.5 kg),   IBW. Weight Source: Bed Scale  Current BMI (kg/m2): 26.9  Usual Body Weight: 201 lb (91.2 kg) (8/22)  % Weight Change (Calculated): -1.5                    BMI Categories: Overweight (BMI 25.0-29. 9)    Estimated Daily Nutrient Needs:  Energy Requirements Based On: Formula  Weight Used for Energy Requirements: Admission  Energy (kcal/day): [place] x 1.2= 4861-0178 kcal  Weight Used for Protein Requirements: Admission  Protein (g/day): 126-143 gm protein based on 1.5-1.7 gm/kg       Nutrition Diagnosis:   Inadequate oral intake related to impaired respiratory function as evidenced by NPO or clear liquid status due to medical condition    Nutrition Interventions:   Food and/or Nutrient Delivery: Continue NPO  Nutrition Education/Counseling: No recommendation at this time  Coordination of Nutrition Care: Continue to monitor while inpatient       Goals:  Previous Goal Met: No Progress toward Goal(s)  Goals: Initiate nutrition support       Nutrition Monitoring and Evaluation:      Food/Nutrient Intake Outcomes: Enteral Nutrition Intake/Tolerance  Physical Signs/Symptoms Outcomes: Biochemical Data, GI Status, Fluid Status or Edema, Skin, Weight    Discharge Planning:     Too soon to determine     Nasir Flores, [address],   [phone]

## 2022-12-07 NOTE — CARE COORDINATION
ONGOING DISCHARGE PLAN:    Patient is alert but was reintubated on 12/6 and unable to communicate. Pt is from [facility] and can return with precert. Referral sent to [address] [place] [address] for discharge needs. Levophed gtt , IV solumedrol 60 mg Q6    Will continue to follow for additional discharge needs.     Electronically signed by Damien Vincent RN on 12/7/2022 at 11:49 AM

## 2022-12-07 NOTE — PROGRESS NOTES
12/07/22 1511   Encounter Summary   Encounter Overview/Reason  Spiritual/Emotional Needs   Service Provided For: Patient   Referral/Consult From: Palliative Care   Last Encounter  12/07/22   Complexity of Encounter Low   Spiritual/Emotional needs   Type Spiritual Support   Palliative Care   Type Palliative Care, Follow-up   Assessment/Intervention/Outcome   Assessment Unable to assess  (back on vent)   Intervention Prayer (assurance of)/[place]

## 2022-12-07 NOTE — PROGRESS NOTES
Patient returned from OR with a trach #7 extra long and a peg tube. Placed back on vent and monitor.

## 2022-12-07 NOTE — ANESTHESIA PRE PROCEDURE
Department of Anesthesiology  Preprocedure Note       Name:  Shawn Mari   Age:  61 y.o.  :  1965                                          MRN:  [id number]         Date:  2022      Surgeon: Cortney Clark):  MD Johanna Cannon MD    Procedure: Procedure(s):  TRACHEOTOMY  EGD ESOPHAGOGASTRODUODENOSCOPY PEG TUBE INSERTION    Medications prior to admission:   Prior to Admission medications    Medication Sig Start Date End Date Taking? Authorizing Provider   acetaminophen (TYLENOL) 325 MG tablet Take 650 mg by mouth every 4 hours as needed for Pain or Fever   Yes Historical Provider, MD   glucose (GLUTOSE) 40 % GEL Take 15 g by mouth as needed (BG <70)   Yes Historical Provider, MD   guaiFENesin (MUCINEX) 600 MG extended release tablet Take 600 mg by mouth 2 times daily   Yes Historical Provider, MD   isosorbide mononitrate (IMDUR) 30 MG extended release tablet Take 30 mg by mouth daily   Yes Historical Provider, MD   LORazepam (ATIVAN) 0.5 MG tablet Take 0.5 mg by mouth every 4 hours as needed for Anxiety (or respiratory discomfort). Yes Historical Provider, MD   metoprolol tartrate (LOPRESSOR) 25 MG tablet Take 25 mg by mouth 2 times daily   Yes Historical Provider, MD   morphine sulfate 20 MG/ML concentrated oral solution Take 5 mg by mouth every hour as needed for Pain (or respiratory discomfort).    Yes Historical Provider, MD   nicotine (NICODERM CQ) 21 MG/24HR Place 1 patch onto the skin every 24 hours   Yes Historical Provider, MD   omeprazole (PRILOSEC) 40 MG delayed release capsule Take 40 mg by mouth daily   Yes Historical Provider, MD   potassium chloride (KLOR-CON M) 20 MEQ extended release tablet Take 20 mEq by mouth 2 times daily   Yes Historical Provider, MD   predniSONE (DELTASONE) 20 MG tablet Take 20 mg by mouth daily   Yes Historical Provider, MD   budesonide (PULMICORT) 0.5 MG/2ML nebulizer suspension Take 1 ampule by nebulization 2 times daily   Yes Historical Provider, 12/07/22 0756    propofol injection  5-80 mcg/kg/min IntraVENous Continuous Sue Jordan MD 22.3 mL/hr at 12/07/22 0636 40 mcg/kg/min at 12/07/22 0636    furosemide (LASIX) injection 20 mg  20 mg IntraVENous BID Rodrigo Perea MD   20 mg at 12/07/22 1224    methylPREDNISolone sodium (SOLU-MEDROL) injection 60 mg  60 mg IntraVENous Q6H JAEL Sy CNP   60 mg at 12/07/22 1224    metoprolol (LOPRESSOR) injection 5 mg  5 mg IntraVENous Q4H Rodrigo Perea MD   5 mg at 12/07/22 1224    metoclopramide (REGLAN) injection 10 mg  10 mg IntraVENous Q6H Sue Jordan MD   10 mg at 12/07/22 1224    pantoprazole (PROTONIX) 40 mg in sodium chloride (PF) 0.9 % 10 mL injection  40 mg IntraVENous Daily Jammie Gatica MD   40 mg at 60/24/56 3448    folic acid 1 mg, thiamine (B-1) 100 mg in sodium chloride 0.9 % 50 mL IVPB   IntraVENous Daily JAEL Gtz  mL/hr at 12/07/22 1256 New Bag at 12/07/22 1256    dilTIAZem (CARDIZEM) tablet 30 mg  30 mg Oral 4 times per day Rodrigo Perea MD   30 mg at 12/07/22 1224    propofol injection  5-50 mcg/kg/min IntraVENous Continuous JAEL Sy CNP   Stopped at 12/05/22 1039    dextromethorphan-guaiFENesin (ROBITUSSIN-DM)  MG/5ML liquid 5 mL  5 mL Oral Q4H PRN Jammie Gatica MD   5 mL at 11/27/22 1440    LORazepam (ATIVAN) tablet 0.5 mg  0.5 mg Oral Q4H PRN Jammie Gatica MD   0.5 mg at 12/05/22 0425    nicotine (NICODERM CQ) 14 MG/24HR 1 patch  1 patch TransDERmal Daily Jammie Gatica MD   1 patch at 12/07/22 0758    oxyCODONE-acetaminophen (PERCOCET) 5-325 MG per tablet 1 tablet  1 tablet Oral Q4H PRN Sue Jordan MD   1 tablet at 12/05/22 0425    potassium chloride (KLOR-CON M) extended release tablet 40 mEq  40 mEq Oral PROMAR Jordan MD   40 mEq at 12/02/22 0043    Or    potassium bicarb-citric acid (EFFER-K) effervescent tablet 40 mEq  40 mEq Oral PROMAR Jordan MD   40 mEq at 12/02/22 1900    Or    potassium chloride 10 mEq/100 mL IVPB (Peripheral Line)  10 mEq IntraVENous PRN Jaime Santiago MD   Stopped at 12/02/22 1129    [Held by provider] metoprolol tartrate (LOPRESSOR) tablet 25 mg  25 mg Oral BID Jaime Santiago MD   25 mg at 12/01/22 0949    sodium chloride flush 0.9 % injection 5-40 mL  5-40 mL IntraVENous 2 times per day Luan Long MD   10 mL at 11/28/22 0911    sodium chloride flush 0.9 % injection 5-40 mL  5-40 mL IntraVENous PRN Luan Long MD        0.9 % sodium chloride infusion   IntraVENous PRN Luan Long MD        sodium chloride flush 0.9 % injection 5-40 mL  5-40 mL IntraVENous 2 times per day Luan Long MD   10 mL at 12/06/22 0838    sodium chloride flush 0.9 % injection 5-40 mL  5-40 mL IntraVENous PRN Luan Long MD   10 mL at 12/06/22 0842    0.9 % sodium chloride infusion   IntraVENous PRN Luan Long MD        enoxaparin (LOVENOX) injection 40 mg  40 mg SubCUTAneous Daily Luan Long MD   40 mg at 12/07/22 0756    acetaminophen (TYLENOL) tablet 650 mg  650 mg Oral Q4H PRN Luan Long MD        ondansetron (ZOFRAN-ODT) disintegrating tablet 4 mg  4 mg Oral Q8H PRN Luan Long MD        Or    ondansetron [facility]) injection 4 mg  4 mg IntraVENous Q6H PRN Luan Long MD        ipratropium-albuterol (DUONEB) nebulizer solution 1 ampule  1 ampule Inhalation Q4H Jaime Santiago MD   1 ampule at 12/07/22 1409    budesonide (PULMICORT) nebulizer suspension 500 mcg  0.5 mg Nebulization BID Jaime Sanitago MD   500 mcg at 12/07/22 0707       Allergies:  No Known Allergies    Problem List:    Patient Active Problem List   Diagnosis Code    Cellulitis of b/l lower extremity L03. 116    Cellulitis of lower extremity L03.119    Alcoholism ([facility]) F10.20    Essential hypertension I10    Hyperkeratosis of b/l [place] Q82.8    Alcohol withdrawal ([facility]) F10.939    Tinea pedis of both feet B35.3    Suicidal ideation R45.851    Dyspnea R06.00    COPD exacerbation (HCC) J44.1    Gastroesophageal reflux disease without esophagitis K21.9    Cigarette smoker F17.210    Avascular necrosis of femoral head ([facility]) M87.059    Tachycardia R00.0    Stage 3 severe COPD by GOLD classification ([facility]) J44.9    Tobacco dependence F17.200    Chronic obstructive pulmonary disease (HCC) J44.9    Lung malignancy ([facility]) C34.90    Cough with hemoptysis R04.2    Sepsis ([facility]) A41.9    COVID-19 U07.1    Mass of lingula of lung R91.8    Hyponatremia E87.1    Respiratory failure ([facility]) J96.90       Past Medical History:        Diagnosis Date    Alcohol abuse     hx of alcoholism; States he drinks 5 beers per day; pt has D/T's    Avascular necrosis of femoral head ([facility] [place] [address] 2014    Overview:  S/p bilateral hip replacements    History of hip replacement     left/right    Hypertension     Mass of left lung     Rib fracture     Scabies     Stage 3 severe COPD by GOLD classification ([facility] [place] [address] 2021    Tachycardia 10/28/2020    does not follow with Cardiology       Past Surgical History:        Procedure Laterality Date    NECK SURGERY      TOTAL HIP ARTHROPLASTY Bilateral     VASECTOMY         Social History:    Social History     Tobacco Use    Smoking status: Former     Packs/day: 1.00     Years: 44.00     Pack years: 44.00     Types: Cigarettes     Start date:      Quit date: 2022     Years since quittin.2    Smokeless tobacco: Never    Tobacco comments:     pt educated on both alcohol and smoking cessation   Substance Use Topics    Alcohol use:  Yes     Alcohol/week: 35.0 standard drinks     Types: 35 Cans of beer per week     Comment: hx of alcoholism; States he drinks 5 beers per day; pt has D/T's                                Counseling given: Not Answered  Tobacco comments: pt educated on both alcohol and smoking cessation      Vital Signs (Current):   Vitals:    22 1043 22 1230 22 1300 22 1411   BP:  120/82 102/72 Pulse: (!) 130 (!) 137 (!) 115 (!) 119   Resp: 20 22 23 22   Temp:  98.3 °F (36.8 °C)     TempSrc:  Oral     SpO2: 100% 100% 100% 100%   Weight:       Height:                                                  BP Readings from Last 3 Encounters:   12/07/22 102/72   09/26/22 106/64   09/19/22 124/77       NPO Status:                                                                                 BMI:   Wt Readings from Last 3 Encounters:   12/05/22 204 lb 9.4 oz (92.8 kg)   09/26/22 199 lb 12.8 oz (90.6 kg)   09/17/22 194 lb 3.6 oz (88.1 kg)     Body mass index is 26.99 kg/m². CBC:   Lab Results   Component Value Date/Time    WBC 14.9 12/07/2022 04:23 AM    RBC 2.88 12/07/2022 04:23 AM    HGB 10.5 12/07/2022 04:23 AM    HCT 31.7 12/07/2022 04:23 AM    .1 12/07/2022 04:23 AM    RDW 14.5 12/07/2022 04:23 AM     12/07/2022 04:23 AM     K 3.8    CMP:   Lab Results   Component Value Date/Time     12/07/2022 04:23 AM    K 3.8 12/07/2022 04:23 AM     12/07/2022 04:23 AM    CO2 32 12/07/2022 04:23 AM    BUN 28 12/07/2022 04:23 AM    CREATININE <0.40 12/07/2022 04:23 AM    GFRAA >60 09/18/2022 05:45 AM    LABGLOM Can not be calculated 12/07/2022 04:23 AM    GLUCOSE 122 12/07/2022 04:23 AM    PROT 4.5 12/01/2022 04:25 AM    CALCIUM 9.1 12/07/2022 04:23 AM    BILITOT 0.6 12/01/2022 04:25 AM    ALKPHOS 100 12/01/2022 04:25 AM    AST 16 12/01/2022 04:25 AM    ALT 30 12/01/2022 04:25 AM       POC Tests: No results for input(s): POCGLU, POCNA, POCK, POCCL, POCBUN, POCHEMO, POCHCT in the last 72 hours.     Coags:   Lab Results   Component Value Date/Time    PROTIME 12.1 11/28/2022 02:00 PM    INR 0.9 11/28/2022 02:00 PM    APTT 30.3 08/02/2022 11:04 AM       HCG (If Applicable): No results found for: PREGTESTUR, PREGSERUM, HCG, HCGQUANT     ABGs:   Lab Results   Component Value Date/Time    PHART 7.461 12/07/2022 04:45 AM    PO2ART 50.1 12/07/2022 04:45 AM    WMN5OXS 49.2 12/07/2022 04:45 AM    SAN5XBW 35.0 12/07/2022 04:45 AM    B0DOFLSP 86.7 12/07/2022 04:45 AM        Type & Screen (If Applicable):  No results found for: LABABO, LABRH    Drug/Infectious Status (If Applicable):  No results found for: HIV, HEPCAB    COVID-19 Screening (If Applicable):   Lab Results   Component Value Date/Time    COVID19 Not Detected 11/22/2022 01:02 PM    COVID19 DETECTED 08/29/2022 07:42 AM           Anesthesia Evaluation  Patient summary reviewed and Nursing notes reviewed no history of anesthetic complications:   Airway: Mallampati: III  TM distance: >3 FB   Neck ROM: full  Mouth opening: > = 3 FB  Intubated Dental:          Pulmonary:normal exam    (+) COPD: no interval change,  shortness of breath: no interval change,      (-) pneumonia, asthma, recent URI, sleep apnea, rhonchi, wheezes, rales, stridor, not a current smoker and no decreased breath sounds          Patient did not smoke on day of surgery. Cardiovascular:  Exercise tolerance: poor (<4 METS),   (+) hypertension: no interval change,     (-) pacemaker, valvular problems/murmurs, past MI, CAD, CABG/stent, dysrhythmias,  angina,  CHF, orthopnea, PND,  ANDRES, murmur, weak pulses,  friction rub, systolic click, carotid bruit,  JVD, peripheral edema, no pulmonary hypertension and no hyperlipidemia    ECG reviewed  Rhythm: regular  Rate: normal  Echocardiogram reviewed         Beta Blocker:  Dose within 24 Hrs         Neuro/Psych:   (+) psychiatric history: stable with treatmentdepression/anxiety    (-) seizures, neuromuscular disease, TIA, CVA and headaches           GI/Hepatic/Renal:   (+) GERD: no interval change,      (-) hiatal hernia, PUD, hepatitis, liver disease, no renal disease, bowel prep and no morbid obesity       Endo/Other: Negative Endo/Other ROS   (+) no malignancy/cancer.     (-) diabetes mellitus, hypothyroidism, hyperthyroidism, blood dyscrasia, arthritis, no electrolyte abnormalities, no malignancy/cancer               Abdominal: Vascular: negative vascular ROS. - PVD, DVT and PE. Other Findings:           Anesthesia Plan      general     ASA 4 - emergent       Induction: intravenous. MIPS: Postoperative opioids intended and Prophylactic antiemetics administered. Anesthetic plan and risks discussed with patient. Plan discussed with CRNA.                     Achille Sicard, MD   12/7/2022

## 2022-12-07 NOTE — OP NOTE
Operative Note      Patient: Wynetta Canavan  YOB: 1965  MRN: [id number]    Date of Procedure: 12/7/2022              Preoperative diagnosis: Respiratory failure. Malnutrition. Postoperative diagnosis: Same. Possible Campbell's esophagus. Mild gastritis and duodenitis. Procedure: Extra long Shiley #7 tracheostomy tube placement. Esophagogastroduodenoscopy with percutaneous endoscopic gastrostomy tube 20 [place] placement. Surgeon: Dr. Nidhi Coleman    Asst.: None    Anesthesia: General    Preparation: Chloraprep    EBL: Less than 10 mL    Specimen: GE junction biopsy    Procedure: Informed consent was obtained from [address] [place] [address]. Patient was taken to the operating room. General anesthesia was given. Shoulder roll was placed. Both the arms were tucked. Neck was placed in an extended fashion. Operative site was prepped and draped in usual sterile fashion. Preoperative antibiotic was given. Timeout was done. Collar incision was made. Incision was deepened with help of Bovie cautery. Platysma was incised. Dissection was continued in the midline all the way down to the trachea. Thyroid isthmus was divided. Trachea was exposed. Trach hook was placed. Trachea was brought into the view. At this point anesthesiologist was informed. Oxygenation was reduced. Second tracheal ring was excised. Tracheal  was used after the endotracheal tube was withdrawn above the tracheotomy. At this point a Shiley 7 Brielle Toby extra long tracheostomy tube was inserted without any difficulty. Obturator was removed. Inner cannula was placed. Cuff was inflated. Tracheostomy tube was connected to the ventilator. Adequate ventilation was confirmed. Oxygen saturation was 98%. Anesthesiologist was satisfied with the placement. At this point retractors were removed. Hemostasis was confirmed. Sponge needle instrument count was found to be correct. Skin was approximated using interrupted nylon suture. Tracheostomy was secured using Prolene suture. Nelly Jose tie was placed. Clean dressing was applied. Patient tolerated this part of the procedure well. Olympus gastroscope was advanced under direct visualization to the level duodenal bulb without any difficulty. No gastric outlet obstruction noted. Mild gastritis and duodenitis noted. At the GE junction possible Campbell's esophagus noted in multiple biopsies obtained. Rest of the examination including retroflexion in the stomach was otherwise grossly unremarkable. Transillumination was seen over the intra-abdominal wall. This area was prepped and draped in usual sterile fashion. Incision was made at the site of transillumination using a #11 blade. Through this incision Angiocath was introduced through the anterior abdominal wall into the stomach. Through this Angiocath guidewire was introduced which was grasped with help of the snare. Guidewire was retrieved from the oral aspect. PEG tube was threaded over the guidewire and retrieved from the abdominal aspect. Bolster was applied which was approximately between 2 and 3 cm. Gastroscope was reintroduced. Motion of the PEG was found to be in satisfactory position. No bleeding noted. Air was decompressed and scope was removed. Clean dressing was applied. Patient tolerated both procedures well and was transferred to the intensive care unit in a stable condition.  -  Recommendations: PEG tube to gravity. PEG tube can be used for medications. Resume tube feeds tomorrow.   Continue medical management per ICU team.

## 2022-12-07 NOTE — PROGRESS NOTES
PULMONARY PROGRESS NOTE:    REASON FOR VISIT:copd exac, resp failure  Interval History:    Unable to obtain- sedated, on vent     Events since last visit: Patient required bipap support since extubation on 12/5. He was unable to come off and had increased work of breathing. He was reintubated yesterday afternoon. Surgery consulted for trach and peg.      PAST MEDICAL HISTORY:      Scheduled Meds:   miconazole   Topical BID    chlorhexidine  15 mL Mouth/Throat BID    famotidine (PEPCID) injection  20 mg IntraVENous BID    furosemide  20 mg IntraVENous BID    methylPREDNISolone  60 mg IntraVENous Q6H    metoprolol  5 mg IntraVENous Q4H    metoclopramide  10 mg IntraVENous Q6H    pantoprazole (PROTONIX) 40 mg injection  40 mg IntraVENous Daily    thiamine and folic acid IVPB   IntraVENous Daily    dilTIAZem  30 mg Oral 4 times per day    nicotine  1 patch TransDERmal Daily    [Held by provider] metoprolol tartrate  25 mg Oral BID    sodium chloride flush  5-40 mL IntraVENous 2 times per day    sodium chloride flush  5-40 mL IntraVENous 2 times per day    enoxaparin  40 mg SubCUTAneous Daily    ipratropium-albuterol  1 ampule Inhalation Q4H    budesonide  0.5 mg Nebulization BID     Continuous Infusions:   phenylephrine (ROSA-SYNEPHRINE) 50mg/250mL infusion      sodium chloride      dexmedetomidine Stopped (12/06/22 1625)    propofol 40 mcg/kg/min (12/07/22 0636)    propofol Stopped (12/05/22 1039)    sodium chloride      sodium chloride       PRN Meds:potassium chloride, dextromethorphan-guaiFENesin, LORazepam, oxyCODONE-acetaminophen, potassium chloride **OR** potassium alternative oral replacement **OR** potassium chloride, sodium chloride flush, sodium chloride, sodium chloride flush, sodium chloride, acetaminophen, ondansetron **OR** ondansetron        PHYSICAL EXAMINATION:  /82   Pulse (!) 130   Temp 99.8 °F (37.7 °C) (Oral)   Resp 20   Ht 6' 1\" (1.854 m)   Wt 204 lb 9.4 oz (92.8 kg)   SpO2 100% BMI 26.99 kg/m²     General : Sedated, orally intubated   Neck - supple, no lymphadenopathy, JVD not raised  Heart - rTachy   Lungs - Air Entry- poor bilaterally; breath sounds : vesicular;   rales/crackles - absent  Abdomen - soft, no tenderness  Upper Extremities  - no cyanosis, mottling; edema : absent  Lower Extremities: no cyanosis, mottling; edema : absent    Current Laboratory, Radiologic, Microbiologic, and Diagnostic studies reviewed  Data ReviewCBC:   Recent Labs     12/05/22  0423 12/06/22  0442 12/07/22  0423   WBC 13.0* 16.3* 14.9*   RBC 2.68* 3.04* 2.88*   HGB 9.7* 11.0* 10.5*   HCT 29.5* 33.7* 31.7*   * 146* 130*     BMP:   Recent Labs     12/05/22  0423 12/06/22  0442 12/06/22  1416 12/07/22  0423   GLUCOSE 172* 102*  --  122*    141  --  139   K 3.6* 3.2* 4.2 3.8   BUN 29* 25*  --  28*   CREATININE <0.40* <0.40*  --  <0.40*   CALCIUM 8.4* 8.6  --  9.1     ABGs:   Recent Labs     12/05/22  1600 12/06/22  1720 12/07/22  0445   PHART 7.362 7.292* 7.461*   PO2ART 47.1* 146.0* 50.1*   VIQ2RFG 56.5* 69.1* 49.2*   VZU7NGU 32.1* 33.4* 35.0*   M3FYFCWB 77.9* 97.2 86.7*      PT/INR:  No results found for: PTINR    ASSESSMENT / PLAN:    Acute hypoxic respiratory failure   Mechanical ventilation - extubated 11/23/22; reintubated 11/28/22. Was extubated on 12/5/22. Reintubated 12/6/22  Consult surgery for trach and peg placement  Hypotension -  PICC  Levophed resumed.  Changed to rod due to tachycardia  Add IVF   PNA - HCAP/ ABx  COPD     BD, increase steroids - no cuff leak  Suspected lung CA  Alcohol use      thiamine / folic acid     Monitor for DTs  IV fluids  H&H q 6  reglan  D/c vanco  Tolerating TF  D/c planning- LTAC      Electronically signed by Porfirio Carranza MD on 12/07/22 at 12:50 PM.

## 2022-12-07 NOTE — PLAN OF CARE
Problem: Discharge Planning  Goal: Discharge to home or other facility with appropriate resources  12/7/2022 0441 by Kyra Grady RN  Outcome: Progressing  Flowsheets (Taken 12/6/2022 1700 by Meenu Jeff RN)  Discharge to home or other facility with appropriate resources:   Identify barriers to discharge with patient and caregiver   Refer to discharge planning if patient needs post-hospital services based on physician order or complex needs related to functional status, cognitive ability or social support system  12/6/2022 1749 by Meenu Jeff RN  Outcome: Not Progressing  Flowsheets  Taken 12/6/2022 1700 by Meenu Jeff RN  Discharge to home or other facility with appropriate resources:   Identify barriers to discharge with patient and caregiver   Refer to discharge planning if patient needs post-hospital services based on physician order or complex needs related to functional status, cognitive ability or social support system  Taken 12/6/2022 1100 by Meenu Jeff RN  Discharge to home or other facility with appropriate resources:   Identify barriers to discharge with patient and caregiver   Refer to discharge planning if patient needs post-hospital services based on physician order or complex needs related to functional status, cognitive ability or social support system  Taken 12/6/2022 0700 by Casi Alvarez  Discharge to home or other facility with appropriate resources:   Identify barriers to discharge with patient and caregiver   Refer to discharge planning if patient needs post-hospital services based on physician order or complex needs related to functional status, cognitive ability or social support system     Problem: Pain  Goal: Verbalizes/displays adequate comfort level or baseline comfort level  12/7/2022 0441 by Kyra Grady RN  Outcome: Progressing  Flowsheets (Taken 12/7/2022 0441)  Verbalizes/displays adequate comfort level or baseline comfort level:   Assess pain using appropriate pain scale   Administer analgesics based on type and severity of pain and evaluate response   Implement non-pharmacological measures as appropriate and evaluate response  12/6/2022 1749 by Gwen Parikh RN  Outcome: Progressing     Problem: Skin/Tissue Integrity  Goal: Absence of new skin breakdown  Description: 1. Monitor for areas of redness and/or skin breakdown  2. Assess vascular access sites hourly  3. Every 4-6 hours minimum:  Change oxygen saturation probe site  4. Every 4-6 hours:  If on nasal continuous positive airway pressure, respiratory therapy assess nares and determine need for appliance change or resting period. 12/7/2022 0441 by Keshawn Bills RN  Outcome: Progressing  12/6/2022 1749 by Gwen Parikh RN  Outcome: Progressing     Problem: Safety - Adult  Goal: Free from fall injury  12/7/2022 0441 by Keshawn Bills RN  Outcome: Progressing  Flowsheets (Taken 12/5/2022 0800 by Oelg Ness RN)  Free From Fall Injury: Instruct family/caregiver on patient safety  12/6/2022 1749 by Gwen Parikh RN  Outcome: Progressing     Problem: ABCDS Injury Assessment  Goal: Absence of physical injury  12/7/2022 0441 by Keshawn Bills RN  Outcome: Progressing  12/6/2022 1749 by Gwen Parikh RN  Outcome: Progressing     Problem: Safety - Medical Restraint  Goal: Remains free of injury from restraints (Restraint for Interference with Medical Device)  Description: INTERVENTIONS:  1. Determine that other, less restrictive measures have been tried or would not be effective before applying the restraint  2. Evaluate the patient's condition at the time of restraint application  3. Inform patient/family regarding the reason for restraint  4.  Q2H: Monitor safety, psychosocial status, comfort, nutrition and hydration  12/7/2022 0441 by Keshawn Bills RN  Outcome: Progressing  Flowsheets  Taken 12/7/2022 0400  Remains free of injury from restraints (restraint for interference with medical device):   Determine that other, less restrictive measures have been tried or would not be effective before applying the restraint   Evaluate the patient's condition at the time of restraint application   Inform patient/family regarding the reason for restraint   Every 2 hours: Monitor safety, psychosocial status, comfort, nutrition and hydration  Taken 12/7/2022 0200  Remains free of injury from restraints (restraint for interference with medical device):   Determine that other, less restrictive measures have been tried or would not be effective before applying the restraint   Evaluate the patient's condition at the time of restraint application   Every 2 hours: Monitor safety, psychosocial status, comfort, nutrition and hydration   Inform patient/family regarding the reason for restraint  Taken 12/7/2022 0000  Remains free of injury from restraints (restraint for interference with medical device):   Determine that other, less restrictive measures have been tried or would not be effective before applying the restraint   Inform patient/family regarding the reason for restraint   Evaluate the patient's condition at the time of restraint application   Every 2 hours: Monitor safety, psychosocial status, comfort, nutrition and hydration  Taken 12/6/2022 2200  Remains free of injury from restraints (restraint for interference with medical device):   Determine that other, less restrictive measures have been tried or would not be effective before applying the restraint   Evaluate the patient's condition at the time of restraint application   Inform patient/family regarding the reason for restraint   Every 2 hours: Monitor safety, psychosocial status, comfort, nutrition and hydration  Taken 12/6/2022 2000  Remains free of injury from restraints (restraint for interference with medical device):   Determine that other, less restrictive measures have been tried or would not be effective before applying the restraint   Evaluate the patient's condition at the time of restraint application   Inform patient/family regarding the reason for restraint   Every 2 hours: Monitor safety, psychosocial status, comfort, nutrition and hydration  12/6/2022 1749 by Bandar Rodriguez RN  Outcome: Progressing  Flowsheets (Taken 12/6/2022 1703)  Remains free of injury from restraints (restraint for interference with medical device):   Determine that other, less restrictive measures have been tried or would not be effective before applying the restraint   Evaluate the patient's condition at the time of restraint application   Inform patient/family regarding the reason for restraint   Every 2 hours: Monitor safety, psychosocial status, comfort, nutrition and hydration     Problem: Nutrition Deficit:  Goal: Optimize nutritional status  12/7/2022 0441 by Nicole Chi RN  Outcome: Progressing  12/6/2022 1749 by Bandar Rodriguez RN  Outcome: Progressing     Problem: Respiratory - Adult  Goal: Achieves optimal ventilation and oxygenation  12/7/2022 0441 by Nicole Chi RN  Outcome: Progressing  12/6/2022 1750 by Bandar Rodriguez RN  Outcome: Not Progressing     Problem: Cardiovascular - Adult  Goal: Maintains optimal cardiac output and hemodynamic stability  12/7/2022 0441 by Nicole Chi RN  Outcome: Progressing  12/6/2022 1750 by Bandar Rodriguez RN  Outcome: Not Progressing     Problem: Discharge Planning  Goal: Discharge to home or other facility with appropriate resources  12/7/2022 0441 by Nicole Chi RN  Outcome: Progressing  Flowsheets (Taken 12/6/2022 1700 by Bandar Rodriguez RN)  Discharge to home or other facility with appropriate resources:   Identify barriers to discharge with patient and caregiver   Refer to discharge planning if patient needs post-hospital services based on physician order or complex needs related to functional status, cognitive ability or social support system  12/6/2022 1749 by Bandar Rodriguez RN  Outcome: Not Progressing  Flowsheets  Taken 12/6/2022 1700 by Flora Holloway RN  Discharge to home or other facility with appropriate resources:   Identify barriers to discharge with patient and caregiver   Refer to discharge planning if patient needs post-hospital services based on physician order or complex needs related to functional status, cognitive ability or social support system  Taken 12/6/2022 1100 by Flora Holloway RN  Discharge to home or other facility with appropriate resources:   Identify barriers to discharge with patient and caregiver   Refer to discharge planning if patient needs post-hospital services based on physician order or complex needs related to functional status, cognitive ability or social support system  Taken 12/6/2022 0700 by Hawk Santamaria  Discharge to home or other facility with appropriate resources:   Identify barriers to discharge with patient and caregiver   Refer to discharge planning if patient needs post-hospital services based on physician order or complex needs related to functional status, cognitive ability or social support system     Problem: Respiratory - Adult  Goal: Achieves optimal ventilation and oxygenation  12/7/2022 0441 by Juwan Maravilla RN  Outcome: Progressing  12/6/2022 1750 by Flora Holloway RN  Outcome: Not Progressing     Problem: Cardiovascular - Adult  Goal: Maintains optimal cardiac output and hemodynamic stability  12/7/2022 0441 by Juwan Maravilla RN  Outcome: Progressing  12/6/2022 1750 by Flora Holloway RN  Outcome: Not Progressing

## 2022-12-07 NOTE — CONSULTS
solution Take 5 mg by mouth every hour as needed for Pain (or respiratory discomfort). Yes Historical Provider, MD   nicotine (NICODERM CQ) 21 MG/24HR Place 1 patch onto the skin every 24 hours   Yes Historical Provider, MD   omeprazole (PRILOSEC) 40 MG delayed release capsule Take 40 mg by mouth daily   Yes Historical Provider, MD   potassium chloride (KLOR-CON M) 20 MEQ extended release tablet Take 20 mEq by mouth 2 times daily   Yes Historical Provider, MD   predniSONE (DELTASONE) 20 MG tablet Take 20 mg by mouth daily   Yes Historical Provider, MD   budesonide (PULMICORT) 0.5 MG/2ML nebulizer suspension Take 1 ampule by nebulization 2 times daily   Yes Historical Provider, MD   senna (SENOKOT) 8.6 MG tablet Take 1 tablet by mouth 2 times daily as needed for Constipation   Yes Historical Provider, MD   sennosides-docusate sodium (SENOKOT-S) 8.6-50 MG tablet Take 2 tablets by mouth in the morning and at bedtime   Yes Historical Provider, MD   albuterol sulfate HFA (PROAIR HFA) 108 (90 Base) MCG/ACT inhaler Inhale 2 puffs into the lungs every 6 hours as needed for Wheezing 12/6/22   Wing Garcia MD   lisinopril (PRINIVIL;ZESTRIL) 10 MG tablet Take 1 tablet by mouth daily 11/14/22   Wing Garcia MD   dilTIAZem (CARDIZEM 12 HR) 120 MG extended release capsule Take 240 mg by mouth daily 10/17/22   Historical Provider, MD   hydrOXYzine HCl (ATARAX) 25 MG tablet Take 1 tablet by mouth at bedtime 10/27/22   Wing Garcia MD   Destini Curlin 851-22.2-64 MCG/INH AEPB INHALE 1 PUFF INTO THE LUNGS DAILY 10/6/22   Hali Hameed MD     Allergies  has No Known Allergies. Family History  family history includes Other in his mother. Social History   reports that he quit smoking about 3 months ago. His smoking use included cigarettes. He started smoking about 36 years ago. He has a 44.00 pack-year smoking history.  He has never used smokeless tobacco. He reports current alcohol use of about 35.0 standard drinks per week. He reports that he does not currently use drugs after having used the following drugs: Cocaine and Marijuana Garrel Calender). Review of Systems:  Unable to obtain detailed review of system. Patient is intubated on the ventilator. OBJECTIVE:   VITALS:  height is 6' 1\" (1.854 m) and weight is 204 lb 9.4 oz (92.8 kg). His oral temperature is 98.3 °F (36.8 °C). His blood pressure is 100/62 and his pulse is 122 (abnormal). His respiration is 21 and oxygen saturation is 100%. CONSTITUTIONAL: Intubated on the ventilator  SKIN: Skin color, texture, turgor normal. No rashes or lesions. LYMPH: no cervical nodes, no inguinal nodes  HEENT: Head is normocephalic, atraumatic. EOMI, PERRLA  NECK: Supple, symmetrical, trachea midline, no adenopathy, thyroid symmetric, not enlarged and no tenderness, skin normal  CHEST/LUNGS: chest symmetric with normal A/P diameter, normal respiratory rate and rhythm, lungs clear to auscultation without wheezes, rales or rhonchi. No accessory muscle use. Scars None   CARDIOVASCULAR: Heart regular rate and rhythm Normal S1 and S2. . Carotid and femoral pulses 2+/4 and equal bilaterally  ABDOMEN: Soft nondistended nontender  RECTAL: deferred, not clinically indicated  NEUROLOGIC: Deferred  EXTREMITIES: no cyanosis, no clubbing, and no edema    LABS:   CBC with Differential:    Lab Results   Component Value Date/Time    WBC 14.9 12/07/2022 04:23 AM    RBC 2.88 12/07/2022 04:23 AM    HGB 10.5 12/07/2022 04:23 AM    HCT 31.7 12/07/2022 04:23 AM     12/07/2022 04:23 AM    .1 12/07/2022 04:23 AM    MCH 36.6 12/07/2022 04:23 AM    MCHC 33.2 12/07/2022 04:23 AM    RDW 14.5 12/07/2022 04:23 AM    NRBC 1 12/07/2022 04:23 AM    LYMPHOPCT 6 12/07/2022 04:23 AM    MONOPCT 6 12/07/2022 04:23 AM    BASOPCT 0 12/07/2022 04:23 AM    MONOSABS 0.89 12/07/2022 04:23 AM    LYMPHSABS 0.89 12/07/2022 04:23 AM    EOSABS 0.00 12/07/2022 04:23 AM    BASOSABS 0.00 12/07/2022 04:23 AM DIFFTYPE NOT REPORTED 10/29/2020 07:10 AM     BMP:    Lab Results   Component Value Date/Time     12/07/2022 04:23 AM    K 3.8 12/07/2022 04:23 AM     12/07/2022 04:23 AM    CO2 32 12/07/2022 04:23 AM    BUN 28 12/07/2022 04:23 AM    LABALBU 2.6 12/01/2022 04:25 AM    CREATININE <0.40 12/07/2022 04:23 AM    CALCIUM 9.1 12/07/2022 04:23 AM    GFRAA >60 09/18/2022 05:45 AM    LABGLOM Can not be calculated 12/07/2022 04:23 AM    GLUCOSE 122 12/07/2022 04:23 AM     Hepatic Function Panel:    Lab Results   Component Value Date/Time    ALKPHOS 100 12/01/2022 04:25 AM    ALT 30 12/01/2022 04:25 AM    AST 16 12/01/2022 04:25 AM    PROT 4.5 12/01/2022 04:25 AM    BILITOT 0.6 12/01/2022 04:25 AM    BILIDIR 0.5 09/13/2022 05:45 PM    IBILI 0.7 09/13/2022 05:45 PM    LABALBU 2.6 12/01/2022 04:25 AM     Calcium:    Lab Results   Component Value Date/Time    CALCIUM 9.1 12/07/2022 04:23 AM     Magnesium:    Lab Results   Component Value Date/Time    MG 1.7 12/06/2022 04:42 AM     Phosphorus:    Lab Results   Component Value Date/Time    PHOS 2.8 12/04/2022 04:49 AM     PT/INR:    Lab Results   Component Value Date/Time    PROTIME 12.1 11/28/2022 02:00 PM    INR 0.9 11/28/2022 02:00 PM     ABG:    Lab Results   Component Value Date/Time    PHART 7.461 12/07/2022 04:45 AM    JZQ1IGS 49.2 12/07/2022 04:45 AM    PO2ART 50.1 12/07/2022 04:45 AM    CKC8GDZ 35.0 12/07/2022 04:45 AM    Q9DPUYKT 86.7 12/07/2022 04:45 AM     Urine Culture:  No components found for: CURINE  Blood Culture:  No components found for: CBLOOD, CFUNGUSBL  Stool Culture:  No components found for: CSTOOL    RADIOLOGY:   I have personally reviewed the following films:  CT HEAD WO CONTRAST    Result Date: 11/22/2022  EXAMINATION: CT OF THE HEAD WITHOUT CONTRAST  11/22/2022 2:59 pm TECHNIQUE: CT of the head was performed without the administration of intravenous contrast. Automated exposure control, iterative reconstruction, and/or weight based adjustment of the mA/kV was utilized to reduce the radiation dose to as low as reasonably achievable. COMPARISON: None. HISTORY: ORDERING SYSTEM PROVIDED HISTORY: altered TECHNOLOGIST PROVIDED HISTORY: altered Decision Support Exception - unselect if not a suspected or confirmed emergency medical condition->Emergency Medical Condition (MA) Reason for Exam: AMS, respiratory distress FINDINGS: There is no acute infarction, intracranial hemorrhage or intracranial mass lesion. No mass effect, midline shift or extra-axial collection is noted. There are moderate nonspecific foci of periventricular and subcortical cerebral white matter hypodensity, most likely representing chronic microangiopathic disease in this age group. The brain parenchyma is otherwise normal. The cerebellar tonsils are in normal position. The ventricles, sulci, and cisterns are mildly prominent suggestive of mild generalized volume loss. The globes and orbits are within normal limits. The visualized extracranial structures including paranasal sinuses and mastoid air cells are unremarkable. No fracture is identified. No evidence for acute intracranial hemorrhage, territorial infarction or intracranial mass lesion. Moderate chronic microangiopathic ischemic disease. Mild generalized volume loss. XR CHEST PORTABLE    Result Date: 11/23/2022  EXAMINATION: ONE XRAY VIEW OF THE CHEST 11/23/2022 6:06 am COMPARISON: 11/22/2022 HISTORY: ORDERING SYSTEM PROVIDED HISTORY: resp failure, on vent TECHNOLOGIST PROVIDED HISTORY: resp failure, on vent Reason for Exam: on vent FINDINGS: Endotracheal tube and enteric tube remain in place. Heart size is within normal limits. Masslike opacity persists at the left base. Mild patchy opacities at the right base are mildly improved. No new airspace consolidation. No evidence of pneumothorax or sizable pleural effusion. 1.  Unchanged masslike opacities at the left base.  2.  Mildly improved patchy opacities at the right base.     XR CHEST PORTABLE    Result Date: 11/22/2022  EXAMINATION: ONE XRAY VIEW OF THE CHEST 11/22/2022 1:39 pm COMPARISON: 09/13/2022 CT chest and chest radiograph HISTORY: ORDERING SYSTEM PROVIDED HISTORY: bipap TECHNOLOGIST PROVIDED HISTORY: bipap Reason for Exam: respiratory distress, ET and OG tube placements FINDINGS: Cardiomediastinal silhouette is within normal limits of size. The endotracheal tube tip measures 3.9 cm from the brendan. Enteric tube tip and side port overlie the stomach. Redemonstrated lingular mass, which may have increased in size since the prior examination. Right basilar opacities may represent atelectasis or infection. No pleural effusion or pneumothorax. No acute osseous abnormality. 1.  Appropriate positioning of the endotracheal and enteric tubes. 2.  A lingular mass appears to have increased in size since the prior examination and is suspicious for malignancy. Recommend tissue sampling if not previously performed. 3.  New right basilar opacities may represent infection or atelectasis. IMPRESSION:   Respiratory failure malnutrition  PNA. COPD. Suspected lung cancer. Alcohol use. does not have any pertinent problems on file. PLAN:   To proceed with tracheostomy tube and PEG tube placement today. Informed consent was obtained from [address] [place] [address]. Case discussed with nursing staff. Thank you for this interesting consult and for allowing us to participate in the care of this patient. If you have any questions please don't hesitate to call.           Electronically signed by Severaino Nicholas MD  on 12/7/2022 at 5:26 PM

## 2022-12-08 LAB
ABSOLUTE BANDS #: 0.37 K/UL (ref 0–1)
ABSOLUTE EOS #: 0 K/UL (ref 0–0.4)
ABSOLUTE LYMPH #: 0.09 K/UL (ref 1–4.8)
ABSOLUTE MONO #: 0.09 K/UL (ref 0.1–1.3)
ALLEN TEST: ABNORMAL
ANION GAP SERPL CALCULATED.3IONS-SCNC: 6 MMOL/L (ref 9–17)
BANDS: 4 % (ref 0–10)
BASOPHILS # BLD: 0 % (ref 0–2)
BASOPHILS ABSOLUTE: 0 K/UL (ref 0–0.2)
BUN BLDV-MCNC: 24 MG/DL (ref 6–20)
CALCIUM SERPL-MCNC: 8.8 MG/DL (ref 8.6–10.4)
CARBOXYHEMOGLOBIN: 1.1 % (ref 0–5)
CHLORIDE BLD-SCNC: 103 MMOL/L (ref 98–107)
CO2: 33 MMOL/L (ref 20–31)
CREAT SERPL-MCNC: <0.4 MG/DL (ref 0.7–1.2)
EOSINOPHILS RELATIVE PERCENT: 0 % (ref 0–4)
FIO2: 35
GFR SERPL CREATININE-BSD FRML MDRD: ABNORMAL ML/MIN/1.73M2
GLUCOSE BLD-MCNC: 115 MG/DL (ref 70–99)
HCO3 ARTERIAL: 36.1 MMOL/L (ref 22–26)
HCT VFR BLD CALC: 27.1 % (ref 41–53)
HEMOGLOBIN: 9 G/DL (ref 13.5–17.5)
LYMPHOCYTES # BLD: 1 % (ref 24–44)
MAGNESIUM: 1.7 MG/DL (ref 1.6–2.6)
MAGNESIUM: 1.7 MG/DL (ref 1.6–2.6)
MCH RBC QN AUTO: 36.7 PG (ref 26–34)
MCHC RBC AUTO-ENTMCNC: 33.1 G/DL (ref 31–37)
MCV RBC AUTO: 111 FL (ref 80–100)
METHEMOGLOBIN: 1 % (ref 0–1.9)
MODE: ABNORMAL
MONOCYTES # BLD: 1 % (ref 1–7)
MORPHOLOGY: ABNORMAL
MORPHOLOGY: ABNORMAL
O2 DEVICE/FLOW/%: ABNORMAL
O2 SAT, ARTERIAL: 91.7 % (ref 95–98)
PATIENT TEMP: 37
PCO2 ARTERIAL: 45 MMHG (ref 35–45)
PDW BLD-RTO: 14.5 % (ref 11.5–14.9)
PEEP/CPAP: 8
PH ARTERIAL: 7.51 (ref 7.35–7.45)
PLATELET # BLD: 94 K/UL (ref 150–450)
PMV BLD AUTO: 9.6 FL (ref 6–12)
PO2 ARTERIAL: 63.7 MMHG (ref 80–100)
POSITIVE BASE EXCESS, ART: 13.1 MMOL/L (ref 0–2)
POTASSIUM SERPL-SCNC: 2.8 MMOL/L (ref 3.7–5.3)
POTASSIUM SERPL-SCNC: 3.6 MMOL/L (ref 3.7–5.3)
PT. POSITION: ABNORMAL
RBC # BLD: 2.44 M/UL (ref 4.5–5.9)
RESPIRATORY RATE: 20
SAMPLE SITE: ABNORMAL
SEG NEUTROPHILS: 94 % (ref 36–66)
SEGMENTED NEUTROPHILS ABSOLUTE COUNT: 8.75 K/UL (ref 1.3–9.1)
SET RATE: 20
SODIUM BLD-SCNC: 142 MMOL/L (ref 135–144)
TEXT FOR RESPIRATORY: ABNORMAL
TOTAL RATE: 25
VT: 450
WBC # BLD: 9.3 K/UL (ref 3.5–11)

## 2022-12-08 PROCEDURE — 6370000000 HC RX 637 (ALT 250 FOR IP): Performed by: SURGERY

## 2022-12-08 PROCEDURE — 94640 AIRWAY INHALATION TREATMENT: CPT

## 2022-12-08 PROCEDURE — 80048 BASIC METABOLIC PNL TOTAL CA: CPT

## 2022-12-08 PROCEDURE — 82805 BLOOD GASES W/O2 SATURATION: CPT

## 2022-12-08 PROCEDURE — 6360000002 HC RX W HCPCS: Performed by: SURGERY

## 2022-12-08 PROCEDURE — 83735 ASSAY OF MAGNESIUM: CPT

## 2022-12-08 PROCEDURE — 2500000003 HC RX 250 WO HCPCS: Performed by: SURGERY

## 2022-12-08 PROCEDURE — 2580000003 HC RX 258: Performed by: SURGERY

## 2022-12-08 PROCEDURE — 85025 COMPLETE CBC W/AUTO DIFF WBC: CPT

## 2022-12-08 PROCEDURE — 84132 ASSAY OF SERUM POTASSIUM: CPT

## 2022-12-08 PROCEDURE — 36600 WITHDRAWAL OF ARTERIAL BLOOD: CPT

## 2022-12-08 PROCEDURE — 36415 COLL VENOUS BLD VENIPUNCTURE: CPT

## 2022-12-08 PROCEDURE — 2700000000 HC OXYGEN THERAPY PER DAY

## 2022-12-08 PROCEDURE — 2000000000 HC ICU R&B

## 2022-12-08 PROCEDURE — C9113 INJ PANTOPRAZOLE SODIUM, VIA: HCPCS | Performed by: SURGERY

## 2022-12-08 PROCEDURE — 94003 VENT MGMT INPAT SUBQ DAY: CPT

## 2022-12-08 PROCEDURE — 94761 N-INVAS EAR/PLS OXIMETRY MLT: CPT

## 2022-12-08 PROCEDURE — 6360000002 HC RX W HCPCS: Performed by: INTERNAL MEDICINE

## 2022-12-08 RX ADMIN — POTASSIUM CHLORIDE 10 MEQ: 10 INJECTION, SOLUTION INTRAVENOUS at 13:01

## 2022-12-08 RX ADMIN — SODIUM CHLORIDE, PRESERVATIVE FREE 40 MG: 5 INJECTION INTRAVENOUS at 08:05

## 2022-12-08 RX ADMIN — IPRATROPIUM BROMIDE AND ALBUTEROL SULFATE 1 AMPULE: 2.5; .5 SOLUTION RESPIRATORY (INHALATION) at 07:16

## 2022-12-08 RX ADMIN — METHYLPREDNISOLONE SODIUM SUCCINATE 60 MG: 125 INJECTION, POWDER, FOR SOLUTION INTRAMUSCULAR; INTRAVENOUS at 22:07

## 2022-12-08 RX ADMIN — METOPROLOL TARTRATE 5 MG: 5 INJECTION, SOLUTION INTRAVENOUS at 11:57

## 2022-12-08 RX ADMIN — POTASSIUM CHLORIDE 10 MEQ: 10 INJECTION, SOLUTION INTRAVENOUS at 11:11

## 2022-12-08 RX ADMIN — METOCLOPRAMIDE HYDROCHLORIDE 10 MG: 5 INJECTION INTRAMUSCULAR; INTRAVENOUS at 00:57

## 2022-12-08 RX ADMIN — DILTIAZEM HYDROCHLORIDE 30 MG: 30 TABLET, FILM COATED ORAL at 05:55

## 2022-12-08 RX ADMIN — PROPOFOL 35 MCG/KG/MIN: 10 INJECTION, EMULSION INTRAVENOUS at 06:29

## 2022-12-08 RX ADMIN — ALTEPLASE 1 MG: 2.2 INJECTION, POWDER, LYOPHILIZED, FOR SOLUTION INTRAVENOUS at 08:13

## 2022-12-08 RX ADMIN — SODIUM CHLORIDE, PRESERVATIVE FREE 10 ML: 5 INJECTION INTRAVENOUS at 20:26

## 2022-12-08 RX ADMIN — OXYCODONE HYDROCHLORIDE AND ACETAMINOPHEN 1 TABLET: 5; 325 TABLET ORAL at 18:07

## 2022-12-08 RX ADMIN — IPRATROPIUM BROMIDE AND ALBUTEROL SULFATE 1 AMPULE: 2.5; .5 SOLUTION RESPIRATORY (INHALATION) at 19:34

## 2022-12-08 RX ADMIN — POTASSIUM CHLORIDE 10 MEQ: 10 INJECTION, SOLUTION INTRAVENOUS at 14:43

## 2022-12-08 RX ADMIN — IPRATROPIUM BROMIDE AND ALBUTEROL SULFATE 1 AMPULE: 2.5; .5 SOLUTION RESPIRATORY (INHALATION) at 11:08

## 2022-12-08 RX ADMIN — FUROSEMIDE 20 MG: 10 INJECTION, SOLUTION INTRAMUSCULAR; INTRAVENOUS at 08:05

## 2022-12-08 RX ADMIN — SODIUM CHLORIDE, PRESERVATIVE FREE 20 MG: 5 INJECTION INTRAVENOUS at 20:25

## 2022-12-08 RX ADMIN — SODIUM CHLORIDE, PRESERVATIVE FREE 10 ML: 5 INJECTION INTRAVENOUS at 08:06

## 2022-12-08 RX ADMIN — IPRATROPIUM BROMIDE AND ALBUTEROL SULFATE 1 AMPULE: 2.5; .5 SOLUTION RESPIRATORY (INHALATION) at 03:34

## 2022-12-08 RX ADMIN — DILTIAZEM HYDROCHLORIDE 30 MG: 30 TABLET, FILM COATED ORAL at 11:57

## 2022-12-08 RX ADMIN — POTASSIUM CHLORIDE 10 MEQ: 10 INJECTION, SOLUTION INTRAVENOUS at 07:18

## 2022-12-08 RX ADMIN — SODIUM CHLORIDE, PRESERVATIVE FREE 10 ML: 5 INJECTION INTRAVENOUS at 20:28

## 2022-12-08 RX ADMIN — SODIUM CHLORIDE: 9 INJECTION, SOLUTION INTRAVENOUS at 15:25

## 2022-12-08 RX ADMIN — IPRATROPIUM BROMIDE AND ALBUTEROL SULFATE 1 AMPULE: 2.5; .5 SOLUTION RESPIRATORY (INHALATION) at 23:14

## 2022-12-08 RX ADMIN — METHYLPREDNISOLONE SODIUM SUCCINATE 60 MG: 125 INJECTION, POWDER, FOR SOLUTION INTRAMUSCULAR; INTRAVENOUS at 13:01

## 2022-12-08 RX ADMIN — THIAMINE HYDROCHLORIDE: 100 INJECTION, SOLUTION INTRAMUSCULAR; INTRAVENOUS at 09:24

## 2022-12-08 RX ADMIN — IPRATROPIUM BROMIDE AND ALBUTEROL SULFATE 1 AMPULE: 2.5; .5 SOLUTION RESPIRATORY (INHALATION) at 15:06

## 2022-12-08 RX ADMIN — METOPROLOL TARTRATE 5 MG: 5 INJECTION, SOLUTION INTRAVENOUS at 00:57

## 2022-12-08 RX ADMIN — BUDESONIDE 500 MCG: 0.5 SUSPENSION RESPIRATORY (INHALATION) at 19:34

## 2022-12-08 RX ADMIN — SODIUM CHLORIDE, PRESERVATIVE FREE 20 MG: 5 INJECTION INTRAVENOUS at 08:05

## 2022-12-08 RX ADMIN — ENOXAPARIN SODIUM 40 MG: 100 INJECTION SUBCUTANEOUS at 08:05

## 2022-12-08 RX ADMIN — IPRATROPIUM BROMIDE AND ALBUTEROL SULFATE 1 AMPULE: 2.5; .5 SOLUTION RESPIRATORY (INHALATION) at 00:02

## 2022-12-08 RX ADMIN — METOCLOPRAMIDE HYDROCHLORIDE 10 MG: 5 INJECTION INTRAMUSCULAR; INTRAVENOUS at 11:57

## 2022-12-08 RX ADMIN — METHYLPREDNISOLONE SODIUM SUCCINATE 60 MG: 125 INJECTION, POWDER, FOR SOLUTION INTRAMUSCULAR; INTRAVENOUS at 04:49

## 2022-12-08 RX ADMIN — DILTIAZEM HYDROCHLORIDE 30 MG: 30 TABLET, FILM COATED ORAL at 00:57

## 2022-12-08 RX ADMIN — SODIUM CHLORIDE: 9 INJECTION, SOLUTION INTRAVENOUS at 04:36

## 2022-12-08 RX ADMIN — SODIUM CHLORIDE, PRESERVATIVE FREE 10 ML: 5 INJECTION INTRAVENOUS at 08:04

## 2022-12-08 RX ADMIN — PROPOFOL 35 MCG/KG/MIN: 10 INJECTION, EMULSION INTRAVENOUS at 00:59

## 2022-12-08 RX ADMIN — DILTIAZEM HYDROCHLORIDE 30 MG: 30 TABLET, FILM COATED ORAL at 18:07

## 2022-12-08 RX ADMIN — METOCLOPRAMIDE HYDROCHLORIDE 10 MG: 5 INJECTION INTRAMUSCULAR; INTRAVENOUS at 18:07

## 2022-12-08 RX ADMIN — METOPROLOL TARTRATE 5 MG: 5 INJECTION, SOLUTION INTRAVENOUS at 15:58

## 2022-12-08 RX ADMIN — DILTIAZEM HYDROCHLORIDE 30 MG: 30 TABLET, FILM COATED ORAL at 22:07

## 2022-12-08 RX ADMIN — ANTI-FUNGAL POWDER MICONAZOLE NITRATE TALC FREE: 1.42 POWDER TOPICAL at 20:27

## 2022-12-08 RX ADMIN — METOPROLOL TARTRATE 5 MG: 5 INJECTION, SOLUTION INTRAVENOUS at 08:05

## 2022-12-08 RX ADMIN — OXYCODONE HYDROCHLORIDE AND ACETAMINOPHEN 1 TABLET: 5; 325 TABLET ORAL at 22:07

## 2022-12-08 RX ADMIN — POTASSIUM CHLORIDE 10 MEQ: 10 INJECTION, SOLUTION INTRAVENOUS at 06:04

## 2022-12-08 RX ADMIN — METHYLPREDNISOLONE SODIUM SUCCINATE 60 MG: 125 INJECTION, POWDER, FOR SOLUTION INTRAMUSCULAR; INTRAVENOUS at 15:58

## 2022-12-08 RX ADMIN — BUDESONIDE 500 MCG: 0.5 SUSPENSION RESPIRATORY (INHALATION) at 07:16

## 2022-12-08 RX ADMIN — METOPROLOL TARTRATE 5 MG: 5 INJECTION, SOLUTION INTRAVENOUS at 04:39

## 2022-12-08 RX ADMIN — METOCLOPRAMIDE HYDROCHLORIDE 10 MG: 5 INJECTION INTRAMUSCULAR; INTRAVENOUS at 06:04

## 2022-12-08 RX ADMIN — POTASSIUM CHLORIDE 10 MEQ: 10 INJECTION, SOLUTION INTRAVENOUS at 09:01

## 2022-12-08 RX ADMIN — CHLORHEXIDINE GLUCONATE 0.12% ORAL RINSE 15 ML: 1.2 LIQUID ORAL at 20:28

## 2022-12-08 RX ADMIN — FUROSEMIDE 20 MG: 10 INJECTION, SOLUTION INTRAMUSCULAR; INTRAVENOUS at 18:07

## 2022-12-08 RX ADMIN — OXYCODONE HYDROCHLORIDE AND ACETAMINOPHEN 1 TABLET: 5; 325 TABLET ORAL at 11:57

## 2022-12-08 RX ADMIN — CHLORHEXIDINE GLUCONATE 0.12% ORAL RINSE 15 ML: 1.2 LIQUID ORAL at 08:05

## 2022-12-08 RX ADMIN — METOPROLOL TARTRATE 5 MG: 5 INJECTION, SOLUTION INTRAVENOUS at 20:24

## 2022-12-08 RX ADMIN — ANTI-FUNGAL POWDER MICONAZOLE NITRATE TALC FREE: 1.42 POWDER TOPICAL at 11:56

## 2022-12-08 ASSESSMENT — PULMONARY FUNCTION TESTS
PIF_VALUE: 20
PIF_VALUE: 17
PIF_VALUE: 19
PIF_VALUE: 25
PIF_VALUE: 20
PIF_VALUE: 18
PIF_VALUE: 19
PIF_VALUE: 20
PIF_VALUE: 22
PIF_VALUE: 18
PIF_VALUE: 19
PIF_VALUE: 18
PIF_VALUE: 18
PIF_VALUE: 19
PIF_VALUE: 20
PIF_VALUE: 18

## 2022-12-08 ASSESSMENT — PAIN SCALES - GENERAL
PAINLEVEL_OUTOF10: 0
PAINLEVEL_OUTOF10: 6
PAINLEVEL_OUTOF10: 0
PAINLEVEL_OUTOF10: 0
PAINLEVEL_OUTOF10: 5
PAINLEVEL_OUTOF10: 5

## 2022-12-08 ASSESSMENT — PAIN DESCRIPTION - LOCATION
LOCATION: BACK

## 2022-12-08 ASSESSMENT — PAIN DESCRIPTION - DESCRIPTORS
DESCRIPTORS: ACHING
DESCRIPTORS: ACHING;CRAMPING;DISCOMFORT

## 2022-12-08 ASSESSMENT — PAIN DESCRIPTION - ORIENTATION: ORIENTATION: RIGHT;LEFT

## 2022-12-08 NOTE — PROGRESS NOTES
[facility]   OCCUPATIONAL THERAPY MISSED TREATMENT NOTE   INPATIENT   Date: 22  Patient Name: Amara Jack       Room:   MRN: [id number]   Account #: [de-identified]    : 1965  (61 y.o.)  Gender: male   Referring Practitioner: Dr. Sofia Bray  Diagnosis: Respiratory failure, septic shock             REASON FOR MISSED TREATMENT:  Hold treatment per nursing request   -  JAYLEN Santos deferred treatment at this time due to pt participating in wean trial. OT will continue to follow. Attempt made at 0937.     [address], [place]

## 2022-12-08 NOTE — PROGRESS NOTES
Lulu Meredith (dietician) notified that Dr. Ankush trotter with starting tube feeds- would like them to strictly stay at 20ml/hr until told otherwise.

## 2022-12-08 NOTE — PROGRESS NOTES
Dr. Elayne Bass okay with tube feeds starting at 20ml/hr today. MD states do not advance any further than 20ml/hr.

## 2022-12-08 NOTE — CARE COORDINATION
ONGOING DISCHARGE PLAN:    Pt has trach and peg placed on 12/7/22. Vent FIO2 35%, sats 100%. Referral to [facility] on 12/7, Lyle Cole stated they could accept and precert  will be started today. CM spoke to Dtr Karma Nichols and updated her on referral to [facility]. Dtr is agreeable to [facility]. CM also explained that once he is ready to be discharged they can assist with returning to [facility]. Will continue to follow for additional discharge needs.     Electronically signed by Rupal Brito RN on 12/8/2022 at 11:03 AM

## 2022-12-08 NOTE — PROGRESS NOTES
Date:   12/8/2022  Patient name: Demar Larsen  Date of admission:  11/22/2022 12:17 PM  MRN:   [id number]  YOB: 1965  PCP: JAEL Tony CNP    Reason for Admission: Respiratory failure [facility]) [J96.90]  Septic shock ([facility] [address] [A41.9, R65.21]  Acute on chronic respiratory failure with hypoxia [facility]) [J96.21]    Cardiology follow up: Episodes of sinus tachycardia, sinus bradycardia, nonsustained V. tach 5-7 beats, ventricular rate about 110       Referring physician: Dr Taniya Smallwood  Episodes of sinus tach most likely physiological  Episodes of nonsustained bradycardia most likely vasovagal  Episodes of nonsustained V. tach 6-7 beats heart rate above 110/hypoxia hypokalemia  Respiratory failure/severe COPD secondary to smoking/on ventilator  History of lung mass/suspected lung cancer  History of COVID-19 infection  Respiratory failure, status post tracheostomy 12/7/2022  Status post PEG tube placement 12/7/2022  History of alcohol abuse  Protein malnutrition, albumin 2.6  Anemia, thrombocytopenia    Past surgical history include bilateral hip replacement, neck surgery, vasectomy     2D echo 9/14/2022  Normal LV size and wall thickness ejection fraction more than 55%  No significant valvular abnormality     ECG 9/13/2022  Sinus tachycardia otherwise normal ECG     Chest x-ray 11/23/2022  Unchanged masslike opacity at the left base, mildly improved patchy opacity at the right base  Endotracheal tube and enteric tubes remain in place, heart size is within normal limit     History of present illness     27-year-old male a nursing home resident with a past medical history of severe COPD, CA lung got readmitted on 11/22/2022 with the respiratory failure, altered mental status and he required intubation and vent support. He got extubated on 11/24/2022. He was recently admitted at [facility] with respiratory failure required vent support.   He has been on beta-blockers and Cardizem, steroids, bronchodilators. On continuous ECG monitoring episodes of sinus tachycardia noted. Occasionally he gets transient sinus bradycardia. He has had a few episodes of nonsustained broad complex rhythm heart rate about 110, 6-7 beats. No syncope no chest pain.     On admission lab work showed   high-sensitivity troponin 25 and 29  Hemoglobin 11.1, WBC 20.5, platelets 092  Sodium 132, potassium 4.0, CO2 35, BUN 12, creatinine 0.40, glucose 145, calcium 9.0     Current evaluation  Patient seen and examined in ICU yesterday and today  He had tracheostomy and PEG tube placement yesterday  He had episode of hypotension last night improved with IV fluid bolus  This morning he was awake/did not follow verbal command  He was taken off the ventilator and started on trach collar  Started on PEG tube feeding  He continues to have sinus tachycardia  Blood pressure is stable 138/70  ECG monitor sinus rhythm    Hemoglobin 9.0, platelet 94  Sodium 852, potassium 2.8, BUN 24, creatinine less than 0.40, magnesium 1.7, glucose 115    Medications:   Scheduled Meds:   miconazole   Topical BID    chlorhexidine  15 mL Mouth/Throat BID    famotidine (PEPCID) injection  20 mg IntraVENous BID    furosemide  20 mg IntraVENous BID    methylPREDNISolone  60 mg IntraVENous Q6H    metoprolol  5 mg IntraVENous Q4H    metoclopramide  10 mg IntraVENous Q6H    pantoprazole (PROTONIX) 40 mg injection  40 mg IntraVENous Daily    thiamine and folic acid IVPB   IntraVENous Daily    dilTIAZem  30 mg Oral 4 times per day    nicotine  1 patch TransDERmal Daily    [Held by provider] metoprolol tartrate  25 mg Oral BID    sodium chloride flush  5-40 mL IntraVENous 2 times per day    sodium chloride flush  5-40 mL IntraVENous 2 times per day    enoxaparin  40 mg SubCUTAneous Daily    ipratropium-albuterol  1 ampule Inhalation Q4H    budesonide  0.5 mg Nebulization BID     Continuous Infusions:   phenylephrine (ROSA-SYNEPHRINE) 50mg/250mL infusion Stopped (12/07/22 1845)    sodium chloride 100 mL/hr at 12/08/22 0436    dexmedetomidine Stopped (12/06/22 1625)    propofol Stopped (12/08/22 1030)    sodium chloride      sodium chloride       CBC:   Recent Labs     12/06/22  0442 12/07/22  0423 12/08/22 0428   WBC 16.3* 14.9* 9.3   HGB 11.0* 10.5* 9.0*   * 130* 94*     BMP:    Recent Labs     12/06/22  0442 12/06/22  1416 12/07/22  0423 12/08/22 0428     --  139 142   K 3.2* 4.2 3.8 2.8*     --  101 103   CO2 32*  --  32* 33*   BUN 25*  --  28* 24*   CREATININE <0.40*  --  <0.40* <0.40*   GLUCOSE 102*  --  122* 115*     Hepatic: No results for input(s): AST, ALT, ALB, BILITOT, ALKPHOS in the last 72 hours. Troponin: No results for input(s): TROPONINI in the last 72 hours. BNP: No results for input(s): BNP in the last 72 hours. Lipids: No results for input(s): CHOL, HDL in the last 72 hours. Invalid input(s): LDLCALCU  INR: No results for input(s): INR in the last 72 hours. Objective:   Vitals: /71   Pulse (!) 123   Temp 97.2 °F (36.2 °C) (Axillary)   Resp 27   Ht 6' 1\" (1.854 m)   Wt 204 lb 9.4 oz (92.8 kg)   SpO2 100%   BMI 26.99 kg/m²   General appearance: Tired and frail looking male  HEENT: Head: Normal, normocephalic, atraumatic. Neck:  Tracheostomy noted difficult to assess neck vein  Lungs: diminished breath sounds bilaterally  Heart:  Cardiac apical impulse not palpable heart sounds distant  Abdomen:  PEG tube noted bowel sounds present  Extremities:  Mild edema upper extremities, no sign of DVT lower extremity  Neurologic: Mental status: Awake follow verbal commands    EKG: normal sinus rhythm. ECHO: reviewed.    Ejection fraction: 55%    Assessment / Acute Cardiac Problems:   Sinus tachycardia most likely physiological  Episodes of bradycardia most likely vagal effect  Nonsustained broad complex rhythm/V. tach, heart rate up to 110 most likely secondary to hypoxia, hypokalemia  Normal LV systolic function  Severe COPD, hypoxia, respiratory failure requiring vent support  Lung mass suspected carcinoma lung  Tracheostomy and PEG tube placement 12/7/2022    Patient Active Problem List:     Cellulitis of b/l lower extremity     Cellulitis of lower extremity     Alcoholism ([address]     Essential hypertension     Hyperkeratosis of b/l [place]     Alcohol withdrawal (HCC)     Tinea pedis of both feet     Suicidal ideation     Dyspnea     COPD exacerbation (HCC)     Gastroesophageal reflux disease without esophagitis     Cigarette smoker     Avascular necrosis of femoral head (HCC)     Tachycardia     Stage 3 severe COPD by GOLD classification (HCC)     Tobacco dependence     Chronic obstructive pulmonary disease (HCC)     Lung malignancy (HCC)     Cough with hemoptysis     Sepsis ([address]     COVID-19     Mass of lingula of lung     Hyponatremia     Respiratory failure ([address]      Plan of Treatment:   Medications reviewed  Continue IV Lopressor 5 mg every 4 hours, Cardizem 30 mg by PEG tube 4 times a day  Hold diuretics  Continue current dose of subcu Lovenox  Continue with the bronchodilators, folic acid, thiamine, steroids  Pulmonology notes reviewed  Started on PEG tube feeding  Replace magnesium and potassium  Recheck magnesium and potassium at 4 PM        Electronically signed by Rodrigo Perea MD on 12/8/2022 at 12:52 PM

## 2022-12-08 NOTE — PLAN OF CARE
Problem: Discharge Planning  Goal: Discharge to home or other facility with appropriate resources  12/8/2022 0630 by Rubin Chapa RN  Outcome: Progressing  12/7/2022 1934 by Ghulam Bergeron RN  Outcome: Progressing     Problem: Pain  Goal: Verbalizes/displays adequate comfort level or baseline comfort level  12/8/2022 0630 by Rubin Chapa RN  Outcome: Progressing  12/7/2022 1934 by Ghulam Bergeron RN  Outcome: Progressing  Note: No complaints of pain. Problem: Skin/Tissue Integrity  Goal: Absence of new skin breakdown  Description: 1. Monitor for areas of redness and/or skin breakdown  2. Assess vascular access sites hourly  3. Every 4-6 hours minimum:  Change oxygen saturation probe site  4. Every 4-6 hours:  If on nasal continuous positive airway pressure, respiratory therapy assess nares and determine need for appliance change or resting period. 12/8/2022 0630 by Rubin Chapa RN  Outcome: Progressing  12/7/2022 1934 by Ghulam Bergeron RN  Outcome: Progressing     Problem: Safety - Adult  Goal: Free from fall injury  12/8/2022 0630 by Rubin Chapa RN  Outcome: Progressing  12/7/2022 1934 by Ghulam Bergeron RN  Outcome: Progressing     Problem: ABCDS Injury Assessment  Goal: Absence of physical injury  12/8/2022 0630 by Rubin Chapa RN  Outcome: Progressing  12/7/2022 1934 by Ghulam Bergeron RN  Outcome: Progressing     Problem: Safety - Medical Restraint  Goal: Remains free of injury from restraints (Restraint for Interference with Medical Device)  Description: INTERVENTIONS:  1. Determine that other, less restrictive measures have been tried or would not be effective before applying the restraint  2. Evaluate the patient's condition at the time of restraint application  3. Inform patient/family regarding the reason for restraint  4.  Q2H: Monitor safety, psychosocial status, comfort, nutrition and hydration  12/8/2022 0630 by Rubin Chapa RN  Outcome: Progressing  12/7/2022 1934 by Jolan Aloe, RN  Outcome: Progressing  Flowsheets (Taken 12/7/2022 0600 by Fabricio Wheeler RN)  Remains free of injury from restraints (restraint for interference with medical device):   Determine that other, less restrictive measures have been tried or would not be effective before applying the restraint   Evaluate the patient's condition at the time of restraint application   Inform patient/family regarding the reason for restraint   Every 2 hours: Monitor safety, psychosocial status, comfort, nutrition and hydration  Note: When restraints are removed patient will move towards ET tube. Problem: Nutrition Deficit:  Goal: Optimize nutritional status  12/8/2022 0630 by Jason Vasquez RN  Outcome: Progressing  12/7/2022 1934 by Calvin Long RN  Outcome: Progressing  Flowsheets (Taken 12/7/2022 1711 by Lyric Cantu RD, LD)  Nutrient intake appropriate for improving, restoring, or maintaining nutritional needs: Recommend, monitor, and adjust tube feedings and TPN/PPN based on assessed needs  Note: PEG tube placed today. Problem: Respiratory - Adult  Goal: Achieves optimal ventilation and oxygenation  12/8/2022 0630 by Jason Vasquez RN  Outcome: Progressing  12/7/2022 1934 by Calvin Long RN  Outcome: Progressing  Note: Patient had a trach placed today. Continues on vent.       Problem: Cardiovascular - Adult  Goal: Maintains optimal cardiac output and hemodynamic stability  12/8/2022 0630 by Jason Vasquez RN  Outcome: Progressing  12/7/2022 1934 by Calvin Long RN  Outcome: Progressing

## 2022-12-08 NOTE — PLAN OF CARE
Problem: Pain  Goal: Verbalizes/displays adequate comfort level or baseline comfort level  Outcome: Progressing  Note: No complaints of pain. Problem: Safety - Medical Restraint  Goal: Remains free of injury from restraints (Restraint for Interference with Medical Device)  Description: INTERVENTIONS:  1. Determine that other, less restrictive measures have been tried or would not be effective before applying the restraint  2. Evaluate the patient's condition at the time of restraint application  3. Inform patient/family regarding the reason for restraint  4. Q2H: Monitor safety, psychosocial status, comfort, nutrition and hydration  Recent Flowsheet Documentation  Taken 12/7/2022 0600 by Keshawn Bills RN  Remains free of injury from restraints (restraint for interference with medical device):   Determine that other, less restrictive measures have been tried or would not be effective before applying the restraint   Evaluate the patient's condition at the time of restraint application   Inform patient/family regarding the reason for restraint   Every 2 hours: Monitor safety, psychosocial status, comfort, nutrition and hydration     Problem: Safety - Medical Restraint  Goal: Remains free of injury from restraints (Restraint for Interference with Medical Device)  Description: INTERVENTIONS:  1. Determine that other, less restrictive measures have been tried or would not be effective before applying the restraint  2. Evaluate the patient's condition at the time of restraint application  3. Inform patient/family regarding the reason for restraint  4.  Q2H: Monitor safety, psychosocial status, comfort, nutrition and hydration  Outcome: Progressing  Flowsheets (Taken 12/7/2022 0600 by Keshawn Bills RN)  Remains free of injury from restraints (restraint for interference with medical device):   Determine that other, less restrictive measures have been tried or would not be effective before applying the restraint Evaluate the patient's condition at the time of restraint application   Inform patient/family regarding the reason for restraint   Every 2 hours: Monitor safety, psychosocial status, comfort, nutrition and hydration  Note: When restraints are removed patient will move towards ET tube. Problem: Nutrition Deficit:  Goal: Optimize nutritional status  Outcome: Progressing  Flowsheets (Taken 12/7/2022 1711 by Maximiliano Saini, RD, LD)  Nutrient intake appropriate for improving, restoring, or maintaining nutritional needs: Recommend, monitor, and adjust tube feedings and TPN/PPN based on assessed needs  Note: PEG tube placed today. Problem: Respiratory - Adult  Goal: Achieves optimal ventilation and oxygenation  Outcome: Progressing  Note: Patient had a trach placed today. Continues on vent.

## 2022-12-08 NOTE — PROGRESS NOTES
BRONCHOSPASM/BRONCHOCONSTRICTION     [x]         IMPROVE AERATION/BREATH SOUNDS  [x]   ADMINISTER BRONCHODILATOR THERAPY AS APPROPRIATE  [x]   ASSESS BREATH SOUNDS  []   IMPLEMENT AEROSOL/MDI PROTOCOL  [x]   PATIENT EDUCATION AS NEEDED   PROVIDE ADEQUATE OXYGENATION WITH ACCEPTABLE SP02/ABG'S    [x]  IDENTIFY APPROPRIATE OXYGEN THERAPY  [x]   MONITOR SP02/ABG'S AS NEEDED   [x]   PATIENT EDUCATION AS NEEDED  Pt placed on CPAP/PS , pt tolerating at this time

## 2022-12-08 NOTE — ANESTHESIA POSTPROCEDURE EVALUATION
POST-ANESTHESIA EVALUATION       Pt Name: Jarold Halsted  MRN: [id number]  YOB: 1965  Date of evaluation: 12/7/2022  Time:  8:03 PM      /71   Pulse (!) 113   Temp 98.3 °F (36.8 °C) (Oral)   Resp 20   Ht 6' 1\" (1.854 m)   Wt 204 lb 9.4 oz (92.8 kg)   SpO2 100%   BMI 26.99 kg/m²      Consciousness Level  []  Awake  []  Sedated but arouseable  [x]  Deeply sedated  Cardiopulmonary Status  []  Stable with patent airway    []  OTHER  Pain Adequately Treated []   YES   []  NO    Nausea / Vomiting  []   YES   [x]  NO  Adequate Hydration  [x]   YES   []  NO  Anesthesia Related Complications []   YES   [x]  NO      Electronically signed by July Jean-Baptiste MD on 12/7/2022 at 8:03 PM      Department of Anesthesiology  Postprocedure Note    Patient: Jarold Halsted  MRN: [id number]  YOB: 1965  Date of evaluation: 12/7/2022      Procedure Summary     Date: 12/07/22 Room / Location: [address] [place] [address] Natalia Carney / [facility]    Anesthesia Start: 1552 Anesthesia Stop: 2106    Procedures:       TRACHEOTOMY (Throat)      EGD ESOPHAGOGASTRODUODENOSCOPY  BIOPSY WITH PEG TUBE INSERTION (Esophagus) Diagnosis:       Respiratory failure, unspecified chronicity, unspecified whether with hypoxia or hypercapnia (HCC)      Malnutrition, unspecified type (HCC)      (Respiratory failure, unspecified chronicity, unspecified whether with hypoxia or hypercapnia ([address] [J96.90])      (Malnutrition, unspecified type ([address] Jacy Gallo)    Surgeons: Perry Ledesma MD Responsible Provider: July Jean-Baptiste MD    Anesthesia Type: general ASA Status: 4 - Emergent          Anesthesia Type: No value filed.     Quynh Phase I:      Quynh Phase II:        Anesthesia Post Evaluation

## 2022-12-08 NOTE — PROGRESS NOTES
PULMONARY PROGRESS NOTE:    REASON FOR VISIT:copd exac, resp failure  Interval History:    Unable to obtain- on vent via trach     Events since last visit: Trach and peg placed yesterday afternoon. He is currently on cpap and looks comfortable. Trach collar to be attempted later. He is off levophed.      PAST MEDICAL HISTORY:      Scheduled Meds:   miconazole   Topical BID    chlorhexidine  15 mL Mouth/Throat BID    famotidine (PEPCID) injection  20 mg IntraVENous BID    furosemide  20 mg IntraVENous BID    methylPREDNISolone  60 mg IntraVENous Q6H    metoprolol  5 mg IntraVENous Q4H    metoclopramide  10 mg IntraVENous Q6H    pantoprazole (PROTONIX) 40 mg injection  40 mg IntraVENous Daily    thiamine and folic acid IVPB   IntraVENous Daily    dilTIAZem  30 mg Oral 4 times per day    nicotine  1 patch TransDERmal Daily    [Held by provider] metoprolol tartrate  25 mg Oral BID    sodium chloride flush  5-40 mL IntraVENous 2 times per day    sodium chloride flush  5-40 mL IntraVENous 2 times per day    enoxaparin  40 mg SubCUTAneous Daily    ipratropium-albuterol  1 ampule Inhalation Q4H    budesonide  0.5 mg Nebulization BID     Continuous Infusions:   phenylephrine (ROSA-SYNEPHRINE) 50mg/250mL infusion Stopped (12/07/22 1845)    sodium chloride 100 mL/hr at 12/08/22 0436    dexmedetomidine Stopped (12/06/22 1625)    propofol 15 mcg/kg/min (12/08/22 0930)    sodium chloride      sodium chloride       PRN Meds:potassium chloride, dextromethorphan-guaiFENesin, LORazepam, oxyCODONE-acetaminophen, potassium chloride **OR** potassium alternative oral replacement **OR** potassium chloride, sodium chloride flush, sodium chloride, sodium chloride flush, sodium chloride, acetaminophen, ondansetron **OR** ondansetron        PHYSICAL EXAMINATION:  BP (!) 153/86   Pulse (!) 112   Temp 97.2 °F (36.2 °C) (Axillary)   Resp 12   Ht 6' 1\" (1.854 m)   Wt 204 lb 9.4 oz (92.8 kg)   SpO2 100%   BMI 26.99 kg/m²     General : Awake, alert,   Neck - supple, trach to vent   Heart - Tachy, S1 and S2 normal; no additional sounds heard  Lungs - Air Entry- poor bilaterally; breath sounds : vesicular;   rales/crackles - absent  Abdomen - soft, no tenderness peg to gravity drainage   Upper Extremities  - no cyanosis, mottling; edema : absent  Lower Extremities: no cyanosis, mottling; edema : absent    Current Laboratory, Radiologic, Microbiologic, and Diagnostic studies reviewed  Data ReviewCBC:   Recent Labs     12/06/22  0442 12/07/22  0423 12/08/22  0428   WBC 16.3* 14.9* 9.3   RBC 3.04* 2.88* 2.44*   HGB 11.0* 10.5* 9.0*   HCT 33.7* 31.7* 27.1*   * 130* 94*     BMP:   Recent Labs     12/06/22  0442 12/06/22  1416 12/07/22  0423 12/08/22  0428   GLUCOSE 102*  --  122* 115*     --  139 142   K 3.2* 4.2 3.8 2.8*   BUN 25*  --  28* 24*   CREATININE <0.40*  --  <0.40* <0.40*   CALCIUM 8.6  --  9.1 8.8     ABGs:   Recent Labs     12/06/22  1720 12/07/22  0445 12/08/22  0423   PHART 7.292* 7.461* 7.512*   PO2ART 146.0* 50.1* 63.7*   [id number] 69.1* 49.2* 45.0   TDY8YAV 33.4* 35.0* 36.1*   P3TSCSSQ 97.2 86.7* 91.7*      PT/INR:  No results found for: PTINR    ASSESSMENT / PLAN:    Acute hypoxic respiratory failure   Mechanical ventilation - extubated 11/23/22; reintubated 11/28/22. Was extubated on 12/5/22.   Reintubated 12/6/22  Trach and peg placed 12/6   On cpap and doing well, will trial trach collar later this afternoon   Hypotension -  PICC  Levophed weaned off   PNA - HCAP/ ABx  COPD     BD, increase steroids - no cuff leak  Suspected lung CA  Alcohol use      thiamine / folic acid     Monitor for DTs  IV fluids  H&H q 6  reglan  D/c vanco  TF to start later today   D/c planning- [facility]      Electronically signed by Andree Daley MD on 12/08/22 at 10:40 AM.

## 2022-12-08 NOTE — PROGRESS NOTES
Secure message sent to Dr. Pasquale Hugo to clarify orders. Per order PEG is to be hooked to gravity, however note states okay to start tube feeds. Awaiting response.

## 2022-12-08 NOTE — PROGRESS NOTES
Patient awake and very confused on what is going on- tears streaming down his face. Writer explained that patient went to the OR to get trach and PEG, patient nodded. Writer also explained he will not be able to speak while trach cuff is inflated, nodded his head in understanding. Continuing wean trial and sedation vacation at this time.

## 2022-12-08 NOTE — PROGRESS NOTES
Pt continues to tolerate trach collar 35% cuff deflated pt still unable to talk however pt witting on board. No distress noted at this time.

## 2022-12-08 NOTE — PROGRESS NOTES
RN notified Dr. Kate Castillo that one of the lumens on patients PICC is not flushing. Order received for Cathflo.

## 2022-12-08 NOTE — PROGRESS NOTES
Dr. Sarah Drake at bedside with writer and RT Shyanne Bolivar- okay to place on trach collar and attempt to take air out of cuff.

## 2022-12-08 NOTE — PROGRESS NOTES
Comprehensive Nutrition Assessment    Type and Reason for Visit:  Reassess    Nutrition Recommendations/Plan:   Vital AF to start at 20 ml/hr only via PEG tube; follow for progression. Malnutrition Assessment:  Malnutrition Status: At risk for malnutrition (Comment) (11/28/22 1350)    Context:  Acute Illness     Findings of the 6 clinical characteristics of malnutrition:  Energy Intake:  No significant decrease in energy intake  Weight Loss:  No significant weight loss     Body Fat Loss:  Unable to assess     Muscle Mass Loss:  Unable to assess    Fluid Accumulation:  Mild Extremities   Strength:  Not Performed    Nutrition Assessment:    Nurse called writer this morning indicating tube feedings can start via PEG just at 20 ml/hr. PEG & trach placed yesterday. Nutrition Related Findings:    Edema: +3 pitting all extremities. BM- 12/6. Labs & meds reviewed. Wound Type: None       Current Nutrition Intake & Therapies:    Average Meal Intake: NPO     Diet NPO  ADULT TUBE FEEDING; PEG; Peptide Based; Continuous; 20; No; 10; Q 8 hours  Current Tube Feeding (TF) Orders:  Feeding Route: PEG  Formula: Peptide Based  Schedule: Continuous  Feeding Regimen: 20 ml only per surgery  Water Flushes: 10 ml every 8 hours (getting IV fluids)  Current TF & Flush Orders Provides: 576 kcals, 36 gm protein      Anthropometric Measures:  Height: 6' 1\" (185.4 cm)  Ideal Body Weight (IBW): 184 lbs (84 kg)    Admission Body Weight: 185 lb (83.9 kg)  Current Body Weight: 204 lb (92.5 kg),   IBW. Weight Source: Bed Scale  Current BMI (kg/m2): 26.9  Usual Body Weight: 201 lb (91.2 kg) (8/22)  % Weight Change (Calculated): -1.5                    BMI Categories: Overweight (BMI 25.0-29. 9)    Estimated Daily Nutrient Needs:  Energy Requirements Based On: Formula  Weight Used for Energy Requirements: Admission  Energy (kcal/day): [place] x 1.2= 8773-6113 kcal  Weight Used for Protein Requirements: Admission  Protein (g/day): 126-143 gm protein based on 1.5-1.7 gm/kg         Nutrition Diagnosis:   Inadequate oral intake related to impaired respiratory function as evidenced by NPO or clear liquid status due to medical condition    Nutrition Interventions:   Food and/or Nutrient Delivery: Continue NPO, Start Tube Feeding  Nutrition Education/Counseling: No recommendation at this time  Coordination of Nutrition Care: Continue to monitor while inpatient       Goals:  Previous Goal Met: Progressing toward Goal(s)  Goals: Initiate nutrition support       Nutrition Monitoring and Evaluation:      Food/Nutrient Intake Outcomes: Enteral Nutrition Intake/Tolerance  Physical Signs/Symptoms Outcomes: Biochemical Data, GI Status, Fluid Status or Edema, Skin, Weight    Discharge Planning:    Enteral Nutrition     Nancy Loo [id number]

## 2022-12-09 LAB
ABSOLUTE BANDS #: 0.6 K/UL (ref 0–1)
ABSOLUTE EOS #: 0 K/UL (ref 0–0.4)
ABSOLUTE LYMPH #: 0.3 K/UL (ref 1–4.8)
ABSOLUTE MONO #: 0.89 K/UL (ref 0.1–1.3)
ALLEN TEST: ABNORMAL
ANION GAP SERPL CALCULATED.3IONS-SCNC: 5 MMOL/L (ref 9–17)
BANDS: 4 % (ref 0–10)
BASOPHILS # BLD: 0 % (ref 0–2)
BASOPHILS ABSOLUTE: 0 K/UL (ref 0–0.2)
BUN BLDV-MCNC: 21 MG/DL (ref 6–20)
CALCIUM SERPL-MCNC: 9.1 MG/DL (ref 8.6–10.4)
CARBOXYHEMOGLOBIN: 1.5 % (ref 0–5)
CHLORIDE BLD-SCNC: 101 MMOL/L (ref 98–107)
CO2: 35 MMOL/L (ref 20–31)
CREAT SERPL-MCNC: <0.4 MG/DL (ref 0.7–1.2)
EOSINOPHILS RELATIVE PERCENT: 0 % (ref 0–4)
GFR SERPL CREATININE-BSD FRML MDRD: ABNORMAL ML/MIN/1.73M2
GLUCOSE BLD-MCNC: 108 MG/DL (ref 70–99)
HCO3 ARTERIAL: 37.3 MMOL/L (ref 22–26)
HCT VFR BLD CALC: 30.9 % (ref 41–53)
HEMOGLOBIN: 10.3 G/DL (ref 13.5–17.5)
LYMPHOCYTES # BLD: 2 % (ref 24–44)
MAGNESIUM: 1.6 MG/DL (ref 1.6–2.6)
MCH RBC QN AUTO: 36.6 PG (ref 26–34)
MCHC RBC AUTO-ENTMCNC: 33.4 G/DL (ref 31–37)
MCV RBC AUTO: 109.6 FL (ref 80–100)
METHEMOGLOBIN: 1 % (ref 0–1.9)
MONOCYTES # BLD: 6 % (ref 1–7)
MORPHOLOGY: ABNORMAL
MORPHOLOGY: ABNORMAL
O2 DEVICE/FLOW/%: ABNORMAL
O2 SAT, ARTERIAL: 84.8 % (ref 95–98)
PATIENT TEMP: 37.1
PCO2 ARTERIAL: 49 MMHG (ref 35–45)
PDW BLD-RTO: 14.4 % (ref 11.5–14.9)
PH ARTERIAL: 7.49 (ref 7.35–7.45)
PLATELET # BLD: 100 K/UL (ref 150–450)
PMV BLD AUTO: 9.1 FL (ref 6–12)
PO2 ARTERIAL: 51.1 MMHG (ref 80–100)
POSITIVE BASE EXCESS, ART: 14 MMOL/L (ref 0–2)
POTASSIUM SERPL-SCNC: 3.1 MMOL/L (ref 3.7–5.3)
PT. POSITION: ABNORMAL
RBC # BLD: 2.82 M/UL (ref 4.5–5.9)
SAMPLE SITE: ABNORMAL
SEG NEUTROPHILS: 88 % (ref 36–66)
SEGMENTED NEUTROPHILS ABSOLUTE COUNT: 13.11 K/UL (ref 1.3–9.1)
SODIUM BLD-SCNC: 141 MMOL/L (ref 135–144)
SURGICAL PATHOLOGY REPORT: NORMAL
TEXT FOR RESPIRATORY: ABNORMAL
WBC # BLD: 14.9 K/UL (ref 3.5–11)

## 2022-12-09 PROCEDURE — 6360000002 HC RX W HCPCS: Performed by: SURGERY

## 2022-12-09 PROCEDURE — 82805 BLOOD GASES W/O2 SATURATION: CPT

## 2022-12-09 PROCEDURE — A4216 STERILE WATER/SALINE, 10 ML: HCPCS | Performed by: SURGERY

## 2022-12-09 PROCEDURE — 6360000002 HC RX W HCPCS: Performed by: NURSE PRACTITIONER

## 2022-12-09 PROCEDURE — 2500000003 HC RX 250 WO HCPCS: Performed by: NURSE PRACTITIONER

## 2022-12-09 PROCEDURE — 2580000003 HC RX 258: Performed by: SURGERY

## 2022-12-09 PROCEDURE — 2500000003 HC RX 250 WO HCPCS: Performed by: SURGERY

## 2022-12-09 PROCEDURE — 6370000000 HC RX 637 (ALT 250 FOR IP): Performed by: SURGERY

## 2022-12-09 PROCEDURE — 36415 COLL VENOUS BLD VENIPUNCTURE: CPT

## 2022-12-09 PROCEDURE — 2580000003 HC RX 258: Performed by: FAMILY MEDICINE

## 2022-12-09 PROCEDURE — 6370000000 HC RX 637 (ALT 250 FOR IP): Performed by: NURSE PRACTITIONER

## 2022-12-09 PROCEDURE — 94640 AIRWAY INHALATION TREATMENT: CPT

## 2022-12-09 PROCEDURE — 2000000000 HC ICU R&B

## 2022-12-09 PROCEDURE — 99232 SBSQ HOSP IP/OBS MODERATE 35: CPT | Performed by: NURSE PRACTITIONER

## 2022-12-09 PROCEDURE — 85025 COMPLETE CBC W/AUTO DIFF WBC: CPT

## 2022-12-09 PROCEDURE — 83735 ASSAY OF MAGNESIUM: CPT

## 2022-12-09 PROCEDURE — 94761 N-INVAS EAR/PLS OXIMETRY MLT: CPT

## 2022-12-09 PROCEDURE — 2700000000 HC OXYGEN THERAPY PER DAY

## 2022-12-09 PROCEDURE — 94003 VENT MGMT INPAT SUBQ DAY: CPT

## 2022-12-09 PROCEDURE — 6370000000 HC RX 637 (ALT 250 FOR IP): Performed by: FAMILY MEDICINE

## 2022-12-09 PROCEDURE — 80048 BASIC METABOLIC PNL TOTAL CA: CPT

## 2022-12-09 PROCEDURE — 36600 WITHDRAWAL OF ARTERIAL BLOOD: CPT

## 2022-12-09 RX ORDER — FUROSEMIDE 10 MG/ML
40 INJECTION INTRAMUSCULAR; INTRAVENOUS 2 TIMES DAILY
Status: DISCONTINUED | OUTPATIENT
Start: 2022-12-09 | End: 2022-12-10 | Stop reason: HOSPADM

## 2022-12-09 RX ORDER — LISINOPRIL 10 MG/1
10 TABLET ORAL DAILY
Status: DISCONTINUED | OUTPATIENT
Start: 2022-12-09 | End: 2022-12-09

## 2022-12-09 RX ORDER — METOPROLOL TARTRATE 5 MG/5ML
5 INJECTION INTRAVENOUS EVERY 6 HOURS PRN
Status: DISCONTINUED | OUTPATIENT
Start: 2022-12-09 | End: 2022-12-10 | Stop reason: HOSPADM

## 2022-12-09 RX ORDER — DILTIAZEM HYDROCHLORIDE 60 MG/1
60 TABLET, FILM COATED ORAL EVERY 6 HOURS SCHEDULED
Status: DISCONTINUED | OUTPATIENT
Start: 2022-12-09 | End: 2022-12-10 | Stop reason: HOSPADM

## 2022-12-09 RX ORDER — FAMOTIDINE 20 MG/1
20 TABLET, FILM COATED ORAL 2 TIMES DAILY
Status: DISCONTINUED | OUTPATIENT
Start: 2022-12-09 | End: 2022-12-10 | Stop reason: HOSPADM

## 2022-12-09 RX ORDER — METHYLPREDNISOLONE SODIUM SUCCINATE 40 MG/ML
40 INJECTION, POWDER, LYOPHILIZED, FOR SOLUTION INTRAMUSCULAR; INTRAVENOUS EVERY 12 HOURS
Status: DISCONTINUED | OUTPATIENT
Start: 2022-12-09 | End: 2022-12-10 | Stop reason: HOSPADM

## 2022-12-09 RX ORDER — 0.9 % SODIUM CHLORIDE 0.9 %
500 INTRAVENOUS SOLUTION INTRAVENOUS ONCE
Status: COMPLETED | OUTPATIENT
Start: 2022-12-09 | End: 2022-12-10

## 2022-12-09 RX ORDER — MIDODRINE HYDROCHLORIDE 5 MG/1
5 TABLET ORAL
Status: DISCONTINUED | OUTPATIENT
Start: 2022-12-09 | End: 2022-12-10 | Stop reason: HOSPADM

## 2022-12-09 RX ADMIN — DILTIAZEM HYDROCHLORIDE 30 MG: 30 TABLET, FILM COATED ORAL at 05:33

## 2022-12-09 RX ADMIN — METHYLPREDNISOLONE SODIUM SUCCINATE 40 MG: 40 INJECTION, POWDER, FOR SOLUTION INTRAMUSCULAR; INTRAVENOUS at 21:06

## 2022-12-09 RX ADMIN — SODIUM CHLORIDE, PRESERVATIVE FREE 20 MG: 5 INJECTION INTRAVENOUS at 09:06

## 2022-12-09 RX ADMIN — ENOXAPARIN SODIUM 40 MG: 100 INJECTION SUBCUTANEOUS at 09:07

## 2022-12-09 RX ADMIN — METHYLPREDNISOLONE SODIUM SUCCINATE 60 MG: 125 INJECTION, POWDER, FOR SOLUTION INTRAMUSCULAR; INTRAVENOUS at 04:43

## 2022-12-09 RX ADMIN — SODIUM CHLORIDE, PRESERVATIVE FREE 10 ML: 5 INJECTION INTRAVENOUS at 09:08

## 2022-12-09 RX ADMIN — FUROSEMIDE 20 MG: 10 INJECTION, SOLUTION INTRAMUSCULAR; INTRAVENOUS at 09:07

## 2022-12-09 RX ADMIN — IPRATROPIUM BROMIDE AND ALBUTEROL SULFATE 1 AMPULE: 2.5; .5 SOLUTION RESPIRATORY (INHALATION) at 07:14

## 2022-12-09 RX ADMIN — FUROSEMIDE 40 MG: 10 INJECTION, SOLUTION INTRAMUSCULAR; INTRAVENOUS at 20:06

## 2022-12-09 RX ADMIN — CHLORHEXIDINE GLUCONATE 0.12% ORAL RINSE 15 ML: 1.2 LIQUID ORAL at 21:07

## 2022-12-09 RX ADMIN — IPRATROPIUM BROMIDE AND ALBUTEROL SULFATE 1 AMPULE: 2.5; .5 SOLUTION RESPIRATORY (INHALATION) at 10:48

## 2022-12-09 RX ADMIN — MIDODRINE HYDROCHLORIDE 5 MG: 5 TABLET ORAL at 23:07

## 2022-12-09 RX ADMIN — IPRATROPIUM BROMIDE AND ALBUTEROL SULFATE 1 AMPULE: 2.5; .5 SOLUTION RESPIRATORY (INHALATION) at 03:07

## 2022-12-09 RX ADMIN — CHLORHEXIDINE GLUCONATE 0.12% ORAL RINSE 15 ML: 1.2 LIQUID ORAL at 09:07

## 2022-12-09 RX ADMIN — BUDESONIDE 500 MCG: 0.5 SUSPENSION RESPIRATORY (INHALATION) at 18:52

## 2022-12-09 RX ADMIN — SODIUM CHLORIDE 500 ML: 9 INJECTION, SOLUTION INTRAVENOUS at 22:23

## 2022-12-09 RX ADMIN — THIAMINE HYDROCHLORIDE: 100 INJECTION, SOLUTION INTRAMUSCULAR; INTRAVENOUS at 14:41

## 2022-12-09 RX ADMIN — IPRATROPIUM BROMIDE AND ALBUTEROL SULFATE 1 AMPULE: 2.5; .5 SOLUTION RESPIRATORY (INHALATION) at 22:56

## 2022-12-09 RX ADMIN — METHYLPREDNISOLONE SODIUM SUCCINATE 60 MG: 125 INJECTION, POWDER, FOR SOLUTION INTRAMUSCULAR; INTRAVENOUS at 09:06

## 2022-12-09 RX ADMIN — METOCLOPRAMIDE HYDROCHLORIDE 10 MG: 5 INJECTION INTRAMUSCULAR; INTRAVENOUS at 05:34

## 2022-12-09 RX ADMIN — METOPROLOL TARTRATE 25 MG: 25 TABLET, FILM COATED ORAL at 13:13

## 2022-12-09 RX ADMIN — METOCLOPRAMIDE HYDROCHLORIDE 10 MG: 5 INJECTION INTRAMUSCULAR; INTRAVENOUS at 14:50

## 2022-12-09 RX ADMIN — POTASSIUM CHLORIDE 40 MEQ: 20 TABLET, EXTENDED RELEASE ORAL at 05:34

## 2022-12-09 RX ADMIN — SODIUM CHLORIDE, PRESERVATIVE FREE 10 ML: 5 INJECTION INTRAVENOUS at 21:08

## 2022-12-09 RX ADMIN — BUDESONIDE 500 MCG: 0.5 SUSPENSION RESPIRATORY (INHALATION) at 07:14

## 2022-12-09 RX ADMIN — METOPROLOL TARTRATE 5 MG: 5 INJECTION, SOLUTION INTRAVENOUS at 04:43

## 2022-12-09 RX ADMIN — FAMOTIDINE 20 MG: 20 TABLET, FILM COATED ORAL at 20:07

## 2022-12-09 RX ADMIN — IPRATROPIUM BROMIDE AND ALBUTEROL SULFATE 1 AMPULE: 2.5; .5 SOLUTION RESPIRATORY (INHALATION) at 18:52

## 2022-12-09 RX ADMIN — SODIUM CHLORIDE, PRESERVATIVE FREE 10 ML: 5 INJECTION INTRAVENOUS at 21:06

## 2022-12-09 RX ADMIN — DILTIAZEM HYDROCHLORIDE 60 MG: 60 TABLET, FILM COATED ORAL at 13:13

## 2022-12-09 RX ADMIN — LISINOPRIL 10 MG: 10 TABLET ORAL at 13:13

## 2022-12-09 RX ADMIN — METOCLOPRAMIDE HYDROCHLORIDE 10 MG: 5 INJECTION INTRAMUSCULAR; INTRAVENOUS at 01:58

## 2022-12-09 RX ADMIN — ANTI-FUNGAL POWDER MICONAZOLE NITRATE TALC FREE: 1.42 POWDER TOPICAL at 09:08

## 2022-12-09 RX ADMIN — ANTI-FUNGAL POWDER MICONAZOLE NITRATE TALC FREE: 1.42 POWDER TOPICAL at 21:07

## 2022-12-09 RX ADMIN — METOPROLOL TARTRATE 5 MG: 5 INJECTION, SOLUTION INTRAVENOUS at 13:13

## 2022-12-09 RX ADMIN — IPRATROPIUM BROMIDE AND ALBUTEROL SULFATE 1 AMPULE: 2.5; .5 SOLUTION RESPIRATORY (INHALATION) at 14:59

## 2022-12-09 RX ADMIN — METOPROLOL TARTRATE 5 MG: 5 INJECTION, SOLUTION INTRAVENOUS at 09:07

## 2022-12-09 RX ADMIN — LORAZEPAM 0.5 MG: 0.5 TABLET ORAL at 09:07

## 2022-12-09 RX ADMIN — DILTIAZEM HYDROCHLORIDE 60 MG: 60 TABLET, FILM COATED ORAL at 20:07

## 2022-12-09 ASSESSMENT — PAIN SCALES - WONG BAKER

## 2022-12-09 ASSESSMENT — PAIN SCALES - GENERAL
PAINLEVEL_OUTOF10: 0
PAINLEVEL_OUTOF10: 3

## 2022-12-09 ASSESSMENT — ENCOUNTER SYMPTOMS
COUGH: 1
ALLERGIC/IMMUNOLOGIC NEGATIVE: 1
ABDOMINAL DISTENTION: 0
ABDOMINAL PAIN: 0
TROUBLE SWALLOWING: 0
NAUSEA: 0
CHEST TIGHTNESS: 0
APNEA: 0
WHEEZING: 0
COLOR CHANGE: 0
SHORTNESS OF BREATH: 0

## 2022-12-09 ASSESSMENT — PULMONARY FUNCTION TESTS
PIF_VALUE: 19
PIF_VALUE: 19
PIF_VALUE: 17
PIF_VALUE: 16
PIF_VALUE: 19
PIF_VALUE: 18
PIF_VALUE: 19
PIF_VALUE: 20
PIF_VALUE: 17
PIF_VALUE: 24
PIF_VALUE: 15

## 2022-12-09 NOTE — PROGRESS NOTES
Date:   12/9/2022  Patient name: Shawn Mari  Date of admission:  11/22/2022 12:17 PM  MRN:   [id number]  YOB: 1965  PCP: JAEL Reed CNP    Reason for Admission: Respiratory failure [facility]) [J96.90]  Septic shock ([address] [A41.9, R65.21]  Acute on chronic respiratory failure with hypoxia [facility]) [J96.21]    Cardiology follow up: Episodes of sinus tachycardia, sinus bradycardia, nonsustained V. tach 5-7 beats, ventricular rate about 110       Referring physician: Dr Homero Cranker  Episodes of sinus tach most likely physiological  Episodes of nonsustained bradycardia most likely vasovagal  Episodes of nonsustained V. tach 6-7 beats heart rate above 110/hypoxia hypokalemia  Respiratory failure/severe COPD secondary to smoking/on ventilator  History of lung mass/suspected lung cancer  History of COVID-19 infection  Respiratory failure, status post tracheostomy 12/7/2022  Status post PEG tube placement 12/7/2022  History of alcohol abuse  Protein malnutrition, albumin 2.6  Anemia, thrombocytopenia    Past surgical history include bilateral hip replacement, neck surgery, vasectomy     2D echo 9/14/2022  Normal LV size and wall thickness ejection fraction more than 55%  No significant valvular abnormality     ECG 9/13/2022  Sinus tachycardia otherwise normal ECG     Chest x-ray 11/23/2022  Unchanged masslike opacity at the left base, mildly improved patchy opacity at the right base  Endotracheal tube and enteric tubes remain in place, heart size is within normal limit     History of present illness     39-year-old male a nursing home resident with a past medical history of severe COPD, CA lung got readmitted on 11/22/2022 with the respiratory failure, altered mental status and he required intubation and vent support. He got extubated on 11/24/2022. He was recently admitted at [facility] with respiratory failure required vent support.   He has been on beta-blockers and Cardizem, steroids, bronchodilators. On continuous ECG monitoring episodes of sinus tachycardia noted. Occasionally he gets transient sinus bradycardia. He has had a few episodes of nonsustained broad complex rhythm heart rate about 110, 6-7 beats. No syncope no chest pain.      On admission lab work showed   high-sensitivity troponin 25 and 29  Hemoglobin 11.1, WBC 20.5, platelets 804  Sodium 132, potassium 4.0, CO2 35, BUN 12, creatinine 0.40, glucose 145, calcium 9.0     Current evaluation  Patient seen and examined in ICU   He was awake he was on ventilator FiO2 35% oxygen saturation 97%  Not appear in any significant pain  Difficult to communicate  He is on PEG tube feeding  ECG monitor sinus tachycardia heart rate 110  He has edema on both upper extremities  Limited movement lower extremities    WBC 14.9, hemoglobin 10.3, , platelets 227  Sodium 141, potassium 3.1, BUN 21, creatinine less than 0.40, glucose 108    Medications:   Scheduled Meds:   furosemide  40 mg IntraVENous BID    famotidine  20 mg Oral BID    lisinopril  10 mg Oral Daily    metoprolol tartrate  25 mg Oral BID    dilTIAZem  60 mg Oral 4 times per day    methylPREDNISolone  40 mg IntraVENous Q12H    miconazole   Topical BID    chlorhexidine  15 mL Mouth/Throat BID    metoclopramide  10 mg IntraVENous Q6H    thiamine and folic acid IVPB   IntraVENous Daily    nicotine  1 patch TransDERmal Daily    [Held by provider] metoprolol tartrate  25 mg Oral BID    sodium chloride flush  5-40 mL IntraVENous 2 times per day    sodium chloride flush  5-40 mL IntraVENous 2 times per day    enoxaparin  40 mg SubCUTAneous Daily    ipratropium-albuterol  1 ampule Inhalation Q4H    budesonide  0.5 mg Nebulization BID     Continuous Infusions:   phenylephrine (ROSA-SYNEPHRINE) 50mg/250mL infusion Stopped (12/07/22 1845)    sodium chloride 100 mL/hr at 12/08/22 1525    dexmedetomidine Stopped (12/06/22 1625)    propofol Stopped (12/08/22 1030)    sodium chloride sodium chloride       CBC:   Recent Labs     12/07/22  0423 12/08/22  0428 12/09/22  0431   WBC 14.9* 9.3 14.9*   HGB 10.5* 9.0* 10.3*   * 94* 100*     BMP:    Recent Labs     12/07/22  0423 12/08/22  0428 12/08/22  1705 12/09/22  0431    142  --  141   K 3.8 2.8* 3.6* 3.1*    103  --  101   CO2 32* 33*  --  35*   BUN 28* 24*  --  21*   CREATININE <0.40* <0.40*  --  <0.40*   GLUCOSE 122* 115*  --  108*     Hepatic: No results for input(s): AST, ALT, ALB, BILITOT, ALKPHOS in the last 72 hours. Troponin: No results for input(s): TROPONINI in the last 72 hours. BNP: No results for input(s): BNP in the last 72 hours. Lipids: No results for input(s): CHOL, HDL in the last 72 hours. Invalid input(s): LDLCALCU  INR: No results for input(s): INR in the last 72 hours. Objective:   Vitals: /63   Pulse (!) 105   Temp 98 °F (36.7 °C) (Oral)   Resp 15   Ht 6' 1\" (1.854 m)   Wt 204 lb 9.4 oz (92.8 kg)   SpO2 97%   BMI 26.99 kg/m²   General appearance: Tired looking male with a pigmentation  HEENT: Head: Normal, normocephalic, atraumatic. Neck:  Difficult to assess neck veins tracheostomy noted  Lungs:  Diminished breath sound bilaterally more on the basal segments  Heart: regular rate and rhythm  Abdomen:  Obese active noted bowel sounds hypoactive  Extremities:  No obvious sign of DVT upper extremities edema noted  Neurologic: Mental status: Patient is awake follow verbal command disc difficult to communicate he is on ventilator    EKG: normal sinus rhythm. Sinus tachycardia  ECHO: reviewed.    Ejection fraction: 55%     Assessment / Acute Cardiac Problems:   Sinus tachycardia most likely physiological  Episodes of bradycardia most likely vagal effect  Nonsustained broad complex rhythm/V. tach, heart rate up to 110 most likely secondary to hypoxia, hypokalemia  Normal LV systolic function  Severe COPD, hypoxia, respiratory failure requiring vent support  Lung mass suspected carcinoma lung  Tracheostomy and PEG tube placement 12/7/2022 12/9/2022 patient was awake on the ventilator hemodynamically stable ECG monitor sinus rhythm, on PEG tube feeding    Patient Active Problem List:     Cellulitis of b/l lower extremity     Cellulitis of lower extremity     Alcoholism ([address]     Essential hypertension     Hyperkeratosis of b/l [place]     Alcohol withdrawal (HCC)     Tinea pedis of both feet     Suicidal ideation     Dyspnea     COPD exacerbation (HCC)     Gastroesophageal reflux disease without esophagitis     Cigarette smoker     Avascular necrosis of femoral head (HCC)     Tachycardia     Stage 3 severe COPD by GOLD classification (HCC)     Tobacco dependence     Chronic obstructive pulmonary disease (HCC)     Lung malignancy (HCC)     Cough with hemoptysis     Sepsis ([address]     COVID-19     Mass of lingula of lung     Hyponatremia     Respiratory failure ([address]      Plan of Treatment:   Medications reviewed  Continue current dose of IV Lasix 40 mg twice daily, Cardizem 60 mg via PEG tube 4 times a day, metoprolol 25 mg twice a day, subcu Lovenox, bronchodilators  Patient is also on lisinopril 10 mg a day, Pepcid 20 mg a day   Palliative care notes reviewed    Long-term prognosis remains guarded    Electronically signed by Anna Amato MD on 12/9/2022 at 4:51 PM

## 2022-12-09 NOTE — PROGRESS NOTES
Writer received referral patient may be in need of HCPOA paperwork; got medical update from Vivian RN, who stated patient is \"not oriented at this time\";      12/09/22 3265   Encounter Summary   Encounter Overview/Reason  Advance Care Planning   Service Provided For: Patient   Referral/Consult From: Palliative Care; Other    Last Encounter  12/09/22   Complexity of Encounter Low   Palliative Care   Type Palliative Care, Follow-up

## 2022-12-09 NOTE — CARE COORDINATION
ONGOING DISCHARGE PLAN:    Pt. Is currently on the Vent, FIO2 35%. Pt. Has been on [address] for most of day. Pt had Trach/Peg placed on 12/7. Pt. Is tolerating TF. Pre-Cert, has been obtained, for [facility], it is good til EOD on 12/12/22. Writer spoke to [place]. Transport has been set up w/ [facility] for sadaf at [address] [place] [address]. If Pt. Remains on the VENT, & is needed for Transport, it will need to be changed til 7PM.    Report is to be called to:   [phone]    [facility]/DC med list will need to be faxed to [phone]. Will continue to follow for additional discharge needs.     Electronically signed by Maddie Martinez RN on 12/9/2022 at 4:23 PM

## 2022-12-09 NOTE — PROGRESS NOTES
Pt placed back on ventilator on full support. RN concerned at pts color.  Will continue to monitor and place pt back on trach collar as pt tolerates

## 2022-12-09 NOTE — PROGRESS NOTES
Patient accepted at [facility], scheduling transport for tomorrow pending discharge orders from physicians.

## 2022-12-09 NOTE — PROGRESS NOTES
[facility] Progress Note        12/9/2022   11:21 AM    Name:  Halie Ibarra  MRN:    [id number]     Acct:     [de-identified]   Room:  2008/2008-01  IP Day: 16     Admit Date: 11/22/2022 12:17 PM  PCP: JAEL Burris CNP    Subjective:     C/C:   Chief Complaint   Patient presents with    Respiratory Distress       Interval History: Status: not changed. Pt lying in bed awake in alert, does have significant upper arm edema, tolerating tube feeding and trach collar. WBC 14.9 due to steroids. ROS:   all 10 systems reviewed and are negative except as noted    Review of Systems   Constitutional:  Negative for activity change, appetite change and fever. HENT:  Negative for congestion and trouble swallowing. Respiratory:  Positive for cough. Negative for apnea, chest tightness, shortness of breath and wheezing. Cardiovascular:  Negative for chest pain, palpitations and leg swelling. Gerard upper arm swelling weeping    Gastrointestinal:  Negative for abdominal distention, abdominal pain and nausea. Genitourinary:  Negative for difficulty urinating. Musculoskeletal:  Negative for gait problem. Skin:  Negative for color change. Allergic/Immunologic: Negative. Neurological:  Negative for dizziness, tremors, weakness and light-headedness. Hematological: Negative. Psychiatric/Behavioral: Negative. Medications:      Allergies: No Known Allergies    Current Meds: furosemide (LASIX) injection 40 mg, BID  metoprolol (LOPRESSOR) injection 5 mg, Q6H PRN  famotidine (PEPCID) tablet 20 mg, BID  lisinopril (PRINIVIL;ZESTRIL) tablet 10 mg, Daily  metoprolol tartrate (LOPRESSOR) tablet 25 mg, BID  dilTIAZem (CARDIZEM) tablet 60 mg, 4 times per day  phenylephrine (ROSA-SYNEPHRINE) 50 mg in dextrose 5 % 250 mL infusion, Continuous  0.9 % sodium chloride infusion, Continuous  dexmedetomidine (PRECEDEX) 400 mcg in sodium chloride 0.9 % 100 mL infusion, Continuous  potassium chloride 10 mEq/100 mL IVPB (Peripheral Line), PRN  miconazole (MICOTIN) 2 % powder, BID  chlorhexidine (PERIDEX) 0.12 % solution 15 mL, BID  propofol injection, Continuous  methylPREDNISolone sodium (SOLU-MEDROL) injection 60 mg, Q6H  metoclopramide (REGLAN) injection 10 mg, M4J  folic acid 1 mg, thiamine (B-1) 100 mg in sodium chloride 0.9 % 50 mL IVPB, Daily  dextromethorphan-guaiFENesin (ROBITUSSIN-DM)  MG/5ML liquid 5 mL, Q4H PRN  LORazepam (ATIVAN) tablet 0.5 mg, Q4H PRN  nicotine (NICODERM CQ) 14 MG/24HR 1 patch, Daily  oxyCODONE-acetaminophen (PERCOCET) 5-325 MG per tablet 1 tablet, Q4H PRN  potassium chloride (KLOR-CON M) extended release tablet 40 mEq, PRN   Or  potassium bicarb-citric acid (EFFER-K) effervescent tablet 40 mEq, PRN   Or  potassium chloride 10 mEq/100 mL IVPB (Peripheral Line), PRN  [Held by provider] metoprolol tartrate (LOPRESSOR) tablet 25 mg, BID  sodium chloride flush 0.9 % injection 5-40 mL, 2 times per day  sodium chloride flush 0.9 % injection 5-40 mL, PRN  0.9 % sodium chloride infusion, PRN  sodium chloride flush 0.9 % injection 5-40 mL, 2 times per day  sodium chloride flush 0.9 % injection 5-40 mL, PRN  0.9 % sodium chloride infusion, PRN  enoxaparin (LOVENOX) injection 40 mg, Daily  acetaminophen (TYLENOL) tablet 650 mg, Q4H PRN  ondansetron (ZOFRAN-ODT) disintegrating tablet 4 mg, Q8H PRN   Or  ondansetron (ZOFRAN) injection 4 mg, Q6H PRN  ipratropium-albuterol (DUONEB) nebulizer solution 1 ampule, Q4H  budesonide (PULMICORT) nebulizer suspension 500 mcg, BID      Data:     Code Status:  DNR-CCA    Family History   Problem Relation Age of Onset    Other Mother         mental health        Social History     Socioeconomic History    Marital status: Single     Spouse name: Not on file    Number of children: Not on file    Years of education: Not on file    Highest education level: Not on file   Occupational History    Not on file   Tobacco Use    Smoking status: Former Packs/day: 1.00     Years: 44.00     Pack years: 44.00     Types: Cigarettes     Start date:      Quit date: 2022     Years since quittin.2    Smokeless tobacco: Never    Tobacco comments:     pt educated on both alcohol and smoking cessation   Vaping Use    Vaping Use: Never used   Substance and Sexual Activity    Alcohol use: Yes     Alcohol/week: 35.0 standard drinks     Types: 35 Cans of beer per week     Comment: hx of alcoholism; States he drinks 5 beers per day; pt has D/T's    Drug use: Not Currently     Types: Cocaine, Marijuana (Weed)     Comment: Quit cocaine at age 24, quit marijuana at age 24    Sexual activity: Not on file   Other Topics Concern    Not on file   Social History Narrative    Not on file     Social Determinants of Health     Financial Resource Strain: High Risk    Difficulty of Paying Living Expenses: Hard   Food Insecurity: No Food Insecurity    Worried About Running Out of Food in the Last Year: Never true    Ran Out of Food in the Last Year: Never true   Transportation Needs: Not on file   Physical Activity: Not on file   Stress: Not on file   Social Connections: Not on file   Intimate Partner Violence: Not on file   Housing Stability: Not on file       I/O (24Hr): Intake/Output Summary (Last 24 hours) at 2022 1121  Last data filed at 2022 0602  Gross per 24 hour   Intake 3114.71 ml   Output 3000 ml   Net 114.71 ml     Radiology:  XR CHEST PORTABLE    Result Date: 2022  1. Progressive patchy opacities at the right lung base could reflect atelectasis or pneumonia. 2. Endotracheal tube 6.2 cm above the brendan. 3. Otherwise stable appearance of the chest with a mass along the left heart border, left basal opacity and possible small left pleural effusion. XR CHEST PORTABLE    Result Date: 2022  1. ETT tip 3 cm above the brendan.  2. Left base 5 cm mass, probably corresponding to lingular mass seen on CT of 2022. 3. Bibasilar airspace 3.1 (L) 3.7 - 5.3 mmol/L    Chloride 101 98 - 107 mmol/L    CO2 35 (H) 20 - 31 mmol/L    Anion Gap 5 (L) 9 - 17 mmol/L   Magnesium    Collection Time: 22  4:31 AM   Result Value Ref Range    Magnesium 1.6 1.6 - 2.6 mg/dL   Blood gas, arterial    Collection Time: 22  5:01 AM   Result Value Ref Range    pH, Arterial 7.490 (H) 7.350 - 7.450    pCO2, Arterial 49.0 (HH) 35.0 - 45.0 mmHg    pO2, Arterial 51.1 (LL) 80.0 - 100.0 mmHg    HCO3, Arterial 37.3 (H) 22.0 - 26.0 mmol/L    Positive Base Excess, Art 14.0 (H) 0.0 - 2.0 mmol/L    O2 Sat, Arterial 84.8 (LL) 95 - 98 %    Carboxyhemoglobin 1.5 0 - 5 %    Methemoglobin 1.0 0.0 - 1.9 %    Pt Temp 37.1     O2 Device/Flow/% O2 Mask     Jose Test PASS     Sample Site Right Radial Artery     Pt. Position SEMI-FOWLERS     Text for Respiratory RESULT TO RN        Physical Examination:        Vitals:  BP (!) 164/93   Pulse (!) 129   Temp 98 °F (36.7 °C) (Oral)   Resp 25   Ht 6' 1\" (1.854 m)   Wt 204 lb 9.4 oz (92.8 kg)   SpO2 99%   BMI 26.99 kg/m²   Temp (24hrs), Av.9 °F (36.6 °C), Min:97.2 °F (36.2 °C), Max:98.4 °F (36.9 °C)    No results for input(s): POCGLU in the last 72 hours. Physical Exam  Constitutional:       General: He is not in acute distress. Appearance: Normal appearance. Comments: Awake alert, answers with a thumbs up to questions. HENT:      Head: Normocephalic. Nose: No congestion. Mouth/Throat:      Mouth: Mucous membranes are moist.   Eyes:      Pupils: Pupils are equal, round, and reactive to light. Cardiovascular:      Rate and Rhythm: Normal rate and regular rhythm. Pulses: Normal pulses. Comments: Gerard upper arm swelling   Pulmonary:      Effort: Pulmonary effort is normal. No respiratory distress. Breath sounds: Normal breath sounds. Comments: Diminished on trach collar   Abdominal:      General: Abdomen is flat. Bowel sounds are normal.      Palpations: Abdomen is soft.       Comments: Peg tube    Musculoskeletal:         General: No swelling. Skin:     General: Skin is warm and dry. Capillary Refill: Capillary refill takes 2 to 3 seconds. Neurological:      General: No focal deficit present. Mental Status: He is alert and oriented to person, place, and time. Psychiatric:         Mood and Affect: Mood normal.       Assessment:        Primary Problem  Respiratory failure (HCC)     Principal Problem:    Respiratory failure ([facility]  Resolved Problems:    * No resolved hospital problems. *      Past Medical History:   Diagnosis Date    Alcohol abuse     hx of alcoholism; States he drinks 5 beers per day; pt has D/T's    Avascular necrosis of femoral head ([facility] 11/25/2014    Overview:  S/p bilateral hip replacements    History of hip replacement     left/right    Hypertension     Mass of left lung     Rib fracture     Scabies     Stage 3 severe COPD by GOLD classification ([facility] 11/08/2021    Tachycardia 10/28/2020    does not follow with Cardiology        Plan:        Increase lasix to 40 mg BID  Restart home Lopressor, Lisinopril, Cardizem   PPI- Pepcid   DVT Prophylaxis- lovenox  EPCs  PT/OT to evaluate and treat  Pain control  Replace electrolytes as per sliding scale  DC planning await Ltach placement  No objection to dc when PC obtained. Electronically signed by JAEL Lai - JAEL Diane, CNP  [facility]   [address] [place] [address] [address], [address] [place] [address]    Physician Dusty Brown MD, have reviewed the note authored by the advance practice provider including history, review of systems,physical examination,medical decision making and agree with the assessment and plan as written.

## 2022-12-09 NOTE — PLAN OF CARE
Problem: Discharge Planning  Goal: Discharge to home or other facility with appropriate resources  Outcome: Progressing     Problem: Pain  Goal: Verbalizes/displays adequate comfort level or baseline comfort level  Outcome: Progressing     Problem: Skin/Tissue Integrity  Goal: Absence of new skin breakdown  Description: 1. Monitor for areas of redness and/or skin breakdown  2. Assess vascular access sites hourly  3. Every 4-6 hours minimum:  Change oxygen saturation probe site  4. Every 4-6 hours:  If on nasal continuous positive airway pressure, respiratory therapy assess nares and determine need for appliance change or resting period. Outcome: Progressing     Problem: Safety - Adult  Goal: Free from fall injury  Outcome: Progressing     Problem: ABCDS Injury Assessment  Goal: Absence of physical injury  Outcome: Progressing     Problem: Safety - Medical Restraint  Goal: Remains free of injury from restraints (Restraint for Interference with Medical Device)  Description: INTERVENTIONS:  1. Determine that other, less restrictive measures have been tried or would not be effective before applying the restraint  2. Evaluate the patient's condition at the time of restraint application  3. Inform patient/family regarding the reason for restraint  4.  Q2H: Monitor safety, psychosocial status, comfort, nutrition and hydration  Outcome: Progressing     Problem: Nutrition Deficit:  Goal: Optimize nutritional status  Outcome: Progressing     Problem: Respiratory - Adult  Goal: Achieves optimal ventilation and oxygenation  Outcome: Progressing     Problem: Cardiovascular - Adult  Goal: Maintains optimal cardiac output and hemodynamic stability  Outcome: Progressing

## 2022-12-09 NOTE — PROGRESS NOTES
PULMONARY PROGRESS NOTE:    REASON FOR VISIT:copd exac, resp failure    Interval History:    Events since last visit: TF started on 12-8, currently at 20 ml / hr  Awake and appears comfortable on 35% TC. Was on vent overnight.     PAST MEDICAL HISTORY:      Scheduled Meds:   miconazole   Topical BID    chlorhexidine  15 mL Mouth/Throat BID    famotidine (PEPCID) injection  20 mg IntraVENous BID    furosemide  20 mg IntraVENous BID    methylPREDNISolone  60 mg IntraVENous Q6H    metoprolol  5 mg IntraVENous Q4H    metoclopramide  10 mg IntraVENous Q6H    pantoprazole (PROTONIX) 40 mg injection  40 mg IntraVENous Daily    thiamine and folic acid IVPB   IntraVENous Daily    dilTIAZem  30 mg Oral 4 times per day    nicotine  1 patch TransDERmal Daily    [Held by provider] metoprolol tartrate  25 mg Oral BID    sodium chloride flush  5-40 mL IntraVENous 2 times per day    sodium chloride flush  5-40 mL IntraVENous 2 times per day    enoxaparin  40 mg SubCUTAneous Daily    ipratropium-albuterol  1 ampule Inhalation Q4H    budesonide  0.5 mg Nebulization BID     Continuous Infusions:   phenylephrine (ROSA-SYNEPHRINE) 50mg/250mL infusion Stopped (12/07/22 1845)    sodium chloride 100 mL/hr at 12/08/22 1525    dexmedetomidine Stopped (12/06/22 1625)    propofol Stopped (12/08/22 1030)    sodium chloride      sodium chloride       PRN Meds:potassium chloride, dextromethorphan-guaiFENesin, LORazepam, oxyCODONE-acetaminophen, potassium chloride **OR** potassium alternative oral replacement **OR** potassium chloride, sodium chloride flush, sodium chloride, sodium chloride flush, sodium chloride, acetaminophen, ondansetron **OR** ondansetron    PHYSICAL EXAMINATION:  BP (!) 164/93   Pulse (!) 129   Temp 98 °F (36.7 °C) (Oral)   Resp 25   Ht 6' 1\" (1.854 m)   Wt 204 lb 9.4 oz (92.8 kg)   SpO2 99%   BMI 26.99 kg/m²     General : Awake, alert, appropriate and following commands  Neck - supple, trach to vent   Heart - [address]  Lungs - Air Entry- poor bilaterally; breath sounds : vesicular;   rales/crackles - absent  Abdomen - soft, no tenderness peg w/TF  Upper Extremities  - no cyanosis, mottling; edema : +  Lower Extremities: no cyanosis, mottling; edema : +    Current Laboratory, Radiologic, Microbiologic, and Diagnostic studies reviewed  Data ReviewCBC:   Recent Labs     12/07/22  0423 12/08/22  0428 12/09/22  0431   WBC 14.9* 9.3 14.9*   RBC 2.88* 2.44* 2.82*   HGB 10.5* 9.0* 10.3*   HCT 31.7* 27.1* 30.9*   * 94* 100*       BMP:   Recent Labs     12/07/22  0423 12/08/22  0428 12/08/22  1705 12/09/22  0431   GLUCOSE 122* 115*  --  108*    142  --  141   K 3.8 2.8* 3.6* 3.1*   BUN 28* 24*  --  21*   CREATININE <0.40* <0.40*  --  <0.40*   CALCIUM 9.1 8.8  --  9.1       ABGs:   Recent Labs     12/07/22  0445 12/08/22  0423 12/09/22  0501   PHART 7.461* 7.512* 7.490*   PO2ART 50.1* 63.7* 51.1*   CHN5BMN 49.2* 45.0 49.0*   [id number] 35.0* 36.1* 37.3*   [id number] 86.7* 91.7* 84.8*        PT/INR:  No results found for: PTINR    ASSESSMENT / PLAN:    Acute hypoxic respiratory failure   Mechanical ventilation - extubated 11/23/22; reintubated 11/28/22. Was extubated on 12/5/22.   Reintubated 12/6/22  Trach and peg placed 12/6   Continue TC as tolerated  Hypotension -  PICC  Monitor off Levophed   PNA - HCAP/ ABx  COPD     BD, decrease steroids  Suspected lung CA  Alcohol use      thiamine / folic acid  reglan  TF    D/c planning- LTAC    Plan of care discussed with Dr Jakob Bañuelos  Electronically signed by JAEL Palmer - CNP on 12/09/22 at 11:11 AM.

## 2022-12-09 NOTE — PROGRESS NOTES
Patient was seen and examined. Awake alert in no acute distress. Afebrile vital signs are stable. On Solu-Medrol. WBC count is elevated. Potassium was replaced. Sheila Backer site is clean. Abdomen is soft. Patient would not let me examine the PEG site but according to the nurses clean. Advance tube feeds to 40 mill per hour. Dietitian to give us a goal rate. Continue supportive care. Local trach and PEG site care. At this point I will sign off the case. Please call me as needed.

## 2022-12-09 NOTE — PROGRESS NOTES
BRONCHOSPASM/BRONCHOCONSTRICTION     [x]         IMPROVE AERATION/BREATH SOUNDS  [x]   ADMINISTER BRONCHODILATOR THERAPY AS APPROPRIATE  [x]   ASSESS BREATH SOUNDS  []   IMPLEMENT AEROSOL/MDI PROTOCOL  [x]   PATIENT EDUCATION AS NEEDED   PROVIDE ADEQUATE OXYGENATION WITH ACCEPTABLE SP02/ABG'S    [x]  IDENTIFY APPROPRIATE OXYGEN THERAPY  [x]   MONITOR SP02/ABG'S AS NEEDED   [x]   PATIENT EDUCATION AS NEEDED     Pt continues to be on trach collar 35% current SpO2 94% . Trach and oral care completed.  Pt in no distress at this time

## 2022-12-09 NOTE — PROGRESS NOTES
Palliative Care Progress Note    NAME:  Adriana Johns  MEDICAL RECORD NUMBER:  [id number]  AGE: 61 y.o. GENDER: male  : 1965  TODAY'S DATE:  2022    Reason for Consult:    Goals of care   Family support       Summary   Patient is awake and responding to questions and commands. He has tracheostomy with Trache collar in place. He has Peg tube and TF is infusing. Discharge planner working on discharge to [facility] versus SNF. Patient does not want to go to [address] provided     Plan      Palliative Interaction:  Went to bedside and patient is alert and talking with his nurse. He has tracheostomy with trache collar and does not appear to have any distress. Patient has TF running per Gtube. We discussed discharge and discussed both [facility] and patient states he does not like [facility]. No other questions or concerns. Palliative will continue to follow     Education/support to patient  Discharge planning/helping to coordinate care  Communications with primary service  Pharmacologic pain management  Providing support for coping/adaptation/distress of patient  Discussing meaning/purpose   Caregiver support/education  Continue with current plan of care  Code status clarified: [facility]  Principle Problem/Diagnosis:  Respiratory failure ([facility] [address]    Additional Assessments:  Principal Problem:    Respiratory failure ([facility] [address]  Resolved Problems:    * No resolved hospital problems.  *   1- Symptom management/ pain control     Pain Assessment:  percocet                Anxiety:   ativan                           Dyspnea:   Patient with tracheostomy with trache collar                          Fatigue:   generalized weakness     Other:      2- Goals of care evaluation   The patient goals of care are improve or maintain function/quality of life   Goals of care discussed with:    [x] Patient independently    [] Patient and Family    [] Family or Healthcare DPOA independently    [] Unable to discuss with patient, family/DPOA not present    3- Code Status  DNR-CCA    4- Other recommendations  - We will continue to provide comfort and support to the patient and the family    Please call with any palliative questions or concerns. Palliative Care Team is available via perfect serve or via phone. History of Present Illness     The patient is a 61 y.o. Non- / non  male who presents with Respiratory Distress      Referred to Palliative Care by  [x] Physician   [] Nursing  [] Family Request   [] Other:       He was admitted to the ICU service for Respiratory failure ([facility] [address] [J96.90]  Septic shock ([facility] [address] [A41.9, R65.21]  Acute on chronic respiratory failure with hypoxia ([facility] [address] [facility] [J96.21]. His hospital course has been associated with Respiratory distress. The patient has a complicated medical history and has been hospitalized since 11/22/2022 12:17 PM.    I went to bedside and spoke with patient. He has tracheostomy with trache collar in place. His Peg tube is intact and tolerating feedings. Patient is off pressor support. Patient mouths words or writes them down to communicate. Discussed discharge planning working on discharge to either [facility] or a SNF but patient states he does not like [facility]. Palliative care will continue to follow. OVERNIGHT EVENTS:  Patient is a DNRCC-A  Patient has tracheostomy with trache collar  Patient with Peg tube with TF running   Mouths words or writes down to communicate  Discharge to [facility] versus SNF    VITALS   BP (!) 164/93   Pulse (!) 129   Temp 98 °F (36.7 °C) (Oral)   Resp 25   Ht 6' 1\" (1.854 m)   Wt 204 lb 9.4 oz (92.8 kg)   SpO2 99%   BMI 26.99 kg/m²     No data found.      Lab Results   Component Value Date    WBC 14.9 (H) 12/09/2022    HGB 10.3 (L) 12/09/2022    HCT 30.9 (L) 12/09/2022    .6 (H) 12/09/2022     (L) 12/09/2022   ,  Lab Results   Component Value Date/Time     12/09/2022 04:31 AM    K 3.1 12/09/2022 04:31 AM     12/09/2022 04:31 AM    CO2 35 12/09/2022 04:31 AM    BUN 21 12/09/2022 04:31 AM    CREATININE <0.40 12/09/2022 04:31 AM    GLUCOSE 108 12/09/2022 04:31 AM    CALCIUM 9.1 12/09/2022 04:31 AM      Lab Results   Component Value Date/Time    MG 1.6 12/09/2022 04:31 AM    ,  Lab Results   Component Value Date    CALCIUM 9.1 12/09/2022    PHOS 2.8 12/04/2022        Xray Result (most recent):  XR CHEST PORTABLE 12/06/2022    Narrative  EXAMINATION:  ONE XRAY VIEW OF THE CHEST    12/6/2022 1:43 pm    COMPARISON:  12/04/2022 and 12/05/2022. HISTORY:  ORDERING SYSTEM PROVIDED HISTORY: ETT placement  TECHNOLOGIST PROVIDED HISTORY:  ETT placement  Reason for Exam: ETT placement    FINDINGS:  An endotracheal tube is 6.2 cm above the brendan. A left peripheral venous  catheter is unchanged. Gastric tube is been removed. Patchy right basal opacities have progressed. A mass along the left heart  border appears unchanged. Left basal opacity and possible small effusion  appear unchanged. Impression  1. Progressive patchy opacities at the right lung base could reflect  atelectasis or pneumonia. 2. Endotracheal tube 6.2 cm above the brendan. 3. Otherwise stable appearance of the chest with a mass along the left heart  border, left basal opacity and possible small left pleural effusion. MRI Result (most recent):  No results found for this or any previous visit from the past 3650 days.      PAST MEDICAL HISTORY  Past Medical History:   Diagnosis Date    Alcohol abuse     hx of alcoholism; States he drinks 5 beers per day; pt has D/T's    Avascular necrosis of femoral head ([address] 11/25/2014    Overview:  S/p bilateral hip replacements    History of hip replacement     left/right    Hypertension     Mass of left lung     Rib fracture     Scabies     Stage 3 severe COPD by GOLD classification ([facility] [address] 11/08/2021    Tachycardia 10/28/2020    does not follow with Cardiology        SURGICAL HISTORY  Past Surgical History:   Procedure Laterality Date    NECK SURGERY      TOTAL HIP ARTHROPLASTY Bilateral     TRACHEOSTOMY N/A 12/7/2022    TRACHEOTOMY performed by Emely Weston MD at [address] N/A 12/7/2022    EGD ESOPHAGOGASTRODUODENOSCOPY  BIOPSY WITH PEG TUBE INSERTION performed by Emely Weston MD at [address]  Family History   Problem Relation Age of Onset    Other Mother         mental health         SOCIAL HISTORY  Social History       Tobacco History       Smoking Status  Former Smoking Start Date  12 Quit Date  09/2022 Smoking Frequency  1 pack/day for 44.00 years (44.00 pk-yrs)    Smoking Tobacco Type  Cigarettes from 12 to 09/2022      Smokeless Tobacco Use  Never      Tobacco Comments  pt educated on both alcohol and smoking cessation              Alcohol History       Alcohol Use Status  Yes Drinks/Week  35 Cans of beer per week Amount  35.0 standard drinks of alcohol/wk Comment  hx of alcoholism; States he drinks 5 beers per day; pt has D/T's              Drug Use       Drug Use Status  Not Currently Types  Cocaine, Marijuana (Weed) Comment  Quit cocaine at age 24, quit marijuana at age 24              Sexual Activity       Sexually Active  Not Asked                     Assessment        REVIEW OF SYSTEMS    []   UNABLE TO OBTAIN:     Constitutional:  []   Chills   [x]  Fatigue   []  Fevers   [x]  Malaise   []  Weight loss   [x] Other: Awake and Alert x3.  He nods or mouths words to answer questions and follows commands     Respiratory:   []  Cough    [x]  Shortness of breath    []  Chest pain    [] Other: tracheostomy with trache collar    Cardiovascular:   []  Chest pain  []  Dyspnea    []  Exertional chest pressure/discomfort     [] Fatigue      []  Palpitations    []  Syncope   [x] Other: tachycardia no cp or pressure HRI    Gastrointestinal:   []  Abdominal pain   []  Constipation    []  Diarrhea    [] Drowsy/coma  ___0       Death            Palliative Care will continue to follow Mr. Humberto Ross care as needed. The note has been dictated by dragon, typing errors may be a possibility     Thank you for allowing Palliative Care to participate in the care of Mr. Momo Hickman . Electronically signed by   JAEL Mcfarland NP  Palliative Care Team  on 12/9/2022 at 11:25 AM    [address] Number [phone] [address] Number [phone] [address] Number [phone]    Please call with any palliative questions or concerns. Palliative Care Team is available via perfect serve or via phone.

## 2022-12-09 NOTE — PROGRESS NOTES
12/09/22 1553   Encounter Summary   Encounter Overview/Reason  Attempted Encounter   Service Provided For: Patient not available  (patient with nurse)

## 2022-12-10 ENCOUNTER — APPOINTMENT (OUTPATIENT)
Dept: GENERAL RADIOLOGY | Age: 57
End: 2022-12-10
Payer: COMMERCIAL

## 2022-12-10 ENCOUNTER — HOSPITAL ENCOUNTER (OUTPATIENT)
Dept: MEDSURG UNIT | Age: 57
Discharge: HOME | End: 2022-12-10
Attending: INTERNAL MEDICINE | Admitting: INTERNAL MEDICINE

## 2022-12-10 VITALS
OXYGEN SATURATION: 94 % | HEART RATE: 99 BPM | TEMPERATURE: 97.9 F | RESPIRATION RATE: 28 BRPM | BODY MASS INDEX: 27.11 KG/M2 | DIASTOLIC BLOOD PRESSURE: 65 MMHG | HEIGHT: 73 IN | WEIGHT: 204.59 LBS | SYSTOLIC BLOOD PRESSURE: 94 MMHG

## 2022-12-10 LAB
ABSOLUTE BANDS #: 0.64 K/UL (ref 0–1)
ABSOLUTE EOS #: 0 K/UL (ref 0–0.4)
ABSOLUTE LYMPH #: 0.13 K/UL (ref 1–4.8)
ABSOLUTE MONO #: 0.64 K/UL (ref 0.1–1.3)
ALLEN TEST: ABNORMAL
ANION GAP SERPL CALCULATED.3IONS-SCNC: 6 MMOL/L (ref 9–17)
BANDS: 5 % (ref 0–10)
BASOPHILS # BLD: 0 % (ref 0–2)
BASOPHILS ABSOLUTE: 0 K/UL (ref 0–0.2)
BUN BLDV-MCNC: 21 MG/DL (ref 6–20)
CALCIUM SERPL-MCNC: 8.8 MG/DL (ref 8.6–10.4)
CARBOXYHEMOGLOBIN: 1.2 % (ref 0–5)
CHLORIDE BLD-SCNC: 97 MMOL/L (ref 98–107)
CO2: 38 MMOL/L (ref 20–31)
CREAT SERPL-MCNC: <0.4 MG/DL (ref 0.7–1.2)
EOSINOPHILS RELATIVE PERCENT: 0 % (ref 0–4)
FIO2: 35
GFR SERPL CREATININE-BSD FRML MDRD: ABNORMAL ML/MIN/1.73M2
GLUCOSE BLD-MCNC: 141 MG/DL (ref 70–99)
HCO3 ARTERIAL: 41.1 MMOL/L (ref 22–26)
HCT VFR BLD CALC: 27.4 % (ref 41–53)
HEMOGLOBIN: 9 G/DL (ref 13.5–17.5)
LYMPHOCYTES # BLD: 1 % (ref 24–44)
MAGNESIUM: 1.5 MG/DL (ref 1.6–2.6)
MCH RBC QN AUTO: 36 PG (ref 26–34)
MCHC RBC AUTO-ENTMCNC: 32.9 G/DL (ref 31–37)
MCV RBC AUTO: 109.4 FL (ref 80–100)
METHEMOGLOBIN: 1 % (ref 0–1.9)
MODE: ABNORMAL
MONOCYTES # BLD: 5 % (ref 1–7)
MORPHOLOGY: ABNORMAL
NUCLEATED RED BLOOD CELLS: 1 PER 100 WBC
O2 DEVICE/FLOW/%: ABNORMAL
O2 SAT, ARTERIAL: 93.9 % (ref 95–98)
PATIENT TEMP: 37
PCO2 ARTERIAL: 43.3 MMHG (ref 35–45)
PDW BLD-RTO: 14.4 % (ref 11.5–14.9)
PEEP/CPAP: 5
PH ARTERIAL: 7.59 (ref 7.35–7.45)
PLATELET # BLD: 80 K/UL (ref 150–450)
PMV BLD AUTO: 9.7 FL (ref 6–12)
PO2 ARTERIAL: 70.8 MMHG (ref 80–100)
POSITIVE BASE EXCESS, ART: 19.3 MMOL/L (ref 0–2)
POTASSIUM SERPL-SCNC: 2.5 MMOL/L (ref 3.7–5.3)
POTASSIUM SERPL-SCNC: 3.2 MMOL/L (ref 3.7–5.3)
PT. POSITION: ABNORMAL
RBC # BLD: 2.51 M/UL (ref 4.5–5.9)
RESPIRATORY RATE: 16
SAMPLE SITE: ABNORMAL
SEG NEUTROPHILS: 89 % (ref 36–66)
SEGMENTED NEUTROPHILS ABSOLUTE COUNT: 11.36 K/UL (ref 1.3–9.1)
SET RATE: 16
SODIUM BLD-SCNC: 141 MMOL/L (ref 135–144)
TEXT FOR RESPIRATORY: ABNORMAL
TOTAL RATE: 20
VT: 500
WBC # BLD: 12.8 K/UL (ref 3.5–11)

## 2022-12-10 PROCEDURE — 94640 AIRWAY INHALATION TREATMENT: CPT

## 2022-12-10 PROCEDURE — 2700000000 HC OXYGEN THERAPY PER DAY

## 2022-12-10 PROCEDURE — 84132 ASSAY OF SERUM POTASSIUM: CPT

## 2022-12-10 PROCEDURE — 2580000003 HC RX 258: Performed by: SURGERY

## 2022-12-10 PROCEDURE — 83735 ASSAY OF MAGNESIUM: CPT

## 2022-12-10 PROCEDURE — 36600 WITHDRAWAL OF ARTERIAL BLOOD: CPT

## 2022-12-10 PROCEDURE — 6370000000 HC RX 637 (ALT 250 FOR IP): Performed by: FAMILY MEDICINE

## 2022-12-10 PROCEDURE — 94761 N-INVAS EAR/PLS OXIMETRY MLT: CPT

## 2022-12-10 PROCEDURE — 2500000003 HC RX 250 WO HCPCS: Performed by: SURGERY

## 2022-12-10 PROCEDURE — 82805 BLOOD GASES W/O2 SATURATION: CPT

## 2022-12-10 PROCEDURE — 71045 X-RAY EXAM CHEST 1 VIEW: CPT

## 2022-12-10 PROCEDURE — 80048 BASIC METABOLIC PNL TOTAL CA: CPT

## 2022-12-10 PROCEDURE — 6360000002 HC RX W HCPCS: Performed by: SURGERY

## 2022-12-10 PROCEDURE — 6370000000 HC RX 637 (ALT 250 FOR IP): Performed by: NURSE PRACTITIONER

## 2022-12-10 PROCEDURE — 6370000000 HC RX 637 (ALT 250 FOR IP): Performed by: SURGERY

## 2022-12-10 PROCEDURE — 6360000002 HC RX W HCPCS: Performed by: NURSE PRACTITIONER

## 2022-12-10 PROCEDURE — 85025 COMPLETE CBC W/AUTO DIFF WBC: CPT

## 2022-12-10 PROCEDURE — 36415 COLL VENOUS BLD VENIPUNCTURE: CPT

## 2022-12-10 PROCEDURE — 2500000003 HC RX 250 WO HCPCS: Performed by: NURSE PRACTITIONER

## 2022-12-10 PROCEDURE — 94003 VENT MGMT INPAT SUBQ DAY: CPT

## 2022-12-10 RX ORDER — THIAMINE MONONITRATE (VIT B1) 100 MG
100 TABLET ORAL DAILY
Qty: 30 TABLET | Refills: 0 | Status: SHIPPED | OUTPATIENT
Start: 2022-12-10 | End: 2023-01-09

## 2022-12-10 RX ORDER — MIDODRINE HYDROCHLORIDE 5 MG/1
5 TABLET ORAL
Qty: 90 TABLET | Refills: 3 | Status: SHIPPED | OUTPATIENT
Start: 2022-12-10

## 2022-12-10 RX ORDER — CHLORHEXIDINE GLUCONATE 0.12 MG/ML
15 RINSE ORAL 2 TIMES DAILY
Qty: 420 ML | Refills: 0 | Status: SHIPPED | OUTPATIENT
Start: 2022-12-10 | End: 2022-12-24

## 2022-12-10 RX ORDER — CHLORDIAZEPOXIDE HYDROCHLORIDE 25 MG/1
25 CAPSULE, GELATIN COATED ORAL 3 TIMES DAILY PRN
Qty: 6 CAPSULE | Refills: 3 | Status: SHIPPED | OUTPATIENT
Start: 2022-12-10 | End: 2022-12-12

## 2022-12-10 RX ORDER — METOCLOPRAMIDE HYDROCHLORIDE 5 MG/ML
10 INJECTION INTRAMUSCULAR; INTRAVENOUS EVERY 6 HOURS
Qty: 6 EACH | Refills: 0
Start: 2022-12-10

## 2022-12-10 RX ORDER — ENOXAPARIN SODIUM 100 MG/ML
40 INJECTION SUBCUTANEOUS DAILY
Qty: 6 EACH | Refills: 0
Start: 2022-12-11

## 2022-12-10 RX ORDER — OXYCODONE HYDROCHLORIDE AND ACETAMINOPHEN 5; 325 MG/1; MG/1
1 TABLET ORAL EVERY 4 HOURS PRN
Qty: 9 TABLET | Refills: 0 | Status: SHIPPED | OUTPATIENT
Start: 2022-12-10 | End: 2022-12-13

## 2022-12-10 RX ORDER — GUAIFENESIN DEXTROMETHORPHAN HYDROBROMIDE ORAL SOLUTION 10; 100 MG/5ML; MG/5ML
5 SOLUTION ORAL EVERY 4 HOURS PRN
Qty: 20 ML | Refills: 0
Start: 2022-12-10

## 2022-12-10 RX ORDER — IPRATROPIUM BROMIDE AND ALBUTEROL SULFATE 2.5; .5 MG/3ML; MG/3ML
3 SOLUTION RESPIRATORY (INHALATION) EVERY 4 HOURS
Qty: 360 ML | Refills: 0
Start: 2022-12-10

## 2022-12-10 RX ORDER — FUROSEMIDE 10 MG/ML
40 INJECTION INTRAMUSCULAR; INTRAVENOUS 2 TIMES DAILY
Qty: 5 ML | Refills: 0 | Status: SHIPPED | OUTPATIENT
Start: 2022-12-10

## 2022-12-10 RX ORDER — METHYLPREDNISOLONE SODIUM SUCCINATE 40 MG/ML
40 INJECTION, POWDER, LYOPHILIZED, FOR SOLUTION INTRAMUSCULAR; INTRAVENOUS EVERY 12 HOURS
Qty: 6 EACH | Refills: 0
Start: 2022-12-10

## 2022-12-10 RX ORDER — ONDANSETRON 4 MG/1
4 TABLET, ORALLY DISINTEGRATING ORAL EVERY 8 HOURS PRN
Qty: 7 TABLET | Refills: 0
Start: 2022-12-10

## 2022-12-10 RX ORDER — DILTIAZEM HYDROCHLORIDE 60 MG/1
60 TABLET, FILM COATED ORAL 4 TIMES DAILY
Qty: 120 TABLET | Refills: 3
Start: 2022-12-10

## 2022-12-10 RX ADMIN — POTASSIUM CHLORIDE 10 MEQ: 10 INJECTION, SOLUTION INTRAVENOUS at 07:42

## 2022-12-10 RX ADMIN — METOCLOPRAMIDE HYDROCHLORIDE 10 MG: 5 INJECTION INTRAMUSCULAR; INTRAVENOUS at 06:11

## 2022-12-10 RX ADMIN — POTASSIUM CHLORIDE 10 MEQ: 10 INJECTION, SOLUTION INTRAVENOUS at 06:09

## 2022-12-10 RX ADMIN — DILTIAZEM HYDROCHLORIDE 60 MG: 60 TABLET, FILM COATED ORAL at 12:00

## 2022-12-10 RX ADMIN — IPRATROPIUM BROMIDE AND ALBUTEROL SULFATE 1 AMPULE: 2.5; .5 SOLUTION RESPIRATORY (INHALATION) at 10:42

## 2022-12-10 RX ADMIN — METOPROLOL TARTRATE 25 MG: 25 TABLET, FILM COATED ORAL at 09:24

## 2022-12-10 RX ADMIN — METHYLPREDNISOLONE SODIUM SUCCINATE 40 MG: 40 INJECTION, POWDER, FOR SOLUTION INTRAMUSCULAR; INTRAVENOUS at 09:24

## 2022-12-10 RX ADMIN — SODIUM CHLORIDE, PRESERVATIVE FREE 10 ML: 5 INJECTION INTRAVENOUS at 14:10

## 2022-12-10 RX ADMIN — POTASSIUM BICARBONATE 40 MEQ: 782 TABLET, EFFERVESCENT ORAL at 14:09

## 2022-12-10 RX ADMIN — THIAMINE HYDROCHLORIDE: 100 INJECTION, SOLUTION INTRAMUSCULAR; INTRAVENOUS at 09:25

## 2022-12-10 RX ADMIN — ENOXAPARIN SODIUM 40 MG: 100 INJECTION SUBCUTANEOUS at 09:25

## 2022-12-10 RX ADMIN — MIDODRINE HYDROCHLORIDE 5 MG: 5 TABLET ORAL at 07:30

## 2022-12-10 RX ADMIN — IPRATROPIUM BROMIDE AND ALBUTEROL SULFATE 1 AMPULE: 2.5; .5 SOLUTION RESPIRATORY (INHALATION) at 03:06

## 2022-12-10 RX ADMIN — FAMOTIDINE 20 MG: 20 TABLET, FILM COATED ORAL at 09:24

## 2022-12-10 RX ADMIN — CHLORHEXIDINE GLUCONATE 0.12% ORAL RINSE 15 ML: 1.2 LIQUID ORAL at 07:44

## 2022-12-10 RX ADMIN — MIDODRINE HYDROCHLORIDE 5 MG: 5 TABLET ORAL at 12:02

## 2022-12-10 RX ADMIN — IPRATROPIUM BROMIDE AND ALBUTEROL SULFATE 1 AMPULE: 2.5; .5 SOLUTION RESPIRATORY (INHALATION) at 07:29

## 2022-12-10 RX ADMIN — METOCLOPRAMIDE HYDROCHLORIDE 10 MG: 5 INJECTION INTRAMUSCULAR; INTRAVENOUS at 01:15

## 2022-12-10 RX ADMIN — ANTI-FUNGAL POWDER MICONAZOLE NITRATE TALC FREE: 1.42 POWDER TOPICAL at 08:00

## 2022-12-10 RX ADMIN — BUDESONIDE 500 MCG: 0.5 SUSPENSION RESPIRATORY (INHALATION) at 07:29

## 2022-12-10 RX ADMIN — IPRATROPIUM BROMIDE AND ALBUTEROL SULFATE 1 AMPULE: 2.5; .5 SOLUTION RESPIRATORY (INHALATION) at 14:57

## 2022-12-10 RX ADMIN — POTASSIUM BICARBONATE 40 MEQ: 782 TABLET, EFFERVESCENT ORAL at 05:11

## 2022-12-10 RX ADMIN — DILTIAZEM HYDROCHLORIDE 60 MG: 60 TABLET, FILM COATED ORAL at 01:15

## 2022-12-10 RX ADMIN — METOPROLOL TARTRATE 5 MG: 5 INJECTION, SOLUTION INTRAVENOUS at 12:45

## 2022-12-10 RX ADMIN — FUROSEMIDE 40 MG: 10 INJECTION, SOLUTION INTRAMUSCULAR; INTRAVENOUS at 09:25

## 2022-12-10 ASSESSMENT — PULMONARY FUNCTION TESTS
PIF_VALUE: 17
PIF_VALUE: 6
PIF_VALUE: 17
PIF_VALUE: 17
PIF_VALUE: 19
PIF_VALUE: 19
PIF_VALUE: 15
PIF_VALUE: 17

## 2022-12-10 ASSESSMENT — PAIN SCALES - WONG BAKER

## 2022-12-10 NOTE — PLAN OF CARE
Problem: Discharge Planning  Goal: Discharge to home or other facility with appropriate resources  Outcome: Progressing     Problem: Pain  Goal: Verbalizes/displays adequate comfort level or baseline comfort level  Outcome: Progressing     Problem: Skin/Tissue Integrity  Goal: Absence of new skin breakdown  Description: 1. Monitor for areas of redness and/or skin breakdown  2. Assess vascular access sites hourly  3. Every 4-6 hours minimum:  Change oxygen saturation probe site  4. Every 4-6 hours:  If on nasal continuous positive airway pressure, respiratory therapy assess nares and determine need for appliance change or resting period. Outcome: Progressing     Problem: Safety - Adult  Goal: Free from fall injury  Outcome: Progressing  Flowsheets (Taken 12/10/2022 0426)  Free From Fall Injury: Instruct family/caregiver on patient safety     Problem: ABCDS Injury Assessment  Goal: Absence of physical injury  Outcome: Progressing  Flowsheets (Taken 12/10/2022 0426)  Absence of Physical Injury: Implement safety measures based on patient assessment     Problem: Safety - Medical Restraint  Goal: Remains free of injury from restraints (Restraint for Interference with Medical Device)  Description: INTERVENTIONS:  1. Determine that other, less restrictive measures have been tried or would not be effective before applying the restraint  2. Evaluate the patient's condition at the time of restraint application  3. Inform patient/family regarding the reason for restraint  4.  Q2H: Monitor safety, psychosocial status, comfort, nutrition and hydration  Outcome: Progressing     Problem: Nutrition Deficit:  Goal: Optimize nutritional status  Outcome: Progressing     Problem: Respiratory - Adult  Goal: Achieves optimal ventilation and oxygenation  Outcome: Progressing     Problem: Cardiovascular - Adult  Goal: Maintains optimal cardiac output and hemodynamic stability  Outcome: Progressing

## 2022-12-10 NOTE — PROGRESS NOTES
Date:   12/10/2022  Patient name: Kp Lincoln  Date of admission:  11/22/2022 12:17 PM  MRN:   [id number]  YOB: 1965  PCP: JAEL Bowman CNP    Reason for Admission: Respiratory failure [facility]) [J96.90]  Septic shock ([facility] [address] [A41.9, R65.21]  Acute on chronic respiratory failure with hypoxia [facility]) [J96.21]    Cardiology follow up: Episodes of sinus tachycardia, sinus bradycardia, nonsustained V. tach 5-7 beats, ventricular rate about 110       Referring physician: Dr Viktor Castro  Episodes of sinus tach most likely physiological  Episodes of nonsustained bradycardia most likely vasovagal  Episodes of nonsustained V. tach 6-7 beats heart rate above 110/hypoxia hypokalemia  Respiratory failure/severe COPD secondary to smoking/on ventilator  History of lung mass/suspected lung cancer  History of COVID-19 infection  Respiratory failure, status post tracheostomy 12/7/2022  Status post PEG tube placement 12/7/2022  History of alcohol abuse  Protein malnutrition, albumin 2.6  Anemia, thrombocytopenia     Past surgical history include bilateral hip replacement, neck surgery, vasectomy     2D echo 9/14/2022  Normal LV size and wall thickness ejection fraction more than 55%  No significant valvular abnormality     ECG 9/13/2022  Sinus tachycardia otherwise normal ECG     Chest x-ray 11/23/2022  Unchanged masslike opacity at the left base, mildly improved patchy opacity at the right base  Endotracheal tube and enteric tubes remain in place, heart size is within normal limit     History of present illness    48-year-old male a nursing home resident with a past medical history of severe COPD, CA lung got readmitted on 11/22/2022 with the respiratory failure, altered mental status and he required intubation and vent support. He got extubated on 11/24/2022. He was recently admitted at [facility] with respiratory failure required vent support.   He has been on beta-blockers and Cardizem, steroids, bronchodilators. On continuous ECG monitoring episodes of sinus tachycardia noted. Occasionally he gets transient sinus bradycardia. He has had a few episodes of nonsustained broad complex rhythm heart rate about 110, 6-7 beats. No syncope no chest pain.      On admission lab work showed   high-sensitivity troponin 25 and 29  Hemoglobin 11.1, WBC 20.5, platelets 509  Sodium 132, potassium 4.0, CO2 35, BUN 12, creatinine 0.40, glucose 145, calcium 9.0     Current evaluation  Patient seen and examined in ICU   Very frail looking male  He was awake but difficult to communicate  He did deny any chest pain or abdominal pain  Was on trach collar, tracheal secretions noted  He was also on PEG tube feeding  ECG monitor sinus rhythm sinus tachycardia heart rate 108    Potassium 3.2, sodium 141, BUN 21, creatinine less than 0.40, magnesium 1.5  Hemoglobin 9.0, WBC 12.8, platelets 80    Medications:   Scheduled Meds:   furosemide  40 mg IntraVENous BID    famotidine  20 mg Oral BID    metoprolol tartrate  25 mg Oral BID    dilTIAZem  60 mg Oral 4 times per day    methylPREDNISolone  40 mg IntraVENous Q12H    midodrine  5 mg Oral TID WC    miconazole   Topical BID    metoclopramide  10 mg IntraVENous Q6H    thiamine and folic acid IVPB   IntraVENous Daily    nicotine  1 patch TransDERmal Daily    [Held by provider] metoprolol tartrate  25 mg Oral BID    sodium chloride flush  5-40 mL IntraVENous 2 times per day    sodium chloride flush  5-40 mL IntraVENous 2 times per day    enoxaparin  40 mg SubCUTAneous Daily    ipratropium-albuterol  1 ampule Inhalation Q4H    budesonide  0.5 mg Nebulization BID     Continuous Infusions:   sodium chloride 100 mL/hr at 12/08/22 1525    sodium chloride      sodium chloride       CBC:   Recent Labs     12/08/22  0428 12/09/22  0431 12/10/22  0355   WBC 9.3 14.9* 12.8*   HGB 9.0* 10.3* 9.0*   PLT 94* 100* 80*     BMP:    Recent Labs     12/08/22  0428 12/08/22  1705 12/09/22  0431 12/10/22  0355 12/10/22  1059     --  141 141  --    K 2.8*   < > 3.1* 2.5* 3.2*     --  101 97*  --    CO2 33*  --  35* 38*  --    BUN 24*  --  21* 21*  --    CREATININE <0.40*  --  <0.40* <0.40*  --    GLUCOSE 115*  --  108* 141*  --     < > = values in this interval not displayed. Hepatic: No results for input(s): AST, ALT, ALB, BILITOT, ALKPHOS in the last 72 hours. Troponin: No results for input(s): TROPONINI in the last 72 hours. BNP: No results for input(s): BNP in the last 72 hours. Lipids: No results for input(s): CHOL, HDL in the last 72 hours. Invalid input(s): LDLCALCU  INR: No results for input(s): INR in the last 72 hours. Objective:   Vitals: /79   Pulse (!) 104   Temp 97.9 °F (36.6 °C) (Axillary)   Resp 27   Ht 6' 1\" (1.854 m)   Wt 204 lb 9.4 oz (92.8 kg)   SpO2 95%   BMI 26.99 kg/m²   General appearance: Tired looking male  HEENT: Head: Normal, normocephalic, atraumatic. Neck:  Tracheostomy noted  Lungs: diminished breath sounds bilaterally  Heart: regular rate and rhythm  Abdomen:  Obese PEG tube noted bowel sounds present  Extremities: Homans sign is negative, no sign of DVT, cold feet  Neurologic: Mental status: Awake difficult to communicate    EKG: normal sinus rhythm. Sinus tachycardia  ECHO: reviewed.    Ejection fraction: 55%    Assessment / Acute Cardiac Problems:   Sinus tachycardia most likely physiological  Episodes of bradycardia most likely vagal effect  Nonsustained broad complex rhythm/V. tach, heart rate up to 110 most likely secondary to hypoxia, hypokalemia  Normal LV systolic function  Severe COPD, hypoxia, respiratory failure requiring vent support  Lung mass suspected carcinoma lung  Tracheostomy and PEG tube placement 12/7/2022    12/10/2022 he was off the ventilator he was on trach collar very tired, continue to have sinus tachycardia, tolerating PEG tube feeding    Patient Active Problem List:     Cellulitis of b/l lower extremity 82 Cellulitis of lower extremity     Alcoholism ([facility]     Essential hypertension     Hyperkeratosis of b/l [place]     Alcohol withdrawal (HCC)     Tinea pedis of both feet     Suicidal ideation     Dyspnea     COPD exacerbation (HCC)     Gastroesophageal reflux disease without esophagitis     Cigarette smoker     Avascular necrosis of femoral head (HCC)     Tachycardia     Stage 3 severe COPD by GOLD classification (HCC)     Tobacco dependence     Chronic obstructive pulmonary disease (HCC)     Lung malignancy (HCC)     Cough with hemoptysis     Sepsis ([address]     COVID-19     Mass of lingula of lung     Hyponatremia     Respiratory failure ([facility]      Plan of Treatment:   Medications reviewed  Replace magnesium and potassium, keep potassium above 4 and magnesium above 2  Continue current dose of midodrine, IV Lasix,  Cardizem 60 mg every 6 hours, current dose of steroids  Continue current dose of subcu Lovenox  Patient is also on antibiotics and bronchodilators  Patient need frequent tracheal aspiration replace    Prognosis remains guarded      Electronically signed by Noemí Herndon MD on 12/10/2022 at 4:05 PM

## 2022-12-10 NOTE — PROGRESS NOTES
Patient's daughter, Bang Vega, called up to unit to receive update on patient's progress, post trach. Writer answered all questions and updated daughter on patient's progress. Daughter satisfied and thankful.

## 2022-12-10 NOTE — CARE COORDINATION
ONGOING DISCHARGE PLAN:    Patient is alert and oriented x4. Spoke with patient regarding discharge plan and Pt. Was informed that he will DC today to [facility] today at [address], [address] [place] [address]. Writer spoke to Chavo, from [employer], he is still ok to DC today. Report will need to be called to [phone]. Writer will fax [facility]/DC med list to [phone], when completed. Daughter spoke to Pt's Daughter, Carito Diaz, & informed of DC today. She did speak to Chavo today as well. Will continue to follow for additional discharge needs.     Electronically signed by Claudia Hanson RN on 12/10/2022 at 11:51 AM

## 2022-12-10 NOTE — PROGRESS NOTES
Dr Tobi Meadows updated that patient placed back on vent support. No new orders at this time continue monitor.

## 2022-12-10 NOTE — PROGRESS NOTES
Comprehensive Nutrition Assessment    Type and Reason for Visit:  Reassess, Consult (tube feeding ordering and management)    Nutrition Recommendations/Plan:   Continue NPO status. Continue Vital AF 1.2 via PEG. Increase rate to 20 ml every 4 hours to goal rate 72 ml/hr. Monitor tube feeding rate, tolerance, weight, and labs. Malnutrition Assessment:  Malnutrition Status: At risk for malnutrition (Comment) (11/28/22 1350)    Context:  Acute Illness     Findings of the 6 clinical characteristics of malnutrition:  Energy Intake:  No significant decrease in energy intake  Weight Loss:  No significant weight loss     Body Fat Loss:  Unable to assess     Muscle Mass Loss:  Unable to assess    Fluid Accumulation:  Mild Extremities   Strength:  Not Performed    Nutrition Assessment:    Patient is status post has tracheostomy with trache collar and PEG placement 12/7/22. Tube feeding running at 40 ml/hr and tolerated. Consult received for tube feeding goal rate. Patient likely to be discharged to [facility] later today. Continue with Vital AF 1.2 via PEG to goal rate 72 ml/hr. Nutrition Related Findings:    Edema: +2 pitting RUE, LUE, generalized, +1 pitting RLE, LLE. Bowel sounds active. Last BM 12/10. Labs and meds reviewed. Potassium 2.5. Received potassium repalcement.  Wound Type: None       Current Nutrition Intake & Therapies:    Average Meal Intake: NPO  Average Supplements Intake: NPO  Diet NPO  ADULT TUBE FEEDING; PEG; Peptide Based; Continuous; 40; No; 10; Q 8 hours  Current Tube Feeding (TF) Orders:   Feeding Route: PEG  Formula: Peptide Based  Schedule: Continuous  Additives/Modulars: None  Water Flushes: 100 ml every 8 hours and per nursing discretion  Current TF & Flush Orders Provides: see below  Goal TF & Flush Orders Provides: 2102 kcal, 131 gm protein, 1721 ml total free fluids     Anthropometric Measures:  Height: 6' 1\" (185.4 cm)  Ideal Body Weight (IBW): 184 lbs (84 kg)    Admission Body Weight: 185 lb (83.9 kg)  Current Body Weight: 204 lb (92.5 kg),   IBW. Weight Source: Bed Scale  Current BMI (kg/m2): 26.9  Usual Body Weight: 201 lb (91.2 kg) (8/22)  % Weight Change (Calculated): -1.5                    BMI Categories: Overweight (BMI 25.0-29. 9)    Estimated Daily Nutrient Needs:  Energy Requirements Based On: Formula  Weight Used for Energy Requirements: Admission  Energy (kcal/day): [place] x 1.2= 3942-7142 kcal  Weight Used for Protein Requirements: Admission  Protein (g/day): 126-143 gm protein based on 1.5-1.7 gm/kg       Nutrition Diagnosis:   Inadequate oral intake related to impaired respiratory function as evidenced by NPO or clear liquid status due to medical condition, nutrition support - enteral nutrition, lab values    Nutrition Interventions:   Food and/or Nutrient Delivery: Continue NPO, Modify Tube Feeding  Nutrition Education/Counseling: Education not indicated  Coordination of Nutrition Care: Continue to monitor while inpatient       Goals:  Previous Goal Met: Progressing toward Goal(s)  Goals:  Tolerate nutrition support at goal rate, Meet at least 75% of estimated needs       Nutrition Monitoring and Evaluation:      Food/Nutrient Intake Outcomes: Enteral Nutrition Intake/Tolerance  Physical Signs/Symptoms Outcomes: Biochemical Data, GI Status, Fluid Status or Edema, Skin, Weight    Discharge Planning:    Enteral Nutrition       Some areas of assessment may be incomplete due to COVID-19 precautions    Cheryl Puente RD, LD  Office phone [phone]

## 2022-12-10 NOTE — FLOWSHEET NOTE
Dr. Celeste Colón notified about patient's hypotension that has persisted for the past hour. New orders provided. Patient to receive 500 NS bolus and 5mg midodrine TID.

## 2022-12-10 NOTE — PLAN OF CARE
Problem: Discharge Planning  Goal: Discharge to home or other facility with appropriate resources  12/10/2022 1646 by Eduin Hansen RN  Outcome: Completed  Flowsheets  Taken 12/10/2022 1600  Discharge to home or other facility with appropriate resources: Identify barriers to discharge with patient and caregiver  Taken 12/10/2022 0800  Discharge to home or other facility with appropriate resources: Identify barriers to discharge with patient and caregiver  12/10/2022 0428 by Tina Schilder, RN  Outcome: Progressing     Problem: Pain  Goal: Verbalizes/displays adequate comfort level or baseline comfort level  12/10/2022 1646 by Eduin Hansen RN  Outcome: Completed  12/10/2022 0428 by Tina Schilder, RN  Outcome: Progressing     Problem: Safety - Medical Restraint  Goal: Remains free of injury from restraints (Restraint for Interference with Medical Device)  Description: INTERVENTIONS:  1. Determine that other, less restrictive measures have been tried or would not be effective before applying the restraint  2. Evaluate the patient's condition at the time of restraint application  3. Inform patient/family regarding the reason for restraint  4. Q2H: Monitor safety, psychosocial status, comfort, nutrition and hydration  12/10/2022 1646 by Eduin Hansen RN  Outcome: Completed  12/10/2022 0428 by Tina Schilder, RN  Outcome: Progressing     Problem: Skin/Tissue Integrity  Goal: Absence of new skin breakdown  Description: 1. Monitor for areas of redness and/or skin breakdown  2. Assess vascular access sites hourly  3. Every 4-6 hours minimum:  Change oxygen saturation probe site  4. Every 4-6 hours:  If on nasal continuous positive airway pressure, respiratory therapy assess nares and determine need for appliance change or resting period.   12/10/2022 1646 by Eduin Hansen RN  Outcome: Completed  12/10/2022 0428 by Tina Schilder, RN  Outcome: Progressing     Problem: Safety - Adult  Goal: Free from fall injury  12/10/2022 1646 by Scout aHmmond RN  Outcome: Completed  Flowsheets (Taken 12/10/2022 0800)  Free From Fall Injury: Instruct family/caregiver on patient safety  12/10/2022 0428 by Jimmy Lozano RN  Outcome: Progressing  Flowsheets (Taken 12/10/2022 0426)  Free From Fall Injury: Instruct family/caregiver on patient safety     Problem: ABCDS Injury Assessment  Goal: Absence of physical injury  12/10/2022 1646 by Scout Hammond RN  Outcome: Completed  Flowsheets (Taken 12/10/2022 0800)  Absence of Physical Injury: Implement safety measures based on patient assessment  12/10/2022 0428 by Jimmy Lozano RN  Outcome: Progressing  Flowsheets (Taken 12/10/2022 0426)  Absence of Physical Injury: Implement safety measures based on patient assessment     Problem: Nutrition Deficit:  Goal: Optimize nutritional status  12/10/2022 1646 by Scout Hammond RN  Outcome: Completed  12/10/2022 0428 by Jimmy Lozano RN  Outcome: Progressing     Problem: Respiratory - Adult  Goal: Achieves optimal ventilation and oxygenation  12/10/2022 1646 by Scout Hammond RN  Outcome: Completed  Flowsheets  Taken 12/10/2022 1200  Achieves optimal ventilation and oxygenation: Assess for changes in respiratory status  Taken 12/10/2022 0800  Achieves optimal ventilation and oxygenation: Assess for changes in respiratory status  12/10/2022 0428 by Jimmy Lozano RN  Outcome: Progressing     Problem: Cardiovascular - Adult  Goal: Maintains optimal cardiac output and hemodynamic stability  12/10/2022 1646 by Scout Hammond RN  Outcome: Completed  12/10/2022 0428 by Jimmy Lozano RN  Outcome: Progressing

## 2022-12-10 NOTE — PROGRESS NOTES
Pulmonary Critical Care Progress Note  Oh Whitfield MD     Patient seen for the follow up of  Respiratory failure [facility])     Subjective:  Patient lying comfortably in the bed. Earlier he had low oxygen saturation and his FiO2 was increased from 35% to 45%. He has tracheal secretions. He is on trach collar at this time. He denies any chest pain. Examination:  Vitals: /64   Pulse (!) 120   Temp 98.6 °F (37 °C) (Oral)   Resp 30   Ht 6' 1\" (1.854 m)   Wt 204 lb 9.4 oz (92.8 kg)   SpO2 97%   BMI 26.99 kg/m²   General appearance: alert and cooperative with exam  Neck: No JVD  Lungs: Diminished air exchange, no crackles or wheezing  Heart: regular rate and rhythm, S1, S2 normal, no gallop  Abdomen: Soft, non tender, + BS  Extremities: no cyanosis or clubbing.  Positive edema    LABs:  CBC:   Recent Labs     12/09/22  0431 12/10/22  0355   WBC 14.9* 12.8*   HGB 10.3* 9.0*   HCT 30.9* 27.4*   * 80*     BMP:   Recent Labs     12/09/22  0431 12/10/22  0355    141   K 3.1* 2.5*   CO2 35* 38*   BUN 21* 21*   CREATININE <0.40* <0.40*   LABGLOM Can not be calculated Can not be calculated   GLUCOSE 108* 141*       ABG:  Lab Results   Component Value Date/Time    PHART 7.586 12/10/2022 04:56 AM    GWC8HDA 43.3 12/10/2022 04:56 AM    PO2ART 70.8 12/10/2022 04:56 AM    OAV6ZHP 41.1 12/10/2022 04:56 AM    J3GKFTFZ 93.9 12/10/2022 04:56 AM    FIO2 35 12/10/2022 04:56 AM       Lab Results   Component Value Date/Time    PHART 7.586 12/10/2022 04:56 AM    OUO9BTL 43.3 12/10/2022 04:56 AM    PO2ART 70.8 12/10/2022 04:56 AM    DRZ9WCY 41.1 12/10/2022 04:56 AM    NBEA 3.0 08/28/2022 08:20 PM    PBEA 19.3 12/10/2022 04:56 AM    A0FDIKOE 93.9 12/10/2022 04:56 AM    FIO2 35 12/10/2022 04:56 AM     Radiology:  Chest Xray   12/6/22      Impression:  Acute hypoxic respiratory failure  Acute exacerbation of COPD  Pneumonia  Suspected lung cancer   Alcohol abuse  Hypokalemia     Recommendations:  Continue vent support as needed, currently on trach collar at 35%  IV solu-medrol 40 mg every 12 hours  Midodrine 5 mg 3 times a day  IV fluids at 100 ml/hr  IV lasix 40 mg 2 times daily  Tolerating tube feeds  Replace potassium  Albuterol and Ipratropium Q 4 hours and prn  Pulmicort  GI prophylaxis with Pepcid   DVT prophylaxis with low molecular weight heparin  OK for discharge planning from pulmonary stand point to LTAC. Plan was discussed with patient and RN. They understands it.     Electronically signed by     Annabelle Mccauley MD on 12/10/2022 at 10:31 AM  Pulmonary Critical Care and Sleep Medicine,  [facility]  Cell: [phone]  Office: [phone]

## 2022-12-10 NOTE — CARE COORDINATION
Continuity of Care Form    Patient Name: Alek Lau   :  1965  MRN:  [id number]    Admit date:  2022  Discharge date:  12/10/22    Code Status Order: DNR-CCA   Advance Directives:     Admitting Physician:  Shelly Brady MD  PCP: Carl Fay, APRN - CNP    Discharging Nurse: Marcos Santana [facility] Unit/Room#:   Discharging Unit Phone Number: [phone]    Emergency Contact:   Extended Emergency Contact Information  Primary Emergency Contact: Meleольга Aase  Address: [place] [address] [address] [place] [address] Phone: [phone]  Relation: Child    Past Surgical History:  Past Surgical History:   Procedure Laterality Date    NECK SURGERY      TOTAL HIP ARTHROPLASTY Bilateral     VASECTOMY         Immunization History:   Immunization History   Administered Date(s) Administered    COVID-19, PFIZER PURPLE top, DILUTE for use, (age 15 y+), 30mcg/0.3mL 2021, 2021    Influenza, FLUCELVAX, (age 10 mo+), MDCK, PF, 0.5mL 2021    Td (Adult), 5 Lf Tetanus Toxoid, Pf (Tenivac, Decavac) 1998    Tdap (Boostrix, Adacel) 10/24/2014       Active Problems:  Patient Active Problem List   Diagnosis Code    Cellulitis of b/l lower extremity L03.116    Cellulitis of lower extremity L03.119    Alcoholism ([facility] F10.20    Essential hypertension I10    Hyperkeratosis of b/l Soles Q82.8    Alcohol withdrawal ([facility] F10.939    Tinea pedis of both feet B35.3    Suicidal ideation R45.851    Dyspnea R06.00    COPD exacerbation (HCC) J44.1    Gastroesophageal reflux disease without esophagitis K21.9    Cigarette smoker F17.210    Avascular necrosis of femoral head (HCC) M87.059    Tachycardia R00.0    Stage 3 severe COPD by GOLD classification ([facility] J44.9    Tobacco dependence F17.200    Chronic obstructive pulmonary disease ([address] J44.9    Lung malignancy ([facility] C34.90    Cough with hemoptysis R04.2    Sepsis ([facility] A41.9    COVID-19 U07.1    Mass of lingula of lung R91.8    Hyponatremia E87.1    Respiratory failure ([facility] J96.90       Isolation/Infection:   Isolation            No Isolation          Patient Infection Status       Infection Onset Added Last Indicated Last Indicated By Review Planned Expiration Resolved Resolved By    None active    Resolved    COVID-19 (Rule Out) 22 COVID-19 & Influenza Combo (Ordered)   22 Rule-Out Test Resulted    COVID-19 22 COVID-19, Rapid   09/15/22 Suzette Beckman RN    Pt was + on 2022    COVID-19 (Rule Out) 22 COVID-19, Rapid (Ordered)   22 Rule-Out Test Resulted    COVID-19 22 Respiratory Panel, Molecular, with COVID-19 (Restricted: peds pts or suitable admitted adults)   22     COVID-19 (Rule Out) 22 Respiratory Panel, Molecular, with COVID-19 (Restricted: peds pts or suitable admitted adults) (Ordered)   22 Rule-Out Test Resulted    COVID-19 (Rule Out) 10/28/20 10/28/20 10/30/20 COVID-19 (Ordered)   10/30/20 Rule-Out Test Resulted    COVID-19 (Rule Out) 10/28/20 10/28/20 10/28/20 COVID-19 (Ordered)   10/28/20 Rule-Out Test Resulted            Nurse Assessment:  Last Vital Signs: BP (!) 88/58   Pulse (!) 113   Temp 98.4 °F (36.9 °C) (Axillary)   Resp 18   Ht 6' 1\" (1.854 m)   Wt 185 lb 10 oz (84.2 kg)   SpO2 100%   BMI 24.49 kg/m²     Last documented pain score (0-10 scale): Pain Level: 0  Last Weight:   Wt Readings from Last 1 Encounters:   22 185 lb 10 oz (84.2 kg)     Mental Status:  oriented    IV Access:  - PICC - site  L Cephalic, insertion date: 22    Nursing Mobility/ADLs:  Walking   Dependent  Transfer  Dependent  Bathing  Dependent  Dressing  Dependent  Toileting  Dependent  Feeding  Dependent  Med Admin  Dependent  Med Delivery   crushed via PEG tube    Wound Care Documentation and Therapy:        Elimination:  Continence:    Bowel: No  Bladder: No  Urinary Catheter: Indication for Use of Catheter: Acute urinary retention/obstruction   Colostomy/Ileostomy/Ileal Conduit: No       Date of Last BM: 12/10/22    Intake/Output Summary (Last 24 hours) at 11/29/2022 1557  Last data filed at 11/29/2022 0534  Gross per 24 hour   Intake 3606.92 ml   Output 2825 ml   Net 781.92 ml     I/O last 3 completed shifts: In: 3606.9 [I.V.:598.5; NG/GT:60; IV Piggyback:2948.5]  Out: 3100 [Urine:2575; Emesis/NG output:525]    Safety Concerns: At Risk for Falls    Impairments/Disabilities:      Speech    Nutrition Therapy:  Current Nutrition Therapy:   - Tube Feedings:  Vital AF    Routes of Feeding: Gastrostomy Tube  Liquids: No Liquids  Daily Fluid Restriction: no  Last Modified Barium Swallow with Video (Video Swallowing Test): not done    Treatments at the Time of Hospital Discharge:   Respiratory Treatments: DuoNeb q4h while awake. Pulmicort BID  Oxygen Therapy:  Trach Collar @ 35%  Ventilator:    - Ventilator Settings:  AS NEEDED  Vt (Set, mL): 500 mL  Resp Rate (Set): 16 bmp  FiO2 : 35 %    PEEP/CPAP (cmH2O): 5       Rehab Therapies: Physical Therapy and Occupational Therapy  Weight Bearing Status/Restrictions: No weight bearing restrictions  Other Medical Equipment (for information only, NOT a DME order):  hospital bed  Other Treatments: Skilled Nursing assessment and monitoring. Medication education and monitoring per protocol. Patient's personal belongings (please select all that are sent with patient):  Glasses, tablet, phone, chargers.     RN SIGNATURE:  Electronically signed by Nu Pope RN on 12/10/22 at 11:30 AM EST    CASE MANAGEMENT/SOCIAL WORK SECTION    Inpatient Status Date:     Readmission Risk Assessment Score:  Readmission Risk              Risk of Unplanned Readmission:  31           Discharging to [facility]   [address] [facility], [address]  Phone: [phone]  Fax: [phone]     Pt came from SNF, refer at discharge:  [facility]   [address]  [place], [address]  Phone: [phone]  Fax: [phone]       Dialysis Facility (if applicable)   Name:  Address:  Dialysis Schedule:  Phone:  Fax:    / signature: Electronically signed by Delvin Forbes RN on 11/29/22 at 3:58 PM EST    PHYSICIAN SECTION    Prognosis: Guarded    Condition at Discharge: Unstable    Rehab Potential (if transferring to Rehab): Guarded    Recommended Labs or Other Treatments After Discharge: no    Physician Certification: I certify the above information and transfer of Kiel Love  is necessary for the continuing treatment of the diagnosis listed and that he requires LTAC for less 30 days. Update Admission H&P: No change in H&P    PHYSICIAN SIGNATURE:  Electronically signed by JAEL Yousif NP on 12/10/22 at 12:59 PM EST    Medication List    START taking these medications    START taking these medications   chlorhexidine 0.12 % solution  Commonly known as: PERIDEX  Take 15 mLs by mouth 2 times daily for 14 days   dextromethorphan-guaiFENesin  MG/5ML syrup  Commonly known as: ROBITUSSIN-DM  Take 5 mLs by mouth every 4 hours as needed for Cough   dilTIAZem 60 MG tablet  Commonly known as: CARDIZEM  Take 1 tablet by mouth 4 times daily  Replaces: dilTIAZem 120 MG extended release capsule   enoxaparin 40 MG/0.4ML  Commonly known as: LOVENOX  Inject 0.4 mLs into the skin daily  Start taking on: December 11, 2022   furosemide 10 MG/ML injection  Commonly known as: LASIX  Infuse 4 mLs intravenously in the morning and 4 mLs in the evening. ipratropium-albuterol 0.5-2.5 (3) MG/3ML Soln nebulizer solution  Commonly known as: DUONEB  Inhale 3 mLs into the lungs every 4 hours   methylPREDNISolone sodium 40 MG injection  Commonly known as: SOLU-MEDROL  Infuse 1 mL intravenously in the morning and 1 mL in the evening.    metoclopramide 5 MG/ML injection  Commonly known as: REGLAN  Infuse 2 mLs intravenously in the morning and 2 mLs at noon and 2 mLs in the evening and 2 mLs before bedtime. miconazole 2 % powder  Commonly known as: MICOTIN  Apply topically 2 times daily. midodrine 5 MG tablet  Commonly known as: PROAMATINE  Take 1 tablet by mouth 3 times daily (with meals)   ondansetron 4 MG disintegrating tablet  Commonly known as: ZOFRAN-ODT  Take 1 tablet by mouth every 8 hours as needed for Nausea or Vomiting     CHANGE how you take these medications    CHANGE how you take these medications   oxyCODONE-acetaminophen 5-325 MG per tablet  Commonly known as: PERCOCET  Take 1 tablet by mouth every 4 hours as needed for Pain for up to 3 days. What changed:   how much to take  when to take this  reasons to take this     CONTINUE taking these medications    CONTINUE taking these medications   acetaminophen 325 MG tablet  Commonly known as: TYLENOL  Take 650 mg by mouth every 4 hours as needed for Pain or Fever   albuterol sulfate  (90 Base) MCG/ACT inhaler  Commonly known as: ProAir HFA  Inhale 2 puffs into the lungs every 6 hours as needed for Wheezing   budesonide 0.5 MG/2ML nebulizer suspension  Commonly known as: PULMICORT  Take 1 ampule by nebulization 2 times daily   chlordiazePOXIDE 25 MG capsule  Commonly known as: LIBRIUM  Take 1 capsule by mouth 3 times daily as needed for Anxiety for up to 2 days.  For up to 3 days starting 11/22/22   guaiFENesin 600 MG extended release tablet  Commonly known as: MUCINEX  Take 600 mg by mouth 2 times daily   hydrOXYzine HCl 25 MG tablet  Commonly known as: ATARAX  Take 1 tablet by mouth at bedtime   isosorbide mononitrate 30 MG extended release tablet  Commonly known as: IMDUR  Take 30 mg by mouth daily   lisinopril 10 MG tablet  Commonly known as: PRINIVIL;ZESTRIL  Take 1 tablet by mouth daily   LORazepam 0.5 MG tablet  Commonly known as: ATIVAN  Take 0.5 mg by mouth every 4 hours as needed for Anxiety (or respiratory discomfort). metoprolol tartrate 25 MG tablet  Commonly known as: LOPRESSOR  Take 25 mg by mouth 2 times daily   morphine sulfate 20 MG/ML concentrated oral solution  Take 5 mg by mouth every hour as needed for Pain (or respiratory discomfort).    nicotine 21 MG/24HR  Commonly known as: [id number] [facility] 1 patch onto the skin every 24 hours   omeprazole 40 MG delayed release capsule  Commonly known as: PRILOSEC  Take 40 mg by mouth daily   potassium chloride 20 MEQ extended release tablet  Commonly known as: KLOR-CON M  Take 20 mEq by mouth 2 times daily   senna 8.6 MG tablet  Commonly known as: SENOKOT  Take 1 tablet by mouth 2 times daily as needed for Constipation   Trelegy Ellipta 200-62.5-25 MCG/ACT Aepb inhaler  Generic drug: fluticasone-umeclidin-vilant  INHALE 1 PUFF INTO THE LUNGS DAILY   vitamin B-1 100 MG tablet  Commonly known as: THIAMINE  Take 1 tablet by mouth daily     STOP taking these medications    STOP taking these medications   cefUROXime 500 MG tablet  Commonly known as: CEFTIN   dilTIAZem 120 MG extended release capsule  Commonly known as: CARDIZEM 12 HR  Replaced by: dilTIAZem 60 MG tablet   glucose 40 % Gel  Commonly known as: GLUTOSE   predniSONE 20 MG tablet  Commonly known as: DELTASONE   sennosides-docusate sodium 8.6-50 MG tablet  Commonly known as: SENOKOT-S   Where to Get Your Medications      These medications were sent to [facility] [address], [address]  [address] [place] [address]  Phone: [phone]       albuterol sulfate  (90 Base) MCG/ACT inhaler      You can get these medications from any pharmacy    Bring a paper prescription for each of these medications      chlordiazePOXIDE 25 MG capsule        chlorhexidine 0.12 % solution        furosemide 10 MG/ML injection        miconazole 2 % powder        midodrine 5 MG tablet        oxyCODONE-acetaminophen 5-325 MG per tablet        vitamin B-1 100 MG tablet      Information about where to get these medications is not yet available    Ask your nurse or doctor about these medications      dextromethorphan-guaiFENesin  MG/5ML syrup        dilTIAZem 60 MG tablet        enoxaparin 40 MG/0.4ML        ipratropium-albuterol 0.5-2.5 (3) MG/3ML Soln nebulizer solution        methylPREDNISolone sodium 40 MG injection        metoclopramide 5 MG/ML injection        ondansetron 4 MG disintegrating tablet             Printing Report    Report Name Print   Discharge Meds Print

## 2022-12-10 NOTE — PROGRESS NOTES
Pt picked up via [facility] to go to [facility]. Report given to EMT's. All belongings taken with patient including tablet, phone, chargers, and cane.

## 2022-12-10 NOTE — PROGRESS NOTES
Report called to [facility]. RN spoke with Jessica YORK. All questions were answered at the time. Pt requesting that I call his sister to inform her of him leaving. Varsha, Pt's sister, was informed and updated.

## 2022-12-10 NOTE — PROGRESS NOTES
12/10/22 1224   Encounter Summary   Encounter Overview/Reason  Spiritual/Emotional Needs   Service Provided For: Patient not available   Referral/Consult From: Palliative Care   Last Encounter  12/10/22   Complexity of Encounter Low   Spiritual/Emotional needs   Type Spiritual Support   Palliative Care   Type Palliative Care, Follow-up   Assessment/Intervention/Outcome   Assessment Unable to assess  (staff with pt)   Intervention Prayer (assurance of)/[place]  (outside room)

## 2022-12-10 NOTE — DISCHARGE SUMMARY
Discharge Summary    NAME:  Keysha Car  :  1965  MRN:  [id number]    Admit date:  2022  Discharge date:  12/10/2022    Admitting Physician:  Nimesh Erickson MD    Primary Diagnosis on Admission:   Present on Admission:   Respiratory failure ([address]   Alcohol withdrawal ([address]   COPD exacerbation ([address]   Tachycardia   Stage 3 severe COPD by GOLD classification ([address]   Hyponatremia   Tobacco dependence   Lung malignancy ([facility]      Secondary Diagnoses:  does not have any pertinent problems on file. Admission Condition:  critical     Discharged Condition: serious    Hospital Course: The patient was admitted for the management of Acute Hypoxic respiratory failure. Was intubated on admission. He has a long standing history of respiratory failure, nicotine dependence and alcohol abuse. He has a known lung mass, that he does not wish to seek treatment for. Has declined lung biopsy. Was extubated on  and placed on Bipap. Treated with IV Steroids. Pt refused to wear oxygen, and bipap. Was tachycardic during admission, IV lopressor pen. Episodes hypotension. Midiodrine added. Librium started for alcohol withdrawal. Pt continued to refuse Biap transferred back to ICU and placed on precedex drip. Started on Levophed, for continued hypotension. Was reintubated on . Hospital acquired pneumonia treated with IV vanco,. Extubated on , placed on Bipap shortly after. Reintubated . Trachesotomy on peg tube placed. Code status changed to [facility]. Tolerating trach collar. Will go to [facility]. Today on day of discharge pt feels better with no further complaints. Vitals and Labs are at pts baseline. All consultants involved during this admission are agreeable to d/c.     Consults:  cardiology, pulmonary/intensive care, and general surgery    Significant Diagnostic/theraputic interventions: Tracheostomy and Peg Tube placement       Disposition:   [facility]     Instructions to Patient:      Follow up with Charo Haque JAEL Barnett CNP in  2 weeks    Discharge Medications:       Medication List        START taking these medications      chlorhexidine 0.12 % solution  Commonly known as: PERIDEX  Take 15 mLs by mouth 2 times daily for 14 days     dextromethorphan-guaiFENesin  MG/5ML syrup  Commonly known as: ROBITUSSIN-DM  Take 5 mLs by mouth every 4 hours as needed for Cough     dilTIAZem 60 MG tablet  Commonly known as: CARDIZEM  Take 1 tablet by mouth 4 times daily  Replaces: dilTIAZem 120 MG extended release capsule     enoxaparin 40 MG/0.4ML  Commonly known as: LOVENOX  Inject 0.4 mLs into the skin daily  Start taking on: December 11, 2022     furosemide 10 MG/ML injection  Commonly known as: LASIX  Infuse 4 mLs intravenously in the morning and 4 mLs in the evening. ipratropium-albuterol 0.5-2.5 (3) MG/3ML Soln nebulizer solution  Commonly known as: DUONEB  Inhale 3 mLs into the lungs every 4 hours     methylPREDNISolone sodium 40 MG injection  Commonly known as: SOLU-MEDROL  Infuse 1 mL intravenously in the morning and 1 mL in the evening. metoclopramide 5 MG/ML injection  Commonly known as: REGLAN  Infuse 2 mLs intravenously in the morning and 2 mLs at noon and 2 mLs in the evening and 2 mLs before bedtime. miconazole 2 % powder  Commonly known as: MICOTIN  Apply topically 2 times daily. midodrine 5 MG tablet  Commonly known as: PROAMATINE  Take 1 tablet by mouth 3 times daily (with meals)     ondansetron 4 MG disintegrating tablet  Commonly known as: ZOFRAN-ODT  Take 1 tablet by mouth every 8 hours as needed for Nausea or Vomiting            CHANGE how you take these medications      oxyCODONE-acetaminophen 5-325 MG per tablet  Commonly known as: PERCOCET  Take 1 tablet by mouth every 4 hours as needed for Pain for up to 3 days.   What changed:   how much to take  when to take this  reasons to take this            CONTINUE taking these medications      acetaminophen 325 MG tablet  Commonly known as: TYLENOL     albuterol sulfate  (90 Base) MCG/ACT inhaler  Commonly known as: ProAir HFA  Inhale 2 puffs into the lungs every 6 hours as needed for Wheezing     budesonide 0.5 MG/2ML nebulizer suspension  Commonly known as: PULMICORT     chlordiazePOXIDE 25 MG capsule  Commonly known as: LIBRIUM  Take 1 capsule by mouth 3 times daily as needed for Anxiety for up to 2 days.  For up to 3 days starting 11/22/22     guaiFENesin 600 MG extended release tablet  Commonly known as: MUCINEX     hydrOXYzine HCl 25 MG tablet  Commonly known as: ATARAX  Take 1 tablet by mouth at bedtime     isosorbide mononitrate 30 MG extended release tablet  Commonly known as: IMDUR     lisinopril 10 MG tablet  Commonly known as: PRINIVIL;ZESTRIL  Take 1 tablet by mouth daily     LORazepam 0.5 MG tablet  Commonly known as: ATIVAN     metoprolol tartrate 25 MG tablet  Commonly known as: LOPRESSOR     morphine sulfate 20 MG/ML concentrated oral solution     nicotine 21 MG/24HR  Commonly known as: NICODERM CQ     omeprazole 40 MG delayed release capsule  Commonly known as: PRILOSEC     potassium chloride 20 MEQ extended release tablet  Commonly known as: KLOR-CON M     senna 8.6 MG tablet  Commonly known as: SENOKOT     Trelegy Ellipta 200-62.5-25 MCG/ACT Aepb inhaler  Generic drug: fluticasone-umeclidin-vilant  INHALE 1 PUFF INTO THE LUNGS DAILY     vitamin B-1 100 MG tablet  Commonly known as: THIAMINE  Take 1 tablet by mouth daily            STOP taking these medications      cefUROXime 500 MG tablet  Commonly known as: CEFTIN     dilTIAZem 120 MG extended release capsule  Commonly known as: CARDIZEM 12 HR  Replaced by: dilTIAZem 60 MG tablet     glucose 40 % Gel  Commonly known as: GLUTOSE     predniSONE 20 MG tablet  Commonly known as: DELTASONE     sennosides-docusate sodium 8.6-50 MG tablet  Commonly known as: SENOKOT-S               Where to Get Your Medications        These medications were sent to [facility] [address]  [address] [facility],  [address] [address]      Phone: [phone]   albuterol sulfate  (90 Base) MCG/ACT inhaler       You can get these medications from any pharmacy    Bring a paper prescription for each of these medications  chlordiazePOXIDE 25 MG capsule  chlorhexidine 0.12 % solution  furosemide 10 MG/ML injection  miconazole 2 % powder  midodrine 5 MG tablet  oxyCODONE-acetaminophen 5-325 MG per tablet  vitamin B-1 100 MG tablet       Information about where to get these medications is not yet available    Ask your nurse or doctor about these medications  dextromethorphan-guaiFENesin  MG/5ML syrup  dilTIAZem 60 MG tablet  enoxaparin 40 MG/0.4ML  ipratropium-albuterol 0.5-2.5 (3) MG/3ML Soln nebulizer solution  methylPREDNISolone sodium 40 MG injection  metoclopramide 5 MG/ML injection  ondansetron 4 MG disintegrating tablet         Send Copies to: JAEL Sanders CNP,       Note that over 30 minutes was spent in preparing discharge papers, discussing discharge with patient and family, medication review, etc.    Signed:  JAEL Antony NP  12/10/2022, 1:22 PM    Physician Lucie Angeles MD, have reviewed the note authored by the advance practice provider including history, review of systems,physical examination,medical decision making and agree with the assessment and plan as written.

## 2022-12-11 LAB
ABSOLUTE EOS #: 0 K/UL (ref 0–0.44)
ABSOLUTE IMMATURE GRANULOCYTE: 0.17 K/UL (ref 0–0.3)
ABSOLUTE LYMPH #: 0.35 K/UL (ref 1.1–3.7)
ABSOLUTE MONO #: 0.52 K/UL (ref 0.1–1.2)
BASOPHILS # BLD: 0 % (ref 0–2)
BASOPHILS ABSOLUTE: 0 K/UL (ref 0–0.2)
EOSINOPHILS RELATIVE PERCENT: 0 % (ref 1–4)
HCT VFR BLD CALC: 31 % (ref 40.7–50.3)
HEMOGLOBIN: 10 G/DL (ref 13–17)
IMMATURE GRANULOCYTES: 1 %
LYMPHOCYTES # BLD: 2 % (ref 24–43)
MCH RBC QN AUTO: 36.6 PG (ref 25.2–33.5)
MCHC RBC AUTO-ENTMCNC: 32.3 G/DL (ref 28.4–34.8)
MCV RBC AUTO: 113.6 FL (ref 82.6–102.9)
MONOCYTES # BLD: 3 % (ref 3–12)
MORPHOLOGY: ABNORMAL
NRBC AUTOMATED: 0.1 PER 100 WBC
PDW BLD-RTO: 13.7 % (ref 11.8–14.4)
PLATELET # BLD: 71 K/UL (ref 138–453)
PMV BLD AUTO: 13 FL (ref 8.1–13.5)
RBC # BLD: 2.73 M/UL (ref 4.21–5.77)
SEG NEUTROPHILS: 94 % (ref 36–65)
SEGMENTED NEUTROPHILS ABSOLUTE COUNT: 16.26 K/UL (ref 1.5–8.1)
WBC # BLD: 17.3 K/UL (ref 3.5–11.3)

## 2022-12-11 PROCEDURE — 85025 COMPLETE CBC W/AUTO DIFF WBC: CPT

## 2022-12-12 RX ORDER — BUDESONIDE AND FORMOTEROL FUMARATE DIHYDRATE 160; 4.5 UG/1; UG/1
AEROSOL RESPIRATORY (INHALATION)
Qty: 10.2 G | Refills: 3 | OUTPATIENT
Start: 2022-12-12

## 2022-12-17 DIAGNOSIS — G47.00 INSOMNIA, UNSPECIFIED TYPE: ICD-10-CM

## 2022-12-17 DIAGNOSIS — I10 ESSENTIAL HYPERTENSION: ICD-10-CM

## 2022-12-17 RX ORDER — HYDROXYZINE HYDROCHLORIDE 25 MG/1
25 TABLET, FILM COATED ORAL NIGHTLY
Qty: 30 TABLET | Refills: 1 | OUTPATIENT
Start: 2022-12-17

## 2022-12-17 RX ORDER — LISINOPRIL 10 MG/1
10 TABLET ORAL DAILY
Qty: 90 TABLET | Refills: 1 | OUTPATIENT
Start: 2022-12-17

## 2022-12-29 DIAGNOSIS — J44.9 STAGE 3 SEVERE COPD BY GOLD CLASSIFICATION (HCC): ICD-10-CM

## 2022-12-29 RX ORDER — METOPROLOL SUCCINATE 100 MG/1
TABLET, EXTENDED RELEASE ORAL
Qty: 30 TABLET | Refills: 3 | OUTPATIENT
Start: 2022-12-29

## 2022-12-29 RX ORDER — FLUTICASONE FUROATE, UMECLIDINIUM BROMIDE AND VILANTEROL TRIFENATATE 200; 62.5; 25 UG/1; UG/1; UG/1
POWDER RESPIRATORY (INHALATION)
Refills: 2 | OUTPATIENT
Start: 2022-12-29

## 2023-02-02 DIAGNOSIS — I10 ESSENTIAL HYPERTENSION: ICD-10-CM

## 2023-02-02 DIAGNOSIS — J44.9 STAGE 3 SEVERE COPD BY GOLD CLASSIFICATION (HCC): ICD-10-CM

## 2023-02-02 RX ORDER — LISINOPRIL 10 MG/1
10 TABLET ORAL DAILY
Qty: 90 TABLET | Refills: 0 | OUTPATIENT
Start: 2023-02-02

## 2023-02-02 RX ORDER — METOPROLOL SUCCINATE 100 MG/1
TABLET, EXTENDED RELEASE ORAL
Qty: 30 TABLET | Refills: 3 | OUTPATIENT
Start: 2023-02-02

## 2023-02-02 RX ORDER — FLUTICASONE FUROATE, UMECLIDINIUM BROMIDE AND VILANTEROL TRIFENATATE 200; 62.5; 25 UG/1; UG/1; UG/1
POWDER RESPIRATORY (INHALATION)
Refills: 2 | OUTPATIENT
Start: 2023-02-02

## 2023-02-05 DIAGNOSIS — J44.9 CHRONIC OBSTRUCTIVE PULMONARY DISEASE, UNSPECIFIED COPD TYPE (HCC): ICD-10-CM

## 2023-02-05 RX ORDER — ALBUTEROL SULFATE 90 UG/1
AEROSOL, METERED RESPIRATORY (INHALATION)
Qty: 8.5 G | Refills: 1 | OUTPATIENT
Start: 2023-02-05

## 2023-02-10 RX ORDER — SODIUM CHLORIDE 1000 MG
TABLET, SOLUBLE MISCELLANEOUS
Qty: 90 TABLET | Refills: 3 | OUTPATIENT
Start: 2023-02-10

## (undated) DEVICE — SUTURE VCRL + SZ 3-0 L27IN ABSRB UD L26MM SH 1/2 CIR VCP416H

## (undated) DEVICE — GOWN,AURORA,NONREINFORCED,LARGE: Brand: MEDLINE

## (undated) DEVICE — GEL US 20GM NONIRRITATING OVERWRAPPED FILE PCH TRNSMIT

## (undated) DEVICE — DRAPE,THYROID,SOFT,STERILE: Brand: MEDLINE

## (undated) DEVICE — SYRINGE, LUER LOCK, 10ML: Brand: MEDLINE

## (undated) DEVICE — ENDO KIT W/SYRINGE: Brand: MEDLINE INDUSTRIES, INC.

## (undated) DEVICE — YANKAUER,POOLE TIP,STERILE,50/CS: Brand: MEDLINE

## (undated) DEVICE — ST CHARLES MINOR ABDOMINAL PK: Brand: MEDLINE INDUSTRIES, INC.

## (undated) DEVICE — BITEBLOCK 54FR W/ DENT RIM BLOX

## (undated) DEVICE — SINGLE PORT MANIFOLD: Brand: NEPTUNE 2

## (undated) DEVICE — GLOVE ORANGE PI 7 1/2   MSG9075

## (undated) DEVICE — FORCEPS BX L240CM WRK CHN 2.8MM STD CAP W/ NDL MIC MESH

## (undated) DEVICE — TUBE TRACH AD L100MM OD12.3MM ID7MM DST EXTN CUF HI VOL LO

## (undated) DEVICE — COVER,MAYO STAND,STERILE: Brand: MEDLINE

## (undated) DEVICE — SUTURE PROL SZ 2-0 L30IN NONABSORBABLE BLU L26MM CT-2 1/2 8411H

## (undated) DEVICE — MERCY HEALTH ST CHARLES: Brand: MEDLINE INDUSTRIES, INC.

## (undated) DEVICE — MIC* SAFETY PERCUTANEOUS ENDOSCOPIC GASTROSTOMY PEG KIT - 20 FR - PUSH OTW: Brand: MIC PEG TUBE

## (undated) DEVICE — SWABSTICK MEDICATED 10% POVIDONE IOD PVP SGL ANTISEP SAT

## (undated) DEVICE — Z INACTIVE - UOM CHANGE USE 2854051 - SPONGE DRN W4XL4IN RAYON/POLYESTER 6 PLY NONWOVEN PRECUT

## (undated) DEVICE — GLOVE ORTHO 7 1/2   MSG9475

## (undated) DEVICE — SEALER LAP SM L18.8CM OPN JAW HAND/FOOT SWCH FORCETRIAD

## (undated) DEVICE — SUTURE ETHLN SZ 3-0 L18IN NONABSORBABLE BLK FS-1 L24MM 3/8 663H

## (undated) DEVICE — DEFENDO AIR WATER SUCTION AND BIOPSY VALVE KIT FOR  OLYMPUS: Brand: DEFENDO AIR/WATER/SUCTION AND BIOPSY VALVE

## (undated) DEVICE — SOLUTION IRRIG 1000ML STRL H2O USP PLAS POUR BTL

## (undated) DEVICE — SOLUTION IRRIG 1000ML 0.9% SOD CHL USP POUR PLAS BTL

## (undated) DEVICE — SUPERSET STRAIGHT CATHETER MOUNT 22F-22M/15F >= 70MM-150MM: Brand: SUPERSET STRAIGHT CATHETER MOUNT 22F-22M/15F >= 70MM-150MM